# Patient Record
Sex: FEMALE | Race: WHITE | NOT HISPANIC OR LATINO | Employment: OTHER | URBAN - METROPOLITAN AREA
[De-identification: names, ages, dates, MRNs, and addresses within clinical notes are randomized per-mention and may not be internally consistent; named-entity substitution may affect disease eponyms.]

---

## 2017-01-19 ENCOUNTER — OFFICE VISIT (OUTPATIENT)
Dept: PULMONOLOGY | Facility: CLINIC | Age: 72
End: 2017-01-19

## 2017-01-19 VITALS
WEIGHT: 279 LBS | SYSTOLIC BLOOD PRESSURE: 130 MMHG | TEMPERATURE: 98.2 F | BODY MASS INDEX: 52.67 KG/M2 | RESPIRATION RATE: 16 BRPM | HEIGHT: 61 IN | OXYGEN SATURATION: 94 % | HEART RATE: 78 BPM | DIASTOLIC BLOOD PRESSURE: 72 MMHG

## 2017-01-19 DIAGNOSIS — J44.9 CHRONIC OBSTRUCTIVE PULMONARY DISEASE, UNSPECIFIED COPD TYPE (HCC): ICD-10-CM

## 2017-01-19 DIAGNOSIS — R06.02 SOB (SHORTNESS OF BREATH): Primary | ICD-10-CM

## 2017-01-19 PROCEDURE — 94726 PLETHYSMOGRAPHY LUNG VOLUMES: CPT | Performed by: INTERNAL MEDICINE

## 2017-01-19 PROCEDURE — 94375 RESPIRATORY FLOW VOLUME LOOP: CPT | Performed by: INTERNAL MEDICINE

## 2017-01-19 PROCEDURE — 99205 OFFICE O/P NEW HI 60 MIN: CPT | Performed by: INTERNAL MEDICINE

## 2017-01-19 PROCEDURE — 94729 DIFFUSING CAPACITY: CPT | Performed by: INTERNAL MEDICINE

## 2017-01-19 NOTE — LETTER
January 19, 2017     Chaz Rico DNP, APRN  [address]  [facility] [address]    Patient: Georgia Velázquez   YOB: 1945   Date of Visit: 1/19/2017       Dear Dr. Rico, FORTUNATO, APRN:    Georgia Velázquez was in my office today. Below is a copy of my note.    If you have questions, please do not hesitate to call me. I look forward to following Georgia along with you.         Sincerely,        Rajeev Sharif MD        CC: No Recipients    Pulmonary Medicine Evaluation    Chief Complaint     Shortness of breath on exertion    History of Present Illness/History Review     This is a very nice 71-year-old former smoker of approximately 22 pack years who quit about 10 years ago who presents for further evaluation of dyspnea on exertion. She states that she recently had a dry cough and her Dr. Rico stopped her lisinopril. Her cough is now better. She also complains of lower extremity edema which is been a problem for her for many years. This in fact began when she was a small girl. She also has a history of coronary artery disease and received a stent in the past. She believes that her echocardiogram does not show left ventricular dysfunction. She states that she sleeps very well however her family does note that she snores from time to time particularly when she lies on her back. She does sleep a lot through the daytime and sometimes is lethargic. She denies chest pain. She has not been evaluated for obstructive sleep apnea with a sleep study as yet though this is planned.    The patient states that she is not short of breath at baseline however if she gets up and walks around she does feel winded. She occasionally does have some wheezes. She has been prescribed as needed albuterol as well as Symbicort. She does not take the Symbicort as directed and uses it only as needed. She has not noticed any effect from it. She denies frequent exacerbations and has never  been hospitalized for a lung problem as far she knows. She denies dysphagia or odynophagia. There is been no hemoptysis with her previous cough.      Review of Systems   Constitutional: Positive for fatigue. Negative for activity change, appetite change, chills, diaphoresis, fever and unexpected weight change.   HENT: Negative for congestion, facial swelling, mouth sores, postnasal drip, rhinorrhea, sinus pressure, sneezing, trouble swallowing and voice change.    Eyes: Negative for photophobia and visual disturbance.   Respiratory: Positive for cough, shortness of breath and wheezing. Negative for apnea, choking, chest tightness and stridor.    Cardiovascular: Positive for leg swelling. Negative for chest pain and palpitations.   Gastrointestinal: Negative for abdominal distention, abdominal pain, constipation, diarrhea, nausea and vomiting.   Genitourinary: Negative for dysuria, frequency, hematuria and urgency.   Musculoskeletal: Negative for myalgias, neck pain and neck stiffness.   Skin: Negative for pallor, rash and wound.   Neurological: Negative for dizziness, tremors, seizures, syncope, numbness and headaches.   Hematological: Negative for adenopathy. Does not bruise/bleed easily.   All other systems reviewed and are negative.    Please see scanned patient review sheet for further.    No Known Allergies    Current Outpatient Prescriptions:   •  albuterol (PROVENTIL HFA;VENTOLIN HFA) 108 (90 BASE) MCG/ACT inhaler, Inhale 2 puffs every 4 (four) hours as needed for wheezing., Disp: 1 inhaler, Rfl: 11  •  alendronate (FOSAMAX) 70 MG tablet, Take 1 tablet by mouth every 7 days., Disp: 4 tablet, Rfl: 5  •  aspirin 81 MG chewable tablet, Chew 1 tablet daily., Disp: 30 tablet, Rfl: 3  •  atorvastatin (LIPITOR) 40 MG tablet, Take 1 tablet by mouth daily., Disp: 30 tablet, Rfl: 3  •  carvedilol (COREG) 3.125 MG tablet, Take 1 tablet by mouth 2 (two) times a day with meals. (Patient taking differently: Take 6.25 mg by  "mouth 2 (Two) Times a Day With Meals.), Disp: 90 tablet, Rfl: 3  •  glipiZIDE (GLUCOTROL) 5 MG ER tablet, Take 1 tablet by mouth daily., Disp: 30 tablet, Rfl: 3  •  metFORMIN (GLUCOPHAGE) 850 MG tablet, Take 1 tablet by mouth 2 (two) times a day with meals., Disp: 60 tablet, Rfl: 3  •  pioglitazone (ACTOS) 30 MG tablet, Take 1 tablet by mouth daily., Disp: 30 tablet, Rfl: 3  •  urea (CARMOL) 10 % cream, Apply  topically 2 (Two) Times a Day., Disp: 57 g, Rfl: 1  •  valsartan-hydrochlorothiazide (DIOVAN HCT) 160-25 MG per tablet, Take 1 tablet by mouth Daily., Disp: 30 tablet, Rfl: 2    Past Medical History   Diagnosis Date   • 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/2016   • Coronary artery disease 5/18/2016   • Essential hypertension 5/18/2016   • GERD (gastroesophageal reflux disease) 5/18/2016   • History of echocardiogram 10/16/2015     TDS.Est EF is 45-50%.Mild distal septal, anteroapical hypokinesia.Mild mitral stenosis.Mean gradient across mitral valve is 6 mmHg.Trace TR   • Hyperlipemia 5/18/2016   • Myocardial infarction 5/18/2016   • Osteoporosis 5/18/2016   • Sepsis      uro   • Type 2 diabetes mellitus 5/18/2016     Past Surgical History   Procedure Laterality Date   • Cholecystectomy     • Appendectomy         Family History   Problem Relation Age of Onset   • Diabetes Mother    • No Known Problems Father    • No Known Problems Sister    • No Known Problems Brother    • No Known Problems Son    • No Known Problems Daughter      Social History   Substance Use Topics   • Smoking status: Former Smoker   • Smokeless tobacco: Never Used   • Alcohol use No       Visit Vitals   • /72   • Pulse 78   • Temp 98.2 °F (36.8 °C)   • Resp 16   • Ht 60.5\" (153.7 cm)   • Wt 279 lb (127 kg)   • SpO2 94%  Comment: RA   • BMI 53.59 kg/m2     Physical Exam   Constitutional: She is oriented to person, place, and time. She appears well-developed and well-nourished. No distress.   Obese   HENT:   Head: Normocephalic and atraumatic. "   Nose: Nose normal.   Mouth/Throat: Oropharynx is clear and moist. No oropharyngeal exudate.   Vargas class IV airway   Eyes: Conjunctivae and EOM are normal. Pupils are equal, round, and reactive to light. Right eye exhibits no discharge. Left eye exhibits no discharge. No scleral icterus.   Neck: Normal range of motion. Neck supple. No JVD present. No tracheal deviation present. No thyromegaly present.   Cardiovascular: Normal rate, regular rhythm and normal heart sounds.  Exam reveals no gallop and no friction rub.    No murmur heard.  Pulmonary/Chest: Effort normal and breath sounds normal. No stridor. No respiratory distress. She has no wheezes. She has no rales. She exhibits no tenderness.   Abdominal: Soft. Bowel sounds are normal. She exhibits no distension. There is no tenderness. There is no rebound.   Musculoskeletal: Normal range of motion. She exhibits edema. She exhibits no tenderness or deformity.   There is chronic lower extremity venous stasis edema with woody changes. There is +1 edema about this.   Lymphadenopathy:     She has no cervical adenopathy.   Neurological: She is alert and oriented to person, place, and time.   Skin: Skin is warm. No rash noted. No erythema.   Psychiatric: She has a normal mood and affect. Her behavior is normal. Judgment and thought content normal.   Vitals reviewed.      Results     Pulmonary function tests were performed today. These show a mild nonspecific reduction of the flows. The FEV1 is actually normal at 1.51 L which is 81% of predicted. Flow volume loop looks normal. Lung volumes are normal but are approaching restriction with a TLC of 3.02 L which is 74% of predicted. Of note the calculated BMI is 53.4. Diffusing capacity when adjusted for alveolar volumes is normal.    Chest x-ray is reviewed. The lung volumes appear normal. There is increased pulmonary vascularity bilaterally possibly consistent with some degree of edema. There are no nodules or masses  seen. There is no infiltrate.        Problem List       ICD-10-CM ICD-9-CM   1. SOB (shortness of breath) R06.02 786.05   2. Chronic obstructive pulmonary disease, unspecified COPD type J44.9 496       Impression and Plan     Mrs. Velázquez is doing very well today. Her cough has resolved after the discontinuation of her ACE inhibitor. She primarily describes to be dyspnea on exertion without chest pain. I suspect that this is a multifactorial shortness of breath with contributions from underlying chronic obstructive pulmonary disease, deconditioning, possible slight pulmonary edema from her heart disease, and potentially some pulmonary hypertension from untreated obstructive sleep apnea. She does sleep a lot through the daytime and most likely does have sleep apnea which will need to be evaluated. I explained to her that sleep apnea puts a lot of stress on the body including the heart and lungs and can contribute to her feeling lethargic and have low energy.    I think it must be noted that the pulmonary function tests are not specifically diagnostic in her case of chronic obstructive pulmonary disease and the pattern we are seeing is likely mixed restriction from her elevated BMI as well as underlying chronic obstructive pulmonary disease.    She does not have a bronchitic component to her lung disease and I feel that she is likely not getting much benefit from her Symbicort. I have given her samples of Stiolto to use instead of this and I have explained the usage of this medicine to her. I have asked her to stop the Symbicort. She will continue the as needed albuterol.    She is currently up-to-date on her vaccinations.    I feel that she would benefit from referral to pulmonary rehabilitation and she seems very enthusiastic about this. The meantime I have asked her to increase her walking.    I will try to retrieve the records of her old echocardiogram to review.    I will plan to see her back in 2 months or so. I  have asked her to call me if she has any problems in the meantime.    Thank you very much resume to help in the care of this very nice patient.    Rajeev Sharif MD, [facility]    CC: Chaz Rico, DNP, APRN    Please note that portions of this note were completed with a voice recognition program. Efforts were made to edit the dictation, but occasionally words are mistranscribed.

## 2017-01-19 NOTE — MR AVS SNAPSHOT
Georgia GIULIANA Velázquez   1/19/2017 10:30 AM   Office Visit    Dept Phone:  [phone]   Encounter #:  [id number]    Provider:  Rajeev Sharif MD   Department:  [facility]                Your Full Care Plan              Today's Medication Changes          These changes are accurate as of: 1/19/17 11:36 AM.  If you have any questions, ask your nurse or doctor.               Stop taking medication(s)listed here:     budesonide-formoterol 160-4.5 MCG/ACT inhaler   Commonly known as:  SYMBICORT   Stopped by:  Rajeev Sharif MD           Umeclidinium Bromide 62.5 MCG/INH aerosol powder    Stopped by:  Rajeev Sharif MD                      Your Updated Medication List          This list is accurate as of: 1/19/17 11:36 AM.  Always use your most recent med list.                albuterol 108 (90 BASE) MCG/ACT inhaler   Commonly known as:  PROVENTIL HFA;VENTOLIN HFA   Inhale 2 puffs every 4 (four) hours as needed for wheezing.       alendronate 70 MG tablet   Commonly known as:  FOSAMAX   Take 1 tablet by mouth every 7 days.       aspirin 81 MG chewable tablet   Chew 1 tablet daily.       atorvastatin 40 MG tablet   Commonly known as:  LIPITOR   Take 1 tablet by mouth daily.       carvedilol 3.125 MG tablet   Commonly known as:  COREG   Take 1 tablet by mouth 2 (two) times a day with meals.       glipiZIDE 5 MG ER tablet   Commonly known as:  GLUCOTROL   Take 1 tablet by mouth daily.       metFORMIN 850 MG tablet   Commonly known as:  GLUCOPHAGE   Take 1 tablet by mouth 2 (two) times a day with meals.       pioglitazone 30 MG tablet   Commonly known as:  ACTOS   Take 1 tablet by mouth daily.       urea 10 % cream   Commonly known as:  CARMOL   Apply  topically 2 (Two) Times a Day.       valsartan-hydrochlorothiazide 160-25 MG per tablet   Commonly known as:  DIOVAN HCT   Take 1 tablet by mouth Daily.               We Performed the Following     "Pulmonary Function Test     XR Chest PA & Lateral       You Were Diagnosed With        Codes Comments    SOB (shortness of breath)    -  Primary ICD-10-CM: R06.02  ICD-9-CM: 786.05     Chronic obstructive pulmonary disease, unspecified COPD type     ICD-10-CM: J44.9  ICD-9-CM: 496       Instructions     None    Patient Instructions History      Upcoming Appointments     Visit Type Date Time Department    FULL PFT 1/19/2017 10:00 AM [facility]    CHEST X-RAY 1/19/2017  9:45 AM [facility]    NEW PATIENT 1/19/2017 10:30 AM [facility]    XR CHEST PA AND LATERAL 1/19/2017  9:50 AM [facility]    CONSULT 2/2/2017  1:00 PM [facility]    FOLLOW UP 3/23/2017  9:30 AM [facility]      MyChart Signup     Our records indicate that you have declined [facility] MyChart signup. If you would like to sign up for [employer]inks[employer], please email [email or web address] or call [phone] to obtain an activation code.             Other Info from Your Visit           Your Appointments     Feb 02, 2017  1:00 PM EST   Consult with Lamin Briggs MD   [facility] (--)    [address]  [facility] [address] [phone]           Please complete mailed new patient packets prior to follow up as well as bring a copy of insurance cards and ID to visit.            Mar 23, 2017  9:30 AM EDT   Follow Up with Rajeev Sharif MD   [facility] (--)    [address]  [facility] [address] [phone]           Arrive 15 minutes prior to appointment.              Allergies     No Known Allergies      Reason for Visit     Breathing Problem           Vital Signs     Blood Pressure Pulse Temperature Respirations Height Weight    130/72 78 98.2 °F (36.8 °C) 16 60.5\" (153.7 cm) 279 lb (127 kg)    Oxygen Saturation Body Mass Index Smoking Status             94% 53.59 kg/m2 Former " Smoker         Problems and Diagnoses Noted     SOB (shortness of breath)    -  Primary    Chronic airway obstruction          Results     XR Chest PA & Lateral           Pulmonary Function Test

## 2017-01-19 NOTE — PROGRESS NOTES
Pulmonary Medicine Evaluation    Chief Complaint     Shortness of breath on exertion    History of Present Illness/History Review     This is a very nice 71-year-old former smoker of approximately 22 pack years who quit about 10 years ago who presents for further evaluation of dyspnea on exertion. She states that she recently had a dry cough and her Dr. Rico stopped her lisinopril. Her cough is now better. She also complains of lower extremity edema which is been a problem for her for many years. This in fact began when she was a small girl. She also has a history of coronary artery disease and received a stent in the past. She believes that her echocardiogram does not show left ventricular dysfunction. She states that she sleeps very well however her family does note that she snores from time to time particularly when she lies on her back. She does sleep a lot through the daytime and sometimes is lethargic. She denies chest pain. She has not been evaluated for obstructive sleep apnea with a sleep study as yet though this is planned.    The patient states that she is not short of breath at baseline however if she gets up and walks around she does feel winded. She occasionally does have some wheezes. She has been prescribed as needed albuterol as well as Symbicort. She does not take the Symbicort as directed and uses it only as needed. She has not noticed any effect from it. She denies frequent exacerbations and has never been hospitalized for a lung problem as far she knows. She denies dysphagia or odynophagia. There is been no hemoptysis with her previous cough.      Review of Systems   Constitutional: Positive for fatigue. Negative for activity change, appetite change, chills, diaphoresis, fever and unexpected weight change.   HENT: Negative for congestion, facial swelling, mouth sores, postnasal drip, rhinorrhea, sinus pressure, sneezing, trouble swallowing and voice change.    Eyes: Negative for photophobia  and visual disturbance.   Respiratory: Positive for cough, shortness of breath and wheezing. Negative for apnea, choking, chest tightness and stridor.    Cardiovascular: Positive for leg swelling. Negative for chest pain and palpitations.   Gastrointestinal: Negative for abdominal distention, abdominal pain, constipation, diarrhea, nausea and vomiting.   Genitourinary: Negative for dysuria, frequency, hematuria and urgency.   Musculoskeletal: Negative for myalgias, neck pain and neck stiffness.   Skin: Negative for pallor, rash and wound.   Neurological: Negative for dizziness, tremors, seizures, syncope, numbness and headaches.   Hematological: Negative for adenopathy. Does not bruise/bleed easily.   All other systems reviewed and are negative.    Please see scanned patient review sheet for further.    No Known Allergies    Current Outpatient Prescriptions:   •  albuterol (PROVENTIL HFA;VENTOLIN HFA) 108 (90 BASE) MCG/ACT inhaler, Inhale 2 puffs every 4 (four) hours as needed for wheezing., Disp: 1 inhaler, Rfl: 11  •  alendronate (FOSAMAX) 70 MG tablet, Take 1 tablet by mouth every 7 days., Disp: 4 tablet, Rfl: 5  •  aspirin 81 MG chewable tablet, Chew 1 tablet daily., Disp: 30 tablet, Rfl: 3  •  atorvastatin (LIPITOR) 40 MG tablet, Take 1 tablet by mouth daily., Disp: 30 tablet, Rfl: 3  •  carvedilol (COREG) 3.125 MG tablet, Take 1 tablet by mouth 2 (two) times a day with meals. (Patient taking differently: Take 6.25 mg by mouth 2 (Two) Times a Day With Meals.), Disp: 90 tablet, Rfl: 3  •  glipiZIDE (GLUCOTROL) 5 MG ER tablet, Take 1 tablet by mouth daily., Disp: 30 tablet, Rfl: 3  •  metFORMIN (GLUCOPHAGE) 850 MG tablet, Take 1 tablet by mouth 2 (two) times a day with meals., Disp: 60 tablet, Rfl: 3  •  pioglitazone (ACTOS) 30 MG tablet, Take 1 tablet by mouth daily., Disp: 30 tablet, Rfl: 3  •  urea (CARMOL) 10 % cream, Apply  topically 2 (Two) Times a Day., Disp: 57 g, Rfl: 1  •  valsartan-hydrochlorothiazide  "(DIOVAN HCT) 160-25 MG per tablet, Take 1 tablet by mouth Daily., Disp: 30 tablet, Rfl: 2    Past Medical History   Diagnosis Date   • 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/2016   • Coronary artery disease 5/18/2016   • Essential hypertension 5/18/2016   • GERD (gastroesophageal reflux disease) 5/18/2016   • History of echocardiogram 10/16/2015     TDS.Est EF is 45-50%.Mild distal septal, anteroapical hypokinesia.Mild mitral stenosis.Mean gradient across mitral valve is 6 mmHg.Trace TR   • Hyperlipemia 5/18/2016   • Myocardial infarction 5/18/2016   • Osteoporosis 5/18/2016   • Sepsis      uro   • Type 2 diabetes mellitus 5/18/2016     Past Surgical History   Procedure Laterality Date   • Cholecystectomy     • Appendectomy         Family History   Problem Relation Age of Onset   • Diabetes Mother    • No Known Problems Father    • No Known Problems Sister    • No Known Problems Brother    • No Known Problems Son    • No Known Problems Daughter      Social History   Substance Use Topics   • Smoking status: Former Smoker   • Smokeless tobacco: Never Used   • Alcohol use No       Visit Vitals   • /72   • Pulse 78   • Temp 98.2 °F (36.8 °C)   • Resp 16   • Ht 60.5\" (153.7 cm)   • Wt 279 lb (127 kg)   • SpO2 94%  Comment: RA   • BMI 53.59 kg/m2     Physical Exam   Constitutional: She is oriented to person, place, and time. She appears well-developed and well-nourished. No distress.   Obese   HENT:   Head: Normocephalic and atraumatic.   Nose: Nose normal.   Mouth/Throat: Oropharynx is clear and moist. No oropharyngeal exudate.   Vargas class IV airway   Eyes: Conjunctivae and EOM are normal. Pupils are equal, round, and reactive to light. Right eye exhibits no discharge. Left eye exhibits no discharge. No scleral icterus.   Neck: Normal range of motion. Neck supple. No JVD present. No tracheal deviation present. No thyromegaly present.   Cardiovascular: Normal rate, regular rhythm and normal heart sounds.  Exam reveals no " gallop and no friction rub.    No murmur heard.  Pulmonary/Chest: Effort normal and breath sounds normal. No stridor. No respiratory distress. She has no wheezes. She has no rales. She exhibits no tenderness.   Abdominal: Soft. Bowel sounds are normal. She exhibits no distension. There is no tenderness. There is no rebound.   Musculoskeletal: Normal range of motion. She exhibits edema. She exhibits no tenderness or deformity.   There is chronic lower extremity venous stasis edema with woody changes. There is +1 edema about this.   Lymphadenopathy:     She has no cervical adenopathy.   Neurological: She is alert and oriented to person, place, and time.   Skin: Skin is warm. No rash noted. No erythema.   Psychiatric: She has a normal mood and affect. Her behavior is normal. Judgment and thought content normal.   Vitals reviewed.      Results     Pulmonary function tests were performed today. These show a mild nonspecific reduction of the flows. The FEV1 is actually normal at 1.51 L which is 81% of predicted. Flow volume loop looks normal. Lung volumes are normal but are approaching restriction with a TLC of 3.02 L which is 74% of predicted. Of note the calculated BMI is 53.4. Diffusing capacity when adjusted for alveolar volumes is normal.    Chest x-ray is reviewed. The lung volumes appear normal. There is increased pulmonary vascularity bilaterally possibly consistent with some degree of edema. There are no nodules or masses seen. There is no infiltrate.        Problem List       ICD-10-CM ICD-9-CM   1. SOB (shortness of breath) R06.02 786.05   2. Chronic obstructive pulmonary disease, unspecified COPD type J44.9 496       Impression and Plan     Mrs. Velázquez is doing very well today. Her cough has resolved after the discontinuation of her ACE inhibitor. She primarily describes to be dyspnea on exertion without chest pain. I suspect that this is a multifactorial shortness of breath with contributions from underlying  chronic obstructive pulmonary disease, deconditioning, possible slight pulmonary edema from her heart disease, and potentially some pulmonary hypertension from untreated obstructive sleep apnea. She does sleep a lot through the daytime and most likely does have sleep apnea which will need to be evaluated. I explained to her that sleep apnea puts a lot of stress on the body including the heart and lungs and can contribute to her feeling lethargic and have low energy.    I think it must be noted that the pulmonary function tests are not specifically diagnostic in her case of chronic obstructive pulmonary disease and the pattern we are seeing is likely mixed restriction from her elevated BMI as well as underlying chronic obstructive pulmonary disease.    She does not have a bronchitic component to her lung disease and I feel that she is likely not getting much benefit from her Symbicort. I have given her samples of Stiolto to use instead of this and I have explained the usage of this medicine to her. I have asked her to stop the Symbicort. She will continue the as needed albuterol.    She is currently up-to-date on her vaccinations.    I feel that she would benefit from referral to pulmonary rehabilitation and she seems very enthusiastic about this. The meantime I have asked her to increase her walking.    I will try to retrieve the records of her old echocardiogram to review.    I will plan to see her back in 2 months or so. I have asked her to call me if she has any problems in the meantime.    Thank you very much resume to help in the care of this very nice patient.    Rajeev Sharif MD, [facility]    CC: Chaz Rico, DNP, APRN    Please note that portions of this note were completed with a voice recognition program. Efforts were made to edit the dictation, but occasionally words are mistranscribed.

## 2017-01-20 ENCOUNTER — DOCUMENTATION (OUTPATIENT)
Dept: PULMONOLOGY | Facility: CLINIC | Age: 72
End: 2017-01-20

## 2017-03-02 ENCOUNTER — TELEPHONE (OUTPATIENT)
Dept: PULMONOLOGY | Facility: CLINIC | Age: 72
End: 2017-03-02

## 2017-03-02 ENCOUNTER — DOCUMENTATION (OUTPATIENT)
Dept: PULMONOLOGY | Facility: CLINIC | Age: 72
End: 2017-03-02

## 2017-03-03 ENCOUNTER — TELEPHONE (OUTPATIENT)
Dept: PULMONOLOGY | Facility: CLINIC | Age: 72
End: 2017-03-03

## 2017-03-17 ENCOUNTER — TELEPHONE (OUTPATIENT)
Dept: PULMONOLOGY | Facility: CLINIC | Age: 72
End: 2017-03-17

## 2017-03-17 NOTE — TELEPHONE ENCOUNTER
Called patient and canceled evaluation for 03/20/2017. Will call patient back with a new date. Patient understood and had no further questions.

## 2017-04-18 DIAGNOSIS — T46.4X5A ACE-INHIBITOR COUGH: ICD-10-CM

## 2017-04-18 DIAGNOSIS — R05.8 ACE-INHIBITOR COUGH: ICD-10-CM

## 2017-04-18 RX ORDER — VALSARTAN AND HYDROCHLOROTHIAZIDE 160; 25 MG/1; MG/1
TABLET ORAL
Qty: 30 TABLET | Refills: 1 | OUTPATIENT
Start: 2017-04-18

## 2017-05-16 ENCOUNTER — OFFICE VISIT (OUTPATIENT)
Dept: FAMILY MEDICINE CLINIC | Facility: CLINIC | Age: 72
End: 2017-05-16

## 2017-05-16 VITALS
OXYGEN SATURATION: 94 % | HEART RATE: 78 BPM | WEIGHT: 271 LBS | HEIGHT: 61 IN | SYSTOLIC BLOOD PRESSURE: 112 MMHG | TEMPERATURE: 98.4 F | BODY MASS INDEX: 51.16 KG/M2 | DIASTOLIC BLOOD PRESSURE: 60 MMHG

## 2017-05-16 DIAGNOSIS — E11.9 CONTROLLED TYPE 2 DIABETES MELLITUS WITHOUT COMPLICATION, WITHOUT LONG-TERM CURRENT USE OF INSULIN (HCC): Primary | ICD-10-CM
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
## 2017-05-16 DIAGNOSIS — R53.83 FATIGUE, UNSPECIFIED TYPE: ICD-10-CM

## 2017-05-16 DIAGNOSIS — E16.2 HYPOGLYCEMIA: ICD-10-CM

## 2017-05-16 DIAGNOSIS — R19.7 DIARRHEA, UNSPECIFIED TYPE: ICD-10-CM

## 2017-05-16 DIAGNOSIS — Z23 IMMUNIZATION DUE: ICD-10-CM

## 2017-05-16 DIAGNOSIS — R11.2 NON-INTRACTABLE VOMITING WITH NAUSEA, UNSPECIFIED VOMITING TYPE: ICD-10-CM

## 2017-05-16 DIAGNOSIS — R06.02 SHORTNESS OF BREATH: ICD-10-CM

## 2017-05-16 DIAGNOSIS — R42 DIZZINESS: ICD-10-CM

## 2017-05-16 LAB
ALBUMIN SERPL-MCNC: 4 G/DL (ref 3.2–4.8)
ALBUMIN/GLOB SERPL: 1.3 G/DL (ref 1.5–2.5)
ALP SERPL-CCNC: 123 U/L (ref 25–100)
ALT SERPL W P-5'-P-CCNC: 38 U/L (ref 7–40)
AMYLASE SERPL-CCNC: 54 U/L (ref 30–118)
ANION GAP SERPL CALCULATED.3IONS-SCNC: 9 MMOL/L (ref 3–11)
ARTICHOKE IGE QN: 91 MG/DL (ref 0–130)
AST SERPL-CCNC: 37 U/L (ref 0–33)
BASOPHILS # BLD AUTO: 0.02 10*3/MM3 (ref 0–0.2)
BASOPHILS NFR BLD AUTO: 0.2 % (ref 0–1)
BILIRUB SERPL-MCNC: 0.5 MG/DL (ref 0.3–1.2)
BUN BLD-MCNC: 27 MG/DL (ref 9–23)
BUN/CREAT SERPL: 20.8 (ref 7–25)
CALCIUM SPEC-SCNC: 9.4 MG/DL (ref 8.7–10.4)
CHLORIDE SERPL-SCNC: 106 MMOL/L (ref 99–109)
CHOLEST SERPL-MCNC: 149 MG/DL (ref 0–200)
CO2 SERPL-SCNC: 27 MMOL/L (ref 20–31)
CREAT BLD-MCNC: 1.3 MG/DL (ref 0.6–1.3)
DEPRECATED RDW RBC AUTO: 51.3 FL (ref 37–54)
EOSINOPHIL # BLD AUTO: 0.07 10*3/MM3 (ref 0.1–0.3)
EOSINOPHIL NFR BLD AUTO: 0.8 % (ref 0–3)
ERYTHROCYTE [DISTWIDTH] IN BLOOD BY AUTOMATED COUNT: 14.4 % (ref 11.3–14.5)
GFR SERPL CREATININE-BSD FRML MDRD: 40 ML/MIN/1.73
GLOBULIN UR ELPH-MCNC: 3.1 GM/DL
GLUCOSE BLD-MCNC: 80 MG/DL (ref 70–100)
HBA1C MFR BLD: 5.6 %
HCT VFR BLD AUTO: 39 % (ref 34.5–44)
HDLC SERPL-MCNC: 35 MG/DL (ref 40–60)
HGB BLD-MCNC: 12.1 G/DL (ref 11.5–15.5)
IMM GRANULOCYTES # BLD: 0.02 10*3/MM3 (ref 0–0.03)
IMM GRANULOCYTES NFR BLD: 0.2 % (ref 0–0.6)
LIPASE SERPL-CCNC: 37 U/L (ref 6–51)
LYMPHOCYTES # BLD AUTO: 1.97 10*3/MM3 (ref 0.6–4.8)
LYMPHOCYTES NFR BLD AUTO: 21.9 % (ref 24–44)
MCH RBC QN AUTO: 30.1 PG (ref 27–31)
MCHC RBC AUTO-ENTMCNC: 31 G/DL (ref 32–36)
MCV RBC AUTO: 97 FL (ref 80–99)
MONOCYTES # BLD AUTO: 0.9 10*3/MM3 (ref 0–1)
MONOCYTES NFR BLD AUTO: 10 % (ref 0–12)
NEUTROPHILS # BLD AUTO: 6.02 10*3/MM3 (ref 1.5–8.3)
NEUTROPHILS NFR BLD AUTO: 66.9 % (ref 41–71)
PLATELET # BLD AUTO: 225 10*3/MM3 (ref 150–450)
PMV BLD AUTO: 11.3 FL (ref 6–12)
POTASSIUM BLD-SCNC: 3.7 MMOL/L (ref 3.5–5.5)
PROT SERPL-MCNC: 7.1 G/DL (ref 5.7–8.2)
RBC # BLD AUTO: 4.02 10*6/MM3 (ref 3.89–5.14)
SODIUM BLD-SCNC: 142 MMOL/L (ref 132–146)
TRIGL SERPL-MCNC: 124 MG/DL (ref 0–150)
TSH SERPL DL<=0.05 MIU/L-ACNC: 1.48 MIU/ML (ref 0.35–5.35)
WBC NRBC COR # BLD: 9 10*3/MM3 (ref 3.5–10.8)

## 2017-05-16 PROCEDURE — 85025 COMPLETE CBC W/AUTO DIFF WBC: CPT | Performed by: NURSE PRACTITIONER

## 2017-05-16 PROCEDURE — G0009 ADMIN PNEUMOCOCCAL VACCINE: HCPCS | Performed by: NURSE PRACTITIONER

## 2017-05-16 PROCEDURE — 99214 OFFICE O/P EST MOD 30 MIN: CPT | Performed by: NURSE PRACTITIONER

## 2017-05-16 PROCEDURE — 84443 ASSAY THYROID STIM HORMONE: CPT | Performed by: NURSE PRACTITIONER

## 2017-05-16 PROCEDURE — 36415 COLL VENOUS BLD VENIPUNCTURE: CPT | Performed by: NURSE PRACTITIONER

## 2017-05-16 PROCEDURE — 83036 HEMOGLOBIN GLYCOSYLATED A1C: CPT | Performed by: NURSE PRACTITIONER

## 2017-05-16 PROCEDURE — 90732 PPSV23 VACC 2 YRS+ SUBQ/IM: CPT | Performed by: NURSE PRACTITIONER

## 2017-05-16 PROCEDURE — 80053 COMPREHEN METABOLIC PANEL: CPT | Performed by: NURSE PRACTITIONER

## 2017-05-16 PROCEDURE — 82150 ASSAY OF AMYLASE: CPT | Performed by: NURSE PRACTITIONER

## 2017-05-16 PROCEDURE — 83690 ASSAY OF LIPASE: CPT | Performed by: NURSE PRACTITIONER

## 2017-05-16 PROCEDURE — 80061 LIPID PANEL: CPT | Performed by: NURSE PRACTITIONER

## 2017-05-16 RX ORDER — ONDANSETRON 4 MG/1
4 TABLET, FILM COATED ORAL EVERY 8 HOURS PRN
Qty: 24 TABLET | Refills: 0 | Status: SHIPPED | OUTPATIENT
Start: 2017-05-16 | End: 2017-11-07

## 2017-06-05 DIAGNOSIS — E11.9 TYPE 2 DIABETES MELLITUS WITHOUT COMPLICATION, UNSPECIFIED LONG TERM INSULIN USE STATUS: Primary | ICD-10-CM

## 2017-08-07 DIAGNOSIS — T46.4X5A ACE-INHIBITOR COUGH: ICD-10-CM

## 2017-08-07 DIAGNOSIS — R05.8 ACE-INHIBITOR COUGH: ICD-10-CM

## 2017-08-08 RX ORDER — VALSARTAN AND HYDROCHLOROTHIAZIDE 160; 25 MG/1; MG/1
TABLET ORAL
Qty: 30 TABLET | Refills: 1 | Status: SHIPPED | OUTPATIENT
Start: 2017-08-08 | End: 2017-11-07 | Stop reason: SDUPTHER

## 2017-08-23 DIAGNOSIS — E11.36 TYPE 2 DIABETES MELLITUS WITH DIABETIC CATARACT (HCC): ICD-10-CM

## 2017-08-28 RX ORDER — PIOGLITAZONEHYDROCHLORIDE 30 MG/1
TABLET ORAL
Qty: 30 TABLET | Refills: 1 | Status: SHIPPED | OUTPATIENT
Start: 2017-08-28 | End: 2017-11-07 | Stop reason: ALTCHOICE

## 2017-10-27 ENCOUNTER — TELEPHONE (OUTPATIENT)
Dept: FAMILY MEDICINE CLINIC | Facility: CLINIC | Age: 72
End: 2017-10-27

## 2017-10-27 DIAGNOSIS — E11.36 TYPE 2 DIABETES MELLITUS WITH DIABETIC CATARACT, UNSPECIFIED LONG TERM INSULIN USE STATUS: Primary | ICD-10-CM

## 2017-10-27 NOTE — TELEPHONE ENCOUNTER
Pt has been informed that a referral for podiatrist  will be put in and she will be contacted to make a appointment for her diabetic shoes.             ----- Message from Chaz Rico DNP, APRN sent at 10/27/2017 10:45 AM EDT -----  Contact: [phone]  You will have to let her family know. I will put a referral in. She can make her own appt.  ----- Message -----     From: Shannan Lo MA     Sent: 10/27/2017   9:37 AM       To: Chaz Rico DNP, APRN     Does she need a referral ?  ----- Message -----     From: Chaz Rico DNP, APRN     Sent: 10/26/2017   4:36 PM       To: Shannan Lo MA    This is the first I have heard about this. NP's cannot order diabetic shoes. She should see a podiatrist who can order them.  ----- Message -----     From: Shannan Lo MA     Sent: 10/26/2017   4:25 PM       To: Chaz Rico DNP, APRN    Did we forge to order her a diabetic shoe?   ----- Message -----     From: Georgina Hines MA     Sent: 10/25/2017  11:56 AM       To: Shannan Lo MA        ----- Message -----     From: Danitza Rollins     Sent: 10/25/2017  10:41 AM       To: CHINEDU Leon @ [facility] IS CHECKING THE STATUS OF A ORDER REQUEST FOR DIABETIC SHOES

## 2017-11-07 ENCOUNTER — OFFICE VISIT (OUTPATIENT)
Dept: FAMILY MEDICINE CLINIC | Facility: CLINIC | Age: 72
End: 2017-11-07

## 2017-11-07 VITALS
BODY MASS INDEX: 48.9 KG/M2 | RESPIRATION RATE: 16 BRPM | DIASTOLIC BLOOD PRESSURE: 80 MMHG | WEIGHT: 259 LBS | SYSTOLIC BLOOD PRESSURE: 124 MMHG | OXYGEN SATURATION: 97 % | HEART RATE: 75 BPM | HEIGHT: 61 IN | TEMPERATURE: 98.3 F

## 2017-11-07 DIAGNOSIS — I25.83 CORONARY ARTERY DISEASE DUE TO LIPID RICH PLAQUE: ICD-10-CM

## 2017-11-07 DIAGNOSIS — I25.10 CORONARY ARTERY DISEASE DUE TO LIPID RICH PLAQUE: ICD-10-CM

## 2017-11-07 DIAGNOSIS — I10 ESSENTIAL HYPERTENSION: ICD-10-CM

## 2017-11-07 DIAGNOSIS — E78.5 HYPERLIPIDEMIA, UNSPECIFIED HYPERLIPIDEMIA TYPE: ICD-10-CM

## 2017-11-07 DIAGNOSIS — T46.4X5A ACE-INHIBITOR COUGH: ICD-10-CM

## 2017-11-07 DIAGNOSIS — R05.8 ACE-INHIBITOR COUGH: ICD-10-CM

## 2017-11-07 DIAGNOSIS — Z23 IMMUNIZATION DUE: ICD-10-CM

## 2017-11-07 DIAGNOSIS — Z12.31 ENCOUNTER FOR SCREENING MAMMOGRAM FOR BREAST CANCER: ICD-10-CM

## 2017-11-07 DIAGNOSIS — K29.00 OTHER ACUTE GASTRITIS WITHOUT HEMORRHAGE: ICD-10-CM

## 2017-11-07 DIAGNOSIS — E11.9 TYPE 2 DIABETES MELLITUS WITHOUT COMPLICATION, UNSPECIFIED LONG TERM INSULIN USE STATUS: Primary | ICD-10-CM

## 2017-11-07 DIAGNOSIS — N30.01 ACUTE CYSTITIS WITH HEMATURIA: ICD-10-CM

## 2017-11-07 DIAGNOSIS — E11.36 TYPE 2 DIABETES MELLITUS WITH DIABETIC CATARACT, WITHOUT LONG-TERM CURRENT USE OF INSULIN (HCC): ICD-10-CM

## 2017-11-07 DIAGNOSIS — M81.0 OSTEOPOROSIS WITHOUT CURRENT PATHOLOGICAL FRACTURE, UNSPECIFIED OSTEOPOROSIS TYPE: ICD-10-CM

## 2017-11-07 LAB
ALBUMIN SERPL-MCNC: 4.4 G/DL (ref 3.2–4.8)
ALBUMIN/GLOB SERPL: 1.5 G/DL (ref 1.5–2.5)
ALP SERPL-CCNC: 115 U/L (ref 25–100)
ALT SERPL W P-5'-P-CCNC: 11 U/L (ref 7–40)
ANION GAP SERPL CALCULATED.3IONS-SCNC: 9 MMOL/L (ref 3–11)
ARTICHOKE IGE QN: 90 MG/DL (ref 0–130)
AST SERPL-CCNC: 18 U/L (ref 0–33)
BASOPHILS # BLD AUTO: 0.02 10*3/MM3 (ref 0–0.2)
BASOPHILS NFR BLD AUTO: 0.2 % (ref 0–1)
BILIRUB BLD-MCNC: NEGATIVE MG/DL
BILIRUB SERPL-MCNC: 0.6 MG/DL (ref 0.3–1.2)
BUN BLD-MCNC: 24 MG/DL (ref 9–23)
BUN/CREAT SERPL: 18.5 (ref 7–25)
CALCIUM SPEC-SCNC: 9.3 MG/DL (ref 8.7–10.4)
CHLORIDE SERPL-SCNC: 107 MMOL/L (ref 99–109)
CHOLEST SERPL-MCNC: 146 MG/DL (ref 0–200)
CLARITY, POC: CLEAR
CO2 SERPL-SCNC: 27 MMOL/L (ref 20–31)
COLOR UR: YELLOW
CREAT BLD-MCNC: 1.3 MG/DL (ref 0.6–1.3)
DEPRECATED RDW RBC AUTO: 49.6 FL (ref 37–54)
EOSINOPHIL # BLD AUTO: 0.18 10*3/MM3 (ref 0–0.3)
EOSINOPHIL NFR BLD AUTO: 1.6 % (ref 0–3)
ERYTHROCYTE [DISTWIDTH] IN BLOOD BY AUTOMATED COUNT: 14.8 % (ref 11.3–14.5)
GFR SERPL CREATININE-BSD FRML MDRD: 40 ML/MIN/1.73
GLOBULIN UR ELPH-MCNC: 3 GM/DL
GLUCOSE BLD-MCNC: 95 MG/DL (ref 70–100)
GLUCOSE UR STRIP-MCNC: NEGATIVE MG/DL
HBA1C MFR BLD: 5.7 %
HCT VFR BLD AUTO: 42.5 % (ref 34.5–44)
HDLC SERPL-MCNC: 41 MG/DL (ref 40–60)
HGB BLD-MCNC: 13.5 G/DL (ref 11.5–15.5)
IMM GRANULOCYTES # BLD: 0.02 10*3/MM3 (ref 0–0.03)
IMM GRANULOCYTES NFR BLD: 0.2 % (ref 0–0.6)
KETONES UR QL: NEGATIVE
LEUKOCYTE EST, POC: ABNORMAL
LYMPHOCYTES # BLD AUTO: 2.4 10*3/MM3 (ref 0.6–4.8)
LYMPHOCYTES NFR BLD AUTO: 21.7 % (ref 24–44)
MCH RBC QN AUTO: 29.3 PG (ref 27–31)
MCHC RBC AUTO-ENTMCNC: 31.8 G/DL (ref 32–36)
MCV RBC AUTO: 92.4 FL (ref 80–99)
MONOCYTES # BLD AUTO: 0.76 10*3/MM3 (ref 0–1)
MONOCYTES NFR BLD AUTO: 6.9 % (ref 0–12)
NEUTROPHILS # BLD AUTO: 7.68 10*3/MM3 (ref 1.5–8.3)
NEUTROPHILS NFR BLD AUTO: 69.4 % (ref 41–71)
NITRITE UR-MCNC: POSITIVE MG/ML
PH UR: 6.5 [PH] (ref 5–8)
PLATELET # BLD AUTO: 235 10*3/MM3 (ref 150–450)
PMV BLD AUTO: 10.6 FL (ref 6–12)
POC CREATININE URINE: 300
POC MICROALBUMIN URINE: 150
POTASSIUM BLD-SCNC: 4.6 MMOL/L (ref 3.5–5.5)
PROT SERPL-MCNC: 7.4 G/DL (ref 5.7–8.2)
PROT UR STRIP-MCNC: ABNORMAL MG/DL
RBC # BLD AUTO: 4.6 10*6/MM3 (ref 3.89–5.14)
RBC # UR STRIP: ABNORMAL /UL
SODIUM BLD-SCNC: 143 MMOL/L (ref 132–146)
SP GR UR: 1.02 (ref 1–1.03)
TRIGL SERPL-MCNC: 112 MG/DL (ref 0–150)
TSH SERPL DL<=0.05 MIU/L-ACNC: 1.45 MIU/ML (ref 0.35–5.35)
UROBILINOGEN UR QL: NORMAL
WBC NRBC COR # BLD: 11.06 10*3/MM3 (ref 3.5–10.8)

## 2017-11-07 PROCEDURE — 36415 COLL VENOUS BLD VENIPUNCTURE: CPT | Performed by: NURSE PRACTITIONER

## 2017-11-07 PROCEDURE — 90715 TDAP VACCINE 7 YRS/> IM: CPT | Performed by: NURSE PRACTITIONER

## 2017-11-07 PROCEDURE — 80053 COMPREHEN METABOLIC PANEL: CPT | Performed by: NURSE PRACTITIONER

## 2017-11-07 PROCEDURE — 84443 ASSAY THYROID STIM HORMONE: CPT | Performed by: NURSE PRACTITIONER

## 2017-11-07 PROCEDURE — 90472 IMMUNIZATION ADMIN EACH ADD: CPT | Performed by: NURSE PRACTITIONER

## 2017-11-07 PROCEDURE — 82044 UR ALBUMIN SEMIQUANTITATIVE: CPT | Performed by: NURSE PRACTITIONER

## 2017-11-07 PROCEDURE — 80061 LIPID PANEL: CPT | Performed by: NURSE PRACTITIONER

## 2017-11-07 PROCEDURE — 90662 IIV NO PRSV INCREASED AG IM: CPT | Performed by: NURSE PRACTITIONER

## 2017-11-07 PROCEDURE — G0008 ADMIN INFLUENZA VIRUS VAC: HCPCS | Performed by: NURSE PRACTITIONER

## 2017-11-07 PROCEDURE — 81003 URINALYSIS AUTO W/O SCOPE: CPT | Performed by: NURSE PRACTITIONER

## 2017-11-07 PROCEDURE — 83036 HEMOGLOBIN GLYCOSYLATED A1C: CPT | Performed by: NURSE PRACTITIONER

## 2017-11-07 PROCEDURE — 99214 OFFICE O/P EST MOD 30 MIN: CPT | Performed by: NURSE PRACTITIONER

## 2017-11-07 PROCEDURE — 85025 COMPLETE CBC W/AUTO DIFF WBC: CPT | Performed by: NURSE PRACTITIONER

## 2017-11-07 RX ORDER — NITROFURANTOIN 25; 75 MG/1; MG/1
100 CAPSULE ORAL 2 TIMES DAILY
Qty: 14 CAPSULE | Refills: 0 | Status: SHIPPED | OUTPATIENT
Start: 2017-11-07 | End: 2017-12-08 | Stop reason: HOSPADM

## 2017-11-07 RX ORDER — CEFPODOXIME PROXETIL 200 MG/1
200 TABLET, FILM COATED ORAL EVERY 12 HOURS
COMMUNITY
End: 2017-11-07

## 2017-11-07 RX ORDER — ALENDRONATE SODIUM 70 MG/1
70 TABLET ORAL
Qty: 4 TABLET | Refills: 5 | Status: SHIPPED | OUTPATIENT
Start: 2017-11-07 | End: 2019-03-21

## 2017-11-07 RX ORDER — CARVEDILOL 3.12 MG/1
3.12 TABLET ORAL 2 TIMES DAILY WITH MEALS
Qty: 60 TABLET | Refills: 3 | Status: SHIPPED | OUTPATIENT
Start: 2017-11-07 | End: 2017-12-08 | Stop reason: HOSPADM

## 2017-11-07 RX ORDER — VALSARTAN AND HYDROCHLOROTHIAZIDE 160; 25 MG/1; MG/1
1 TABLET ORAL DAILY
Qty: 30 TABLET | Refills: 1 | Status: SHIPPED | OUTPATIENT
Start: 2017-11-07 | End: 2017-12-08 | Stop reason: HOSPADM

## 2017-11-07 RX ORDER — ASPIRIN 81 MG/1
81 TABLET, CHEWABLE ORAL DAILY
Qty: 30 TABLET | Refills: 3 | Status: SHIPPED | OUTPATIENT
Start: 2017-11-07 | End: 2020-07-21

## 2017-11-07 RX ORDER — PANTOPRAZOLE SODIUM 40 MG/1
40 TABLET, DELAYED RELEASE ORAL DAILY
Qty: 30 TABLET | Refills: 1 | Status: SHIPPED | OUTPATIENT
Start: 2017-11-07 | End: 2018-01-03 | Stop reason: SDUPTHER

## 2017-11-07 RX ORDER — PANTOPRAZOLE SODIUM 40 MG/1
40 TABLET, DELAYED RELEASE ORAL DAILY
COMMUNITY
End: 2017-11-07 | Stop reason: SDUPTHER

## 2017-11-07 RX ORDER — ATORVASTATIN CALCIUM 40 MG/1
40 TABLET, FILM COATED ORAL DAILY
Qty: 30 TABLET | Refills: 3 | Status: SHIPPED | OUTPATIENT
Start: 2017-11-07 | End: 2018-01-12

## 2017-11-08 NOTE — PROGRESS NOTES
Subjective   Georgia Velázquez is a 72 y.o. female.     History of Present Illness Patient presents with her daughter for new medical problem and follow up on established medical problems.  1. On Halloween she was sitting in a chair passing out candy with her daughter. Since then she has had back pain in the left low back and right buttock that will occasionally radiate down her right leg. She has had some urinary incontinence and frequency but denies any burning. No fever. She does have constipation. Treated the back pain with tylenol with good relief.  2. She was to follow up 2 weeks after last visit but failed to do so. She did see her cardiologist and he adjusted medications but she nor her daughter are 100% sure of her meds. I do not have a copy of that note.   3. She is not checking her blood pressure at home. Denies chest pain, palpitations or worsening edema.  4. She has diabetes. She is trying to follow a low carb diet. Weight loss is noted. She has not been to podiatry yet.  5. She has still not returned her cologuard. Her daughter states she will help her complete this task this week.  6. Ms Velázquez lives alone at [place]. Her daughter is having to assist her with baths. She ambulates with a rolling walker. Her daughter will also have to start filling her medication box.  7. She needs lab and immunizations today. Agress to mammogram. Will schedule medicare wellness.  8. Her previous symptoms of dizziness, abd pain, nausea and diarrhea.  The following portions of the patient's history were reviewed and updated as appropriate: allergies, current medications, past family history, past medical history, past social history, past surgical history and problem list.    Review of Systems   Constitutional: Negative for fatigue, fever and unexpected weight change.   HENT: Negative for congestion, hearing loss, nosebleeds, rhinorrhea, sore throat, trouble swallowing and voice change.    Eyes: Negative for pain,  discharge, redness and visual disturbance.   Respiratory: Negative for cough, chest tightness, shortness of breath and wheezing.    Cardiovascular: Negative for chest pain, palpitations and leg swelling.   Gastrointestinal: Negative for abdominal distention, abdominal pain, anal bleeding, blood in stool, constipation, diarrhea, nausea and vomiting.   Endocrine: Negative for cold intolerance, heat intolerance, polydipsia, polyphagia and polyuria.   Genitourinary: Positive for flank pain and frequency. Negative for dysuria and hematuria.   Musculoskeletal: Positive for back pain. Negative for arthralgias, gait problem, joint swelling and myalgias.   Skin: Positive for rash. Negative for color change.   Neurological: Negative for dizziness, tremors, seizures, syncope, speech difficulty, weakness, numbness and headaches.   Hematological: Negative.    Psychiatric/Behavioral: Negative.        Objective   Physical Exam   Constitutional: She is oriented to person, place, and time. She appears well-developed and well-nourished. No distress.   HENT:   Head: Normocephalic and atraumatic.   Right Ear: Tympanic membrane and external ear normal.   Left Ear: Tympanic membrane and external ear normal.   Nose: Nose normal.   Mouth/Throat: Oropharynx is clear and moist. No oropharyngeal exudate.   Eyes: Conjunctivae are normal. Pupils are equal, round, and reactive to light. Right eye exhibits no discharge. Left eye exhibits no discharge. No scleral icterus.   Neck: Neck supple. No tracheal deviation present. No thyromegaly present.   Cardiovascular: Normal rate, regular rhythm and normal heart sounds.  Exam reveals no gallop and no friction rub.    No murmur heard.  Pulmonary/Chest: Effort normal and breath sounds normal. No respiratory distress. She has no wheezes.   Abdominal: Soft. Bowel sounds are normal. She exhibits no distension and no mass. There is no tenderness.   Musculoskeletal: She exhibits tenderness. She exhibits no  edema or deformity.   Tenderness with palpation of low back. Possible muscle spasm on the left. Some pain in right sciatic notch. Strong dorsiflexion of toes bilat.   Lymphadenopathy:     She has no cervical adenopathy.   Neurological: She is alert and oriented to person, place, and time. Coordination normal.   Skin: Skin is warm and dry. Rash noted. No erythema.   Feet and ankles with heavy plaques.   Psychiatric: She has a normal mood and affect. Her speech is normal and behavior is normal. Judgment and thought content normal.   Nursing note and vitals reviewed.      Assessment/Plan   Georgia was seen today for back pain.    Diagnoses and all orders for this visit:    Type 2 diabetes mellitus without complication, unspecified long term insulin use status  -     glucose blood (ACCU-CHEK ANNIE PLUS) test strip; Use once daily  -     POC Glycosylated Hemoglobin (Hb A1C)  -     POC Microalbumin  -     POC Urinalysis Dipstick, Automated  -     CBC & Differential  -     Comprehensive Metabolic Panel  -     TSH  -     CBC Auto Differential    Encounter for screening mammogram for breast cancer  -     Mammo Screening Digital Tomosynthesis Bilateral With CAD; Future    ACE-inhibitor cough  -     valsartan-hydrochlorothiazide (DIOVAN-HCT) 160-25 MG per tablet; Take 1 tablet by mouth Daily.    Type 2 diabetes mellitus with diabetic cataract, without long-term current use of insulin  -     metFORMIN (GLUCOPHAGE) 850 MG tablet; Take 1 tablet by mouth 2 (Two) Times a Day With Meals.    Coronary artery disease due to lipid rich plaque  -     carvedilol (COREG) 3.125 MG tablet; Take 1 tablet by mouth 2 (Two) Times a Day With Meals.  -     Lipid Panel    Osteoporosis without current pathological fracture, unspecified osteoporosis type  -     alendronate (FOSAMAX) 70 MG tablet; Take 1 tablet by mouth Every 7 (Seven) Days.    Essential hypertension  -     aspirin 81 MG chewable tablet; Chew 1 tablet Daily.    Hyperlipidemia,  unspecified hyperlipidemia type  -     atorvastatin (LIPITOR) 40 MG tablet; Take 1 tablet by mouth Daily.    Other acute gastritis without hemorrhage  -     pantoprazole (PROTONIX) 40 MG EC tablet; Take 1 tablet by mouth Daily.    Immunization due  -     Flu Vaccine High Dose PF 65YR+  -     Tdap Vaccine Greater Than or Equal To 6yo IM    Acute cystitis with hematuria  -     nitrofurantoin, macrocrystal-monohydrate, (MACROBID) 100 MG capsule; Take 1 capsule by mouth 2 (Two) Times a Day.    After multiple phone calls I determined that she has NOT seen her cardiologist. She has an appt with him in 3 weeks. I reviewed her meds with her daughter. The daughter will take over her meds and fill her pill bottles. She is also getting someone to help with bathing and household chores.  We discussed her UTI and need to finish antibiotics.  Anticipate futher referrals and changes in meds.  Discussed the nature of the disease including, risks, complications, implications, management, safe and proper use of medications. Encouraged therapeutic lifestyle changes including low calorie diet with plenty of fruits and vegetables, daily exercise, medication compliance, and keeping scheduled follow up appointments with me and any other providers. Encouraged patient to have appointment for complete physical, fasting labs, appropriate screenings, and immunizations on an annual basis.  Follow up symptoms persist or worsen.  Increase water. Decrease caffeine. Discussed proper personal hygiene. Follow up for fever, nausea, flank pain or worsening symptoms.  VIS provided.  I personally spent over half of a total 45 minutes face to face with the patient in counseling and discussion and/or coordination of care as described above.

## 2017-11-09 ENCOUNTER — TELEPHONE (OUTPATIENT)
Dept: FAMILY MEDICINE CLINIC | Facility: CLINIC | Age: 72
End: 2017-11-09

## 2017-11-09 NOTE — TELEPHONE ENCOUNTER
Provider already spoke to pt.   ----- Message from Chaz Rico DNP, APRN sent at 11/9/2017 12:53 PM EST -----  Regarding: RE: RETURNED YOUR CALL  Contact: [phone]  No. I already spoke to her.  ----- Message -----     From: Shannan Lo MA     Sent: 11/9/2017  12:48 PM       To: Chaz Rico DNP, APRN  Subject: FW: RETURNED YOUR CALL                           Have you called this pt ?   ----- Message -----     From: Kasey García     Sent: 11/8/2017  10:41 AM       To: Shannan Lo MA  Subject: RETURNED YOUR CALL                               SHE IS RETURNING YOUR CALL.  PLEASE CALL PATIENT

## 2017-11-13 ENCOUNTER — TELEPHONE (OUTPATIENT)
Dept: FAMILY MEDICINE CLINIC | Facility: CLINIC | Age: 72
End: 2017-11-13

## 2017-11-13 RX ORDER — CYCLOBENZAPRINE HCL 10 MG
TABLET ORAL
Qty: 30 TABLET | Refills: 0 | Status: SHIPPED | OUTPATIENT
Start: 2017-11-13 | End: 2017-12-29

## 2017-11-13 NOTE — TELEPHONE ENCOUNTER
----- Message from Shannan Lo MA sent at 11/13/2017  8:53 AM EST -----  Regarding: FW: PLEASE CALL  Contact: [phone]  Pt wants a muscle relaxer called in for back pain.   ----- Message -----     From: Adelina Washington     Sent: 11/13/2017   8:35 AM       To: Shannan Lo MA  Subject: PLEASE CALL                                      PLEASE CALL RE: BACK PAIN HAD AN APPT THURSDAY

## 2017-11-13 NOTE — TELEPHONE ENCOUNTER
Pt has received her medication.      ----- Message from Danitza Rollins sent at 11/13/2017 11:22 AM EST -----  Contact: [phone]  PATIENT CALLED TO CHECK THE STATUS OF A PA FOR HER FLEXERIL SCRIPT

## 2017-11-13 NOTE — TELEPHONE ENCOUNTER
Pt wants a muscle relaxer for back pain.      ----- Message from Adelina Washington sent at 11/13/2017  8:35 AM EST -----  Regarding: PLEASE CALL  Contact: [phone]  PLEASE CALL RE: BACK PAIN HAD AN APPT THURSDAY

## 2017-11-20 ENCOUNTER — TELEPHONE (OUTPATIENT)
Dept: FAMILY MEDICINE CLINIC | Facility: CLINIC | Age: 72
End: 2017-11-20

## 2017-11-20 RX ORDER — TIZANIDINE HYDROCHLORIDE 2 MG/1
2 CAPSULE, GELATIN COATED ORAL 3 TIMES DAILY
Qty: 60 CAPSULE | Refills: 0 | Status: SHIPPED | OUTPATIENT
Start: 2017-11-20 | End: 2019-03-21

## 2017-11-20 NOTE — TELEPHONE ENCOUNTER
Pt information verfied      ----- Message from Danitza Rollins sent at 11/20/2017 10:46 AM EST -----  Contact: [phone]  [facility]  IS REQUESTING A CALL BACK.  THEY HAVE SEVERAL ?'S ABOUT A PA FOR A MEDICATION          REF #[id number]

## 2017-11-20 NOTE — TELEPHONE ENCOUNTER
----- Message from Shannan Lo MA sent at 11/20/2017  2:20 PM EST -----  Regarding: FW: PLEASE CALL  Contact: [phone]   Pt wants to know if you can call something else in until flexeril gets approved through her insurance?  ----- Message -----     From: Arleth Armendariz MA     Sent: 11/20/2017   9:32 AM       To: Shannan Lo MA  Subject: FW: PLEASE CALL                                      ----- Message -----     From: Adelina Washington     Sent: 11/20/2017   9:06 AM       To: Arleth Armendariz MA  Subject: PLEASE CALL                                      PLEASE CALL RE: HER BACK

## 2017-11-24 ENCOUNTER — HOSPITAL ENCOUNTER (EMERGENCY)
Facility: HOSPITAL | Age: 72
Discharge: HOME | End: 2017-11-24
Attending: EMERGENCY MEDICINE | Admitting: EMERGENCY MEDICINE

## 2017-11-24 ENCOUNTER — APPOINTMENT (OUTPATIENT)
Dept: CT IMAGING | Facility: HOSPITAL | Age: 72
End: 2017-11-24

## 2017-11-24 VITALS
RESPIRATION RATE: 18 BRPM | DIASTOLIC BLOOD PRESSURE: 46 MMHG | BODY MASS INDEX: 49.09 KG/M2 | WEIGHT: 260 LBS | OXYGEN SATURATION: 94 % | HEART RATE: 64 BPM | HEIGHT: 61 IN | SYSTOLIC BLOOD PRESSURE: 108 MMHG | TEMPERATURE: 97.7 F

## 2017-11-24 DIAGNOSIS — M54.31 RIGHT SCIATIC NERVE PAIN: ICD-10-CM

## 2017-11-24 DIAGNOSIS — S32.040A CLOSED WEDGE COMPRESSION FRACTURE OF FOURTH LUMBAR VERTEBRA, INITIAL ENCOUNTER: Primary | ICD-10-CM

## 2017-11-24 PROCEDURE — 99283 EMERGENCY DEPT VISIT LOW MDM: CPT

## 2017-11-24 PROCEDURE — 72131 CT LUMBAR SPINE W/O DYE: CPT

## 2017-11-24 RX ORDER — HYDROCODONE BITARTRATE AND ACETAMINOPHEN 10; 325 MG/1; MG/1
1 TABLET ORAL ONCE
Status: COMPLETED | OUTPATIENT
Start: 2017-11-24 | End: 2017-11-24

## 2017-11-24 RX ORDER — HYDROCODONE BITARTRATE AND ACETAMINOPHEN 5; 325 MG/1; MG/1
1-2 TABLET ORAL EVERY 4 HOURS PRN
Qty: 18 TABLET | Refills: 0 | Status: SHIPPED | OUTPATIENT
Start: 2017-11-24 | End: 2017-11-27 | Stop reason: DRUGHIGH

## 2017-11-24 RX ADMIN — HYDROCODONE BITARTRATE AND ACETAMINOPHEN 1 TABLET: 10; 325 TABLET ORAL at 16:01

## 2017-11-24 NOTE — DISCHARGE INSTRUCTIONS
Discuss with Dr. Schwartz the possibility of kyphoplasty.     No driving for 12 hours after narcotic use.    Please review the medications you are supposed to be taking according to prior physician recommendations. I have not changed your home medications during this visit. If your discharge instructions indicate that I have changed your home medications, this is not the case, and you should disregard. If you have any questions about the medication you should be taking at home, please call your physician.

## 2017-11-24 NOTE — ED PROVIDER NOTES
Subjective   HPI Comments: Georgia Velázquez is a 72 y.o.female with a hx of DM who presents to the ED with c/o back pain. For the past two weeks the pt has had diffuse lower back pain that intermittently radiates down her right leg. She has visited her PCP twice since onset. Her PCP stated that she had UTI. She was prescribed a ten day treatment of Macrobid for the UTI and Zanaflex for her back pain. Her symptoms have gradually worsened, prompting her to call EMS to bring her to the ED today. At the ED she denies bladder incontinence, dysuria, abdominal pain, abnormal appetite or any other acute sx at this time.      Patient is a 72 y.o. female presenting with back pain.   History provided by:  Patient  Back Pain   Location:  Lumbar spine  Radiates to:  R thigh  Pain is:  Same all the time  Onset quality:  Gradual  Duration:  2 weeks  Timing:  Constant  Progression:  Worsening  Relieved by:  Nothing  Worsened by:  Movement  Ineffective treatments: Zanaflex.  Associated symptoms: leg pain    Associated symptoms: no abdominal pain, no bladder incontinence, no dysuria, no fever and no numbness        Review of Systems   Constitutional: Negative for appetite change, chills, diaphoresis and fever.   Gastrointestinal: Negative for abdominal pain.   Genitourinary: Negative for bladder incontinence and dysuria.   Musculoskeletal: Positive for back pain.   Neurological: Negative for numbness.   All other systems reviewed and are negative.      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   • Coronary artery disease 5/18/2016   • Essential hypertension 5/18/2016   • GERD (gastroesophageal reflux disease) 5/18/2016   • History of echocardiogram 10/16/2015    TDS.Est EF is 45-50%.Mild distal septal, anteroapical hypokinesia.Mild mitral stenosis.Mean gradient across mitral valve is 6 mmHg.Trace TR   • Hyperlipemia 5/18/2016   • Myocardial infarction 5/18/2016   • Osteoporosis 5/18/2016   • Sepsis      uro   • Type 2 diabetes mellitus 5/18/2016       No Known Allergies    Past Surgical History:   Procedure Laterality Date   • APPENDECTOMY     • CHOLECYSTECTOMY         Family History   Problem Relation Age of Onset   • Diabetes Mother    • No Known Problems Father    • No Known Problems Sister    • No Known Problems Brother    • No Known Problems Son    • No Known Problems Daughter        Social History     Social History   • Marital status:      Spouse name: N/A   • Number of children: N/A   • Years of education: N/A     Occupational History   • Retired from [employer]      Social History Main Topics   • Smoking status: Former Smoker     Types: Cigarettes   • Smokeless tobacco: Never Used   • Alcohol use No   • Drug use: No   • Sexual activity: No     Other Topics Concern   • None     Social History Narrative         Objective   Physical Exam   Constitutional: She is oriented to person, place, and time. She appears well-developed and well-nourished. No distress.   HENT:   Head: Normocephalic and atraumatic.   Mouth/Throat: No oropharyngeal exudate.   Eyes: Conjunctivae are normal. No scleral icterus.   Neck: Normal range of motion. Neck supple. No JVD present.   Cardiovascular: Normal rate, regular rhythm and normal heart sounds.  Exam reveals no gallop and no friction rub.    No murmur heard.  Pulmonary/Chest: Effort normal and breath sounds normal. No respiratory distress. She has no wheezes. She has no rales.   Abdominal: Soft. Bowel sounds are normal. She exhibits no distension. There is no tenderness. There is no rebound and no guarding.   Musculoskeletal: Normal range of motion. She exhibits tenderness. She exhibits no edema.   No ankle clonus. TTP int he right low back soft tissues, especially overlying the pelvis. Compression of sciatic nerve is difficult, secondary to body habitus, although she does have some pain with palpation of that area.   Neurological: She is alert and oriented to person,  "place, and time.   1+ patellar reflexes but pt has difficulty relaxing completely. Fine touch and motor intact distally.   Skin: Skin is warm and dry. She is not diaphoretic.   Psychiatric: She has a normal mood and affect. Her behavior is normal.   Nursing note and vitals reviewed.      Procedures         ED Course  ED Course   Comment By Time   MAIRA query unsuccessful secondary to prolonged retrieval time/inoperable MAIRA system. [facility] 11/24 1709   Dr. Severino re-evaluated the pt. She is feeling much better after the Lortab. [facility] 11/24 1727     No results found for this or any previous visit (from the past 24 hour(s)).  Note: In addition to lab results from this visit, the labs listed above may include labs taken at another facility or during a different encounter within the last 24 hours. Please correlate lab times with ED admission and discharge times for further clarification of the services performed during this visit.    CT Lumbar Spine Without Contrast   Final Result   Superior endplate irregularity with approximately 25% height   loss at the L4 vertebral level consistent with compression deformity of   likely subacute etiology given subchondral sclerosis. There is   retropulsion of fracture fragments of approximate 7 mm at this level   producing moderate spinal canal stenosis.       DICTATED:     11/24/2017   EDITED:          11/24/2017           This report was finalized on 11/24/2017 3:50 PM by Dr. Jamison Nunn.            Vitals:    11/24/17 1324 11/24/17 1453 11/24/17 1601 11/24/17 1630   BP: 108/62   108/46   Pulse: 60   64   Resp: 18      Temp: 97.7 °F (36.5 °C)      TempSrc: Oral      SpO2:   94% 94%   Weight:  260 lb (118 kg)     Height:  61\" (154.9 cm)       Medications   HYDROcodone-acetaminophen (NORCO)  MG per tablet 1 tablet (1 tablet Oral Given 11/24/17 1601)     ECG/EMG Results (last 24 hours)     ** No results found for the last 24 hours. **            "         MDM    Final diagnoses:   Closed wedge compression fracture of fourth lumbar vertebra, initial encounter   Right sciatic nerve pain       Documentation assistance provided by vic Solares.  Information recorded by the vic was done at my direction and has been verified and validated by me.     Hao Solares  11/24/17 1512       Hao Solares  11/24/17 1718       Medardo Severino MD  11/26/17 0955

## 2017-11-27 ENCOUNTER — TELEPHONE (OUTPATIENT)
Dept: FAMILY MEDICINE CLINIC | Facility: CLINIC | Age: 72
End: 2017-11-27

## 2017-11-27 DIAGNOSIS — M48.46XA STRESS FRACTURE OF LUMBAR VERTEBRA, INITIAL ENCOUNTER: Primary | ICD-10-CM

## 2017-11-27 RX ORDER — HYDROCODONE BITARTRATE AND ACETAMINOPHEN 10; 325 MG/1; MG/1
1 TABLET ORAL EVERY 6 HOURS PRN
Qty: 24 TABLET | Refills: 0 | Status: SHIPPED | OUTPATIENT
Start: 2017-11-27 | End: 2017-12-29

## 2017-11-27 NOTE — TELEPHONE ENCOUNTER
Seen in ER. Lumbar stress fracture. Severe pain. Seeing neurosurg. Daughter is managing her meds and someone is staying with her around the clock. Will check reid. Has apt with me next week.

## 2017-11-30 ENCOUNTER — OFFICE VISIT (OUTPATIENT)
Dept: NEUROSURGERY | Facility: CLINIC | Age: 72
End: 2017-11-30

## 2017-11-30 VITALS
WEIGHT: 245 LBS | HEIGHT: 61 IN | DIASTOLIC BLOOD PRESSURE: 78 MMHG | SYSTOLIC BLOOD PRESSURE: 132 MMHG | BODY MASS INDEX: 46.26 KG/M2 | TEMPERATURE: 97.3 F

## 2017-11-30 DIAGNOSIS — M81.0 AGE RELATED OSTEOPOROSIS, UNSPECIFIED PATHOLOGICAL FRACTURE PRESENCE: Primary | ICD-10-CM

## 2017-11-30 DIAGNOSIS — S32.040A CLOSED COMPRESSION FRACTURE OF FOURTH LUMBAR VERTEBRA, INITIAL ENCOUNTER: ICD-10-CM

## 2017-11-30 PROCEDURE — 99214 OFFICE O/P EST MOD 30 MIN: CPT | Performed by: PHYSICIAN ASSISTANT

## 2017-11-30 NOTE — PROGRESS NOTES
Patient: Georgia Velázquez  : 1945  Chart #: [id number]    Date of Service: 2017    CHIEF COMPLAINT: Back and right leg pain    History of Present Illness Ms. Velázquez is a very kind 72-year-old woman with a history of osteoporosis who presents with a two-month history of pain in the low back that extends down the back of the right leg to the top of her foot.  This has been progressive.  She denies any inciting accident or event.  She has no prior history of back difficulties.  She does have multiple medical comorbidities including CHF, COPD, diabetes, hypertension, hypercholesterolemia, and GERD.  Her pain is severe with walking.  She has some relief when lying down.  She denies left-sided symptoms.  No bowel or bladder difficulties.  She was evaluated in the emergency department and was treated with pain medications.  She also had a CT scan of the lumbar spine and is here for our review and further recommendation.    The following portions of the patient's history were reviewed and updated as appropriate: allergies, current medications, past family history, past medical history, past social history, past surgical history and problem list.    Review of Systems   Constitutional: Negative for activity change, appetite change, chills, diaphoresis, fatigue, fever and unexpected weight change.   HENT: Negative for congestion, dental problem, drooling, ear discharge, ear pain, facial swelling, hearing loss, mouth sores, nosebleeds, postnasal drip, rhinorrhea, sinus pressure, sneezing, sore throat, tinnitus, trouble swallowing and voice change.    Eyes: Negative for photophobia, pain, discharge, redness, itching and visual disturbance.   Respiratory: Negative for apnea, cough, choking, chest tightness, shortness of breath, wheezing and stridor.    Cardiovascular: Negative for chest pain, palpitations and leg swelling.   Gastrointestinal: Negative for abdominal distention, abdominal pain, anal bleeding,  "blood in stool, constipation, diarrhea, nausea, rectal pain and vomiting.   Endocrine: Negative for cold intolerance, heat intolerance, polydipsia, polyphagia and polyuria.   Genitourinary: Negative for decreased urine volume, difficulty urinating, dysuria, enuresis, flank pain, frequency, genital sores, hematuria and urgency.   Musculoskeletal: Positive for back pain, gait problem and myalgias. Negative for arthralgias, joint swelling, neck pain and neck stiffness.   Skin: Negative for color change, pallor, rash and wound.   Allergic/Immunologic: Negative for environmental allergies, food allergies and immunocompromised state.   Neurological: Negative for dizziness, tremors, seizures, syncope, facial asymmetry, speech difficulty, weakness, light-headedness, numbness and headaches.   Hematological: Negative for adenopathy. Does not bruise/bleed easily.   Psychiatric/Behavioral: Negative for agitation, behavioral problems, confusion, decreased concentration, dysphoric mood, hallucinations, self-injury, sleep disturbance and suicidal ideas. The patient is not nervous/anxious and is not hyperactive.        Objective   Vital Signs: Blood pressure 132/78, temperature 97.3 °F (36.3 °C), height 61\" (154.9 cm), weight 245 lb (111 kg), not currently breastfeeding.  Physical Exam   Constitutional: She appears well-developed and well-nourished. No distress.   HENT:   Head: Normocephalic and atraumatic.   Eyes: EOM are normal. Pupils are equal, round, and reactive to light.   Cardiovascular: Normal rate, regular rhythm and normal heart sounds.    Pulmonary/Chest: Effort normal and breath sounds normal.   Abdominal: Soft. There is no tenderness.   Musculoskeletal: She exhibits edema (lower extremities).   Psychiatric: She has a normal mood and affect. Her behavior is normal. Thought content normal.   Nursing note and vitals reviewed.  Musculoskeletal: No tenderness observed in the low back.  Strength is intact in upper and lower " extremities to direct testing.  Muscle tone is normal throughout.  Ambulates with the assistance of a rolling walker.  She is able to ambulate independently in the room and climb up on the exam table.  Patient has pain in the calf with straight leg raise on the right.  Neurologic:  Coordination is intact.  Finger to nose, heel-to-shin, rapid alternating movements.  Deep tendon reflexes: Difficult to elicit throughout  Sensation is intact to light touch throughout.  Patient is oriented to person, place, and time.  Jerry sign negative. No ankle clonus     Radiographic Imaging: CT of the lumbar spine demonstrates diffuse degenerative change and diminished bone density.  At L4 vertebral body there is superior endplate irregularity and compression deformity.  Possibly a fracture fragment extending posteriorly into the canal.    Assessment/Plan    Diagnosis: Compression fracture L4 vertebral body in the setting of osteoporosis.  Medical Decision Making: I have referred Ms. Velázquez for an MRI of the lumbar spine without gadolinium, a bone scan, and flexion-extension x-rays.  I have also given her some pain medication until she can get back in our office.  She will follow up with these studies and see Dr. Snow.               Michaela Can PA-C  Patient Care Team:  Chaz Rico DNP, APRN as PCP - General (Family Medicine)  Chaz Rico DNP, APRDOROTHEA as PCP - Claims Attributed  Carmine Pérez MD as Consulting Physician (Interventional Cardiology)

## 2017-12-01 ENCOUNTER — TELEPHONE (OUTPATIENT)
Dept: FAMILY MEDICINE CLINIC | Facility: CLINIC | Age: 72
End: 2017-12-01

## 2017-12-01 NOTE — TELEPHONE ENCOUNTER
----- Message from Shannan Lo MA sent at 12/1/2017  3:55 PM EST -----  Regarding: FW: PATIENT HAVING EXTREME PAIN  Contact: [phone]      ----- Message -----     From: Kasey García     Sent: 12/1/2017  12:45 PM       To: Shannan Lo MA  Subject: PATIENT HAVING EXTREME PAIN                      JARVIS ESCALONA,DAUGHTER, WILL YOU DIRECT ADMIT HER TO [facility].  PATIENT SAW DR GALEAS YESTERDAY AND SHE HAS COMPRESSION FX .  THEY HAVE SCHEDULED MRI AND BONE SCAN FOR 12-05-17, BUT PATIENT IS NOT EATING AND PATIENT HAS LOST 15 POUNDS.  PATIENT IS IN EXTREME PAIN.  PATIENT IS TAKING LORTAB IS NOT WORKING.  CANT GET PERCOCETS FILLED UNTIL TOMORROW.  PATIENT IS VERY DISTRAUGHT AND CRYING.        Spoke to patient's daughter. Pain is so severe she is not eating or drinking. 15 pound weight loss. I have encouraged her to take the patient to the ER for evaluation. She may need to be admitted or just have fluids and pain control. She will take her.

## 2017-12-01 NOTE — TELEPHONE ENCOUNTER
Pt informed that pt will not be sent a refferal till her neurologist clear her and after her mri.       ----- Message from Chaz Rico DNP, DON sent at 12/1/2017  8:18 AM EST -----  Regarding: RE: order for pt  Contact: [phone]  She needs to wait until after the neurologist clears her after her MRI etc. Once she is cleared we can do that.  ----- Message -----     From: Shannan Lo MA     Sent: 12/1/2017   7:42 AM       To: Chaz Rico DNP, DON  Subject: FW: order for pt                                     ----- Message -----     From: Jeanette Zuniga     Sent: 11/30/2017   3:57 PM       To: Shannan Lo MA  Subject: order for pt                                     Gisela--julio is requesting an order for pt for weakness and strengthing

## 2017-12-04 ENCOUNTER — HOSPITAL ENCOUNTER (INPATIENT)
Facility: HOSPITAL | Age: 72
LOS: 3 days | Discharge: HOME HEALTH | End: 2017-12-08
Attending: EMERGENCY MEDICINE | Admitting: FAMILY MEDICINE

## 2017-12-04 ENCOUNTER — APPOINTMENT (OUTPATIENT)
Dept: MRI IMAGING | Facility: HOSPITAL | Age: 72
End: 2017-12-04

## 2017-12-04 DIAGNOSIS — Z74.09 IMPAIRED MOBILITY AND ADLS: ICD-10-CM

## 2017-12-04 DIAGNOSIS — N30.00 ACUTE CYSTITIS WITHOUT HEMATURIA: Primary | ICD-10-CM

## 2017-12-04 DIAGNOSIS — N17.9 ACUTE KIDNEY INJURY (HCC): ICD-10-CM

## 2017-12-04 DIAGNOSIS — S32.040A CLOSED COMPRESSION FRACTURE OF FOURTH LUMBAR VERTEBRA, INITIAL ENCOUNTER: ICD-10-CM

## 2017-12-04 DIAGNOSIS — Z78.9 IMPAIRED MOBILITY AND ADLS: ICD-10-CM

## 2017-12-04 DIAGNOSIS — S32.040S CLOSED COMPRESSION FRACTURE OF FOURTH LUMBAR VERTEBRA, SEQUELA: ICD-10-CM

## 2017-12-04 DIAGNOSIS — Z74.09 IMPAIRED FUNCTIONAL MOBILITY, BALANCE, GAIT, AND ENDURANCE: ICD-10-CM

## 2017-12-04 DIAGNOSIS — M51.9 LUMBAR DISC DISEASE: ICD-10-CM

## 2017-12-04 LAB
ALBUMIN SERPL-MCNC: 4.1 G/DL (ref 3.2–4.8)
ALBUMIN/GLOB SERPL: 1.2 G/DL (ref 1.5–2.5)
ALP SERPL-CCNC: 207 U/L (ref 25–100)
ALT SERPL W P-5'-P-CCNC: 58 U/L (ref 7–40)
ANION GAP SERPL CALCULATED.3IONS-SCNC: 14 MMOL/L (ref 3–11)
AST SERPL-CCNC: 78 U/L (ref 0–33)
BACTERIA UR QL AUTO: ABNORMAL /HPF
BASOPHILS # BLD AUTO: 0.02 10*3/MM3 (ref 0–0.2)
BASOPHILS NFR BLD AUTO: 0.2 % (ref 0–1)
BILIRUB SERPL-MCNC: 0.8 MG/DL (ref 0.3–1.2)
BILIRUB UR QL STRIP: ABNORMAL
BUN BLD-MCNC: 62 MG/DL (ref 9–23)
BUN/CREAT SERPL: 25.8 (ref 7–25)
CALCIUM SPEC-SCNC: 9.4 MG/DL (ref 8.7–10.4)
CHLORIDE SERPL-SCNC: 95 MMOL/L (ref 99–109)
CLARITY UR: ABNORMAL
CO2 SERPL-SCNC: 20 MMOL/L (ref 20–31)
COLOR UR: ABNORMAL
CREAT BLD-MCNC: 2.4 MG/DL (ref 0.6–1.3)
DEPRECATED RDW RBC AUTO: 47.6 FL (ref 37–54)
EOSINOPHIL # BLD AUTO: 0.05 10*3/MM3 (ref 0–0.3)
EOSINOPHIL NFR BLD AUTO: 0.4 % (ref 0–3)
ERYTHROCYTE [DISTWIDTH] IN BLOOD BY AUTOMATED COUNT: 14.7 % (ref 11.3–14.5)
GFR SERPL CREATININE-BSD FRML MDRD: 20 ML/MIN/1.73
GLOBULIN UR ELPH-MCNC: 3.3 GM/DL
GLUCOSE BLD-MCNC: 213 MG/DL (ref 70–100)
GLUCOSE UR STRIP-MCNC: NEGATIVE MG/DL
HCT VFR BLD AUTO: 34.8 % (ref 34.5–44)
HGB BLD-MCNC: 11.6 G/DL (ref 11.5–15.5)
HGB UR QL STRIP.AUTO: ABNORMAL
HYALINE CASTS UR QL AUTO: ABNORMAL /LPF
IMM GRANULOCYTES # BLD: 0.03 10*3/MM3 (ref 0–0.03)
IMM GRANULOCYTES NFR BLD: 0.3 % (ref 0–0.6)
KETONES UR QL STRIP: NEGATIVE
LEUKOCYTE ESTERASE UR QL STRIP.AUTO: ABNORMAL
LYMPHOCYTES # BLD AUTO: 1.02 10*3/MM3 (ref 0.6–4.8)
LYMPHOCYTES NFR BLD AUTO: 8.9 % (ref 24–44)
MCH RBC QN AUTO: 29.3 PG (ref 27–31)
MCHC RBC AUTO-ENTMCNC: 33.3 G/DL (ref 32–36)
MCV RBC AUTO: 87.9 FL (ref 80–99)
MONOCYTES # BLD AUTO: 1.08 10*3/MM3 (ref 0–1)
MONOCYTES NFR BLD AUTO: 9.4 % (ref 0–12)
NEUTROPHILS # BLD AUTO: 9.25 10*3/MM3 (ref 1.5–8.3)
NEUTROPHILS NFR BLD AUTO: 80.8 % (ref 41–71)
NITRITE UR QL STRIP: NEGATIVE
PH UR STRIP.AUTO: 5.5 [PH] (ref 5–8)
PLATELET # BLD AUTO: 217 10*3/MM3 (ref 150–450)
PMV BLD AUTO: 11.4 FL (ref 6–12)
POTASSIUM BLD-SCNC: 4.5 MMOL/L (ref 3.5–5.5)
PROT SERPL-MCNC: 7.4 G/DL (ref 5.7–8.2)
PROT UR QL STRIP: ABNORMAL
RBC # BLD AUTO: 3.96 10*6/MM3 (ref 3.89–5.14)
RBC # UR: ABNORMAL /HPF
REF LAB TEST METHOD: ABNORMAL
SODIUM BLD-SCNC: 129 MMOL/L (ref 132–146)
SP GR UR STRIP: 1.02 (ref 1–1.03)
SQUAMOUS #/AREA URNS HPF: ABNORMAL /HPF
UROBILINOGEN UR QL STRIP: ABNORMAL
WBC NRBC COR # BLD: 11.45 10*3/MM3 (ref 3.5–10.8)
WBC UR QL AUTO: ABNORMAL /HPF

## 2017-12-04 PROCEDURE — 81001 URINALYSIS AUTO W/SCOPE: CPT | Performed by: EMERGENCY MEDICINE

## 2017-12-04 PROCEDURE — 25010000002 HYDROMORPHONE PER 4 MG: Performed by: EMERGENCY MEDICINE

## 2017-12-04 PROCEDURE — 87086 URINE CULTURE/COLONY COUNT: CPT | Performed by: EMERGENCY MEDICINE

## 2017-12-04 PROCEDURE — 25010000002 ONDANSETRON PER 1 MG: Performed by: EMERGENCY MEDICINE

## 2017-12-04 PROCEDURE — 85025 COMPLETE CBC W/AUTO DIFF WBC: CPT | Performed by: EMERGENCY MEDICINE

## 2017-12-04 PROCEDURE — P9612 CATHETERIZE FOR URINE SPEC: HCPCS

## 2017-12-04 PROCEDURE — 87186 SC STD MICRODIL/AGAR DIL: CPT | Performed by: EMERGENCY MEDICINE

## 2017-12-04 PROCEDURE — 99284 EMERGENCY DEPT VISIT MOD MDM: CPT

## 2017-12-04 PROCEDURE — 87077 CULTURE AEROBIC IDENTIFY: CPT | Performed by: EMERGENCY MEDICINE

## 2017-12-04 PROCEDURE — 82570 ASSAY OF URINE CREATININE: CPT | Performed by: NURSE PRACTITIONER

## 2017-12-04 PROCEDURE — 72148 MRI LUMBAR SPINE W/O DYE: CPT

## 2017-12-04 PROCEDURE — 80053 COMPREHEN METABOLIC PANEL: CPT | Performed by: EMERGENCY MEDICINE

## 2017-12-04 PROCEDURE — 25010000002 CEFTRIAXONE PER 250 MG: Performed by: EMERGENCY MEDICINE

## 2017-12-04 RX ORDER — HYDROMORPHONE HYDROCHLORIDE 1 MG/ML
0.5 INJECTION, SOLUTION INTRAMUSCULAR; INTRAVENOUS; SUBCUTANEOUS ONCE
Status: COMPLETED | OUTPATIENT
Start: 2017-12-04 | End: 2017-12-04

## 2017-12-04 RX ORDER — ONDANSETRON 2 MG/ML
4 INJECTION INTRAMUSCULAR; INTRAVENOUS ONCE
Status: COMPLETED | OUTPATIENT
Start: 2017-12-04 | End: 2017-12-04

## 2017-12-04 RX ORDER — CEFTRIAXONE SODIUM 1 G/50ML
1 INJECTION, SOLUTION INTRAVENOUS ONCE
Status: COMPLETED | OUTPATIENT
Start: 2017-12-04 | End: 2017-12-04

## 2017-12-04 RX ADMIN — CEFTRIAXONE SODIUM 1 G: 1 INJECTION, SOLUTION INTRAVENOUS at 22:29

## 2017-12-04 RX ADMIN — HYDROMORPHONE HYDROCHLORIDE 0.5 MG: 10 INJECTION INTRAMUSCULAR; INTRAVENOUS; SUBCUTANEOUS at 19:59

## 2017-12-04 RX ADMIN — SODIUM CHLORIDE 500 ML: 9 INJECTION, SOLUTION INTRAVENOUS at 19:54

## 2017-12-04 RX ADMIN — ONDANSETRON 4 MG: 2 INJECTION INTRAMUSCULAR; INTRAVENOUS at 19:56

## 2017-12-05 ENCOUNTER — TELEPHONE (OUTPATIENT)
Dept: NEUROSURGERY | Facility: CLINIC | Age: 72
End: 2017-12-05

## 2017-12-05 ENCOUNTER — HOSPITAL ENCOUNTER (OUTPATIENT)
Dept: NUCLEAR MEDICINE | Facility: HOSPITAL | Age: 72
Discharge: HOME | End: 2017-12-05

## 2017-12-05 ENCOUNTER — APPOINTMENT (OUTPATIENT)
Dept: MRI IMAGING | Facility: HOSPITAL | Age: 72
End: 2017-12-05

## 2017-12-05 ENCOUNTER — APPOINTMENT (OUTPATIENT)
Dept: GENERAL RADIOLOGY | Facility: HOSPITAL | Age: 72
End: 2017-12-05

## 2017-12-05 ENCOUNTER — APPOINTMENT (OUTPATIENT)
Dept: ULTRASOUND IMAGING | Facility: HOSPITAL | Age: 72
End: 2017-12-05

## 2017-12-05 PROBLEM — N30.00 ACUTE CYSTITIS WITHOUT HEMATURIA: Status: ACTIVE | Noted: 2017-12-05

## 2017-12-05 PROBLEM — D72.829 LEUKOCYTOSIS: Status: ACTIVE | Noted: 2017-12-05

## 2017-12-05 PROBLEM — N39.0 URINARY TRACT INFECTION: Status: ACTIVE | Noted: 2017-12-05

## 2017-12-05 PROBLEM — R10.9 ABDOMINAL PAIN: Status: ACTIVE | Noted: 2017-12-05

## 2017-12-05 PROBLEM — N17.9 ACUTE KIDNEY INJURY (HCC): Status: ACTIVE | Noted: 2017-12-05

## 2017-12-05 PROBLEM — K59.00 CONSTIPATION: Status: ACTIVE | Noted: 2017-12-05

## 2017-12-05 LAB
ANION GAP SERPL CALCULATED.3IONS-SCNC: 12 MMOL/L (ref 3–11)
BUN BLD-MCNC: 49 MG/DL (ref 9–23)
BUN/CREAT SERPL: 27.2 (ref 7–25)
CALCIUM SPEC-SCNC: 9.2 MG/DL (ref 8.7–10.4)
CHLORIDE SERPL-SCNC: 98 MMOL/L (ref 99–109)
CO2 SERPL-SCNC: 22 MMOL/L (ref 20–31)
CREAT BLD-MCNC: 1.8 MG/DL (ref 0.6–1.3)
CREAT UR-MCNC: 157.7 MG/DL
DEPRECATED RDW RBC AUTO: 49 FL (ref 37–54)
ERYTHROCYTE [DISTWIDTH] IN BLOOD BY AUTOMATED COUNT: 15.2 % (ref 11.3–14.5)
GFR SERPL CREATININE-BSD FRML MDRD: 28 ML/MIN/1.73
GLUCOSE BLD-MCNC: 129 MG/DL (ref 70–100)
GLUCOSE BLDC GLUCOMTR-MCNC: 127 MG/DL (ref 70–130)
GLUCOSE BLDC GLUCOMTR-MCNC: 130 MG/DL (ref 70–130)
GLUCOSE BLDC GLUCOMTR-MCNC: 148 MG/DL (ref 70–130)
GLUCOSE BLDC GLUCOMTR-MCNC: 148 MG/DL (ref 70–130)
GLUCOSE BLDC GLUCOMTR-MCNC: 158 MG/DL (ref 70–130)
HCT VFR BLD AUTO: 34.8 % (ref 34.5–44)
HGB BLD-MCNC: 11.7 G/DL (ref 11.5–15.5)
MCH RBC QN AUTO: 29.9 PG (ref 27–31)
MCHC RBC AUTO-ENTMCNC: 33.6 G/DL (ref 32–36)
MCV RBC AUTO: 89 FL (ref 80–99)
PLATELET # BLD AUTO: 225 10*3/MM3 (ref 150–450)
PMV BLD AUTO: 11.4 FL (ref 6–12)
POTASSIUM BLD-SCNC: 4.3 MMOL/L (ref 3.5–5.5)
RBC # BLD AUTO: 3.91 10*6/MM3 (ref 3.89–5.14)
SODIUM BLD-SCNC: 132 MMOL/L (ref 132–146)
WBC NRBC COR # BLD: 10.09 10*3/MM3 (ref 3.5–10.8)

## 2017-12-05 PROCEDURE — 82962 GLUCOSE BLOOD TEST: CPT

## 2017-12-05 PROCEDURE — 76775 US EXAM ABDO BACK WALL LIM: CPT

## 2017-12-05 PROCEDURE — 99222 1ST HOSP IP/OBS MODERATE 55: CPT | Performed by: PHYSICIAN ASSISTANT

## 2017-12-05 PROCEDURE — 25010000002 HEPARIN (PORCINE) PER 1000 UNITS: Performed by: NURSE PRACTITIONER

## 2017-12-05 PROCEDURE — 74000 HC ABDOMEN KUB: CPT

## 2017-12-05 PROCEDURE — 63710000001 INSULIN LISPRO (HUMAN) PER 5 UNITS: Performed by: NURSE PRACTITIONER

## 2017-12-05 PROCEDURE — 99222 1ST HOSP IP/OBS MODERATE 55: CPT | Performed by: INTERNAL MEDICINE

## 2017-12-05 PROCEDURE — 80048 BASIC METABOLIC PNL TOTAL CA: CPT | Performed by: NURSE PRACTITIONER

## 2017-12-05 PROCEDURE — 85027 COMPLETE CBC AUTOMATED: CPT | Performed by: NURSE PRACTITIONER

## 2017-12-05 RX ORDER — ASPIRIN 81 MG/1
81 TABLET, CHEWABLE ORAL DAILY
Status: DISCONTINUED | OUTPATIENT
Start: 2017-12-05 | End: 2017-12-08 | Stop reason: HOSPADM

## 2017-12-05 RX ORDER — DEXTROSE MONOHYDRATE 25 G/50ML
25 INJECTION, SOLUTION INTRAVENOUS
Status: DISCONTINUED | OUTPATIENT
Start: 2017-12-05 | End: 2017-12-08 | Stop reason: HOSPADM

## 2017-12-05 RX ORDER — ACETAMINOPHEN 325 MG/1
650 TABLET ORAL EVERY 4 HOURS PRN
Status: DISCONTINUED | OUTPATIENT
Start: 2017-12-05 | End: 2017-12-08 | Stop reason: HOSPADM

## 2017-12-05 RX ORDER — HYDROCODONE BITARTRATE AND ACETAMINOPHEN 10; 325 MG/1; MG/1
1 TABLET ORAL EVERY 6 HOURS PRN
Status: DISCONTINUED | OUTPATIENT
Start: 2017-12-05 | End: 2017-12-08 | Stop reason: HOSPADM

## 2017-12-05 RX ORDER — SENNA AND DOCUSATE SODIUM 50; 8.6 MG/1; MG/1
2 TABLET, FILM COATED ORAL NIGHTLY
Status: DISCONTINUED | OUTPATIENT
Start: 2017-12-05 | End: 2017-12-08 | Stop reason: HOSPADM

## 2017-12-05 RX ORDER — SODIUM CHLORIDE 0.9 % (FLUSH) 0.9 %
1-10 SYRINGE (ML) INJECTION AS NEEDED
Status: DISCONTINUED | OUTPATIENT
Start: 2017-12-05 | End: 2017-12-08 | Stop reason: HOSPADM

## 2017-12-05 RX ORDER — NICOTINE POLACRILEX 4 MG
15 LOZENGE BUCCAL
Status: DISCONTINUED | OUTPATIENT
Start: 2017-12-05 | End: 2017-12-08 | Stop reason: HOSPADM

## 2017-12-05 RX ORDER — CARVEDILOL 6.25 MG/1
6.25 TABLET ORAL 2 TIMES DAILY WITH MEALS
Status: DISCONTINUED | OUTPATIENT
Start: 2017-12-05 | End: 2017-12-08 | Stop reason: HOSPADM

## 2017-12-05 RX ORDER — PANTOPRAZOLE SODIUM 40 MG/1
40 TABLET, DELAYED RELEASE ORAL
Status: DISCONTINUED | OUTPATIENT
Start: 2017-12-05 | End: 2017-12-08 | Stop reason: HOSPADM

## 2017-12-05 RX ORDER — NYSTATIN 100000 [USP'U]/G
POWDER TOPICAL EVERY 12 HOURS SCHEDULED
Status: DISCONTINUED | OUTPATIENT
Start: 2017-12-05 | End: 2017-12-08 | Stop reason: HOSPADM

## 2017-12-05 RX ORDER — ONDANSETRON 4 MG/1
4 TABLET, FILM COATED ORAL EVERY 6 HOURS PRN
Status: DISCONTINUED | OUTPATIENT
Start: 2017-12-05 | End: 2017-12-08 | Stop reason: HOSPADM

## 2017-12-05 RX ORDER — TIZANIDINE 4 MG/1
2 TABLET ORAL 3 TIMES DAILY
Status: DISCONTINUED | OUTPATIENT
Start: 2017-12-05 | End: 2017-12-08 | Stop reason: HOSPADM

## 2017-12-05 RX ORDER — HEPARIN SODIUM 5000 [USP'U]/ML
5000 INJECTION, SOLUTION INTRAVENOUS; SUBCUTANEOUS EVERY 12 HOURS SCHEDULED
Status: DISCONTINUED | OUTPATIENT
Start: 2017-12-05 | End: 2017-12-08 | Stop reason: HOSPADM

## 2017-12-05 RX ORDER — ONDANSETRON 2 MG/ML
4 INJECTION INTRAMUSCULAR; INTRAVENOUS EVERY 6 HOURS PRN
Status: DISCONTINUED | OUTPATIENT
Start: 2017-12-05 | End: 2017-12-08 | Stop reason: HOSPADM

## 2017-12-05 RX ORDER — ATORVASTATIN CALCIUM 40 MG/1
40 TABLET, FILM COATED ORAL DAILY
Status: DISCONTINUED | OUTPATIENT
Start: 2017-12-05 | End: 2017-12-08 | Stop reason: HOSPADM

## 2017-12-05 RX ORDER — BISACODYL 10 MG
10 SUPPOSITORY, RECTAL RECTAL DAILY
Status: DISCONTINUED | OUTPATIENT
Start: 2017-12-05 | End: 2017-12-08 | Stop reason: HOSPADM

## 2017-12-05 RX ORDER — SODIUM CHLORIDE 9 MG/ML
100 INJECTION, SOLUTION INTRAVENOUS CONTINUOUS
Status: DISCONTINUED | OUTPATIENT
Start: 2017-12-05 | End: 2017-12-07

## 2017-12-05 RX ORDER — SENNA AND DOCUSATE SODIUM 50; 8.6 MG/1; MG/1
2 TABLET, FILM COATED ORAL 2 TIMES DAILY
Status: DISCONTINUED | OUTPATIENT
Start: 2017-12-05 | End: 2017-12-05

## 2017-12-05 RX ADMIN — Medication 2 TABLET: at 02:28

## 2017-12-05 RX ADMIN — TIZANIDINE 2 MG: 4 TABLET ORAL at 21:12

## 2017-12-05 RX ADMIN — TIZANIDINE 2 MG: 4 TABLET ORAL at 08:44

## 2017-12-05 RX ADMIN — TIZANIDINE 2 MG: 4 TABLET ORAL at 17:25

## 2017-12-05 RX ADMIN — SODIUM CHLORIDE 100 ML/HR: 9 INJECTION, SOLUTION INTRAVENOUS at 13:51

## 2017-12-05 RX ADMIN — INSULIN LISPRO 2 UNITS: 100 INJECTION, SOLUTION INTRAVENOUS; SUBCUTANEOUS at 17:32

## 2017-12-05 RX ADMIN — CARVEDILOL 6.25 MG: 6.25 TABLET, FILM COATED ORAL at 08:44

## 2017-12-05 RX ADMIN — HYDROCODONE BITARTRATE AND ACETAMINOPHEN 1 TABLET: 10; 325 TABLET ORAL at 15:06

## 2017-12-05 RX ADMIN — BISACODYL 10 MG: 10 SUPPOSITORY RECTAL at 02:28

## 2017-12-05 RX ADMIN — INSULIN LISPRO 0 UNITS: 100 INJECTION, SOLUTION INTRAVENOUS; SUBCUTANEOUS at 21:10

## 2017-12-05 RX ADMIN — HEPARIN SODIUM 5000 UNITS: 5000 INJECTION, SOLUTION INTRAVENOUS; SUBCUTANEOUS at 02:28

## 2017-12-05 RX ADMIN — NYSTATIN: 100000 POWDER TOPICAL at 17:24

## 2017-12-05 RX ADMIN — HEPARIN SODIUM 5000 UNITS: 5000 INJECTION, SOLUTION INTRAVENOUS; SUBCUTANEOUS at 08:44

## 2017-12-05 RX ADMIN — INSULIN LISPRO 0 UNITS: 100 INJECTION, SOLUTION INTRAVENOUS; SUBCUTANEOUS at 02:28

## 2017-12-05 RX ADMIN — CARVEDILOL 6.25 MG: 6.25 TABLET, FILM COATED ORAL at 17:26

## 2017-12-05 RX ADMIN — POLYETHYLENE GLYCOL 3350 17 G: 17 POWDER, FOR SOLUTION ORAL at 08:54

## 2017-12-05 RX ADMIN — ATORVASTATIN CALCIUM 40 MG: 40 TABLET, FILM COATED ORAL at 08:44

## 2017-12-05 RX ADMIN — PANTOPRAZOLE SODIUM 40 MG: 40 TABLET, DELAYED RELEASE ORAL at 06:08

## 2017-12-05 RX ADMIN — NYSTATIN: 100000 POWDER TOPICAL at 21:12

## 2017-12-05 RX ADMIN — HEPARIN SODIUM 5000 UNITS: 5000 INJECTION, SOLUTION INTRAVENOUS; SUBCUTANEOUS at 21:11

## 2017-12-05 RX ADMIN — ASPIRIN 81 MG 81 MG: 81 TABLET ORAL at 08:44

## 2017-12-05 RX ADMIN — SODIUM CHLORIDE 100 ML/HR: 9 INJECTION, SOLUTION INTRAVENOUS at 02:29

## 2017-12-05 NOTE — H&P
[facility] Medicine Services  HISTORY AND PHYSICAL    Patient Name: Georgia Velázquez  : 1945  MRN: [id number]  Primary Care Physician: Chaz Rico, DNP, APRN    Subjective   Subjective     Chief Complaint: Low back pain, urinary incontinence    HPI:  Georgia Velázquez is a 72 y.o. female with PMH significant for CAD, HTN, HLD, T2DM, GERD, osteoporosis. She was recently diagnosed with an L4 compression fracture and follows Dr. Snow, Neurosurgery. She presents to [facility] ED today for c/o low back pain and states today pain is worse. She also reports that she lost control of her bladder and had urinary incontinence. Additionally, she reports dysuria, urgency, frequency, and suprapubic abdominal pain. She reports constipation and states her last BM was about 7 days ago. She denies bowel incontinence, diarrhea, or blood in stool. Denies F/C, cough, SOA, CP, palpitations, dizziness, weakness, lightheadedness, confusion, or any other acute complaints. She is able to walk with a walker. According to patient, she has had hx of frequent urinary tract infection. In the ED, she was found to have a UTI with large leukocytes and 4+ bacteria. Her labs revealed ISRRAEL with BUN 62 and Cr 2.40, baseline Cr 1.30. She also has leukocytosis (WBC 11.45). She was given IV Rocephin in the ED. MRI showed recent (acute or subacute) compression fracture. She will be admitted to Hospital medicine service for further evaluation with plans for Neurosurgery, Dr. Snow, consult in the AM.     Review of Systems   Constitutional: Negative for chills and fever.   Respiratory: Negative for cough, shortness of breath and wheezing.    Cardiovascular: Negative for chest pain, palpitations and leg swelling.   Gastrointestinal: Positive for abdominal pain. Negative for blood in stool, constipation, diarrhea, nausea and vomiting.        Denies bowel dysfunction or incontinence   Genitourinary: Positive for  dysuria, frequency and urgency. Negative for difficulty urinating and hematuria.        Reports hx of frequent urinary tract infections   Musculoskeletal: Positive for back pain. Negative for arthralgias.   Skin: Negative for color change, pallor and rash.   Neurological: Negative for dizziness, syncope, weakness, light-headedness and headaches.   Hematological: Does not bruise/bleed easily.   Psychiatric/Behavioral: Negative for confusion.   All other systems reviewed and are negative.     Otherwise 10-system ROS reviewed and is negative except as mentioned in the HPI.    Personal History     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   • CHF (congestive heart failure)    • COPD (chronic obstructive pulmonary disease)    • Coronary artery disease 5/18/2016   • Essential hypertension 5/18/2016   • GERD (gastroesophageal reflux disease) 5/18/2016   • History of echocardiogram 10/16/2015    TDS.Est EF is 45-50%.Mild distal septal, anteroapical hypokinesia.Mild mitral stenosis.Mean gradient across mitral valve is 6 mmHg.Trace TR   • Hyperlipemia 5/18/2016   • Myocardial infarction 5/18/2016   • Osteoporosis 5/18/2016   • Sepsis     uro   • Type 2 diabetes mellitus 5/18/2016     Past Surgical History:   Procedure Laterality Date   • APPENDECTOMY     • CHOLECYSTECTOMY       Family History: family history includes Diabetes in her mother; No Known Problems in her brother, daughter, father, sister, and son.     Social History:  reports that she quit smoking about 10 years ago. Her smoking use included Cigarettes. She has never used smokeless tobacco. She reports that she does not drink alcohol or use illicit drugs.    Medications:  Prescriptions Prior to Admission   Medication Sig Dispense Refill Last Dose   • albuterol (PROVENTIL HFA;VENTOLIN HFA) 108 (90 BASE) MCG/ACT inhaler Inhale 2 puffs every 4 (four) hours as needed for wheezing. 1 inhaler 11 Taking   • alendronate  (FOSAMAX) 70 MG tablet Take 1 tablet by mouth Every 7 (Seven) Days. 4 tablet 5 Taking   • aspirin 81 MG chewable tablet Chew 1 tablet Daily. 30 tablet 3 Taking   • atorvastatin (LIPITOR) 40 MG tablet Take 1 tablet by mouth Daily. 30 tablet 3 Taking   • carvedilol (COREG) 3.125 MG tablet Take 1 tablet by mouth 2 (Two) Times a Day With Meals. (Patient taking differently: Take 6.25 mg by mouth 2 (Two) Times a Day With Meals.) 60 tablet 3 Taking   • cyclobenzaprine (FLEXERIL) 10 MG tablet 1/2 po bid prn 30 tablet 0 Taking   • glucose blood (ACCU-CHEK ANNIE PLUS) test strip Use once daily 100 each 12 Taking   • glucose blood test strip Use as instructed 100 each 12 Taking   • HYDROcodone-acetaminophen (NORCO)  MG per tablet Take 1 tablet by mouth Every 6 (Six) Hours As Needed for Severe Pain . 24 tablet 0 Taking   • metFORMIN (GLUCOPHAGE) 850 MG tablet Take 1 tablet by mouth 2 (Two) Times a Day With Meals. 60 tablet 2 Taking   • nitrofurantoin, macrocrystal-monohydrate, (MACROBID) 100 MG capsule Take 1 capsule by mouth 2 (Two) Times a Day. 14 capsule 0 Taking   • pantoprazole (PROTONIX) 40 MG EC tablet Take 1 tablet by mouth Daily. 30 tablet 1 Taking   • TiZANidine (ZANAFLEX) 2 MG capsule Take 1 capsule by mouth 3 (Three) Times a Day. 60 capsule 0 Taking   • urea (CARMOL) 10 % cream Apply  topically 2 (Two) Times a Day. 57 g 1 Taking   • valsartan-hydrochlorothiazide (DIOVAN-HCT) 160-25 MG per tablet Take 1 tablet by mouth Daily. 30 tablet 1 Taking     No Known Allergies    Objective   Objective     Vital Signs:   Temp:  [98 °F (36.7 °C)-98.6 °F (37 °C)] 98 °F (36.7 °C)  Heart Rate:  [72-81] 77  Resp:  [18-20] 18  BP: (105-124)/(60-89) 115/61        Physical Exam   Constitutional: She is oriented to person, place, and time. She appears well-developed and well-nourished. She is cooperative. She is easily aroused.   Obese  female. No apparent distress.    HENT:   Head: Normocephalic and atraumatic.   Eyes:  EOM are normal. Pupils are equal, round, and reactive to light. No scleral icterus.   Neck: Normal range of motion. Neck supple.   Cardiovascular: Normal rate, regular rhythm, normal heart sounds and intact distal pulses.  Exam reveals no gallop and no friction rub.    No murmur heard.  Pulses:       Radial pulses are 2+ on the right side, and 2+ on the left side.        Dorsalis pedis pulses are 2+ on the right side, and 2+ on the left side.        Posterior tibial pulses are 2+ on the right side, and 2+ on the left side.   Pulmonary/Chest: Effort normal and breath sounds normal. No respiratory distress. She has no wheezes. She exhibits no tenderness.   Abdominal: Soft. Bowel sounds are normal. She exhibits distension. She exhibits no mass. There is no hepatosplenomegaly. There is tenderness in the right lower quadrant and suprapubic area. There is no rebound, no guarding and no CVA tenderness. No hernia.   Musculoskeletal: Normal range of motion. She exhibits no edema or tenderness.   Neurological: She is alert, oriented to person, place, and time and easily aroused. GCS eye subscore is 4. GCS verbal subscore is 5. GCS motor subscore is 6.   Skin: Skin is warm and dry. No erythema.   Psychiatric: She has a normal mood and affect. Her behavior is normal. Thought content normal.   Vitals reviewed.     Results Reviewed:  I have personally reviewed current lab, radiology, and data and agree.    Lab Results (last 24 hours)     Procedure Component Value Units Date/Time    CBC & Differential [[id number]] Collected:  12/04/17 1936    Specimen:  Blood Updated:  12/04/17 1946    Narrative:       The following orders were created for panel order CBC & Differential.  Procedure                               Abnormality         Status                     ---------                               -----------         ------                     CBC Auto Differential[[id number]]        Abnormal            Final result                  Please view results for these tests on the individual orders.    CBC Auto Differential [[id number]]  (Abnormal) Collected:  12/04/17 1936    Specimen:  Blood Updated:  12/04/17 1946     WBC 11.45 (H) 10*3/mm3      RBC 3.96 10*6/mm3      Hemoglobin 11.6 g/dL      Hematocrit 34.8 %      MCV 87.9 fL      MCH 29.3 pg      MCHC 33.3 g/dL      RDW 14.7 (H) %      RDW-SD 47.6 fl      MPV 11.4 fL      Platelets 217 10*3/mm3      Neutrophil % 80.8 (H) %      Lymphocyte % 8.9 (L) %      Monocyte % 9.4 %      Eosinophil % 0.4 %      Basophil % 0.2 %      Immature Grans % 0.3 %      Neutrophils, Absolute 9.25 (H) 10*3/mm3      Lymphocytes, Absolute 1.02 10*3/mm3      Monocytes, Absolute 1.08 (H) 10*3/mm3      Eosinophils, Absolute 0.05 10*3/mm3      Basophils, Absolute 0.02 10*3/mm3      Immature Grans, Absolute 0.03 10*3/mm3     Urine Culture - Urine, Urine, Clean Catch [[id number]] Collected:  12/04/17 2009    Specimen:  Urine from Urine, Catheter Updated:  12/04/17 2026    Comprehensive Metabolic Panel [[id number]]  (Abnormal) Collected:  12/04/17 1936    Specimen:  Blood Updated:  12/04/17 2026     Glucose 213 (H) mg/dL      BUN 62 (H) mg/dL      Creatinine 2.40 (H) mg/dL      Sodium 129 (L) mmol/L      Potassium 4.5 mmol/L      Chloride 95 (L) mmol/L      CO2 20.0 mmol/L      Calcium 9.4 mg/dL      Total Protein 7.4 g/dL      Albumin 4.10 g/dL      ALT (SGPT) 58 (H) U/L      AST (SGOT) 78 (H) U/L      Alkaline Phosphatase 207 (H) U/L      Total Bilirubin 0.8 mg/dL      eGFR Non African Amer 20 (L) mL/min/1.73      Globulin 3.3 gm/dL      A/G Ratio 1.2 (L) g/dL      BUN/Creatinine Ratio 25.8 (H)     Anion Gap 14.0 (H) mmol/L     Narrative:       [employer] Guidelines    Stage     Description        GFR  1         Normal or High     90+  2         Mild decrease      60-89  3         Moderate decrease  30-59  4         Severe decrease    15-29  5         Kidney failure     <15    Urinalysis With / Culture If  Indicated - Urine, Catheter [[id number]]  (Abnormal) Collected:  12/04/17 2009    Specimen:  Urine from Urine, Catheter Updated:  12/04/17 2028     Color, UA Dark Yellow (A)     Appearance, UA Turbid (A)     pH, UA 5.5     Specific Gravity, UA 1.019     Glucose, UA Negative     Ketones, UA Negative     Bilirubin, UA Small (1+) (A)     Blood, UA Moderate (2+) (A)     Protein, UA 30 mg/dL (1+) (A)     Leuk Esterase, UA Large (3+) (A)     Nitrite, UA Negative     Urobilinogen, UA 1.0 E.U./dL    Urinalysis, Microscopic Only - Urine, Clean Catch [[id number]]  (Abnormal) Collected:  12/04/17 2009    Specimen:  Urine from Urine, Catheter Updated:  12/04/17 2028     RBC, UA 3-6 (A) /HPF      WBC, UA Too Numerous to Count (A) /HPF      Bacteria, UA 4+ (A) /HPF      Squamous Epithelial Cells, UA 0-2 /HPF      Hyaline Casts, UA 0-6 /LPF      Methodology Automated Microscopy        Imaging Results (last 24 hours)     Procedure Component Value Units Date/Time    MRI Lumbar Spine Without Contrast [[id number]] Collected:  12/04/17 1855     Updated:  12/04/17 2322    Addenda:        ADDENDUM: No MR evidence of cauda equina syndrome.  Clinical correlation   is   also necessary. The findings were verbally communicated via telephone   conference with GEORGIA Bond at 11:17 PM EST on 12/4/2017. The findings   were   acknowledged and understood.    Hyperintense STIR signal along posterior aspects of L4, L5, S1 vertebral   bodies   may represent some posterior longitudinal ligamentous injury.    THIS DOCUMENT HAS BEEN ELECTRONICALLY SIGNED BY CHALINO MTZ MD  Signed:  12/04/17 2322 by Chalino Mtz MD    Narrative:       EXAM:    MR Lumbar Spine Without Intravenous Contrast  11/27/2017 at 2156.    CLINICAL HISTORY:    72 years old, female; Pain; Lumbago; Loss of bladder control. Recently dx   with comp FX L4; back and right leg pain low back pain x 2 weeks; per pt no HX   of cancer no surgeries on back    TECHNIQUE:    Magnetic  resonance images of the lumbar spine without intravenous contrast in   multiple planes.    COMPARISON:    CT LUMBAR SPINE WO CONTRAST 2017-11-24 15:19    FINDINGS:    Vertebrae:    L4 vertebral body shows nearly diffuse hypointense T1W signal, hyperintense T2W   signal at superior endplate, and hyperintense STIR signal throughout vertebral   body but predominantly at superior endplate.  L4 vertebral body shows mild height loss.  No subluxation.    Spinal cord: Suboptimal visualization of lower spinal cord and conus   medullaris due to motion artifact.    Soft tissues: Extensive subcutaneous edema in lower back.    Kidneys and ureters:  Right renal posterior, cortical, round, hyperintense   T2W lesion measures about 18 x 15 mm and likely represents renal cyst.     DISCS/SPINAL CANAL/NEURAL FORAMINA:  Mild height loss at L5/S1 disc.  T12/L1: Focal disc bulge or broad-based disc protrusion and bilateral facet   hypertrophy causing minimal central canal and no significant neuroforaminal   stenosis.   L1/L2: Focal disc bulge or broad-based disc protrusion and bilateral facet   hypertrophy causing minimal central canal and no significant neuroforaminal   stenosis.   L2/L3: Bilateral facet hypertrophy causing no significant central canal or   neuroforaminal stenosis.  L3/L4: Focal disc bulge or broad-based disc protrusion, L4 superoposterior   endplate retropulsed bone fragment, and bilateral facet hypertrophy causing   moderate central canal and moderate bilateral neuroforaminal stenosis.  L4/L5: Focal disc bulge or broad-based disc protrusion and bilateral facet   hypertrophy causing mild central canal and moderate bilateral neuroforaminal   stenosis.   L5/S1: Focal disc bulge or broad-based disc protrusion and bilateral facet   hypertrophy causing mild central canal and mild bilateral neuroforaminal   stenosis.       Impression:       1. L4 vertebral body, recent (acute or subacute), compression fracture.  L4 vertebral  body shows mild height loss and superoposterior endplate   retropulsed bone fragment.  L3/L4: Focal disc bulge or broad-based disc protrusion, L4 superoposterior   endplate retropulsed bone fragment, and bilateral facet hypertrophy causing   moderate central canal and moderate bilateral neuroforaminal stenosis.  2. Moderate lumbosacral spine degenerative disease with acquired spinal   stenosis.    THIS DOCUMENT HAS BEEN ELECTRONICALLY SIGNED BY CHALINO RIBERA MD        Assessment/Plan   Assessment / Plan       Assessment & Plan:  1. Low back pain secondary to recent compression fracture   -follows Dr. Snow   -consult Neurosurgery in the AM   -continue home pain control    2. Urinary tract infection  -UA dark, turbid, blood, large leukocytes, TNTC WBC, 4+ bacteria  -given IV Rocephin in the ED   -continue IV Rocephin   -follow urine culture   -strict I&O's    3. Acute kidney injury  -BUN 62 and Cr 2.40 (previously 24 and 1.30 on 11/7/17)   -IV hydration   -hold nephrotoxic meds   -urine creatine added   -renal ultrasound in the AM   -BMP in the AM    4. Constipation, abdominal pain  -last BM about 7 days ago  -bowel regimen    -bisacodyl suppository   -STAT KUB ordered    5. Hx of HTN   -admit to telemetry   -vital signs Q4H   -continue home meds with hold parameters    6. Hx of T2DM   -FSBG AC/HS   -low dose SSI   -last Hgb A1C 5.7 on 11/7/2017    7. Hx of HLD   -continue home meds    8. Hx of GERD   -continue home meds    9. Hx of CAD   -continue home meds    DVT prophylaxis:   -heparin   -TEDs and SCDs    CODE STATUS:     -FULL code      DON Deshpande   12/05/17   1:21 AM      Brief Attending Admission Attestation     I have seen and examined the patient, performing an independent face-to-face diagnostic evaluation with plan of care reviewed and developed with the advanced practice clinician (APC).      Brief Summary Statement/HPI:   Georgia Velázquez is a 72 y.o. female with PMH significant for CAD, HTN,  "HLD, DM, GERD who presents from home with 2 weeks of dysuria, increased frequency and suprapubic pain as well as back pain. She has a known L4 compression fracture and is supposed to follow-up with neurosurgery. She also endorses constipation and stats she has \"not had a BM in 2 weeks\". She endorses some RLQ abdominal pain, but denies any nausea, vomiting. She takes stool softeners at home but states they have not been working. She currently lives with her daughter, says she gets around okay with a walker. In the ED found to have UTI as well as ISRRAEL.       Attending Physical Exam:  Constitutional: No acute distress, awake, alert, obese  female  Eyes: PERRLA, sclerae anicteric, no conjunctival injection  HENT: NCAT, mucous membranes moist  Neck: Supple, no thyromegaly, no lymphadenopathy, trachea midline  Respiratory: Clear to auscultation bilaterally, nonlabored respirations   Cardiovascular: RRR, no murmurs, rubs, or gallops, palpable pedal pulses bilaterally  Gastrointestinal: Positive bowel sounds, soft, nontender, nondistended, some mild RLQ tenderness with deep palpation.  Musculoskeletal: No bilateral ankle edema, no clubbing or cyanosis to extremities  Psychiatric: Appropriate affect, cooperative  Neurologic: Oriented x 3, strength symmetric in all extremities, Cranial Nerves grossly intact to confrontation, speech clear  Skin: No rashes    Brief Assessment/Plan :  See above for further detailed assessment and plan developed with APC which I have reviewed and/or edited.    UTI - on IV rocephin, will adjust as cx results come back  Constipation - patient reports no BM x2 weeks, KUB showing no obvious obstruction. Aggressive bowel regimen, colace BID, miralax and 1x dulcolax suppository, if no BM will try mag citrate or lactulose    I believe this patient meets INPATIENT status due to the need for care which can only be reasonably provided in an hospital setting such as aggressive/expedited ancillary " services and/or consultation services, the necessity for IV medications, close physician monitoring and/or the possible need for procedures.  In such, I feel patient’s risk for adverse outcomes and need for care warrant INPATIENT evaluation and predict the patient’s care encounter to likely last beyond 2 midnights.      Cailin Rosales DO  12/05/17  10:04 AM

## 2017-12-05 NOTE — PLAN OF CARE
Problem: Patient Care Overview (Adult)  Goal: Plan of Care Review  Outcome: Ongoing (interventions implemented as appropriate)    12/05/17 0441   Coping/Psychosocial Response Interventions   Plan Of Care Reviewed With patient   Patient Care Overview   Progress unable to show any progress toward functional goals         Problem: Fall Risk (Adult)  Goal: Identify Related Risk Factors and Signs and Symptoms  Outcome: Ongoing (interventions implemented as appropriate)    12/05/17 0441   Fall Risk   Fall Risk: Related Risk Factors bladder function altered;confusion/agitation;gait/mobility problems;environment unfamiliar   Fall Risk: Signs and Symptoms presence of risk factors       Goal: Absence of Falls  Outcome: Ongoing (interventions implemented as appropriate)    12/05/17 0441   Fall Risk (Adult)   Absence of Falls achieves outcome         Problem: Urine Elimination, Impaired (Adult)  Goal: Identify Related Risk Factors and Signs and Symptoms  Outcome: Ongoing (interventions implemented as appropriate)    12/05/17 0441   Urine Elimination, Impaired   Urine Elimination, Impaired: Related Risk Factors disease process;immobility   Signs and Symptoms (Urine Elimination Impaired) acute pain (abdomen, pelvis, back);involuntary loss of urine;painful urination;urgency;voiding small frequent amounts

## 2017-12-05 NOTE — PROGRESS NOTES
Discharge Planning Assessment  [facility]     Patient Name: Georgia Velázquez  MRN: [id number]  Today's Date: 12/5/2017    Admit Date: 12/4/2017          Discharge Needs Assessment       12/05/17 1144    Living Environment    Lives With alone    Living Arrangements apartment    Home Accessibility no concerns    Stair Railings at Home inside, present on right side;outside, present on right side    Type of Financial/Environmental Concern none    Transportation Available car;family or friend will provide    Living Environment    Provides Primary Care For no one    Primary Care Provided By child(chandler) (specify)    Quality Of Family Relationships supportive;helpful    Able to Return to Prior Living Arrangements yes    Discharge Needs Assessment    Concerns To Be Addressed no discharge needs identified;denies needs/concerns at this time    Readmission Within The Last 30 Days no previous admission in last 30 days    Anticipated Changes Related to Illness none    Equipment Currently Used at Home walker, standard;bath bench;glucometer    Equipment Needed After Discharge none    Discharge Disposition home or self-care;home healthcare service            Discharge Plan       12/05/17 1144    Case Management/Social Work Plan    Plan Home at discharge with HH if needed     Patient/Family In Agreement With Plan yes    Additional Comments Spoke with patient and daughter at bedside. Patient lives alone in [facility] - She has a walker, bath bench, and glucometer at home - she denies any needs for additional DME. Patient has had [facility] in the past and is agreeable to being evaluated by PT/OT to see if she should have home health at discharge. CM will watch for PT/OT eval & recs and then will rediscuss with the patient - CM following - [phone]         Discharge Placement     No information found                Demographic Summary       12/05/17 1142    Referral Information    Admission Type inpatient    Arrived From  admitted as an inpatient    Referral Source admission list    Reason For Consult discharge planning    Record Reviewed history and physical;medical record    Contact Information    Permission Granted to Share Information With     Primary Care Physician Information    Name Chaz Rico             Functional Status       12/05/17 1143    Functional Status Current    Current Functional Level Comment PLease see nursing notes     Functional Status Prior    Ambulation 1-->assistive equipment    Transferring 0-->independent    Toileting 0-->independent    Bathing 0-->independent    Eating 0-->independent    Communication 0-->understands/communicates without difficulty    Swallowing 0-->swallows foods/liquids without difficulty    IADL    Medications independent    Meal Preparation independent    Housekeeping independent    Laundry independent    Shopping assistive person    Oral Care independent    Activity Tolerance    Current Activity Limitations none    Usual Activity Tolerance good    Current Activity Tolerance moderate    Cognitive/Perceptual/Developmental    Current Mental Status/Cognitive Functioning no deficits noted    Employment/Financial    Employment/Finance Comments Medicare with [employer] medicaid secondary             Psychosocial     None            Abuse/Neglect     None            Legal     None            Substance Abuse     None            Patient Forms     None          Lianne Peterson RN

## 2017-12-05 NOTE — ED PROVIDER NOTES
Subjective   HPI Comments: 32-year-old white female arrives complaining of low back pain.  Patient was recently diagnosed with an L4 compression fracture and states that today the pain was worse and she lost control of her bladder.  She is able to walk with the use of a walker and denies any bowel incontinence, or documented fever.  According the patient and her family she has had a history of frequent urinary tract infections.  She has no other complaints    Patient is a 72 y.o. female presenting with back pain.   History provided by:  Patient  Back Pain   Location:  Lumbar spine  Quality:  Aching  Radiates to:  Does not radiate  Pain severity:  Moderate  Onset quality:  Gradual  Timing:  Constant  Chronicity:  New  Relieved by:  Nothing  Worsened by:  Movement  Ineffective treatments:  None tried  Associated symptoms: bladder incontinence    Associated symptoms: no abdominal pain, no bowel incontinence, no dysuria, no fever, no headaches, no numbness, no paresthesias and no perianal numbness        Review of Systems   Constitutional: Negative for fever.   Gastrointestinal: Negative for abdominal pain and bowel incontinence.   Genitourinary: Positive for bladder incontinence. Negative for dysuria.   Musculoskeletal: Positive for back pain.   Neurological: Negative for numbness, headaches and paresthesias.   All other systems reviewed and are negative.      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   • CHF (congestive heart failure)    • COPD (chronic obstructive pulmonary disease)    • Coronary artery disease 5/18/2016   • Essential hypertension 5/18/2016   • GERD (gastroesophageal reflux disease) 5/18/2016   • History of echocardiogram 10/16/2015    TDS.Est EF is 45-50%.Mild distal septal, anteroapical hypokinesia.Mild mitral stenosis.Mean gradient across mitral valve is 6 mmHg.Trace TR   • Hyperlipemia 5/18/2016   • Myocardial infarction 5/18/2016   • Osteoporosis  5/18/2016   • Sepsis     uro   • Type 2 diabetes mellitus 5/18/2016       No Known Allergies    Past Surgical History:   Procedure Laterality Date   • APPENDECTOMY     • CHOLECYSTECTOMY         Family History   Problem Relation Age of Onset   • Diabetes Mother    • No Known Problems Father    • No Known Problems Sister    • No Known Problems Brother    • No Known Problems Son    • No Known Problems Daughter        Social History     Social History   • Marital status:      Spouse name: N/A   • Number of children: N/A   • Years of education: N/A     Occupational History   • Retired from [employer]      Social History Main Topics   • Smoking status: Former Smoker     Types: Cigarettes     Quit date: 2007   • Smokeless tobacco: Never Used   • Alcohol use No   • Drug use: No   • Sexual activity: No     Other Topics Concern   • None     Social History Narrative           Objective   Physical Exam   Constitutional: She appears well-developed and well-nourished.   HENT:   Head: Normocephalic and atraumatic.   Eyes: Conjunctivae are normal.   Neck: Normal range of motion. Neck supple.   Cardiovascular: Normal rate, regular rhythm and normal heart sounds.    No murmur heard.  Pulmonary/Chest: Effort normal and breath sounds normal. No respiratory distress. She has no wheezes.   Abdominal: Soft. Bowel sounds are normal. She exhibits no distension. There is no tenderness.   Genitourinary:   Genitourinary Comments: Normal rectal tone. Normal sensation in the saddle distribution   Musculoskeletal: Normal range of motion.   Tender lumbar area.  Normal sensation lower extremities.  Abdomen bowel sounds active soft nontender no rebound or guarding.   Neurological: She is alert. She displays normal reflexes.   Skin: Skin is warm and dry.   Psychiatric: She has a normal mood and affect. Her behavior is normal. Judgment and thought content normal.   Nursing note and vitals reviewed.      Procedures         ED Course  ED Course    Comment By Time   Spoke with Dr. Cesar with virtual radiology.  He said there was no findings of cauda equina syndrome on this study.  Further, I performed a rectal exam and the rectal tone was normal and she had normal sensation in the saddle distribution. GEORGIA Gu 12/04 2325          Recent Results (from the past 24 hour(s))   Comprehensive Metabolic Panel    Collection Time: 12/04/17  7:36 PM   Result Value Ref Range    Glucose 213 (H) 70 - 100 mg/dL    BUN 62 (H) 9 - 23 mg/dL    Creatinine 2.40 (H) 0.60 - 1.30 mg/dL    Sodium 129 (L) 132 - 146 mmol/L    Potassium 4.5 3.5 - 5.5 mmol/L    Chloride 95 (L) 99 - 109 mmol/L    CO2 20.0 20.0 - 31.0 mmol/L    Calcium 9.4 8.7 - 10.4 mg/dL    Total Protein 7.4 5.7 - 8.2 g/dL    Albumin 4.10 3.20 - 4.80 g/dL    ALT (SGPT) 58 (H) 7 - 40 U/L    AST (SGOT) 78 (H) 0 - 33 U/L    Alkaline Phosphatase 207 (H) 25 - 100 U/L    Total Bilirubin 0.8 0.3 - 1.2 mg/dL    eGFR Non African Amer 20 (L) >60 mL/min/1.73    Globulin 3.3 gm/dL    A/G Ratio 1.2 (L) 1.5 - 2.5 g/dL    BUN/Creatinine Ratio 25.8 (H) 7.0 - 25.0    Anion Gap 14.0 (H) 3.0 - 11.0 mmol/L   CBC Auto Differential    Collection Time: 12/04/17  7:36 PM   Result Value Ref Range    WBC 11.45 (H) 3.50 - 10.80 10*3/mm3    RBC 3.96 3.89 - 5.14 10*6/mm3    Hemoglobin 11.6 11.5 - 15.5 g/dL    Hematocrit 34.8 34.5 - 44.0 %    MCV 87.9 80.0 - 99.0 fL    MCH 29.3 27.0 - 31.0 pg    MCHC 33.3 32.0 - 36.0 g/dL    RDW 14.7 (H) 11.3 - 14.5 %    RDW-SD 47.6 37.0 - 54.0 fl    MPV 11.4 6.0 - 12.0 fL    Platelets 217 150 - 450 10*3/mm3    Neutrophil % 80.8 (H) 41.0 - 71.0 %    Lymphocyte % 8.9 (L) 24.0 - 44.0 %    Monocyte % 9.4 0.0 - 12.0 %    Eosinophil % 0.4 0.0 - 3.0 %    Basophil % 0.2 0.0 - 1.0 %    Immature Grans % 0.3 0.0 - 0.6 %    Neutrophils, Absolute 9.25 (H) 1.50 - 8.30 10*3/mm3    Lymphocytes, Absolute 1.02 0.60 - 4.80 10*3/mm3    Monocytes, Absolute 1.08 (H) 0.00 - 1.00 10*3/mm3    Eosinophils, Absolute 0.05 0.00 -  0.30 10*3/mm3    Basophils, Absolute 0.02 0.00 - 0.20 10*3/mm3    Immature Grans, Absolute 0.03 0.00 - 0.03 10*3/mm3   Urinalysis With / Culture If Indicated - Urine, Catheter    Collection Time: 12/04/17  8:09 PM   Result Value Ref Range    Color, UA Dark Yellow (A) Yellow, Straw    Appearance, UA Turbid (A) Clear    pH, UA 5.5 5.0 - 8.0    Specific Gravity, UA 1.019 1.001 - 1.030    Glucose, UA Negative Negative    Ketones, UA Negative Negative    Bilirubin, UA Small (1+) (A) Negative    Blood, UA Moderate (2+) (A) Negative    Protein, UA 30 mg/dL (1+) (A) Negative    Leuk Esterase, UA Large (3+) (A) Negative    Nitrite, UA Negative Negative    Urobilinogen, UA 1.0 E.U./dL 0.2 - 1.0 E.U./dL   Urinalysis, Microscopic Only - Urine, Clean Catch    Collection Time: 12/04/17  8:09 PM   Result Value Ref Range    RBC, UA 3-6 (A) None Seen, 0-2 /HPF    WBC, UA Too Numerous to Count (A) None Seen /HPF    Bacteria, UA 4+ (A) None Seen, Trace /HPF    Squamous Epithelial Cells, UA 0-2 None Seen, 0-2 /HPF    Hyaline Casts, UA 0-6 0 - 6 /LPF    Methodology Automated Microscopy      Note: In addition to lab results from this visit, the labs listed above may include labs taken at another facility or during a different encounter within the last 24 hours. Please correlate lab times with ED admission and discharge times for further clarification of the services performed during this visit.    MRI Lumbar Spine Without Contrast   Final Result   Addendum 1 of 1   ADDENDUM: No MR evidence of cauda equina syndrome.  Clinical correlation    is    also necessary. The findings were verbally communicated via telephone    conference with GEORGIA Bond at 11:17 PM EST on 12/4/2017. The findings    were    acknowledged and understood.      Hyperintense STIR signal along posterior aspects of L4, L5, S1 vertebral    bodies    may represent some posterior longitudinal ligamentous injury.      THIS DOCUMENT HAS BEEN ELECTRONICALLY SIGNED BY CHALINO  "MOUNIKA ANGLIN      Final   1. L4 vertebral body, recent (acute or subacute), compression fracture.   L4 vertebral body shows mild height loss and superoposterior endplate    retropulsed bone fragment.   L3/L4: Focal disc bulge or broad-based disc protrusion, L4 superoposterior    endplate retropulsed bone fragment, and bilateral facet hypertrophy causing    moderate central canal and moderate bilateral neuroforaminal stenosis.   2. Moderate lumbosacral spine degenerative disease with acquired spinal    stenosis.      THIS DOCUMENT HAS BEEN ELECTRONICALLY SIGNED BY CHALINO IRBERA MD        Vitals:    12/04/17 1817 12/04/17 1900 12/04/17 1934   BP: 118/89 114/64    BP Location: Left arm     Patient Position: Lying     Pulse: 80  72   Resp: 20     Temp: 98.6 °F (37 °C)     TempSrc: Oral     SpO2: 95% 95%    Weight: 245 lb (111 kg)     Height: 61\" (154.9 cm)       Medications   sodium chloride 0.9 % bolus 500 mL (0 mL Intravenous Stopped 12/4/17 2100)   ondansetron (ZOFRAN) injection 4 mg (4 mg Intravenous Given 12/4/17 1956)   HYDROmorphone (DILAUDID) injection 0.5 mg (0.5 mg Intravenous Given 12/4/17 1959)   cefTRIAXone (ROCEPHIN) IVPB 1 g (0 g Intravenous Stopped 12/4/17 2300)     ECG/EMG Results (last 24 hours)     ** No results found for the last 24 hours. **              [facility]    Final diagnoses:   Acute cystitis without hematuria   Acute kidney injury   Closed compression fracture of fourth lumbar vertebra, initial encounter   Lumbar disc disease            GEORGIA Gu  12/04/17 2316       GEORGIA Gu  12/04/17 2327    "

## 2017-12-05 NOTE — TELEPHONE ENCOUNTER
Provider:  Edy  Caller: J Luis     Phone #:  [phone]   Last visit:   11/30/17  Next visit: 12/12/17  CURRENTLY ADMITTED  MAIRA:         Reason for call:         J Luis from [facility] called to verify if there was anything that would prevent the pt from having Limited Bone Scan that was ordered, he states that he was told that it could not be done due to high creatine level. Please advise

## 2017-12-05 NOTE — CONSULTS
Adult Nutrition  Assessment/PES    Patient Name:  Georgia Velázquez  YOB: 1945  MRN: [id number]  Admit Date:  12/4/2017    Assessment Date:  12/5/2017    Comments:            Reason for Assessment       12/05/17 1342    Reason for Assessment    Reason For Assessment/Visit identified at risk by screening criteria;nurse/nurse practitioner consult    Identified At Risk By Screening Criteria MST SCORE 2+    Time Spent (min) 30    Infectious Disease UTI    Ortho Fracture   L4 compression Fx    Renal ISRRAEL              Nutrition/Diet History       12/05/17 1343    Nutrition/Diet History    Patient Reported Diet/Restrictions/Preferences diabetic    Reported/Observed By Family    Appetite Poor at this Time   Family reports appetite has been poor since sustaining her Fx.      Mental State/Condition Confusion    Other Per family pt was weighed in MD office 11/7, weight at that visit was 259lbs.  On subsequent visit to MD on 11/30 she weighed 245lbs.              Anthropometrics       12/05/17 1356    Anthropometrics    RD Documented Weight on Admission 111 kg (245 lb)   Stated weight by family    Usual Body Weight (UBW)    Usual Body Weight 117 kg (259 lb)    Weight Loss 6.35 kg (14 lb)    % Weight Loss  5 %    Weight Loss Time Frame 3 weeks            Labs/Tests/Procedures/Meds       12/05/17 1359    Labs/Tests/Procedures/Meds    Labs/Tests Review Reviewed;BUN;Creat    Medication Review Reviewed, pertinent;Diabetic Oral Agent                Nutrition Prescription Ordered       12/05/17 1400    Nutrition Prescription PO    Current PO Diet Regular            Evaluation of Received Nutrient/Fluid Intake       12/05/17 1400    PO Evaluation    Number of Days PO Intake Evaluated Insufficient Data              Malnutrition Severity Assessment       12/05/17 1404    Malnutrition Severity Assessment    Malnutrition Type Acute Illness/Injury Malnutrition    Weight Status (Acute)    Weight Loss Mod (5% / 1 mo)     Energy Intake Status (Acute)    Energy Intake Severe (< or equal to 50% / > or equal to 5d)    Criteria Met (Must meet criteria for severity in at least 2 of these categories: M Wasting, Fat Loss, Fluid, Secondary Signs, Wt. Status, Intake)    Patient meets criteria for  Moderate malnutrition        Problem/Interventions:        Problem 1       12/05/17 1400    Nutrition Diagnoses Problem 1    Problem 1 Inadequate Nutrient Intake    Etiology (related to) Medical Diagnosis    Infectious Disease UTI    Renal ISRRAEL    Signs/Symptoms (evidenced by) Report of Mnimal PO Intake;Unintended Weight Change    Unintended Weight Change Loss    Number of Pounds Lost 14    Weight loss time period 3 weeks                    Intervention Goal       12/05/17 1401    Intervention Goal    General Nutrition support treatment    PO Establish PO;Tolerate PO            Nutrition Intervention       12/05/17 1401    Nutrition Intervention    RD/Tech Action Follow Tx progress;Care plan reviewd;Advise alternate selection;Interview for preference;Encourage intake              Education/Evaluation       12/05/17 1402    Monitor/Evaluation    Monitor Per protocol;PO intake;Pertinent labs        Electronically signed by:  Alina Prince  12/05/17 2:12 PM

## 2017-12-05 NOTE — TELEPHONE ENCOUNTER
The bone scan was part of her regular follow up through the office for Dr. Snow and can be postponed until her acute issues for this hospitalization are addressed. I called [facility] and updated them on this

## 2017-12-05 NOTE — CONSULTS
[facility]    NEUROSURGERY CONSULTATION    Patient: Georgia Velázquez  : 1945   MRN: [id number]    Referring Provider: No ref. provider found  Admitting Provider: Cailin Rosales DO  Admitting Diagnosis:  Acute cystitis without hematuria [N30.00]  Date of Admission:  2017    Patient Care Team:  Chaz Rico DNP, APRN as PCP - General (Family Medicine)  Chaz Rico DNP, APRN as PCP - Claims Attributed  Carmine Pérez MD as Consulting Physician (Interventional Cardiology)      Reason for Consultation: L4 compression fracture    History of Present Illness:    Ms. Velázquez is a 72 y.o. woman with a history of an L4 compression fracture and osteoporosis who presented to the ED last night with altered mental status and urinary incontinence.  Physical examination and imaging in ED ruled out cauda equina.   Upon presentation, she was also found to have a UTI, ISRRAEL, and abdominal pain which required additional workup and treatment upon admission.       Patient is disoriented and thus family provides the history for me today.  She reportedly has had low back pain with radiation down the left leg to top of foot for 2 months which progressively worsened about 2 weeks ago.  No acute injury or fall.  She has a known history of osteoporosis.  She has severe pain with walking.  She has no left sided symptoms. She was evaluated in our clinic on  after presenting to ED on .  MRI, bone scan, and flexion/extension Xrays were ordered and were initially scheduled for this morning.  MRI was preformed this morning.  Bone scan has been deferred.  She has an extensive cardiac history, including CHF, AMI, CAD, HTN, HLD, DM2, and COPD.       Review of Systems:  Review of Systems    Constitutional: Negative for chills and fever.   Respiratory: Negative for cough, shortness of breath and wheezing.    Cardiovascular: Negative for chest pain,  palpitations and leg swelling.   Gastrointestinal: Positive for abdominal pain. Negative for blood in stool, constipation, diarrhea, nausea and vomiting.        Denies bowel dysfunction or incontinence   Genitourinary: Positive for dysuria, frequency and urgency. Negative for difficulty urinating and hematuria.        Reports hx of frequent urinary tract infections   Musculoskeletal: Positive for back pain. Negative for arthralgias.   Skin: Negative for color change, pallor and rash.   Neurological: Negative for dizziness, syncope, weakness, light-headedness and headaches.   Hematological: Does not bruise/bleed easily.   Psychiatric/Behavioral: Negative for confusion.   All other systems reviewed and are negative.    History:  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   • CHF (congestive heart failure)    • COPD (chronic obstructive pulmonary disease)    • Coronary artery disease 5/18/2016   • Essential hypertension 5/18/2016   • GERD (gastroesophageal reflux disease) 5/18/2016   • History of echocardiogram 10/16/2015    TDS.Est EF is 45-50%.Mild distal septal, anteroapical hypokinesia.Mild mitral stenosis.Mean gradient across mitral valve is 6 mmHg.Trace TR   • Hyperlipemia 5/18/2016   • Myocardial infarction 5/18/2016   • Osteoporosis 5/18/2016   • Sepsis     uro   • Type 2 diabetes mellitus 5/18/2016     Past Surgical History:   Procedure Laterality Date   • APPENDECTOMY     • CHOLECYSTECTOMY       Family History   Problem Relation Age of Onset   • Diabetes Mother    • No Known Problems Father    • No Known Problems Sister    • No Known Problems Brother    • No Known Problems Son    • No Known Problems Daughter      Social History     Social History   • Marital status:      Spouse name: N/A   • Number of children: N/A   • Years of education: N/A     Occupational History   • Retired from [employer]      Social History Main Topics   • Smoking status: Former  Smoker     Types: Cigarettes     Quit date: 2007   • Smokeless tobacco: Never Used   • Alcohol use No   • Drug use: No   • Sexual activity: No     Other Topics Concern   • Not on file     Social History Narrative       Allergies:  Review of patient's allergies indicates no known allergies.  Prescriptions Prior to Admission   Medication Sig Dispense Refill Last Dose   • albuterol (PROVENTIL HFA;VENTOLIN HFA) 108 (90 BASE) MCG/ACT inhaler Inhale 2 puffs every 4 (four) hours as needed for wheezing. 1 inhaler 11 Taking   • alendronate (FOSAMAX) 70 MG tablet Take 1 tablet by mouth Every 7 (Seven) Days. 4 tablet 5 Taking   • aspirin 81 MG chewable tablet Chew 1 tablet Daily. 30 tablet 3 Taking   • atorvastatin (LIPITOR) 40 MG tablet Take 1 tablet by mouth Daily. 30 tablet 3 Taking   • carvedilol (COREG) 3.125 MG tablet Take 1 tablet by mouth 2 (Two) Times a Day With Meals. (Patient taking differently: Take 6.25 mg by mouth 2 (Two) Times a Day With Meals.) 60 tablet 3 Taking   • cyclobenzaprine (FLEXERIL) 10 MG tablet 1/2 po bid prn 30 tablet 0 Taking   • glucose blood (ACCU-CHEK ANNIE PLUS) test strip Use once daily 100 each 12 Taking   • glucose blood test strip Use as instructed 100 each 12 Taking   • HYDROcodone-acetaminophen (NORCO)  MG per tablet Take 1 tablet by mouth Every 6 (Six) Hours As Needed for Severe Pain . 24 tablet 0 Taking   • metFORMIN (GLUCOPHAGE) 850 MG tablet Take 1 tablet by mouth 2 (Two) Times a Day With Meals. 60 tablet 2 Taking   • nitrofurantoin, macrocrystal-monohydrate, (MACROBID) 100 MG capsule Take 1 capsule by mouth 2 (Two) Times a Day. 14 capsule 0 Taking   • pantoprazole (PROTONIX) 40 MG EC tablet Take 1 tablet by mouth Daily. 30 tablet 1 Taking   • TiZANidine (ZANAFLEX) 2 MG capsule Take 1 capsule by mouth 3 (Three) Times a Day. 60 capsule 0 Taking   • urea (CARMOL) 10 % cream Apply  topically 2 (Two) Times a Day. 57 g 1 Taking   • valsartan-hydrochlorothiazide (DIOVAN-HCT) 160-25  "MG per tablet Take 1 tablet by mouth Daily. 30 tablet 1 Taking        Objective:  Physical Exam  Vitals: Blood pressure 114/63, pulse 79, temperature 98.4 °F (36.9 °C), temperature source Oral, resp. rate 20, height 154.9 cm (61\"), weight 111 kg (245 lb), SpO2 95 %, not currently breastfeeding. Body mass index is 46.29 kg/(m^2).   General:   Morbidly obese, obtunded. NAD.  Cooperative when awakened and asked to follow directions, but quickly falls back to sleep.   HEENT: NCAT.  PERRLAB.  EOMI.  Nystagmus is not present.     Chest Breathing unlabored.   Extremities: 1-2+ BLE edema.  Hyperpigmentation of BLE.    Motor: Normal muscle strength, bulk and tone in upper extremities. Unable to assess muscle strength of hip flexors d/t drowsiness, otherwise, normal muscle strength, bulk, and tone in lower extremities.     Sensation: Sensation is intact to light touch testing throughout.   Reflexes: Difficult to assess due to decreased cooperation/alertness during exam.   Station and Gait: Deferred.   Special Test: No Jerry's signs, there is no ankle clonus.  Straight leg raise and Giles's sign did not elicit pain bilaterally.   Skin: Warm and dry.  Patient is not diaphoretic.          Review of Imaging (Interpretation of scans not reports):  MRI demonstrates compression fracture of L4 vertebral body with a fracture fragment extending posteriorly without significant comprise of the spinal canal.      Laboratory Results:   UA suggestive of UTI, Cx pending.  BMP demonstrated elevated elevated Cr, BUN in presence of decreased eGFR.  KUB demonstrated an abnormal gas pattern favoring ileus or viral enteritis with the recommendation for further workup.       Test Pending:  Renal US    Assessment & Plan    Problem List:  Principal Problem:    Urinary tract infection  Active Problems:    Essential hypertension    Type 2 diabetes mellitus    Hyperlipemia    Coronary artery disease    Osteoporosis    GERD (gastroesophageal reflux " disease)    Acute cystitis without hematuria    Acute kidney injury    Constipation    Abdominal pain    Leukocytosis      ASSESSMENT  Compression fracture of L4 vertebral body in presence of osteoporosis without evidence of significant spinal stenosis.  She has no signs of urinary retention, cauda equina syndrome.  She has no signs of myelopathy, no clonus no long tract signs.      TREATMENT RECOMMENDATIONS  Ms. Velázquez is being seen in consult for low back pain r/t L4 compression fracture.   After examination, the plan of care should include symptomatic treatment until her other hospital conditions are stabilized.  Once her other more acute conditions are stabilized and she has received cardiac clearance, we can move forward with further workup and treatment of her L4 vertebral fracture.  This includes her bone scan, which can be deferred at this time.  Further recommendations will ensue pending evaluation by Dr. Pham.      Thank you for the opportunity to assist in the care of Ms. Velázquez.        PARIS Bravo MD  [facility]  12/05/17  11:44 AM    As per Ms. Salbador above.  The patient is a 72-year-old woman known osteoporosis who complains of a 2 week history of severe low back pain.  Sometimes the pain extends into her legs, right greater than left.  She denies any inciting or precipitating event.  She was seen in our office and referred for studies.  Given her increased pain she presented to the emergency room where she was noted to have a urinary tract infection and acute renal insufficiency.  Studies have demonstrated a compression fracture at L4.    Examination:  The patient is now awake and alert and generally oriented.  Her speech is fluent.  She follows all commands.  Motor function is at least antigravity in her left leg and perhaps 2/5 in her right leg.  Straight leg raising induces severe back pain on each side.  There is mild  tenderness to palpation in the lumbar midline.    Data review:  MRI of the lumbar spine demonstrates an acute fracture of the upper aspect of L4.  No other fractures are noted.  There is moderate to generous stenosis at L3-4 and L4-5.    Impression/recommendation:  If the patient stabilizes from a medical standpoint and is cleared in terms of her cardiac issues then we certainly could pursue L4 kyphoplasty to ameliorate her pain.  We have sufficient data.  The bone scan is not necessary at this point in regard to her back.    Marc Pham MD

## 2017-12-06 PROBLEM — S32.040A CLOSED COMPRESSION FRACTURE OF L4 LUMBAR VERTEBRA: Status: ACTIVE | Noted: 2017-12-04

## 2017-12-06 LAB
ANION GAP SERPL CALCULATED.3IONS-SCNC: 11 MMOL/L (ref 3–11)
BACTERIA SPEC AEROBE CULT: ABNORMAL
BACTERIA SPEC AEROBE CULT: ABNORMAL
BUN BLD-MCNC: 40 MG/DL (ref 9–23)
BUN/CREAT SERPL: 23.5 (ref 7–25)
CALCIUM SPEC-SCNC: 8.8 MG/DL (ref 8.7–10.4)
CHLORIDE SERPL-SCNC: 105 MMOL/L (ref 99–109)
CO2 SERPL-SCNC: 20 MMOL/L (ref 20–31)
CREAT BLD-MCNC: 1.7 MG/DL (ref 0.6–1.3)
GFR SERPL CREATININE-BSD FRML MDRD: 29 ML/MIN/1.73
GLUCOSE BLD-MCNC: 117 MG/DL (ref 70–100)
GLUCOSE BLDC GLUCOMTR-MCNC: 108 MG/DL (ref 70–130)
GLUCOSE BLDC GLUCOMTR-MCNC: 130 MG/DL (ref 70–130)
GLUCOSE BLDC GLUCOMTR-MCNC: 139 MG/DL (ref 70–130)
GLUCOSE BLDC GLUCOMTR-MCNC: 145 MG/DL (ref 70–130)
POTASSIUM BLD-SCNC: 4.3 MMOL/L (ref 3.5–5.5)
SODIUM BLD-SCNC: 136 MMOL/L (ref 132–146)

## 2017-12-06 PROCEDURE — 97530 THERAPEUTIC ACTIVITIES: CPT

## 2017-12-06 PROCEDURE — 97161 PT EVAL LOW COMPLEX 20 MIN: CPT

## 2017-12-06 PROCEDURE — 93005 ELECTROCARDIOGRAM TRACING: CPT | Performed by: INTERNAL MEDICINE

## 2017-12-06 PROCEDURE — 97165 OT EVAL LOW COMPLEX 30 MIN: CPT

## 2017-12-06 PROCEDURE — 80048 BASIC METABOLIC PNL TOTAL CA: CPT | Performed by: INTERNAL MEDICINE

## 2017-12-06 PROCEDURE — 25010000002 CEFTRIAXONE PER 250 MG: Performed by: INTERNAL MEDICINE

## 2017-12-06 PROCEDURE — 82962 GLUCOSE BLOOD TEST: CPT

## 2017-12-06 PROCEDURE — 25010000002 HEPARIN (PORCINE) PER 1000 UNITS: Performed by: NURSE PRACTITIONER

## 2017-12-06 PROCEDURE — 93010 ELECTROCARDIOGRAM REPORT: CPT | Performed by: INTERNAL MEDICINE

## 2017-12-06 PROCEDURE — 99232 SBSQ HOSP IP/OBS MODERATE 35: CPT | Performed by: NEUROLOGICAL SURGERY

## 2017-12-06 PROCEDURE — 99232 SBSQ HOSP IP/OBS MODERATE 35: CPT | Performed by: INTERNAL MEDICINE

## 2017-12-06 RX ORDER — CEFTRIAXONE SODIUM 1 G/50ML
1 INJECTION, SOLUTION INTRAVENOUS EVERY 24 HOURS
Status: DISCONTINUED | OUTPATIENT
Start: 2017-12-06 | End: 2017-12-08 | Stop reason: HOSPADM

## 2017-12-06 RX ORDER — CASTOR OIL AND BALSAM, PERU 788; 87 MG/G; MG/G
OINTMENT TOPICAL EVERY 12 HOURS SCHEDULED
Status: DISCONTINUED | OUTPATIENT
Start: 2017-12-06 | End: 2017-12-08 | Stop reason: HOSPADM

## 2017-12-06 RX ADMIN — NYSTATIN: 100000 POWDER TOPICAL at 20:54

## 2017-12-06 RX ADMIN — HYDROCODONE BITARTRATE AND ACETAMINOPHEN 1 TABLET: 10; 325 TABLET ORAL at 11:04

## 2017-12-06 RX ADMIN — CARVEDILOL 6.25 MG: 6.25 TABLET, FILM COATED ORAL at 09:30

## 2017-12-06 RX ADMIN — CASTOR OIL AND BALSAM, PERU: 788; 87 OINTMENT TOPICAL at 20:54

## 2017-12-06 RX ADMIN — TIZANIDINE 2 MG: 4 TABLET ORAL at 09:30

## 2017-12-06 RX ADMIN — ASPIRIN 81 MG 81 MG: 81 TABLET ORAL at 09:30

## 2017-12-06 RX ADMIN — TIZANIDINE 2 MG: 4 TABLET ORAL at 16:58

## 2017-12-06 RX ADMIN — PANTOPRAZOLE SODIUM 40 MG: 40 TABLET, DELAYED RELEASE ORAL at 05:39

## 2017-12-06 RX ADMIN — ATORVASTATIN CALCIUM 40 MG: 40 TABLET, FILM COATED ORAL at 09:30

## 2017-12-06 RX ADMIN — CEFTRIAXONE SODIUM 1 G: 1 INJECTION, SOLUTION INTRAVENOUS at 11:04

## 2017-12-06 RX ADMIN — TIZANIDINE 2 MG: 4 TABLET ORAL at 20:50

## 2017-12-06 RX ADMIN — HYDROCODONE BITARTRATE AND ACETAMINOPHEN 1 TABLET: 10; 325 TABLET ORAL at 17:29

## 2017-12-06 RX ADMIN — HEPARIN SODIUM 5000 UNITS: 5000 INJECTION, SOLUTION INTRAVENOUS; SUBCUTANEOUS at 09:30

## 2017-12-06 RX ADMIN — HEPARIN SODIUM 5000 UNITS: 5000 INJECTION, SOLUTION INTRAVENOUS; SUBCUTANEOUS at 20:49

## 2017-12-06 RX ADMIN — CASTOR OIL AND BALSAM, PERU 1 STRIP: 788; 87 OINTMENT TOPICAL at 17:08

## 2017-12-06 RX ADMIN — SODIUM CHLORIDE 100 ML/HR: 9 INJECTION, SOLUTION INTRAVENOUS at 20:50

## 2017-12-06 RX ADMIN — NYSTATIN: 100000 POWDER TOPICAL at 11:00

## 2017-12-06 RX ADMIN — CARVEDILOL 6.25 MG: 6.25 TABLET, FILM COATED ORAL at 17:08

## 2017-12-06 NOTE — THERAPY EVALUATION
Acute Care - Occupational Therapy Initial Evaluation  [facility]     Patient Name: Georgia Velázquez  : 1945  MRN: [id number]  Today's Date: 2017  Onset of Illness/Injury or Date of Surgery Date: 17  Date of Referral to OT: 17  Referring Physician: DO Bobby    Admit Date: 2017       ICD-10-CM ICD-9-CM   1. Acute cystitis without hematuria N30.00 595.0   2. Acute kidney injury N17.9 584.9   3. Closed compression fracture of fourth lumbar vertebra, initial encounter S32.040A 805.4   4. Lumbar disc disease M51.9 722.93   5. Impaired functional mobility, balance, gait, and endurance Z74.09 V49.89   6. Impaired mobility and ADLs Z74.09 799.89     Patient Active Problem List   Diagnosis   • Essential hypertension   • Type 2 diabetes mellitus   • Hyperlipemia   • Myocardial infarction   • Coronary artery disease   • Osteoporosis   • GERD (gastroesophageal reflux disease)   • Arthritis of shoulder   • Acute cystitis without hematuria   • Urinary tract infection   • Acute kidney injury   • Constipation   • Abdominal pain   • Leukocytosis     Past Medical History:   Diagnosis Date   • Arthritis    • Arthritis of shoulder 2016   • CHF (congestive heart failure)    • COPD (chronic obstructive pulmonary disease)    • Coronary artery disease 2016   • Essential hypertension 2016   • GERD (gastroesophageal reflux disease) 2016   • History of echocardiogram 10/16/2015    TDS.Est EF is 45-50%.Mild distal septal, anteroapical hypokinesia.Mild mitral stenosis.Mean gradient across mitral valve is 6 mmHg.Trace TR   • Hyperlipemia 2016   • Myocardial infarction 2016   • Osteoporosis 2016   • Sepsis     uro   • Type 2 diabetes mellitus 2016     Past Surgical History:   Procedure Laterality Date   • APPENDECTOMY     • CHOLECYSTECTOMY            OT ASSESSMENT FLOWSHEET (last 72 hours)      OT Evaluation       17 1115 17 1106 17 1144 17 1143  12/05/17 0031    Rehab Evaluation    Document Type evaluation  -LS evaluation;therapy note (daily note)  -TB       Subjective Information agree to therapy;no complaints  -LS agree to therapy;complains of;pain  -TB       Patient Effort, Rehab Treatment good  -LS good  -TB       Symptoms Noted During/After Treatment increased pain  -LS increased pain;fatigue  -TB       Symptoms Noted Comment  Pt with h/o L4 compression fracture treated conservatively for pain control; Current plan is for kyphoplasty   -TB       General Information    Patient Profile Review yes  -LS yes  -TB       Onset of Illness/Injury or Date of Surgery Date 12/04/17  -LS 12/04/17  -TB       Referring Physician DO Bobby  -RAMSES Rosales  -DAYAN       General Observations  Pt rec'vd supine in bed, room air, IV and tele intact; son present  -TB       Pertinent History Of Current Problem Pt admitted with AMS, recent incontinence of bladder, constipation; hx of known L4 compression fx being treated conservatively. Found to have UTI and [place]; planned to undergo kyphoplasty when medically appropriate.   -LS Pt to ED with AMS and back pain that radiates to L foot; UTI; plans for kyphoplasty as pt has failed out pt pain control  -TB       Precautions/Limitations fall precautions;spinal precautions   L4 compression fx  -LS fall precautions;spinal precautions  -TB       Prior Level of Function independent:;gait;transfer;ADL's;bathing;dressing;driving  -LS independent:;all household mobility;community mobility;transfer;bed mobility;ADL's;home management;driving  -TB       Equipment Currently Used at Home rollator;shower chair;grab bar  -LS grab bar;shower chair;rollator  -TB walker, standard;bath bench;glucometer  -AW  walker, standard;bath bench;glucometer  -KW    Plans/Goals Discussed With patient and family;agreed upon  -LS patient and family;agreed upon  -TB       Risks Reviewed patient and family:;LOB;dizziness;increased discomfort;change in vital signs  -LS  patient and family:;LOB;increased discomfort;lines disloged  -TB       Benefits Reviewed patient and family:;improve function;increase independence;increase strength;increase knowledge  -LS patient and family:;improve function;increase independence;decrease pain;increase knowledge  -TB       Barriers to Rehab  none identified  -TB       Living Environment    Lives With alone  -LS alone  -TB alone  -AW  alone  -KW    Living Arrangements apartment  -LS apartment  -TB apartment  -AW  apartment  -KW    Home Accessibility tub/shower is not walk in  -LS tub/shower is not walk in  -TB no concerns  -AW  no concerns  -KW    Stair Railings at Home   inside, present on right side;outside, present on right side  -AW  inside, present on right side;outside, present on right side  -KW    Type of Financial/Environmental Concern   none  -AW  none  -KW    Transportation Available   car;family or friend will provide  -AW  car;family or friend will provide  -KW    Living Environment Comment  Recommend toilet seat riser - education completed with pt/son. Pt has shower chair and grab bars at tub and toilet  -TB       Clinical Impression    Date of Referral to OT  12/05/17  -TB       OT Diagnosis  Impaired mobility, transfer and ADL  -TB       Patient/Family Goals Statement  to return home at d/c  -TB       Criteria for Skilled Therapeutic Interventions Met  yes;treatment indicated  -TB       Rehab Potential  good, to achieve stated therapy goals  -TB       Therapy Frequency  daily  -TB       Anticipated Equipment Needs At Discharge  raised toilet seat   Education completed with pt/family for obtaining ETS  -TB       Anticipated Discharge Disposition  home with home health  -TB       Functional Level Prior    Ambulation    1-->assistive equipment  -AW 1-->assistive equipment  -KW    Transferring    0-->independent  -AW 0-->independent  -KW    Toileting    0-->independent  -AW 0-->independent  -KW    Bathing    0-->independent  -AW  0-->independent  -KW    Dressing     0-->independent  -KW    Eating    0-->independent  -AW 0-->independent  -KW    Communication    0-->understands/communicates without difficulty  -AW 0-->understands/communicates without difficulty  -KW    Swallowing    0-->swallows foods/liquids without difficulty  -AW 0-->swallows foods/liquids without difficulty  -KW    Prior Functional Level Comment  Independent with pain and effort  -TB   NA  -KW    Vital Signs    Pre Systolic BP Rehab 135  -LS        Pre Treatment Diastolic BP 77  -LS --   stable; RN cleared OT  -TB       Pretreatment Heart Rate (beats/min) 73  -LS        Posttreatment Heart Rate (beats/min) 81  -LS        O2 Delivery Pre Treatment room air  -LS        O2 Delivery Post Treatment room air  -LS room air  -TB       Pre Patient Position Supine  -LS Supine  -TB       Intra Patient Position Standing  -LS Standing  -TB       Post Patient Position Sitting  -LS Sitting  -TB       Pain Assessment    Pain Assessment 0-10  -LS 0-10  -TB       Pain Score 0  -LS 0  -TB       Post Pain Score  5  -TB       Pain Type  Acute pain  -TB       Pain Location Back  -LS Back  -TB       Pain Orientation  Lower  -TB       Pain Radiating Towards  L LE/foot  -TB       Pain Intervention(s) Repositioned;Ambulation/increased activity  -LS Ambulation/increased activity;Repositioned  -TB       Response to Interventions tolerated  -LS Pt tolerated OOB activity  -TB       Vision Assessment/Intervention    Visual Impairment  WFL  -TB       Cognitive Assessment/Intervention    Current Cognitive/Communication Assessment functional  -LS functional  -TB       Orientation Status oriented x 4  -LS oriented x 4  -TB       Follows Commands/Answers Questions able to follow single-step instructions;100% of the time;needs cueing  -LS able to follow single-step instructions;100% of the time  -TB       Personal Safety mild impairment;decreased awareness, need for safety  -LS WNL/WFL  -TB       Personal  Safety Interventions fall prevention program maintained;gait belt;nonskid shoes/slippers when out of bed  -LS fall prevention program maintained;gait belt;nonskid shoes/slippers when out of bed  -TB       Short/Long Term Memory  intact short term memory;intact long term memory  -TB       ROM (Range of Motion)    General ROM no range of motion deficits identified   BLEs  -LS no range of motion deficits identified  -TB       General ROM Detail  B UE WFL  -TB       MMT (Manual Muscle Testing)    General MMT Assessment lower extremity strength deficits identified  -LS no strength deficits identified  -TB       General MMT Assessment Detail  B UE WFL 4/5,  4+/5  -TB       Bed Mobility, Assessment/Treatment    Bed Mobility, Assistive Device bed rails;head of bed elevated  -LS        Bed Mobility, Roll Right, [place] moderate assist (50% patient effort);verbal cues required  -LS moderate assist (50% patient effort);verbal cues required  -TB       Bed Mob, Sidelying to Sit, [place] minimum assist (75% patient effort);verbal cues required  -LS minimum assist (75% patient effort);verbal cues required  -TB       Bed Mobility, Safety Issues  decreased use of arms for pushing/pulling;decreased use of legs for bridging/pushing  -TB       Bed Mobility, Impairments strength decreased;impaired balance;pain  -LS pain  -TB       Bed Mobility, Comment VC's for log rolling.   -LS Education initiated for logroll sequencing in light of kyphoplasty pending  -TB       Transfer Assessment/Treatment    Transfers, Bed-Chair [place]  contact guard assist;verbal cues required  -TB       Transfers, Sit-Stand [place] contact guard assist;verbal cues required;1 person + 1 person to manage equipment  -LS contact guard assist;verbal cues required  -TB       Transfers, Stand-Sit [place] contact guard assist;verbal cues required;1 person + 1 person to manage equipment  -LS contact guard assist;verbal cues required  -TB        Transfers, Sit-Stand-Sit, Assist Device rolling walker  -LS rolling walker  -TB       Toilet Transfer, [place] contact guard assist;1 person + 1 person to manage equipment;verbal cues required  -LS contact guard assist;verbal cues required  -TB       Toilet Transfer, Assistive Device  bedside commode without drop arms;rolling walker  -TB       Transfer, Safety Issues  balance decreased during turns  -TB       Transfer, Impairments impaired balance;strength decreased  -LS pain  -TB       Transfer, Comment VC's for hand placement.   -LS fair safety, no LOB  -TB       Functional Mobility    Functional Mobility- Ind. Level  contact guard assist;verbal cues required  -TB       Functional Mobility- Device  rolling walker  -TB       Functional Mobility-Distance (Feet)  100  -TB       Functional Mobility- Comment  fair safety, decreased activity tolerance with standing rest break at 50 feet  -TB       Upper Body Dressing Assessment/Training    UB Dressing Assess/Train, Clothing Type  donning:;hospital gown  -TB       UB Dressing Assess/Train, Position  sitting  -TB       UB Dressing Assess/Train, [place]  minimum assist (75% patient effort)  -TB       UB Dressing Assess/Train, Comment  assist for lines  -TB       Toileting Assessment/Training    Toileting Assess/Train, Assistive Device  bedside commode  -TB       Toileting Assess/Train, Position  sitting  -TB       Toileting Assess/Train, Indepen Level  moderate assist (50% patient effort)  -TB       Toileting Assess/Train, Comment  assist for clothing mgmt; s/u for hygiene  -TB       Grooming Assessment/Training    Grooming Assess/Train, Position  sitting  -TB       Grooming Assess/Train, Indepen Level  set up required  -TB       Grooming Assess/Train, Comment  to wash hands  -TB       Motor Skills/Interventions    Additional Documentation Balance Skills Training (Group)  -        Balance Skills Training    Sitting-Level of Assistance Close supervision  -  Close supervision  -TB       Sitting-Balance Support Feet supported  -LS Feet supported  -TB       Sitting # of Minutes  10  -TB       Standing-Level of Assistance Contact guard  -LS Contact guard  -TB       Static Standing Balance Support assistive device  -LS assistive device  -TB       Standing-Balance Activities  Weight Shift R-L  -TB       Standing Balance # of Minutes  3  -TB       Gait Balance-Level of Assistance Contact guard  -LS        Gait Balance Support assistive device  -LS        Therapy Exercises    Bilateral Upper Extremity  AROM:;sitting;shoulder extension/flexion;shoulder abduction/adduction;shoulder horizontal abd/add;shoulder ER/IR;elbow flexion/extension;pronation/supination;hand pumps  -TB       Fine Motor Coordination Training    Opposition  Right:;Left:;intact  -TB       Sensory Assessment/Intervention    Light Touch RLE;LLE  -LS LUE;RUE  -TB       LUE Light Touch  WNL  -TB       RUE Light Touch  WNL  -TB       LLE Light Touch WNL  -LS        RLE Light Touch WNL  -LS        Positioning and Restraints    Pre-Treatment Position in bed  -LS in bed  -TB       Post Treatment Position chair  -LS chair  -TB       In Chair notified nsg;reclined;call light within reach;encouraged to call for assist;with family/caregiver;legs elevated  -LS notified nsg;reclined;call light within reach;encouraged to call for assist;with family/caregiver;legs elevated  -TB       Lower Extremity    Lower Ext Manual Muscle Testing Detail BLEs grossly 3+/5  -LS          User Key  (r) = Recorded By, (t) = Taken By, (c) = Cosigned By    Initials Name Effective Dates    TB Geovanna Vieira OT 06/22/15 -     LS Latonya Snowden PT 06/19/15 -     AW Lianne Peterson, JEANMARIE 06/16/16 -     KW Hazel Cedeno, RN 04/18/17 -            Occupational Therapy Education     Title: PT OT SLP Therapies (Done)     Topic: Occupational Therapy (Done)     Point: ADL training (Done)    Description: Instruct learner(s) on proper safety  adaptation and remediation techniques during self care or transfers.   Instruct in proper use of assistive devices.    Learning Progress Summary    Learner Readiness Method Response Comment Documented by Status   Patient Acceptance E,D VU,NR Education initiated for benefits of activity/therapy, role of OT, spinal precautions in anticipation of upcoming kyphoplasty and home safety (including recommendation for ETS) TB 12/06/17 1158 Done   Family Acceptance E,D VU,NR Education initiated for benefits of activity/therapy, role of OT, spinal precautions in anticipation of upcoming kyphoplasty and home safety (including recommendation for ETS) TB 12/06/17 1158 Done               Point: Precautions (Done)    Description: Instruct learner(s) on prescribed precautions during self-care and functional transfers.    Learning Progress Summary    Learner Readiness Method Response Comment Documented by Status   Patient Acceptance E,D VU,NR Education initiated for benefits of activity/therapy, role of OT, spinal precautions in anticipation of upcoming kyphoplasty and home safety (including recommendation for ETS) TB 12/06/17 1158 Done   Family Acceptance E,D VU,NR Education initiated for benefits of activity/therapy, role of OT, spinal precautions in anticipation of upcoming kyphoplasty and home safety (including recommendation for ETS) TB 12/06/17 1158 Done                      User Key     Initials Effective Dates Name Provider Type Discipline     06/22/15 -  Geovanna Vieira OT Occupational Therapist OT                  OT Recommendation and Plan  Anticipated Equipment Needs At Discharge: raised toilet seat (Education completed with pt/family for obtaining ETS)  Anticipated Discharge Disposition: home with home health  Therapy Frequency: daily  Plan of Care Review  Plan Of Care Reviewed With: patient, son  Outcome Summary/Follow up Plan: OT IE completed. Pt presents with increased pain, decreased generalized strength and  decreased occupational endurance limiting participation in ADLs. OT to follow. CGA for mobility/transfers. Education completed for ETS/DME recommendations for home.  Recommend home with assist and HH OT/PT to follow.          OT Goals       12/06/17 1334          Bed Mobility OT LTG    Bed Mobility OT LTG, Time to Achieve by discharge  -TB      Bed Mobility OT LTG, Activity Type roll left/roll right;supine to sit/sit to supine  -TB      Bed Mobility OT LTG, [place] Level supervision required;verbal cues required  -TB      Bed Mobility OT LTG, Additional Goal via logroll  -TB      Transfer Training OT LTG    Transfer Training OT LTG, Time to Achieve by discharge  -TB      Transfer Training OT LTG, Activity Type sit to stand/stand to sit;toilet  -TB      Transfer Training OT LTG, [place] Level supervision required;verbal cues required  -TB      Transfer Training OT LTG, Assist Device walker, rolling  -TB      Transfer Training OT LTG, Additional Goal ambulate to BR, ETS with rails  -TB      Strength OT LTG    Strength Goal OT LTG, Functional Goal Pt demonstrates independence with written HEP x10 reps to support ADLs  -TB      Patient Education OT LTG    Patient Education OT LTG, Time to Achieve by discharge  -TB      Patient Education OT LTG, Education Type written program;HEP;precautions per surgeon;home safety  -TB      Patient Education OT LTG, Education Understanding demonstrates adequately;verbalizes understanding  -TB      Functional Mobility OT LTG    Functional Mobility Goal OT LTG, Time to Achieve by discharge  -TB      Functional Mobility Goal OT LTG, [place] Level standby assist  -TB      Functional Mobility Goal OT LTG, Assist Device rolling walker  -TB      Functional Mobility Goal OT LTG, Distance to Achieve to the bathroom  -TB        User Key  (r) = Recorded By, (t) = Taken By, (c) = Cosigned By    Initials Name Provider Type    TB Geovanna Vieira OT Occupational Therapist                 Outcome Measures       12/06/17 1115 12/06/17 1106       How much help from another person do you currently need...    Turning from your back to your side while in flat bed without using bedrails? 2  -LS      Moving from lying on back to sitting on the side of a flat bed without bedrails? 3  -LS      Moving to and from a bed to a chair (including a wheelchair)? 3  -LS      Standing up from a chair using your arms (e.g., wheelchair, bedside chair)? 3  -LS      Climbing 3-5 steps with a railing? 2  -LS      To walk in hospital room? 3  -LS      AM-PAC 6 Clicks Score 16  -LS      How much help from another is currently needed...    Putting on and taking off regular lower body clothing?  3  -TB     Bathing (including washing, rinsing, and drying)  3  -TB     Toileting (which includes using toilet bed pan or urinal)  3  -TB     Putting on and taking off regular upper body clothing  3  -TB     Taking care of personal grooming (such as brushing teeth)  4  -TB     Eating meals  4  -TB     Score  20  -TB     Functional Assessment    Outcome Measure Options AM-PAC 6 Clicks Basic Mobility (PT)  -LS AM-PAC 6 Clicks Daily Activity (OT)  -TB       User Key  (r) = Recorded By, (t) = Taken By, (c) = Cosigned By    Initials Name Provider Type    TB Geovanna Vieira OT Occupational Therapist    LS Latonya Snowden, PT Physical Therapist          Time Calculation:   OT Start Time: 1106    Therapy Charges for Today     Code Description Service Date Service Provider Modifiers Qty    [id number]  OT THERAPEUTIC ACT EA 15 MIN 12/6/2017 Geovanna Vieira OT GO 1    [id number]  OT EVAL LOW COMPLEXITY 3 12/6/2017 Geovanna Vieira OT GO 1               Geovanna Vieira OT  12/6/2017

## 2017-12-06 NOTE — PLAN OF CARE
Problem: Patient Care Overview (Adult)  Goal: Plan of Care Review  Outcome: Ongoing (interventions implemented as appropriate)    12/06/17 0354   Coping/Psychosocial Response Interventions   Plan Of Care Reviewed With patient;terry   Patient Care Overview   Progress progress toward functional goals as expected         Problem: Fall Risk (Adult)  Goal: Identify Related Risk Factors and Signs and Symptoms  Outcome: Ongoing (interventions implemented as appropriate)    12/05/17 0441   Fall Risk   Fall Risk: Related Risk Factors bladder function altered;confusion/agitation;gait/mobility problems;environment unfamiliar   Fall Risk: Signs and Symptoms presence of risk factors         Problem: Urine Elimination, Impaired (Adult)  Goal: Identify Related Risk Factors and Signs and Symptoms  Outcome: Ongoing (interventions implemented as appropriate)    12/06/17 0354   Urine Elimination, Impaired   Urine Elimination, Impaired: Related Risk Factors disease process;immobility   Signs and Symptoms (Urine Elimination Impaired) involuntary loss of urine;painful urination;urgency;voiding small frequent amounts       Goal: Effective Urinary Elimination  Outcome: Ongoing (interventions implemented as appropriate)    12/06/17 0354   Urine Elimination, Impaired (Adult)   Effective Urinary Elimination achieves outcome       Goal: Effective Containment of Urine  Outcome: Ongoing (interventions implemented as appropriate)    12/06/17 0354   Urine Elimination, Impaired (Adult)   Effective Containment of Urine achieves outcome

## 2017-12-06 NOTE — PLAN OF CARE
Problem: Patient Care Overview (Adult)  Goal: Plan of Care Review    12/05/17 1900   Coping/Psychosocial Response Interventions   Plan Of Care Reviewed With patient;daughter   Patient Care Overview   Progress progress toward functional goals as expected   Outcome Evaluation   Outcome Summary/Follow up Plan encouraged fluids and reviewed pain management program         Problem: Urine Elimination, Impaired (Adult)  Intervention: Promote Effective Urine Elimination/Improve Continence    12/05/17 1900   Hygiene Care Assistance   Perineal Care cleansed  (purewick in use)   Genitourinary () Interventions   Urinary Elimination Promotion other (see comments)  (purewick in use)

## 2017-12-06 NOTE — PROGRESS NOTES
"NEUROSURGERY PROGRESS NOTE     LOS: 1 day   Patient Care Team:  Chaz Rico DNP, APRN as PCP - General (Family Medicine)  Chaz Rico DNP, APRN as PCP - Claims Attributed  Carmine Pérez MD as Consulting Physician (Interventional Cardiology)    Chief Complaint: Low back pain.    Interval History:   Patient Complaints: Ongoing low back pain but better controlled.  Patient Denies: Leg pain, new weakness or numbness.    Review of Systems:   Musculoskeletal and Neurological systems were reviewed and are negative except for:  Musculoskeletal: positive for back pain  Neurological: positive for difficulty walking    Vital Signs: Blood pressure 118/50, pulse 82, temperature 97.6 °F (36.4 °C), temperature source Oral, resp. rate 18, height 154.9 cm (61\"), weight 111 kg (245 lb), SpO2 95 %, not currently breastfeeding.  Intake/Output:   Intake/Output Summary (Last 24 hours) at 12/06/17 0733  Last data filed at 12/06/17 0600   Gross per 24 hour   Intake             2198 ml   Output             2450 ml   Net             -252 ml       Physical Exam:  The patient is awake, alert, and appropriate.  She is well oriented this morning.  She follows all commands.  Motor function is 5/5 in her lower extremities distally but she is not antigravity proximally in the right lower extremity.  She indicates that this is a long-standing and not a new issue.  Sensation is intact to light touch testing in her lower extremities.       Assessment/Plan:  1.  L4 osteoporotic compression fracture.  Given significant pain that has been poorly managed on an outpatient basis I think she would greatly benefit from kyphoplasty.  I discussed the nature of this procedure with the patient as well as the potential risks, complications, and limitations.  2.  Acute renal insufficiency.  3.  Urinary tract infection.  Gram-negative bacilli.  Are we treating this infection?  4.  Coronary artery disease.  5.  Morbid obesity.  6.  Diabetes " mellitus.  7.  Hypertension.  8.  Disposition: When patient is medically cleared I can pursue L4 kyphoplasty as early as tomorrow or potentially on Monday.        Marc Pham MD  12/06/17  7:33 AM

## 2017-12-06 NOTE — PLAN OF CARE
Problem: Patient Care Overview (Adult)  Goal: Plan of Care Review  Outcome: Ongoing (interventions implemented as appropriate)    12/06/17 1334   Coping/Psychosocial Response Interventions   Plan Of Care Reviewed With patient;son   Outcome Evaluation   Outcome Summary/Follow up Plan OT IE completed. Pt presents with increased pain, decreased generalized strength and decreased occupational endurance limiting participation in ADLs. OT to follow. CGA for mobility/transfers. Education completed for ETS/DME recommendations for home. Recommend home with assist and HH OT/PT to follow.         Problem: Inpatient Occupational Therapy  Goal: Bed Mobility Goal LTG- OT  Outcome: Ongoing (interventions implemented as appropriate)    12/06/17 1334   Bed Mobility OT LTG   Bed Mobility OT LTG, Time to Achieve by discharge   Bed Mobility OT LTG, Activity Type roll left/roll right;supine to sit/sit to supine   Bed Mobility OT LTG, [place] Level supervision required;verbal cues required   Bed Mobility OT LTG, Additional Goal via logroll       Goal: Transfer Training Goal 1 LTG- OT  Outcome: Ongoing (interventions implemented as appropriate)    12/06/17 1334   Transfer Training OT LTG   Transfer Training OT LTG, Time to Achieve by discharge   Transfer Training OT LTG, Activity Type sit to stand/stand to sit;toilet   Transfer Training OT LTG, [place] Level supervision required;verbal cues required   Transfer Training OT LTG, Assist Device walker, rolling   Transfer Training OT LTG, Additional Goal ambulate to BR, ETS with rails       Goal: Strength Goal LTG- OT  Outcome: Ongoing (interventions implemented as appropriate)    12/06/17 1334   Strength OT LTG   Strength Goal OT LTG, Functional Goal Pt demonstrates independence with written HEP x10 reps to support ADLs       Goal: Patient Education Goal LTG- OT  Outcome: Ongoing (interventions implemented as appropriate)    12/06/17 1334   Patient Education OT LTG   Patient  Education OT LTG, Time to Achieve by discharge   Patient Education OT LTG, Education Type written program;HEP;precautions per surgeon;home safety   Patient Education OT LTG, Education Understanding demonstrates adequately;verbalizes understanding       Goal: Functional Mobility Goal LTG- OT  Outcome: Ongoing (interventions implemented as appropriate)    12/06/17 1334   Functional Mobility OT LTG   Functional Mobility Goal OT LTG, Time to Achieve by discharge   Functional Mobility Goal OT LTG, [place] Level standby assist   Functional Mobility Goal OT LTG, Assist Device rolling walker   Functional Mobility Goal OT LTG, Distance to Achieve to the bathroom

## 2017-12-06 NOTE — THERAPY EVALUATION
Acute Care - Physical Therapy Initial Evaluation  [facility]     Patient Name: Georgia Velázquez  : 1945  MRN: [id number]  Today's Date: 2017   Onset of Illness/Injury or Date of Surgery Date: 17  Date of Referral to PT: 17  Referring Physician: DO Bobby      Admit Date: 2017     Visit Dx:    ICD-10-CM ICD-9-CM   1. Acute cystitis without hematuria N30.00 595.0   2. Acute kidney injury N17.9 584.9   3. Closed compression fracture of fourth lumbar vertebra, initial encounter S32.040A 805.4   4. Lumbar disc disease M51.9 722.93   5. Impaired functional mobility, balance, gait, and endurance Z74.09 V49.89   6. Impaired mobility and ADLs Z74.09 799.89     Patient Active Problem List   Diagnosis   • Essential hypertension   • Type 2 diabetes mellitus   • Hyperlipemia   • Myocardial infarction   • Coronary artery disease   • Osteoporosis   • GERD (gastroesophageal reflux disease)   • Arthritis of shoulder   • Acute cystitis without hematuria   • Urinary tract infection   • Acute kidney injury   • Constipation   • Abdominal pain   • Leukocytosis     Past Medical History:   Diagnosis Date   • Arthritis    • Arthritis of shoulder 2016   • CHF (congestive heart failure)    • COPD (chronic obstructive pulmonary disease)    • Coronary artery disease 2016   • Essential hypertension 2016   • GERD (gastroesophageal reflux disease) 2016   • History of echocardiogram 10/16/2015    TDS.Est EF is 45-50%.Mild distal septal, anteroapical hypokinesia.Mild mitral stenosis.Mean gradient across mitral valve is 6 mmHg.Trace TR   • Hyperlipemia 2016   • Myocardial infarction 2016   • Osteoporosis 2016   • Sepsis     uro   • Type 2 diabetes mellitus 2016     Past Surgical History:   Procedure Laterality Date   • APPENDECTOMY     • CHOLECYSTECTOMY            PT ASSESSMENT (last 72 hours)      PT Evaluation       17 1115 17 1106    Rehab Evaluation     Document Type evaluation  -LS evaluation;therapy note (daily note)  -TB    Subjective Information agree to therapy;no complaints  -LS agree to therapy;complains of;pain  -TB    Patient Effort, Rehab Treatment good  -LS good  -TB    Symptoms Noted During/After Treatment increased pain  -LS increased pain;fatigue  -TB    Symptoms Noted Comment  Pt with h/o L4 compression fracture treated conservatively for pain control; Current plan is for kyphoplasty   -TB    General Information    Patient Profile Review yes  -LS yes  -TB    Onset of Illness/Injury or Date of Surgery Date 12/04/17  -LS 12/04/17  -TB    Referring Physician DO Bobby  -RAMSES Rosales  -DAYAN    General Observations  Pt rec'vd supine in bed, room air, IV and tele intact; son present  -TB    Pertinent History Of Current Problem Pt admitted with AMS, recent incontinence of bladder, constipation; hx of known L4 compression fx being treated conservatively. Found to have UTI and [place]; planned to undergo kyphoplasty when medically appropriate.   -LS Pt to ED with AMS and back pain that radiates to L foot; UTI; plans for kyphoplasty as pt has failed out pt pain control  -TB    Precautions/Limitations fall precautions;spinal precautions   L4 compression fx  -LS fall precautions;spinal precautions  -TB    Prior Level of Function independent:;gait;transfer;ADL's;bathing;dressing;driving  -LS independent:;all household mobility;community mobility;transfer;bed mobility;ADL's;home management;driving  -TB    Equipment Currently Used at Home rollator;shower chair;grab bar  -LS grab bar;shower chair;rollator  -TB    Plans/Goals Discussed With patient and family;agreed upon  -LS patient and family;agreed upon  -TB    Risks Reviewed patient and family:;LOB;dizziness;increased discomfort;change in vital signs  -LS patient and family:;LOB;increased discomfort;lines disloged  -TB    Benefits Reviewed patient and family:;improve function;increase independence;increase  strength;increase knowledge  -LS patient and family:;improve function;increase independence;decrease pain;increase knowledge  -TB    Barriers to Rehab  none identified  -TB    Living Environment    Lives With alone  -LS alone  -TB    Living Arrangements apartment  -LS apartment  -TB    Home Accessibility tub/shower is not walk in  -LS tub/shower is not walk in  -TB    Living Environment Comment  Recommend toilet seat riser - education completed with pt/son. Pt has shower chair and grab bars at tub and toilet  -TB    Clinical Impression    Date of Referral to PT 12/05/17  -LS     PT Diagnosis impaired functional mobility, balance, gait  -LS     Patient/Family Goals Statement return to PLOF  -LS     Criteria for Skilled Therapeutic Interventions Met yes;treatment indicated  -LS     Vital Signs    Pre Systolic BP Rehab 135  -LS     Pre Treatment Diastolic BP 77  -LS --   stable; RN cleared OT  -TB    Pretreatment Heart Rate (beats/min) 73  -LS     Posttreatment Heart Rate (beats/min) 81  -LS     O2 Delivery Pre Treatment room air  -LS     O2 Delivery Post Treatment room air  -LS room air  -TB    Pre Patient Position Supine  -LS Supine  -TB    Intra Patient Position Standing  -LS Standing  -TB    Post Patient Position Sitting  -LS Sitting  -TB    Pain Assessment    Pain Assessment 0-10  -LS 0-10  -TB    Pain Score 0  -LS 0  -TB    Post Pain Score  5  -TB    Pain Type  Acute pain  -TB    Pain Location Back  -LS Back  -TB    Pain Orientation  Lower  -TB    Pain Radiating Towards  L LE/foot  -TB    Pain Intervention(s) Repositioned;Ambulation/increased activity  -LS Ambulation/increased activity;Repositioned  -TB    Response to Interventions tolerated  -LS Pt tolerated OOB activity  -TB    Vision Assessment/Intervention    Visual Impairment  WFL  -TB    Cognitive Assessment/Intervention    Current Cognitive/Communication Assessment functional  -LS functional  -TB    Orientation Status oriented x 4  -LS oriented x 4  -TB     Follows Commands/Answers Questions able to follow single-step instructions;100% of the time;needs cueing  -LS able to follow single-step instructions;100% of the time  -TB    Personal Safety mild impairment;decreased awareness, need for safety  -LS WNL/WFL  -TB    Personal Safety Interventions fall prevention program maintained;gait belt;nonskid shoes/slippers when out of bed  -LS fall prevention program maintained;gait belt;nonskid shoes/slippers when out of bed  -TB    Short/Long Term Memory  intact short term memory;intact long term memory  -TB    ROM (Range of Motion)    General ROM no range of motion deficits identified   BLEs  -LS no range of motion deficits identified  -TB    General ROM Detail  B UE WFL  -TB    MMT (Manual Muscle Testing)    General MMT Assessment lower extremity strength deficits identified  -LS no strength deficits identified  -TB    General MMT Assessment Detail  B UE WFL 4/5,  4+/5  -TB    Lower Extremity    Lower Ext Manual Muscle Testing Detail BLEs grossly 3+/5  -LS     Bed Mobility, Assessment/Treatment    Bed Mobility, Assistive Device bed rails;head of bed elevated  -LS     Bed Mobility, Roll Right, [place] moderate assist (50% patient effort);verbal cues required  -LS moderate assist (50% patient effort);verbal cues required  -TB    Bed Mob, Sidelying to Sit, [place] minimum assist (75% patient effort);verbal cues required  -LS minimum assist (75% patient effort);verbal cues required  -TB    Bed Mobility, Safety Issues  decreased use of arms for pushing/pulling;decreased use of legs for bridging/pushing  -TB    Bed Mobility, Impairments strength decreased;impaired balance;pain  -LS pain  -TB    Bed Mobility, Comment VC's for log rolling.   -LS Education initiated for logroll sequencing in light of kyphoplasty pending  -TB    Transfer Assessment/Treatment    Transfers, Bed-Chair [place]  contact guard assist;verbal cues required  -TB    Transfers, Sit-Stand  [place] contact guard assist;verbal cues required;1 person + 1 person to manage equipment  -LS contact guard assist;verbal cues required  -TB    Transfers, Stand-Sit [place] contact guard assist;verbal cues required;1 person + 1 person to manage equipment  -LS contact guard assist;verbal cues required  -TB    Transfers, Sit-Stand-Sit, Assist Device rolling walker  -LS rolling walker  -TB    Toilet Transfer, [place] contact guard assist;1 person + 1 person to manage equipment;verbal cues required  -LS contact guard assist;verbal cues required  -TB    Toilet Transfer, Assistive Device  bedside commode without drop arms;rolling walker  -TB    Transfer, Safety Issues  balance decreased during turns  -TB    Transfer, Impairments impaired balance;strength decreased  -LS pain  -TB    Transfer, Comment VC's for hand placement.   -LS fair safety, no LOB  -TB    Gait Assessment/Treatment    Gait, [place] Level contact guard assist;1 person + 1 person to manage equipment;verbal cues required  -LS     Gait, Assistive Device rolling walker  -LS     Gait, Distance (Feet) 100  -LS     Gait, Gait Deviations no decreased;forward flexed posture;step length decreased  -LS     Gait, Impairments impaired balance;strength decreased  -LS     Gait, Comment VC's for posture and increasing step length within RWx. Distance limited by fatigue; req'd 1 brief standing rest break.   -LS     Motor Skills/Interventions    Additional Documentation Balance Skills Training (Group)  -LS     Balance Skills Training    Sitting-Level of Assistance Close supervision  -LS Close supervision  -TB    Sitting-Balance Support Feet supported  -LS Feet supported  -TB    Sitting # of Minutes  10  -TB    Standing-Level of Assistance Contact guard  -LS Contact guard  -TB    Static Standing Balance Support assistive device  -LS assistive device  -TB    Standing-Balance Activities  Weight Shift R-L  -TB    Standing Balance # of Minutes  3  -TB     Gait Balance-Level of Assistance Contact guard  -LS     Gait Balance Support assistive device  -LS     Therapy Exercises    Bilateral Upper Extremity  AROM:;sitting;shoulder extension/flexion;shoulder abduction/adduction;shoulder horizontal abd/add;shoulder ER/IR;elbow flexion/extension;pronation/supination;hand pumps  -TB    Fine Motor Coordination Training    Opposition  Right:;Left:;intact  -TB    Sensory Assessment/Intervention    Light Touch RLE;LLE  -LS LUE;RUE  -TB    LUE Light Touch  WNL  -TB    RUE Light Touch  WNL  -TB    LLE Light Touch WNL  -LS     RLE Light Touch WNL  -LS     Positioning and Restraints    Pre-Treatment Position in bed  -LS in bed  -TB    Post Treatment Position chair  -LS chair  -TB    In Chair notified nsg;reclined;call light within reach;encouraged to call for assist;with family/caregiver;legs elevated  -LS notified nsg;reclined;call light within reach;encouraged to call for assist;with family/caregiver;legs elevated  -TB      12/05/17 1144 12/05/17 0031    General Information    Equipment Currently Used at Home walker, standard;bath bench;glucometer  -AW walker, standard;bath bench;glucometer  -KW    Living Environment    Lives With alone  -AW alone  -KW    Living Arrangements apartment  -AW apartment  -KW    Home Accessibility no concerns  -AW no concerns  -KW    Stair Railings at Home inside, present on right side;outside, present on right side  -AW inside, present on right side;outside, present on right side  -KW    Type of Financial/Environmental Concern none  -AW none  -KW    Transportation Available car;family or friend will provide  -AW car;family or friend will provide  -KW      User Key  (r) = Recorded By, (t) = Taken By, (c) = Cosigned By    Initials Name Provider Type    TB Geovanna Vieira OT Occupational Therapist    LS Latonya Snowden, PT Physical Therapist    AW Lianne Peterson, RN Registered Nurse    KW Hazel Cedeno, RN Registered Nurse              PT  Recommendation and Plan  Anticipated Discharge Disposition: home with assist, home with home health  PT Frequency: daily  Plan of Care Review  Plan Of Care Reviewed With: patient  Outcome Summary/Follow up Plan: PT evaluation completed. Pt demonstrates decreased indep re: funcitional mobility with stable signs/symptoms, warranting further skilled PT services to promote PLOF. Recommend d/c home with assist and HHPT based upon current functional status; will cont to monitor d/c needs as pt is scheduled to undergo kyphoplasty (when medically appropriate).           IP PT Goals       12/06/17 1201          Bed Mobility PT LTG    Bed Mobility PT LTG, Date Established 12/06/17  -LS      Bed Mobility PT LTG, Time to Achieve 2 wks  -LS      Bed Mobility PT LTG, [place] Level independent  -LS      Bed Mobility PT LTG, Additional Goal using log roll technique  -LS      Transfer Training PT LTG    Transfer Training PT LTG, Date Established 12/06/17  -LS      Transfer Training PT LTG, Time to Achieve 2 wks  -LS      Transfer Training PT LTG, Activity Type sit to stand/stand to sit  -LS      Transfer Training PT LTG, [place] Level conditional independence  -LS      Transfer Training PT LTG, Assist Device walker, rolling  -LS      Gait Training PT LTG    Gait Training Goal PT LTG, Date Established 12/06/17  -LS      Gait Training Goal PT LTG, Time to Achieve 2 wks  -LS      Gait Training Goal PT LTG, [place] Level conditional independence  -LS      Gait Training Goal PT LTG, Assist Device walker, rolling  -LS      Gait Training Goal PT LTG, Distance to Achieve 200  -LS        User Key  (r) = Recorded By, (t) = Taken By, (c) = Cosigned By    Initials Name Provider Type    LS Latonya Snowden, PT Physical Therapist                Outcome Measures       12/06/17 1115 12/06/17 1106       How much help from another person do you currently need...    Turning from your back to your side while in flat bed without using  bedrails? 2  -LS      Moving from lying on back to sitting on the side of a flat bed without bedrails? 3  -LS      Moving to and from a bed to a chair (including a wheelchair)? 3  -LS      Standing up from a chair using your arms (e.g., wheelchair, bedside chair)? 3  -LS      Climbing 3-5 steps with a railing? 2  -LS      To walk in hospital room? 3  -LS      AM-PAC 6 Clicks Score 16  -LS      How much help from another is currently needed...    Putting on and taking off regular lower body clothing?  3  -TB     Bathing (including washing, rinsing, and drying)  3  -TB     Toileting (which includes using toilet bed pan or urinal)  3  -TB     Putting on and taking off regular upper body clothing  3  -TB     Taking care of personal grooming (such as brushing teeth)  4  -TB     Eating meals  4  -TB     Score  20  -TB     Functional Assessment    Outcome Measure Options AM-PAC 6 Clicks Basic Mobility (PT)  -LS AM-PAC 6 Clicks Daily Activity (OT)  -TB       User Key  (r) = Recorded By, (t) = Taken By, (c) = Cosigned By    Initials Name Provider Type     Geovanna Vieira, OT Occupational Therapist     Latonya Snowden, PT Physical Therapist           Time Calculation:         PT Charges       12/06/17 1205          Time Calculation    Start Time 1115  -LS      PT Received On 12/06/17  -      PT Goal Re-Cert Due Date 12/16/17  -        User Key  (r) = Recorded By, (t) = Taken By, (c) = Cosigned By    Initials Name Provider Type     Latonya Snowden, PT Physical Therapist          Therapy Charges for Today     Code Description Service Date Service Provider Modifiers Qty    68499353930 HC PT EVAL LOW COMPLEXITY 4 12/6/2017 Latonya Snowden, PT GP 1          PT G-Codes  Outcome Measure Options: AM-PAC 6 Clicks Basic Mobility (PT)      Latonya Snowden, PT  12/6/2017

## 2017-12-06 NOTE — PLAN OF CARE
Problem: Patient Care Overview (Adult)  Goal: Plan of Care Review  Outcome: Ongoing (interventions implemented as appropriate)    12/06/17 1201   Coping/Psychosocial Response Interventions   Plan Of Care Reviewed With patient   Outcome Evaluation   Outcome Summary/Follow up Plan PT evaluation completed. Pt demonstrates decreased indep re: funcitional mobility with stable signs/symptoms, warranting further skilled PT services to promote PLOF. Recommend d/c home with assist and HHPT based upon current functional status; will cont to monitor d/c needs as pt is scheduled to undergo kyphoplasty (when medically appropriate).          Problem: Inpatient Physical Therapy  Goal: Bed Mobility Goal LTG- PT  Outcome: Ongoing (interventions implemented as appropriate)    12/06/17 1201   Bed Mobility PT LTG   Bed Mobility PT LTG, Date Established 12/06/17   Bed Mobility PT LTG, Time to Achieve 2 wks   Bed Mobility PT LTG, [place] Level independent   Bed Mobility PT LTG, Additional Goal using log roll technique       Goal: Transfer Training Goal 1 LTG- PT  Outcome: Ongoing (interventions implemented as appropriate)    12/06/17 1201   Transfer Training PT LTG   Transfer Training PT LTG, Date Established 12/06/17   Transfer Training PT LTG, Time to Achieve 2 wks   Transfer Training PT LTG, Activity Type sit to stand/stand to sit   Transfer Training PT LTG, [place] Level conditional independence   Transfer Training PT LTG, Assist Device walker, rolling       Goal: Gait Training Goal LTG- PT  Outcome: Ongoing (interventions implemented as appropriate)    12/06/17 1201   Gait Training PT LTG   Gait Training Goal PT LTG, Date Established 12/06/17   Gait Training Goal PT LTG, Time to Achieve 2 wks   Gait Training Goal PT LTG, [place] Level conditional independence   Gait Training Goal PT LTG, Assist Device walker, rolling   Gait Training Goal PT LTG, Distance to Achieve 200

## 2017-12-06 NOTE — PLAN OF CARE
Problem: Patient Care Overview (Adult)  Goal: Plan of Care Review  Outcome: Ongoing (interventions implemented as appropriate)    12/06/17 1340   Coping/Psychosocial Response Interventions   Plan Of Care Reviewed With patient   Patient Care Overview   Progress no change   Outcome Evaluation   Outcome Summary/Follow up Plan Patient presents with coccyx excoriation and mild shearing. On waffle mattress. See LDA for wound details. See skin care orders for care needs. WOC will continue to follow. Please contact WOC nurse if further needs arise. Thanks

## 2017-12-07 ENCOUNTER — TELEPHONE (OUTPATIENT)
Dept: FAMILY MEDICINE CLINIC | Facility: CLINIC | Age: 72
End: 2017-12-07

## 2017-12-07 ENCOUNTER — ANESTHESIA (OUTPATIENT)
Dept: PERIOP | Facility: HOSPITAL | Age: 72
End: 2017-12-07

## 2017-12-07 ENCOUNTER — APPOINTMENT (OUTPATIENT)
Dept: GENERAL RADIOLOGY | Facility: HOSPITAL | Age: 72
End: 2017-12-07

## 2017-12-07 ENCOUNTER — ANESTHESIA EVENT (OUTPATIENT)
Dept: PERIOP | Facility: HOSPITAL | Age: 72
End: 2017-12-07

## 2017-12-07 LAB
ANION GAP SERPL CALCULATED.3IONS-SCNC: 10 MMOL/L (ref 3–11)
BUN BLD-MCNC: 29 MG/DL (ref 9–23)
BUN/CREAT SERPL: 24.2 (ref 7–25)
CALCIUM SPEC-SCNC: 8.6 MG/DL (ref 8.7–10.4)
CHLORIDE SERPL-SCNC: 104 MMOL/L (ref 99–109)
CO2 SERPL-SCNC: 23 MMOL/L (ref 20–31)
CREAT BLD-MCNC: 1.2 MG/DL (ref 0.6–1.3)
GFR SERPL CREATININE-BSD FRML MDRD: 44 ML/MIN/1.73
GLUCOSE BLD-MCNC: 114 MG/DL (ref 70–100)
GLUCOSE BLDC GLUCOMTR-MCNC: 112 MG/DL (ref 70–130)
GLUCOSE BLDC GLUCOMTR-MCNC: 115 MG/DL (ref 70–130)
GLUCOSE BLDC GLUCOMTR-MCNC: 121 MG/DL (ref 70–130)
GLUCOSE BLDC GLUCOMTR-MCNC: 124 MG/DL (ref 70–130)
GLUCOSE BLDC GLUCOMTR-MCNC: 222 MG/DL (ref 70–130)
POTASSIUM BLD-SCNC: 4.2 MMOL/L (ref 3.5–5.5)
SODIUM BLD-SCNC: 137 MMOL/L (ref 132–146)
TROPONIN I SERPL-MCNC: 0.02 NG/ML

## 2017-12-07 PROCEDURE — 99232 SBSQ HOSP IP/OBS MODERATE 35: CPT | Performed by: INTERNAL MEDICINE

## 2017-12-07 PROCEDURE — 93005 ELECTROCARDIOGRAM TRACING: CPT | Performed by: ANESTHESIOLOGY

## 2017-12-07 PROCEDURE — 25010000002 DEXAMETHASONE PER 1 MG: Performed by: NURSE ANESTHETIST, CERTIFIED REGISTERED

## 2017-12-07 PROCEDURE — 25010000003 CEFAZOLIN IN DEXTROSE 2-4 GM/100ML-% SOLUTION: Performed by: NEUROLOGICAL SURGERY

## 2017-12-07 PROCEDURE — 93010 ELECTROCARDIOGRAM REPORT: CPT | Performed by: INTERNAL MEDICINE

## 2017-12-07 PROCEDURE — 0QU03JZ SUPPLEMENT LUMBAR VERTEBRA WITH SYNTHETIC SUBSTITUTE, PERCUTANEOUS APPROACH: ICD-10-PCS | Performed by: NEUROLOGICAL SURGERY

## 2017-12-07 PROCEDURE — 0QS03ZZ REPOSITION LUMBAR VERTEBRA, PERCUTANEOUS APPROACH: ICD-10-PCS | Performed by: NEUROLOGICAL SURGERY

## 2017-12-07 PROCEDURE — 25010000002 PROPOFOL 10 MG/ML EMULSION: Performed by: NURSE ANESTHETIST, CERTIFIED REGISTERED

## 2017-12-07 PROCEDURE — 93005 ELECTROCARDIOGRAM TRACING: CPT | Performed by: INTERNAL MEDICINE

## 2017-12-07 PROCEDURE — C1713 ANCHOR/SCREW BN/BN,TIS/BN: HCPCS | Performed by: NEUROLOGICAL SURGERY

## 2017-12-07 PROCEDURE — 88311 DECALCIFY TISSUE: CPT | Performed by: NEUROLOGICAL SURGERY

## 2017-12-07 PROCEDURE — 25010000002 MORPHINE SULFATE (PF) 2 MG/ML SOLUTION: Performed by: NEUROLOGICAL SURGERY

## 2017-12-07 PROCEDURE — 80048 BASIC METABOLIC PNL TOTAL CA: CPT | Performed by: INTERNAL MEDICINE

## 2017-12-07 PROCEDURE — 22514 PERQ VERTEBRAL AUGMENTATION: CPT | Performed by: NEUROLOGICAL SURGERY

## 2017-12-07 PROCEDURE — 84484 ASSAY OF TROPONIN QUANT: CPT | Performed by: INTERNAL MEDICINE

## 2017-12-07 PROCEDURE — 25010000002 FENTANYL CITRATE (PF) 100 MCG/2ML SOLUTION: Performed by: NURSE ANESTHETIST, CERTIFIED REGISTERED

## 2017-12-07 PROCEDURE — 82962 GLUCOSE BLOOD TEST: CPT

## 2017-12-07 PROCEDURE — 88307 TISSUE EXAM BY PATHOLOGIST: CPT | Performed by: NEUROLOGICAL SURGERY

## 2017-12-07 PROCEDURE — 0 IOPAMIDOL 41 % SOLUTION: Performed by: NEUROLOGICAL SURGERY

## 2017-12-07 PROCEDURE — 0Q903ZX DRAINAGE OF LUMBAR VERTEBRA, PERCUTANEOUS APPROACH, DIAGNOSTIC: ICD-10-PCS | Performed by: NEUROLOGICAL SURGERY

## 2017-12-07 PROCEDURE — 25010000002 CEFTRIAXONE PER 250 MG: Performed by: INTERNAL MEDICINE

## 2017-12-07 PROCEDURE — 25010000002 HEPARIN (PORCINE) PER 1000 UNITS: Performed by: NURSE PRACTITIONER

## 2017-12-07 PROCEDURE — 22510 PERQ CERVICOTHORACIC INJECT: CPT

## 2017-12-07 DEVICE — BONE CEMENT C10A KYPHON ACTIVOS 10
Type: IMPLANTABLE DEVICE | Site: SPINE LUMBAR | Status: FUNCTIONAL
Brand: ACTIVOS™ 10 BONE CEMENT

## 2017-12-07 RX ORDER — DEXAMETHASONE SODIUM PHOSPHATE 4 MG/ML
INJECTION, SOLUTION INTRA-ARTICULAR; INTRALESIONAL; INTRAMUSCULAR; INTRAVENOUS; SOFT TISSUE AS NEEDED
Status: DISCONTINUED | OUTPATIENT
Start: 2017-12-07 | End: 2017-12-07 | Stop reason: SURG

## 2017-12-07 RX ORDER — SODIUM CHLORIDE, SODIUM LACTATE, POTASSIUM CHLORIDE, CALCIUM CHLORIDE 600; 310; 30; 20 MG/100ML; MG/100ML; MG/100ML; MG/100ML
75 INJECTION, SOLUTION INTRAVENOUS CONTINUOUS
Status: DISCONTINUED | OUTPATIENT
Start: 2017-12-07 | End: 2017-12-08

## 2017-12-07 RX ORDER — SODIUM CHLORIDE 0.9 % (FLUSH) 0.9 %
1-10 SYRINGE (ML) INJECTION AS NEEDED
Status: DISCONTINUED | OUTPATIENT
Start: 2017-12-07 | End: 2017-12-08 | Stop reason: HOSPADM

## 2017-12-07 RX ORDER — FAMOTIDINE 20 MG/1
20 TABLET, FILM COATED ORAL ONCE
Status: DISCONTINUED | OUTPATIENT
Start: 2017-12-07 | End: 2017-12-07 | Stop reason: HOSPADM

## 2017-12-07 RX ORDER — SODIUM CHLORIDE 0.9 % (FLUSH) 0.9 %
1-10 SYRINGE (ML) INJECTION AS NEEDED
Status: DISCONTINUED | OUTPATIENT
Start: 2017-12-07 | End: 2017-12-07 | Stop reason: HOSPADM

## 2017-12-07 RX ORDER — NALOXONE HCL 0.4 MG/ML
0.4 VIAL (ML) INJECTION AS NEEDED
Status: DISCONTINUED | OUTPATIENT
Start: 2017-12-07 | End: 2017-12-07 | Stop reason: HOSPADM

## 2017-12-07 RX ORDER — SENNA AND DOCUSATE SODIUM 50; 8.6 MG/1; MG/1
1 TABLET, FILM COATED ORAL NIGHTLY PRN
Status: DISCONTINUED | OUTPATIENT
Start: 2017-12-07 | End: 2017-12-08 | Stop reason: HOSPADM

## 2017-12-07 RX ORDER — BISACODYL 5 MG/1
5 TABLET, DELAYED RELEASE ORAL DAILY PRN
Status: DISCONTINUED | OUTPATIENT
Start: 2017-12-07 | End: 2017-12-08 | Stop reason: HOSPADM

## 2017-12-07 RX ORDER — MEPERIDINE HYDROCHLORIDE 25 MG/ML
12.5 INJECTION INTRAMUSCULAR; INTRAVENOUS; SUBCUTANEOUS
Status: DISCONTINUED | OUTPATIENT
Start: 2017-12-07 | End: 2017-12-07 | Stop reason: HOSPADM

## 2017-12-07 RX ORDER — CEFAZOLIN SODIUM 2 G/100ML
2 INJECTION, SOLUTION INTRAVENOUS ONCE
Status: COMPLETED | OUTPATIENT
Start: 2017-12-07 | End: 2017-12-07

## 2017-12-07 RX ORDER — GLYCOPYRROLATE 0.2 MG/ML
INJECTION INTRAMUSCULAR; INTRAVENOUS AS NEEDED
Status: DISCONTINUED | OUTPATIENT
Start: 2017-12-07 | End: 2017-12-07 | Stop reason: SURG

## 2017-12-07 RX ORDER — MORPHINE SULFATE 2 MG/ML
2 INJECTION, SOLUTION INTRAMUSCULAR; INTRAVENOUS EVERY 4 HOURS PRN
Status: DISCONTINUED | OUTPATIENT
Start: 2017-12-07 | End: 2017-12-08 | Stop reason: HOSPADM

## 2017-12-07 RX ORDER — FAMOTIDINE 20 MG/1
20 TABLET, FILM COATED ORAL
Status: DISCONTINUED | OUTPATIENT
Start: 2017-12-07 | End: 2017-12-07 | Stop reason: HOSPADM

## 2017-12-07 RX ORDER — NALOXONE HCL 0.4 MG/ML
0.4 VIAL (ML) INJECTION
Status: DISCONTINUED | OUTPATIENT
Start: 2017-12-07 | End: 2017-12-08 | Stop reason: HOSPADM

## 2017-12-07 RX ORDER — MAGNESIUM HYDROXIDE 1200 MG/15ML
LIQUID ORAL AS NEEDED
Status: DISCONTINUED | OUTPATIENT
Start: 2017-12-07 | End: 2017-12-07 | Stop reason: HOSPADM

## 2017-12-07 RX ORDER — ONDANSETRON 2 MG/ML
4 INJECTION INTRAMUSCULAR; INTRAVENOUS ONCE AS NEEDED
Status: DISCONTINUED | OUTPATIENT
Start: 2017-12-07 | End: 2017-12-07 | Stop reason: HOSPADM

## 2017-12-07 RX ORDER — BISACODYL 10 MG
10 SUPPOSITORY, RECTAL RECTAL DAILY PRN
Status: DISCONTINUED | OUTPATIENT
Start: 2017-12-07 | End: 2017-12-08 | Stop reason: HOSPADM

## 2017-12-07 RX ORDER — NITROGLYCERIN 0.4 MG/1
TABLET SUBLINGUAL
Status: COMPLETED
Start: 2017-12-07 | End: 2017-12-07

## 2017-12-07 RX ORDER — OXYCODONE HYDROCHLORIDE AND ACETAMINOPHEN 5; 325 MG/1; MG/1
1 TABLET ORAL ONCE AS NEEDED
Status: DISCONTINUED | OUTPATIENT
Start: 2017-12-07 | End: 2017-12-07 | Stop reason: HOSPADM

## 2017-12-07 RX ORDER — LABETALOL HYDROCHLORIDE 5 MG/ML
5 INJECTION, SOLUTION INTRAVENOUS
Status: DISCONTINUED | OUTPATIENT
Start: 2017-12-07 | End: 2017-12-07 | Stop reason: HOSPADM

## 2017-12-07 RX ORDER — FENTANYL CITRATE 50 UG/ML
INJECTION, SOLUTION INTRAMUSCULAR; INTRAVENOUS AS NEEDED
Status: DISCONTINUED | OUTPATIENT
Start: 2017-12-07 | End: 2017-12-07 | Stop reason: SURG

## 2017-12-07 RX ORDER — EPHEDRINE SULFATE 50 MG/ML
5 INJECTION, SOLUTION INTRAVENOUS ONCE AS NEEDED
Status: DISCONTINUED | OUTPATIENT
Start: 2017-12-07 | End: 2017-12-07 | Stop reason: HOSPADM

## 2017-12-07 RX ORDER — DIPHENHYDRAMINE HCL 25 MG
25 CAPSULE ORAL NIGHTLY PRN
Status: DISCONTINUED | OUTPATIENT
Start: 2017-12-07 | End: 2017-12-08 | Stop reason: HOSPADM

## 2017-12-07 RX ORDER — ATRACURIUM BESYLATE 10 MG/ML
INJECTION, SOLUTION INTRAVENOUS AS NEEDED
Status: DISCONTINUED | OUTPATIENT
Start: 2017-12-07 | End: 2017-12-07 | Stop reason: SURG

## 2017-12-07 RX ORDER — LIDOCAINE HYDROCHLORIDE 10 MG/ML
INJECTION, SOLUTION EPIDURAL; INFILTRATION; INTRACAUDAL; PERINEURAL AS NEEDED
Status: DISCONTINUED | OUTPATIENT
Start: 2017-12-07 | End: 2017-12-07 | Stop reason: SURG

## 2017-12-07 RX ORDER — FENTANYL CITRATE 50 UG/ML
50 INJECTION, SOLUTION INTRAMUSCULAR; INTRAVENOUS
Status: DISCONTINUED | OUTPATIENT
Start: 2017-12-07 | End: 2017-12-07 | Stop reason: HOSPADM

## 2017-12-07 RX ORDER — CEFAZOLIN SODIUM 2 G/100ML
2 INJECTION, SOLUTION INTRAVENOUS EVERY 8 HOURS
Status: COMPLETED | OUTPATIENT
Start: 2017-12-07 | End: 2017-12-08

## 2017-12-07 RX ORDER — NITROGLYCERIN 0.4 MG/1
0.4 TABLET SUBLINGUAL
Status: DISCONTINUED | OUTPATIENT
Start: 2017-12-07 | End: 2017-12-08 | Stop reason: HOSPADM

## 2017-12-07 RX ORDER — SODIUM CHLORIDE, SODIUM LACTATE, POTASSIUM CHLORIDE, CALCIUM CHLORIDE 600; 310; 30; 20 MG/100ML; MG/100ML; MG/100ML; MG/100ML
9 INJECTION, SOLUTION INTRAVENOUS CONTINUOUS
Status: DISCONTINUED | OUTPATIENT
Start: 2017-12-07 | End: 2017-12-08 | Stop reason: HOSPADM

## 2017-12-07 RX ORDER — PROPOFOL 10 MG/ML
VIAL (ML) INTRAVENOUS AS NEEDED
Status: DISCONTINUED | OUTPATIENT
Start: 2017-12-07 | End: 2017-12-07 | Stop reason: SURG

## 2017-12-07 RX ORDER — FAMOTIDINE 10 MG/ML
20 INJECTION, SOLUTION INTRAVENOUS ONCE
Status: DISCONTINUED | OUTPATIENT
Start: 2017-12-07 | End: 2017-12-07 | Stop reason: HOSPADM

## 2017-12-07 RX ORDER — LIDOCAINE HYDROCHLORIDE 10 MG/ML
0.5 INJECTION, SOLUTION EPIDURAL; INFILTRATION; INTRACAUDAL; PERINEURAL ONCE AS NEEDED
Status: DISCONTINUED | OUTPATIENT
Start: 2017-12-07 | End: 2017-12-07 | Stop reason: HOSPADM

## 2017-12-07 RX ORDER — ACETAMINOPHEN 325 MG/1
650 TABLET ORAL EVERY 4 HOURS PRN
Status: DISCONTINUED | OUTPATIENT
Start: 2017-12-07 | End: 2017-12-08 | Stop reason: HOSPADM

## 2017-12-07 RX ORDER — DOCUSATE SODIUM 100 MG/1
100 CAPSULE, LIQUID FILLED ORAL 2 TIMES DAILY PRN
Status: DISCONTINUED | OUTPATIENT
Start: 2017-12-07 | End: 2017-12-08 | Stop reason: HOSPADM

## 2017-12-07 RX ORDER — SODIUM CHLORIDE, SODIUM LACTATE, POTASSIUM CHLORIDE, CALCIUM CHLORIDE 600; 310; 30; 20 MG/100ML; MG/100ML; MG/100ML; MG/100ML
100 INJECTION, SOLUTION INTRAVENOUS CONTINUOUS
Status: DISCONTINUED | OUTPATIENT
Start: 2017-12-07 | End: 2017-12-08

## 2017-12-07 RX ADMIN — ATRACURIUM BESYLATE 30 MG: 10 INJECTION, SOLUTION INTRAVENOUS at 13:22

## 2017-12-07 RX ADMIN — DEXAMETHASONE SODIUM PHOSPHATE 4 MG: 4 INJECTION, SOLUTION INTRAMUSCULAR; INTRAVENOUS at 13:22

## 2017-12-07 RX ADMIN — LIDOCAINE HYDROCHLORIDE 30 MG: 10 INJECTION, SOLUTION EPIDURAL; INFILTRATION; INTRACAUDAL; PERINEURAL at 13:22

## 2017-12-07 RX ADMIN — HYDROCODONE BITARTRATE AND ACETAMINOPHEN 1 TABLET: 10; 325 TABLET ORAL at 04:51

## 2017-12-07 RX ADMIN — CEFAZOLIN SODIUM 2 G: 2 INJECTION, SOLUTION INTRAVENOUS at 13:19

## 2017-12-07 RX ADMIN — CARVEDILOL 6.25 MG: 6.25 TABLET, FILM COATED ORAL at 09:05

## 2017-12-07 RX ADMIN — CASTOR OIL AND BALSAM, PERU: 788; 87 OINTMENT TOPICAL at 20:26

## 2017-12-07 RX ADMIN — NITROGLYCERIN 0.4 MG: 0.4 TABLET SUBLINGUAL at 17:29

## 2017-12-07 RX ADMIN — MORPHINE SULFATE 2 MG: 2 INJECTION, SOLUTION INTRAMUSCULAR; INTRAVENOUS at 18:24

## 2017-12-07 RX ADMIN — ASPIRIN 81 MG 81 MG: 81 TABLET ORAL at 09:03

## 2017-12-07 RX ADMIN — HEPARIN SODIUM 5000 UNITS: 5000 INJECTION, SOLUTION INTRAVENOUS; SUBCUTANEOUS at 09:03

## 2017-12-07 RX ADMIN — PROPOFOL 150 MG: 10 INJECTION, EMULSION INTRAVENOUS at 13:22

## 2017-12-07 RX ADMIN — NYSTATIN: 100000 POWDER TOPICAL at 20:26

## 2017-12-07 RX ADMIN — Medication 2 TABLET: at 20:25

## 2017-12-07 RX ADMIN — HEPARIN SODIUM 5000 UNITS: 5000 INJECTION, SOLUTION INTRAVENOUS; SUBCUTANEOUS at 20:25

## 2017-12-07 RX ADMIN — CEFTRIAXONE SODIUM 1 G: 1 INJECTION, SOLUTION INTRAVENOUS at 09:03

## 2017-12-07 RX ADMIN — CEFAZOLIN SODIUM 2 G: 2 INJECTION, SOLUTION INTRAVENOUS at 21:35

## 2017-12-07 RX ADMIN — ATORVASTATIN CALCIUM 40 MG: 40 TABLET, FILM COATED ORAL at 09:03

## 2017-12-07 RX ADMIN — SODIUM CHLORIDE, POTASSIUM CHLORIDE, SODIUM LACTATE AND CALCIUM CHLORIDE: 600; 310; 30; 20 INJECTION, SOLUTION INTRAVENOUS at 13:19

## 2017-12-07 RX ADMIN — INSULIN LISPRO 3 UNITS: 100 INJECTION, SOLUTION INTRAVENOUS; SUBCUTANEOUS at 21:42

## 2017-12-07 RX ADMIN — PANTOPRAZOLE SODIUM 40 MG: 40 TABLET, DELAYED RELEASE ORAL at 04:51

## 2017-12-07 RX ADMIN — CARVEDILOL 6.25 MG: 6.25 TABLET, FILM COATED ORAL at 17:33

## 2017-12-07 RX ADMIN — TIZANIDINE 2 MG: 4 TABLET ORAL at 20:26

## 2017-12-07 RX ADMIN — TIZANIDINE 2 MG: 4 TABLET ORAL at 09:03

## 2017-12-07 RX ADMIN — FENTANYL CITRATE 100 MCG: 50 INJECTION, SOLUTION INTRAMUSCULAR; INTRAVENOUS at 13:22

## 2017-12-07 RX ADMIN — GLYCOPYRROLATE 3 MG: 0.2 INJECTION, SOLUTION INTRAMUSCULAR; INTRAVENOUS at 13:56

## 2017-12-07 RX ADMIN — CASTOR OIL AND BALSAM, PERU: 788; 87 OINTMENT TOPICAL at 09:04

## 2017-12-07 RX ADMIN — NYSTATIN: 100000 POWDER TOPICAL at 09:04

## 2017-12-07 NOTE — OP NOTE
NEUROSURGICAL OPERATIVE NOTE        PREOPERATIVE DIAGNOSIS:    L4 compression fracture  Osteoporosis      POSTOPERATIVE DIAGNOSIS:  Same      PROCEDURE:  L4 Kyphoplasty      SURGEON:  Marc Pham M.D.      ASSISTANT:  Nancy Siu PA-C      ANESTHESIA:  General      ESTIMATED BLOOD LOSS:  Minimal      SPECIMEN:  Bone core      DRAINS:  None      COMPLICATIONS:  None      CLINICAL NOTE:  The patient is a 72-year-old woman with a history of osteoporosis who has developed severe low back pain.  Studies reveal an acute upper endplate fracture of L4 that provides clinical correlation.  Given her ongoing pain she is brought to surgery for L4 kyphoplasty.  The nature of the procedure as well as the potential risks, complications, limitations, and alternatives to the procedure were discussed at length with the patient and the patient has agreed to proceed with surgery.      TECHNICAL NOTE:  The patient was brought to the operating room and while on her cart, general endotracheal anesthesia was achieved.  She was turned prone onto blanket rolls, maintained longitudinally onto her chest and abdomen.  Special care was ensured to protect pressure points. Two C-arms were brought into use and aligned to provide good AP and lateral imaging of the L4 fracture site.  The low back was prepared and draped in the usual fashion. Punctate incisions were made just lateral to the pedicles as noted on the AP imaging.  One-Step cannulae were impacted through these punctate incisions, through the pedicle into the dorsal vertebral body.  Biopsy needle was impacted through the working cannula on each side.  A bone core was harvested on the right.  Kyphon balloons, 20 mm, were inserted through the working cannula.  These were inflated and I was able to reduce the fracture.  The balloons were lowered and approximately 4.5 mL of methyl methacrylate was instilled on each side in the standard fashion.  The cement was allowed to harden and the  working cannulae were removed.  The punctate incisions were closed with a single subcuticular Vicryl suture, followed by a dermal sealant.  Completion fluoroscopy demonstrated that the cement was well contained along the upper aspect of the L4 vertebral body.  She did receive preoperative antibiotics.  She was rolled onto her cart, extubated  and taking to the recovery room in satisfactory condition.  There were no overt intraoperative complications.              Marc Pham M.D.

## 2017-12-07 NOTE — ANESTHESIA PROCEDURE NOTES
Airway  Urgency: elective    Airway not difficult    General Information and Staff    Patient location during procedure: OR  Anesthesiologist: JEMAL LOPEZ  CRNA: ROSALIA HOLCOMB    Indications and Patient Condition  Indications for airway management: airway protection    Preoxygenated: yes  MILS not maintained throughout  Mask difficulty assessment: 1 - vent by mask    Final Airway Details  Final airway type: endotracheal airway      Successful airway: ETT  Cuffed: yes   Successful intubation technique: direct laryngoscopy  Endotracheal tube insertion site: oral  Blade: Anjelica  Blade size: #3  ETT size: 7.0 mm  Cormack-Lehane Classification: grade I - full view of glottis  Placement verified by: chest auscultation and capnometry   Cuff volume (mL): 5  Measured from: lips  ETT to lips (cm): 20  Number of attempts at approach: 1    Additional Comments  Negative epigastric sounds, Breath sound equal bilaterally with symmetric chest rise and fall

## 2017-12-07 NOTE — PROGRESS NOTES
Adult Nutrition  Assessment/PES    Patient Name:  Georgia Velázquez  YOB: 1945  MRN: [id number]  Admit Date:  12/4/2017    Assessment Date:  12/7/2017          Reason for Assessment       12/07/17 0900    Reason for Assessment    Reason For Assessment/Visit follow up protocol    Time Spent (min) 10    Diagnosis --   per notes this adm    Ortho --   tentatively planning for kyphoplasty today (12/17) per RN   Principal Problem:    Closed compression fracture of L4 lumbar vertebra  Active Problems:    Essential hypertension    Type 2 diabetes mellitus    Hyperlipemia    Coronary artery disease    Osteoporosis    GERD (gastroesophageal reflux disease)    Acute cystitis without hematuria    Urinary tract infection    Acute kidney injury    Constipation    Abdominal pain    Leukocytosis                 Labs/Tests/Procedures/Meds       12/07/17 0900    Labs/Tests/Procedures/Meds    Labs/Tests Review Reviewed    Medication Review Reviewed, pertinent     Results from last 7 days  Lab Units 12/07/17  0519  12/04/17  1936   SODIUM mmol/L 137  < > 129*   POTASSIUM mmol/L 4.2  < > 4.5   CHLORIDE mmol/L 104  < > 95*   CO2 mmol/L 23.0  < > 20.0   BUN mg/dL 29*  < > 62*   CREATININE mg/dL 1.20  < > 2.40*   CALCIUM mg/dL 8.6*  < > 9.4   BILIRUBIN mg/dL  --   --  0.8   ALK PHOS U/L  --   --  207*   ALT (SGPT) U/L  --   --  58*   AST (SGOT) U/L  --   --  78*   GLUCOSE mg/dL 114*  < > 213*   < > = values in this interval not displayed.             Nutrition Prescription Ordered       12/07/17 0900    Nutrition Prescription PO    Current PO Diet NPO   for procedure            Evaluation of Received Nutrient/Fluid Intake       12/07/17 0901    PO Evaluation    Number of Meals 6    % PO Intake 88   reflects PO intake prior to pt being NPO, pt was on a consistent carbohydrate diet            Problem/Interventions:          Problem 2       12/07/17 0902    Nutrition Diagnoses Problem 2    Problem 2 No Nutrition Diagnosis  at this Time                  Intervention Goal       12/07/17 0902    Intervention Goal    General Nutrition support treatment    PO Maintain intake            Nutrition Intervention       12/07/17 0902    Nutrition Intervention    RD/Tech Action Care plan reviewd;Follow Tx progress              Education/Evaluation       12/07/17 0902    Monitor/Evaluation    Monitor Per protocol;PO intake;Pertinent labs        Electronically signed by:  Eveline Ferrer MS RD/CB CNSC  12/07/17 9:02 AM

## 2017-12-07 NOTE — PLAN OF CARE
Problem: Patient Care Overview (Adult)  Goal: Plan of Care Review  Outcome: Ongoing (interventions implemented as appropriate)    12/06/17 1340 12/07/17 0422   Coping/Psychosocial Response Interventions   Plan Of Care Reviewed With --  patient   Patient Care Overview   Progress no change --    Outcome Evaluation   Outcome Summary/Follow up Plan --  Pt rested throughout the night. Redness/excoriation to sacrum and skin folds; nystatin powder and venelex applied. VSS. Plan for kyphoplsty today- NPO since MN.          Problem: Fall Risk (Adult)  Goal: Identify Related Risk Factors and Signs and Symptoms  Outcome: Ongoing (interventions implemented as appropriate)  Goal: Absence of Falls  Outcome: Ongoing (interventions implemented as appropriate)    12/07/17 0422   Fall Risk (Adult)   Absence of Falls making progress toward outcome         Problem: Skin Integrity Impairment, Risk/Actual (Adult)  Goal: Identify Related Risk Factors and Signs and Symptoms  Outcome: Ongoing (interventions implemented as appropriate)    12/07/17 0422   Skin Integrity Impairment, Risk/Actual   Skin Integrity Impairment, Risk/Actual: Related Risk Factors age extremes;environmental exposure;edema;immobility   Signs and Symptoms (Skin Integrity Impairment) edema       Goal: Skin Integrity/Wound Healing  Outcome: Ongoing (interventions implemented as appropriate)    12/07/17 0422   Skin Integrity Impairment, Risk/Actual (Adult)   Skin Integrity/Wound Healing making progress toward outcome

## 2017-12-07 NOTE — ANESTHESIA PREPROCEDURE EVALUATION
Anesthesia Evaluation     Patient summary reviewed and Nursing notes reviewed   no history of anesthetic complications:  NPO Solid Status: > 8 hours  NPO Liquid Status: > 8 hours     Airway   Mallampati: II  TM distance: >3 FB  Neck ROM: full  no difficulty expected  Dental    (+) edentulous    Pulmonary - normal exam   (+) a smoker Former, COPD, shortness of breath,   Cardiovascular - normal exam  Exercise tolerance: poor (<4 METS)    ECG reviewed  Rhythm: regular  Rate: normal    (+) hypertension, past MI  >12 months, CAD, cardiac stents more than 12 months ago hyperlipidemia  (-) angina, CHF, DVT      Neuro/Psych  (-) seizures, TIA  GI/Hepatic/Renal/Endo    (+) obesity,  GERD well controlled, renal disease (CKD ), diabetes mellitus type 2 well controlled,   (-) hepatitis, liver disease, hypothyroidism    Musculoskeletal     (+) back pain, radiculopathy Right lower extremity  Abdominal   (+) obese,    Substance History      OB/GYN          Other   (+) arthritis                                   Anesthesia Plan    ASA 3     general   (Labs/studies reviewed)  intravenous induction   Anesthetic plan and risks discussed with patient.    Plan discussed with CRNA.

## 2017-12-07 NOTE — ANESTHESIA POSTPROCEDURE EVALUATION
Patient: Georgia Velázquez    Procedure Summary     Date Anesthesia Start Anesthesia Stop Room / Location    12/07/17 1319  [facility]       Procedure Diagnosis Surgeon Provider    KYPHOPLASTY L4 (N/A Spine Lumbar) Closed compression fracture of fourth lumbar vertebra, initial encounter  (Closed compression fracture of fourth lumbar vertebra, initial encounter [S32.040A]) MD Lisa Omalley MD          Anesthesia Type: general  Last vitals  /66   Temp   97.3   Pulse   67   Resp   16   SpO2   93     Post Anesthesia Care and Evaluation    Patient location during evaluation: PACU  Patient participation: complete - patient participated  Level of consciousness: awake and alert  Pain score: 0  Pain management: adequate  Airway patency: patent  Anesthetic complications: No anesthetic complications  PONV Status: none  Cardiovascular status: hemodynamically stable and acceptable  Respiratory status: nonlabored ventilation, acceptable and nasal cannula  Hydration status: acceptable

## 2017-12-07 NOTE — DISCHARGE PLACEMENT REQUEST
"Referral for PT/OT home health   Patient says she has been a patient with [facility] in the past    Thank you   Will call on day of discharge - possibly 12/8     Lianne Peterson Rn/CM   [phone]     Georgia Palumbo (72 y.o. Female)     Date of Birth Social Security Number Address Home Phone MRN    1945  [address]  [address]  [facility] [address] [phone] [id number]    Sabianist Marital Status          Judaism        Admission Date Admission Type Admitting Provider Attending Provider Department, Room/Bed    12/4/17 Emergency Cailin Rosales, Cailin Lopez,  [address], [address]/1    Discharge Date Discharge Disposition Discharge Destination                      Attending Provider: Cailin Rosales DO     Allergies:  No Known Allergies    Isolation:  None   Infection:  None   Code Status:  FULL    Ht:  154.9 cm (61\")   Wt:  111 kg (245 lb)    Admission Cmt:  None   Principal Problem:  Closed compression fracture of L4 lumbar vertebra [S32.040A] More...                 Active Insurance as of 12/4/2017     Primary Coverage     Payor Plan Insurance Group Employer/Plan Group    MEDICARE MEDICARE A & B      Payor Plan Address Payor Plan Phone Number Effective From Effective To    [address] [phone] 5/1/2008     [place] [address]       Subscriber Name Subscriber Birth Date Member ID       GEORGIA PALUMBO 1945 [id number]           Secondary Coverage     Payor Plan Insurance Group Employer/Plan Group    [place]      Payor Plan Address Payor Plan Phone Number Effective From Effective To    [address] [address]  1/1/2014     [place] [address]       Subscriber Name Subscriber Birth Date Member ID       GEORGIA PALUMBO GIULIANA 1945 [id number]                 Emergency Contacts      (Rel.) Home Phone Work Phone Mobile Phone    JorgeOmaira (Daughter) [phone] -- --    EberFlakito (Son) " [phone] -- --          [address]  [address] [facility] [address]  Phone:  [phone]  Fax:   Date: Dec 7, 2017      Ambulatory Referral to Home Health     Patient:  Georgia Velázquez MRN:  [id number]   [address]  [address]  [facility] [address] :  1945  SSN:    Phone: [phone] Sex:  F      INSURANCE PAYOR PLAN GROUP # SUBSCRIBER ID   Primary:  Secondary: MEDICARE  [facility] [place] [id number]  [id number]   [id number]  [id number]      Referring Provider Information:  YULIANA TEE Phone: [phone] Fax:       Referral Information:   # Visits:  1 Referral Type: Home Health [42]   Urgency:  Routine Referral Reason: Specialty Services Required   Start Date: Dec 7, 2017 End Date:  To be determined by Insurer   Diagnosis: Acute cystitis without hematuria (N30.00 [ICD-10-CM] 595.0 [ICD-9-CM])  Closed compression fracture of fourth lumbar vertebra, initial encounter (S32.040A [ICD-10-CM] 805.4 [ICD-9-CM])  Impaired functional mobility, balance, gait, and endurance (Z74.09 [ICD-10-CM] V49.89 [ICD-9-CM])  Impaired mobility and ADLs (Z74.09 [ICD-10-CM] 799.89 [ICD-9-CM])      Refer to Dept:   Refer to Provider:   Refer to Facility:       Face to Face Visit Date: 2017  Follow-up Provider for Plan of Care? I treated the patient in an acute care facility and will not continue treatment after discharge.  Follow-up Provider: KIRA CHUNG [[id number]]  Reason/Clinical Findings: close Compression fracture of L4 with limited mobility, gait, weakness  Describe mobility limitations that make leaving home difficult: compression fracture, limited mobility, impaired strength and gait  Nursing/Therapeutic Services Requested: Physical Therapy  Nursing/Therapeutic Services Requested: Occupational Therapy  PT orders: Total joint pathway  PT orders: Therapeutic exercise  PT orders: Gait Training  PT orders: Home safety assessment  Weight Bearing Status: As  Tolerated  Occupational orders: Activities of daily living  Occupational orders: Energy conservation  Occupational orders: Strengthening  Occupational orders: Cognition  Occupational orders: Fine motor  Occupational orders: Home safety assessment     This document serves as a request of services and does not constitute Insurance authorization or approval of services.  To determine eligibility, please contact the members Insurance carrier to verify and review coverage.     If you have medical questions regarding this request for services. Please contact [address] at [phone] between the hours of 8:00am - 5:00pm (Mon-Fri).             Verbal Order Mode: Per protocol: cosign required  Authorizing Provider: Cailin Rosales DO  Authorizing Provider's NPI: [id number]     Order Entered By: Lianne Peterson RN 2017 10:30 AM     Electronically signed by: Cailin Rosales DO  2017 10:33 AM                 History & Physical      Cailin Rosales DO at 2017  1:21 AM              [facility] Medicine Services  HISTORY AND PHYSICAL    Patient Name: Georgia Velázquez  : 1945  MRN: [id number]  Primary Care Physician: Chaz Rico, DNP, APRN    Subjective   Subjective     Chief Complaint: Low back pain, urinary incontinence    HPI:  Georgia Velázquez is a 72 y.o. female with PMH significant for CAD, HTN, HLD, T2DM, GERD, osteoporosis. She was recently diagnosed with an L4 compression fracture and follows Dr. Snow, Neurosurgery. She presents to [facility] ED today for c/o low back pain and states today pain is worse. She also reports that she lost control of her bladder and had urinary incontinence. Additionally, she reports dysuria, urgency, frequency, and suprapubic abdominal pain. She reports constipation and states her last BM was about 7 days ago. She denies bowel incontinence, diarrhea, or blood in stool. Denies F/C, cough, SOA, CP, palpitations,  dizziness, weakness, lightheadedness, confusion, or any other acute complaints. She is able to walk with a walker. According to patient, she has had hx of frequent urinary tract infection. In the ED, she was found to have a UTI with large leukocytes and 4+ bacteria. Her labs revealed ISRRAEL with BUN 62 and Cr 2.40, baseline Cr 1.30. She also has leukocytosis (WBC 11.45). She was given IV Rocephin in the ED. MRI showed recent (acute or subacute) compression fracture. She will be admitted to Hospital medicine service for further evaluation with plans for Neurosurgery, Dr. Snow, consult in the AM.     Review of Systems   Constitutional: Negative for chills and fever.   Respiratory: Negative for cough, shortness of breath and wheezing.    Cardiovascular: Negative for chest pain, palpitations and leg swelling.   Gastrointestinal: Positive for abdominal pain. Negative for blood in stool, constipation, diarrhea, nausea and vomiting.        Denies bowel dysfunction or incontinence   Genitourinary: Positive for dysuria, frequency and urgency. Negative for difficulty urinating and hematuria.        Reports hx of frequent urinary tract infections   Musculoskeletal: Positive for back pain. Negative for arthralgias.   Skin: Negative for color change, pallor and rash.   Neurological: Negative for dizziness, syncope, weakness, light-headedness and headaches.   Hematological: Does not bruise/bleed easily.   Psychiatric/Behavioral: Negative for confusion.   All other systems reviewed and are negative.     Otherwise 10-system ROS reviewed and is negative except as mentioned in the HPI.    Personal History     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   • CHF (congestive heart failure)    • COPD (chronic obstructive pulmonary disease)    • Coronary artery disease 5/18/2016   • Essential hypertension 5/18/2016   • GERD (gastroesophageal reflux disease) 5/18/2016   • History of  echocardiogram 10/16/2015    TDS.Est EF is 45-50%.Mild distal septal, anteroapical hypokinesia.Mild mitral stenosis.Mean gradient across mitral valve is 6 mmHg.Trace TR   • Hyperlipemia 5/18/2016   • Myocardial infarction 5/18/2016   • Osteoporosis 5/18/2016   • Sepsis     uro   • Type 2 diabetes mellitus 5/18/2016     Past Surgical History:   Procedure Laterality Date   • APPENDECTOMY     • CHOLECYSTECTOMY       Family History: family history includes Diabetes in her mother; No Known Problems in her brother, daughter, father, sister, and son.     Social History:  reports that she quit smoking about 10 years ago. Her smoking use included Cigarettes. She has never used smokeless tobacco. She reports that she does not drink alcohol or use illicit drugs.    Medications:  Prescriptions Prior to Admission   Medication Sig Dispense Refill Last Dose   • albuterol (PROVENTIL HFA;VENTOLIN HFA) 108 (90 BASE) MCG/ACT inhaler Inhale 2 puffs every 4 (four) hours as needed for wheezing. 1 inhaler 11 Taking   • alendronate (FOSAMAX) 70 MG tablet Take 1 tablet by mouth Every 7 (Seven) Days. 4 tablet 5 Taking   • aspirin 81 MG chewable tablet Chew 1 tablet Daily. 30 tablet 3 Taking   • atorvastatin (LIPITOR) 40 MG tablet Take 1 tablet by mouth Daily. 30 tablet 3 Taking   • carvedilol (COREG) 3.125 MG tablet Take 1 tablet by mouth 2 (Two) Times a Day With Meals. (Patient taking differently: Take 6.25 mg by mouth 2 (Two) Times a Day With Meals.) 60 tablet 3 Taking   • cyclobenzaprine (FLEXERIL) 10 MG tablet 1/2 po bid prn 30 tablet 0 Taking   • glucose blood (ACCU-CHEK ANNIE PLUS) test strip Use once daily 100 each 12 Taking   • glucose blood test strip Use as instructed 100 each 12 Taking   • HYDROcodone-acetaminophen (NORCO)  MG per tablet Take 1 tablet by mouth Every 6 (Six) Hours As Needed for Severe Pain . 24 tablet 0 Taking   • metFORMIN (GLUCOPHAGE) 850 MG tablet Take 1 tablet by mouth 2 (Two) Times a Day With Meals. 60  tablet 2 Taking   • nitrofurantoin, macrocrystal-monohydrate, (MACROBID) 100 MG capsule Take 1 capsule by mouth 2 (Two) Times a Day. 14 capsule 0 Taking   • pantoprazole (PROTONIX) 40 MG EC tablet Take 1 tablet by mouth Daily. 30 tablet 1 Taking   • TiZANidine (ZANAFLEX) 2 MG capsule Take 1 capsule by mouth 3 (Three) Times a Day. 60 capsule 0 Taking   • urea (CARMOL) 10 % cream Apply  topically 2 (Two) Times a Day. 57 g 1 Taking   • valsartan-hydrochlorothiazide (DIOVAN-HCT) 160-25 MG per tablet Take 1 tablet by mouth Daily. 30 tablet 1 Taking     No Known Allergies    Objective   Objective     Vital Signs:   Temp:  [98 °F (36.7 °C)-98.6 °F (37 °C)] 98 °F (36.7 °C)  Heart Rate:  [72-81] 77  Resp:  [18-20] 18  BP: (105-124)/(60-89) 115/61        Physical Exam   Constitutional: She is oriented to person, place, and time. She appears well-developed and well-nourished. She is cooperative. She is easily aroused.   Obese  female. No apparent distress.    HENT:   Head: Normocephalic and atraumatic.   Eyes: EOM are normal. Pupils are equal, round, and reactive to light. No scleral icterus.   Neck: Normal range of motion. Neck supple.   Cardiovascular: Normal rate, regular rhythm, normal heart sounds and intact distal pulses.  Exam reveals no gallop and no friction rub.    No murmur heard.  Pulses:       Radial pulses are 2+ on the right side, and 2+ on the left side.        Dorsalis pedis pulses are 2+ on the right side, and 2+ on the left side.        Posterior tibial pulses are 2+ on the right side, and 2+ on the left side.   Pulmonary/Chest: Effort normal and breath sounds normal. No respiratory distress. She has no wheezes. She exhibits no tenderness.   Abdominal: Soft. Bowel sounds are normal. She exhibits distension. She exhibits no mass. There is no hepatosplenomegaly. There is tenderness in the right lower quadrant and suprapubic area. There is no rebound, no guarding and no CVA tenderness. No hernia.    Musculoskeletal: Normal range of motion. She exhibits no edema or tenderness.   Neurological: She is alert, oriented to person, place, and time and easily aroused. GCS eye subscore is 4. GCS verbal subscore is 5. GCS motor subscore is 6.   Skin: Skin is warm and dry. No erythema.   Psychiatric: She has a normal mood and affect. Her behavior is normal. Thought content normal.   Vitals reviewed.     Results Reviewed:  I have personally reviewed current lab, radiology, and data and agree.    Lab Results (last 24 hours)     Procedure Component Value Units Date/Time    CBC & Differential [[id number]] Collected:  12/04/17 1936    Specimen:  Blood Updated:  12/04/17 1946    Narrative:       The following orders were created for panel order CBC & Differential.  Procedure                               Abnormality         Status                     ---------                               -----------         ------                     CBC Auto Differential[[id number]]        Abnormal            Final result                 Please view results for these tests on the individual orders.    CBC Auto Differential [[id number]]  (Abnormal) Collected:  12/04/17 1936    Specimen:  Blood Updated:  12/04/17 1946     WBC 11.45 (H) 10*3/mm3      RBC 3.96 10*6/mm3      Hemoglobin 11.6 g/dL      Hematocrit 34.8 %      MCV 87.9 fL      MCH 29.3 pg      MCHC 33.3 g/dL      RDW 14.7 (H) %      RDW-SD 47.6 fl      MPV 11.4 fL      Platelets 217 10*3/mm3      Neutrophil % 80.8 (H) %      Lymphocyte % 8.9 (L) %      Monocyte % 9.4 %      Eosinophil % 0.4 %      Basophil % 0.2 %      Immature Grans % 0.3 %      Neutrophils, Absolute 9.25 (H) 10*3/mm3      Lymphocytes, Absolute 1.02 10*3/mm3      Monocytes, Absolute 1.08 (H) 10*3/mm3      Eosinophils, Absolute 0.05 10*3/mm3      Basophils, Absolute 0.02 10*3/mm3      Immature Grans, Absolute 0.03 10*3/mm3     Urine Culture - Urine, Urine, Clean Catch [[id number]] Collected:  12/04/17 2009    Specimen:   Urine from Urine, Catheter Updated:  12/04/17 2026    Comprehensive Metabolic Panel [[id number]]  (Abnormal) Collected:  12/04/17 1936    Specimen:  Blood Updated:  12/04/17 2026     Glucose 213 (H) mg/dL      BUN 62 (H) mg/dL      Creatinine 2.40 (H) mg/dL      Sodium 129 (L) mmol/L      Potassium 4.5 mmol/L      Chloride 95 (L) mmol/L      CO2 20.0 mmol/L      Calcium 9.4 mg/dL      Total Protein 7.4 g/dL      Albumin 4.10 g/dL      ALT (SGPT) 58 (H) U/L      AST (SGOT) 78 (H) U/L      Alkaline Phosphatase 207 (H) U/L      Total Bilirubin 0.8 mg/dL      eGFR Non African Amer 20 (L) mL/min/1.73      Globulin 3.3 gm/dL      A/G Ratio 1.2 (L) g/dL      BUN/Creatinine Ratio 25.8 (H)     Anion Gap 14.0 (H) mmol/L     Narrative:       National Kid[employer] Guidelines    Stage     Description        GFR  1         Normal or High     90+  2         Mild decrease      60-89  3         Moderate decrease  30-59  4         Severe decrease    15-29  5         Kidney failure     <15    Urinalysis With / Culture If Indicated - Urine, Catheter [[id number]]  (Abnormal) Collected:  12/04/17 2009    Specimen:  Urine from Urine, Catheter Updated:  12/04/17 2028     Color, UA Dark Yellow (A)     Appearance, UA Turbid (A)     pH, UA 5.5     Specific Gravity, UA 1.019     Glucose, UA Negative     Ketones, UA Negative     Bilirubin, UA Small (1+) (A)     Blood, UA Moderate (2+) (A)     Protein, UA 30 mg/dL (1+) (A)     Leuk Esterase, UA Large (3+) (A)     Nitrite, UA Negative     Urobilinogen, UA 1.0 E.U./dL    Urinalysis, Microscopic Only - Urine, Clean Catch [[id number]]  (Abnormal) Collected:  12/04/17 2009    Specimen:  Urine from Urine, Catheter Updated:  12/04/17 2028     RBC, UA 3-6 (A) /HPF      WBC, UA Too Numerous to Count (A) /HPF      Bacteria, UA 4+ (A) /HPF      Squamous Epithelial Cells, UA 0-2 /HPF      Hyaline Casts, UA 0-6 /LPF      Methodology Automated Microscopy        Imaging Results (last 24 hours)     Procedure  Component Value Units Date/Time    MRI Lumbar Spine Without Contrast [[id number]] Collected:  12/04/17 1855     Updated:  12/04/17 2322    Addenda:        ADDENDUM: No MR evidence of cauda equina syndrome.  Clinical correlation   is   also necessary. The findings were verbally communicated via telephone   conference with GEORGIA Bond at 11:17 PM EST on 12/4/2017. The findings   were   acknowledged and understood.    Hyperintense STIR signal along posterior aspects of L4, L5, S1 vertebral   bodies   may represent some posterior longitudinal ligamentous injury.    THIS DOCUMENT HAS BEEN ELECTRONICALLY SIGNED BY CHALINO MTZ MD  Signed:  12/04/17 2322 by Chalino Mtz MD    Narrative:       EXAM:    MR Lumbar Spine Without Intravenous Contrast  11/27/2017 at 2156.    CLINICAL HISTORY:    72 years old, female; Pain; Lumbago; Loss of bladder control. Recently dx   with comp FX L4; back and right leg pain low back pain x 2 weeks; per pt no HX   of cancer no surgeries on back    TECHNIQUE:    Magnetic resonance images of the lumbar spine without intravenous contrast in   multiple planes.    COMPARISON:    CT LUMBAR SPINE WO CONTRAST 2017-11-24 15:19    FINDINGS:    Vertebrae:    L4 vertebral body shows nearly diffuse hypointense T1W signal, hyperintense T2W   signal at superior endplate, and hyperintense STIR signal throughout vertebral   body but predominantly at superior endplate.  L4 vertebral body shows mild height loss.  No subluxation.    Spinal cord: Suboptimal visualization of lower spinal cord and conus   medullaris due to motion artifact.    Soft tissues: Extensive subcutaneous edema in lower back.    Kidneys and ureters:  Right renal posterior, cortical, round, hyperintense   T2W lesion measures about 18 x 15 mm and likely represents renal cyst.     DISCS/SPINAL CANAL/NEURAL FORAMINA:  Mild height loss at L5/S1 disc.  T12/L1: Focal disc bulge or broad-based disc protrusion and bilateral facet    hypertrophy causing minimal central canal and no significant neuroforaminal   stenosis.   L1/L2: Focal disc bulge or broad-based disc protrusion and bilateral facet   hypertrophy causing minimal central canal and no significant neuroforaminal   stenosis.   L2/L3: Bilateral facet hypertrophy causing no significant central canal or   neuroforaminal stenosis.  L3/L4: Focal disc bulge or broad-based disc protrusion, L4 superoposterior   endplate retropulsed bone fragment, and bilateral facet hypertrophy causing   moderate central canal and moderate bilateral neuroforaminal stenosis.  L4/L5: Focal disc bulge or broad-based disc protrusion and bilateral facet   hypertrophy causing mild central canal and moderate bilateral neuroforaminal   stenosis.   L5/S1: Focal disc bulge or broad-based disc protrusion and bilateral facet   hypertrophy causing mild central canal and mild bilateral neuroforaminal   stenosis.       Impression:       1. L4 vertebral body, recent (acute or subacute), compression fracture.  L4 vertebral body shows mild height loss and superoposterior endplate   retropulsed bone fragment.  L3/L4: Focal disc bulge or broad-based disc protrusion, L4 superoposterior   endplate retropulsed bone fragment, and bilateral facet hypertrophy causing   moderate central canal and moderate bilateral neuroforaminal stenosis.  2. Moderate lumbosacral spine degenerative disease with acquired spinal   stenosis.    THIS DOCUMENT HAS BEEN ELECTRONICALLY SIGNED BY CHALINO RIBERA MD        Assessment/Plan   Assessment / Plan       Assessment & Plan:  1. Low back pain secondary to recent compression fracture   -follows Dr. Snow   -consult Neurosurgery in the AM   -continue home pain control    2. Urinary tract infection  -UA dark, turbid, blood, large leukocytes, TNTC WBC, 4+ bacteria  -given IV Rocephin in the ED   -continue IV Rocephin   -follow urine culture   -strict I&O's    3. Acute kidney injury  -BUN 62 and Cr 2.40  "(previously 24 and 1.30 on 11/7/17)   -IV hydration   -hold nephrotoxic meds   -urine creatine added   -renal ultrasound in the AM   -BMP in the AM    4. Constipation, abdominal pain  -last BM about 7 days ago  -bowel regimen    -bisacodyl suppository   -STAT KUB ordered    5. Hx of HTN   -admit to telemetry   -vital signs Q4H   -continue home meds with hold parameters    6. Hx of T2DM   -FSBG AC/HS   -low dose SSI   -last Hgb A1C 5.7 on 11/7/2017    7. Hx of HLD   -continue home meds    8. Hx of GERD   -continue home meds    9. Hx of CAD   -continue home meds    DVT prophylaxis:   -heparin   -TEDs and SCDs    CODE STATUS:     -FULL code      Rocky Get, APRN   12/05/17   1:21 AM      Brief Attending Admission Attestation     I have seen and examined the patient, performing an independent face-to-face diagnostic evaluation with plan of care reviewed and developed with the advanced practice clinician (APC).      Brief Summary Statement/HPI:   Georgia Velázquez is a 72 y.o. female with PMH significant for CAD, HTN, HLD, DM, GERD who presents from home with 2 weeks of dysuria, increased frequency and suprapubic pain as well as back pain. She has a known L4 compression fracture and is supposed to follow-up with neurosurgery. She also endorses constipation and stats she has \"not had a BM in 2 weeks\". She endorses some RLQ abdominal pain, but denies any nausea, vomiting. She takes stool softeners at home but states they have not been working. She currently lives with her daughter, says she gets around okay with a walker. In the ED found to have UTI as well as ISRRAEL.       Attending Physical Exam:  Constitutional: No acute distress, awake, alert, obese  female  Eyes: PERRLA, sclerae anicteric, no conjunctival injection  HENT: NCAT, mucous membranes moist  Neck: Supple, no thyromegaly, no lymphadenopathy, trachea midline  Respiratory: Clear to auscultation bilaterally, nonlabored respirations   Cardiovascular: " RRR, no murmurs, rubs, or gallops, palpable pedal pulses bilaterally  Gastrointestinal: Positive bowel sounds, soft, nontender, nondistended, some mild RLQ tenderness with deep palpation.  Musculoskeletal: No bilateral ankle edema, no clubbing or cyanosis to extremities  Psychiatric: Appropriate affect, cooperative  Neurologic: Oriented x 3, strength symmetric in all extremities, Cranial Nerves grossly intact to confrontation, speech clear  Skin: No rashes    Brief Assessment/Plan :  See above for further detailed assessment and plan developed with APC which I have reviewed and/or edited.    UTI - on IV rocephin, will adjust as cx results come back  Constipation - patient reports no BM x2 weeks, KUB showing no obvious obstruction. Aggressive bowel regimen, colace BID, miralax and 1x dulcolax suppository, if no BM will try mag citrate or lactulose    I believe this patient meets INPATIENT status due to the need for care which can only be reasonably provided in an hospital setting such as aggressive/expedited ancillary services and/or consultation services, the necessity for IV medications, close physician monitoring and/or the possible need for procedures.  In such, I feel patient’s risk for adverse outcomes and need for care warrant INPATIENT evaluation and predict the patient’s care encounter to likely last beyond 2 midnights.      Cailin Rosales DO  17  10:04 AM              Electronically signed by Cailin Rosales DO at 2017 10:12 AM           Physician Progress Notes (most recent note)      Cailin Rosales DO at 2017  8:36 AM  Version 1 of 1             [facility]  PROGRESS NOTE    Patient Name: Georgia Velázquez  : 1945  MRN: [id number]    Date of Admission: 2017  Length of Stay: 2  Primary Care Physician: Chaz Rico, DNP, APRN    Subjective   Subjective     CC:  F/u back pain    HPI:  Feels well, no complaints, no acute  events overnight.    Review of Systems  Gen- No fevers, chills  CV- No chest pain, palpitations  Resp- No cough, dyspnea  GI- No N/V/D, abd pain     Otherwise ROS is negative except as mentioned in the HPI.    Objective   Objective     Vital Signs:   Temp:  [97.7 °F (36.5 °C)-98.2 °F (36.8 °C)] 98.2 °F (36.8 °C)  Heart Rate:  [65-70] 65  Resp:  [18-20] 18  BP: (115-142)/(46-64) 142/47        Physical Exam:  Constitutional: No acute distress, awake, alert, obese  female  Eyes: PERRL, sclerae anicteric, no conjunctival injection  HENT: NCAT, mucous membranes moist  Neck: Supple, no thyromegaly, no lymphadenopathy, trachea midline  Respiratory: Clear to auscultation bilaterally, nonlabored respirations   Cardiovascular: RRR, no murmurs, rubs, or gallops, palpable pedal pulses bilaterally  Gastrointestinal: Positive bowel sounds, soft, nontender, nondistended  Musculoskeletal: No bilateral ankle edema, no clubbing or cyanosis to extremities  Psychiatric: Appropriate affect, cooperative  Neurologic: Oriented x 3, strength symmetric in all extremities, Cranial Nerves grossly intact to confrontation, speech clear  Skin: No rashes    Results Reviewed:  I have personally reviewed current lab, radiology, and data and agree.      Results from last 7 days  Lab Units 12/05/17  0750 12/04/17  1936   WBC 10*3/mm3 10.09 11.45*   HEMOGLOBIN g/dL 11.7 11.6   HEMATOCRIT % 34.8 34.8   PLATELETS 10*3/mm3 225 217       Results from last 7 days  Lab Units 12/07/17  0519 12/06/17  0657 12/05/17  0750 12/04/17  1936   SODIUM mmol/L 137 136 132 129*   POTASSIUM mmol/L 4.2 4.3 4.3 4.5   CHLORIDE mmol/L 104 105 98* 95*   CO2 mmol/L 23.0 20.0 22.0 20.0   BUN mg/dL 29* 40* 49* 62*   CREATININE mg/dL 1.20 1.70* 1.80* 2.40*   GLUCOSE mg/dL 114* 117* 129* 213*   CALCIUM mg/dL 8.6* 8.8 9.2 9.4   ALT (SGPT) U/L  --   --   --  58*   AST (SGOT) U/L  --   --   --  78*     No results found for: BNP  No results found for: PHART    Microbiology  Results Abnormal     Procedure Component Value - Date/Time    Urine Culture - Urine, Urine, Clean Catch [[id number]]  (Abnormal)  (Susceptibility) Collected:  12/04/17 2009    Lab Status:  Final result Specimen:  Urine from Urine, Catheter Updated:  12/06/17 1142     Urine Culture --      >100,000 CFU/mL Klebsiella pneumoniae (A)    Susceptibility      Klebsiella pneumoniae     MIGUEL ANGEL     Ampicillin >16 ug/ml Resistant     Ampicillin + Sulbactam <=8/4 ug/ml Susceptible     Aztreonam <=8 ug/ml Susceptible     Cefepime <=8 ug/ml Susceptible     Cefotaxime <=2 ug/ml Susceptible     Ceftriaxone <=8 ug/ml Susceptible     Cefuroxime sodium <=4 ug/ml Susceptible     Cephalothin <=8 ug/ml Susceptible     Ertapenem <=1 ug/ml Susceptible     Gentamicin <=4 ug/ml Susceptible     Levofloxacin <=2 ug/ml Susceptible     Meropenem <=1 ug/ml Susceptible     Nitrofurantoin <=32 ug/ml Susceptible     Piperacillin + Tazobactam <=16 ug/ml Susceptible     Tetracycline <=4 ug/ml Susceptible     Tobramycin <=4 ug/ml Susceptible     Trimethoprim + Sulfamethoxazole <=2/38 ug/ml Susceptible                          Imaging Results (last 24 hours)     ** No results found for the last 24 hours. **           I have reviewed the medications.    Assessment/Plan   Assessment / Plan     Hospital Problem List     * (Principal)Closed compression fracture of L4 lumbar vertebra    Overview Signed 12/6/2017  2:39 PM by Marc Pham MD     Added automatically from request for surgery [id number]         Essential hypertension    Type 2 diabetes mellitus    Hyperlipemia    Coronary artery disease    Osteoporosis    GERD (gastroesophageal reflux disease)    Acute cystitis without hematuria    Urinary tract infection    Acute kidney injury    Constipation    Abdominal pain    Leukocytosis          Brief Hospital Course to date:  Georgia Velázquez is a 72 y.o. female admitted with increased urinary frequency, and low back pain.    Assessment & Plan:  1. Low  back pain secondary to L4 compression fracture  - neurosurgery planning for kyphoplasty when medically cleared - tentatively tomorrow  - EKG performed, NSR, no ST elevation/depression  - Hx of MI in , patient reports no issues since then, no anginal symptoms, no exertional SOB or CP  - RCRI 0.9%      2. Urinary tract infection  - urine cx growing klebsiella susceptible to Rocephin, will continue for now     3. Acute kidney injury  - Cr. Has returned to patient's baseline, 1.2 this morning. D/C IVF  - renal u/s negative, no hydronephrosis     4. Constipation, abdominal pain  -continue bowel regimen     5. Hx of HTN  - continue home meds    6. Hx of T2DM  -FSBG AC/HS - not on insulin therapy at home  -low dose SSI  -last Hgb A1C 5.7 on 2017     7. Hx of HLD  -continue home meds     8. Hx of GERD  -continue home meds     9. Hx of CAD   -continue home meds     DVT Prophylaxis:  Heparin    CODE STATUS: Full Code    Disposition: I expect the patient to be discharged TBD    Cailin Rosales DO  17  8:36 AM           Electronically signed by Cailin Rosales DO at 2017  8:41 AM           Physical Therapy Notes (most recent note)      Latonya Snowden, PT at 2017 12:05 PM  Version 1 of 1         Acute Care - Physical Therapy Initial Evaluation  [facility]     Patient Name: Georgia Velázquez  : 1945  MRN: [id number]  Today's Date: 2017   Onset of Illness/Injury or Date of Surgery Date: 17  Date of Referral to PT: 17  Referring Physician: DO Bobby      Admit Date: 2017     Visit Dx:    ICD-10-CM ICD-9-CM   1. Acute cystitis without hematuria N30.00 595.0   2. Acute kidney injury N17.9 584.9   3. Closed compression fracture of fourth lumbar vertebra, initial encounter S32.040A 805.4   4. Lumbar disc disease M51.9 722.93   5. Impaired functional mobility, balance, gait, and endurance Z74.09 V49.89   6. Impaired mobility and ADLs Z74.09 799.89     Patient Active Problem  List   Diagnosis   • Essential hypertension   • Type 2 diabetes mellitus   • Hyperlipemia   • Myocardial infarction   • Coronary artery disease   • Osteoporosis   • GERD (gastroesophageal reflux disease)   • Arthritis of shoulder   • Acute cystitis without hematuria   • Urinary tract infection   • Acute kidney injury   • Constipation   • Abdominal pain   • Leukocytosis     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   • CHF (congestive heart failure)    • COPD (chronic obstructive pulmonary disease)    • Coronary artery disease 5/18/2016   • Essential hypertension 5/18/2016   • GERD (gastroesophageal reflux disease) 5/18/2016   • History of echocardiogram 10/16/2015    TDS.Est EF is 45-50%.Mild distal septal, anteroapical hypokinesia.Mild mitral stenosis.Mean gradient across mitral valve is 6 mmHg.Trace TR   • Hyperlipemia 5/18/2016   • Myocardial infarction 5/18/2016   • Osteoporosis 5/18/2016   • Sepsis     uro   • Type 2 diabetes mellitus 5/18/2016     Past Surgical History:   Procedure Laterality Date   • APPENDECTOMY     • CHOLECYSTECTOMY            PT ASSESSMENT (last 72 hours)      PT Evaluation       12/06/17 1115 12/06/17 1106    Rehab Evaluation    Document Type evaluation  -LS evaluation;therapy note (daily note)  -TB    Subjective Information agree to therapy;no complaints  -LS agree to therapy;complains of;pain  -TB    Patient Effort, Rehab Treatment good  -LS good  -TB    Symptoms Noted During/After Treatment increased pain  -LS increased pain;fatigue  -TB    Symptoms Noted Comment  Pt with h/o L4 compression fracture treated conservatively for pain control; Current plan is for kyphoplasty   -TB    General Information    Patient Profile Review yes  -LS yes  -TB    Onset of Illness/Injury or Date of Surgery Date 12/04/17  -LS 12/04/17  -TB    Referring Physician Bobby DO  -RAMSES Rosales  -TB    General Observations  Pt rec'vd supine in bed, room air, IV and  tele intact; son present  -TB    Pertinent History Of Current Problem Pt admitted with AMS, recent incontinence of bladder, constipation; hx of known L4 compression fx being treated conservatively. Found to have UTI and ISRRAEL; planned to undergo kyphoplasty when medically appropriate.   -LS Pt to ED with AMS and back pain that radiates to L foot; UTI; plans for kyphoplasty as pt has failed out pt pain control  -TB    Precautions/Limitations fall precautions;spinal precautions   L4 compression fx  -LS fall precautions;spinal precautions  -TB    Prior Level of Function independent:;gait;transfer;ADL's;bathing;dressing;driving  - independent:;all household mobility;community mobility;transfer;bed mobility;ADL's;home management;driving  -TB    Equipment Currently Used at Home rollator;shower chair;grab bar  -LS grab bar;shower chair;rollator  -TB    Plans/Goals Discussed With patient and family;agreed upon  -LS patient and family;agreed upon  -TB    Risks Reviewed patient and family:;LOB;dizziness;increased discomfort;change in vital signs  -LS patient and family:;LOB;increased discomfort;lines disloged  -TB    Benefits Reviewed patient and family:;improve function;increase independence;increase strength;increase knowledge  -LS patient and family:;improve function;increase independence;decrease pain;increase knowledge  -TB    Barriers to Rehab  none identified  -TB    Living Environment    Lives With alone  -LS alone  -TB    Living Arrangements apartment  -LS apartment  -TB    Home Accessibility tub/shower is not walk in  -LS tub/shower is not walk in  -TB    Living Environment Comment  Recommend toilet seat riser - education completed with pt/son. Pt has shower chair and grab bars at tub and toilet  -TB    Clinical Impression    Date of Referral to PT 12/05/17  -LS     PT Diagnosis impaired functional mobility, balance, gait  -LS     Patient/Family Goals Statement return to PLOF  -     Criteria for Skilled  Therapeutic Interventions Met yes;treatment indicated  -LS     Vital Signs    Pre Systolic BP Rehab 135  -LS     Pre Treatment Diastolic BP 77  -LS --   stable; RN cleared OT  -TB    Pretreatment Heart Rate (beats/min) 73  -LS     Posttreatment Heart Rate (beats/min) 81  -LS     O2 Delivery Pre Treatment room air  -LS     O2 Delivery Post Treatment room air  -LS room air  -TB    Pre Patient Position Supine  -LS Supine  -TB    Intra Patient Position Standing  -LS Standing  -TB    Post Patient Position Sitting  -LS Sitting  -TB    Pain Assessment    Pain Assessment 0-10  -LS 0-10  -TB    Pain Score 0  -LS 0  -TB    Post Pain Score  5  -TB    Pain Type  Acute pain  -TB    Pain Location Back  -LS Back  -TB    Pain Orientation  Lower  -TB    Pain Radiating Towards  L LE/foot  -TB    Pain Intervention(s) Repositioned;Ambulation/increased activity  -LS Ambulation/increased activity;Repositioned  -TB    Response to Interventions tolerated  -LS Pt tolerated OOB activity  -TB    Vision Assessment/Intervention    Visual Impairment  WFL  -TB    Cognitive Assessment/Intervention    Current Cognitive/Communication Assessment functional  -LS functional  -TB    Orientation Status oriented x 4  -LS oriented x 4  -TB    Follows Commands/Answers Questions able to follow single-step instructions;100% of the time;needs cueing  -LS able to follow single-step instructions;100% of the time  -TB    Personal Safety mild impairment;decreased awareness, need for safety  -LS WNL/WFL  -TB    Personal Safety Interventions fall prevention program maintained;gait belt;nonskid shoes/slippers when out of bed  -LS fall prevention program maintained;gait belt;nonskid shoes/slippers when out of bed  -TB    Short/Long Term Memory  intact short term memory;intact long term memory  -TB    ROM (Range of Motion)    General ROM no range of motion deficits identified   BLEs  -LS no range of motion deficits identified  -TB    General ROM Detail  B UE WFL  -TB     MMT (Manual Muscle Testing)    General MMT Assessment lower extremity strength deficits identified  -LS no strength deficits identified  -TB    General MMT Assessment Detail  B UE WFL 4/5,  4+/5  -TB    Lower Extremity    Lower Ext Manual Muscle Testing Detail BLEs grossly 3+/5  -LS     Bed Mobility, Assessment/Treatment    Bed Mobility, Assistive Device bed rails;head of bed elevated  -LS     Bed Mobility, Roll Right, [place] moderate assist (50% patient effort);verbal cues required  -LS moderate assist (50% patient effort);verbal cues required  -TB    Bed Mob, Sidelying to Sit, [place] minimum assist (75% patient effort);verbal cues required  -LS minimum assist (75% patient effort);verbal cues required  -TB    Bed Mobility, Safety Issues  decreased use of arms for pushing/pulling;decreased use of legs for bridging/pushing  -TB    Bed Mobility, Impairments strength decreased;impaired balance;pain  -LS pain  -TB    Bed Mobility, Comment VC's for log rolling.   -LS Education initiated for logroll sequencing in light of kyphoplasty pending  -TB    Transfer Assessment/Treatment    Transfers, Bed-Chair [place]  contact guard assist;verbal cues required  -TB    Transfers, Sit-Stand [place] contact guard assist;verbal cues required;1 person + 1 person to manage equipment  -LS contact guard assist;verbal cues required  -TB    Transfers, Stand-Sit [place] contact guard assist;verbal cues required;1 person + 1 person to manage equipment  -LS contact guard assist;verbal cues required  -TB    Transfers, Sit-Stand-Sit, Assist Device rolling walker  -LS rolling walker  -TB    Toilet Transfer, [place] contact guard assist;1 person + 1 person to manage equipment;verbal cues required  -LS contact guard assist;verbal cues required  -TB    Toilet Transfer, Assistive Device  bedside commode without drop arms;rolling walker  -TB    Transfer, Safety Issues  balance decreased during turns  -TB     Transfer, Impairments impaired balance;strength decreased  -LS pain  -TB    Transfer, Comment VC's for hand placement.   -LS fair safety, no LOB  -TB    Gait Assessment/Treatment    Gait, [place] Level contact guard assist;1 person + 1 person to manage equipment;verbal cues required  -LS     Gait, Assistive Device rolling walker  -LS     Gait, Distance (Feet) 100  -LS     Gait, Gait Deviations no decreased;forward flexed posture;step length decreased  -LS     Gait, Impairments impaired balance;strength decreased  -LS     Gait, Comment VC's for posture and increasing step length within RWx. Distance limited by fatigue; req'd 1 brief standing rest break.   -LS     Motor Skills/Interventions    Additional Documentation Balance Skills Training (Group)  -LS     Balance Skills Training    Sitting-Level of Assistance Close supervision  -LS Close supervision  -TB    Sitting-Balance Support Feet supported  -LS Feet supported  -TB    Sitting # of Minutes  10  -TB    Standing-Level of Assistance Contact guard  -LS Contact guard  -TB    Static Standing Balance Support assistive device  -LS assistive device  -TB    Standing-Balance Activities  Weight Shift R-L  -TB    Standing Balance # of Minutes  3  -TB    Gait Balance-Level of Assistance Contact guard  -LS     Gait Balance Support assistive device  -LS     Therapy Exercises    Bilateral Upper Extremity  AROM:;sitting;shoulder extension/flexion;shoulder abduction/adduction;shoulder horizontal abd/add;shoulder ER/IR;elbow flexion/extension;pronation/supination;hand pumps  -TB    Fine Motor Coordination Training    Opposition  Right:;Left:;intact  -TB    Sensory Assessment/Intervention    Light Touch RLE;LLE  -LS LUE;RUE  -TB    LUE Light Touch  WNL  -TB    RUE Light Touch  WNL  -TB    LLE Light Touch WNL  -LS     RLE Light Touch WNL  -LS     Positioning and Restraints    Pre-Treatment Position in bed  -LS in bed  -TB    Post Treatment Position chair  -LS chair  -TB     In Chair notified nsg;reclined;call light within reach;encouraged to call for assist;with family/caregiver;legs elevated  -LS notified nsg;reclined;call light within reach;encouraged to call for assist;with family/caregiver;legs elevated  -TB      12/05/17 1144 12/05/17 0031    General Information    Equipment Currently Used at Home walker, standard;bath bench;glucometer  -AW walker, standard;bath bench;glucometer  -KW    Living Environment    Lives With alone  -AW alone  -KW    Living Arrangements apartment  -AW apartment  -KW    Home Accessibility no concerns  -AW no concerns  -KW    Stair Railings at Home inside, present on right side;outside, present on right side  -AW inside, present on right side;outside, present on right side  -KW    Type of Financial/Environmental Concern none  -AW none  -KW    Transportation Available car;family or friend will provide  -AW car;family or friend will provide  -KW      User Key  (r) = Recorded By, (t) = Taken By, (c) = Cosigned By    Initials Name Provider Type    TB Geovanna Veiira, OT Occupational Therapist    LS Latonya Snowden, PT Physical Therapist    AW Lianne Peterson, RN Registered Nurse    KW Hazel Cedeno, RN Registered Nurse              PT Recommendation and Plan  Anticipated Discharge Disposition: home with assist, home with home health  PT Frequency: daily  Plan of Care Review  Plan Of Care Reviewed With: patient  Outcome Summary/Follow up Plan: PT evaluation completed. Pt demonstrates decreased indep re: funcitional mobility with stable signs/symptoms, warranting further skilled PT services to promote PLOF. Recommend d/c home with assist and HHPT based upon current functional status; will cont to monitor d/c needs as pt is scheduled to undergo kyphoplasty (when medically appropriate).           IP PT Goals       12/06/17 1201          Bed Mobility PT LTG    Bed Mobility PT LTG, Date Established 12/06/17  -LS      Bed Mobility PT LTG, Time to Achieve 2 wks   -LS      Bed Mobility PT LTG, [place] Level independent  -LS      Bed Mobility PT LTG, Additional Goal using log roll technique  -LS      Transfer Training PT LTG    Transfer Training PT LTG, Date Established 12/06/17  -LS      Transfer Training PT LTG, Time to Achieve 2 wks  -LS      Transfer Training PT LTG, Activity Type sit to stand/stand to sit  -LS      Transfer Training PT LTG, [place] Level conditional independence  -LS      Transfer Training PT LTG, Assist Device walker, rolling  -LS      Gait Training PT LTG    Gait Training Goal PT LTG, Date Established 12/06/17  -LS      Gait Training Goal PT LTG, Time to Achieve 2 wks  -LS      Gait Training Goal PT LTG, [place] Level conditional independence  -LS      Gait Training Goal PT LTG, Assist Device walker, rolling  -LS      Gait Training Goal PT LTG, Distance to Achieve 200  -LS        User Key  (r) = Recorded By, (t) = Taken By, (c) = Cosigned By    Initials Name Provider Type    LS Latonya Snowden, PT Physical Therapist                Outcome Measures       12/06/17 1115 12/06/17 1106       How much help from another person do you currently need...    Turning from your back to your side while in flat bed without using bedrails? 2  -LS      Moving from lying on back to sitting on the side of a flat bed without bedrails? 3  -LS      Moving to and from a bed to a chair (including a wheelchair)? 3  -LS      Standing up from a chair using your arms (e.g., wheelchair, bedside chair)? 3  -LS      Climbing 3-5 steps with a railing? 2  -LS      To walk in hospital room? 3  -LS      AM-PAC 6 Clicks Score 16  -LS      How much help from another is currently needed...    Putting on and taking off regular lower body clothing?  3  -TB     Bathing (including washing, rinsing, and drying)  3  -TB     Toileting (which includes using toilet bed pan or urinal)  3  -TB     Putting on and taking off regular upper body clothing  3  -TB     Taking care of personal  grooming (such as brushing teeth)  4  -TB     Eating meals  4  -TB     Score  20  -TB     Functional Assessment    Outcome Measure Options AM-PAC 6 Clicks Basic Mobility (PT)  -LS AM-PAC 6 Clicks Daily Activity (OT)  -TB       User Key  (r) = Recorded By, (t) = Taken By, (c) = Cosigned By    Initials Name Provider Type    TB Geovanna Vieira, OT Occupational Therapist    LS Latonya Snowden, PT Physical Therapist           Time Calculation:         PT Charges       17 1205          Time Calculation    Start Time 1115  -LS      PT Received On 17  -      PT Goal Re-Cert Due Date 17  -        User Key  (r) = Recorded By, (t) = Taken By, (c) = Cosigned By    Initials Name Provider Type     Latonya Snowden, PT Physical Therapist          Therapy Charges for Today     Code Description Service Date Service Provider Modifiers Qty    14276690344 HC PT EVAL LOW COMPLEXITY 4 2017 Latonya Snowden, PT GP 1          PT G-Codes  Outcome Measure Options: AM-PAC 6 Clicks Basic Mobility (PT)      Latonya Snowden, PT  2017            Electronically signed by Latonya Snowden, PT at 2017 12:06 PM           Occupational Therapy Notes (most recent note)      Geovanna Vieira, OT at 2017  1:39 PM  Version 1 of 1         Acute Care - Occupational Therapy Initial Evaluation  [facility]     Patient Name: Georgia Velázquez  : 1945  MRN: [id number]  Today's Date: 2017  Onset of Illness/Injury or Date of Surgery Date: 17  Date of Referral to OT: 17  Referring Physician: DO Bobby    Admit Date: 2017       ICD-10-CM ICD-9-CM   1. Acute cystitis without hematuria N30.00 595.0   2. Acute kidney injury N17.9 584.9   3. Closed compression fracture of fourth lumbar vertebra, initial encounter S32.040A 805.4   4. Lumbar disc disease M51.9 722.93   5. Impaired functional mobility, balance, gait, and endurance Z74.09 V49.89   6. Impaired mobility and ADLs Z74.09 799.89      Patient Active Problem List   Diagnosis   • Essential hypertension   • Type 2 diabetes mellitus   • Hyperlipemia   • Myocardial infarction   • Coronary artery disease   • Osteoporosis   • GERD (gastroesophageal reflux disease)   • Arthritis of shoulder   • Acute cystitis without hematuria   • Urinary tract infection   • Acute kidney injury   • Constipation   • Abdominal pain   • Leukocytosis     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   • CHF (congestive heart failure)    • COPD (chronic obstructive pulmonary disease)    • Coronary artery disease 5/18/2016   • Essential hypertension 5/18/2016   • GERD (gastroesophageal reflux disease) 5/18/2016   • History of echocardiogram 10/16/2015    TDS.Est EF is 45-50%.Mild distal septal, anteroapical hypokinesia.Mild mitral stenosis.Mean gradient across mitral valve is 6 mmHg.Trace TR   • Hyperlipemia 5/18/2016   • Myocardial infarction 5/18/2016   • Osteoporosis 5/18/2016   • Sepsis     uro   • Type 2 diabetes mellitus 5/18/2016     Past Surgical History:   Procedure Laterality Date   • APPENDECTOMY     • CHOLECYSTECTOMY            OT ASSESSMENT FLOWSHEET (last 72 hours)      OT Evaluation       12/06/17 1115 12/06/17 1106 12/05/17 1144 12/05/17 1143 12/05/17 0031    Rehab Evaluation    Document Type evaluation  -LS evaluation;therapy note (daily note)  -TB       Subjective Information agree to therapy;no complaints  -LS agree to therapy;complains of;pain  -TB       Patient Effort, Rehab Treatment good  -LS good  -TB       Symptoms Noted During/After Treatment increased pain  -LS increased pain;fatigue  -TB       Symptoms Noted Comment  Pt with h/o L4 compression fracture treated conservatively for pain control; Current plan is for kyphoplasty   -TB       General Information    Patient Profile Review yes  -LS yes  -TB       Onset of Illness/Injury or Date of Surgery Date 12/04/17  -LS 12/04/17  -TB       Referring  Physician Bobby,   - Bobby  -TB       General Observations  Pt rec'vd supine in bed, room air, IV and tele intact; son present  -TB       Pertinent History Of Current Problem Pt admitted with AMS, recent incontinence of bladder, constipation; hx of known L4 compression fx being treated conservatively. Found to have UTI and [place]; planned to undergo kyphoplasty when medically appropriate.   -LS Pt to ED with AMS and back pain that radiates to L foot; UTI; plans for kyphoplasty as pt has failed out pt pain control  -TB       Precautions/Limitations fall precautions;spinal precautions   L4 compression fx  -LS fall precautions;spinal precautions  -TB       Prior Level of Function independent:;gait;transfer;ADL's;bathing;dressing;driving  -LS independent:;all household mobility;community mobility;transfer;bed mobility;ADL's;home management;driving  -TB       Equipment Currently Used at Home rollator;shower chair;grab bar  -LS grab bar;shower chair;rollator  -TB walker, standard;bath bench;glucometer  -AW  walker, standard;bath bench;glucometer  -KW    Plans/Goals Discussed With patient and family;agreed upon  -LS patient and family;agreed upon  -TB       Risks Reviewed patient and family:;LOB;dizziness;increased discomfort;change in vital signs  -LS patient and family:;LOB;increased discomfort;lines disloged  -TB       Benefits Reviewed patient and family:;improve function;increase independence;increase strength;increase knowledge  -LS patient and family:;improve function;increase independence;decrease pain;increase knowledge  -TB       Barriers to Rehab  none identified  -TB       Living Environment    Lives With alone  -LS alone  -TB alone  -AW  alone  -KW    Living Arrangements apartment  -LS apartment  -TB apartment  -AW  apartment  -KW    Home Accessibility tub/shower is not walk in  -LS tub/shower is not walk in  -TB no concerns  -AW  no concerns  -KW    Stair Railings at Home   inside, present on right  side;outside, present on right side  -AW  inside, present on right side;outside, present on right side  -KW    Type of Financial/Environmental Concern   none  -AW  none  -KW    Transportation Available   car;family or friend will provide  -AW  car;family or friend will provide  -KW    Living Environment Comment  Recommend toilet seat riser - education completed with pt/son. Pt has shower chair and grab bars at tub and toilet  -TB       Clinical Impression    Date of Referral to OT  12/05/17  -TB       OT Diagnosis  Impaired mobility, transfer and ADL  -TB       Patient/Family Goals Statement  to return home at d/c  -TB       Criteria for Skilled Therapeutic Interventions Met  yes;treatment indicated  -TB       Rehab Potential  good, to achieve stated therapy goals  -TB       Therapy Frequency  daily  -TB       Anticipated Equipment Needs At Discharge  raised toilet seat   Education completed with pt/family for obtaining ETS  -TB       Anticipated Discharge Disposition  home with home health  -TB       Functional Level Prior    Ambulation    1-->assistive equipment  -AW 1-->assistive equipment  -KW    Transferring    0-->independent  -AW 0-->independent  -KW    Toileting    0-->independent  -AW 0-->independent  -KW    Bathing    0-->independent  -AW 0-->independent  -KW    Dressing     0-->independent  -KW    Eating    0-->independent  -AW 0-->independent  -KW    Communication    0-->understands/communicates without difficulty  -AW 0-->understands/communicates without difficulty  -KW    Swallowing    0-->swallows foods/liquids without difficulty  -AW 0-->swallows foods/liquids without difficulty  -KW    Prior Functional Level Comment  Independent with pain and effort  -TB   NA  -KW    Vital Signs    Pre Systolic BP Rehab 135  -LS        Pre Treatment Diastolic BP 77  -LS --   stable; RN cleared OT  -TB       Pretreatment Heart Rate (beats/min) 73  -LS        Posttreatment Heart Rate (beats/min) 81  -LS        O2  Delivery Pre Treatment room air  -LS        O2 Delivery Post Treatment room air  -LS room air  -TB       Pre Patient Position Supine  -LS Supine  -TB       Intra Patient Position Standing  -LS Standing  -TB       Post Patient Position Sitting  -LS Sitting  -TB       Pain Assessment    Pain Assessment 0-10  -LS 0-10  -TB       Pain Score 0  -LS 0  -TB       Post Pain Score  5  -TB       Pain Type  Acute pain  -TB       Pain Location Back  -LS Back  -TB       Pain Orientation  Lower  -TB       Pain Radiating Towards  L LE/foot  -TB       Pain Intervention(s) Repositioned;Ambulation/increased activity  -LS Ambulation/increased activity;Repositioned  -TB       Response to Interventions tolerated  -LS Pt tolerated OOB activity  -TB       Vision Assessment/Intervention    Visual Impairment  WFL  -TB       Cognitive Assessment/Intervention    Current Cognitive/Communication Assessment functional  -LS functional  -TB       Orientation Status oriented x 4  -LS oriented x 4  -TB       Follows Commands/Answers Questions able to follow single-step instructions;100% of the time;needs cueing  -LS able to follow single-step instructions;100% of the time  -TB       Personal Safety mild impairment;decreased awareness, need for safety  -LS WNL/WFL  -TB       Personal Safety Interventions fall prevention program maintained;gait belt;nonskid shoes/slippers when out of bed  -LS fall prevention program maintained;gait belt;nonskid shoes/slippers when out of bed  -TB       Short/Long Term Memory  intact short term memory;intact long term memory  -TB       ROM (Range of Motion)    General ROM no range of motion deficits identified   BLEs  -LS no range of motion deficits identified  -TB       General ROM Detail  B UE WFL  -TB       MMT (Manual Muscle Testing)    General MMT Assessment lower extremity strength deficits identified  -LS no strength deficits identified  -TB       General MMT Assessment Detail  B UE WFL 4/5,  4+/5  -TB        Bed Mobility, Assessment/Treatment    Bed Mobility, Assistive Device bed rails;head of bed elevated  -LS        Bed Mobility, Roll Right, [place] moderate assist (50% patient effort);verbal cues required  -LS moderate assist (50% patient effort);verbal cues required  -TB       Bed Mob, Sidelying to Sit, [place] minimum assist (75% patient effort);verbal cues required  -LS minimum assist (75% patient effort);verbal cues required  -TB       Bed Mobility, Safety Issues  decreased use of arms for pushing/pulling;decreased use of legs for bridging/pushing  -TB       Bed Mobility, Impairments strength decreased;impaired balance;pain  -LS pain  -TB       Bed Mobility, Comment VC's for log rolling.   -LS Education initiated for logroll sequencing in light of kyphoplasty pending  -TB       Transfer Assessment/Treatment    Transfers, Bed-Chair [place]  contact guard assist;verbal cues required  -TB       Transfers, Sit-Stand [place] contact guard assist;verbal cues required;1 person + 1 person to manage equipment  -LS contact guard assist;verbal cues required  -TB       Transfers, Stand-Sit [place] contact guard assist;verbal cues required;1 person + 1 person to manage equipment  -LS contact guard assist;verbal cues required  -TB       Transfers, Sit-Stand-Sit, Assist Device rolling walker  -LS rolling walker  -TB       Toilet Transfer, [place] contact guard assist;1 person + 1 person to manage equipment;verbal cues required  -LS contact guard assist;verbal cues required  -TB       Toilet Transfer, Assistive Device  bedside commode without drop arms;rolling walker  -TB       Transfer, Safety Issues  balance decreased during turns  -TB       Transfer, Impairments impaired balance;strength decreased  -LS pain  -TB       Transfer, Comment VC's for hand placement.   -LS fair safety, no LOB  -TB       Functional Mobility    Functional Mobility- Ind. Level  contact guard assist;verbal cues required   -TB       Functional Mobility- Device  rolling walker  -TB       Functional Mobility-Distance (Feet)  100  -TB       Functional Mobility- Comment  fair safety, decreased activity tolerance with standing rest break at 50 feet  -TB       Upper Body Dressing Assessment/Training    UB Dressing Assess/Train, Clothing Type  donning:;hospital gown  -TB       UB Dressing Assess/Train, Position  sitting  -TB       UB Dressing Assess/Train, [place]  minimum assist (75% patient effort)  -TB       UB Dressing Assess/Train, Comment  assist for lines  -TB       Toileting Assessment/Training    Toileting Assess/Train, Assistive Device  bedside commode  -TB       Toileting Assess/Train, Position  sitting  -TB       Toileting Assess/Train, Indepen Level  moderate assist (50% patient effort)  -TB       Toileting Assess/Train, Comment  assist for clothing mgmt; s/u for hygiene  -TB       Grooming Assessment/Training    Grooming Assess/Train, Position  sitting  -TB       Grooming Assess/Train, Indepen Level  set up required  -TB       Grooming Assess/Train, Comment  to wash hands  -TB       Motor Skills/Interventions    Additional Documentation Balance Skills Training (Group)  -LS        Balance Skills Training    Sitting-Level of Assistance Close supervision  -LS Close supervision  -TB       Sitting-Balance Support Feet supported  -LS Feet supported  -TB       Sitting # of Minutes  10  -TB       Standing-Level of Assistance Contact guard  -LS Contact guard  -TB       Static Standing Balance Support assistive device  -LS assistive device  -TB       Standing-Balance Activities  Weight Shift R-L  -TB       Standing Balance # of Minutes  3  -TB       Gait Balance-Level of Assistance Contact guard  -LS        Gait Balance Support assistive device  -LS        Therapy Exercises    Bilateral Upper Extremity  AROM:;sitting;shoulder extension/flexion;shoulder abduction/adduction;shoulder horizontal abd/add;shoulder ER/IR;elbow  flexion/extension;pronation/supination;hand pumps  -TB       Fine Motor Coordination Training    Opposition  Right:;Left:;intact  -TB       Sensory Assessment/Intervention    Light Touch RLE;LLE  -LS LUE;RUE  -TB       LUE Light Touch  WNL  -TB       RUE Light Touch  WNL  -TB       LLE Light Touch WNL  -LS        RLE Light Touch WNL  -LS        Positioning and Restraints    Pre-Treatment Position in bed  -LS in bed  -TB       Post Treatment Position chair  -LS chair  -TB       In Chair notified nsg;reclined;call light within reach;encouraged to call for assist;with family/caregiver;legs elevated  -LS notified nsg;reclined;call light within reach;encouraged to call for assist;with family/caregiver;legs elevated  -TB       Lower Extremity    Lower Ext Manual Muscle Testing Detail BLEs grossly 3+/5  -LS          User Key  (r) = Recorded By, (t) = Taken By, (c) = Cosigned By    Initials Name Effective Dates    TB Geovanna Vieira, OT 06/22/15 -     LS Latonya Snowden, PT 06/19/15 -     AW Lianne Peterson, RN 06/16/16 -     KW Hazel Cedeno, RN 04/18/17 -            Occupational Therapy Education     Title: PT OT SLP Therapies (Done)     Topic: Occupational Therapy (Done)     Point: ADL training (Done)    Description: Instruct learner(s) on proper safety adaptation and remediation techniques during self care or transfers.   Instruct in proper use of assistive devices.    Learning Progress Summary    Learner Readiness Method Response Comment Documented by Status   Patient Acceptance E,D VU,NR Education initiated for benefits of activity/therapy, role of OT, spinal precautions in anticipation of upcoming kyphoplasty and home safety (including recommendation for ETS)  12/06/17 1158 Done   Family Acceptance E,D VU,NR Education initiated for benefits of activity/therapy, role of OT, spinal precautions in anticipation of upcoming kyphoplasty and home safety (including recommendation for ETS) TB 12/06/17 1158 Done                Point: Precautions (Done)    Description: Instruct learner(s) on prescribed precautions during self-care and functional transfers.    Learning Progress Summary    Learner Readiness Method Response Comment Documented by Status   Patient Acceptance E,D ALBERT,NR Education initiated for benefits of activity/therapy, role of OT, spinal precautions in anticipation of upcoming kyphoplasty and home safety (including recommendation for ETS)  12/06/17 1158 Done   Family Acceptance E,D ALBERT,NR Education initiated for benefits of activity/therapy, role of OT, spinal precautions in anticipation of upcoming kyphoplasty and home safety (including recommendation for ETS)  12/06/17 1158 Done                      User Key     Initials Effective Dates Name Provider Type Discipline     06/22/15 -  Geovanna Vieira OT Occupational Therapist OT                  OT Recommendation and Plan  Anticipated Equipment Needs At Discharge: raised toilet seat (Education completed with pt/family for obtaining ETS)  Anticipated Discharge Disposition: home with home health  Therapy Frequency: daily  Plan of Care Review  Plan Of Care Reviewed With: patient, son  Outcome Summary/Follow up Plan: OT IE completed. Pt presents with increased pain, decreased generalized strength and decreased occupational endurance limiting participation in ADLs. OT to follow. CGA for mobility/transfers. Education completed for ETS/DME recommendations for home.  Recommend home with assist and HH OT/PT to follow.          OT Goals       12/06/17 1334          Bed Mobility OT LTG    Bed Mobility OT LTG, Time to Achieve by discharge  -TB      Bed Mobility OT LTG, Activity Type roll left/roll right;supine to sit/sit to supine  -TB      Bed Mobility OT LTG, [place] Level supervision required;verbal cues required  -TB      Bed Mobility OT LTG, Additional Goal via logroll  -TB      Transfer Training OT LTG    Transfer Training OT LTG, Time to Achieve by  discharge  -TB      Transfer Training OT LTG, Activity Type sit to stand/stand to sit;toilet  -TB      Transfer Training OT LTG, [place] Level supervision required;verbal cues required  -TB      Transfer Training OT LTG, Assist Device walker, rolling  -TB      Transfer Training OT LTG, Additional Goal ambulate to BR, ETS with rails  -TB      Strength OT LTG    Strength Goal OT LTG, Functional Goal Pt demonstrates independence with written HEP x10 reps to support ADLs  -TB      Patient Education OT LTG    Patient Education OT LTG, Time to Achieve by discharge  -TB      Patient Education OT LTG, Education Type written program;HEP;precautions per surgeon;home safety  -TB      Patient Education OT LTG, Education Understanding demonstrates adequately;verbalizes understanding  -TB      Functional Mobility OT LTG    Functional Mobility Goal OT LTG, Time to Achieve by discharge  -TB      Functional Mobility Goal OT LTG, [place] Level standby assist  -TB      Functional Mobility Goal OT LTG, Assist Device rolling walker  -TB      Functional Mobility Goal OT LTG, Distance to Achieve to the bathroom  -TB        User Key  (r) = Recorded By, (t) = Taken By, (c) = Cosigned By    Initials Name Provider Type    TB Geovanna Vieira OT Occupational Therapist                Outcome Measures       12/06/17 1115 12/06/17 1106       How much help from another person do you currently need...    Turning from your back to your side while in flat bed without using bedrails? 2  -LS      Moving from lying on back to sitting on the side of a flat bed without bedrails? 3  -LS      Moving to and from a bed to a chair (including a wheelchair)? 3  -LS      Standing up from a chair using your arms (e.g., wheelchair, bedside chair)? 3  -LS      Climbing 3-5 steps with a railing? 2  -LS      To walk in hospital room? 3  -LS      AM-PAC 6 Clicks Score 16  -LS      How much help from another is currently needed...    Putting on and  taking off regular lower body clothing?  3  -TB     Bathing (including washing, rinsing, and drying)  3  -TB     Toileting (which includes using toilet bed pan or urinal)  3  -TB     Putting on and taking off regular upper body clothing  3  -TB     Taking care of personal grooming (such as brushing teeth)  4  -TB     Eating meals  4  -TB     Score  20  -TB     Functional Assessment    Outcome Measure Options AM-PAC 6 Clicks Basic Mobility (PT)  -LS AM-PAC 6 Clicks Daily Activity (OT)  -TB       User Key  (r) = Recorded By, (t) = Taken By, (c) = Cosigned By    Initials Name Provider Type    TB Geovanna Vieira OT Occupational Therapist    LS Latonya Snowden, PT Physical Therapist          Time Calculation:   OT Start Time: 1106    Therapy Charges for Today     Code Description Service Date Service Provider Modifiers Qty    25362453726  OT THERAPEUTIC ACT EA 15 MIN 12/6/2017 Geovanna Vieira OT GO 1    [id number]  OT EVAL LOW COMPLEXITY 3 12/6/2017 Geovanna Vieira OT GO 1               Geovanna Vieira OT  12/6/2017     Electronically signed by Geovanna Vieira OT at 12/6/2017  1:40 PM

## 2017-12-07 NOTE — PROGRESS NOTES
[facility] Medicine Services  PROGRESS NOTE    Patient Name: Georgia Velázquez  : 1945  MRN: [id number]    Date of Admission: 2017  Length of Stay: 2  Primary Care Physician: Chaz Rico, DNP, APRN    Subjective   Subjective     CC:  F/u back pain    HPI:  Feels well, no complaints, no acute events overnight.    Review of Systems  Gen- No fevers, chills  CV- No chest pain, palpitations  Resp- No cough, dyspnea  GI- No N/V/D, abd pain     Otherwise ROS is negative except as mentioned in the HPI.    Objective   Objective     Vital Signs:   Temp:  [97.7 °F (36.5 °C)-98.2 °F (36.8 °C)] 98.2 °F (36.8 °C)  Heart Rate:  [65-70] 65  Resp:  [18-20] 18  BP: (115-142)/(46-64) 142/47        Physical Exam:  Constitutional: No acute distress, awake, alert, obese  female  Eyes: PERRL, sclerae anicteric, no conjunctival injection  HENT: NCAT, mucous membranes moist  Neck: Supple, no thyromegaly, no lymphadenopathy, trachea midline  Respiratory: Clear to auscultation bilaterally, nonlabored respirations   Cardiovascular: RRR, no murmurs, rubs, or gallops, palpable pedal pulses bilaterally  Gastrointestinal: Positive bowel sounds, soft, nontender, nondistended  Musculoskeletal: No bilateral ankle edema, no clubbing or cyanosis to extremities  Psychiatric: Appropriate affect, cooperative  Neurologic: Oriented x 3, strength symmetric in all extremities, Cranial Nerves grossly intact to confrontation, speech clear  Skin: No rashes    Results Reviewed:  I have personally reviewed current lab, radiology, and data and agree.      Results from last 7 days  Lab Units 17  0750 17  1936   WBC 10*3/mm3 10.09 11.45*   HEMOGLOBIN g/dL 11.7 11.6   HEMATOCRIT % 34.8 34.8   PLATELETS 10*3/mm3 225 217       Results from last 7 days  Lab Units 17  0519 17  0657 17  0750 17  1936   SODIUM mmol/L 137 136 132 129*   POTASSIUM mmol/L 4.2 4.3 4.3 4.5   CHLORIDE  mmol/L 104 105 98* 95*   CO2 mmol/L 23.0 20.0 22.0 20.0   BUN mg/dL 29* 40* 49* 62*   CREATININE mg/dL 1.20 1.70* 1.80* 2.40*   GLUCOSE mg/dL 114* 117* 129* 213*   CALCIUM mg/dL 8.6* 8.8 9.2 9.4   ALT (SGPT) U/L  --   --   --  58*   AST (SGOT) U/L  --   --   --  78*     No results found for: BNP  No results found for: PHART    Microbiology Results Abnormal     Procedure Component Value - Date/Time    Urine Culture - Urine, Urine, Clean Catch [[id number]]  (Abnormal)  (Susceptibility) Collected:  12/04/17 2009    Lab Status:  Final result Specimen:  Urine from Urine, Catheter Updated:  12/06/17 1142     Urine Culture --      >100,000 CFU/mL Klebsiella pneumoniae (A)    Susceptibility      Klebsiella pneumoniae     MIGUEL ANGEL     Ampicillin >16 ug/ml Resistant     Ampicillin + Sulbactam <=8/4 ug/ml Susceptible     Aztreonam <=8 ug/ml Susceptible     Cefepime <=8 ug/ml Susceptible     Cefotaxime <=2 ug/ml Susceptible     Ceftriaxone <=8 ug/ml Susceptible     Cefuroxime sodium <=4 ug/ml Susceptible     Cephalothin <=8 ug/ml Susceptible     Ertapenem <=1 ug/ml Susceptible     Gentamicin <=4 ug/ml Susceptible     Levofloxacin <=2 ug/ml Susceptible     Meropenem <=1 ug/ml Susceptible     Nitrofurantoin <=32 ug/ml Susceptible     Piperacillin + Tazobactam <=16 ug/ml Susceptible     Tetracycline <=4 ug/ml Susceptible     Tobramycin <=4 ug/ml Susceptible     Trimethoprim + Sulfamethoxazole <=2/38 ug/ml Susceptible                          Imaging Results (last 24 hours)     ** No results found for the last 24 hours. **           I have reviewed the medications.    Assessment/Plan   Assessment / Plan     Hospital Problem List     * (Principal)Closed compression fracture of L4 lumbar vertebra    Overview Signed 12/6/2017  2:39 PM by Marc Pham MD     Added automatically from request for surgery [id number]         Essential hypertension    Type 2 diabetes mellitus    Hyperlipemia    Coronary artery disease    Osteoporosis    GERD  (gastroesophageal reflux disease)    Acute cystitis without hematuria    Urinary tract infection    Acute kidney injury    Constipation    Abdominal pain    Leukocytosis          Brief Hospital Course to date:  Georgia Velázquez is a 72 y.o. female admitted with increased urinary frequency, and low back pain.    Assessment & Plan:  1. Low back pain secondary to L4 compression fracture  - neurosurgery planning for kyphoplasty when medically cleared - tentatively tomorrow  - EKG performed, NSR, no ST elevation/depression  - Hx of MI in 2002, patient reports no issues since then, no anginal symptoms, no exertional SOB or CP  - RCRI 0.9%      2. Urinary tract infection  - urine cx growing klebsiella susceptible to Rocephin, will continue for now     3. Acute kidney injury  - Cr. Has returned to patient's baseline, 1.2 this morning. D/C IVF  - renal u/s negative, no hydronephrosis     4. Constipation, abdominal pain  -continue bowel regimen     5. Hx of HTN  - continue home meds    6. Hx of T2DM  -FSBG AC/HS - not on insulin therapy at home  -low dose SSI  -last Hgb A1C 5.7 on 11/7/2017     7. Hx of HLD  -continue home meds     8. Hx of GERD  -continue home meds     9. Hx of CAD   -continue home meds     DVT Prophylaxis:  Heparin    CODE STATUS: Full Code    Disposition: I expect the patient to be discharged CYNTHIA Rosales,   12/07/17  8:36 AM

## 2017-12-07 NOTE — TELEPHONE ENCOUNTER
----- Message from Chaz Rico DNP, DON sent at 12/7/2017  1:53 PM EST -----  Regarding: RE: ORDER FOR HH  Contact: [phone]  Please call her back and let her know that I will follow along with Dr Nancy Sorensen.  ----- Message -----     From: Shannan Lo MA     Sent: 12/7/2017   1:10 PM       To: Chaz Rico DNP, APRN  Subject: FW: ORDER FOR HH                                     ----- Message -----     From: Kasey García     Sent: 12/7/2017  12:07 PM       To: Shannan Lo MA  Subject: ORDER FOR                                      TAN WITH [facility].  WANTS TO KNOW IF WE CAN FOLLOW FOR HOME HEALTH?

## 2017-12-07 NOTE — PLAN OF CARE
Problem: Perioperative Period (Adult)  Goal: Signs and Symptoms of Listed Potential Problems Will be Absent or Manageable (Perioperative Period)  Outcome: Ongoing (interventions implemented as appropriate)    12/07/17 1209   Perioperative Period   Problems Assessed (Perioperative Period) pain   Problems Present (Perioperative Period) none

## 2017-12-08 ENCOUNTER — TELEPHONE (OUTPATIENT)
Dept: FAMILY MEDICINE CLINIC | Facility: CLINIC | Age: 72
End: 2017-12-08

## 2017-12-08 VITALS
BODY MASS INDEX: 46.26 KG/M2 | RESPIRATION RATE: 16 BRPM | SYSTOLIC BLOOD PRESSURE: 103 MMHG | HEIGHT: 61 IN | DIASTOLIC BLOOD PRESSURE: 57 MMHG | OXYGEN SATURATION: 94 % | TEMPERATURE: 97.8 F | WEIGHT: 245 LBS | HEART RATE: 63 BPM

## 2017-12-08 LAB
ANION GAP SERPL CALCULATED.3IONS-SCNC: 9 MMOL/L (ref 3–11)
BUN BLD-MCNC: 21 MG/DL (ref 9–23)
BUN/CREAT SERPL: 15 (ref 7–25)
CALCIUM SPEC-SCNC: 9.2 MG/DL (ref 8.7–10.4)
CHLORIDE SERPL-SCNC: 104 MMOL/L (ref 99–109)
CO2 SERPL-SCNC: 24 MMOL/L (ref 20–31)
CREAT BLD-MCNC: 1.4 MG/DL (ref 0.6–1.3)
CYTO UR: NORMAL
GFR SERPL CREATININE-BSD FRML MDRD: 37 ML/MIN/1.73
GLUCOSE BLD-MCNC: 134 MG/DL (ref 70–100)
GLUCOSE BLDC GLUCOMTR-MCNC: 127 MG/DL (ref 70–130)
GLUCOSE BLDC GLUCOMTR-MCNC: 147 MG/DL (ref 70–130)
LAB AP CASE REPORT: NORMAL
LAB AP CLINICAL INFORMATION: NORMAL
Lab: NORMAL
PATH REPORT.FINAL DX SPEC: NORMAL
PATH REPORT.GROSS SPEC: NORMAL
POTASSIUM BLD-SCNC: 4.7 MMOL/L (ref 3.5–5.5)
SODIUM BLD-SCNC: 137 MMOL/L (ref 132–146)

## 2017-12-08 PROCEDURE — 99239 HOSP IP/OBS DSCHRG MGMT >30: CPT | Performed by: INTERNAL MEDICINE

## 2017-12-08 PROCEDURE — 25010000002 HEPARIN (PORCINE) PER 1000 UNITS: Performed by: NURSE PRACTITIONER

## 2017-12-08 PROCEDURE — 82962 GLUCOSE BLOOD TEST: CPT

## 2017-12-08 PROCEDURE — 80048 BASIC METABOLIC PNL TOTAL CA: CPT | Performed by: INTERNAL MEDICINE

## 2017-12-08 PROCEDURE — 25010000003 CEFAZOLIN IN DEXTROSE 2-4 GM/100ML-% SOLUTION: Performed by: NEUROLOGICAL SURGERY

## 2017-12-08 PROCEDURE — 97110 THERAPEUTIC EXERCISES: CPT

## 2017-12-08 PROCEDURE — 25010000002 CEFTRIAXONE PER 250 MG: Performed by: INTERNAL MEDICINE

## 2017-12-08 PROCEDURE — 97116 GAIT TRAINING THERAPY: CPT

## 2017-12-08 RX ORDER — CARVEDILOL 6.25 MG/1
6.25 TABLET ORAL 2 TIMES DAILY WITH MEALS
Qty: 60 TABLET | Refills: 0 | OUTPATIENT
Start: 2017-12-08 | End: 2021-03-19 | Stop reason: HOSPADM

## 2017-12-08 RX ORDER — CEFUROXIME AXETIL 500 MG/1
500 TABLET ORAL 2 TIMES DAILY
Qty: 8 TABLET | Refills: 0 | Status: SHIPPED | OUTPATIENT
Start: 2017-12-08 | End: 2017-12-12

## 2017-12-08 RX ADMIN — HYDROCODONE BITARTRATE AND ACETAMINOPHEN 1 TABLET: 10; 325 TABLET ORAL at 02:51

## 2017-12-08 RX ADMIN — POLYETHYLENE GLYCOL 3350 17 G: 17 POWDER, FOR SOLUTION ORAL at 08:25

## 2017-12-08 RX ADMIN — ASPIRIN 81 MG 81 MG: 81 TABLET ORAL at 08:23

## 2017-12-08 RX ADMIN — PANTOPRAZOLE SODIUM 40 MG: 40 TABLET, DELAYED RELEASE ORAL at 05:07

## 2017-12-08 RX ADMIN — CASTOR OIL AND BALSAM, PERU 1 APPLICATION: 788; 87 OINTMENT TOPICAL at 08:25

## 2017-12-08 RX ADMIN — CEFAZOLIN SODIUM 2 G: 2 INJECTION, SOLUTION INTRAVENOUS at 05:06

## 2017-12-08 RX ADMIN — TIZANIDINE 2 MG: 4 TABLET ORAL at 08:23

## 2017-12-08 RX ADMIN — CARVEDILOL 6.25 MG: 6.25 TABLET, FILM COATED ORAL at 08:24

## 2017-12-08 RX ADMIN — HEPARIN SODIUM 5000 UNITS: 5000 INJECTION, SOLUTION INTRAVENOUS; SUBCUTANEOUS at 08:25

## 2017-12-08 RX ADMIN — NYSTATIN 1 APPLICATION: 100000 POWDER TOPICAL at 08:27

## 2017-12-08 RX ADMIN — ATORVASTATIN CALCIUM 40 MG: 40 TABLET, FILM COATED ORAL at 08:24

## 2017-12-08 NOTE — PROGRESS NOTES
Continued Stay Note  [facility]     Patient Name: Georgia Velázquez  MRN: [id number]  Today's Date: 12/8/2017    Admit Date: 12/4/2017          Discharge Plan       12/08/17 1103    Case Management/Social Work Plan    Plan Bedside Commode    Patient/Family In Agreement With Plan yes    Additional Comments Spoke with patient's daughter at bedside. Patient will go home with daughter at discharge. Interested in a bedside commode while she is recovering. No preference of [employer] company. Called referral to [facility]. Bedside commode to be delivered to patient's room prior to discharge. Danitza Gilmore CM [phone]              Discharge Codes     None        Expected Discharge Date and Time     Expected Discharge Date Expected Discharge Time    Dec 8, 2017             Danitza Gilmore RN

## 2017-12-08 NOTE — DISCHARGE SUMMARY
[facility] Medicine Services  DISCHARGE SUMMARY    Patient Name: Georgia Velázquez  : 1945  MRN: [id number]    Date of Admission: 2017  Date of Discharge:    Length of Stay: 3  Primary Care Physician: Chaz Rico, FORTUNATO, APRN    Consults     Date and Time Order Name Status Description    2017 0128 Inpatient Consult to Neurosurgery Completed         Hospital Course     Presenting Problem:   Acute cystitis without hematuria [N30.00]    Active Hospital Problems (** Indicates Principal Problem)    Diagnosis Date Noted   • **Closed compression fracture of L4 lumbar vertebra [S32.040A] 2017   • Acute cystitis without hematuria [N30.00] 2017   • Urinary tract infection [N39.0] 2017   • Acute kidney injury [N17.9] 2017   • Constipation [K59.00] 2017   • Abdominal pain [R10.9] 2017   • Leukocytosis [D72.829] 2017   • Essential hypertension [I10] 2016   • Type 2 diabetes mellitus [E11.9] 2016   • Hyperlipemia [E78.5] 2016   • Coronary artery disease [I25.10] 2016   • Osteoporosis [M81.0] 2016   • GERD (gastroesophageal reflux disease) [K21.9] 2016      Resolved Hospital Problems    Diagnosis Date Noted Date Resolved   No resolved problems to display.          Hospital Course:  Georgia Velázquez is a 72 y.o. female with PMH significant for CAD, HTN, HLD, DM, GERD who presented from home with 2 weeks of dysuria, increased frequency and suprapubic pain as well as back pain with known L4 compression fracture. Patient admitted to hospital medicine services and diagnosed with urinary tract infection. She was started on IV rocephin while cultures were pending. She was also noted to have ISRRAEL on CKD, with Cr. Of 2.4 on admission. Patient was given IVF, renal u/s was checked which was unremarkable. Her Cr. Gradually improved back to her baseline of 1.3-1.4. Neurosurgery was consulted regarding her back  pain and decided to proceed with successful L4 kyphoplasty on 12/7. Patient was seen on PT post-operatively and did well. She complained of no pain with ambulation. They felt she was appropriate for discharge home with home health. She was provided with a bedside commode prior to discharge. Her urine cultures grew Klebsiella sensitive to cephalosporins and she will be discharged on abx to complete total course of 7 days. Her Diovan was held at discharge given her ISRRAEL on CKD during this admission. Her Coreg was increased and has provided adequate blood pressure control. She will follow up with neurosurgery in 2 weeks and her PCP within one week.    Procedure(s):  KYPHOPLASTY L4       Day of Discharge     HPI:   Feels well this morning after kyphoplasty. Has had no further pain. Ambulating well. Plans to discharge home today with daughter.    Review of Systems  Gen- No fevers, chills  CV- No chest pain, palpitations  Resp- No cough, dyspnea  GI- No N/V/D, abd pain    Otherwise ROS is negative except as mentioned in the HPI.    Vital Signs:   Temp:  [97.1 °F (36.2 °C)-98.7 °F (37.1 °C)] 97.8 °F (36.6 °C)  Heart Rate:  [] 63  Resp:  [16] 16  BP: (102-184)/() 103/57     Physical Exam:  Constitutional: No acute distress, awake, alert  HENT: NCAT, mucous membranes moist  Respiratory: Clear to auscultation bilaterally, respiratory effort normal   Cardiovascular: RRR, no murmurs, rubs, or gallops, palpable pedal pulses bilaterally  Gastrointestinal: Positive bowel sounds, soft, nontender, nondistended  Musculoskeletal: No bilateral ankle edema  Psychiatric: Appropriate affect, cooperative  Neurologic: Oriented x 3, strength symmetric in all extremities, Cranial Nerves grossly intact to confrontation, speech clear  Skin: No rashes      Pertinent  and/or Most Recent Results         Results from last 7 days  Lab Units 12/08/17  0428 12/07/17  0519 12/06/17  0657 12/05/17  0750 12/04/17  1936   WBC 10*3/mm3  --   --    --  10.09 11.45*   HEMOGLOBIN g/dL  --   --   --  11.7 11.6   HEMATOCRIT %  --   --   --  34.8 34.8   PLATELETS 10*3/mm3  --   --   --  225 217   SODIUM mmol/L 137 137 136 132 129*   POTASSIUM mmol/L 4.7 4.2 4.3 4.3 4.5   CHLORIDE mmol/L 104 104 105 98* 95*   CO2 mmol/L 24.0 23.0 20.0 22.0 20.0   BUN mg/dL 21 29* 40* 49* 62*   CREATININE mg/dL 1.40* 1.20 1.70* 1.80* 2.40*   GLUCOSE mg/dL 134* 114* 117* 129* 213*   CALCIUM mg/dL 9.2 8.6* 8.8 9.2 9.4       Results from last 7 days  Lab Units 12/04/17  1936   BILIRUBIN mg/dL 0.8   ALK PHOS U/L 207*   ALT (SGPT) U/L 58*   AST (SGOT) U/L 78*           Invalid input(s): TG, LDLREALC    Results from last 7 days  Lab Units 12/07/17  1733   TROPONIN I ng/mL 0.022     Brief Urine Lab Results  (Last result in the past 365 days)      Color   Clarity   Blood   Leuk Est   Nitrite   Protein   CREAT   Urine HCG        12/04/17 2009             157.7       12/04/17 2009 Dark Yellow(A) Turbid(A) Moderate (2+)(A) Large (3+)(A) Negative 30 mg/dL (1+)(A)               Microbiology Results Abnormal     Procedure Component Value - Date/Time    Urine Culture - Urine, Urine, Clean Catch [[id number]]  (Abnormal)  (Susceptibility) Collected:  12/04/17 2009    Lab Status:  Final result Specimen:  Urine from Urine, Catheter Updated:  12/06/17 1142     Urine Culture --      >100,000 CFU/mL Klebsiella pneumoniae (A)    Susceptibility      Klebsiella pneumoniae     MIGUEL ANGEL     Ampicillin >16 ug/ml Resistant     Ampicillin + Sulbactam <=8/4 ug/ml Susceptible     Aztreonam <=8 ug/ml Susceptible     Cefepime <=8 ug/ml Susceptible     Cefotaxime <=2 ug/ml Susceptible     Ceftriaxone <=8 ug/ml Susceptible     Cefuroxime sodium <=4 ug/ml Susceptible     Cephalothin <=8 ug/ml Susceptible     Ertapenem <=1 ug/ml Susceptible     Gentamicin <=4 ug/ml Susceptible     Levofloxacin <=2 ug/ml Susceptible     Meropenem <=1 ug/ml Susceptible     Nitrofurantoin <=32 ug/ml Susceptible     Piperacillin + Tazobactam <=16  ug/ml Susceptible     Tetracycline <=4 ug/ml Susceptible     Tobramycin <=4 ug/ml Susceptible     Trimethoprim + Sulfamethoxazole <=2/38 ug/ml Susceptible                          Imaging Results (all)     Procedure Component Value Units Date/Time    MRI Lumbar Spine Without Contrast [[id number]] Collected:  12/04/17 1855     Updated:  12/04/17 2322    Addenda:        ADDENDUM: No MR evidence of cauda equina syndrome.  Clinical correlation   is   also necessary. The findings were verbally communicated via telephone   conference with GEORGIA Bond at 11:17 PM EST on 12/4/2017. The findings   were   acknowledged and understood.    Hyperintense STIR signal along posterior aspects of L4, L5, S1 vertebral   bodies   may represent some posterior longitudinal ligamentous injury.    THIS DOCUMENT HAS BEEN ELECTRONICALLY SIGNED BY CHALINO MTZ MD  Signed:  12/04/17 2322 by Chalino Mtz MD    Narrative:       EXAM:    MR Lumbar Spine Without Intravenous Contrast  11/27/2017 at 2156.    CLINICAL HISTORY:    72 years old, female; Pain; Lumbago; Loss of bladder control. Recently dx   with comp FX L4; back and right leg pain low back pain x 2 weeks; per pt no HX   of cancer no surgeries on back    TECHNIQUE:    Magnetic resonance images of the lumbar spine without intravenous contrast in   multiple planes.    COMPARISON:    CT LUMBAR SPINE WO CONTRAST 2017-11-24 15:19    FINDINGS:    Vertebrae:    L4 vertebral body shows nearly diffuse hypointense T1W signal, hyperintense T2W   signal at superior endplate, and hyperintense STIR signal throughout vertebral   body but predominantly at superior endplate.  L4 vertebral body shows mild height loss.  No subluxation.    Spinal cord: Suboptimal visualization of lower spinal cord and conus   medullaris due to motion artifact.    Soft tissues: Extensive subcutaneous edema in lower back.    Kidneys and ureters:  Right renal posterior, cortical, round, hyperintense   T2W lesion  measures about 18 x 15 mm and likely represents renal cyst.     DISCS/SPINAL CANAL/NEURAL FORAMINA:  Mild height loss at L5/S1 disc.  T12/L1: Focal disc bulge or broad-based disc protrusion and bilateral facet   hypertrophy causing minimal central canal and no significant neuroforaminal   stenosis.   L1/L2: Focal disc bulge or broad-based disc protrusion and bilateral facet   hypertrophy causing minimal central canal and no significant neuroforaminal   stenosis.   L2/L3: Bilateral facet hypertrophy causing no significant central canal or   neuroforaminal stenosis.  L3/L4: Focal disc bulge or broad-based disc protrusion, L4 superoposterior   endplate retropulsed bone fragment, and bilateral facet hypertrophy causing   moderate central canal and moderate bilateral neuroforaminal stenosis.  L4/L5: Focal disc bulge or broad-based disc protrusion and bilateral facet   hypertrophy causing mild central canal and moderate bilateral neuroforaminal   stenosis.   L5/S1: Focal disc bulge or broad-based disc protrusion and bilateral facet   hypertrophy causing mild central canal and mild bilateral neuroforaminal   stenosis.       Impression:       1. L4 vertebral body, recent (acute or subacute), compression fracture.  L4 vertebral body shows mild height loss and superoposterior endplate   retropulsed bone fragment.  L3/L4: Focal disc bulge or broad-based disc protrusion, L4 superoposterior   endplate retropulsed bone fragment, and bilateral facet hypertrophy causing   moderate central canal and moderate bilateral neuroforaminal stenosis.  2. Moderate lumbosacral spine degenerative disease with acquired spinal   stenosis.    THIS DOCUMENT HAS BEEN ELECTRONICALLY SIGNED BY CHALINO RIBERA MD    XR Abdomen KUB [[id number]] Collected:  12/05/17 0856     Updated:  12/05/17 0941    Narrative:       EXAMINATION: XR ABDOMEN KUB- 12/05/2017     INDICATION: N30.00-Acute cystitis without hematuria; N17.9-Acute kidney  failure, unspecified;  S32.040A-Wedge compression fracture of fourth  lumbar vertebra, initial encounter for closed fracture;  M51.9-Unspecified thoracic, thoracolumbar and lumbosacral intervertebral  disc disorder      COMPARISON: NONE     FINDINGS: There is gaseous prominence of both the large and small bowel  diffusely with mild air-filled stomach. No dominant high-grade  distention is seen and there is no mass or transition zone.           Impression:       1. Diffusely gaseous abdomen without evidence of focal dominant  distention, mass or transition zone.  2. Ileus or viral enteritis is favored. High-grade obstructive findings  are not identified and there is no detectable pneumatosis. Nonetheless,  the pattern is considered abnormal and should be followed.     D:  12/05/2017  E:  12/05/2017     This report was finalized on 12/5/2017 9:39 AM by Dr. Curt Hills MD.        Renal Limited [[id number]] Collected:  12/05/17 1343     Updated:  12/05/17 1434    Narrative:       EXAMINATION:  RENAL LIMITED- 12/05/2017     INDICATION: ISRRAEL; N30.00-Acute cystitis without hematuria; N17.9-Acute  kidney failure, unspecified; S32.040A-Wedge compression fracture of  fourth lumbar vertebra, initial encounter for closed fracture;  M51.9-Unspecified thoracic, thoracolumbar and lumbosacral intervertebral  disc disorder      TECHNIQUE: Ultrasound kidneys and urinary bladder     COMPARISON: NONE     FINDINGS: Right kidney measures 10.2 cm in length without evidence of  hydronephrosis, contour deforming mass or obvious calculi. Left kidney  measures 11.6 cm in length without evidence of hydronephrosis, contour  deforming mass or obvious calculi.     Visualized urinary bladder is partially decompressed and grossly  unremarkable.       Impression:       No sonographic evidence for abnormality within the  visualized kidneys or urinary bladder. No hydronephrosis.     D:  12/05/2017  E:  12/05/2017         This report was finalized on 12/5/2017 2:32 PM  by Dr. Jamison Nunn.       Kyphoplasty Vertebroplasty [[id number]] Collected:  12/07/17 1608     Updated:  12/07/17 1819    Narrative:       EXAMINATION: IR KYPHOPLASTY VERTEBROPLASTY - 12/07/2017     INDICATION:  N30.00-Acute cystitis without hematuria; N17.9-Acute kidney  failure, unspecified; S32.040A-Wedge compression fracture of fourth  lumbar vertebra, initial encounter for closed fracture;  M51.9-Unspecified thoracic, thoracolumbar and lumbosacral intervertebral  disc disorder; Z74.09-Other reduced mobility.     TECHNIQUE: Intraoperative fluoroscopy utilized for localization and  treatment planning.     COMPARISON: MRI lumbar spine dated 12/04/2017.     FINDINGS: Intraoperative fluoroscopy with total fluoroscopic time 1  minute 30 seconds and 4 representative images saved of L4 kyphoplasty  repair.       Impression:       Intraoperative fluoroscopy L4 kyphoplasty.     DICTATED:     12/07/2017  EDITED:          12/07/2017        This report was finalized on 12/7/2017 6:17 PM by Dr. Jamison Nunn.                    Discharge Details      Georgia Velázquez   Home Medication Instructions RAUDEL:[id number]    Printed on:12/08/17 1258   Medication Information                      albuterol (PROVENTIL HFA;VENTOLIN HFA) 108 (90 BASE) MCG/ACT inhaler  Inhale 2 puffs every 4 (four) hours as needed for wheezing.             alendronate (FOSAMAX) 70 MG tablet  Take 1 tablet by mouth Every 7 (Seven) Days.             aspirin 81 MG chewable tablet  Chew 1 tablet Daily.             atorvastatin (LIPITOR) 40 MG tablet  Take 1 tablet by mouth Daily.             carvedilol (COREG) 6.25 MG tablet  Take 1 tablet by mouth 2 (Two) Times a Day With Meals for 30 days.             cefuroxime (CEFTIN) 500 MG tablet  Take 1 tablet by mouth 2 (Two) Times a Day for 4 days.             cyclobenzaprine (FLEXERIL) 10 MG tablet  1/2 po bid prn             glucose blood (ACCU-CHEK ANNIE PLUS) test strip  Use once daily             glucose  blood test strip  Use as instructed             HYDROcodone-acetaminophen (NORCO)  MG per tablet  Take 1 tablet by mouth Every 6 (Six) Hours As Needed for Severe Pain .             metFORMIN (GLUCOPHAGE) 850 MG tablet  Take 1 tablet by mouth 2 (Two) Times a Day With Meals.             pantoprazole (PROTONIX) 40 MG EC tablet  Take 1 tablet by mouth Daily.             TiZANidine (ZANAFLEX) 2 MG capsule  Take 1 capsule by mouth 3 (Three) Times a Day.             urea (CARMOL) 10 % cream  Apply  topically 2 (Two) Times a Day.                   Discharge Disposition:  Home or Self Care    Discharge Diet: regular       Discharge Activity: as tolerated      Special Instructions:      Future Appointments  Date Time Provider Department Center   12/18/2017 10:20 AM [facility]   12/29/2017 2:30 PM PARIS Fonseca NS [facility] None       Additional Instructions for the Follow-ups that You Need to Schedule     Ambulatory Referral to Home Health    As directed    Face to Face Visit Date:  12/7/2017   Follow-up Provider for Plan of Care?:  I treated the patient in an acute care facility and will not continue treatment after discharge.   Follow-up Provider:  KIRA CHUNG   Reason/Clinical Findings:  close Compression fracture of L4 with limited mobility, gait, weakness   Describe mobility limitations that make leaving home difficult:  compression fracture, limited mobility, impaired strength and gait   Nursing/Therapeutic Services Requested:   Physical Therapy  Occupational Therapy      PT orders:   Total joint pathway  Therapeutic exercise  Gait Training  Home safety assessment      Weight Bearing Status:  As Tolerated   Occupational orders:   Activities of daily living  Energy conservation  Strengthening  Cognition  Fine motor  Home safety assessment          Discharge Follow-up with Specified Provider: Mary Ellen Siu PA-C and Dr. Pham's office; 3 Weeks    As directed    To:   Mary Ellen Siu PA-C and Dr. Pham's office   Follow Up:  3 Weeks   Verify the patient's follow-up provider is added in the Provider section of the Discharge navigator with the appropriate appointment information.:  Complete                 Time Spent on Discharge:  35 minutes    Cailin Rosales DO  12/08/17  12:58 PM

## 2017-12-08 NOTE — THERAPY PROGRESS REPORT/RE-CERT
Acute Care - Physical Therapy Re-Evaluation  [facility]     Patient Name: Georgia Velázquez  : 1945  MRN: [id number]  Today's Date: 2017   Onset of Illness/Injury or Date of Surgery Date: 17  Date of Referral to PT: 17  Referring Physician: DO Bobby      Admit Date: 2017     Visit Dx:    ICD-10-CM ICD-9-CM   1. Acute cystitis without hematuria N30.00 595.0   2. Acute kidney injury N17.9 584.9   3. Closed compression fracture of fourth lumbar vertebra, initial encounter S32.040A 805.4   4. Lumbar disc disease M51.9 722.93   5. Impaired functional mobility, balance, gait, and endurance Z74.09 V49.89   6. Impaired mobility and ADLs Z74.09 799.89     Patient Active Problem List   Diagnosis   • Essential hypertension   • Type 2 diabetes mellitus   • Hyperlipemia   • Myocardial infarction   • Coronary artery disease   • Osteoporosis   • GERD (gastroesophageal reflux disease)   • Arthritis of shoulder   • Acute cystitis without hematuria   • Urinary tract infection   • Acute kidney injury   • Constipation   • Abdominal pain   • Leukocytosis   • Closed compression fracture of L4 lumbar vertebra     Past Medical History:   Diagnosis Date   • Arthritis    • Arthritis of shoulder 2016   • CHF (congestive heart failure)    • COPD (chronic obstructive pulmonary disease)    • Coronary artery disease 2016   • Essential hypertension 2016   • GERD (gastroesophageal reflux disease) 2016   • History of echocardiogram 10/16/2015    TDS.Est EF is 45-50%.Mild distal septal, anteroapical hypokinesia.Mild mitral stenosis.Mean gradient across mitral valve is 6 mmHg.Trace TR   • Hyperlipemia 2016   • Myocardial infarction 2016   • Osteoporosis 2016   • Sepsis     uro   • Type 2 diabetes mellitus 2016     Past Surgical History:   Procedure Laterality Date   • APPENDECTOMY     • CHOLECYSTECTOMY            PT ASSESSMENT (last 72 hours)      PT Evaluation       17  1000 12/08/17 0925    Rehab Evaluation    Document Type  re-evaluation   pt is s/p kyphoplasty 12/7  -KM    Subjective Information  no complaints;agree to therapy  -KM    Patient Effort, Rehab Treatment  excellent  -KM    General Information    Precautions/Limitations  fall precautions;spinal precautions  -KM    Vital Signs    O2 Delivery Pre Treatment room air  -KM     O2 Delivery Intra Treatment room air  -KM     Post SpO2 (%) 95  -KM     O2 Delivery Post Treatment room air  -KM     Pain Assessment    Pain Assessment  No/denies pain  -KM    Cognitive Assessment/Intervention    Current Cognitive/Communication Assessment  functional  -KM    Orientation Status  oriented x 4  -KM    Follows Commands/Answers Questions  able to follow single-step instructions;100% of the time  -KM    Personal Safety  WNL/WFL  -KM    Personal Safety Interventions  fall prevention program maintained  -KM    ROM (Range of Motion)    General ROM  no range of motion deficits identified  -KM    Bed Mobility, Assessment/Treatment    Bed Mobility, Comment  pt sitting eob,stated was able to self perform  -KM    Transfer Assessment/Treatment    Transfers, Sit-Stand [place]  independent  -KM    Transfers, Stand-Sit [place]  independent  -KM    Transfers, Sit-Stand-Sit, Assist Device  rolling walker  -KM    Gait Assessment/Treatment    Gait, [place] Level  conditional independence  -KM    Gait, Assistive Device  rolling walker  -KM    Gait, Distance (Feet)  250   one sitting rest at 200 ft  -KM    Gait, Gait Deviations  no decreased  -KM    Therapy Exercises    Bilateral Lower Extremities  AROM:;10 reps;sitting;ankle pumps/circles;hip flexion;LAQ  -KM    Positioning and Restraints    Pre-Treatment Position  in bed   sitting eob  -KM    Post Treatment Position  bed  -KM    In Bed  sitting EOB;call light within reach;encouraged to call for assist;with family/caregiver  -KM      12/08/17 0815 12/07/17 1600    Sensory  Assessment/Intervention    Light Touch RLE;LLE  -SK RLE;LLE  -LS    LUE Light Touch WNL  -SK WNL  -LS    RUE Light Touch WNL  -SK WNL  -LS    LLE Light Touch WNL  -SK WNL  -LS    RLE Light Touch WNL  -SK WNL  -LS      12/07/17 1200 12/06/17 1115    Rehab Evaluation    Document Type  evaluation  -LSA    Subjective Information  agree to therapy;no complaints  -LSA    Patient Effort, Rehab Treatment  good  -LSA    Symptoms Noted During/After Treatment  increased pain  -LSA    General Information    Patient Profile Review  yes  -LSA    Onset of Illness/Injury or Date of Surgery Date  12/04/17  -LSA    Referring Physician  DO Bobby  -LSA    Pertinent History Of Current Problem  Pt admitted with AMS, recent incontinence of bladder, constipation; hx of known L4 compression fx being treated conservatively. Found to have UTI and [place]; planned to undergo kyphoplasty when medically appropriate.   -LSA    Precautions/Limitations  fall precautions;spinal precautions   L4 compression fx  -LSA    Prior Level of Function  independent:;gait;transfer;ADL's;bathing;dressing;driving  -LSA    Equipment Currently Used at Home rollator  -CR rollator;shower chair;grab bar  -LSA    Plans/Goals Discussed With  patient and family;agreed upon  -LSA    Risks Reviewed  patient and family:;LOB;dizziness;increased discomfort;change in vital signs  -LSA    Benefits Reviewed  patient and family:;improve function;increase independence;increase strength;increase knowledge  -LSA    Living Environment    Lives With alone  -CR alone  -LSA    Living Arrangements apartment  -CR apartment  -LSA    Home Accessibility  tub/shower is not walk in  -LSA    Clinical Impression    Date of Referral to PT  12/05/17  -LSA    PT Diagnosis  impaired functional mobility, balance, gait  -LSA    Patient/Family Goals Statement  return to PLOF  -LSA    Criteria for Skilled Therapeutic Interventions Met  yes;treatment indicated  -LSA    Vital Signs    Pre Systolic BP Rehab   135  -LSA    Pre Treatment Diastolic BP  77  -LSA    Pretreatment Heart Rate (beats/min)  73  -LSA    Posttreatment Heart Rate (beats/min)  81  -LSA    O2 Delivery Pre Treatment  room air  -LSA    O2 Delivery Post Treatment  room air  -LSA    Pre Patient Position  Supine  -LSA    Intra Patient Position  Standing  -LSA    Post Patient Position  Sitting  -LSA    Pain Assessment    Pain Assessment  0-10  -LSA    Pain Score  0  -LSA    Pain Location  Back  -LSA    Pain Intervention(s)  Repositioned;Ambulation/increased activity  -LSA    Response to Interventions  tolerated  -LSA    Cognitive Assessment/Intervention    Current Cognitive/Communication Assessment  functional  -LSA    Orientation Status  oriented x 4  -LSA    Follows Commands/Answers Questions  able to follow single-step instructions;100% of the time;needs cueing  -LSA    Personal Safety  mild impairment;decreased awareness, need for safety  -LSA    Personal Safety Interventions  fall prevention program maintained;gait belt;nonskid shoes/slippers when out of bed  -LSA    ROM (Range of Motion)    General ROM  no range of motion deficits identified   BLEs  -LSA    MMT (Manual Muscle Testing)    General MMT Assessment  lower extremity strength deficits identified  -LSA    Lower Extremity    Lower Ext Manual Muscle Testing Detail  BLEs grossly 3+/5  -LSA    Bed Mobility, Assessment/Treatment    Bed Mobility, Assistive Device  bed rails;head of bed elevated  -LSA    Bed Mobility, Roll Right, [place]  moderate assist (50% patient effort);verbal cues required  -LSA    Bed Mob, Sidelying to Sit, [place]  minimum assist (75% patient effort);verbal cues required  -LSA    Bed Mobility, Impairments  strength decreased;impaired balance;pain  -LSA    Bed Mobility, Comment  VC's for log rolling.   -LSA    Transfer Assessment/Treatment    Transfers, Sit-Stand [place]  contact guard assist;verbal cues required;1 person + 1 person to manage equipment  -LSA     Transfers, Stand-Sit [place]  contact guard assist;verbal cues required;1 person + 1 person to manage equipment  -LSA    Transfers, Sit-Stand-Sit, Assist Device  rolling walker  -LSA    Toilet Transfer, [place]  contact guard assist;1 person + 1 person to manage equipment;verbal cues required  -LSA    Transfer, Impairments  impaired balance;strength decreased  -LSA    Transfer, Comment  VC's for hand placement.   -LSA    Gait Assessment/Treatment    Gait, [place] Level  contact guard assist;1 person + 1 person to manage equipment;verbal cues required  -LSA    Gait, Assistive Device  rolling walker  -LSA    Gait, Distance (Feet)  100  -LSA    Gait, Gait Deviations  no decreased;forward flexed posture;step length decreased  -LSA    Gait, Impairments  impaired balance;strength decreased  -LSA    Gait, Comment  VC's for posture and increasing step length within RWx. Distance limited by fatigue; req'd 1 brief standing rest break.   -LSA    Motor Skills/Interventions    Additional Documentation  Balance Skills Training (Group)  -LSA    Balance Skills Training    Sitting-Level of Assistance  Close supervision  -LSA    Sitting-Balance Support  Feet supported  -LSA    Standing-Level of Assistance  Contact guard  -LSA    Static Standing Balance Support  assistive device  -LSA    Gait Balance-Level of Assistance  Contact guard  -LSA    Gait Balance Support  assistive device  -LSA    Sensory Assessment/Intervention    Light Touch  RLE;LLE  -LSA    LLE Light Touch  WNL  -LSA    RLE Light Touch  WNL  -LSA    Positioning and Restraints    Pre-Treatment Position  in bed  -LSA    Post Treatment Position  chair  -LSA    In Chair  notified nsg;reclined;call light within reach;encouraged to call for assist;with family/caregiver;legs elevated  -LSA      12/06/17 1106 12/05/17 1144    Rehab Evaluation    Document Type evaluation;therapy note (daily note)  -TB     Subjective Information agree to therapy;complains  of;pain  -TB     Patient Effort, Rehab Treatment good  -TB     Symptoms Noted During/After Treatment increased pain;fatigue  -TB     Symptoms Noted Comment Pt with h/o L4 compression fracture treated conservatively for pain control; Current plan is for kyphoplasty   -TB     General Information    Patient Profile Review yes  -TB     Onset of Illness/Injury or Date of Surgery Date 12/04/17  -TB     Referring Physician Bobby  -TB     General Observations Pt rec'vd supine in bed, room air, IV and tele intact; son present  -TB     Pertinent History Of Current Problem Pt to ED with AMS and back pain that radiates to L foot; UTI; plans for kyphoplasty as pt has failed out pt pain control  -TB     Precautions/Limitations fall precautions;spinal precautions  -TB     Prior Level of Function independent:;all household mobility;community mobility;transfer;bed mobility;ADL's;home management;driving  -TB     Equipment Currently Used at Home grab bar;shower chair;rollator  -TB walker, standard;bath bench;glucometer  -AW    Plans/Goals Discussed With patient and family;agreed upon  -TB     Risks Reviewed patient and family:;LOB;increased discomfort;lines disloged  -TB     Benefits Reviewed patient and family:;improve function;increase independence;decrease pain;increase knowledge  -TB     Barriers to Rehab none identified  -TB     Living Environment    Lives With alone  -TB alone  -AW    Living Arrangements apartment  -TB apartment  -AW    Home Accessibility tub/shower is not walk in  -TB no concerns  -AW    Stair Railings at Home  inside, present on right side;outside, present on right side  -AW    Type of Financial/Environmental Concern  none  -AW    Transportation Available  car;family or friend will provide  -AW    Living Environment Comment Recommend toilet seat riser - education completed with pt/son. Pt has shower chair and grab bars at tub and toilet  -TB     Vital Signs    Pre Treatment Diastolic BP --   stable; RN  cleared OT  -TB     O2 Delivery Post Treatment room air  -TB     Pre Patient Position Supine  -TB     Intra Patient Position Standing  -TB     Post Patient Position Sitting  -TB     Pain Assessment    Pain Assessment 0-10  -TB     Pain Score 0  -TB     Post Pain Score 5  -TB     Pain Type Acute pain  -TB     Pain Location Back  -TB     Pain Orientation Lower  -TB     Pain Radiating Towards L LE/foot  -TB     Pain Intervention(s) Ambulation/increased activity;Repositioned  -TB     Response to Interventions Pt tolerated OOB activity  -TB     Vision Assessment/Intervention    Visual Impairment WFL  -TB     Cognitive Assessment/Intervention    Current Cognitive/Communication Assessment functional  -TB     Orientation Status oriented x 4  -TB     Follows Commands/Answers Questions able to follow single-step instructions;100% of the time  -TB     Personal Safety WNL/WFL  -TB     Personal Safety Interventions fall prevention program maintained;gait belt;nonskid shoes/slippers when out of bed  -TB     Short/Long Term Memory intact short term memory;intact long term memory  -TB     ROM (Range of Motion)    General ROM no range of motion deficits identified  -TB     General ROM Detail B UE WFL  -TB     MMT (Manual Muscle Testing)    General MMT Assessment no strength deficits identified  -TB     General MMT Assessment Detail B UE WFL 4/5,  4+/5  -TB     Bed Mobility, Assessment/Treatment    Bed Mobility, Roll Right, [place] moderate assist (50% patient effort);verbal cues required  -TB     Bed Mob, Sidelying to Sit, [place] minimum assist (75% patient effort);verbal cues required  -TB     Bed Mobility, Safety Issues decreased use of arms for pushing/pulling;decreased use of legs for bridging/pushing  -TB     Bed Mobility, Impairments pain  -TB     Bed Mobility, Comment Education initiated for logroll sequencing in light of kyphoplasty pending  -TB     Transfer Assessment/Treatment    Transfers, Bed-Chair  [place] contact guard assist;verbal cues required  -TB     Transfers, Sit-Stand [place] contact guard assist;verbal cues required  -TB     Transfers, Stand-Sit [place] contact guard assist;verbal cues required  -TB     Transfers, Sit-Stand-Sit, Assist Device rolling walker  -TB     Toilet Transfer, [place] contact guard assist;verbal cues required  -TB     Toilet Transfer, Assistive Device bedside commode without drop arms;rolling walker  -TB     Transfer, Safety Issues balance decreased during turns  -TB     Transfer, Impairments pain  -TB     Transfer, Comment fair safety, no LOB  -TB     Balance Skills Training    Sitting-Level of Assistance Close supervision  -TB     Sitting-Balance Support Feet supported  -TB     Sitting # of Minutes 10  -TB     Standing-Level of Assistance Contact guard  -TB     Static Standing Balance Support assistive device  -TB     Standing-Balance Activities Weight Shift R-L  -TB     Standing Balance # of Minutes 3  -TB     Therapy Exercises    Bilateral Upper Extremity AROM:;sitting;shoulder extension/flexion;shoulder abduction/adduction;shoulder horizontal abd/add;shoulder ER/IR;elbow flexion/extension;pronation/supination;hand pumps  -TB     Fine Motor Coordination Training    Opposition Right:;Left:;intact  -TB     Sensory Assessment/Intervention    Light Touch LUE;RUE  -TB     LUE Light Touch WNL  -TB     RUE Light Touch WNL  -TB     Positioning and Restraints    Pre-Treatment Position in bed  -TB     Post Treatment Position chair  -TB     In Chair notified nsg;reclined;call light within reach;encouraged to call for assist;with family/caregiver;legs elevated  -TB       User Key  (r) = Recorded By, (t) = Taken By, (c) = Cosigned By    Initials Name Provider Type    TB Geovanna Vieira, OT Occupational Therapist    TARUN Cote, PT Physical Therapist    NAZ Snowden, PT Physical Therapist    DAREK Peterson, JEANMARIE Registered Nurse    MELINA  Ino Bauer, RN Registered Nurse    RAMSES Reed, RN Registered Nurse    RINA Ramires, RN Registered Nurse          Physical Therapy Education     Title: PT OT SLP Therapies (Done)     Topic: Physical Therapy (Done)     Point: Mobility training (Done)    Learning Progress Summary    Learner Readiness Method Response Comment Documented by Status   Patient Acceptance E SCOTT PRICE discussed d/c planning and movement precautions s/p kyphoplasty  12/08/17 1044 Done   Family Acceptance E SCOTT PRICE discussed d/c planning and movement precautions s/p kyphoplasty  12/08/17 1044 Done               Point: Home exercise program (Done)    Learning Progress Summary    Learner Readiness Method Response Comment Documented by Status   Patient Acceptance E SCOTT PRICE discussed d/c planning and movement precautions s/p kyphoplasty  12/08/17 1044 Done   Family Acceptance E SCOTT PRICE discussed d/c planning and movement precautions s/p kyphoplasty  12/08/17 1044 Done               Point: Body mechanics (Done)    Learning Progress Summary    Learner Readiness Method Response Comment Documented by Status   Patient Acceptance E SCOTT PRICE discussed d/c planning and movement precautions s/p kyphoplasty  12/08/17 1044 Done   Family Acceptance E SCOTT PRICE discussed d/c planning and movement precautions s/p kyphoplasty  12/08/17 1044 Done               Point: Precautions (Done)    Learning Progress Summary    Learner Readiness Method Response Comment Documented by Status   Patient Acceptance E SCOTT PRICE discussed d/c planning and movement precautions s/p kyphoplasty  12/08/17 1044 Done   Family Acceptance E SCOTT PRICE discussed d/c planning and movement precautions s/p kyphoplasty  12/08/17 1044 Done                      User Key     Initials Effective Dates Name Provider Type Discipline     06/19/15 -  Patricia Cote, LARISA Physical Therapist PT                PT Recommendation and Plan  Anticipated Discharge Disposition: home with  assist, home with home health  PT Frequency: daily  Plan of Care Review  Plan Of Care Reviewed With: patient  Outcome Summary/Follow up Plan: Pt with improved mobility and no pain s/p kyphoplasty, appropriate for home with HHPT for safety eval          IP PT Goals       12/08/17 1041 12/06/17 1201       Bed Mobility PT LTG    Bed Mobility PT LTG, Date Established  12/06/17  -LS     Bed Mobility PT LTG, Time to Achieve  2 wks  -LS     Bed Mobility PT LTG, [place] Level  independent  -LS     Bed Mobility PT LTG, Additional Goal  using log roll technique  -LS     Bed Mobility PT LTG, Outcome goal met  -KM      Transfer Training PT LTG    Transfer Training PT LTG, Date Established  12/06/17  -LS     Transfer Training PT LTG, Time to Achieve  2 wks  -LS     Transfer Training PT LTG, Activity Type  sit to stand/stand to sit  -LS     Transfer Training PT LTG, [place] Level  conditional independence  -LS     Transfer Training PT LTG, Assist Device  walker, rolling  -LS     Transfer Training PT LTG, Outcome goal met  -KM      Gait Training PT LTG    Gait Training Goal PT LTG, Date Established  12/06/17  -LS     Gait Training Goal PT LTG, Time to Achieve  2 wks  -LS     Gait Training Goal PT LTG, [place] Level  conditional independence  -LS     Gait Training Goal PT LTG, Assist Device  walker, rolling  -LS     Gait Training Goal PT LTG, Distance to Achieve  200  -LS     Gait Training Goal PT LTG, Outcome goal met  -KM        User Key  (r) = Recorded By, (t) = Taken By, (c) = Cosigned By    Initials Name Provider Type    TARUN Cote, PT Physical Therapist    RAMSES Snowden, PT Physical Therapist                Outcome Measures       12/08/17 0925 12/06/17 1115 12/06/17 1106    How much help from another person do you currently need...    Turning from your back to your side while in flat bed without using bedrails? 4  -KM 2  -LS     Moving from lying on back to sitting on the side of a flat bed  without bedrails? 4  -KM 3  -LS     Moving to and from a bed to a chair (including a wheelchair)? 4  -KM 3  -LS     Standing up from a chair using your arms (e.g., wheelchair, bedside chair)? 4  -KM 3  -LS     Climbing 3-5 steps with a railing? 2  -KM 2  -LS     To walk in hospital room? 3  -KM 3  -LS     AM-PAC 6 Clicks Score 21  -KM 16  -LS     How much help from another is currently needed...    Putting on and taking off regular lower body clothing?   3  -TB    Bathing (including washing, rinsing, and drying)   3  -TB    Toileting (which includes using toilet bed pan or urinal)   3  -TB    Putting on and taking off regular upper body clothing   3  -TB    Taking care of personal grooming (such as brushing teeth)   4  -TB    Eating meals   4  -TB    Score   20  -TB    Functional Assessment    Outcome Measure Options AM-PAC 6 Clicks Basic Mobility (PT)  -KM AM-PAC 6 Clicks Basic Mobility (PT)  -LS AM-PAC 6 Clicks Daily Activity (OT)  -TB      User Key  (r) = Recorded By, (t) = Taken By, (c) = Cosigned By    Initials Name Provider Type    TB Geovanna Vieira, OT Occupational Therapist    KM Patricia Cote, PT Physical Therapist    LS Latonya Snowden, PT Physical Therapist           Time Calculation:         PT Charges       12/08/17 1040          Time Calculation    Start Time 0925  -KM      PT Received On 12/08/17  -KM      PT Goal Re-Cert Due Date 12/16/17  -KM      Time Calculation- PT    Total Timed Code Minutes- PT 23 minute(s)  -KM        User Key  (r) = Recorded By, (t) = Taken By, (c) = Cosigned By    Initials Name Provider Type     Patricia Cote, PT Physical Therapist          Therapy Charges for Today     Code Description Service Date Service Provider Modifiers Qty    22961179724 HC GAIT TRAINING EA 15 MIN 12/8/2017 Patricia Cote, PT GP 1    50345994476 HC PT THER PROC EA 15 MIN 12/8/2017 Patricia Cote, PT GP 1          PT G-Codes  Outcome Measure Options: AM-PAC 6 Clicks  Basic Mobility (PT)      Patricia Cote, PT  12/8/2017

## 2017-12-08 NOTE — PLAN OF CARE
Problem: Patient Care Overview (Adult)  Goal: Plan of Care Review  Outcome: Ongoing (interventions implemented as appropriate)    12/08/17 0411   Coping/Psychosocial Response Interventions   Plan Of Care Reviewed With patient   Patient Care Overview   Progress progress toward functional goals as expected   Outcome Evaluation   Outcome Summary/Follow up Plan vital signs stable, pain controlled with prn meds, ambulated to restroom         Problem: Fall Risk (Adult)  Goal: Identify Related Risk Factors and Signs and Symptoms  Outcome: Ongoing (interventions implemented as appropriate)    Problem: Skin Integrity Impairment, Risk/Actual (Adult)  Goal: Identify Related Risk Factors and Signs and Symptoms  Outcome: Ongoing (interventions implemented as appropriate)    Problem: Perioperative Period (Adult)  Goal: Signs and Symptoms of Listed Potential Problems Will be Absent or Manageable (Perioperative Period)  Outcome: Ongoing (interventions implemented as appropriate)

## 2017-12-08 NOTE — PROGRESS NOTES
Continued Stay Note  [facility]     Patient Name: Georgia Velázquez  MRN: [id number]  Today's Date: 12/8/2017    Admit Date: 12/4/2017          Discharge Plan       12/08/17 1138    Case Management/Social Work Plan    Plan Home with     Patient/Family In Agreement With Plan yes    Additional Comments Called discharge date to [facility] and gave them daughter's address and phone number. Danitza Gilmore CM [phone]      12/08/17 1103    Case Management/Social Work Plan    Plan Bedside Commode    Patient/Family In Agreement With Plan yes    Additional Comments Spoke with patient's daughter at bedside. Patient will go home with daughter at discharge. Interested in a bedside commode while she is recovering. No preference of [employer] company. Called referral to [facility]. Bedside commode to be delivered to patient's room prior to discharge. Danitza Gilmore CM [phone]              Discharge Codes       12/08/17 1136    Discharge Codes    Discharge Codes 06  Discharged/transferred to home under care of organized home health service organization in anticipation of skilled care        Expected Discharge Date and Time     Expected Discharge Date Expected Discharge Time    Dec 8, 2017             Danitza Gilmore RN

## 2017-12-08 NOTE — PROGRESS NOTES
"NEUROSURGERY PROGRESS NOTE     LOS: 3 days   Patient Care Team:  Chaz Rico DNP, APRN as PCP - General (Family Medicine)  Chaz Rico DNP, APRN as PCP - Claims Attributed  Carmine Pérez MD as Consulting Physician (Interventional Cardiology)    Chief Complaint: Low back pain.    POD#: 1 Day Post-Op  Procedures:  L4 kyphoplasty.    Interval History:   The patient ambulated in the cadena yesterday afternoon after surgery.  Patient Complaints: None.  Patient Denies: Preoperative low back pain, weakness, numbness, or voiding difficulty.    Vital Signs: Blood pressure 156/75, pulse 97, temperature 97.7 °F (36.5 °C), temperature source Temporal Artery , resp. rate 16, height 154.9 cm (61\"), weight 111 kg (245 lb), SpO2 98 %, not currently breastfeeding.  Intake/Output:   Intake/Output Summary (Last 24 hours) at 12/08/17 0655  Last data filed at 12/08/17 0526   Gross per 24 hour   Intake              540 ml   Output             1550 ml   Net            -1010 ml       Physical Exam:  The patient is awake, alert, uncomfortable appearing.  She moves about in bed easily.  Incision sites are intact.       Data Review:      Results from last 7 days  Lab Units 12/08/17  0428   SODIUM mmol/L 137   POTASSIUM mmol/L 4.7   CHLORIDE mmol/L 104   CO2 mmol/L 24.0   BUN mg/dL 21   CREATININE mg/dL 1.40*   GLUCOSE mg/dL 134*   CALCIUM mg/dL 9.2         Assessment/Plan:  1.  L4 osteoporotic compression fracture.   L4 kyphoplasty yesterday.  2.  Acute renal insufficiency.  3.  Urinary tract infection.  Gram-negative bacilli.  Are we treating this infection?  4.  Coronary artery disease.  5.  Morbid obesity.  6.  Diabetes mellitus.  7.  Hypertension.  8.  Disposition: The patient is doing well status post kyphoplasty.  She may be discharged home or to rehabilitation from neurosurgical perspective.  We will see her in follow-up in the office in 2-3 weeks.    Marc Pham MD  12/08/17  6:55 AM    "

## 2017-12-08 NOTE — PLAN OF CARE
Problem: Patient Care Overview (Adult)  Goal: Plan of Care Review  Outcome: Outcome(s) achieved Date Met:  12/08/17 12/08/17 1041   Coping/Psychosocial Response Interventions   Plan Of Care Reviewed With patient   Outcome Evaluation   Outcome Summary/Follow up Plan Pt with improved mobility and no pain s/p kyphoplasty, appropriate for home with HHPT for safety eval         Problem: Inpatient Physical Therapy  Goal: Bed Mobility Goal LTG- PT  Outcome: Outcome(s) achieved Date Met:  12/08/17 12/08/17 1041   Bed Mobility PT LTG   Bed Mobility PT LTG, Outcome goal met       Goal: Transfer Training Goal 1 LTG- PT  Outcome: Outcome(s) achieved Date Met:  12/08/17  Goal: Gait Training Goal LTG- PT  Outcome: Outcome(s) achieved Date Met:  12/08/17 12/08/17 1041   Gait Training PT LTG   Gait Training Goal PT LTG, Outcome goal met

## 2017-12-08 NOTE — TELEPHONE ENCOUNTER
----- Message from Shannan Lo MA sent at 12/8/2017  3:15 PM EST -----      ----- Message -----     From: Cherelle Luis     Sent: 12/8/2017   3:00 PM       To: CHINEDU Valdesyn from [facility] called [phone]. Pt is asking that she start PT and OT on Monday 12/11/17. Referral from  for a closed compression fracture of L4 lumbar vertebrae.      They will fax order to sign.

## 2017-12-08 NOTE — PLAN OF CARE
Problem: Patient Care Overview (Adult)  Goal: Adult Individualization and Mutuality  Outcome: Ongoing (interventions implemented as appropriate)    12/08/17 1405   Individualization   Patient Specific Goals Patient wants to go home today.        Goal: Discharge Needs Assessment    12/08/17 1405   Discharge Needs Assessment   Discharge Disposition still a patient         Problem: Fall Risk (Adult)  Goal: Identify Related Risk Factors and Signs and Symptoms  Outcome: Ongoing (interventions implemented as appropriate)    12/08/17 1405   Fall Risk   Fall Risk: Related Risk Factors age-related changes;bladder function altered;gait/mobility problems;history of falls   Fall Risk: Signs and Symptoms presence of risk factors       Goal: Absence of Falls  Outcome: Ongoing (interventions implemented as appropriate)    12/08/17 1405   Fall Risk (Adult)   Absence of Falls making progress toward outcome

## 2017-12-11 ENCOUNTER — TRANSITIONAL CARE MANAGEMENT TELEPHONE ENCOUNTER (OUTPATIENT)
Dept: FAMILY MEDICINE CLINIC | Facility: CLINIC | Age: 72
End: 2017-12-11

## 2017-12-11 ENCOUNTER — APPOINTMENT (OUTPATIENT)
Dept: GENERAL RADIOLOGY | Facility: HOSPITAL | Age: 72
End: 2017-12-11

## 2017-12-11 ENCOUNTER — APPOINTMENT (OUTPATIENT)
Dept: NUCLEAR MEDICINE | Facility: HOSPITAL | Age: 72
End: 2017-12-11

## 2017-12-11 ENCOUNTER — HOSPITAL ENCOUNTER (EMERGENCY)
Facility: HOSPITAL | Age: 72
Discharge: HOME | End: 2017-12-11
Attending: EMERGENCY MEDICINE | Admitting: EMERGENCY MEDICINE

## 2017-12-11 VITALS
HEART RATE: 80 BPM | DIASTOLIC BLOOD PRESSURE: 74 MMHG | RESPIRATION RATE: 22 BRPM | OXYGEN SATURATION: 97 % | HEIGHT: 61 IN | TEMPERATURE: 98.5 F | WEIGHT: 249 LBS | SYSTOLIC BLOOD PRESSURE: 132 MMHG | BODY MASS INDEX: 47.01 KG/M2

## 2017-12-11 DIAGNOSIS — R07.89 CHEST WALL PAIN: Primary | ICD-10-CM

## 2017-12-11 DIAGNOSIS — R79.89 ELEVATED LIVER FUNCTION TESTS: ICD-10-CM

## 2017-12-11 DIAGNOSIS — R73.9 HYPERGLYCEMIA: ICD-10-CM

## 2017-12-11 DIAGNOSIS — D72.829 LEUKOCYTOSIS, UNSPECIFIED TYPE: ICD-10-CM

## 2017-12-11 LAB
ALBUMIN SERPL-MCNC: 3.9 G/DL (ref 3.2–4.8)
ALBUMIN/GLOB SERPL: 1.3 G/DL (ref 1.5–2.5)
ALP SERPL-CCNC: 216 U/L (ref 25–100)
ALT SERPL W P-5'-P-CCNC: 45 U/L (ref 7–40)
ANION GAP SERPL CALCULATED.3IONS-SCNC: 11 MMOL/L (ref 3–11)
AST SERPL-CCNC: 44 U/L (ref 0–33)
BACTERIA UR QL AUTO: ABNORMAL /HPF
BASOPHILS # BLD AUTO: 0.03 10*3/MM3 (ref 0–0.2)
BASOPHILS NFR BLD AUTO: 0.2 % (ref 0–1)
BILIRUB SERPL-MCNC: 0.6 MG/DL (ref 0.3–1.2)
BILIRUB UR QL STRIP: ABNORMAL
BNP SERPL-MCNC: 71 PG/ML (ref 0–100)
BUN BLD-MCNC: 27 MG/DL (ref 9–23)
BUN/CREAT SERPL: 22.5 (ref 7–25)
CALCIUM SPEC-SCNC: 8.7 MG/DL (ref 8.7–10.4)
CHLORIDE SERPL-SCNC: 101 MMOL/L (ref 99–109)
CLARITY UR: ABNORMAL
CO2 SERPL-SCNC: 26 MMOL/L (ref 20–31)
COLOR UR: ABNORMAL
CREAT BLD-MCNC: 1.2 MG/DL (ref 0.6–1.3)
DEPRECATED RDW RBC AUTO: 51.9 FL (ref 37–54)
EOSINOPHIL # BLD AUTO: 0.27 10*3/MM3 (ref 0–0.3)
EOSINOPHIL NFR BLD AUTO: 1.9 % (ref 0–3)
ERYTHROCYTE [DISTWIDTH] IN BLOOD BY AUTOMATED COUNT: 15.2 % (ref 11.3–14.5)
GFR SERPL CREATININE-BSD FRML MDRD: 44 ML/MIN/1.73
GLOBULIN UR ELPH-MCNC: 3 GM/DL
GLUCOSE BLD-MCNC: 149 MG/DL (ref 70–100)
GLUCOSE UR STRIP-MCNC: ABNORMAL MG/DL
HCT VFR BLD AUTO: 38 % (ref 34.5–44)
HGB BLD-MCNC: 12.5 G/DL (ref 11.5–15.5)
HGB UR QL STRIP.AUTO: NEGATIVE
HOLD SPECIMEN: NORMAL
HOLD SPECIMEN: NORMAL
HYALINE CASTS UR QL AUTO: ABNORMAL /LPF
IMM GRANULOCYTES # BLD: 0.04 10*3/MM3 (ref 0–0.03)
IMM GRANULOCYTES NFR BLD: 0.3 % (ref 0–0.6)
KETONES UR QL STRIP: NEGATIVE
LEUKOCYTE ESTERASE UR QL STRIP.AUTO: ABNORMAL
LIPASE SERPL-CCNC: 68 U/L (ref 6–51)
LYMPHOCYTES # BLD AUTO: 2.48 10*3/MM3 (ref 0.6–4.8)
LYMPHOCYTES NFR BLD AUTO: 17.9 % (ref 24–44)
MCH RBC QN AUTO: 30.6 PG (ref 27–31)
MCHC RBC AUTO-ENTMCNC: 32.9 G/DL (ref 32–36)
MCV RBC AUTO: 92.9 FL (ref 80–99)
MONOCYTES # BLD AUTO: 0.71 10*3/MM3 (ref 0–1)
MONOCYTES NFR BLD AUTO: 5.1 % (ref 0–12)
NEUTROPHILS # BLD AUTO: 10.34 10*3/MM3 (ref 1.5–8.3)
NEUTROPHILS NFR BLD AUTO: 74.6 % (ref 41–71)
NITRITE UR QL STRIP: NEGATIVE
PH UR STRIP.AUTO: 6 [PH] (ref 5–8)
PLATELET # BLD AUTO: 306 10*3/MM3 (ref 150–450)
PMV BLD AUTO: 10.7 FL (ref 6–12)
POTASSIUM BLD-SCNC: 4.4 MMOL/L (ref 3.5–5.5)
PROT SERPL-MCNC: 6.9 G/DL (ref 5.7–8.2)
PROT UR QL STRIP: ABNORMAL
RBC # BLD AUTO: 4.09 10*6/MM3 (ref 3.89–5.14)
RBC # UR: ABNORMAL /HPF
REF LAB TEST METHOD: ABNORMAL
SODIUM BLD-SCNC: 138 MMOL/L (ref 132–146)
SP GR UR STRIP: 1.02 (ref 1–1.03)
SQUAMOUS #/AREA URNS HPF: ABNORMAL /HPF
TROPONIN I SERPL-MCNC: 0 NG/ML (ref 0–0.07)
TROPONIN I SERPL-MCNC: 0 NG/ML (ref 0–0.07)
UROBILINOGEN UR QL STRIP: ABNORMAL
WBC NRBC COR # BLD: 13.87 10*3/MM3 (ref 3.5–10.8)
WBC UR QL AUTO: ABNORMAL /HPF
WHOLE BLOOD HOLD SPECIMEN: NORMAL
WHOLE BLOOD HOLD SPECIMEN: NORMAL

## 2017-12-11 PROCEDURE — 0 TECHNETIUM ALBUMIN AGGREGATED: Performed by: EMERGENCY MEDICINE

## 2017-12-11 PROCEDURE — 25010000002 ONDANSETRON PER 1 MG: Performed by: EMERGENCY MEDICINE

## 2017-12-11 PROCEDURE — 96375 TX/PRO/DX INJ NEW DRUG ADDON: CPT

## 2017-12-11 PROCEDURE — 84484 ASSAY OF TROPONIN QUANT: CPT

## 2017-12-11 PROCEDURE — 81001 URINALYSIS AUTO W/SCOPE: CPT | Performed by: EMERGENCY MEDICINE

## 2017-12-11 PROCEDURE — 83690 ASSAY OF LIPASE: CPT | Performed by: EMERGENCY MEDICINE

## 2017-12-11 PROCEDURE — 96374 THER/PROPH/DIAG INJ IV PUSH: CPT

## 2017-12-11 PROCEDURE — 96361 HYDRATE IV INFUSION ADD-ON: CPT

## 2017-12-11 PROCEDURE — 0 XENON XE 133: Performed by: EMERGENCY MEDICINE

## 2017-12-11 PROCEDURE — A9540 TC99M MAA: HCPCS | Performed by: EMERGENCY MEDICINE

## 2017-12-11 PROCEDURE — 71010 HC CHEST PA OR AP: CPT

## 2017-12-11 PROCEDURE — 80053 COMPREHEN METABOLIC PANEL: CPT | Performed by: EMERGENCY MEDICINE

## 2017-12-11 PROCEDURE — 25010000002 HYDROMORPHONE PER 4 MG: Performed by: EMERGENCY MEDICINE

## 2017-12-11 PROCEDURE — 83880 ASSAY OF NATRIURETIC PEPTIDE: CPT | Performed by: EMERGENCY MEDICINE

## 2017-12-11 PROCEDURE — 78582 LUNG VENTILAT&PERFUS IMAGING: CPT

## 2017-12-11 PROCEDURE — 99284 EMERGENCY DEPT VISIT MOD MDM: CPT

## 2017-12-11 PROCEDURE — 93005 ELECTROCARDIOGRAM TRACING: CPT

## 2017-12-11 PROCEDURE — A9558 XE133 XENON 10MCI: HCPCS | Performed by: EMERGENCY MEDICINE

## 2017-12-11 PROCEDURE — 85025 COMPLETE CBC W/AUTO DIFF WBC: CPT | Performed by: EMERGENCY MEDICINE

## 2017-12-11 PROCEDURE — 93005 ELECTROCARDIOGRAM TRACING: CPT | Performed by: EMERGENCY MEDICINE

## 2017-12-11 RX ORDER — SODIUM CHLORIDE 0.9 % (FLUSH) 0.9 %
10 SYRINGE (ML) INJECTION AS NEEDED
Status: DISCONTINUED | OUTPATIENT
Start: 2017-12-11 | End: 2017-12-12 | Stop reason: HOSPADM

## 2017-12-11 RX ORDER — HYDROCODONE BITARTRATE AND ACETAMINOPHEN 10; 325 MG/1; MG/1
1 TABLET ORAL ONCE
Status: COMPLETED | OUTPATIENT
Start: 2017-12-11 | End: 2017-12-11

## 2017-12-11 RX ORDER — HYDROMORPHONE HYDROCHLORIDE 1 MG/ML
0.5 INJECTION, SOLUTION INTRAMUSCULAR; INTRAVENOUS; SUBCUTANEOUS ONCE
Status: COMPLETED | OUTPATIENT
Start: 2017-12-11 | End: 2017-12-11

## 2017-12-11 RX ORDER — ONDANSETRON 2 MG/ML
4 INJECTION INTRAMUSCULAR; INTRAVENOUS ONCE
Status: COMPLETED | OUTPATIENT
Start: 2017-12-11 | End: 2017-12-11

## 2017-12-11 RX ORDER — ASPIRIN 81 MG/1
324 TABLET, CHEWABLE ORAL ONCE
Status: DISCONTINUED | OUTPATIENT
Start: 2017-12-11 | End: 2017-12-12 | Stop reason: HOSPADM

## 2017-12-11 RX ORDER — SODIUM CHLORIDE 9 MG/ML
125 INJECTION, SOLUTION INTRAVENOUS CONTINUOUS
Status: DISCONTINUED | OUTPATIENT
Start: 2017-12-11 | End: 2017-12-12 | Stop reason: HOSPADM

## 2017-12-11 RX ORDER — NITROGLYCERIN 0.4 MG/1
0.4 TABLET SUBLINGUAL
Qty: 100 TABLET | Refills: 0 | Status: SHIPPED | OUTPATIENT
Start: 2017-12-11

## 2017-12-11 RX ADMIN — SODIUM CHLORIDE 500 ML: 9 INJECTION, SOLUTION INTRAVENOUS at 16:51

## 2017-12-11 RX ADMIN — Medication 1 DOSE: at 18:30

## 2017-12-11 RX ADMIN — XENON XE-133 12.97 MILLICURIE: 10 GAS RESPIRATORY (INHALATION) at 18:17

## 2017-12-11 RX ADMIN — ONDANSETRON 4 MG: 2 INJECTION INTRAMUSCULAR; INTRAVENOUS at 19:39

## 2017-12-11 RX ADMIN — HYDROMORPHONE HYDROCHLORIDE 0.5 MG: 2 INJECTION INTRAMUSCULAR; INTRAVENOUS; SUBCUTANEOUS at 19:38

## 2017-12-11 RX ADMIN — SODIUM CHLORIDE 125 ML/HR: 9 INJECTION, SOLUTION INTRAVENOUS at 16:51

## 2017-12-11 RX ADMIN — HYDROCODONE BITARTRATE AND ACETAMINOPHEN 1 TABLET: 10; 325 TABLET ORAL at 21:57

## 2017-12-11 NOTE — ED PROVIDER NOTES
Subjective   HPI Comments: Ms. Georgia Velázquez is a 72 y.o. female who presents to the ED with c/o CP s/p kyphoplasty 5 days ago. She reports that 5 days ago she had a kyphoplasty done for a closed compression fracture of her L4 vertebrae but 3 hours after the surgery she developed CP so she was admitted to the hospital for a night to do tests. She states that she had cardiac enzymes and an EKG done, which were both normal so she was discharged the next day. She states that her pain has been constant even since being discharged but the pain worsened significantly today, which prompted presentation to the ED. She also complains of constipation, and hematochezia. She notes that her last bm before today was the day of her surgery. She has no h of blood clots, being on blood thinners, or hemorrhoids. No other acute complaints at this time.    Patient is a 72 y.o. female presenting with chest pain.   History provided by:  Patient  Chest Pain   Pain location:  L chest  Pain quality: sharp    Pain severity:  Severe  Onset quality:  Gradual  Duration:  5 days  Timing:  Constant  Progression:  Worsening  Chronicity:  New  Context comment:  Surgery  Relieved by:  Nothing      Review of Systems   Cardiovascular: Positive for chest pain.   Gastrointestinal: Positive for blood in stool and constipation.   All other systems reviewed and are negative.      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   • CHF (congestive heart failure)    • COPD (chronic obstructive pulmonary disease)    • Coronary artery disease 5/18/2016   • Essential hypertension 5/18/2016   • GERD (gastroesophageal reflux disease) 5/18/2016   • History of echocardiogram 10/16/2015    TDS.Est EF is 45-50%.Mild distal septal, anteroapical hypokinesia.Mild mitral stenosis.Mean gradient across mitral valve is 6 mmHg.Trace TR   • Hyperlipemia 5/18/2016   • Myocardial infarction 5/18/2016   • Osteoporosis 5/18/2016   •  Sepsis     uro   • Type 2 diabetes mellitus 5/18/2016       No Known Allergies    Past Surgical History:   Procedure Laterality Date   • APPENDECTOMY     • CHOLECYSTECTOMY     • KYPHOPLASTY N/A 12/7/2017    Procedure: KYPHOPLASTY L4;  Surgeon: Marc Pham MD;  Location: [facility];  Service:        Family History   Problem Relation Age of Onset   • Diabetes Mother    • No Known Problems Father    • No Known Problems Sister    • No Known Problems Brother    • No Known Problems Son    • No Known Problems Daughter        Social History     Social History   • Marital status:      Spouse name: N/A   • Number of children: N/A   • Years of education: N/A     Occupational History   • Retired from [employer]      Social History Main Topics   • Smoking status: Former Smoker     Types: Cigarettes     Quit date: 2007   • Smokeless tobacco: Never Used   • Alcohol use No   • Drug use: No   • Sexual activity: No     Other Topics Concern   • None     Social History Narrative         Objective   Physical Exam   Constitutional: She is oriented to person, place, and time. She appears well-developed and well-nourished. No distress.   HENT:   Head: Normocephalic and atraumatic.   Nose: Nose normal.   Eyes: Conjunctivae are normal. No scleral icterus.   Neck: Normal range of motion. Neck supple.   Cardiovascular: Normal rate, regular rhythm and normal heart sounds.    No murmur heard.  Pulmonary/Chest: Effort normal and breath sounds normal. No respiratory distress. She exhibits tenderness.   Patient has exquisite left anterior chest wall tenderness. Chest wall excursion is slightly limited.   Abdominal: Soft. There is no tenderness. There is no CVA tenderness.   Musculoskeletal: Normal range of motion. She exhibits no tenderness (No sub-costal tenderness).   Neurological: She is alert and oriented to person, place, and time.   Skin: Skin is warm and dry. No rash noted. She is not diaphoretic. No erythema.   Patient's  kyphoplasty site is clean, dry, and intact with no signs of infection. Patient's leg is red, warm, and unchanged.   Psychiatric: She has a normal mood and affect. Her behavior is normal.   Nursing note and vitals reviewed.      Procedures         ED Course  ED Course   Value Comment By Time   Creatinine: 1.20 (Reviewed) Abdifatah Silva MD 12/11 1656    I discussed findings at length with the patient.  She is serially reevaluated throughout the ED course with last reevaluation now.  She is quite comfortable though still has discomfort with movement.  I've treated with analgesics here in the ED.  She is on hydrocodone throughout the day at home, and evidence to continue this.  Overdose of hydrocodone prior to discharge as well.  VQ scan was negative in the emergency department.  Chest x-ray had no infiltrate.I discussed follow-up with the patient and she has PCP or here at [facility].  I asked her to follow up for the chest wall pain.  We'll give her teaching with an incentive spirometer and have her use that at home as she is going and discomfort with deep inspiration and movement.  This does not appear to be cardiac pain and she has easily reproducible chest wall tenderness to palpation that exactly reproduces her symptoms had each reevaluation.  She has analgesics artery ordered.  I discussed follow-up with her PCP for this but also cardiovascular reevaluation as she hasn't had a cardiovascular tests in the last 3 years.  She has nitroglycerin at home but upon determination of refill this.  Vascular to continue your aspirin which she takes on a daily basisI discussed indications for immediate return.  Patient is quite comfortable now in the ED.  Very pleasant and reliable patient verbalizes understanding agreement with the plan of care Abdifatah Silva MD 12/11 2127     Recent Results (from the past 24 hour(s))   Comprehensive Metabolic Panel    Collection Time: 12/11/17  3:42 PM   Result Value Ref Range    Glucose 149  (H) 70 - 100 mg/dL    BUN 27 (H) 9 - 23 mg/dL    Creatinine 1.20 0.60 - 1.30 mg/dL    Sodium 138 132 - 146 mmol/L    Potassium 4.4 3.5 - 5.5 mmol/L    Chloride 101 99 - 109 mmol/L    CO2 26.0 20.0 - 31.0 mmol/L    Calcium 8.7 8.7 - 10.4 mg/dL    Total Protein 6.9 5.7 - 8.2 g/dL    Albumin 3.90 3.20 - 4.80 g/dL    ALT (SGPT) 45 (H) 7 - 40 U/L    AST (SGOT) 44 (H) 0 - 33 U/L    Alkaline Phosphatase 216 (H) 25 - 100 U/L    Total Bilirubin 0.6 0.3 - 1.2 mg/dL    eGFR Non African Amer 44 (L) >60 mL/min/1.73    Globulin 3.0 gm/dL    A/G Ratio 1.3 (L) 1.5 - 2.5 g/dL    BUN/Creatinine Ratio 22.5 7.0 - 25.0    Anion Gap 11.0 3.0 - 11.0 mmol/L   Lipase    Collection Time: 12/11/17  3:42 PM   Result Value Ref Range    Lipase 68 (H) 6 - 51 U/L   BNP    Collection Time: 12/11/17  3:42 PM   Result Value Ref Range    BNP 71.0 0.0 - 100.0 pg/mL   Light Blue Top    Collection Time: 12/11/17  3:42 PM   Result Value Ref Range    Extra Tube hold for add-on    Green Top (Gel)    Collection Time: 12/11/17  3:42 PM   Result Value Ref Range    Extra Tube Hold for add-ons.    Lavender Top    Collection Time: 12/11/17  3:42 PM   Result Value Ref Range    Extra Tube hold for add-on    Gold Top - SST    Collection Time: 12/11/17  3:42 PM   Result Value Ref Range    Extra Tube Hold for add-ons.    CBC Auto Differential    Collection Time: 12/11/17  3:42 PM   Result Value Ref Range    WBC 13.87 (H) 3.50 - 10.80 10*3/mm3    RBC 4.09 3.89 - 5.14 10*6/mm3    Hemoglobin 12.5 11.5 - 15.5 g/dL    Hematocrit 38.0 34.5 - 44.0 %    MCV 92.9 80.0 - 99.0 fL    MCH 30.6 27.0 - 31.0 pg    MCHC 32.9 32.0 - 36.0 g/dL    RDW 15.2 (H) 11.3 - 14.5 %    RDW-SD 51.9 37.0 - 54.0 fl    MPV 10.7 6.0 - 12.0 fL    Platelets 306 150 - 450 10*3/mm3    Neutrophil % 74.6 (H) 41.0 - 71.0 %    Lymphocyte % 17.9 (L) 24.0 - 44.0 %    Monocyte % 5.1 0.0 - 12.0 %    Eosinophil % 1.9 0.0 - 3.0 %    Basophil % 0.2 0.0 - 1.0 %    Immature Grans % 0.3 0.0 - 0.6 %    Neutrophils,  Absolute 10.34 (H) 1.50 - 8.30 10*3/mm3    Lymphocytes, Absolute 2.48 0.60 - 4.80 10*3/mm3    Monocytes, Absolute 0.71 0.00 - 1.00 10*3/mm3    Eosinophils, Absolute 0.27 0.00 - 0.30 10*3/mm3    Basophils, Absolute 0.03 0.00 - 0.20 10*3/mm3    Immature Grans, Absolute 0.04 (H) 0.00 - 0.03 10*3/mm3   POC Troponin, Rapid    Collection Time: 12/11/17  3:45 PM   Result Value Ref Range    Troponin I 0.00 0.00 - 0.07 ng/mL   POC Troponin, Rapid    Collection Time: 12/11/17  7:08 PM   Result Value Ref Range    Troponin I 0.00 0.00 - 0.07 ng/mL   Urinalysis With / Culture If Indicated - Urine, Clean Catch    Collection Time: 12/11/17  8:27 PM   Result Value Ref Range    Color, UA Dark Yellow (A) Yellow, Straw    Appearance, UA Cloudy (A) Clear    pH, UA 6.0 5.0 - 8.0    Specific Gravity, UA 1.021 1.001 - 1.030    Glucose,  mg/dL (Trace) (A) Negative    Ketones, UA Negative Negative    Bilirubin, UA Small (1+) (A) Negative    Blood, UA Negative Negative    Protein, UA Trace (A) Negative    Leuk Esterase, UA Trace (A) Negative    Nitrite, UA Negative Negative    Urobilinogen, UA 1.0 E.U./dL 0.2 - 1.0 E.U./dL   Urinalysis, Microscopic Only - Urine, Clean Catch    Collection Time: 12/11/17  8:27 PM   Result Value Ref Range    RBC, UA 0-2 None Seen, 0-2 /HPF    WBC, UA 3-5 (A) None Seen /HPF    Bacteria, UA None Seen None Seen, Trace /HPF    Squamous Epithelial Cells, UA 7-12 (A) None Seen, 0-2 /HPF    Hyaline Casts, UA 0-6 0 - 6 /LPF    Methodology Automated Microscopy      Note: In addition to lab results from this visit, the labs listed above may include labs taken at another facility or during a different encounter within the last 24 hours. Please correlate lab times with ED admission and discharge times for further clarification of the services performed during this visit.    NM Lung Ventilation Perfusion   Final Result   No evidence of pulmonary embolism.      Mild obstructive pulmonary disease.      THIS DOCUMENT HAS  BEEN ELECTRONICALLY SIGNED BY ASHANTI KNOWLES MD      XR Chest 1 View   Preliminary Result   Heart is enlarged with no evidence of acute parenchymal   disease.       D:  12/11/2017   E:  12/11/2017                    Vitals:    12/11/17 1730 12/11/17 1800 12/11/17 1905 12/11/17 1930   BP: 141/69 151/56 159/70 145/77   Pulse: 72 72 77 82   Resp:       Temp:       SpO2: 96% 97% 100% 93%   Weight:       Height:         Medications   sodium chloride 0.9 % flush 10 mL (not administered)   aspirin chewable tablet 324 mg (324 mg Oral Not Given 12/11/17 1557)   sodium chloride 0.9 % infusion (125 mL/hr Intravenous New Bag 12/11/17 1651)   HYDROcodone-acetaminophen (NORCO)  MG per tablet 1 tablet (not administered)   sodium chloride 0.9 % bolus 500 mL (0 mL Intravenous Stopped 12/11/17 1805)   xenon xe 133 gas 10 mCi 12.97 millicurie (12.97 millicuries Inhalation Given 12/11/17 1817)   technetium albumin aggregated (MAA) solution 1 dose (1 dose Intravenous Given 12/11/17 1830)   HYDROmorphone (DILAUDID) injection 0.5 mg (0.5 mg Intravenous Given 12/11/17 1938)   ondansetron (ZOFRAN) injection 4 mg (4 mg Intravenous Given 12/11/17 1939)     ECG/EMG Results (last 24 hours)     Procedure Component Value Units Date/Time    ECG 12 Lead [[id number]] Collected:  12/11/17 1519     Updated:  12/11/17 1614    Narrative:       Test Reason : CHEST PAIN  Blood Pressure : **/** mmHG  Vent. Rate : 068 BPM     Atrial Rate : 068 BPM     P-R Int : 186 ms          QRS Dur : 082 ms      QT Int : 380 ms       P-R-T Axes : 048 012 070 degrees     QTc Int : 404 ms    Sinus rhythm with occasional premature ventricular complexes  Possible Inferior infarct , age undetermined  Anterior infarct (cited on or before 06-DEC-2017)  Abnormal ECG  When compared with ECG of 07-DEC-2017 17:25,  premature ventricular complexes are now present  Borderline criteria for Inferior infarct are now present  Confirmed by ABDIFATAH ANGLIN, HOLIS ([id number]) on 12/11/2017 4:14:04  PM    Referred By: MD ER           Confirmed By:KAYDEN SILVA MD    ECG 12 Lead [[id number]] Collected:  12/11/17 1912     Updated:  12/11/17 2053    Narrative:       Test Reason : 2ND SET  Blood Pressure : **/** mmHG  Vent. Rate : 076 BPM     Atrial Rate : 076 BPM     P-R Int : 188 ms          QRS Dur : 086 ms      QT Int : 376 ms       P-R-T Axes : 039 023 023 degrees     QTc Int : 423 ms    Normal sinus rhythm  Low voltage QRS  Cannot rule out Anterior infarct (cited on or before 06-DEC-2017)  Abnormal ECG  When compared with ECG of 11-DEC-2017 15:19,  premature ventricular complexes are no longer present  Confirmed by KAYDEN SILVA MD ([id number]) on 12/11/2017 8:53:18 PM    Referred By:  ed md           Confirmed By:KAYDEN SILVA MD                        [facility]    Final diagnoses:   Chest wall pain   Hyperglycemia   Elevated liver function tests   Leukocytosis, unspecified type       Documentation assistance provided by vic Edwards.  Information recorded by the scribe was done at my direction and has been verified and validated by me.     Lianne Edwards  12/11/17 1651       Lianne Edwards  12/11/17 2118       Abdifatah Silva MD  12/11/17 2127

## 2017-12-12 ENCOUNTER — TELEPHONE (OUTPATIENT)
Dept: FAMILY MEDICINE CLINIC | Facility: CLINIC | Age: 72
End: 2017-12-12

## 2017-12-12 NOTE — TELEPHONE ENCOUNTER
Spoke to radha and she will fax over forms for the order.     ----- Message from Chaz Rico DNP, APRN sent at 12/12/2017  3:52 PM EST -----  Contact: [phone]  I have not seen a form. They need to send a form over for MD to complete  ----- Message -----     From: Shannan Lo MA     Sent: 12/12/2017   2:58 PM       To: Chaz Rico DNP, APRN     Have you order diabetic shoes for ?   ----- Message -----     From: Danitza Rollins     Sent: 12/12/2017   2:30 PM       To: Shannan Lo MA     EXT. [employer] REQUESTS A CALL BACK TO CHECK THE STATUS OF A REQUEST FOR DIABETIC SHOES

## 2017-12-12 NOTE — OUTREACH NOTE
MERY call completed.  Please refer to TCM call flowsheet for call documentation.  Patient did go to ED yesterday for chest pain.  She is still having the same pain.  She is with PT at this time.

## 2017-12-12 NOTE — TELEPHONE ENCOUNTER
ANGUS Dean,  Ms Velázquez has already been back to th ED since her discharge. She has home health OT and PT.  She is high risk and needs a care coordinator. She may need home health with nursing care as well. Can you read her discharge information and talk to the  who took care of her and see if she was to have nursing care at home as well.  Thanks  Jose Angel

## 2017-12-12 NOTE — DISCHARGE INSTRUCTIONS
Continue your current medications.  Continue your hydrocodone for pain    Use your incentive spirometer 10 inspirations every hour while you're awake.  Continue your ambulation and nonexertional movement as previously directed    Continue your inhaler and pain medications as previously ordered.    Rest with no vigorous activity.    Follow-up with her cardiologist within the next 7 days for evaluation and consideration of noninvasive testing.  I refilled your prescription for nitroglycerin, though this does not appear to be cardiac pain in the ED today.  Continue your aspirin daily as previous    Follow-up with your primary care provider for evaluation of the chest wall pain, elevated liver function studies, and continued medical care.  Call tomorrow for an appointment    Immediately return to the ED if you develop any acute or worsening symptoms.

## 2017-12-12 NOTE — TELEPHONE ENCOUNTER
Aden Rico gave  Verbal authorization for ot for  to start pt for 6 weeks two times a week. Upper strength training and adc training.

## 2017-12-13 ENCOUNTER — TELEPHONE (OUTPATIENT)
Dept: FAMILY MEDICINE CLINIC | Facility: CLINIC | Age: 72
End: 2017-12-13

## 2017-12-13 NOTE — DISCHARGE PLACEMENT REQUEST
"  Georgia Palumbo (72 y.o. Female)     Please add skilled nursing.     Danitza Gilmore, [phone]      Date of Birth Social Security Number Address Home Phone MRN    1945  [address]  [address]  [facility] [address] [phone] [id number]    Islam Marital Status          Caodaism        Admission Date Admission Type Admitting Provider Attending Provider Department, Room/Bed    12/4/17 Emergency Cailin Rosales,   [facility] 3H, S371/1    Discharge Date Discharge Disposition Discharge Destination        12/8/2017 Home or Self Care Home            Attending Provider: (none)    Allergies:  No Known Allergies    Isolation:  None   Infection:  None   Code Status:  Prior    Ht:  154.9 cm (61\")   Wt:  111 kg (245 lb)    Admission Cmt:  None   Principal Problem:  Closed compression fracture of L4 lumbar vertebra [S32.040A] More...                 Active Insurance as of 12/4/2017     Primary Coverage     Payor Plan Insurance Group Employer/Plan Group    MEDICARE MEDICARE A & B      Payor Plan Address Payor Plan Phone Number Effective From Effective To    [address] [phone] 5/1/2008     [place] [address]       Subscriber Name Subscriber Birth Date Member ID       DEEPALIGEORGIA GIULIANA 1945 [id number]           Secondary Coverage     Payor Plan Insurance Group Employer/Plan Group    [place]      Payor Plan Address Payor Plan Phone Number Effective From Effective To    [address] [address]  1/1/2014     [place] [address]       Subscriber Name Subscriber Birth Date Member ID       GEORGIA PALUMBO GIULIANA 1945 [id number]                 Emergency Contacts      (Rel.) Home Phone Work Phone Mobile Phone    Omaira Patel (Daughter) [phone] -- --    DeepaliFlakito (Son) [phone] -- --            [address]  [address] [facility] [address]  Phone:  [phone]  Fax:   Date: " Dec 13, 2017      Ambulatory Referral to Home Health     Patient:  Georgia Velázquez MRN:  [id number]   [address]  [address]  [facility] [address] :  1945  SSN:    Phone: [phone] Sex:  F      INSURANCE PAYOR PLAN GROUP # SUBSCRIBER ID   Primary:  Secondary: MEDICARE  AETNA [facility] [id number]  [id number]   [id number]  [id number]      Referring Provider Information:  CAILIN TEE Phone: [phone] Fax:       Referral Information:   # Visits:  1 Referral Type: Home Health [42]   Urgency:  Routine Referral Reason: Specialty Services Required   Start Date: Dec 13, 2017 End Date:  To be determined by Insurer   Diagnosis: Impaired functional mobility, balance, gait, and endurance (Z74.09 [ICD-10-CM] V49.89 [ICD-9-CM])  Impaired mobility and ADLs (Z74.09 [ICD-10-CM] 799.89 [ICD-9-CM])  Closed compression fracture of fourth lumbar vertebra, sequela (S32.040S [ICD-10-CM] 905.1 [ICD-9-CM])      Refer to Dept:   Refer to Provider:   Refer to Facility:       Face to Face Visit Date: 2017  Follow-up Provider for Plan of Care? I treated the patient in an acute care facility and will not continue treatment after discharge.  Follow-up Provider: KIRA CHUNG [[id number]]  Reason/Clinical Findings: post-op care  Describe mobility limitations that make leaving home difficult: post-op care  Nursing/Therapeutic Services Requested: Skilled Nursing  Skilled nursing orders: Medication education (Assess)     This document serves as a request of services and does not constitute Insurance authorization or approval of services.  To determine eligibility, please contact the members Insurance carrier to verify and review coverage.     If you have medical questions regarding this request for services. Please contact [address] at [phone] between the hours of 8:00am - 5:00pm (Mon-Fri).             Verbal Order Mode: Telephone with readback   Authorizing Provider: Cailin VENEGAS  DO Bobby  Authorizing Provider's NPI: [id number]     Order Entered By: Danitza Gilmore RN 12/13/2017  1:15 PM

## 2017-12-13 NOTE — TELEPHONE ENCOUNTER
----- Message from Jermaine Allen LPN sent at 12/13/2017  1:56 PM EST -----  Regarding: FW: ACO high risk  Jose Angel,  I spoke with patient's care manager Danitza.  HH with skilled nursing has been ordered.  I have also contacted the care advisors to see if they are able to assist the patient in any way.      Thanks,  Jermaine  ----- Message -----     From: Chaz Rico, FORTUNATO, APRN     Sent: 12/12/2017   2:01 PM       To: Jermaine Allen LPN  Subject: ACO high risk                                    ANGUS Dean,  Ms Velázquez has already been back to  ED since her discharge. She has home health OT and PT.  She is high risk and needs a care coordinator. She may need home health with nursing care as well. Can you read her discharge information and talk to the  who took care of her and see if she was to have nursing care at home as well.  Thanks  Jose Angel

## 2017-12-14 ENCOUNTER — OFFICE VISIT (OUTPATIENT)
Dept: FAMILY MEDICINE CLINIC | Facility: CLINIC | Age: 72
End: 2017-12-14

## 2017-12-14 VITALS
RESPIRATION RATE: 16 BRPM | HEART RATE: 83 BPM | DIASTOLIC BLOOD PRESSURE: 80 MMHG | BODY MASS INDEX: 46.26 KG/M2 | SYSTOLIC BLOOD PRESSURE: 122 MMHG | WEIGHT: 245 LBS | HEIGHT: 61 IN | OXYGEN SATURATION: 98 % | TEMPERATURE: 97.5 F

## 2017-12-14 DIAGNOSIS — N30.01 ACUTE CYSTITIS WITH HEMATURIA: ICD-10-CM

## 2017-12-14 DIAGNOSIS — M94.0 COSTOCHONDRITIS, ACUTE: ICD-10-CM

## 2017-12-14 DIAGNOSIS — R74.8 ELEVATED ALKALINE PHOSPHATASE LEVEL: ICD-10-CM

## 2017-12-14 DIAGNOSIS — E11.9 TYPE 2 DIABETES MELLITUS WITHOUT COMPLICATION, UNSPECIFIED LONG TERM INSULIN USE STATUS: ICD-10-CM

## 2017-12-14 DIAGNOSIS — R01.1 MURMUR, CARDIAC: Primary | ICD-10-CM

## 2017-12-14 LAB
ALBUMIN SERPL-MCNC: 3.9 G/DL (ref 3.2–4.8)
ALBUMIN/GLOB SERPL: 1.3 G/DL (ref 1.5–2.5)
ALP SERPL-CCNC: 179 U/L (ref 25–100)
ALT SERPL W P-5'-P-CCNC: 30 U/L (ref 7–40)
ANION GAP SERPL CALCULATED.3IONS-SCNC: 12 MMOL/L (ref 3–11)
AST SERPL-CCNC: 26 U/L (ref 0–33)
BILIRUB BLD-MCNC: ABNORMAL MG/DL
BILIRUB SERPL-MCNC: 0.6 MG/DL (ref 0.3–1.2)
BUN BLD-MCNC: 18 MG/DL (ref 9–23)
BUN/CREAT SERPL: 16.4 (ref 7–25)
CALCIUM SPEC-SCNC: 8.6 MG/DL (ref 8.7–10.4)
CHLORIDE SERPL-SCNC: 101 MMOL/L (ref 99–109)
CLARITY, POC: CLEAR
CO2 SERPL-SCNC: 26 MMOL/L (ref 20–31)
COLOR UR: YELLOW
CREAT BLD-MCNC: 1.1 MG/DL (ref 0.6–1.3)
GFR SERPL CREATININE-BSD FRML MDRD: 49 ML/MIN/1.73
GLOBULIN UR ELPH-MCNC: 3 GM/DL
GLUCOSE BLD-MCNC: 138 MG/DL (ref 70–100)
GLUCOSE UR STRIP-MCNC: NEGATIVE MG/DL
KETONES UR QL: ABNORMAL
LEUKOCYTE EST, POC: NEGATIVE
NITRITE UR-MCNC: NEGATIVE MG/ML
PH UR: 6 [PH] (ref 5–8)
POTASSIUM BLD-SCNC: 4.7 MMOL/L (ref 3.5–5.5)
PROT SERPL-MCNC: 6.9 G/DL (ref 5.7–8.2)
PROT UR STRIP-MCNC: ABNORMAL MG/DL
RBC # UR STRIP: NEGATIVE /UL
SODIUM BLD-SCNC: 139 MMOL/L (ref 132–146)
SP GR UR: 1.02 (ref 1–1.03)
UROBILINOGEN UR QL: NORMAL

## 2017-12-14 PROCEDURE — 80053 COMPREHEN METABOLIC PANEL: CPT | Performed by: NURSE PRACTITIONER

## 2017-12-14 PROCEDURE — 36415 COLL VENOUS BLD VENIPUNCTURE: CPT | Performed by: NURSE PRACTITIONER

## 2017-12-14 PROCEDURE — 81003 URINALYSIS AUTO W/O SCOPE: CPT | Performed by: NURSE PRACTITIONER

## 2017-12-14 PROCEDURE — 99496 TRANSJ CARE MGMT HIGH F2F 7D: CPT | Performed by: NURSE PRACTITIONER

## 2017-12-14 NOTE — PATIENT INSTRUCTIONS
Costochondritis  Costochondritis, sometimes called Tietze syndrome, is a swelling and irritation (inflammation) of the tissue (cartilage) that connects your ribs with your breastbone (sternum). It causes pain in the chest and rib area. Costochondritis usually goes away on its own over time. It can take up to 6 weeks or longer to get better, especially if you are unable to limit your activities.  CAUSES   Some cases of costochondritis have no known cause. Possible causes include:  · Injury (trauma).  · Exercise or activity such as lifting.  · Severe coughing.  SIGNS AND SYMPTOMS  · Pain and tenderness in the chest and rib area.  · Pain that gets worse when coughing or taking deep breaths.  · Pain that gets worse with specific movements.  DIAGNOSIS   Your health care provider will do a physical exam and ask about your symptoms. Chest X-rays or other tests may be done to rule out other problems.  TREATMENT   Costochondritis usually goes away on its own over time. Your health care provider may prescribe medicine to help relieve pain.  HOME CARE INSTRUCTIONS   · Avoid exhausting physical activity. Try not to strain your ribs during normal activity. This would include any activities using chest, abdominal, and side muscles, especially if heavy weights are used.  · Apply ice to the affected area for the first 2 days after the pain begins.  ¨ Put ice in a plastic bag.  ¨ Place a towel between your skin and the bag.  ¨ Leave the ice on for 20 minutes, 2-3 times a day.  · Only take over-the-counter or prescription medicines as directed by your health care provider.  SEEK MEDICAL CARE IF:  · You have redness or swelling at the rib joints. These are signs of infection.  · Your pain does not go away despite rest or medicine.  SEEK IMMEDIATE MEDICAL CARE IF:   · Your pain increases or you are very uncomfortable.  · You have shortness of breath or difficulty breathing.  · You cough up blood.  · You have worse chest pains,  sweating, or vomiting.  · You have a fever or persistent symptoms for more than 2-3 days.  · You have a fever and your symptoms suddenly get worse.  MAKE SURE YOU:   · Understand these instructions.  · Will watch your condition.  · Will get help right away if you are not doing well or get worse.     This information is not intended to replace advice given to you by your health care provider. Make sure you discuss any questions you have with your health care provider.     Document Released: 09/27/2006 Document Revised: 10/08/2014 Document Reviewed: 07/22/2014  [employer] Interactive Patient Education ©2017 [employer]

## 2017-12-14 NOTE — PROGRESS NOTES
Transitional Care Follow Up Visit  Subjective     Georgianeptali Velázquez is a 72 y.o. female who presents for a transitional care management visit.    Within 48 business hours after discharge our office contacted her via telephone to coordinate her care and needs.      I reviewed and discussed the details of that call along with the discharge summary, hospital problems, inpatient lab results, inpatient diagnostic studies, and consultation reports with Georgia.     Current outpatient and discharge medications have been reconciled for the patient.    Date of TCM Phone Call 12/12/2017   [facility]   Date of Admission 12/5/2017   Date of Discharge 12/8/2017   Discharge Disposition Home or Self Care     Risk for Readmission (LACE) Score: 10    History of Present Illness   Course During Hospital Stay:  Patient was treated for UTI and had kyphoplasty  By Dr Coelho. She also had renal failure that resolved. She went to her daughter's home with PT/OT. Developed severe left chest pain. Would last 1-5 minutes. Not associated with sob, palpitations, eating etc. Made worse by movement. She was taken back to the hospital and PE and MI were ruled out. She is here today with her son. She is still having the chest pain. It is definitely worse with movement.  Denies fever, sob, wheezing, constipation or diarrhea. No dysuria or incontinence. Good appetite. Taking her meds as directed. Home health is now in the home. She is ambulating with a walker.       The following portions of the patient's history were reviewed and updated as appropriate: allergies, current medications, past family history, past medical history, past social history, past surgical history and problem list.    Review of Systems   Constitutional: Negative for fatigue, fever and unexpected weight change.   HENT: Negative for congestion, hearing loss, nosebleeds, rhinorrhea, sore throat, trouble swallowing and voice change.    Eyes: Negative for  pain, discharge, redness and visual disturbance.   Respiratory: Negative for cough, chest tightness, shortness of breath and wheezing.    Cardiovascular: Positive for chest pain. Negative for palpitations and leg swelling.        Chest wall pain.   Gastrointestinal: Negative for abdominal distention, abdominal pain, anal bleeding, blood in stool, constipation, diarrhea, nausea and vomiting.   Endocrine: Negative for cold intolerance, heat intolerance, polydipsia, polyphagia and polyuria.   Genitourinary: Negative for dysuria, flank pain, frequency and hematuria.   Musculoskeletal: Negative for arthralgias, gait problem, joint swelling and myalgias.   Skin: Negative for color change and rash.   Neurological: Negative for dizziness, tremors, seizures, syncope, speech difficulty, weakness, numbness and headaches.   Hematological: Negative.    Psychiatric/Behavioral: Negative.        Objective   Physical Exam   Constitutional: She is oriented to person, place, and time. She appears well-developed and well-nourished. No distress.   HENT:   Head: Normocephalic and atraumatic.   Right Ear: Tympanic membrane and external ear normal.   Left Ear: Tympanic membrane and external ear normal.   Nose: Nose normal.   Mouth/Throat: Oropharynx is clear and moist. No oropharyngeal exudate.   Eyes: Conjunctivae are normal. Pupils are equal, round, and reactive to light. Right eye exhibits no discharge. Left eye exhibits no discharge. No scleral icterus.   Neck: Neck supple. No tracheal deviation present. No thyromegaly present.   Cardiovascular: Normal rate and regular rhythm.  Exam reveals no gallop and no friction rub.    Murmur heard.   Systolic murmur is present with a grade of 2/6   Aortic murmur   Pulmonary/Chest: Effort normal and breath sounds normal. No respiratory distress. She has no wheezes.   I can reproduce her pain with palpation of the left sternal border.   Abdominal: Soft. Bowel sounds are normal. She exhibits no  distension and no mass. There is no tenderness.   Musculoskeletal: She exhibits no edema or deformity.   Lymphadenopathy:     She has no cervical adenopathy.   Neurological: She is alert and oriented to person, place, and time. Coordination normal.   Skin: Skin is warm and dry. No rash noted. No erythema.        Two small dressings on her back are intact. Scant drainage on the dressing has been marked. No erythema or discharge.   Psychiatric: She has a normal mood and affect. Her speech is normal and behavior is normal. Judgment and thought content normal.   Nursing note and vitals reviewed.      Assessment/Plan   Georgia was seen today for breast pain.    Diagnoses and all orders for this visit:    Murmur, cardiac  -     Ambulatory Referral to Cardiology    Elevated alkaline phosphatase level  -     Comprehensive Metabolic Panel    Acute cystitis with hematuria  -     POC Urinalysis Dipstick, Automated    Costochondritis, acute    Type 2 diabetes mellitus without complication, unspecified long term insulin use status    We reviewed her chart. Discussed findings.  Discussed diagnosis of costochondritis. Provided written info. I think it is from pulling herself up.  Check labs and UA today.  I want her to see cardiology at [facility] for better continuity of care.  Rx written for diabetic shoes.  Follow up in one month.  Discussed the nature of the disease including, risks, complications, implications, management, safe and proper use of medications. Encouraged therapeutic lifestyle changes including low calorie diet with plenty of fruits and vegetables, daily exercise, medication compliance, and keeping scheduled follow up appointments with me and any other providers. Encouraged patient to have appointment for complete physical, fasting labs, appropriate screenings, and immunizations on an annual basis.  Follow up symptoms persist or worsen.  I personally spent over half of a total 40 minutes face to face with the  patient in counseling and discussion and/or coordination of care as described above.

## 2017-12-15 ENCOUNTER — TELEPHONE (OUTPATIENT)
Dept: FAMILY MEDICINE CLINIC | Facility: CLINIC | Age: 72
End: 2017-12-15

## 2017-12-15 NOTE — TELEPHONE ENCOUNTER
Spoke albert gaytan from [employer] and informed her that pt does not have heart failure.       ----- Message from Chaz Rico DNP, APRDOROTHEA sent at 12/14/2017  5:11 PM EST -----  She doesn't have heart failure. She has CAD, cardiomegally, type 2 dm, lumbar fracture.  ----- Message -----     From: Shannan Lo MA     Sent: 12/14/2017   3:53 PM       To: Chaz Rico DNP, APRDOROTHEA        ----- Message -----     From: Jeanette Zuniga     Sent: 12/14/2017   3:26 PM       To: Shannan Lo MA    EUSEBIA WITH [facility] NEEDS TO KNOW WHAT KIND OF HEART FAILURE SHE HAS    P--[phone]

## 2017-12-19 ENCOUNTER — TELEPHONE (OUTPATIENT)
Dept: FAMILY MEDICINE CLINIC | Facility: CLINIC | Age: 72
End: 2017-12-19

## 2017-12-19 NOTE — TELEPHONE ENCOUNTER
Called multiple time and received no answer or vm box.     ----- Message from Chaz Rico DNP, APRN sent at 12/19/2017  1:28 PM EST -----  Contact: [phone]  We have to wait for physician signature. It will be a few weeks.  ----- Message -----     From: Shannan Lo MA     Sent: 12/19/2017  10:50 AM       To: Chaz Rico DNP, DNO    What should I tell karolyn?   ----- Message -----     From: Danitza Rollins     Sent: 12/18/2017   1:10 PM       To: Shannan Lo MA     [facility] . STATUS OF A REQUEST FOR DIABETIC SHOES

## 2017-12-20 ENCOUNTER — TELEPHONE (OUTPATIENT)
Dept: FAMILY MEDICINE CLINIC | Facility: CLINIC | Age: 72
End: 2017-12-20

## 2017-12-20 NOTE — TELEPHONE ENCOUNTER
----- Message from Chaz Rico DNP, APRN sent at 12/20/2017  4:29 PM EST -----  Contact: [phone]  She needs to get that from Dr Coelho. I do not prescribe controlled substances.  ----- Message -----     From: Shannan Lo MA     Sent: 12/20/2017   4:22 PM       To: Chaz Rico DNP, APRN     Is it ok to refill the norco    ----- Message -----     From: Danitza Rollins     Sent: 12/20/2017   1:17 PM       To: Shannan Lo MA    PATIENT IS REQUESTING A CALL BACK TO DISCUSS A REFILL FOR HER PAIN MEDICATION

## 2017-12-26 ENCOUNTER — TELEPHONE (OUTPATIENT)
Dept: NEUROSURGERY | Facility: CLINIC | Age: 72
End: 2017-12-26

## 2017-12-26 ENCOUNTER — TELEPHONE (OUTPATIENT)
Dept: FAMILY MEDICINE CLINIC | Facility: CLINIC | Age: 72
End: 2017-12-26

## 2017-12-26 DIAGNOSIS — S32.040S CLOSED COMPRESSION FRACTURE OF FOURTH LUMBAR VERTEBRA, SEQUELA: Primary | ICD-10-CM

## 2017-12-26 DIAGNOSIS — L30.9 DERMATITIS: ICD-10-CM

## 2017-12-26 NOTE — TELEPHONE ENCOUNTER
Provider:  Edy  Caller: physical therapy  Time of call:   0966  Phone #:  [phone]  Surgery:  L4 kypho  Surgery Date:  12/7/17  Last visit:   11/30/17  Next visit:    12/29/17    Reason for call:         Physical therapist left message on behalf of pt stating that she was experiencing pain in her entire right leg when she bears weight on said leg, the pain is sharp in nature and is considered to be 8 out of 10 in intensity.

## 2017-12-26 NOTE — TELEPHONE ENCOUNTER
Attempted to call Luisa from [employer]. Phone number documented is wrong number.     ----- Message from Jozef Austin sent at 12/26/2017  1:27 PM EST -----  Regarding: NEEDS A REFERAL  Contact: [phone]  SHE IS A PT OF [facility]. LUISA FROM [facility] CALLED, PT HAS HAD 5 LB WEIGHT GAIN SINCE Friday. PT DAUGHTER WOULD LIKE HER GO BACK TO REHAB SINCE SHE IS NOT MAKING GOOD DECISIONS SINCE GETTING OUT. SHE NEEDS A REFERRAL TO GO BACK SINCE IT HAS BEEN LESS THAN 30 DAYS SINCE SHE HAS GOTTEN OUT. PLEASE CALL LUISA BACK [phone]

## 2017-12-26 NOTE — TELEPHONE ENCOUNTER
Patient had an L4 kyphoplasty done by Dr. Pham on 12/7/17.  When she first presented to the ED deanna 11/24, she complained of back pain that sometimes radiated down her right leg.     Spoke with patient. She is having pain from waist to toes in her right leg down to her toes. She says it started about a week after surgery. She is able to stand and walk on it. The back is not hurting as much as her leg is. She denies bowel/bladder dysfunction. No new falls or injuries to her back or to her leg.     She has an appointment on Friday. We will have her get new xrays of her thoracic and lumbar spine before her appointment. At the appointment, we can decide if she needs repeat MRI

## 2017-12-28 ENCOUNTER — HOSPITAL ENCOUNTER (OUTPATIENT)
Dept: GENERAL RADIOLOGY | Facility: HOSPITAL | Age: 72
Discharge: HOME | End: 2017-12-28

## 2017-12-28 ENCOUNTER — HOSPITAL ENCOUNTER (OUTPATIENT)
Dept: GENERAL RADIOLOGY | Facility: HOSPITAL | Age: 72
Discharge: HOME | End: 2017-12-28
Admitting: PHYSICIAN ASSISTANT

## 2017-12-28 DIAGNOSIS — M81.0 AGE RELATED OSTEOPOROSIS, UNSPECIFIED PATHOLOGICAL FRACTURE PRESENCE: ICD-10-CM

## 2017-12-28 DIAGNOSIS — S32.040S CLOSED COMPRESSION FRACTURE OF FOURTH LUMBAR VERTEBRA, SEQUELA: ICD-10-CM

## 2017-12-28 DIAGNOSIS — S32.040A CLOSED COMPRESSION FRACTURE OF FOURTH LUMBAR VERTEBRA, INITIAL ENCOUNTER: ICD-10-CM

## 2017-12-28 PROCEDURE — 72080 X-RAY EXAM THORACOLMB 2/> VW: CPT

## 2017-12-28 PROCEDURE — 72120 X-RAY BEND ONLY L-S SPINE: CPT

## 2017-12-29 ENCOUNTER — PATIENT OUTREACH (OUTPATIENT)
Dept: CASE MANAGEMENT | Facility: OTHER | Age: 72
End: 2017-12-29

## 2017-12-29 ENCOUNTER — OFFICE VISIT (OUTPATIENT)
Dept: NEUROSURGERY | Facility: CLINIC | Age: 72
End: 2017-12-29

## 2017-12-29 ENCOUNTER — DOCUMENTATION (OUTPATIENT)
Dept: NEUROSURGERY | Facility: CLINIC | Age: 72
End: 2017-12-29

## 2017-12-29 VITALS
SYSTOLIC BLOOD PRESSURE: 126 MMHG | WEIGHT: 241.2 LBS | HEIGHT: 61 IN | DIASTOLIC BLOOD PRESSURE: 68 MMHG | BODY MASS INDEX: 45.54 KG/M2 | TEMPERATURE: 97.8 F

## 2017-12-29 DIAGNOSIS — S32.040S CLOSED COMPRESSION FRACTURE OF FOURTH LUMBAR VERTEBRA, SEQUELA: Primary | ICD-10-CM

## 2017-12-29 PROCEDURE — 99212 OFFICE O/P EST SF 10 MIN: CPT | Performed by: PHYSICIAN ASSISTANT

## 2017-12-29 RX ORDER — GABAPENTIN 100 MG/1
100 CAPSULE ORAL 2 TIMES DAILY
Qty: 60 CAPSULE | Refills: 0 | Status: SHIPPED | OUTPATIENT
Start: 2017-12-29 | End: 2018-01-05

## 2017-12-29 NOTE — PROGRESS NOTES
"Subjective     Chief Complaint:  Georgia Velázquez is a 72 y.o. female here today for follow up after L4 vertebroplasty on 12/7/17.     History of Present Illness  Georgia Velázquez is a 72 y.o. female with PMH significant for CAD, HTN, HLD, T2DM, GERD, osteoporosis.  She was seen at [facility] ED on 12/4/17 c/o low back pain and leg pain as well as intermittent episodes of urinary incontinence without saddle anesthesia or bowel incontinence. MRI of her lumbar spine showed a recent L4 compression fracture.   Upon presentation, she also had a a UTI with large leukocytes and 4+ bacteria. Her labs revealed ISR[place] with BUN 62 and Cr 2.40, baseline Cr 1.30. She also has leukocytosis (WBC 11.45).  She was admitted and once her UTI (with Klebsiella, sensitive to cephalosporins) and kidney function had improved, she underwent kyphoplasty at L4 for stabilization of her compression fracture.  She did reasonably well after surgery and was discharged to her daughter's home on 12/8 with skilled nursing HH and home physical therapy.  She has struggled since discharge with right leg pain and her home health nursing staff ([facility]) have recommended that she be considered for inpatient rehab for a period of time.  She is here today for follow up from her surgery.  She states that she is having right leg pain that began about one week after surgery. It hurts from her mid back, down the front of the thigh to the knee , then wraps to the back of the calf on the right, stopping at the ankle. She describes her pain as \"sharp\" in character and has been treating it with intermittent ibuprofen or muscle relaxants. Muscle relaxants are not particularly helpful, though the NSAIDS can be.   She is relatively comfortable lying down, but it hurts especially with standing and walking and with pivoting/turning to use the restroom or to get in and out of bed.  Family has made accommodations, but she is having some trouble with her " activities of daily living.   She has responded some to home PT and is able to walk with the the help of her walker. She uses a bedside commode.     The following portions of the patient's history were reviewed and updated as appropriate: allergies, current medications, past family history, past medical history, past social history, past surgical history and problem list.    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        • COPD (chronic obstructive pulmonary disease)    • Coronary artery disease 5/18/2016   • Essential hypertension 5/18/2016   • GERD (gastroesophageal reflux disease) 5/18/2016   • History of echocardiogram 10/16/2015    TDS.Est EF is 45-50%.Mild distal septal, anteroapical hypokinesia.Mild mitral stenosis.Mean gradient across mitral valve is 6 mmHg.Trace TR   • Hyperlipemia 5/18/2016   • Myocardial infarction 5/18/2016   • Osteoporosis 5/18/2016   • Sepsis     uro   • Type 2 diabetes mellitus  Chronic kidney disease  History of UTI   5/18/2016     Past Surgical History:   Procedure Laterality Date   • APPENDECTOMY     • BACK SURGERY     • CHOLECYSTECTOMY     • KYPHOPLASTY N/A 12/7/2017    Procedure: KYPHOPLASTY L4;  Surgeon: Marc Pham MD;  Location: [facility];  Service:            Family history:   Family History   Problem Relation Age of Onset   • Diabetes Mother    • No Known Problems Father    • No Known Problems Sister    • No Known Problems Brother    • No Known Problems Son    • No Known Problems Daughter        Social history:   Social History     Social History   • Marital status:      Spouse name: N/A   • Number of children: N/A   • Years of education: N/A     Occupational History   • Retired from [employer]      Social History Main Topics   • Smoking status: Former Smoker     Types: Cigarettes     Quit date: 2007   • Smokeless tobacco: Never Used   • Alcohol use No   • Drug use: No   • Sexual activity: No     Other Topics  Concern   • Not on file     Social History Narrative   • No narrative on file     Current Outpatient Prescriptions on File Prior to Visit   Medication Sig Dispense Refill   • albuterol (PROVENTIL HFA;VENTOLIN HFA) 108 (90 BASE) MCG/ACT inhaler Inhale 2 puffs every 4 (four) hours as needed for wheezing. 1 inhaler 11   • alendronate (FOSAMAX) 70 MG tablet Take 1 tablet by mouth Every 7 (Seven) Days. 4 tablet 5   • aspirin 81 MG chewable tablet Chew 1 tablet Daily. 30 tablet 3   • atorvastatin (LIPITOR) 40 MG tablet Take 1 tablet by mouth Daily. 30 tablet 3   • carvedilol (COREG) 6.25 MG tablet Take 1 tablet by mouth 2 (Two) Times a Day With Meals for 30 days. 60 tablet 0   • glucose blood (ACCU-CHEK ANNIE PLUS) test strip Use once daily 100 each 12   • glucose blood test strip Use as instructed 100 each 12   • metFORMIN (GLUCOPHAGE) 850 MG tablet Take 1 tablet by mouth 2 (Two) Times a Day With Meals. 60 tablet 2   • nitroglycerin (NITROSTAT) 0.4 MG SL tablet Place 1 tablet under the tongue Every 5 (Five) Minutes As Needed for Chest Pain. Take no more than 3 doses in 15 minutes. 100 tablet 0   • pantoprazole (PROTONIX) 40 MG EC tablet Take 1 tablet by mouth Daily. 30 tablet 1   • TiZANidine (ZANAFLEX) 2 MG capsule Take 1 capsule by mouth 3 (Three) Times a Day. 60 capsule 0   • urea (CARMOL) 10 % cream APPLY TO AFFECTED AREA(S) TWO TIMES A DAY 57 g 0   • [DISCONTINUED] cyclobenzaprine (FLEXERIL) 10 MG tablet 1/2 po bid prn 30 tablet 0   • [DISCONTINUED] HYDROcodone-acetaminophen (NORCO)  MG per tablet Take 1 tablet by mouth Every 6 (Six) Hours As Needed for Severe Pain . 24 tablet 0     No current facility-administered medications on file prior to visit.          Review of Systems   Constitutional: Positive for activity change, appetite change and fatigue. Negative for chills, diaphoresis, fever and unexpected weight change.   HENT: Negative for congestion, dental problem, drooling, ear discharge, ear pain, facial  "swelling, hearing loss, mouth sores, nosebleeds, postnasal drip, rhinorrhea, sinus pressure, sneezing, sore throat, tinnitus, trouble swallowing and voice change.    Eyes: Negative for photophobia, pain, discharge, redness, itching and visual disturbance.   Respiratory: Negative for apnea, cough, choking, chest tightness, shortness of breath, wheezing and stridor.    Cardiovascular: Negative for chest pain, palpitations and leg swelling.   Gastrointestinal: Negative for abdominal distention, abdominal pain, anal bleeding, blood in stool, constipation, diarrhea, nausea, rectal pain and vomiting.   Endocrine: Negative for cold intolerance, heat intolerance, polydipsia, polyphagia and polyuria.   Genitourinary: Negative for decreased urine volume, difficulty urinating, dysuria, enuresis, flank pain, frequency, genital sores, hematuria and urgency.   Musculoskeletal: Positive for back pain, gait problem and myalgias. Negative for arthralgias, joint swelling, neck pain and neck stiffness.   Skin: Negative for color change, pallor, rash and wound.   Allergic/Immunologic: Negative for environmental allergies, food allergies and immunocompromised state.   Neurological: Negative for dizziness, tremors, seizures, syncope, facial asymmetry, speech difficulty, weakness, light-headedness, numbness and headaches.   Hematological: Negative for adenopathy. Does not bruise/bleed easily.   Psychiatric/Behavioral: Negative for agitation, behavioral problems, confusion, decreased concentration, dysphoric mood, hallucinations, self-injury, sleep disturbance and suicidal ideas. The patient is not nervous/anxious and is not hyperactive.    All other systems reviewed and are negative.      Objective   Blood pressure 126/68, temperature 97.8 °F (36.6 °C), temperature source Temporal Artery , height 154.9 cm (61\"), weight 109 kg (241 lb 3.2 oz), not currently breastfeeding.  Body mass index is 45.57 kg/(m^2).    Physical Exam "   Constitutional: She is oriented to person, place, and time. She appears well-developed and well-nourished. No distress.   Moderately obese, appears stated age   HENT:   Head: Normocephalic and atraumatic.   Eyes: EOM are normal. Pupils are equal, round, and reactive to light.   Neck: Normal range of motion. Neck supple.   Cardiovascular: Normal rate and regular rhythm.    Pulmonary/Chest: Effort normal. No respiratory distress.   Abdominal: Soft. She exhibits no distension.   Musculoskeletal: Normal range of motion. She exhibits no edema or deformity.   Neurological: She is alert and oriented to person, place, and time. She has normal reflexes. No cranial nerve deficit. Coordination normal.   Skin: Skin is warm and dry.   Lumbar incisions closed with two sutures on each side. These were cleaned well with alcohol and the sutures were removed.    Psychiatric: She has a normal mood and affect.     xrays of the thoracolumbar spine were taken today and reviewed. These show good filling of the L4 compression fracture, which is stable. There are diffuse degenerative changes, particularly at T-11/T-12 with a mild levoscoliosis.       Assessment/Plan   We will start Ms. Velázquez on a small dose of gabapentin (100mg BID). She will begin this at night for a few days, and then transistion to taking it twice a day if tolerated.  This may help with her burning pain.  We are in agreement with her PT and home health nurse that she would benefit from some inpatient rehab and will start this referral.  We will plan to see her back in about 6 weeks. If her leg pain persists despite these conservative treatments, we may get a new MRI, but hopefully we can keep her functional and moving and avoid pursuing further surgery.     Georgia was seen today for back pain.    Diagnoses and all orders for this visit:    Closed compression fracture of fourth lumbar vertebra, sequela    [place] [facility]  [phone]    Return in about 6 weeks (around 2/9/2018).

## 2017-12-29 NOTE — PROGRESS NOTES
Gabapentin 100mg 1 tab BID #45 NR called in to pt's pharmacy per Griselda.  Freida Prince, CMA 1:02PM

## 2018-01-02 NOTE — OUTREACH NOTE
Talked with patient about HRCM.  She is currently being followed by [facility], PT and OT.  -  States she still has some pain in right leg.  Uses a Walker to stand and walk.  Has all her meds. And voiced compliance.  Denies falls.  States she is doing ok.  Explained to patient CMANNIE. Will follow-up by phone after Home Health is completed.  She voiced understanding.

## 2018-01-03 DIAGNOSIS — K29.00 OTHER ACUTE GASTRITIS WITHOUT HEMORRHAGE: ICD-10-CM

## 2018-01-04 ENCOUNTER — TELEPHONE (OUTPATIENT)
Dept: FAMILY MEDICINE CLINIC | Facility: CLINIC | Age: 73
End: 2018-01-04

## 2018-01-04 ENCOUNTER — TELEPHONE (OUTPATIENT)
Dept: NEUROSURGERY | Facility: CLINIC | Age: 73
End: 2018-01-04

## 2018-01-04 ENCOUNTER — LAB REQUISITION (OUTPATIENT)
Dept: LAB | Facility: HOSPITAL | Age: 73
End: 2018-01-04

## 2018-01-04 DIAGNOSIS — Z00.00 ROUTINE GENERAL MEDICAL EXAMINATION AT A HEALTH CARE FACILITY: ICD-10-CM

## 2018-01-04 DIAGNOSIS — S32.040S CLOSED COMPRESSION FRACTURE OF FOURTH LUMBAR VERTEBRA, SEQUELA: Primary | ICD-10-CM

## 2018-01-04 LAB
BACTERIA UR QL AUTO: ABNORMAL /HPF
BILIRUB UR QL STRIP: NEGATIVE
CLARITY UR: ABNORMAL
COLOR UR: YELLOW
GLUCOSE UR STRIP-MCNC: NEGATIVE MG/DL
HGB UR QL STRIP.AUTO: NEGATIVE
HYALINE CASTS UR QL AUTO: ABNORMAL /LPF
KETONES UR QL STRIP: NEGATIVE
LEUKOCYTE ESTERASE UR QL STRIP.AUTO: ABNORMAL
NITRITE UR QL STRIP: NEGATIVE
PH UR STRIP.AUTO: 6 [PH] (ref 5–8)
PROT UR QL STRIP: NEGATIVE
RBC # UR: ABNORMAL /HPF
REF LAB TEST METHOD: ABNORMAL
SP GR UR STRIP: 1.02 (ref 1–1.03)
SQUAMOUS #/AREA URNS HPF: ABNORMAL /HPF
UROBILINOGEN UR QL STRIP: ABNORMAL
WBC UR QL AUTO: ABNORMAL /HPF

## 2018-01-04 PROCEDURE — 81001 URINALYSIS AUTO W/SCOPE: CPT

## 2018-01-04 NOTE — TELEPHONE ENCOUNTER
Provider:  Edy Pham  Caller: JEANMARIE Thomas at [facility]  Time of call:  10:51am   Phone #:  [phone]  Surgery: Kypho L4  Surgery Date:    12/07/17   Last visit:   12/29/17  Next visit: LORENA RAO:         Reason for call:         JEANMARIE Thomas from [facility] reports that the pt was mobile yesterday- walking with walker, etc.  Today pt is c/o new symptoms including increased LBP and sharp shooting pains in both legs that comes in waves.   Pain is so severe that pt is unable to bear weight, participate in PT, get up to use bathroom which she is currently using bed pan and has difficulty even rolling to side for placement of bed pan.  She has not had any BB issues and denies any numbness in her extremities or elsewhere.  She is currently taking Gabapentin and Tizanidine.

## 2018-01-04 NOTE — TELEPHONE ENCOUNTER
Spoke to Martha, she would like the patient to see Griselda on Friday if possible. Also said that the patient's daughter said that Griselda mentioned getting a MRI at the last appt, wanted to check that.     I spoke to Griselda, she opened a slot on her schedule tomorrow and I rescheduled the patient for 12:00 tomorrow afternoon in case she needs to be admitted. Griselda said they would discuss possible Tramadol at f/u appt.     I called Martha back and adv of the above, she understood. Adv that the patient should be brought to the hospital around 11:00 so that she can get the xrays done at the [facility] hospital before her appt, she understood.

## 2018-01-04 NOTE — TELEPHONE ENCOUNTER
Called the [facility] @ [phone] and spoke to JEANMARIE Thomas. Asked firstly that they do a UA on the patient to make sure she doesn't have a UTI, she said she would add the order to the labs the patient had ordered already for her. Adv that we would order some xrays for the patient and see her back in the office, she wanted to go ahead and schedule it. All the PA's were booked Friday and Tuesday so I scheduled patient for next Wed 01/10 @ 10:30 with [facility].     She asked if the patient could have tylenol for pain, adv her that I would have to ask Griselda and I would call her back, she understood.     Spoke to Griselda who asked me to order thoracic and lumbar ap/lateral xrays for the patient and that the patient could have tylenol or Tramadol. She did say that she would open up her schedule for Friday if the patient was in so much pain that she needed to be seen sooner.      Attempted to call Martha back, was unable to reach a nurse and was re-routed back to the main option line every time I tried to connect to a nurse. Will try to call back soon.

## 2018-01-04 NOTE — TELEPHONE ENCOUNTER
radha was informed to expect an answer by the end of January when a MD returns.       ----- Message from Chaz Rico DNP, APRN sent at 1/4/2018 12:18 PM EST -----  Contact: [phone]  Let her know it will be after 1/15 when dr quinonez gets back and to re-fax it.  ----- Message -----     From: Shannan Lo MA     Sent: 1/4/2018  11:58 AM       To: Chaz Rico DNP, APRN        ----- Message -----     From: Georgina Hines MA     Sent: 1/4/2018  11:54 AM       To: Shannan Lo MA    Minerva, from [facility], called to check the status of the order for patient's diabetic shoes. States she spoke with you before the holidays, and you were just waiting for the order to be signed.    [phone]

## 2018-01-04 NOTE — TELEPHONE ENCOUNTER
Tried to call, but the person answering (a resident?) does not know who I am asking for or how to connect me.  If she is having new pain, we need to get her in for xrays and a follow up, preferably when Dr. Pham is here to review the images.    The [facility] should also check a UA to make sure her UTI is not flaring up again as well.

## 2018-01-05 ENCOUNTER — APPOINTMENT (OUTPATIENT)
Dept: MRI IMAGING | Facility: HOSPITAL | Age: 73
End: 2018-01-05
Attending: EMERGENCY MEDICINE

## 2018-01-05 ENCOUNTER — HOSPITAL ENCOUNTER (EMERGENCY)
Facility: HOSPITAL | Age: 73
Discharge: HOME | End: 2018-01-05
Attending: EMERGENCY MEDICINE | Admitting: EMERGENCY MEDICINE

## 2018-01-05 ENCOUNTER — TELEPHONE (OUTPATIENT)
Dept: FAMILY MEDICINE CLINIC | Facility: CLINIC | Age: 73
End: 2018-01-05

## 2018-01-05 ENCOUNTER — TELEPHONE (OUTPATIENT)
Dept: NEUROSURGERY | Facility: CLINIC | Age: 73
End: 2018-01-05

## 2018-01-05 VITALS
HEIGHT: 61 IN | WEIGHT: 242 LBS | SYSTOLIC BLOOD PRESSURE: 160 MMHG | DIASTOLIC BLOOD PRESSURE: 88 MMHG | HEART RATE: 86 BPM | RESPIRATION RATE: 18 BRPM | TEMPERATURE: 97.6 F | OXYGEN SATURATION: 98 % | BODY MASS INDEX: 45.69 KG/M2

## 2018-01-05 DIAGNOSIS — M48.56XS COMPRESSION FRACTURE OF LUMBAR VERTEBRAE, NON-TRAUMATIC, SEQUELA: ICD-10-CM

## 2018-01-05 DIAGNOSIS — M54.42 BILATERAL LOW BACK PAIN WITH BILATERAL SCIATICA, UNSPECIFIED CHRONICITY: Primary | ICD-10-CM

## 2018-01-05 DIAGNOSIS — M54.41 BILATERAL LOW BACK PAIN WITH BILATERAL SCIATICA, UNSPECIFIED CHRONICITY: Primary | ICD-10-CM

## 2018-01-05 LAB
BACTERIA UR QL AUTO: ABNORMAL /HPF
BILIRUB UR QL STRIP: NEGATIVE
CLARITY UR: ABNORMAL
COLOR UR: YELLOW
GLUCOSE UR STRIP-MCNC: NEGATIVE MG/DL
HGB UR QL STRIP.AUTO: NEGATIVE
HYALINE CASTS UR QL AUTO: ABNORMAL /LPF
KETONES UR QL STRIP: NEGATIVE
LEUKOCYTE ESTERASE UR QL STRIP.AUTO: NEGATIVE
NITRITE UR QL STRIP: NEGATIVE
PH UR STRIP.AUTO: 6 [PH] (ref 5–8)
PROT UR QL STRIP: ABNORMAL
RBC # UR: ABNORMAL /HPF
REF LAB TEST METHOD: ABNORMAL
SP GR UR STRIP: 1.02 (ref 1–1.03)
SQUAMOUS #/AREA URNS HPF: ABNORMAL /HPF
UROBILINOGEN UR QL STRIP: ABNORMAL
WBC UR QL AUTO: ABNORMAL /HPF

## 2018-01-05 PROCEDURE — 72148 MRI LUMBAR SPINE W/O DYE: CPT

## 2018-01-05 PROCEDURE — 99284 EMERGENCY DEPT VISIT MOD MDM: CPT

## 2018-01-05 PROCEDURE — 81001 URINALYSIS AUTO W/SCOPE: CPT | Performed by: EMERGENCY MEDICINE

## 2018-01-05 PROCEDURE — 25010000002 KETOROLAC TROMETHAMINE PER 15 MG: Performed by: EMERGENCY MEDICINE

## 2018-01-05 PROCEDURE — 96372 THER/PROPH/DIAG INJ SC/IM: CPT

## 2018-01-05 RX ORDER — TRAMADOL HYDROCHLORIDE 50 MG/1
50 TABLET ORAL ONCE
Status: COMPLETED | OUTPATIENT
Start: 2018-01-05 | End: 2018-01-05

## 2018-01-05 RX ORDER — TRAMADOL HYDROCHLORIDE 50 MG/1
50 TABLET ORAL 2 TIMES DAILY PRN
Qty: 20 TABLET | Refills: 0 | Status: SHIPPED | OUTPATIENT
Start: 2018-01-05 | End: 2018-01-12 | Stop reason: SDUPTHER

## 2018-01-05 RX ORDER — KETOROLAC TROMETHAMINE 15 MG/ML
7.5 INJECTION, SOLUTION INTRAMUSCULAR; INTRAVENOUS ONCE
Status: DISCONTINUED | OUTPATIENT
Start: 2018-01-05 | End: 2018-01-05

## 2018-01-05 RX ORDER — GABAPENTIN 100 MG/1
100 CAPSULE ORAL ONCE
Status: COMPLETED | OUTPATIENT
Start: 2018-01-05 | End: 2018-01-05

## 2018-01-05 RX ORDER — SODIUM CHLORIDE 0.9 % (FLUSH) 0.9 %
10 SYRINGE (ML) INJECTION AS NEEDED
Status: DISCONTINUED | OUTPATIENT
Start: 2018-01-05 | End: 2018-01-05 | Stop reason: HOSPADM

## 2018-01-05 RX ORDER — KETOROLAC TROMETHAMINE 15 MG/ML
7.5 INJECTION, SOLUTION INTRAMUSCULAR; INTRAVENOUS ONCE
Status: COMPLETED | OUTPATIENT
Start: 2018-01-05 | End: 2018-01-05

## 2018-01-05 RX ORDER — GABAPENTIN 100 MG/1
CAPSULE ORAL
Qty: 150 CAPSULE | Refills: 0 | Status: SHIPPED | OUTPATIENT
Start: 2018-01-05 | End: 2018-01-16 | Stop reason: SDUPTHER

## 2018-01-05 RX ADMIN — KETOROLAC TROMETHAMINE 7.5 MG: 15 INJECTION, SOLUTION INTRAMUSCULAR; INTRAVENOUS at 13:44

## 2018-01-05 RX ADMIN — TRAMADOL HYDROCHLORIDE 50 MG: 50 TABLET, COATED ORAL at 15:42

## 2018-01-05 RX ADMIN — GABAPENTIN 100 MG: 100 CAPSULE ORAL at 15:42

## 2018-01-05 NOTE — TELEPHONE ENCOUNTER
Call patient.  Patient is currently in [facility] ER.  ER doctor has ordered MRI.  And ER doctor said that he would discuss with Edy.  After the scan.    Patient is having radicular pain into both legs.  Now left goes from lateral hip into the knee and right goes down the lateral aspect to the ankle.    Patient was already at the ER when I called and scan was Rito and motion.

## 2018-01-05 NOTE — TELEPHONE ENCOUNTER
Provider:  William  Caller: Oma[facility]   Phone #:  [phone]  Surgery:  Kypho L4  Surgery Date:  12/7/17  Last visit:   12/29/17  Next visit: Today(pt unable to make appt)    MAIRA:         Reason for call:           Pt daughter called, states that Pt will not be able to make appt today, states that transport needed a 24 hour notice to bring pt. States that pt is having increased pain on her left side, is currently a pt at the [facility]. Requests to speak with Griselda regarding pt.

## 2018-01-05 NOTE — TELEPHONE ENCOUNTER
----- Message from Shannan Lo MA sent at 1/5/2018 11:17 AM EST -----  Regarding: FW: PLEASE CALL  Contact: [phone]  I spoke with Mrs. Patel and she informed me that Ms. Velázquez is going to the ER due to increased back pain. They wanted to talk to you because the  neurosurgeon would not increase her  Gabapentin.   ----- Message -----     From: Adelina Washington     Sent: 1/5/2018  10:45 AM       To: Shannan Lo MA  Subject: PLEASE CALL                                      JARVIS PATEL [phone] DAUGHTER PLEASE CALL    JOVAN GEORGES SAID JARVIS NEEDS TO SPEAK TO [facility] BECAUSE NEUROSURGEON AT [facility] WILL NOT RETURN THERE CALL.    PT IS AT REHAB AT [facility]        Patient in ER with severe back pain with radiculopathy.

## 2018-01-05 NOTE — ED PROVIDER NOTES
"Subjective   HPI Comments: Ms. Georgia Velázquez is a 72 year old female who presents to the ED with c/o back pain and bilateral leg pain. She recently had a compression fracture of the L4 and Dr. Pham performed a kyphoplasty and she was discharged home. Home health has been taking care of her in her daughter's home. She developed pain in her right leg which she states was an 8/10 at it's worst and she was started on Gabapentin 100 mg twice a day. She started rehab 3 days ago and developed the same pain in her left leg which was so severe that she couldn't ambulate or even get out of bed. She states Dr. Pham wouldn't increase the Gabapentin dosage without re-evaluating the patient. She also states she feels numbness and tingling \"after the pain\". She also states her back pain is not new or worsening. She reports a history of DM. There are no other known complaints at this time.    Patient is a 72 y.o. female presenting with lower extremity pain.   History provided by:  Patient and relative  Lower Extremity Issue   Location:  Leg  Leg location:  R leg and L leg  Pain details:     Radiates to:  Does not radiate    Severity:  Severe    Onset quality:  Sudden    Timing:  Constant    Progression:  Worsening  Chronicity:  New  Foreign body present:  No foreign bodies  Tetanus status:  Unknown  Prior injury to area:  Yes  Relieved by:  None tried (Compression fracture of L4)  Worsened by:  Nothing  Ineffective treatments:  None tried  Associated symptoms: back pain, numbness and tingling        Review of Systems   Musculoskeletal: Positive for back pain.        Bilateral leg pain.   Neurological: Positive for numbness.   All other systems reviewed and are negative.      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   • CHF (congestive heart failure)    • COPD (chronic obstructive pulmonary disease)    • Coronary artery disease 5/18/2016   • Essential hypertension 5/18/2016 "   • GERD (gastroesophageal reflux disease) 5/18/2016   • History of echocardiogram 10/16/2015    TDS.Est EF is 45-50%.Mild distal septal, anteroapical hypokinesia.Mild mitral stenosis.Mean gradient across mitral valve is 6 mmHg.Trace TR   • Hyperlipemia 5/18/2016   • Myocardial infarction 5/18/2016   • Osteoporosis 5/18/2016   • Sepsis     uro   • Type 2 diabetes mellitus 5/18/2016       No Known Allergies    Past Surgical History:   Procedure Laterality Date   • APPENDECTOMY     • BACK SURGERY     • CHOLECYSTECTOMY     • KYPHOPLASTY N/A 12/7/2017    Procedure: KYPHOPLASTY L4;  Surgeon: Marc Pham MD;  Location: [facility];  Service:        Family History   Problem Relation Age of Onset   • Diabetes Mother    • No Known Problems Father    • No Known Problems Sister    • No Known Problems Brother    • No Known Problems Son    • No Known Problems Daughter        Social History     Social History   • Marital status:      Spouse name: N/A   • Number of children: N/A   • Years of education: N/A     Occupational History   • Retired from [employer]      Social History Main Topics   • Smoking status: Former Smoker     Types: Cigarettes     Quit date: 2007   • Smokeless tobacco: Never Used   • Alcohol use No   • Drug use: No   • Sexual activity: No     Other Topics Concern   • None     Social History Narrative         Objective   Physical Exam   Constitutional: She is oriented to person, place, and time. She appears well-developed and well-nourished.   HENT:   Head: Normocephalic and atraumatic.   Nose: Nose normal.   Eyes: Conjunctivae are normal. No scleral icterus.   Neck: Normal range of motion.   Cardiovascular: Normal rate, regular rhythm, normal heart sounds and intact distal pulses.    Pulmonary/Chest: Effort normal and breath sounds normal. No respiratory distress.   Abdominal: Soft. There is no tenderness.   Musculoskeletal: She exhibits edema (bilateral lower extremities).   Neurological: She is alert  and oriented to person, place, and time. She has normal reflexes.   Sensory and motor neuro intact.   Skin: Skin is warm and dry.   Chronic statis dermatitis of bilateral lower extremities.     Psychiatric: She has a normal mood and affect. Her behavior is normal.   Nursing note and vitals reviewed.      Procedures         ED Course  ED Course   Comment By Time   Dr. Downing spoke with GEORGIA Koroma neurosurgery, who agrees with imaging and will see the patient here in the ED afterwards. Lonnie AYALA Candelario 01/05 1247   I reviewed the case with Miss Davenport with neurosurgery.  She personally evaluated the patient here in the ER and reviewed the MRI with her attending physician.  They recommend increasing the dose of Neurontin, writing Ultram, and having the patient follow-up with Dr. Pham in 3 weeks. Ye Downing MD 01/05 1513   Dr. Downing is at the bedside notifying the patient of the lab and radiology findings and plan of discharge. Patient is agreeable. Lonnie Palomino 01/05 1526     Recent Results (from the past 24 hour(s))   Urinalysis With / Culture If Indicated - Urine, Clean Catch    Collection Time: 01/05/18  1:52 PM   Result Value Ref Range    Color, UA Yellow Yellow, Straw    Appearance, UA Cloudy (A) Clear    pH, UA 6.0 5.0 - 8.0    Specific Gravity, UA 1.025 1.001 - 1.030    Glucose, UA Negative Negative    Ketones, UA Negative Negative    Bilirubin, UA Negative Negative    Blood, UA Negative Negative    Protein, UA Trace (A) Negative    Leuk Esterase, UA Negative Negative    Nitrite, UA Negative Negative    Urobilinogen, UA 0.2 E.U./dL 0.2 - 1.0 E.U./dL   Urinalysis, Microscopic Only - Urine, Clean Catch    Collection Time: 01/05/18  1:52 PM   Result Value Ref Range    RBC, UA 0-2 None Seen, 0-2 /HPF    WBC, UA 0-2 (A) None Seen /HPF    Bacteria, UA None Seen None Seen, Trace /HPF    Squamous Epithelial Cells, UA 7-12 (A) None Seen, 0-2 /HPF    Hyaline Casts, UA 0-6 0 - 6 /LPF    Methodology  "Automated Microscopy      Note: In addition to lab results from this visit, the labs listed above may include labs taken at another facility or during a different encounter within the last 24 hours. Please correlate lab times with ED admission and discharge times for further clarification of the services performed during this visit.    MRI Lumbar Spine Without Contrast   Final Result   1.  Significant fragmented compression fracture L4 vertebral body is   noted without anterior subluxation.   2.  Retropulsion of fragmented bone from the posterior superior middle   column of L4 producing dominant dural sac defect which in combination   with facet arthropathy and bossing produces significant central and   lateral recess impingement stenosis. This degree of impingement may have   slightly increased centrally by 1 to 2 mm only, however, this could be   technical related. Significant or high grade change is not identified in   the central and lateral recess impingement even though the patient has   had a kyphoplasty over the interval.   3.  There is a small soft disc protrusion L5-S1 producing an anterior   rightward effacement of the right nerve root sleeve at L5-S1. This area   has been noted previously, however, the projections are slightly   different to determine slight progression of the anterior rightward   L5-S1 disc protrusion.   4.  No other high grade interval change is noted since one month ago.        D:  01/05/2018   E:  01/05/2018       This report was finalized on 1/5/2018 2:16 PM by Dr. Curt Hills MD.            Vitals:    01/05/18 1133 01/05/18 1230 01/05/18 1555   BP: 168/76 178/85 160/88   BP Location: Left arm  Left arm   Patient Position: Sitting  Sitting   Pulse: 88  86   Resp: 20  18   Temp: 97.6 °F (36.4 °C)     SpO2: 95% 97% 98%   Weight: 110 kg (242 lb)     Height: 154.9 cm (61\")       Medications   ketorolac (TORADOL) injection 7.5 mg (7.5 mg Intramuscular Given 1/5/18 1344)   gabapentin " (NEURONTIN) capsule 100 mg (100 mg Oral Given 1/5/18 1542)   traMADol (ULTRAM) tablet 50 mg (50 mg Oral Given 1/5/18 1542)     ECG/EMG Results (last 24 hours)     ** No results found for the last 24 hours. **                        MDM  Number of Diagnoses or Management Options  Bilateral low back pain with bilateral sciatica, unspecified chronicity:   Compression fracture of lumbar vertebrae, non-traumatic, sequela:      Amount and/or Complexity of Data Reviewed  Clinical lab tests: reviewed  Tests in the radiology section of CPT®: reviewed  Decide to obtain previous medical records or to obtain history from someone other than the patient: yes  Obtain history from someone other than the patient: yes  Review and summarize past medical records: yes  Discuss the patient with other providers: yes  Independent visualization of images, tracings, or specimens: yes        Final diagnoses:   Bilateral low back pain with bilateral sciatica, unspecified chronicity   Compression fracture of lumbar vertebrae, non-traumatic, sequela       Documentation assistance provided by vic Palomino.  Information recorded by the scribe was done at my direction and has been verified and validated by me.     Lonnie Palomino  01/05/18 1246       Lonnie Palomino  01/05/18 1533       Ye Downing MD  01/05/18 5468

## 2018-01-05 NOTE — TELEPHONE ENCOUNTER
I reviewed the MRI with Dr. Pham. She has some moderate lumbar stenosis, but no new fracture or acute problem.  We will increase her neurontin to 100mg in the morning, 100mg in the afternoon and 300mg at night.  She was given Tramadol BID in the ER as well.  Dr. Pham would like her to participate fully with PT at the [facility] for a few weeks and see him in the office in about 3 weeks with flexion/extension xrays of the lumbar spine.   I saw the patient in the ER and reviewed her MRI with her and with her daughter and discussed Dr. Pham's recommendation, and communicated this with Dr. Downing in the ER.

## 2018-01-07 RX ORDER — PANTOPRAZOLE SODIUM 40 MG/1
TABLET, DELAYED RELEASE ORAL
Qty: 30 TABLET | Refills: 0 | Status: SHIPPED | OUTPATIENT
Start: 2018-01-07 | End: 2018-02-11 | Stop reason: SDUPTHER

## 2018-01-08 ENCOUNTER — PATIENT OUTREACH (OUTPATIENT)
Dept: CASE MANAGEMENT | Facility: OTHER | Age: 73
End: 2018-01-08

## 2018-01-08 NOTE — OUTREACH NOTE
Skilled Nursing Facility Discharge Flowsheet:     Skilled Nursing Facility Discharge Assessment 1/8/2018   Acute Facility Discharged From [facility]   Acute Discharge Date 1/3/2018   Name [facility] the Skilled Nursing Facility? [facility]   Tier Level of the Skilled Nursing Facility 4   Purpose of SNF Admission PT;OT   Estimated length of stay for the patient? 8 WEEKS   Who is the insurance provider or payor of patient stay? Medicare   Progression of Patient? GETTING THERAPY - PER REVIEW OF EMR, PATIENT WAS DC'd FROM [facility]. HOSP. ON 12-8-17, TO HOME WITH HOME HEALTH.  PER NAVJOT IN BUSINESS OFFICE  AT THE [facility], PATIENT ADMITTED TO SNF ON 1-3-18 FROM HOME, AND IS UNDER MEDICARE (WITHIN 30-DAY WINDOW OF LAST HOSPITALIZATION).

## 2018-01-12 ENCOUNTER — OFFICE VISIT (OUTPATIENT)
Dept: FAMILY MEDICINE CLINIC | Facility: CLINIC | Age: 73
End: 2018-01-12

## 2018-01-12 VITALS
SYSTOLIC BLOOD PRESSURE: 132 MMHG | WEIGHT: 244 LBS | TEMPERATURE: 98 F | DIASTOLIC BLOOD PRESSURE: 82 MMHG | HEART RATE: 73 BPM | OXYGEN SATURATION: 96 % | RESPIRATION RATE: 16 BRPM | HEIGHT: 61 IN | BODY MASS INDEX: 46.07 KG/M2

## 2018-01-12 DIAGNOSIS — I10 ESSENTIAL HYPERTENSION: ICD-10-CM

## 2018-01-12 DIAGNOSIS — I25.83 CORONARY ARTERY DISEASE DUE TO LIPID RICH PLAQUE: ICD-10-CM

## 2018-01-12 DIAGNOSIS — E78.5 HYPERLIPIDEMIA, UNSPECIFIED HYPERLIPIDEMIA TYPE: ICD-10-CM

## 2018-01-12 DIAGNOSIS — I25.10 CORONARY ARTERY DISEASE DUE TO LIPID RICH PLAQUE: ICD-10-CM

## 2018-01-12 DIAGNOSIS — E66.01 MORBID OBESITY DUE TO EXCESS CALORIES (HCC): ICD-10-CM

## 2018-01-12 DIAGNOSIS — S32.040A CLOSED COMPRESSION FRACTURE OF FOURTH LUMBAR VERTEBRA, INITIAL ENCOUNTER: Primary | ICD-10-CM

## 2018-01-12 DIAGNOSIS — E11.9 TYPE 2 DIABETES MELLITUS WITHOUT COMPLICATION, UNSPECIFIED LONG TERM INSULIN USE STATUS: ICD-10-CM

## 2018-01-12 PROCEDURE — 99214 OFFICE O/P EST MOD 30 MIN: CPT | Performed by: NURSE PRACTITIONER

## 2018-01-12 RX ORDER — TRAMADOL HYDROCHLORIDE 50 MG/1
50 TABLET ORAL 2 TIMES DAILY PRN
Qty: 30 TABLET | Refills: 0 | Status: SHIPPED | OUTPATIENT
Start: 2018-01-12 | End: 2018-02-16 | Stop reason: SDUPTHER

## 2018-01-12 NOTE — PROGRESS NOTES
Subjective   Georgia Velázquez is a 72 y.o. female.     History of Present Illness Ms Velázquez presents with her son, daughter was on speaker phone. Currently she is in the [facility] at [facility]. Since her surgery she has had persistent back pain that radiates down the right leg. She was seen in the ER and MRI showed spinal stenosis. She is on gabapentin and ultram bid. States she can only do PT every other day due to pain. Pain is sharp and feels like the leg with give out. After the pain she will have numbness. States she cannot live alone at this time, but desires to be independent.    The following portions of the patient's history were reviewed and updated as appropriate: allergies, current medications, past family history, past medical history, past social history, past surgical history and problem list.    Review of Systems   Constitutional: Negative for fatigue, fever and unexpected weight change.   HENT: Negative for congestion, hearing loss, nosebleeds, rhinorrhea, sore throat, trouble swallowing and voice change.    Eyes: Negative for pain, discharge, redness and visual disturbance.   Respiratory: Negative for cough, chest tightness, shortness of breath and wheezing.    Cardiovascular: Negative for chest pain, palpitations and leg swelling.   Gastrointestinal: Negative for abdominal distention, abdominal pain, anal bleeding, blood in stool, constipation, diarrhea, nausea and vomiting.   Endocrine: Negative for cold intolerance, heat intolerance, polydipsia, polyphagia and polyuria.   Genitourinary: Negative for dysuria, flank pain, frequency and hematuria.   Musculoskeletal: Positive for back pain. Negative for arthralgias, gait problem, joint swelling and myalgias.   Skin: Negative for color change and rash.   Neurological: Positive for numbness. Negative for dizziness, tremors, seizures, syncope, speech difficulty, weakness and headaches.   Hematological: Negative.    Psychiatric/Behavioral: Negative.         Objective   Physical Exam   Constitutional: She is oriented to person, place, and time. She appears well-developed and well-nourished. No distress.   HENT:   Head: Normocephalic and atraumatic.   Right Ear: Tympanic membrane and external ear normal.   Left Ear: Tympanic membrane and external ear normal.   Nose: Nose normal.   Mouth/Throat: Oropharynx is clear and moist. No oropharyngeal exudate.   Eyes: Conjunctivae are normal. Pupils are equal, round, and reactive to light. Right eye exhibits no discharge. Left eye exhibits no discharge. No scleral icterus.   Neck: Neck supple. No tracheal deviation present. No thyromegaly present.   Cardiovascular: Normal rate, regular rhythm and normal heart sounds.  Exam reveals no gallop and no friction rub.    No murmur heard.  Pulmonary/Chest: Effort normal and breath sounds normal. No respiratory distress. She has no wheezes.   Abdominal: Soft. Bowel sounds are normal. She exhibits no distension and no mass. There is no tenderness.   Musculoskeletal: She exhibits no edema or deformity.   She can ambulate well if she uses the rolling walker. She is reluctant to take any steps without the walker and limps. No pain with palpation of the spine, SI joint and sciatic notch.   Lymphadenopathy:     She has no cervical adenopathy.   Neurological: She is alert and oriented to person, place, and time. Coordination normal.   Skin: Skin is warm and dry. No rash noted. No erythema.   Psychiatric: She has a normal mood and affect. Her speech is normal and behavior is normal. Judgment and thought content normal.   Nursing note and vitals reviewed.      Assessment/Plan   Georgia was seen today for back pain and leg pain.    Diagnoses and all orders for this visit:    Closed compression fracture of fourth lumbar vertebra, initial encounter  -     traMADol (ULTRAM) 50 MG tablet; Take 1 tablet by mouth 2 (Two) Times a Day As Needed for Moderate Pain .    Type 2 diabetes mellitus without  complication, unspecified long term insulin use status    Coronary artery disease due to lipid rich plaque    Essential hypertension    Hyperlipidemia, unspecified hyperlipidemia type    Morbid obesity due to excess calories      I reviewed her chart and talked at length with the entire family. We agree that she needs to try harder to participate in PT twice a day. She will have follow up appt with Dr Coelho in 2 weeks. We discussed that she may end up with a referral to pain management.   Discussed the nature of the disease including, risks, complications, implications, management, safe and proper use of medications. Encouraged therapeutic lifestyle changes including low calorie diet with plenty of fruits and vegetables, daily exercise, medication compliance, and keeping scheduled follow up appointments with me and any other providers. Encouraged patient to have appointment for complete physical, fasting labs, appropriate screenings, and immunizations on an annual basis.   As a part of this patient's therapy a controlled substance was prescribed. Instructed on the safe and proper use of this medication along with potential risks. Controlled substance contract signed and scanned. [facility] will be reviewed and UDS obtained as indicated. New contract signed. [facility] #[id number]

## 2018-01-15 ENCOUNTER — PATIENT OUTREACH (OUTPATIENT)
Dept: CASE MANAGEMENT | Facility: OTHER | Age: 73
End: 2018-01-15

## 2018-01-15 NOTE — OUTREACH NOTE
Skilled Nursing Facility Discharge Flowsheet:     Skilled Nursing Facility Discharge Assessment 1/15/2018   Acute Facility Discharged From [facility]   Acute Discharge Date 1/3/2018   Name of the Skilled Nursing Facility? [facility]   Tier Level of the Skilled Nursing Facility 4   Purpose of SNF Admission PT;OT   Estimated length of stay for the patient? 8 WEEKS   Who is the insurance provider or payor of patient stay? Medicare   Progression of Patient? Progressing with Therapy - Contact made with GENNY Swann at [facility].  No DC date set yet.

## 2018-01-16 RX ORDER — GABAPENTIN 100 MG/1
100 CAPSULE ORAL 3 TIMES DAILY
Qty: 60 CAPSULE | Refills: 0 | OUTPATIENT
Start: 2018-01-16 | End: 2018-02-16 | Stop reason: SDUPTHER

## 2018-01-16 NOTE — TELEPHONE ENCOUNTER
Provider:  Ankit  Caller:  Automated refill request  Surgery:  KYPHO L4  Surgery Date:  12/07/17  Last visit:   12/29/17  Next visit:  01/26/17    Reason for call:         Requested Prescriptions     Pending Prescriptions Disp Refills   • gabapentin (NEURONTIN) 100 MG capsule [Pharmacy Med Name: GABAPENTIN 100 MG CAPSULE] 45 capsule 0     Sig: TAKE ONE CAPSULE BY MOUTH TWICE A DAY

## 2018-01-22 ENCOUNTER — PATIENT OUTREACH (OUTPATIENT)
Dept: CASE MANAGEMENT | Facility: OTHER | Age: 73
End: 2018-01-22

## 2018-01-22 NOTE — OUTREACH NOTE
Skilled Nursing Facility Discharge Flowsheet:     Skilled Nursing Facility Discharge Assessment 1/22/2018   Acute Facility Discharged From [facility]   Acute Discharge Date 1/3/2018   Name of the Skilled Nursing Facility? [facility]   Tier Level of the Skilled Nursing Facility 4   Purpose of SNF Admission PT;OT   Estimated length of stay for the patient? 8 WEEKS   Who is the insurance provider or payor of patient stay? Medicare   Progression of Patient? PROGRESSING WITH THERAPY - TALKED WITH GENNY TAVERAS AT THE [facility].  NO DC DATE SET YET.

## 2018-01-26 ENCOUNTER — OFFICE VISIT (OUTPATIENT)
Dept: NEUROSURGERY | Facility: CLINIC | Age: 73
End: 2018-01-26

## 2018-01-26 ENCOUNTER — HOSPITAL ENCOUNTER (OUTPATIENT)
Dept: GENERAL RADIOLOGY | Facility: HOSPITAL | Age: 73
Discharge: HOME | End: 2018-01-26
Admitting: PHYSICIAN ASSISTANT

## 2018-01-26 VITALS — WEIGHT: 237 LBS | BODY MASS INDEX: 44.75 KG/M2 | HEIGHT: 61 IN | TEMPERATURE: 98.4 F

## 2018-01-26 DIAGNOSIS — Z98.890 HISTORY OF KYPHOPLASTY: Primary | ICD-10-CM

## 2018-01-26 DIAGNOSIS — M51.36 DEGENERATIVE DISC DISEASE, LUMBAR: ICD-10-CM

## 2018-01-26 DIAGNOSIS — S32.040S CLOSED COMPRESSION FRACTURE OF FOURTH LUMBAR VERTEBRA, SEQUELA: ICD-10-CM

## 2018-01-26 DIAGNOSIS — S32.040D CLOSED COMPRESSION FRACTURE OF L4 LUMBAR VERTEBRA WITH ROUTINE HEALING, SUBSEQUENT ENCOUNTER: ICD-10-CM

## 2018-01-26 PROCEDURE — 72070 X-RAY EXAM THORAC SPINE 2VWS: CPT

## 2018-01-26 PROCEDURE — 72100 X-RAY EXAM L-S SPINE 2/3 VWS: CPT

## 2018-01-26 PROCEDURE — 99213 OFFICE O/P EST LOW 20 MIN: CPT | Performed by: NEUROLOGICAL SURGERY

## 2018-01-26 NOTE — PROGRESS NOTES
Patient: Georgia Velázquez  : 1945    Primary Care Provider: Chaz Rico, DNP, APRN    Requesting Provider: As above        History    Chief Complaint: Low back and right leg pain.    History of Present Illness: Ms. Velázquez is a 72-year-old woman who is seen in follow-up.  She was seen as an inpatient consultation and on 17 underwent L4 kyphoplasty.  She did well for a couple of weeks but then began developing increasing low back pain that extends somewhat vaguely into the right leg.  Sometimes the pain is on the front of her thigh toward the knee and at other times is on the back of her thigh.  Today she indicates it is better with walking and worse with changing positions.  She is currently in a formal rehabilitation setting.  Her pain comes and goes.  Ultram is very helpful.  She's taking Neurontin 2 in the morning, 1 in the afternoon, and 3 at night.    Review of Systems   Constitutional: Positive for activity change, appetite change and fatigue. Negative for chills, diaphoresis, fever and unexpected weight change.   HENT: Negative for congestion, dental problem, drooling, ear discharge, ear pain, facial swelling, hearing loss, mouth sores, nosebleeds, postnasal drip, rhinorrhea, sinus pressure, sneezing, sore throat, tinnitus, trouble swallowing and voice change.    Eyes: Negative for photophobia, pain, discharge, redness, itching and visual disturbance.   Respiratory: Negative for apnea, cough, choking, chest tightness, shortness of breath, wheezing and stridor.    Cardiovascular: Negative for chest pain, palpitations and leg swelling.   Gastrointestinal: Negative for abdominal distention, abdominal pain, anal bleeding, blood in stool, constipation, diarrhea, nausea, rectal pain and vomiting.   Endocrine: Negative for cold intolerance, heat intolerance, polydipsia, polyphagia and polyuria.   Genitourinary: Negative for decreased urine volume, difficulty urinating, dysuria, enuresis,  "flank pain, frequency, genital sores, hematuria and urgency.   Musculoskeletal: Positive for back pain, gait problem and myalgias. Negative for arthralgias, joint swelling, neck pain and neck stiffness.   Skin: Negative for color change, pallor, rash and wound.   Allergic/Immunologic: Negative for environmental allergies, food allergies and immunocompromised state.   Neurological: Negative for dizziness, tremors, seizures, syncope, facial asymmetry, speech difficulty, weakness, light-headedness, numbness and headaches.   Hematological: Negative for adenopathy. Does not bruise/bleed easily.   Psychiatric/Behavioral: Negative for agitation, behavioral problems, confusion, decreased concentration, dysphoric mood, hallucinations, self-injury, sleep disturbance and suicidal ideas. The patient is not nervous/anxious and is not hyperactive.    All other systems reviewed and are negative.      The patient's past medical history, past surgical history, family history, and social history have been reviewed at length in the electronic medical record.    Physical Exam:   Temp 98.4 °F (36.9 °C)  Ht 154.9 cm (61\")  Wt 108 kg (237 lb)  LMP  (LMP Unknown)  BMI 44.78 kg/m2  MUSCULOSKELETAL:  Straight leg raising is negative.  Giles's Sign is negative.  The patient ambulates with a fairly spry fashion utilizing a rolling walker.  Tenderness in the back to palpation is not observed.  NEUROLOGICAL:  Strength is intact in the lower extremities to direct testing.  Muscle tone is normal throughout.  Station and gait are normal.  Sensation is intact to light touch testing throughout.      Medical Decision Making    Data Review:   Plain films reveal good positioning of her methylmethacrylate at the L4 level.  MRI of the lumbar spine dated 1/5/18 demonstrate changes related to her prior kyphoplasty.  There is some narrowing at L3-4 and L4-5 due to facet overgrowth.  There is a small area of posterior bone displacement off of the " superior endplate of L4 as a result of her compression fracture.    Diagnosis:   It's unclear to me why the patient has started having recurrent symptoms that now extend down the leg.  We may be dealing with some degree of nerve root compromise that emanates from the L3-4 or L4-5 levels.    Treatment Options:   She will increase her Neurontin to 2 in the morning, 1 afternoon, and 3 at night.  I'm going to refer her for a lumbar epidural injection or 2.  She will follow up thereafter.  She should continue her Ultram as needed.       Diagnosis Plan   1. History of kyphoplasty  Ambulatory Referral to Pain Management   2. Closed compression fracture of L4 lumbar vertebra with routine healing, subsequent encounter     3. Degenerative disc disease, lumbar       Scribed for Marc Pham MD by Caprice Dietrich CMA on 01/26/2018 at 3:39 PM    I, Dr. Pham, personally performed the services described in the documentation, as scribed in my presence, and it is both accurate and complete.

## 2018-01-29 ENCOUNTER — PATIENT OUTREACH (OUTPATIENT)
Dept: CASE MANAGEMENT | Facility: OTHER | Age: 73
End: 2018-01-29

## 2018-01-29 ENCOUNTER — TELEPHONE (OUTPATIENT)
Dept: PAIN MEDICINE | Facility: CLINIC | Age: 73
End: 2018-01-29

## 2018-01-29 DIAGNOSIS — Z98.890 S/P KYPHOPLASTY: Primary | ICD-10-CM

## 2018-01-29 PROBLEM — M47.816 LUMBAR FACET ARTHROPATHY: Status: ACTIVE | Noted: 2018-01-29

## 2018-01-29 PROBLEM — E11.69 DIABETES MELLITUS TYPE 2 IN OBESE (HCC): Status: ACTIVE | Noted: 2018-01-29

## 2018-01-29 PROBLEM — M48.062 LUMBAR STENOSIS WITH NEUROGENIC CLAUDICATION: Status: ACTIVE | Noted: 2018-01-29

## 2018-01-29 PROBLEM — E66.01 MORBID OBESITY DUE TO EXCESS CALORIES (HCC): Status: ACTIVE | Noted: 2018-01-29

## 2018-01-29 PROBLEM — E66.9 DIABETES MELLITUS TYPE 2 IN OBESE (HCC): Status: ACTIVE | Noted: 2018-01-29

## 2018-01-29 RX ORDER — GABAPENTIN 100 MG/1
100 CAPSULE ORAL 3 TIMES DAILY
Qty: 120 CAPSULE | Refills: 3 | Status: SHIPPED | OUTPATIENT
Start: 2018-01-29 | End: 2018-01-30

## 2018-01-29 NOTE — PROGRESS NOTES
"Chief Complaint: \"Pain in my lower back and right leg.\"     History of Present Illness:   Patient: Ms. Georgia Velázquez, 72 y.o. female   Referring physician: Dr. Marc Pham  Reason for referral: Consultation for intractable lower back and leg pain.   Pain history: Patient reports a 1 month history of pain, which began after surgery. Pain has progressed in intensity over the past 2 weeks. Patient underwent kyphoplasty. Patient reports that current pain was present before her kyphoplasty.  Pain description: intermittent pain, described as sharp, shooting and stabbing sensation.   Radiation of pain: The lower back pain radiates down into the anterior aspect of the right thigh to the knee. At times the pain travels down into the left leg.  The thigh pain is more severe than the lower back pain.  Pain intensity today: 0/10 (sitting) 7/10 (standing or walking)  Average pain intensity last week: 4/10  Pain intensity ranges from: 0/10 to 8/10  Aggravating factors: Pain increases with twisting, bending, sitting longer than 5 minutes, standing longer than 5 minutes and ambulating more than 5 minutes.  Alleviating factors: Pain decreases with lying down.   Associated symptoms:   Patient reports pain but denies numbness or weakness in the lower extremities.   Patient denies any new bladder or bowel problems.   Patient denies difficulties with her balance but denies falls. Patient uses a walker (>1year).      Review of previous therapies and additional medical records:  Georgia Velázquez has already failed the following measures, including:   Conservative measures: oral analgesics, physical therapy and heat   Interventional measures: no interventional measures  Surgical measures: Patient underwent L4 kyphoplasty with very little relief the first 2 days, then, pain returned fully.  Georgia Velázquez underwent neurosurgical consultation with Dr. Marc Pham on 01/26/2018, and was found not to be a surgical candidate " at this time.  Georgia Velázquez presents with significant comorbidity including diabetes type 2, morbid obesity, osteoporosis, engaged in treatment.   In terms of current analgesics, Georgia Velázquez takes: tramadol, gabapentin, tizanidine  I have reviewed James Report #[id number] consistent to medication reconciliation.     Review of Diagnostic Studies:    EXAMINATION: MRI LUMBAR SPINE WO CONTRAST 01/05/2018. I have reviewed images. Compression fracture of L4 vertebral body treated with kyphoplasty. Compressed L4 vertebral body is fragmented with marked compression of the superior endplate, fragmentation, and posterior retropulsion with bossing of the posterior superior aspect of the middle column of L4. As a result, there is significant impingement and defect on the dural sac and on the cauda equina. This is compounded by facet hypertrophy. Substantial central and lateral recess stenosis produced by the compression fracture, posterior retropulsion, disc protrusion, and facet arthropathic bossing has slightly increased in the degree of central compression. Narrowing at L3-L4 and L4-L5 due to facet hypertrophy and a small area of posterior bone displacement off of the superior endplate of L4 from the compression fracture. No subluxation. No other compression fracture. No evidence of impingement at other levels. Mild soft disc protrusion at L5-S1 producing a slight rightward impingement on the right nerve root sleeve at L5-S1.   XR SPINE LUMBAR AP AND LATERAL- 01/26/2018. Minimal anterior displacement of L4 relative to L5. There is compression fracture and kyphoplasty at L4. There are degenerative changes at L5-S1 with disc space narrowing. There is facet hypertrophy in the lower lumbar region. There are no acute findings when compared with 12/28/2017. IMPRESSION: There are no acute findings and there has been no change since 12/28/2017.   XR SPINE THORACIC 2 VW - 01/26/2018. Overall normal alignment without  focal subluxation in the anterior or posterior direction of vertebral body segments. Vertebral body heights are fairly well-preserved with multilevel intervertebral disc height space loss and degenerative disc disease. Bridging osteophyte formations are noted. Facet arthropathy.    Review of Systems   Respiratory: Positive for shortness of breath.    Endocrine: Positive for polydipsia and polyuria.   Musculoskeletal: Positive for back pain.   All other systems reviewed and are negative.     Patient Active Problem List   Diagnosis   • Essential hypertension   • Type 2 diabetes mellitus   • Hyperlipemia   • Myocardial infarction   • Coronary artery disease   • Osteoporosis   • GERD (gastroesophageal reflux disease)   • Arthritis of shoulder   • Acute cystitis without hematuria   • Urinary tract infection   • Acute kidney injury   • Constipation   • Abdominal pain   • Leukocytosis   • Closed compression fracture of L4 lumbar vertebra   • Lumbar facet arthropathy   • Lumbar stenosis with neurogenic claudication   • Morbid obesity due to excess calories   • Diabetes mellitus type 2 in obese   • Physical deconditioning   • History of kyphoplasty       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   • CHF (congestive heart failure)    • COPD (chronic obstructive pulmonary disease)    • Coronary artery disease 5/18/2016   • Essential hypertension 5/18/2016   • Extremity pain    • GERD (gastroesophageal reflux disease) 5/18/2016   • History of echocardiogram 10/16/2015    TDS.Est EF is 45-50%.Mild distal septal, anteroapical hypokinesia.Mild mitral stenosis.Mean gradient across mitral valve is 6 mmHg.Trace TR   • Hyperlipemia 5/18/2016   • Low back pain    • Myocardial infarction 5/18/2016   • Osteoporosis 5/18/2016   • Sepsis     uro   • Type 2 diabetes mellitus 5/18/2016         Past Surgical History:   Procedure Laterality Date   • APPENDECTOMY     • BACK SURGERY     •  CHOLECYSTECTOMY     • KYPHOPLASTY N/A 12/7/2017    Procedure: KYPHOPLASTY L4;  Surgeon: Marc Pham MD;  Location: [facility];  Service:          Family History   Problem Relation Age of Onset   • Diabetes Mother    • No Known Problems Father    • No Known Problems Sister    • No Known Problems Brother    • No Known Problems Son    • No Known Problems Daughter          Social History     Social History   • Marital status:      Spouse name: N/A   • Number of children: N/A   • Years of education: N/A     Occupational History   • Retired from [employer]      Social History Main Topics   • Smoking status: Former Smoker     Types: Cigarettes     Quit date: 2007   • Smokeless tobacco: Never Used   • Alcohol use No   • Drug use: No   • Sexual activity: No     Other Topics Concern   • None     Social History Narrative        Current Outpatient Prescriptions:   •  alendronate (FOSAMAX) 70 MG tablet, Take 1 tablet by mouth Every 7 (Seven) Days., Disp: 4 tablet, Rfl: 5  •  aspirin 81 MG chewable tablet, Chew 1 tablet Daily., Disp: 30 tablet, Rfl: 3  •  gabapentin (NEURONTIN) 100 MG capsule, Take 1 capsule by mouth 3 (Three) Times a Day. Take 1 capsule in the morning, 1 capsule in the afternoon and 3 capsules at bedtime, Disp: 60 capsule, Rfl: 0  •  glucose blood (ACCU-CHEK ANNIE PLUS) test strip, Use once daily, Disp: 100 each, Rfl: 12  •  glucose blood test strip, Use as instructed, Disp: 100 each, Rfl: 12  •  metFORMIN (GLUCOPHAGE) 850 MG tablet, Take 1 tablet by mouth 2 (Two) Times a Day With Meals., Disp: 60 tablet, Rfl: 2  •  nitroglycerin (NITROSTAT) 0.4 MG SL tablet, Place 1 tablet under the tongue Every 5 (Five) Minutes As Needed for Chest Pain. Take no more than 3 doses in 15 minutes., Disp: 100 tablet, Rfl: 0  •  pantoprazole (PROTONIX) 40 MG EC tablet, TAKE ONE TABLET BY MOUTH DAILY, Disp: 30 tablet, Rfl: 0  •  TiZANidine (ZANAFLEX) 2 MG capsule, Take 1 capsule by mouth 3 (Three) Times a Day., Disp: 60  "capsule, Rfl: 0  •  traMADol (ULTRAM) 50 MG tablet, Take 1 tablet by mouth 2 (Two) Times a Day As Needed for Moderate Pain ., Disp: 30 tablet, Rfl: 0  •  urea (CARMOL) 10 % cream, APPLY TO AFFECTED AREA(S) TWO TIMES A DAY, Disp: 57 g, Rfl: 0      No Known Allergies      /80 (BP Location: Left arm, Patient Position: Sitting)  Pulse 57  Temp 97.8 °F (36.6 °C) (Temporal Artery )   Resp 20  Ht 154.9 cm (61\")  Wt 108 kg (238 lb)  LMP  (LMP Unknown)  SpO2 97%  BMI 44.97 kg/m2      Physical Exam:  Constitutional: Patient is oriented to person, place, and time. Vital signs are normal. Patient appears well-developed and well-nourished.   HENT: Head: Normocephalic and atraumatic. Eyes: Conjunctivae and lids are normal. Pupils: Equal, round, reactive to light.   Neck: Trachea normal. Neck supple. No JVD present.   Pulmonary Respiratory effort: No increased work of breathing or signs of respiratory distress. Auscultation of lungs: Clear to auscultation.   Cardiovascular Auscultation of heart: Normal rate and rhythm, normal S1 and S2, no murmurs.   Abdomen: The abdomen was soft and nontender. Bowel sounds were normal.   Musculoskeletal   Gait and station: Gait evaluation demonstrated shuffling. Walker dependent for ambulation  Lumbar spine: The range of motion of the lumbar spine is appropriate for her age and condition. Extension, flexion, lateral flexion, rotation of the lumbar spine did not increase or reproduce pain. Lumbar facet joint loading maneuvers are negative.   Giles and Gaenslen's tests are negative   Piriformis maneuvers are negative   Palpation of the bilateral ischial tuberosities, unrevealing   Palpation of the bilateral greater trochanter, unrevealing   Examination of the Iliotibial band: unrevealing   Hip joints: The range of motion of the hip joints is full and without pain   Neurological: Patient is alert and oriented to person, place, and time. Speech: speech is normal. Cortical function: " Normal mental status.   Cranial nerves: Cranial nerves 2-12 intact.   Reflex Scores:  Right patellar: 0+  Left patellar: 0+  Right achilles: 0+  Left achilles: 0+  Motor strength: 5/5  Motor Tone: normal tone.   Involuntary movements: none.   Superficial/Primitive Reflexes: primitive reflexes were absent.   Right Jerry: absent  Left Jerry: absent  Right ankle clonus: absent  Left ankle clonus: absent   Negative long tract signs. Straight leg raising test is negative. Femoral stretch sign is negative.   Sensation: No sensory loss. Sensory exam: intact to light touch, intact pain and temperature sensation, intact vibration sensation and normal proprioception.   Coordination: Normal finger to nose and heel to shin. Normal balance and negative. Romberg's sign negative.   Skin and subcutaneous tissue: Skin is warm and intact. No rash noted. No cyanosis.   Psychiatric: Judgment and insight: Normal. Orientation to person, place and time: Normal. Recent and remote memory: Intact. Mood and affect: Normal.     ASSESSMENT:   1. Lumbar stenosis with neurogenic claudication    2. Closed compression fracture of L4 lumbar vertebra with routine healing, subsequent encounter    3. History of kyphoplasty    4. Lumbar facet arthropathy    5. Coronary artery disease due to lipid rich plaque    6. Age related osteoporosis, unspecified pathological fracture presence    7. Morbid obesity due to excess calories    8. Diabetes mellitus type 2 in obese    9. Physical deconditioning      PLAN/MEDICAL DECISION MAKING: I had a lengthy conversation with Ms. Georgia Velázquez regarding her chronic pain condition and potential therapeutic options including risks, benefits, alternative therapies, to name a few. Patient has failed to obtain pain relief with conservative measures and previous surgical intervention, as referenced above. Patient presents with severe lower back and right leg pain. On 12/07/2017, patient underwent L4 kyphoplasty.  She did well for a couple of weeks but then began developing increasing lower back pain that extends vaguely into the right leg. Patient has failed conservative measures including formal rehabilitation,  Tramadol, gabapentin.I have reviewed all available patient's medical records as well as previous therapies as referenced above. X-rays reveal good positioning of methylmethacrylate at the L4 level. MRI of the lumbar spine 01/05/2018, revealed changes related to her prior kyphoplasty. There is narrowing at L3-L4 and L4-L5. Area of posterior bone displacement off of the superior endplate of L4 as a result of her compression fracture. Therefore, I have proposed the following plan:  1. Pharmacological measures: Reviewed. Discussed. Patient takes tramadol, gabapentin, tizanidine  2. Interventional pain management measures: Patient will be scheduled for diagnostic and therapeutic right L3-L4 and right L4-L5 transforaminal epidural steroid injection. We may repeat another epidural depending on patient's outcome.  Patient will follow-up with Dr. Pham thereafter.  3. Long-term rehabilitation efforts:  A. The patient does not have a history of falls. I did complete a risk assessment for falls. Fall precautions: Patient has been instructed regarding universal fall precautions, such as;   · Using gait aids a rolling walker at the appropriate height at all times for ambulation   · Removing all area rugs and coffee tables to create a safe environment at home  · Ensure clean, dry floors  · Wearing supportive footwear and properly fitting clothing  · Ensure bed/chair is appropriate height and patient's feet can touch the floor  · Using a shower transfer bench  · Using walk-in shower and having shower safety bars installed  · Ensure proper lighting, minimize glare  · Have nightlights operational and in use  · Participation in an exercise program for gait training, balance training and strength  · Ensure proper compliance and  organization of medications to avoid errors   · Avoid use of over the counter sedatives and alcohol consumption  · Ensure easy access to call bell, glasses, TV control, telephone  · Ensure glasses/hearing aids are in use or close by (on top of night table)  B.  Patient will start a comprehensive physical therapy program for water therapy, therapeutic exercise, core strengthening, gait and balance training and neurodynamics once pain is under control  C.  Start an exercise program such as water therapy  D.  Contrast therapy: Apply ice-packs for 15-20 minutes, followed by heating pads for 15-20 minutes to affected area   E.  Referral to [facility]  4.  The patient and her family have been instructed to contact my office with any questions or difficulties. The patient understands the plan and agrees to proceed accordingly.       Patient Care Team:  Chaz Rico DNP, APRN as PCP - General (Family Medicine)  Chaz Rico DNP, APRN as PCP - Claims Attributed  Carmine Pérez MD as Consulting Physician (Interventional Cardiology)  Tatiana Albarado RN as Care Coordinator (Population Health)  Marc Pham MD as Consulting Physician (Neurosurgery)  Prieto Flores MD as Consulting Physician (Pain Medicine)  Rajeev Sharif MD as Consulting Physician (Pulmonary Disease)     No orders of the defined types were placed in this encounter.        No future appointments.      Prieto Flores MD     EMR Dragon/Transcription disclaimer:  Much of this encounter note is an electronic transcription of spoken language to printed text. Electronic transcription of spoken language may permit erroneous, or at times, nonsensical words or phrases to be inadvertently transcribed. Although I have reviewed the note for such errors, some may still exist.

## 2018-01-29 NOTE — TELEPHONE ENCOUNTER
Rx printed- and signed by .     Printed OV note to fax Rx-      Everything has been faxed to Martha.      Encounter closed.

## 2018-01-29 NOTE — TELEPHONE ENCOUNTER
Provider:  Ankit  Surgery: Kypho L4   Surgery Date:  12/07/17  Last visit:   01/26/18  Next visit: PRN- referral to PM given at OV     James:12/03/2017 Oxycodone/Acetaminophen  325MG/7.5MG  1945 30 7 Edy Jimmy [facility] [id number] [place] 48 1  12/29/2017 Gabapentin 100MG 1945 45 22 Gerald Sandhu [facility] [address] [place] 1    01/19/2018 Gabapentin 100MG 1945 30 15 Von Unrug, Kalin [facility] 2    01/19/2018 Tramadol Hcl 50MG 1945 60 15 Von Unrug, Kalin [facility]is [facility] 20 2    01/22/2018 Gabapentin 300MG 1945 15 15 Von Unrug, Kalin Mathis [facility] 2      Reason for call: JEANMARIE Thomas from the [facility] called this morning and stated that pt's daughter said Meliton Lorenzo increased her mothers Gabapentin. - I confirmed with Martha that  did increase, Martha states she will need a new PRINTED RX w/ Sig and OV note  Faxed to [phone] attn Martha       -I have pended a aime RX- Please adjust #   SET TO PRINT- I WILL NEED TO FAX     [facility] OV note 01/26/18-   Treatment Options:   She will increase her Neurontin to 2 in the morning, 1 afternoon, and 3 at night.  I'm going to refer her for a lumbar epidural injection or 2.  She will follow up thereafter.  She should continue her Ultram as needed.

## 2018-01-29 NOTE — OUTREACH NOTE
Skilled Nursing Facility Discharge Flowsheet:     Skilled Nursing Facility Discharge Assessment 1/29/2018   Acute Facility Discharged From [facility]   Acute Discharge Date 1/3/2018   Name of the Skilled Nursing Facility? [facility]   Tier Level of the Skilled Nursing Facility 4   Purpose of SNF Admission PT;OT   Estimated length of stay for the patient? 2-1-18   Who is the insurance provider or payor of patient stay? Medicare   Progression of Patient? Contact made with GENNY Swann at [facility].  Plans are for patient to go home on Thursday, 2-1-18.

## 2018-01-30 ENCOUNTER — OFFICE VISIT (OUTPATIENT)
Dept: PAIN MEDICINE | Facility: CLINIC | Age: 73
End: 2018-01-30

## 2018-01-30 ENCOUNTER — TELEPHONE (OUTPATIENT)
Dept: FAMILY MEDICINE CLINIC | Facility: CLINIC | Age: 73
End: 2018-01-30

## 2018-01-30 VITALS
BODY MASS INDEX: 44.93 KG/M2 | SYSTOLIC BLOOD PRESSURE: 114 MMHG | HEART RATE: 57 BPM | WEIGHT: 238 LBS | RESPIRATION RATE: 20 BRPM | DIASTOLIC BLOOD PRESSURE: 80 MMHG | OXYGEN SATURATION: 97 % | TEMPERATURE: 97.8 F | HEIGHT: 61 IN

## 2018-01-30 DIAGNOSIS — I25.83 CORONARY ARTERY DISEASE DUE TO LIPID RICH PLAQUE: ICD-10-CM

## 2018-01-30 DIAGNOSIS — M47.816 LUMBAR FACET ARTHROPATHY: ICD-10-CM

## 2018-01-30 DIAGNOSIS — E66.9 DIABETES MELLITUS TYPE 2 IN OBESE (HCC): ICD-10-CM

## 2018-01-30 DIAGNOSIS — E11.69 DIABETES MELLITUS TYPE 2 IN OBESE (HCC): ICD-10-CM

## 2018-01-30 DIAGNOSIS — E66.01 MORBID OBESITY DUE TO EXCESS CALORIES (HCC): ICD-10-CM

## 2018-01-30 DIAGNOSIS — R53.81 PHYSICAL DECONDITIONING: ICD-10-CM

## 2018-01-30 DIAGNOSIS — S32.040D CLOSED COMPRESSION FRACTURE OF L4 LUMBAR VERTEBRA WITH ROUTINE HEALING, SUBSEQUENT ENCOUNTER: ICD-10-CM

## 2018-01-30 DIAGNOSIS — Z98.890 HISTORY OF KYPHOPLASTY: ICD-10-CM

## 2018-01-30 DIAGNOSIS — E66.01 MORBID OBESITY DUE TO EXCESS CALORIES (HCC): Primary | ICD-10-CM

## 2018-01-30 DIAGNOSIS — M81.0 AGE RELATED OSTEOPOROSIS, UNSPECIFIED PATHOLOGICAL FRACTURE PRESENCE: ICD-10-CM

## 2018-01-30 DIAGNOSIS — M48.062 LUMBAR STENOSIS WITH NEUROGENIC CLAUDICATION: ICD-10-CM

## 2018-01-30 DIAGNOSIS — I25.10 CORONARY ARTERY DISEASE DUE TO LIPID RICH PLAQUE: ICD-10-CM

## 2018-01-30 PROCEDURE — 99203 OFFICE O/P NEW LOW 30 MIN: CPT | Performed by: ANESTHESIOLOGY

## 2018-01-30 NOTE — TELEPHONE ENCOUNTER
[facility] will be faxing a new order form.       ----- Message from Chaz Rico DNP, APRN sent at 1/30/2018  8:26 AM EST -----  Regarding: RE: ORDER FOR DIABETIC SHOES  Contact: [phone]  They need to fax back a paper order and I will have Dr Sorensen sign it.  ----- Message -----     From: Shannan Lo MA     Sent: 1/30/2018   8:11 AM       To: Chaz Rico DNP, APRN  Subject: FW: ORDER FOR DIABETIC SHOES                      Where do we stand on that order?  ----- Message -----     From: Kasey García     Sent: 1/29/2018  11:59 AM       To: Shannan Lo MA  Subject: ORDER FOR DIABETIC SHOES                         EXT:[id number]  [facility] WITH [facility] REGARDING DIABETIC SHOE ORDER..

## 2018-02-05 ENCOUNTER — TELEPHONE (OUTPATIENT)
Dept: FAMILY MEDICINE CLINIC | Facility: CLINIC | Age: 73
End: 2018-02-05

## 2018-02-05 NOTE — TELEPHONE ENCOUNTER
Confirmed with chandler that they did receive the order for diabetes shoes today. 2/5/2018.      ----- Message from Kasey García sent at 1/29/2018 11:59 AM EST -----  Regarding: ORDER FOR DIABETIC SHOES  Contact: [phone]  EXT:[id number]  [facility] WITH [facility] REGARDING DIABETIC SHOE ORDER..

## 2018-02-06 ENCOUNTER — PATIENT OUTREACH (OUTPATIENT)
Dept: CASE MANAGEMENT | Facility: OTHER | Age: 73
End: 2018-02-06

## 2018-02-06 NOTE — OUTREACH NOTE
Skilled Nursing Facility Discharge Flowsheet:     Skilled Nursing Facility Discharge Assessment 2/6/2018   Acute Facility Discharged From [place]   Acute Discharge Date 1/3/2018   Name of the Skilled Nursing Facility? [facility]   Tier Level of the Skilled Nursing Facility 4   Purpose of SNF Admission PT;OT   Estimated length of stay for the patient? 2-1-18   Who is the insurance provider or payor of patient stay? Medicare   Progression of Patient? Verified with [facility] that patient did DC home on 2-1-18   Skilled Nursing Discharge Date? 2/1/2018   Where was the patient discharged to? Home

## 2018-02-07 ENCOUNTER — OUTSIDE FACILITY SERVICE (OUTPATIENT)
Dept: PAIN MEDICINE | Facility: CLINIC | Age: 73
End: 2018-02-07

## 2018-02-07 PROCEDURE — 64484 NJX AA&/STRD TFRM EPI L/S EA: CPT | Performed by: ANESTHESIOLOGY

## 2018-02-07 PROCEDURE — 99152 MOD SED SAME PHYS/QHP 5/>YRS: CPT | Performed by: ANESTHESIOLOGY

## 2018-02-07 PROCEDURE — 64483 NJX AA&/STRD TFRM EPI L/S 1: CPT | Performed by: ANESTHESIOLOGY

## 2018-02-08 ENCOUNTER — TELEPHONE (OUTPATIENT)
Dept: FAMILY MEDICINE CLINIC | Facility: CLINIC | Age: 73
End: 2018-02-08

## 2018-02-08 NOTE — TELEPHONE ENCOUNTER
----- Message from Chaz Rico DNP, APRN sent at 2/8/2018 11:53 AM EST -----  Okay. Thanks  ----- Message -----     From: Georgina Hines MA     Sent: 2/8/2018   9:48 AM       To: Chaz Rico DNP, DON    [facility] called stating that patient fell on 02/05/2018. States she has bruising on her bottom and shoulders. States patient saw Ortho yesterday, and did not think she needed to be seen here.  Just an FYI

## 2018-02-11 DIAGNOSIS — K29.00 OTHER ACUTE GASTRITIS WITHOUT HEMORRHAGE: ICD-10-CM

## 2018-02-11 DIAGNOSIS — R05.8 ACE-INHIBITOR COUGH: ICD-10-CM

## 2018-02-11 DIAGNOSIS — T46.4X5A ACE-INHIBITOR COUGH: ICD-10-CM

## 2018-02-11 RX ORDER — PANTOPRAZOLE SODIUM 40 MG/1
TABLET, DELAYED RELEASE ORAL
Qty: 30 TABLET | Refills: 5 | OUTPATIENT
Start: 2018-02-11 | End: 2019-03-21

## 2018-02-11 RX ORDER — VALSARTAN AND HYDROCHLOROTHIAZIDE 160; 25 MG/1; MG/1
TABLET ORAL
Qty: 30 TABLET | Refills: 5 | Status: SHIPPED | OUTPATIENT
Start: 2018-02-11 | End: 2019-06-14 | Stop reason: SDUPTHER

## 2018-02-13 ENCOUNTER — EPISODE CHANGES (OUTPATIENT)
Dept: CASE MANAGEMENT | Facility: OTHER | Age: 73
End: 2018-02-13

## 2018-02-16 ENCOUNTER — OFFICE VISIT (OUTPATIENT)
Dept: NEUROSURGERY | Facility: CLINIC | Age: 73
End: 2018-02-16

## 2018-02-16 ENCOUNTER — DOCUMENTATION (OUTPATIENT)
Dept: NEUROSURGERY | Facility: CLINIC | Age: 73
End: 2018-02-16

## 2018-02-16 VITALS — WEIGHT: 231 LBS | TEMPERATURE: 97.2 F | HEIGHT: 61 IN | BODY MASS INDEX: 43.61 KG/M2

## 2018-02-16 DIAGNOSIS — M51.36 DEGENERATIVE DISC DISEASE, LUMBAR: ICD-10-CM

## 2018-02-16 DIAGNOSIS — Z98.890 HISTORY OF KYPHOPLASTY: Primary | ICD-10-CM

## 2018-02-16 DIAGNOSIS — S32.040D CLOSED COMPRESSION FRACTURE OF L4 LUMBAR VERTEBRA WITH ROUTINE HEALING, SUBSEQUENT ENCOUNTER: ICD-10-CM

## 2018-02-16 DIAGNOSIS — S32.040A CLOSED COMPRESSION FRACTURE OF FOURTH LUMBAR VERTEBRA, INITIAL ENCOUNTER: ICD-10-CM

## 2018-02-16 PROCEDURE — 99213 OFFICE O/P EST LOW 20 MIN: CPT | Performed by: NEUROLOGICAL SURGERY

## 2018-02-16 RX ORDER — TRAMADOL HYDROCHLORIDE 50 MG/1
50 TABLET ORAL 2 TIMES DAILY PRN
Qty: 60 TABLET | Refills: 0 | OUTPATIENT
Start: 2018-02-16 | End: 2018-03-19 | Stop reason: SDUPTHER

## 2018-02-16 RX ORDER — GABAPENTIN 100 MG/1
100 CAPSULE ORAL TAKE AS DIRECTED
Qty: 180 CAPSULE | Refills: 1 | OUTPATIENT
Start: 2018-02-16 | End: 2018-07-16 | Stop reason: SDUPTHER

## 2018-02-16 NOTE — PROGRESS NOTES
Patient: Georgia Velázquez  : 1945    Primary Care Provider: Chaz Rico, DNP, APRN    Requesting Provider: As above      History    Chief Complaint: Low back and now left leg pain.    History of Present Illness: Ms. Velázquez is a 72-year-old woman who is seen in follow-up.  She was seen as an inpatient consultation and on 17 underwent L4 kyphoplasty.  She did well for a couple of weeks but then began developing increasing low back pain that extended somewhat vaguely into the right leg.  Since I've seen her last her pain is switched over to the left leg.  It extends from her buttock into the front of her thigh to the left knee.  Her right-sided symptoms are now absent.  She has no significant low back pain.  Her son reports that she is doing better overall.  Ultram is very helpful.  She's taking Neurontin 2 in the morning, 1 in the afternoon, and 3 at night.    Review of Systems   Constitutional: Negative for activity change, appetite change, chills, diaphoresis, fatigue, fever and unexpected weight change.   HENT: Negative for congestion, dental problem, drooling, ear discharge, ear pain, facial swelling, hearing loss, mouth sores, nosebleeds, postnasal drip, rhinorrhea, sinus pressure, sneezing, sore throat, tinnitus, trouble swallowing and voice change.    Eyes: Negative for photophobia, pain, discharge, redness, itching and visual disturbance.   Respiratory: Negative for apnea, cough, choking, chest tightness, shortness of breath, wheezing and stridor.    Cardiovascular: Negative for chest pain, palpitations and leg swelling.   Gastrointestinal: Negative for abdominal distention, abdominal pain, anal bleeding, blood in stool, constipation, diarrhea, nausea, rectal pain and vomiting.   Endocrine: Negative for cold intolerance, heat intolerance, polydipsia, polyphagia and polyuria.   Genitourinary: Negative for decreased urine volume, difficulty urinating, dysuria, enuresis, flank pain,  "frequency, genital sores, hematuria and urgency.   Musculoskeletal: Positive for joint swelling. Negative for arthralgias, back pain, gait problem, myalgias, neck pain and neck stiffness.   Skin: Negative for color change, pallor, rash and wound.   Allergic/Immunologic: Negative for environmental allergies, food allergies and immunocompromised state.   Neurological: Negative for dizziness, tremors, seizures, syncope, facial asymmetry, speech difficulty, weakness, light-headedness, numbness and headaches.   Hematological: Negative for adenopathy. Does not bruise/bleed easily.   Psychiatric/Behavioral: Negative for agitation, behavioral problems, confusion, decreased concentration, dysphoric mood, hallucinations, self-injury, sleep disturbance and suicidal ideas. The patient is not nervous/anxious and is not hyperactive.        The patient's past medical history, past surgical history, family history, and social history have been reviewed at length in the electronic medical record.    Physical Exam:   Temp 97.2 °F (36.2 °C)  Ht 154.9 cm (61\")  Wt 105 kg (231 lb)  LMP  (LMP Unknown)  BMI 43.65 kg/m2  MUSCULOSKELETAL:  Straight leg raising is negative.  Giles's Sign is negative.  Tenderness in the back to palpation is not observed.  NEUROLOGICAL:  Strength is intact in the lower extremities to direct testing.  Muscle tone is normal throughout.  Station and gait are normal.  Sensation is intact to light touch testing throughout.    Medical Decision Making    Data Review:   MRI of the lumbar spine dated 1/5/18 demonstrate changes related to her prior kyphoplasty.  There is some narrowing at L3-4 and L4-5 due to facet overgrowth.  There is a small area of posterior bone displacement off of the superior endplate of L4 as a result of her compression fracture.    Diagnosis:   1.  History of L4 compression fracture status post kyphoplasty  2.  Left thigh pain of uncertain etiology.    Treatment Options:   I would like to " give the patient some time.  Overall she seems to be progressing.  She will utilize tramadol by mouth twice a day when necessary.  She'll follow-up with me in about 6 weeks to check on her progress.       Diagnosis Plan   1. History of kyphoplasty     2. Closed compression fracture of L4 lumbar vertebra with routine healing, subsequent encounter     3. Degenerative disc disease, lumbar     4. Closed compression fracture of fourth lumbar vertebra, initial encounter  traMADol (ULTRAM) 50 MG tablet     Scribed for Marc Pham MD by Caprice Dietrich CMA on 02/16/2018 at 1:24 PM    I, Dr. Pham, personally performed the services described in the documentation, as scribed in my presence, and it is both accurate and complete.

## 2018-02-19 ENCOUNTER — TELEPHONE (OUTPATIENT)
Dept: NEUROSURGERY | Facility: CLINIC | Age: 73
End: 2018-02-19

## 2018-02-19 NOTE — TELEPHONE ENCOUNTER
Provider: Ankit    Caller: Trenton with home health   Time of call: 1:13  Phone #: [phone]   Surgery: Kypho 12/7/17   Last visit: 02/16/18     Next visit: 03/27/18        Reason for call:   Trenton from home health PT called and stated the pt is complaining of pain so intense that she is refusing to participate in any exercises. She also states the pt says her pain is so intense in the morning that she refuses to get up and use the bathroom, which has resulted in multiple bladder accidents in bed. HH was ordered by a hospitalist, do we need to recommend pursing or d/c this?

## 2018-02-19 NOTE — TELEPHONE ENCOUNTER
Please call patient back and get more specifics about her pain.  Dr. Pham saw her 3 days ago and did not note any terrible pain.  Dr. Pham actually thought that she was doing better.  See what has changed and we may need to discuss this with Dr. Pham tomorrow.

## 2018-02-21 NOTE — TELEPHONE ENCOUNTER
HH called again and LVM.    - I called pt and she states the pain is the same as when she seen  on 2/16, shes struggling or with standing to sit position and sitting to stand- which is also why she is having accidents in the morning. States both leg ache all day long- worse QAM.     She is taking her Tramadol 50mg BID, and Gabapentin 200mg QAM, 100mg Qafternoon and 300mg QHS   - states she gets no relief from medication.      - I asked pt if she was refusing to do her exercises, she states no- that she tries but sometimes its just to much pain for her to continue.         Please advise.

## 2018-02-23 ENCOUNTER — TELEPHONE (OUTPATIENT)
Dept: NEUROSURGERY | Facility: CLINIC | Age: 73
End: 2018-02-23

## 2018-02-23 NOTE — TELEPHONE ENCOUNTER
The physical therapist said the patient is very difficult to work with. She refuses to do anything for them because she is in constant pain. She said this has been ongoing for several weeks and is nothing new.  Patient was just seen by Dr. Pham last week as was getting by.  I called Ms. Velázquez and she was surprised by what the physical therapist said.  She said she still has some pain but she is managing.  She is getting up and walking around and is reportedly doing exercises on her own.  I'm not sure what to make of this. Assuming maybe Ms. Velázqeuz does not like having the therapy. We will give her a break from this for awhile.  She will call us if she has worsening symptoms.

## 2018-02-23 NOTE — TELEPHONE ENCOUNTER
Provider:  Ankit  Caller: [facility]  Time of call:  14:18  Phone #:  [phone]  Surgery:  KYPHOPLASTY L4  Surgery Date:  12/7/2017  Last visit:   2/16/2018  Next visit: 03/27/2018    MAIRA:         Reason for call:         Orestes from [facility] called stating that they are wanting to put a hold on pts PT.  Pt is in severe pain to he point to is urinating on herself because she cannot get off the couch to use the bathroom.  O[facility] said that pt needs to be seen by us before they will resume PT.

## 2018-02-26 ENCOUNTER — OFFICE VISIT (OUTPATIENT)
Dept: FAMILY MEDICINE CLINIC | Facility: CLINIC | Age: 73
End: 2018-02-26

## 2018-02-26 VITALS
DIASTOLIC BLOOD PRESSURE: 68 MMHG | BODY MASS INDEX: 42.29 KG/M2 | RESPIRATION RATE: 16 BRPM | HEIGHT: 61 IN | HEART RATE: 68 BPM | WEIGHT: 224 LBS | SYSTOLIC BLOOD PRESSURE: 99 MMHG | OXYGEN SATURATION: 97 % | TEMPERATURE: 98.7 F

## 2018-02-26 DIAGNOSIS — S32.040G CLOSED COMPRESSION FRACTURE OF L4 LUMBAR VERTEBRA WITH DELAYED HEALING, SUBSEQUENT ENCOUNTER: ICD-10-CM

## 2018-02-26 DIAGNOSIS — R74.8 ELEVATED ALKALINE PHOSPHATASE LEVEL: ICD-10-CM

## 2018-02-26 DIAGNOSIS — M81.0 AGE-RELATED OSTEOPOROSIS WITHOUT CURRENT PATHOLOGICAL FRACTURE: ICD-10-CM

## 2018-02-26 DIAGNOSIS — N39.42 URINARY INCONTINENCE WITHOUT SENSORY AWARENESS: ICD-10-CM

## 2018-02-26 DIAGNOSIS — E78.5 HYPERLIPIDEMIA, UNSPECIFIED HYPERLIPIDEMIA TYPE: ICD-10-CM

## 2018-02-26 DIAGNOSIS — Z00.00 MEDICARE ANNUAL WELLNESS VISIT, SUBSEQUENT: Primary | ICD-10-CM

## 2018-02-26 DIAGNOSIS — R60.0 EDEMA OF RIGHT LOWER EXTREMITY: ICD-10-CM

## 2018-02-26 DIAGNOSIS — M48.062 LUMBAR STENOSIS WITH NEUROGENIC CLAUDICATION: ICD-10-CM

## 2018-02-26 DIAGNOSIS — I25.83 CORONARY ARTERY DISEASE DUE TO LIPID RICH PLAQUE: ICD-10-CM

## 2018-02-26 DIAGNOSIS — E11.9 TYPE 2 DIABETES MELLITUS WITHOUT COMPLICATION, UNSPECIFIED LONG TERM INSULIN USE STATUS: ICD-10-CM

## 2018-02-26 DIAGNOSIS — I10 ESSENTIAL HYPERTENSION: ICD-10-CM

## 2018-02-26 DIAGNOSIS — E66.01 MORBID OBESITY DUE TO EXCESS CALORIES (HCC): ICD-10-CM

## 2018-02-26 DIAGNOSIS — R53.81 PHYSICAL DECONDITIONING: ICD-10-CM

## 2018-02-26 DIAGNOSIS — I25.10 CORONARY ARTERY DISEASE DUE TO LIPID RICH PLAQUE: ICD-10-CM

## 2018-02-26 PROBLEM — I51.9 HEART DISEASE: Status: ACTIVE | Noted: 2018-02-26

## 2018-02-26 LAB
ALBUMIN SERPL-MCNC: 4.4 G/DL (ref 3.2–4.8)
ALBUMIN/GLOB SERPL: 1.5 G/DL (ref 1.5–2.5)
ALP SERPL-CCNC: 106 U/L (ref 25–100)
ALT SERPL W P-5'-P-CCNC: 23 U/L (ref 7–40)
ANION GAP SERPL CALCULATED.3IONS-SCNC: 15 MMOL/L (ref 3–11)
ARTICHOKE IGE QN: 46 MG/DL (ref 0–130)
AST SERPL-CCNC: 25 U/L (ref 0–33)
BILIRUB SERPL-MCNC: 0.4 MG/DL (ref 0.3–1.2)
BUN BLD-MCNC: 28 MG/DL (ref 9–23)
BUN/CREAT SERPL: 21.5 (ref 7–25)
CALCIUM SPEC-SCNC: 9.1 MG/DL (ref 8.7–10.4)
CHLORIDE SERPL-SCNC: 101 MMOL/L (ref 99–109)
CHOLEST SERPL-MCNC: 100 MG/DL (ref 0–200)
CO2 SERPL-SCNC: 24 MMOL/L (ref 20–31)
CREAT BLD-MCNC: 1.3 MG/DL (ref 0.6–1.3)
EXPIRATION DATE: NORMAL
GFR SERPL CREATININE-BSD FRML MDRD: 40 ML/MIN/1.73
GLOBULIN UR ELPH-MCNC: 2.9 GM/DL
GLUCOSE BLD-MCNC: 148 MG/DL (ref 70–100)
HBA1C MFR BLD: 5.5 %
HCT VFR BLDA CALC: 44.2 %
HDLC SERPL-MCNC: 38 MG/DL (ref 40–60)
HGB BLDA-MCNC: 14.1 G/DL
Lab: NORMAL
MCH, POC: 30.6
MCHC, POC: 31.9
MCV, POC: 95.8
PLATELET # BLD AUTO: 105 10*3/MM3
PMV BLD: 10.2 FL
POTASSIUM BLD-SCNC: 4.1 MMOL/L (ref 3.5–5.5)
PROT SERPL-MCNC: 7.3 G/DL (ref 5.7–8.2)
RBC, POC: 4.61
RDW, POC: 13.9
SODIUM BLD-SCNC: 140 MMOL/L (ref 132–146)
TRIGL SERPL-MCNC: 120 MG/DL (ref 0–150)
WBC # BLD: 8.3 10*3/UL

## 2018-02-26 PROCEDURE — 83036 HEMOGLOBIN GLYCOSYLATED A1C: CPT | Performed by: NURSE PRACTITIONER

## 2018-02-26 PROCEDURE — 80061 LIPID PANEL: CPT | Performed by: NURSE PRACTITIONER

## 2018-02-26 PROCEDURE — 85027 COMPLETE CBC AUTOMATED: CPT | Performed by: NURSE PRACTITIONER

## 2018-02-26 PROCEDURE — G0439 PPPS, SUBSEQ VISIT: HCPCS | Performed by: NURSE PRACTITIONER

## 2018-02-26 PROCEDURE — 36415 COLL VENOUS BLD VENIPUNCTURE: CPT | Performed by: NURSE PRACTITIONER

## 2018-02-26 PROCEDURE — 80053 COMPREHEN METABOLIC PANEL: CPT | Performed by: NURSE PRACTITIONER

## 2018-02-26 RX ORDER — ATORVASTATIN CALCIUM 10 MG/1
10 TABLET, FILM COATED ORAL NIGHTLY
Qty: 30 TABLET | Refills: 5 | Status: SHIPPED | OUTPATIENT
Start: 2018-02-26 | End: 2018-12-28 | Stop reason: SDUPTHER

## 2018-02-26 NOTE — PROGRESS NOTES
QUICK REFERENCE INFORMATION:  The ABCs of the Annual Wellness Visit    Subsequent Medicare Wellness Visit    HEALTH RISK ASSESSMENT    1945    Recent Hospitalizations:  Recently treated at the following:  [facility].        Current Medical Providers:  Patient Care Team:  Chaz Rico DNP, APRN as PCP - General (Family Medicine)  Chaz Rico DNP, APRN as PCP - Claims Attributed  Carmine Pérez MD as Consulting Physician (Interventional Cardiology)  Tatiana Albarado RN as Care Coordinator (Population Health)  Marc Pham MD as Consulting Physician (Neurosurgery)  Prieto Flores MD as Consulting Physician (Pain Medicine)  Rajeev Sharif MD as Consulting Physician (Pulmonary Disease)        Smoking Status:  History   Smoking Status   • Former Smoker   • Types: Cigarettes   • Quit date: 2007   Smokeless Tobacco   • Never Used       Alcohol Consumption:  History   Alcohol Use No       Depression Screen:   PHQ-2/PHQ-9 Depression Screening 5/16/2017   Little interest or pleasure in doing things 0   Feeling down, depressed, or hopeless 0   Trouble falling or staying asleep, or sleeping too much 0   Feeling tired or having little energy 0   Poor appetite or overeating 0   Feeling bad about yourself - or that you are a failure or have let yourself or your family down 0   Trouble concentrating on things, such as reading the newspaper or watching television 0   Moving or speaking so slowly that other people could have noticed. Or the opposite - being so fidgety or restless that you have been moving around a lot more than usual 0   Thoughts that you would be better off dead, or of hurting yourself in some way 0   Total Score 0   If you checked off any problems, how difficult have these problems made it for you to do your work, take care of things at home, or get along with other people? Not difficult at all       Health Habits and Functional and Cognitive Screening:  Functional  & Cognitive Status 2/26/2018   Do you have difficulty preparing food and eating? No   Do you have difficulty bathing yourself, getting dressed or grooming yourself? Yes   Do you have difficulty using the toilet? No   Do you have difficulty moving around from place to place? No   Do you have trouble with steps or getting out of a bed or a chair? Yes   In the past year have you fallen or experienced a near fall? Yes   Current Diet Well Balanced Diet   Dental Exam Unknown   Eye Exam Not up to date   Exercise (times per week) 2 times per week   Current Exercise Activities Include (No Data)   Do you need help using the phone?  No   Are you deaf or do you have serious difficulty hearing?  Yes   Do you need help with transportation? Yes   Do you need help shopping? Yes   Do you need help preparing meals?  Yes   Do you need help with housework?  Yes   Do you need help with laundry? Yes   Do you need help taking your medications? No   Do you need help managing money? No   Have you felt unusual stress, anger or loneliness in the last month? No   Who do you live with? Child   If you need help, do you have trouble finding someone available to you? No   Have you been bothered in the last four weeks by sexual problems? No   Do you have difficulty concentrating, remembering or making decisions? Yes           Does the patient have evidence of cognitive impairment? No    Aspirin use counseling: Taking ASA appropriately as indicated      Recent Lab Results:  CMP:  Lab Results   Component Value Date    BUN 18 12/14/2017    CREATININE 1.10 12/14/2017    EGFRIFNONA 49 (L) 12/14/2017    BCR 16.4 12/14/2017     12/14/2017    K 4.7 12/14/2017    CO2 26.0 12/14/2017    CALCIUM 8.6 (L) 12/14/2017    ALBUMIN 3.90 12/14/2017    LABIL2 1.3 (L) 12/14/2017    BILITOT 0.6 12/14/2017    ALKPHOS 179 (H) 12/14/2017    AST 26 12/14/2017    ALT 30 12/14/2017     Lipid Panel:  Lab Results   Component Value Date    CHOL 146 11/07/2017    TRIG 112  11/07/2017    HDL 41 11/07/2017     HbA1c:  Lab Results   Component Value Date    HGBA1C 5.7 11/07/2017       Visual Acuity:  No exam data present    Age-appropriate Screening Schedule:  Refer to the list below for future screening recommendations based on patient's age, sex and/or medical conditions. Orders for these recommended tests are listed in the plan section. The patient has been provided with a written plan.    Health Maintenance   Topic Date Due   • MAMMOGRAM  05/18/2016   • ZOSTER VACCINE  05/18/2016   • DXA SCAN  08/22/2016   • DIABETIC EYE EXAM  08/22/2017   • HEMOGLOBIN A1C  05/07/2018   • PNEUMOCOCCAL VACCINES (65+ LOW/MEDIUM RISK) (2 of 2 - PCV13) 05/16/2018   • DIABETIC FOOT EXAM  05/16/2018   • LIPID PANEL  11/07/2018   • URINE MICROALBUMIN  11/07/2018   • COLONOSCOPY  08/22/2026   • TDAP/TD VACCINES (2 - Td) 11/07/2027   • INFLUENZA VACCINE  Completed        Subjective   History of Present Illness    Georgia Velázquez is a 72 y.o. female who presents for an Subsequent Wellness Visit.    The following portions of the patient's history were reviewed and updated as appropriate: allergies, current medications, past family history, past medical history, past social history, past surgical history and problem list.    Outpatient Medications Prior to Visit   Medication Sig Dispense Refill   • alendronate (FOSAMAX) 70 MG tablet Take 1 tablet by mouth Every 7 (Seven) Days. 4 tablet 5   • aspirin 81 MG chewable tablet Chew 1 tablet Daily. 30 tablet 3   • gabapentin (NEURONTIN) 100 MG capsule Take 1 capsule by mouth Take As Directed. Take 2 capsule in the morning, 1 capsule in the afternoon and 3 capsules at bedtime 180 capsule 1   • glucose blood (ACCU-CHEK ANNIE PLUS) test strip Use once daily 100 each 12   • glucose blood test strip Use as instructed 100 each 12   • metFORMIN (GLUCOPHAGE) 850 MG tablet Take 1 tablet by mouth 2 (Two) Times a Day With Meals. 60 tablet 2   • nitroglycerin (NITROSTAT) 0.4 MG  SL tablet Place 1 tablet under the tongue Every 5 (Five) Minutes As Needed for Chest Pain. Take no more than 3 doses in 15 minutes. 100 tablet 0   • pantoprazole (PROTONIX) 40 MG EC tablet TAKE ONE TABLET BY MOUTH DAILY 30 tablet 5   • TiZANidine (ZANAFLEX) 2 MG capsule Take 1 capsule by mouth 3 (Three) Times a Day. 60 capsule 0   • traMADol (ULTRAM) 50 MG tablet Take 1 tablet by mouth 2 (Two) Times a Day As Needed for Moderate Pain . 60 tablet 0   • urea (CARMOL) 10 % cream APPLY TO AFFECTED AREA(S) TWO TIMES A DAY 57 g 0   • valsartan-hydrochlorothiazide (DIOVAN-HCT) 160-25 MG per tablet TAKE ONE TABLET BY MOUTH DAILY 30 tablet 5     No facility-administered medications prior to visit.        Patient Active Problem List   Diagnosis   • Essential hypertension   • Type 2 diabetes mellitus   • Hyperlipemia   • Myocardial infarction   • Coronary artery disease   • Osteoporosis   • GERD (gastroesophageal reflux disease)   • Arthritis of shoulder   • Acute cystitis without hematuria   • Urinary tract infection   • Acute kidney injury   • Constipation   • Abdominal pain   • Leukocytosis   • Closed compression fracture of L4 lumbar vertebra   • Lumbar facet arthropathy   • Lumbar stenosis with neurogenic claudication   • Morbid obesity due to excess calories   • Diabetes mellitus type 2 in obese   • Physical deconditioning   • History of kyphoplasty       Advance Care Planning:  has NO advance directive - information provided to the patient today    Identification of Risk Factors:  Risk factors include: weight , cardiovascular risk, increased fall risk, chronic pain, incontinence and polypharmacy.    Review of Systems    Compared to one year ago, the patient feels her physical health is worse.  Compared to one year ago, the patient feels her mental health is the same.    Objective     Physical Exam   Constitutional: She is oriented to person, place, and time. She appears well-developed and well-nourished.   HENT:   Head:  "Normocephalic and atraumatic.   Eyes: Conjunctivae are normal. Pupils are equal, round, and reactive to light. Right eye exhibits no discharge. Left eye exhibits no discharge. No scleral icterus.   Neck: Normal range of motion. Neck supple. No tracheal deviation present. No thyromegaly present.   Cardiovascular: Normal rate and regular rhythm.    Murmur heard.   Systolic murmur is present with a grade of 2/6    Diastolic murmur is present with a grade of 6/6   Pulmonary/Chest: Effort normal and breath sounds normal.   Abdominal: Soft. Bowel sounds are normal. She exhibits no distension and no mass. There is no tenderness. There is guarding.   Musculoskeletal: She exhibits edema and tenderness.   Right LE with significant edema from knee down. Neg Cheryle's. No erythema.  Chronic low back pain. Walks with walker    Georgia had a diabetic foot exam performed (Feet are much better. Cool to touch, but plaques are being removed. Monofilament 10/10) today.   During the foot exam she had a monofilament test performed.    Vascular Status -  Her exam exhibits no right foot edema. Her exam exhibits no left foot edema.   Skin Integrity  -  Her right foot skin is not intact. She has callous right foot.   Georgia's left foot skin is not intact. She has callous left foot..  Neurological: She is alert and oriented to person, place, and time. No cranial nerve deficit. Coordination normal.   Skin: Skin is warm. Rash noted.   Heavy crusts on legs and feet are much better. Still a few places left but daughter is working on them.   Psychiatric: She has a normal mood and affect. Her behavior is normal. Judgment and thought content normal.       Vitals:    02/26/18 1257   BP: 99/68   BP Location: Right arm   Patient Position: Sitting   Cuff Size: Adult   Resp: 16   Temp: 98.7 °F (37.1 °C)   TempSrc: Oral   Weight: 102 kg (224 lb)   Height: 154.9 cm (61\")     Georgia was seen today for annual exam.    Diagnoses and all orders for this " visit:    Medicare annual wellness visit, subsequent    Hyperlipidemia, unspecified hyperlipidemia type  -     atorvastatin (LIPITOR) 10 MG tablet; Take 1 tablet by mouth Every Night.  -     Lipid Panel    Type 2 diabetes mellitus without complication, unspecified long term insulin use status  -     POC Glycosylated Hemoglobin (Hb A1C)  -     Comprehensive Metabolic Panel  -     POCT CBC    Coronary artery disease due to lipid rich plaque  -     Cancel: CBC & Differential    Morbid obesity due to excess calories    Elevated alkaline phosphatase level  -     Comprehensive Metabolic Panel    Essential hypertension    Edema of right lower extremity  -     Duplex Venous Lower Extremity - Right CAR; Future    Urinary incontinence without sensory awareness    Age-related osteoporosis without current pathological fracture    Physical deconditioning    Lumbar stenosis with neurogenic claudication    Closed compression fracture of L4 lumbar vertebra with delayed healing, subsequent encounter          Body mass index is 42.32 kg/(m^2).  Discussed the patient's BMI with her. BMI is above normal parameters. Follow-up plan includes:  exercise counseling and nutrition counseling.    Assessment/Plan   Patient Self-Management and Personalized Health Advice  The patient has been provided with information about: diet, exercise, weight management, prevention of cardiac or vascular disease, the relationship between weight and GERD, fall prevention and designing advance directives and preventive services including:   · Advance directive, Colorectal cancer screening, cologuard test ordered, Counseling for cardiovascular disease risk reduction, Exercise counseling provided, Fall Risk assessment done, Glaucoma screening recommended, Nutrition counseling provided.        Outpatient Encounter Prescriptions as of 2/26/2018   Medication Sig Dispense Refill   • alendronate (FOSAMAX) 70 MG tablet Take 1 tablet by mouth Every 7 (Seven) Days. 4  tablet 5   • aspirin 81 MG chewable tablet Chew 1 tablet Daily. 30 tablet 3   • gabapentin (NEURONTIN) 100 MG capsule Take 1 capsule by mouth Take As Directed. Take 2 capsule in the morning, 1 capsule in the afternoon and 3 capsules at bedtime 180 capsule 1   • glucose blood (ACCU-CHEK ANNIE PLUS) test strip Use once daily 100 each 12   • glucose blood test strip Use as instructed 100 each 12   • metFORMIN (GLUCOPHAGE) 850 MG tablet Take 1 tablet by mouth 2 (Two) Times a Day With Meals. 60 tablet 2   • nitroglycerin (NITROSTAT) 0.4 MG SL tablet Place 1 tablet under the tongue Every 5 (Five) Minutes As Needed for Chest Pain. Take no more than 3 doses in 15 minutes. 100 tablet 0   • pantoprazole (PROTONIX) 40 MG EC tablet TAKE ONE TABLET BY MOUTH DAILY 30 tablet 5   • TiZANidine (ZANAFLEX) 2 MG capsule Take 1 capsule by mouth 3 (Three) Times a Day. 60 capsule 0   • traMADol (ULTRAM) 50 MG tablet Take 1 tablet by mouth 2 (Two) Times a Day As Needed for Moderate Pain . 60 tablet 0   • urea (CARMOL) 10 % cream APPLY TO AFFECTED AREA(S) TWO TIMES A DAY 57 g 0   • valsartan-hydrochlorothiazide (DIOVAN-HCT) 160-25 MG per tablet TAKE ONE TABLET BY MOUTH DAILY 30 tablet 5     No facility-administered encounter medications on file as of 2/26/2018.        Reviewed use of high risk medication in the elderly: yes  Reviewed for potential of harmful drug interactions in the elderly: yes    Follow Up:  No Follow-up on file.     An After Visit Summary and PPPS with all of these plans were given to the patient.      She still needs  Diabetic eye exam  Mammogram-this will be her last one  Colorectal screen with patricia. She has the bucket at home and states she will use and send in.  She does not need pap smear.  She needs a DEXA scan.  Zostavax.  Sleep study.  Needs to follow up with Cardiology.  I will discuss all of this with Ms Velázquez and her daughter. They are exhausted from so many appointment regarding the back pain issue. We  will follow up on these more chronic problems this spring.  Send for  Venous doppler us of RLE today to rule DVT-it was negative and daughter informed.

## 2018-02-27 ENCOUNTER — TELEPHONE (OUTPATIENT)
Dept: FAMILY MEDICINE CLINIC | Facility: CLINIC | Age: 73
End: 2018-02-27

## 2018-02-27 DIAGNOSIS — N18.3 ACUTE RENAL FAILURE SUPERIMPOSED ON STAGE 3 CHRONIC KIDNEY DISEASE, UNSPECIFIED ACUTE RENAL FAILURE TYPE: Primary | ICD-10-CM

## 2018-02-27 DIAGNOSIS — N17.9 ACUTE RENAL FAILURE SUPERIMPOSED ON STAGE 3 CHRONIC KIDNEY DISEASE, UNSPECIFIED ACUTE RENAL FAILURE TYPE: Primary | ICD-10-CM

## 2018-02-27 PROBLEM — N18.30 STAGE 3 CHRONIC KIDNEY DISEASE (HCC): Status: ACTIVE | Noted: 2018-02-27

## 2018-03-15 ENCOUNTER — TELEPHONE (OUTPATIENT)
Dept: NEUROSURGERY | Facility: CLINIC | Age: 73
End: 2018-03-15

## 2018-03-15 DIAGNOSIS — E78.5 HYPERLIPIDEMIA, UNSPECIFIED HYPERLIPIDEMIA TYPE: ICD-10-CM

## 2018-03-15 RX ORDER — ATORVASTATIN CALCIUM 40 MG/1
TABLET, FILM COATED ORAL
Qty: 30 TABLET | Refills: 2 | Status: SHIPPED | OUTPATIENT
Start: 2018-03-15 | End: 2018-12-01 | Stop reason: SDUPTHER

## 2018-03-15 NOTE — TELEPHONE ENCOUNTER
Provider: Ankit   Caller: dorian  Time of call:2:54  Phone #:  [phone]  Surgery: N/A  Surgery Date:    Last visit:  2/16/18   Next visit: 3/27/18

## 2018-03-15 NOTE — TELEPHONE ENCOUNTER
Pt states she is having pain in her buttocks, both sides. That sometimes radiates down into both legs. Pt states no burning/numbness only severe pain.     Pt denies B/B issues, and numbness around groin area.     Pt states she hardly has any pain in her back, mostly in her buttocks.     Pt is taking Gabapentin    Please adv on what to tell the pt?

## 2018-03-15 NOTE — TELEPHONE ENCOUNTER
She has called a couple times now since seeing Dr. Pham complaining of severe pain.  Depending on who you speak to the story changes a bit.  I think we should try to schedule her follow up with him sooner to see what is going on. Please see if he has an opening next week.

## 2018-03-19 DIAGNOSIS — S32.040A CLOSED COMPRESSION FRACTURE OF FOURTH LUMBAR VERTEBRA, INITIAL ENCOUNTER: ICD-10-CM

## 2018-03-19 RX ORDER — TRAMADOL HYDROCHLORIDE 50 MG/1
50 TABLET ORAL 2 TIMES DAILY PRN
Qty: 60 TABLET | Refills: 0 | OUTPATIENT
Start: 2018-03-19 | End: 2019-03-21

## 2018-03-19 NOTE — TELEPHONE ENCOUNTER
Provider:  Ankit  Pharmacy: [facility]  Surgery:  Kypho L4  Surgery Date:  12/07/17  Last visit:   02/16/18  Next visit: 03/27/18    MAIRA: (only 2 results on maira)  01/19/2018 Tramadol Hcl 50MG 1945 60 15 Kalin Rm  02/17/2018 Tramadol 50mg 1945 60 30 Marc Pham    Reason for call:       Automated refill request from patient's pharmacy

## 2018-03-19 NOTE — TELEPHONE ENCOUNTER
Called in Tramadol 50 mg BID PRN #[id number] to patient's pharmacy.     Automated refill request, no further action required, closing encounter.

## 2018-03-27 ENCOUNTER — OFFICE VISIT (OUTPATIENT)
Dept: NEUROSURGERY | Facility: CLINIC | Age: 73
End: 2018-03-27

## 2018-03-27 VITALS — BODY MASS INDEX: 40.02 KG/M2 | TEMPERATURE: 97.3 F | WEIGHT: 212 LBS | HEIGHT: 61 IN

## 2018-03-27 DIAGNOSIS — S32.040D CLOSED COMPRESSION FRACTURE OF L4 LUMBAR VERTEBRA WITH ROUTINE HEALING, SUBSEQUENT ENCOUNTER: ICD-10-CM

## 2018-03-27 DIAGNOSIS — Z98.890 HISTORY OF KYPHOPLASTY: Primary | ICD-10-CM

## 2018-03-27 DIAGNOSIS — S32.040S CLOSED COMPRESSION FRACTURE OF FOURTH LUMBAR VERTEBRA, SEQUELA: ICD-10-CM

## 2018-03-27 DIAGNOSIS — M51.36 DEGENERATIVE DISC DISEASE, LUMBAR: ICD-10-CM

## 2018-03-27 PROCEDURE — 99212 OFFICE O/P EST SF 10 MIN: CPT | Performed by: NEUROLOGICAL SURGERY

## 2018-03-27 NOTE — PROGRESS NOTES
Patient: Georgia Velázquez  : 1945    Primary Care Provider: Chaz Rico, DNP, APRN    Requesting Provider: As above        History    Chief Complaint: Low back pain.    History of Present Illness: Ms. Velázquez is a 72-year-old woman who was seen as an inpatient and on 17 and subsequently underwent L4 kyphoplasty.  Her back pain was better but she developed pain in her right leg that subsequently switched over to the left leg.  Her leg pain is now absent.  She is complaining of some pain in her buttocks.  Her low back pain is modest.  She has been increasingly active.  She continues to take Neurontin.    Review of Systems   Constitutional: Negative for activity change, appetite change, chills, diaphoresis, fatigue, fever and unexpected weight change.   HENT: Negative for congestion, dental problem, drooling, ear discharge, ear pain, facial swelling, hearing loss, mouth sores, nosebleeds, postnasal drip, rhinorrhea, sinus pressure, sneezing, sore throat, tinnitus, trouble swallowing and voice change.    Eyes: Negative for photophobia, pain, discharge, redness, itching and visual disturbance.   Respiratory: Negative for apnea, cough, choking, chest tightness, shortness of breath, wheezing and stridor.    Cardiovascular: Negative for chest pain, palpitations and leg swelling.   Gastrointestinal: Negative for abdominal distention, abdominal pain, anal bleeding, blood in stool, constipation, diarrhea, nausea, rectal pain and vomiting.   Endocrine: Negative for cold intolerance, heat intolerance, polydipsia, polyphagia and polyuria.   Genitourinary: Negative for decreased urine volume, difficulty urinating, dysuria, enuresis, flank pain, frequency, genital sores, hematuria and urgency.   Musculoskeletal: Positive for joint swelling. Negative for arthralgias, back pain, gait problem, myalgias, neck pain and neck stiffness.   Skin: Negative for color change, pallor, rash and wound.  "  Allergic/Immunologic: Negative for environmental allergies, food allergies and immunocompromised state.   Neurological: Negative for dizziness, tremors, seizures, syncope, facial asymmetry, speech difficulty, weakness, light-headedness, numbness and headaches.   Hematological: Negative for adenopathy. Does not bruise/bleed easily.   Psychiatric/Behavioral: Negative for agitation, behavioral problems, confusion, decreased concentration, dysphoric mood, hallucinations, self-injury, sleep disturbance and suicidal ideas. The patient is not nervous/anxious and is not hyperactive.        The patient's past medical history, past surgical history, family history, and social history have been reviewed at length in the electronic medical record.    Physical Exam:   Ht 154.9 cm (61\")   LMP  (LMP Unknown)   MUSCULOSKELETAL:  Straight leg raising is negative.  Giles's Sign is negative.  Tenderness in the back to palpation is not observed.  NEUROLOGICAL:  Strength is intact in the lower extremities to direct testing.  Muscle tone is normal throughout.  Station and gait are normal somewhat stooped and unsteady.  Sensation is intact to light touch testing throughout.      Medical Decision Making    Diagnosis:   L4 compression fracture status post kyphoplasty.    Treatment Options:   Ms. Velázquez is doing better overall.  She will slowly decrease her Neurontin by one a day for a week at a time until we get her off of the medication.  At this juncture she will follow-up on an as-needed basis.       Diagnosis Plan   1. History of kyphoplasty     2. Closed compression fracture of L4 lumbar vertebra with routine healing, subsequent encounter     3. Closed compression fracture of fourth lumbar vertebra, sequela     4. Degenerative disc disease, lumbar       Scribed for Marc Pham MD by Caprice Dietrich CMA on 03/27/2018 at 11:48 AM    I, Dr. Pham, personally performed the services described in the documentation, as " scribed in my presence, and it is both accurate and complete.

## 2018-05-20 DIAGNOSIS — E11.36 TYPE 2 DIABETES MELLITUS WITH DIABETIC CATARACT (HCC): ICD-10-CM

## 2018-07-16 RX ORDER — GABAPENTIN 100 MG/1
100 CAPSULE ORAL NIGHTLY
Qty: 30 CAPSULE | Refills: 0 | OUTPATIENT
Start: 2018-07-16 | End: 2019-03-21

## 2018-07-16 NOTE — TELEPHONE ENCOUNTER
Per Dr. Pham's last note:    Treatment Options:   Ms. Velázquez is doing better overall.  She will slowly decrease her Neurontin by one a day for a week at a time until we get her off of the medication.  At this juncture she will follow-up on an as-needed basis.        Please find out how much she is taking.  If she feels like she is going to need ongoing refills, she will need to talk to her PCP since we are not seeing her routinely. We will however refill it until she can get an appt to discuss with them.  Please just find out how much she is taking.

## 2018-07-16 NOTE — TELEPHONE ENCOUNTER
Called in Gabapentin 100 mg QHS #[id number] to patient's pharmacy.     From our conversation a few minutes ago, pt is already aware that this was going to be called in.

## 2018-07-16 NOTE — TELEPHONE ENCOUNTER
Called pt to find out how often she is taking the Gabapentin, she said she is taking 1 at bedtime and is wanting to continue weaning herself off them completely. I asked her if she felt that she could wean herself off with 1 more month's supply of the Gabapentin, she said yes.     Adjusted the gabapentin refill to reflect that and also added a note to the pharmacy explaining that she is weaning herself off.

## 2018-07-16 NOTE — TELEPHONE ENCOUNTER
Provider:  Ankit  [facility]: [facility]  Surgery:  Kypho L4  Surgery Date:  12/17  Last visit:   03/27/18  Next visit: none scheduled    MAIRA:   04/07/2018 Gabapentin 100MG 1945 120 20 MD Ankit, Marc  05/13/2018 Gabapentin 100MG 1945 180 30 MD Pham Steven  06/18/2018 Gabapentin 100MG 1945 180 30 MD Ankit, Marc    Reason for call:       Automated refill request from patient's pharmacy

## 2018-08-16 ENCOUNTER — EPISODE CHANGES (OUTPATIENT)
Dept: CASE MANAGEMENT | Facility: OTHER | Age: 73
End: 2018-08-16

## 2018-08-17 ENCOUNTER — EPISODE CHANGES (OUTPATIENT)
Dept: CASE MANAGEMENT | Facility: OTHER | Age: 73
End: 2018-08-17

## 2018-08-19 DIAGNOSIS — L30.9 DERMATITIS: ICD-10-CM

## 2018-08-20 DIAGNOSIS — M51.36 DDD (DEGENERATIVE DISC DISEASE), LUMBAR: Primary | ICD-10-CM

## 2018-08-20 RX ORDER — GABAPENTIN 100 MG/1
CAPSULE ORAL
Qty: 30 CAPSULE | Refills: 0 | OUTPATIENT
Start: 2018-08-20

## 2018-08-20 NOTE — TELEPHONE ENCOUNTER
Provider:  Ankit  [facility]arm[facility]: [facility]  Surgery:  Kypho L4  Surgery Date:  12/17  Last visit:   03/27/18  Next visit: none scheduled    MAIRA:         05/13/2018 Gabapentin 100MG 1945 180 30 MD Ankit, Marc [facility] [address] [place] [address] 1  06/18/2018 Gabapentin 100MG 1945 180 30 MD Ankit, [facility] [address] [place] [address] 1  07/23/2018 Gabapentin 100MG 1945 30 30 MD Ankit, Marc [facility] [address] [place] [address] [place] 1    Reason for call:         Automated refill request

## 2018-08-20 NOTE — TELEPHONE ENCOUNTER
Called patient, she said that she is still taking it 1 at bedtime because 'she had so many left'. She said she did not need a refill.     She did say she still wants to come off of the Gabapentin. I adv her that she doesn't need to keep taking it just because she has a lot left. Adv to take 1QHS every other day for a week or so and then she can stop taking it. She understood and said she would try that.

## 2018-08-26 RX ORDER — UREA 10 %
LOTION (ML) TOPICAL
Refills: 0 | OUTPATIENT
Start: 2018-08-26

## 2018-12-01 DIAGNOSIS — E78.5 HYPERLIPIDEMIA, UNSPECIFIED HYPERLIPIDEMIA TYPE: ICD-10-CM

## 2018-12-04 RX ORDER — ATORVASTATIN CALCIUM 40 MG/1
TABLET, FILM COATED ORAL
Qty: 30 TABLET | Refills: 1 | Status: SHIPPED | OUTPATIENT
Start: 2018-12-04 | End: 2019-06-14 | Stop reason: SDUPTHER

## 2018-12-27 ENCOUNTER — TELEPHONE (OUTPATIENT)
Dept: FAMILY MEDICINE CLINIC | Facility: CLINIC | Age: 73
End: 2018-12-27

## 2018-12-27 DIAGNOSIS — E78.5 HYPERLIPIDEMIA, UNSPECIFIED HYPERLIPIDEMIA TYPE: ICD-10-CM

## 2018-12-27 NOTE — TELEPHONE ENCOUNTER
Please clarify the correct dosage of atorvastatin 40 mg or 10 mg. Received fax from pharmacy to refill a 90 day supply.

## 2018-12-28 RX ORDER — ATORVASTATIN CALCIUM 10 MG/1
TABLET, FILM COATED ORAL
Qty: 30 TABLET | Refills: 1 | Status: SHIPPED | OUTPATIENT
Start: 2018-12-28 | End: 2019-06-14 | Stop reason: DRUGHIGH

## 2019-03-21 ENCOUNTER — APPOINTMENT (OUTPATIENT)
Dept: CT IMAGING | Facility: HOSPITAL | Age: 74
End: 2019-03-21

## 2019-03-21 ENCOUNTER — HOSPITAL ENCOUNTER (EMERGENCY)
Facility: HOSPITAL | Age: 74
Discharge: HOME | End: 2019-03-21
Attending: EMERGENCY MEDICINE | Admitting: EMERGENCY MEDICINE

## 2019-03-21 VITALS
RESPIRATION RATE: 18 BRPM | HEIGHT: 61 IN | WEIGHT: 222 LBS | BODY MASS INDEX: 41.91 KG/M2 | HEART RATE: 78 BPM | SYSTOLIC BLOOD PRESSURE: 149 MMHG | TEMPERATURE: 98.8 F | OXYGEN SATURATION: 98 % | DIASTOLIC BLOOD PRESSURE: 92 MMHG

## 2019-03-21 DIAGNOSIS — S16.1XXA ACUTE CERVICAL MYOFASCIAL STRAIN, INITIAL ENCOUNTER: Primary | ICD-10-CM

## 2019-03-21 PROCEDURE — 72125 CT NECK SPINE W/O DYE: CPT

## 2019-03-21 PROCEDURE — 99284 EMERGENCY DEPT VISIT MOD MDM: CPT

## 2019-03-21 PROCEDURE — 72128 CT CHEST SPINE W/O DYE: CPT

## 2019-03-21 RX ORDER — HYDROCODONE BITARTRATE AND ACETAMINOPHEN 5; 325 MG/1; MG/1
1 TABLET ORAL EVERY 6 HOURS PRN
Qty: 12 TABLET | Refills: 0 | Status: SHIPPED | OUTPATIENT
Start: 2019-03-21 | End: 2019-06-14

## 2019-03-21 RX ORDER — CYCLOBENZAPRINE HCL 10 MG
10 TABLET ORAL 3 TIMES DAILY PRN
Qty: 15 TABLET | Refills: 0 | Status: SHIPPED | OUTPATIENT
Start: 2019-03-21 | End: 2019-06-14

## 2019-03-21 RX ORDER — HYDROCODONE BITARTRATE AND ACETAMINOPHEN 7.5; 325 MG/1; MG/1
1 TABLET ORAL ONCE
Status: COMPLETED | OUTPATIENT
Start: 2019-03-21 | End: 2019-03-21

## 2019-03-21 RX ORDER — ONDANSETRON 4 MG/1
4 TABLET, ORALLY DISINTEGRATING ORAL ONCE
Status: COMPLETED | OUTPATIENT
Start: 2019-03-21 | End: 2019-03-21

## 2019-03-21 RX ADMIN — HYDROCODONE BITARTRATE AND ACETAMINOPHEN 1 TABLET: 7.5; 325 TABLET ORAL at 11:28

## 2019-03-21 RX ADMIN — ONDANSETRON 4 MG: 4 TABLET, ORALLY DISINTEGRATING ORAL at 12:04

## 2019-03-21 NOTE — ED PROVIDER NOTES
Subjective   Georgia Velázquez is a 73 y.o. female who presents to the ED with complaints of right sided neck pain. The patient reports that the pain began this morning when she woke up and is unaware of anything she could've done to strain her neck. She states that she is also experiencing right ear pain, but denies the pain radiating to her back, arms, or legs. She also reports that her pain increases with movement. She denies numbness or tingling in her arms and legs. She also denies fever, chills, cough, rhinorrhea, and lower back pain. There are no other acute complaints at this time.         History provided by:  Patient  Neck Pain   Pain location:  R side  Pain radiates to:  Does not radiate  Pain severity:  Moderate  Onset quality:  Sudden  Timing:  Constant  Chronicity:  New  Relieved by:  None tried  Associated symptoms: no fever, no numbness and no tingling        Review of Systems   Constitutional: Negative for chills and fever.   HENT: Positive for ear pain (right ear). Negative for rhinorrhea.    Respiratory: Negative for cough.    Musculoskeletal: Positive for neck pain. Negative for back pain.   Neurological: Negative for tingling and numbness.   All other systems reviewed and are negative.      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   • CHF (congestive heart failure) (CMS/[facility])    • COPD (chronic obstructive pulmonary disease) (CMS/[facility])    • Coronary artery disease 5/18/2016   • Elevated creatine kinase    • Essential hypertension 5/18/2016   • Extremity pain    • GERD (gastroesophageal reflux disease) 5/18/2016   • History of echocardiogram 10/16/2015    TDS.Est EF is 45-50%.Mild distal septal, anteroapical hypokinesia.Mild mitral stenosis.Mean gradient across mitral valve is 6 mmHg.Trace TR   • Hyperlipemia 5/18/2016   • Low back pain    • Myocardial infarction (CMS/HCC) 5/18/2016   • Osteoporosis 5/18/2016   • Sepsis (CMS/[facility])     uro   • Type 2  diabetes mellitus (CMS/[facility]) 2016       No Known Allergies    Past Surgical History:   Procedure Laterality Date   • APPENDECTOMY     • BACK SURGERY     • CHOLECYSTECTOMY     • KYPHOPLASTY N/A 2017    Procedure: KYPHOPLASTY L4;  Surgeon: Marc Pham MD;  Location: [facility];  Service:        Family History   Problem Relation Age of Onset   • Diabetes Mother    • No Known Problems Father    • No Known Problems Sister    • No Known Problems Brother    • No Known Problems Son    • No Known Problems Daughter        Social History     Socioeconomic History   • Marital status:      Spouse name: Not on file   • Number of children: Not on file   • Years of education: Not on file   • Highest education level: Not on file   Occupational History   • Occupation: Retired from [employer]   Tobacco Use   • Smoking status: Former Smoker     Types: Cigarettes     Last attempt to quit:      Years since quittin.2   • Smokeless tobacco: Never Used   Substance and Sexual Activity   • Alcohol use: No   • Drug use: No   • Sexual activity: No   Social History Narrative    Living w daughter.         Objective   Physical Exam   Constitutional: She is oriented to person, place, and time. She appears well-developed and well-nourished. No distress.   HENT:   Head: Normocephalic and atraumatic.   Right Ear: Tympanic membrane normal.   Left Ear: Tympanic membrane normal.   Eyes: Conjunctivae are normal. No scleral icterus.   Neck: Normal range of motion. Neck supple.   Cardiovascular: Normal rate, regular rhythm and normal heart sounds.   Pulmonary/Chest: Effort normal and breath sounds normal.   Abdominal: Soft. There is no tenderness.   Musculoskeletal: Normal range of motion. She exhibits tenderness.   Point tenderness in middle of cervical spine, but tender through cervical spine all the way down to the thoracic spine. Tenderness in paraspinal muscles throughout back and right Paraspinous muscles.    Neurological:  She is alert and oriented to person, place, and time.   Skin: Skin is warm and dry.   Psychiatric: She has a normal mood and affect. Her behavior is normal.   Nursing note and vitals reviewed.      Procedures         ED Course  ED Course as of Mar 21 1601   Thu Mar 21, 2019   1329 MAIRA report was not available.  I believe the medical necessity for and safety in prescribing the controlled substance substantially outweighs the risk of unlawful use or diversion of the controlled substance.    [LI]      ED Course User Index  [LI] Ravinder James MD       No results found for this or any previous visit (from the past 24 hour(s)).  Note: In addition to lab results from this visit, the labs listed above may include labs taken at another facility or during a different encounter within the last 24 hours. Please correlate lab times with ED admission and discharge times for further clarification of the services performed during this visit.    CT Thoracic Spine Without Contrast   Preliminary Result   Diffuse osteopenia, scoliosis and arthritic change without   acute abnormality.       D:  03/21/2019   E:  03/21/2019          CT Cervical Spine Without Contrast   Preliminary Result   Advanced osteoarthritis, primarily at C5-C6 but noted to a   lesser extent diffusely. There are no acute findings.       D:  03/21/2019   E:  03/21/2019                Vitals:    03/21/19 1044 03/21/19 1100 03/21/19 1115 03/21/19 1427   BP: (!) 195/81 143/77  149/92   BP Location:    Right arm   Patient Position:    Lying   Pulse:  86 80 78   Resp:    18   Temp:    98.8 °F (37.1 °C)   TempSrc:    Oral   SpO2:  94% 95% 98%   Weight:       Height:         Medications   HYDROcodone-acetaminophen (NORCO) 7.5-325 MG per tablet 1 tablet (1 tablet Oral Given 3/21/19 1128)   ondansetron ODT (ZOFRAN-ODT) disintegrating tablet 4 mg (4 mg Oral Given 3/21/19 1204)     ECG/EMG Results (last 24 hours)     ** No results found for the last 24 hours. **        No  orders to display                     MDM    Final diagnoses:   Acute cervical myofascial strain, initial encounter       Documentation assistance provided by vic Correa.  Information recorded by the scribe was done at my direction and has been verified and validated by me.     Charo Correa  03/21/19 1127       Ravinder James MD  03/21/19 7467

## 2019-05-31 DIAGNOSIS — E11.36 TYPE 2 DIABETES MELLITUS WITH DIABETIC CATARACT (HCC): ICD-10-CM

## 2019-06-04 DIAGNOSIS — E11.36 TYPE 2 DIABETES MELLITUS WITH DIABETIC CATARACT (HCC): ICD-10-CM

## 2019-06-05 DIAGNOSIS — E11.36 TYPE 2 DIABETES MELLITUS WITH DIABETIC CATARACT (HCC): ICD-10-CM

## 2019-06-05 DIAGNOSIS — E78.5 HYPERLIPIDEMIA, UNSPECIFIED HYPERLIPIDEMIA TYPE: ICD-10-CM

## 2019-06-05 DIAGNOSIS — R05.8 ACE-INHIBITOR COUGH: ICD-10-CM

## 2019-06-05 DIAGNOSIS — T46.4X5A ACE-INHIBITOR COUGH: ICD-10-CM

## 2019-06-05 DIAGNOSIS — E11.36 TYPE 2 DIABETES MELLITUS WITH DIABETIC CATARACT, WITHOUT LONG-TERM CURRENT USE OF INSULIN (HCC): ICD-10-CM

## 2019-06-05 RX ORDER — ATORVASTATIN CALCIUM 10 MG/1
TABLET, FILM COATED ORAL
Qty: 30 TABLET | Refills: 1 | OUTPATIENT
Start: 2019-06-05

## 2019-06-05 RX ORDER — VALSARTAN AND HYDROCHLOROTHIAZIDE 160; 25 MG/1; MG/1
1 TABLET ORAL DAILY
Qty: 30 TABLET | Refills: 5 | OUTPATIENT
Start: 2019-06-05

## 2019-06-05 RX ORDER — ATORVASTATIN CALCIUM 40 MG/1
40 TABLET, FILM COATED ORAL DAILY
Qty: 30 TABLET | Refills: 1 | OUTPATIENT
Start: 2019-06-05

## 2019-06-12 ENCOUNTER — TELEPHONE (OUTPATIENT)
Dept: FAMILY MEDICINE CLINIC | Facility: CLINIC | Age: 74
End: 2019-06-12

## 2019-06-12 NOTE — TELEPHONE ENCOUNTER
Pt hasn't been seen in 1 year 4 months, spoke with Pt and informed her she has to be seen prior to us being able to prescribe her any medication. Informed Pt that it is very important she be seen every 3 months for her diabetes. Pt declined to schedule an apt at this time and states she will call back.

## 2019-06-12 NOTE — TELEPHONE ENCOUNTER
----- Message from Jozef Sanon sent at 6/12/2019 11:18 AM EDT -----  Regarding: METFORMIN  Contact: [phone]  PT WILL CALL BACK TO MAKE AN APPOINTMENT BUT SHE IS COMPLETELY OUT OF METFORMIN AND WAS WONDERING IF SHE COULD GET A 30 DAY SUPPLY CALLED INTO THE [facility] ON [facility]

## 2019-06-14 ENCOUNTER — OFFICE VISIT (OUTPATIENT)
Dept: FAMILY MEDICINE CLINIC | Facility: CLINIC | Age: 74
End: 2019-06-14

## 2019-06-14 VITALS
BODY MASS INDEX: 49.28 KG/M2 | SYSTOLIC BLOOD PRESSURE: 152 MMHG | DIASTOLIC BLOOD PRESSURE: 80 MMHG | RESPIRATION RATE: 20 BRPM | TEMPERATURE: 98.3 F | OXYGEN SATURATION: 94 % | HEIGHT: 61 IN | WEIGHT: 261 LBS | HEART RATE: 90 BPM

## 2019-06-14 DIAGNOSIS — T46.4X5A ACE-INHIBITOR COUGH: ICD-10-CM

## 2019-06-14 DIAGNOSIS — E11.36 TYPE 2 DIABETES MELLITUS WITH DIABETIC CATARACT, WITHOUT LONG-TERM CURRENT USE OF INSULIN (HCC): ICD-10-CM

## 2019-06-14 DIAGNOSIS — I25.83 CORONARY ARTERY DISEASE DUE TO LIPID RICH PLAQUE: ICD-10-CM

## 2019-06-14 DIAGNOSIS — R53.81 PHYSICAL DECONDITIONING: ICD-10-CM

## 2019-06-14 DIAGNOSIS — L85.3 DRY SKIN DERMATITIS: ICD-10-CM

## 2019-06-14 DIAGNOSIS — E78.5 HYPERLIPIDEMIA, UNSPECIFIED HYPERLIPIDEMIA TYPE: ICD-10-CM

## 2019-06-14 DIAGNOSIS — I10 ESSENTIAL HYPERTENSION: ICD-10-CM

## 2019-06-14 DIAGNOSIS — Z00.00 MEDICARE ANNUAL WELLNESS VISIT, SUBSEQUENT: Primary | ICD-10-CM

## 2019-06-14 DIAGNOSIS — N18.3 ACUTE RENAL FAILURE SUPERIMPOSED ON STAGE 3 CHRONIC KIDNEY DISEASE, UNSPECIFIED ACUTE RENAL FAILURE TYPE: ICD-10-CM

## 2019-06-14 DIAGNOSIS — M81.0 OSTEOPOROSIS WITHOUT CURRENT PATHOLOGICAL FRACTURE, UNSPECIFIED OSTEOPOROSIS TYPE: ICD-10-CM

## 2019-06-14 DIAGNOSIS — I25.10 CORONARY ARTERY DISEASE DUE TO LIPID RICH PLAQUE: ICD-10-CM

## 2019-06-14 DIAGNOSIS — R05.8 ACE-INHIBITOR COUGH: ICD-10-CM

## 2019-06-14 DIAGNOSIS — N17.9 ACUTE RENAL FAILURE SUPERIMPOSED ON STAGE 3 CHRONIC KIDNEY DISEASE, UNSPECIFIED ACUTE RENAL FAILURE TYPE: ICD-10-CM

## 2019-06-14 DIAGNOSIS — E66.01 MORBID OBESITY DUE TO EXCESS CALORIES (HCC): ICD-10-CM

## 2019-06-14 DIAGNOSIS — Z23 IMMUNIZATION DUE: ICD-10-CM

## 2019-06-14 PROBLEM — N18.9 CHRONIC KIDNEY DISEASE: Status: ACTIVE | Noted: 2018-02-27

## 2019-06-14 PROBLEM — M54.50 LOW BACK PAIN: Status: ACTIVE | Noted: 2017-12-05

## 2019-06-14 PROBLEM — J44.9 CHRONIC OBSTRUCTIVE PULMONARY DISEASE: Status: ACTIVE | Noted: 2019-06-14

## 2019-06-14 PROBLEM — K21.00 GASTRO-ESOPHAGEAL REFLUX DISEASE WITH ESOPHAGITIS: Status: ACTIVE | Noted: 2019-06-14

## 2019-06-14 LAB
BILIRUB BLD-MCNC: NEGATIVE MG/DL
CLARITY, POC: CLEAR
COLOR UR: YELLOW
GLUCOSE UR STRIP-MCNC: NEGATIVE MG/DL
HBA1C MFR BLD: 6.4 %
KETONES UR QL: NEGATIVE
LEUKOCYTE EST, POC: ABNORMAL
NITRITE UR-MCNC: NEGATIVE MG/ML
PH UR: 7 [PH] (ref 5–8)
POC CREATININE URINE: 100
POC MICROALBUMIN URINE: 80
PROT UR STRIP-MCNC: ABNORMAL MG/DL
RBC # UR STRIP: ABNORMAL /UL
SP GR UR: 1.01 (ref 1–1.03)
UROBILINOGEN UR QL: NORMAL

## 2019-06-14 PROCEDURE — 80053 COMPREHEN METABOLIC PANEL: CPT | Performed by: NURSE PRACTITIONER

## 2019-06-14 PROCEDURE — 82306 VITAMIN D 25 HYDROXY: CPT | Performed by: NURSE PRACTITIONER

## 2019-06-14 PROCEDURE — 82044 UR ALBUMIN SEMIQUANTITATIVE: CPT | Performed by: NURSE PRACTITIONER

## 2019-06-14 PROCEDURE — G0439 PPPS, SUBSEQ VISIT: HCPCS | Performed by: NURSE PRACTITIONER

## 2019-06-14 PROCEDURE — 85025 COMPLETE CBC W/AUTO DIFF WBC: CPT | Performed by: NURSE PRACTITIONER

## 2019-06-14 PROCEDURE — 90471 IMMUNIZATION ADMIN: CPT | Performed by: NURSE PRACTITIONER

## 2019-06-14 PROCEDURE — 80061 LIPID PANEL: CPT | Performed by: NURSE PRACTITIONER

## 2019-06-14 PROCEDURE — 90670 PCV13 VACCINE IM: CPT | Performed by: NURSE PRACTITIONER

## 2019-06-14 PROCEDURE — 81003 URINALYSIS AUTO W/O SCOPE: CPT | Performed by: NURSE PRACTITIONER

## 2019-06-14 PROCEDURE — 84443 ASSAY THYROID STIM HORMONE: CPT | Performed by: NURSE PRACTITIONER

## 2019-06-14 PROCEDURE — 83036 HEMOGLOBIN GLYCOSYLATED A1C: CPT | Performed by: NURSE PRACTITIONER

## 2019-06-14 RX ORDER — VALSARTAN AND HYDROCHLOROTHIAZIDE 160; 25 MG/1; MG/1
1 TABLET ORAL DAILY
Qty: 90 TABLET | Refills: 1 | Status: SHIPPED | OUTPATIENT
Start: 2019-06-14 | End: 2019-12-04 | Stop reason: SDUPTHER

## 2019-06-14 RX ORDER — ATORVASTATIN CALCIUM 40 MG/1
40 TABLET, FILM COATED ORAL DAILY
Qty: 90 TABLET | Refills: 1 | Status: SHIPPED | OUTPATIENT
Start: 2019-06-14 | End: 2019-12-04 | Stop reason: SDUPTHER

## 2019-06-14 NOTE — PROGRESS NOTES
QUICK REFERENCE INFORMATION:  The ABCs of the Annual Wellness Visit    Subsequent Medicare Wellness Visit     HEALTH RISK ASSESSMENT    : 1945    Recent Hospitalizations:  No hospitalization(s) within the last year..  ccc      Current Medical Providers:  Patient Care Team:  Chaz Rico, FORTUNATO, APRN as PCP - General (Family Medicine)  Jimmy Cook MD as PCP - Claims Attributed  Carmine Pérez MD as Consulting Physician (Interventional Cardiology)  Marc Pham MD as Consulting Physician (Neurosurgery)  Prieto Flores MD as Consulting Physician (Pain Medicine)  Rajeev Sharif MD as Consulting Physician (Pulmonary Disease)   [facility]        Smoking Status:  Social History     Tobacco Use   Smoking Status Former Smoker   • Types: Cigarettes   • Last attempt to quit:    • Years since quittin.4   Smokeless Tobacco Never Used       Alcohol Consumption:  Social History     Substance and Sexual Activity   Alcohol Use No       Depression Screen:   PHQ-2/PHQ-9 Depression Screening 2019   Little interest or pleasure in doing things 0   Feeling down, depressed, or hopeless 0   Trouble falling or staying asleep, or sleeping too much -   Feeling tired or having little energy -   Poor appetite or overeating -   Feeling bad about yourself - or that you are a failure or have let yourself or your family down -   Trouble concentrating on things, such as reading the newspaper or watching television -   Moving or speaking so slowly that other people could have noticed. Or the opposite - being so fidgety or restless that you have been moving around a lot more than usual -   Thoughts that you would be better off dead, or of hurting yourself in some way -   Total Score 0   If you checked off any problems, how difficult have these problems made it for you to do your work, take care of things at home, or get along with other people? -       Health Habits and Functional and  Cognitive Screening:  Functional & Cognitive Status 2/26/2018   Do you have difficulty preparing food and eating? No   Do you have difficulty bathing yourself, getting dressed or grooming yourself? Yes   Do you have difficulty using the toilet? No   Do you have difficulty moving around from place to place? No   Do you have trouble with steps or getting out of a bed or a chair? Yes   In the past year have you fallen or experienced a near fall? Yes   Current Diet Well Balanced Diet   Dental Exam Unknown   Eye Exam Not up to date   Exercise (times per week) 2 times per week   Current Exercise Activities Include (No Data)   Do you need help using the phone?  No   Are you deaf or do you have serious difficulty hearing?  Yes   Do you need help with transportation? Yes   Do you need help shopping? Yes   Do you need help preparing meals?  Yes   Do you need help with housework?  Yes   Do you need help with laundry? Yes   Do you need help taking your medications? No   Do you need help managing money? No   Have you felt unusual stress, anger or loneliness in the last month? No   Who do you live with? Child   If you need help, do you have trouble finding someone available to you? No   Have you been bothered in the last four weeks by sexual problems? No   Do you have difficulty concentrating, remembering or making decisions? Yes           Does the patient have evidence of cognitive impairment? No    Asiprin use counseling: Taking ASA appropriately as indicated      Recent Lab Results:       Lab Results   Component Value Date    HGBA1C 6.4 06/14/2019     Lab Results   Component Value Date    CHOL 100 02/26/2018    TRIG 120 02/26/2018    HDL 38 (L) 02/26/2018           Age-appropriate Screening Schedule:  Refer to the list below for future screening recommendations based on patient's age, sex and/or medical conditions. Orders for these recommended tests are listed in the plan section. The patient has been provided with a written  plan.    Health Maintenance   Topic Date Due   • PNEUMOCOCCAL VACCINES (65+ LOW/MEDIUM RISK) (2 of 2 - PCV13) 05/16/2018   • ZOSTER VACCINE (1 of 2) 06/14/2019 (Originally 5/15/1993)   • INFLUENZA VACCINE  08/01/2019   • HEMOGLOBIN A1C  12/14/2019   • LIPID PANEL  06/14/2020   • URINE MICROALBUMIN  06/14/2020   • COLONOSCOPY  08/22/2026   • TDAP/TD VACCINES (2 - Td) 11/07/2027   • MAMMOGRAM  Discontinued   • DXA SCAN  Discontinued        Subjective   History of Present Illness    Georgia Velázquez is a 74 y.o. female who presents for an Annual Wellness Visit.    The following portions of the patient's history were reviewed and updated as appropriate: allergies, current medications, past family history, past medical history, past social history, past surgical history and problem list.    Outpatient Medications Prior to Visit   Medication Sig Dispense Refill   • aspirin 81 MG chewable tablet Chew 1 tablet Daily. 30 tablet 3   • glucose blood (ACCU-CHEK ANNIE PLUS) test strip Use once daily 100 each 12   • nitroglycerin (NITROSTAT) 0.4 MG SL tablet Place 1 tablet under the tongue Every 5 (Five) Minutes As Needed for Chest Pain. Take no more than 3 doses in 15 minutes. 100 tablet 0   • atorvastatin (LIPITOR) 10 MG tablet Take 1 tablet daily. NO FURTHER REFILLS UNTIL SEEN 30 tablet 1   • atorvastatin (LIPITOR) 40 MG tablet TAKE ONE TABLET BY MOUTH DAILY 30 tablet 1   • cyclobenzaprine (FLEXERIL) 10 MG tablet Take 1 tablet by mouth 3 (Three) Times a Day As Needed for Muscle Spasms. 15 tablet 0   • metFORMIN (GLUCOPHAGE) 850 MG tablet Take 1 tablet by mouth 2 (Two) Times a Day With Meals. 60 tablet 2   • valsartan-hydrochlorothiazide (DIOVAN-HCT) 160-25 MG per tablet TAKE ONE TABLET BY MOUTH DAILY 30 tablet 5   • glucose blood test strip Use as instructed 100 each 12   • HYDROcodone-acetaminophen (NORCO) 5-325 MG per tablet Take 1 tablet by mouth Every 6 (Six) Hours As Needed for Moderate Pain . 12 tablet 0   • metFORMIN  (GLUCOPHAGE) 850 MG tablet TAKE ONE TABLET BY MOUTH TWICE A DAY WITH MEALS 60 tablet 1     No facility-administered medications prior to visit.        Patient Active Problem List   Diagnosis   • Essential hypertension   • Type 2 diabetes mellitus (CMS/formerly [facility])   • Hyperlipidemia   • Myocardial infarction (CMS/formerly [facility])   • Coronary artery disease   • Osteoporosis   • GERD (gastroesophageal reflux disease)   • Arthritis of shoulder   • Acute cystitis without hematuria   • Urinary tract infection   • Acute kidney injury (CMS/formerly [facility])   • Constipation   • Low back pain   • Leukocytosis   • Closed compression fracture of L4 lumbar vertebra (CMS/formerly [facility])   • Lumbar facet arthropathy   • Lumbar stenosis with neurogenic claudication   • Morbid obesity due to excess calories (CMS/formerly [facility])   • Diabetes mellitus type 2 in obese (CMS/formerly [facility])   • Physical deconditioning   • History of kyphoplasty   • Urinary incontinence without sensory awareness   • Elevated alkaline phosphatase level   • Heart disease   • Chronic kidney disease   • Body mass index (BMI) of 45.0-49.9 in adult (CMS/formerly [facility])   • Chronic obstructive pulmonary disease (CMS/formerly [facility])   • Gastro-esophageal reflux disease with esophagitis   • Sepsis (CMS/formerly [facility])       Advance Care Planning:  Patient does not have an advance directive - not interested in additional information    Identification of Risk Factors:  Risk factors include: weight , unhealthy diet, cardiovascular risk, inactivity, increased fall risk, vision limitations and polypharmacy.    Review of Systems   Constitutional: Negative for fatigue, fever and unexpected weight change.   HENT: Negative for congestion, hearing loss, nosebleeds, rhinorrhea, sore throat, trouble swallowing and voice change.    Eyes: Negative for discharge, redness and visual disturbance.   Respiratory: Negative for cough, chest tightness, shortness of breath and wheezing.    Cardiovascular: Negative for chest pain, palpitations and leg swelling.   Gastrointestinal:  Negative for abdominal pain, blood in stool, constipation, diarrhea, nausea and vomiting.   Endocrine: Negative for polydipsia, polyphagia and polyuria.   Genitourinary: Negative for dysuria, flank pain, frequency and hematuria.   Musculoskeletal: Negative for arthralgias, joint swelling and myalgias.   Skin: Negative for color change and rash.   Neurological: Negative for dizziness, seizures, syncope, weakness and headaches.   Hematological: Negative for adenopathy. Does not bruise/bleed easily.   Psychiatric/Behavioral: Negative for dysphoric mood. The patient is not nervous/anxious.        Compared to one year ago, the patient feels her physical health is the same.  Compared to one year ago, the patient feels her mental health is the same.    Objective     Physical Exam   Constitutional: She is oriented to person, place, and time. She appears well-developed and well-nourished. No distress.   HENT:   Head: Normocephalic and atraumatic.   Right Ear: Tympanic membrane and external ear normal.   Left Ear: Tympanic membrane and external ear normal.   Nose: Nose normal.   Mouth/Throat: Oropharynx is clear and moist. No oropharyngeal exudate.   Eyes: Conjunctivae are normal. Pupils are equal, round, and reactive to light. Right eye exhibits no discharge. Left eye exhibits no discharge. No scleral icterus.   Neck: Neck supple. No tracheal deviation present. No thyromegaly present.   Cardiovascular: Normal rate and regular rhythm. Exam reveals no gallop and no friction rub.   Murmur heard.  Pulmonary/Chest: Effort normal and breath sounds normal. No respiratory distress. She has no wheezes.   Abdominal: Soft. Bowel sounds are normal. She exhibits no distension and no mass. There is no tenderness.   Musculoskeletal: She exhibits edema. She exhibits no deformity.   +2 pitting pre-tibial edema  Walks with rolling walker   Lymphadenopathy:     She has no cervical adenopathy.   Neurological: She is alert and oriented to  "person, place, and time. Coordination normal.   Skin: Skin is warm and dry. Capillary refill takes less than 2 seconds. No rash noted. No erythema.   Psychiatric: She has a normal mood and affect. Her speech is normal and behavior is normal. Judgment and thought content normal.   Nursing note and vitals reviewed.      Vitals:    06/14/19 1532   BP: 152/80   BP Location: Left arm   Patient Position: Sitting   Pulse: 90   Resp: 20   Temp: 98.3 °F (36.8 °C)   TempSrc: Temporal   SpO2: 94%   Weight: 118 kg (261 lb)   Height: 154.9 cm (61\")   PainSc: 0-No pain       Patient's Body mass index is 49.32 kg/m². BMI is above normal parameters. Recommendations include: educational material and exercise counseling.    Georgia was seen today for diabetes.    Diagnoses and all orders for this visit:    Medicare annual wellness visit, subsequent  -     CBC w AUTO Differential  -     CBC Auto Differential    Type 2 diabetes mellitus with diabetic cataract, without long-term current use of insulin (CMS/[facility])  -     POC Glycosylated Hemoglobin (Hb A1C)  -     POC Microalbumin  -     POC Urinalysis Dipstick, Automated  -     metFORMIN (GLUCOPHAGE) 850 MG tablet; Take 1 tablet by mouth 2 (Two) Times a Day With Meals.    Immunization due  -     Pneumococcal Conjugate Vaccine 13-Valent All    Hyperlipidemia, unspecified hyperlipidemia type  -     Lipid Panel  -     atorvastatin (LIPITOR) 40 MG tablet; Take 1 tablet by mouth Daily.    ACE-inhibitor cough    Essential hypertension  -     Cancel: CBC & Differential  -     Comprehensive Metabolic Panel  -     valsartan-hydrochlorothiazide (DIOVAN-HCT) 160-25 MG per tablet; Take 1 tablet by mouth Daily.  -     Cancel: CBC Auto Differential    Morbid obesity due to excess calories (CMS/HCC)  -     TSH    Coronary artery disease due to lipid rich plaque    Acute renal failure superimposed on stage 3 chronic kidney disease, unspecified acute renal failure type (CMS/[facility])    Physical " deconditioning    Osteoporosis without current pathological fracture, unspecified osteoporosis type  -     Vitamin D 25 Hydroxy    Dry skin dermatitis  -     urea (CARMOL) 10 % cream; Apply  topically to the appropriate area as directed As Needed for Dry Skin.        Visit Diagnoses:    ICD-10-CM ICD-9-CM   1. Medicare annual wellness visit, subsequent Z00.00 V70.0   2. Type 2 diabetes mellitus with diabetic cataract, without long-term current use of insulin (CMS/HCC) E11.36 250.50     366.41   3. Immunization due Z23 V05.9   4. Hyperlipidemia, unspecified hyperlipidemia type E78.5 272.4   5. ACE-inhibitor cough R05 786.2    T46.4X5A E942.6   6. Essential hypertension I10 401.9   7. Morbid obesity due to excess calories (CMS/HCC) E66.01 278.01   8. Coronary artery disease due to lipid rich plaque I25.10 414.00    I25.83 414.3   9. Acute renal failure superimposed on stage 3 chronic kidney disease, unspecified acute renal failure type (CMS/[facility]) N17.9 584.9    N18.3 585.3   10. Physical deconditioning R53.81 799.3   11. Osteoporosis without current pathological fracture, unspecified osteoporosis type M81.0 733.00   12. Dry skin dermatitis L85.3 692.89       Orders Placed This Encounter   Procedures   • Pneumococcal Conjugate Vaccine 13-Valent All   • Lipid Panel   • Comprehensive Metabolic Panel   • TSH   • Vitamin D 25 Hydroxy   • CBC Auto Differential   • POC Glycosylated Hemoglobin (Hb A1C)   • POC Microalbumin   • POC Urinalysis Dipstick, Automated   • CBC w AUTO Differential     Order Specific Question:   Manual Differential     Answer:   No       Outpatient Encounter Medications as of 6/14/2019   Medication Sig Dispense Refill   • aspirin 81 MG chewable tablet Chew 1 tablet Daily. 30 tablet 3   • atorvastatin (LIPITOR) 40 MG tablet Take 1 tablet by mouth Daily. 90 tablet 1   • glucose blood (ACCU-CHEK ANNIE PLUS) test strip Use once daily 100 each 12   • metFORMIN (GLUCOPHAGE) 850 MG tablet Take 1 tablet by  mouth 2 (Two) Times a Day With Meals. 180 tablet 1   • nitroglycerin (NITROSTAT) 0.4 MG SL tablet Place 1 tablet under the tongue Every 5 (Five) Minutes As Needed for Chest Pain. Take no more than 3 doses in 15 minutes. 100 tablet 0   • valsartan-hydrochlorothiazide (DIOVAN-HCT) 160-25 MG per tablet Take 1 tablet by mouth Daily. 90 tablet 1   • [DISCONTINUED] atorvastatin (LIPITOR) 10 MG tablet Take 1 tablet daily. NO FURTHER REFILLS UNTIL SEEN 30 tablet 1   • [DISCONTINUED] atorvastatin (LIPITOR) 40 MG tablet TAKE ONE TABLET BY MOUTH DAILY 30 tablet 1   • [DISCONTINUED] cyclobenzaprine (FLEXERIL) 10 MG tablet Take 1 tablet by mouth 3 (Three) Times a Day As Needed for Muscle Spasms. 15 tablet 0   • [DISCONTINUED] metFORMIN (GLUCOPHAGE) 850 MG tablet Take 1 tablet by mouth 2 (Two) Times a Day With Meals. 60 tablet 2   • [DISCONTINUED] valsartan-hydrochlorothiazide (DIOVAN-HCT) 160-25 MG per tablet TAKE ONE TABLET BY MOUTH DAILY 30 tablet 5   • urea (CARMOL) 10 % cream Apply  topically to the appropriate area as directed As Needed for Dry Skin. 85 g 5   • [DISCONTINUED] glucose blood test strip Use as instructed 100 each 12   • [DISCONTINUED] HYDROcodone-acetaminophen (NORCO) 5-325 MG per tablet Take 1 tablet by mouth Every 6 (Six) Hours As Needed for Moderate Pain . 12 tablet 0   • [DISCONTINUED] metFORMIN (GLUCOPHAGE) 850 MG tablet TAKE ONE TABLET BY MOUTH TWICE A DAY WITH MEALS 60 tablet 1     No facility-administered encounter medications on file as of 6/14/2019.        Reviewed use of high risk medication in the elderly: yes  Reviewed for potential of harmful drug interactions in the elderly: yes    Follow Up:  Return in about 6 months (around 12/14/2019) for Recheck.     An After Visit Summary and PPPS with all of these plans were given to the patient.

## 2019-06-15 LAB
25(OH)D3 SERPL-MCNC: 9 NG/ML (ref 30–100)
ALBUMIN SERPL-MCNC: 3.4 G/DL (ref 3.5–5.2)
ALBUMIN/GLOB SERPL: 0.8 G/DL
ALP SERPL-CCNC: 98 U/L (ref 39–117)
ALT SERPL W P-5'-P-CCNC: 17 U/L (ref 1–33)
ANION GAP SERPL CALCULATED.3IONS-SCNC: 14 MMOL/L
AST SERPL-CCNC: 20 U/L (ref 1–32)
BASOPHILS # BLD AUTO: 0.05 10*3/MM3 (ref 0–0.2)
BASOPHILS NFR BLD AUTO: 0.4 % (ref 0–1.5)
BILIRUB SERPL-MCNC: 0.3 MG/DL (ref 0.2–1.2)
BUN BLD-MCNC: 22 MG/DL (ref 8–23)
BUN/CREAT SERPL: 18.6 (ref 7–25)
CALCIUM SPEC-SCNC: 9.4 MG/DL (ref 8.6–10.5)
CHLORIDE SERPL-SCNC: 103 MMOL/L (ref 98–107)
CHOLEST SERPL-MCNC: 156 MG/DL (ref 0–200)
CO2 SERPL-SCNC: 24 MMOL/L (ref 22–29)
CREAT BLD-MCNC: 1.18 MG/DL (ref 0.57–1)
DEPRECATED RDW RBC AUTO: 50.1 FL (ref 37–54)
EOSINOPHIL # BLD AUTO: 0.08 10*3/MM3 (ref 0–0.4)
EOSINOPHIL NFR BLD AUTO: 0.6 % (ref 0.3–6.2)
ERYTHROCYTE [DISTWIDTH] IN BLOOD BY AUTOMATED COUNT: 13.7 % (ref 12.3–15.4)
GFR SERPL CREATININE-BSD FRML MDRD: 45 ML/MIN/1.73
GLOBULIN UR ELPH-MCNC: 4.3 GM/DL
GLUCOSE BLD-MCNC: 113 MG/DL (ref 65–99)
HCT VFR BLD AUTO: 43.8 % (ref 34–46.6)
HDLC SERPL-MCNC: 51 MG/DL (ref 40–60)
HGB BLD-MCNC: 13.6 G/DL (ref 12–15.9)
IMM GRANULOCYTES # BLD AUTO: 0.05 10*3/MM3 (ref 0–0.05)
IMM GRANULOCYTES NFR BLD AUTO: 0.4 % (ref 0–0.5)
LDLC SERPL CALC-MCNC: 91 MG/DL (ref 0–100)
LDLC/HDLC SERPL: 1.78 {RATIO}
LYMPHOCYTES # BLD AUTO: 2.04 10*3/MM3 (ref 0.7–3.1)
LYMPHOCYTES NFR BLD AUTO: 14.3 % (ref 19.6–45.3)
MCH RBC QN AUTO: 31 PG (ref 26.6–33)
MCHC RBC AUTO-ENTMCNC: 31.1 G/DL (ref 31.5–35.7)
MCV RBC AUTO: 99.8 FL (ref 79–97)
MONOCYTES # BLD AUTO: 0.8 10*3/MM3 (ref 0.1–0.9)
MONOCYTES NFR BLD AUTO: 5.6 % (ref 5–12)
NEUTROPHILS # BLD AUTO: 11.26 10*3/MM3 (ref 1.7–7)
NEUTROPHILS NFR BLD AUTO: 78.7 % (ref 42.7–76)
NRBC BLD AUTO-RTO: 0 /100 WBC (ref 0–0.2)
PLATELET # BLD AUTO: 194 10*3/MM3 (ref 140–450)
PMV BLD AUTO: 12.1 FL (ref 6–12)
POTASSIUM BLD-SCNC: 5.1 MMOL/L (ref 3.5–5.2)
PROT SERPL-MCNC: 7.7 G/DL (ref 6–8.5)
RBC # BLD AUTO: 4.39 10*6/MM3 (ref 3.77–5.28)
SODIUM BLD-SCNC: 141 MMOL/L (ref 136–145)
TRIGL SERPL-MCNC: 71 MG/DL (ref 0–150)
TSH SERPL DL<=0.05 MIU/L-ACNC: 2.06 MIU/ML (ref 0.27–4.2)
VLDLC SERPL-MCNC: 14.2 MG/DL (ref 5–40)
WBC NRBC COR # BLD: 14.28 10*3/MM3 (ref 3.4–10.8)

## 2019-06-17 ENCOUNTER — TELEPHONE (OUTPATIENT)
Dept: FAMILY MEDICINE CLINIC | Facility: CLINIC | Age: 74
End: 2019-06-17

## 2019-06-17 DIAGNOSIS — D72.829 LEUKOCYTOSIS, UNSPECIFIED TYPE: Primary | ICD-10-CM

## 2019-06-17 DIAGNOSIS — E55.9 VITAMIN D DEFICIENCY: Primary | ICD-10-CM

## 2019-06-17 RX ORDER — ERGOCALCIFEROL 1.25 MG/1
50000 CAPSULE ORAL WEEKLY
Qty: 12 CAPSULE | Refills: 0 | Status: SHIPPED | OUTPATIENT
Start: 2019-06-17 | End: 2019-08-01 | Stop reason: SDUPTHER

## 2019-06-17 NOTE — TELEPHONE ENCOUNTER
Called and left message for daughter to call me back. Ms Velázquez has had an apparent drastic weight gain if data is accurate. She did have quite a bit of ankle edema at her visit. Labs with slowing increasing leukocytosis, renal failure and low vit D.   Will send in vit d supplement. Make sure she is taking her meds as directed. Use compression hose and elevate legs. Make appt with Cardiology and hematology. Keep appt with nephrology. Repeat labs in 2 months.

## 2019-07-26 ENCOUNTER — OFFICE VISIT (OUTPATIENT)
Dept: FAMILY MEDICINE CLINIC | Facility: CLINIC | Age: 74
End: 2019-07-26

## 2019-07-26 VITALS
RESPIRATION RATE: 20 BRPM | HEIGHT: 61 IN | SYSTOLIC BLOOD PRESSURE: 122 MMHG | BODY MASS INDEX: 48.33 KG/M2 | OXYGEN SATURATION: 94 % | TEMPERATURE: 97.4 F | HEART RATE: 68 BPM | DIASTOLIC BLOOD PRESSURE: 64 MMHG | WEIGHT: 256 LBS

## 2019-07-26 DIAGNOSIS — R60.9 EDEMA, UNSPECIFIED TYPE: ICD-10-CM

## 2019-07-26 DIAGNOSIS — E55.9 VITAMIN D DEFICIENCY: ICD-10-CM

## 2019-07-26 DIAGNOSIS — D72.829 LEUKOCYTOSIS, UNSPECIFIED TYPE: Primary | ICD-10-CM

## 2019-07-26 DIAGNOSIS — R18.8 OTHER ASCITES: ICD-10-CM

## 2019-07-26 DIAGNOSIS — N17.9 ACUTE RENAL FAILURE SUPERIMPOSED ON STAGE 3 CHRONIC KIDNEY DISEASE, UNSPECIFIED ACUTE RENAL FAILURE TYPE: ICD-10-CM

## 2019-07-26 DIAGNOSIS — N18.3 ACUTE RENAL FAILURE SUPERIMPOSED ON STAGE 3 CHRONIC KIDNEY DISEASE, UNSPECIFIED ACUTE RENAL FAILURE TYPE: ICD-10-CM

## 2019-07-26 PROCEDURE — 99214 OFFICE O/P EST MOD 30 MIN: CPT | Performed by: NURSE PRACTITIONER

## 2019-07-26 NOTE — PROGRESS NOTES
Subjective   Georgiaferanndez Velázquez is a 74 y.o. female.     History of Present Illness Ms Velázquez is here with her daughter to follow up on multiple problems  1. Weight is about the same. Denies sob and chest pain. No cough. Patient doesn't feel like her abd is swollen but her daughter thinks her pants are more tight. No abd pain or change in bowels.  2. Legs still with significant edema.  She is elevating her feet at home. Drinking plenty of water. Can't put on the compression hose by herself. The ones they got roll down her legs.  3. She missed her appt with hematology this week. Needs repeat labs.  4. Had vit d def. Is taking vit d supplement.  5. Worsening renal failure. Has appt with nephrology next week.      Outpatient Encounter Medications as of 7/26/2019   Medication Sig Dispense Refill   • aspirin 81 MG chewable tablet Chew 1 tablet Daily. 30 tablet 3   • atorvastatin (LIPITOR) 40 MG tablet Take 1 tablet by mouth Daily. 90 tablet 1   • glucose blood (ACCU-CHEK ANNIE PLUS) test strip Use once daily 100 each 12   • metFORMIN (GLUCOPHAGE) 850 MG tablet Take 1 tablet by mouth 2 (Two) Times a Day With Meals. 180 tablet 1   • nitroglycerin (NITROSTAT) 0.4 MG SL tablet Place 1 tablet under the tongue Every 5 (Five) Minutes As Needed for Chest Pain. Take no more than 3 doses in 15 minutes. 100 tablet 0   • urea (CARMOL) 10 % cream Apply  topically to the appropriate area as directed As Needed for Dry Skin. 85 g 5   • valsartan-hydrochlorothiazide (DIOVAN-HCT) 160-25 MG per tablet Take 1 tablet by mouth Daily. 90 tablet 1   • vitamin D (ERGOCALCIFEROL) 02247 units capsule capsule Take 1 capsule by mouth 1 (One) Time Per Week. 12 capsule 0     No facility-administered encounter medications on file as of 7/26/2019.        The following portions of the patient's history were reviewed and updated as appropriate: allergies, current medications, past family history, past medical history, past social history, past surgical  "history and problem list.    Review of Systems   Constitutional: Negative for appetite change, fever, unexpected weight gain and unexpected weight loss.   HENT: Negative for congestion, nosebleeds, sore throat and trouble swallowing.    Eyes: Negative for visual disturbance.   Respiratory: Negative for cough, shortness of breath and wheezing.    Cardiovascular: Positive for leg swelling. Negative for chest pain and palpitations.        Significant +3 pitting edema. Some early discoloration of skin on RLE. No ulcer. Thickened, scaly skin on feet and ankles.   Gastrointestinal: Negative for abdominal pain, blood in stool, constipation, diarrhea, nausea and vomiting.        Abd is protruberant. Positive fluid wave. NO masses.   Endocrine: Negative for polydipsia, polyphagia and polyuria.   Genitourinary: Negative for dysuria, frequency and hematuria.   Musculoskeletal: Negative for arthralgias, joint swelling and myalgias.   Skin: Negative for rash.   Neurological: Negative for dizziness, seizures, syncope and numbness.   Hematological: Negative for adenopathy. Does not bruise/bleed easily.   Psychiatric/Behavioral: Negative for behavioral problems, sleep disturbance and depressed mood. The patient is not nervous/anxious.        Objective     Visit Vitals  /64 (BP Location: Left arm, Patient Position: Sitting)   Pulse 68   Temp 97.4 °F (36.3 °C) (Temporal)   Resp 20   Ht 154.9 cm (61\")   Wt 116 kg (256 lb)   LMP  (LMP Unknown)   SpO2 94%   BMI 48.37 kg/m²       Physical Exam   Constitutional: She is oriented to person, place, and time. She appears well-developed and well-nourished. No distress.   HENT:   Head: Normocephalic and atraumatic.   Right Ear: Tympanic membrane and external ear normal.   Left Ear: Tympanic membrane and external ear normal.   Nose: Nose normal.   Mouth/Throat: Oropharynx is clear and moist. No oropharyngeal exudate.   Eyes: Conjunctivae are normal. Pupils are equal, round, and reactive to " light. Right eye exhibits no discharge. Left eye exhibits no discharge. No scleral icterus.   Neck: Neck supple. No tracheal deviation present. No thyromegaly present.   Cardiovascular: Normal rate, regular rhythm and normal heart sounds. Exam reveals no gallop and no friction rub.   No murmur heard.  Pulmonary/Chest: Effort normal and breath sounds normal. No respiratory distress. She has no wheezes.   Abdominal: Soft. Bowel sounds are normal. She exhibits no distension and no mass. There is no tenderness.   Abd is protruberant. Positive fluid wave. NO masses.   Musculoskeletal: She exhibits edema. She exhibits no deformity.   Significant +3 pitting edema. Some early discoloration of skin on RLE. No ulcer. Thickened, scaly skin on feet and ankles.   Lymphadenopathy:     She has no cervical adenopathy.   Neurological: She is alert and oriented to person, place, and time. Coordination normal.   Skin: Skin is warm and dry. Capillary refill takes less than 2 seconds. No rash noted. No erythema.   Psychiatric: She has a normal mood and affect. Her speech is normal and behavior is normal. Judgment and thought content normal.   Nursing note and vitals reviewed.        Assessment/Plan   Georgia was seen today for abnormal lab.    Diagnoses and all orders for this visit:    Leukocytosis, unspecified type  -     Cancel: CBC & Differential  -     Cancel: CBC Auto Differential  -     CBC & Differential; Future    Vitamin D deficiency  -     Cancel: Vitamin D 25 Hydroxy  -     Vitamin D 25 Hydroxy; Future    Acute renal failure superimposed on stage 3 chronic kidney disease, unspecified acute renal failure type (CMS/HCC)  -     Cancel: Comprehensive Metabolic Panel  -     Comprehensive Metabolic Panel; Future    Other ascites  -     CT Abdomen Pelvis Without Contrast; Future  -     Comprehensive Metabolic Panel; Future    Edema, unspecified type  -     Comprehensive Metabolic Panel; Future      Will repeat labs.  She is  asymptomatic form her abd and edema. No sob or chest pain.  Will obtain CT of abd without contrast to assess for ascities or other cause of LE edema.  Go to [employer] to be fitted for compression stockings.  Close follow up.  Discussed the nature of the disease including, risks, complications, implications, management, safe and proper use of medications. Encouraged therapeutic lifestyle changes including low calorie diet with plenty of fruits and vegetables, daily exercise, medication compliance, and keeping scheduled follow up appointments with me and any other providers. Encouraged patient to have appointment for complete physical, fasting labs, appropriate screenings, and immunizations on an annual basis.  Follow up symptoms persist or worsen or go to ER.

## 2019-08-01 ENCOUNTER — TELEPHONE (OUTPATIENT)
Dept: FAMILY MEDICINE CLINIC | Facility: CLINIC | Age: 74
End: 2019-08-01

## 2019-08-01 DIAGNOSIS — E55.9 VITAMIN D DEFICIENCY: ICD-10-CM

## 2019-08-01 RX ORDER — ERGOCALCIFEROL 1.25 MG/1
50000 CAPSULE ORAL WEEKLY
Qty: 12 CAPSULE | Refills: 0 | Status: SHIPPED | OUTPATIENT
Start: 2019-08-01 | End: 2020-07-01

## 2019-08-01 NOTE — TELEPHONE ENCOUNTER
Left message for daughter Omaira to call and discuss labs. No real changes. Stage 3 renal failure. Low Vitamin D. Continue current meds. Make sure she is taking the weekly vitamin d supplement.

## 2019-08-07 ENCOUNTER — HOSPITAL ENCOUNTER (OUTPATIENT)
Dept: CT IMAGING | Facility: HOSPITAL | Age: 74
Discharge: HOME | End: 2019-08-07
Admitting: NURSE PRACTITIONER

## 2019-08-07 DIAGNOSIS — R18.8 OTHER ASCITES: ICD-10-CM

## 2019-08-07 PROCEDURE — 74176 CT ABD & PELVIS W/O CONTRAST: CPT

## 2019-08-09 ENCOUNTER — TELEPHONE (OUTPATIENT)
Dept: FAMILY MEDICINE CLINIC | Facility: CLINIC | Age: 74
End: 2019-08-09

## 2019-08-09 NOTE — TELEPHONE ENCOUNTER
Discussed CT findings.  Some thickening at distal esophagus and around kidney and bladder. Daughter wants to hold off on EGD at this time.  Has follow up with nephrology in 2 weeks.

## 2019-11-28 ENCOUNTER — HOSPITAL ENCOUNTER (EMERGENCY)
Facility: HOSPITAL | Age: 74
Discharge: HOME | End: 2019-11-28
Attending: EMERGENCY MEDICINE | Admitting: EMERGENCY MEDICINE

## 2019-11-28 ENCOUNTER — APPOINTMENT (OUTPATIENT)
Dept: GENERAL RADIOLOGY | Facility: HOSPITAL | Age: 74
End: 2019-11-28

## 2019-11-28 VITALS
WEIGHT: 223 LBS | TEMPERATURE: 97.8 F | HEIGHT: 61 IN | HEART RATE: 65 BPM | RESPIRATION RATE: 16 BRPM | SYSTOLIC BLOOD PRESSURE: 111 MMHG | OXYGEN SATURATION: 96 % | BODY MASS INDEX: 42.1 KG/M2 | DIASTOLIC BLOOD PRESSURE: 74 MMHG

## 2019-11-28 DIAGNOSIS — N39.0 ACUTE UTI: ICD-10-CM

## 2019-11-28 DIAGNOSIS — R55 VASOVAGAL SYNCOPE: Primary | ICD-10-CM

## 2019-11-28 LAB
ALBUMIN SERPL-MCNC: 3.8 G/DL (ref 3.5–5.2)
ALBUMIN/GLOB SERPL: 1.1 G/DL
ALP SERPL-CCNC: 85 U/L (ref 39–117)
ALT SERPL W P-5'-P-CCNC: 10 U/L (ref 1–33)
ANION GAP SERPL CALCULATED.3IONS-SCNC: 17 MMOL/L (ref 5–15)
AST SERPL-CCNC: 14 U/L (ref 1–32)
BACTERIA UR QL AUTO: ABNORMAL /HPF
BASOPHILS # BLD AUTO: 0.05 10*3/MM3 (ref 0–0.2)
BASOPHILS NFR BLD AUTO: 0.5 % (ref 0–1.5)
BILIRUB SERPL-MCNC: 0.4 MG/DL (ref 0.2–1.2)
BILIRUB UR QL STRIP: NEGATIVE
BUN BLD-MCNC: 45 MG/DL (ref 8–23)
BUN/CREAT SERPL: 25.6 (ref 7–25)
CALCIUM SPEC-SCNC: 9.5 MG/DL (ref 8.6–10.5)
CHLORIDE SERPL-SCNC: 104 MMOL/L (ref 98–107)
CLARITY UR: ABNORMAL
CO2 SERPL-SCNC: 21 MMOL/L (ref 22–29)
COLOR UR: YELLOW
CREAT BLD-MCNC: 1.76 MG/DL (ref 0.57–1)
DEPRECATED RDW RBC AUTO: 50.2 FL (ref 37–54)
EOSINOPHIL # BLD AUTO: 0.16 10*3/MM3 (ref 0–0.4)
EOSINOPHIL NFR BLD AUTO: 1.6 % (ref 0.3–6.2)
ERYTHROCYTE [DISTWIDTH] IN BLOOD BY AUTOMATED COUNT: 14.2 % (ref 12.3–15.4)
GFR SERPL CREATININE-BSD FRML MDRD: 28 ML/MIN/1.73
GLOBULIN UR ELPH-MCNC: 3.4 GM/DL
GLUCOSE BLD-MCNC: 174 MG/DL (ref 65–99)
GLUCOSE UR STRIP-MCNC: NEGATIVE MG/DL
HCT VFR BLD AUTO: 37.4 % (ref 34–46.6)
HGB BLD-MCNC: 11.6 G/DL (ref 12–15.9)
HGB UR QL STRIP.AUTO: NEGATIVE
HOLD SPECIMEN: NORMAL
HOLD SPECIMEN: NORMAL
HYALINE CASTS UR QL AUTO: ABNORMAL /LPF
IMM GRANULOCYTES # BLD AUTO: 0.03 10*3/MM3 (ref 0–0.05)
IMM GRANULOCYTES NFR BLD AUTO: 0.3 % (ref 0–0.5)
KETONES UR QL STRIP: NEGATIVE
LEUKOCYTE ESTERASE UR QL STRIP.AUTO: ABNORMAL
LYMPHOCYTES # BLD AUTO: 2.45 10*3/MM3 (ref 0.7–3.1)
LYMPHOCYTES NFR BLD AUTO: 24.8 % (ref 19.6–45.3)
MCH RBC QN AUTO: 30.1 PG (ref 26.6–33)
MCHC RBC AUTO-ENTMCNC: 31 G/DL (ref 31.5–35.7)
MCV RBC AUTO: 96.9 FL (ref 79–97)
MONOCYTES # BLD AUTO: 0.67 10*3/MM3 (ref 0.1–0.9)
MONOCYTES NFR BLD AUTO: 6.8 % (ref 5–12)
NEUTROPHILS # BLD AUTO: 6.52 10*3/MM3 (ref 1.7–7)
NEUTROPHILS NFR BLD AUTO: 66 % (ref 42.7–76)
NITRITE UR QL STRIP: POSITIVE
NRBC BLD AUTO-RTO: 0 /100 WBC (ref 0–0.2)
NT-PROBNP SERPL-MCNC: 401.5 PG/ML (ref 5–1800)
PH UR STRIP.AUTO: 5.5 [PH] (ref 5–8)
PLATELET # BLD AUTO: 195 10*3/MM3 (ref 140–450)
PMV BLD AUTO: 11.9 FL (ref 6–12)
POTASSIUM BLD-SCNC: 4.6 MMOL/L (ref 3.5–5.2)
PROT SERPL-MCNC: 7.2 G/DL (ref 6–8.5)
PROT UR QL STRIP: NEGATIVE
RBC # BLD AUTO: 3.86 10*6/MM3 (ref 3.77–5.28)
RBC # UR: ABNORMAL /HPF
REF LAB TEST METHOD: ABNORMAL
SODIUM BLD-SCNC: 142 MMOL/L (ref 136–145)
SP GR UR STRIP: 1.02 (ref 1–1.03)
SQUAMOUS #/AREA URNS HPF: ABNORMAL /HPF
TROPONIN T SERPL-MCNC: <0.01 NG/ML (ref 0–0.03)
UROBILINOGEN UR QL STRIP: ABNORMAL
WBC NRBC COR # BLD: 9.88 10*3/MM3 (ref 3.4–10.8)
WBC UR QL AUTO: ABNORMAL /HPF
WHOLE BLOOD HOLD SPECIMEN: NORMAL
WHOLE BLOOD HOLD SPECIMEN: NORMAL

## 2019-11-28 PROCEDURE — 87186 SC STD MICRODIL/AGAR DIL: CPT | Performed by: NURSE PRACTITIONER

## 2019-11-28 PROCEDURE — 99284 EMERGENCY DEPT VISIT MOD MDM: CPT

## 2019-11-28 PROCEDURE — 96374 THER/PROPH/DIAG INJ IV PUSH: CPT

## 2019-11-28 PROCEDURE — 84484 ASSAY OF TROPONIN QUANT: CPT | Performed by: NURSE PRACTITIONER

## 2019-11-28 PROCEDURE — 87088 URINE BACTERIA CULTURE: CPT | Performed by: NURSE PRACTITIONER

## 2019-11-28 PROCEDURE — 25010000002 ONDANSETRON PER 1 MG: Performed by: EMERGENCY MEDICINE

## 2019-11-28 PROCEDURE — 80053 COMPREHEN METABOLIC PANEL: CPT | Performed by: NURSE PRACTITIONER

## 2019-11-28 PROCEDURE — 85025 COMPLETE CBC W/AUTO DIFF WBC: CPT | Performed by: NURSE PRACTITIONER

## 2019-11-28 PROCEDURE — 83880 ASSAY OF NATRIURETIC PEPTIDE: CPT | Performed by: NURSE PRACTITIONER

## 2019-11-28 PROCEDURE — 71045 X-RAY EXAM CHEST 1 VIEW: CPT

## 2019-11-28 PROCEDURE — 81001 URINALYSIS AUTO W/SCOPE: CPT | Performed by: NURSE PRACTITIONER

## 2019-11-28 PROCEDURE — 87086 URINE CULTURE/COLONY COUNT: CPT | Performed by: NURSE PRACTITIONER

## 2019-11-28 PROCEDURE — 93005 ELECTROCARDIOGRAM TRACING: CPT | Performed by: EMERGENCY MEDICINE

## 2019-11-28 PROCEDURE — P9612 CATHETERIZE FOR URINE SPEC: HCPCS

## 2019-11-28 RX ORDER — SODIUM CHLORIDE 0.9 % (FLUSH) 0.9 %
10 SYRINGE (ML) INJECTION AS NEEDED
Status: DISCONTINUED | OUTPATIENT
Start: 2019-11-28 | End: 2019-11-28 | Stop reason: HOSPADM

## 2019-11-28 RX ORDER — ONDANSETRON 2 MG/ML
4 INJECTION INTRAMUSCULAR; INTRAVENOUS ONCE
Status: COMPLETED | OUTPATIENT
Start: 2019-11-28 | End: 2019-11-28

## 2019-11-28 RX ORDER — CEFDINIR 300 MG/1
300 CAPSULE ORAL 2 TIMES DAILY
Qty: 14 CAPSULE | Refills: 0 | Status: SHIPPED | OUTPATIENT
Start: 2019-11-28 | End: 2019-12-04

## 2019-11-28 RX ORDER — CEFDINIR 300 MG/1
300 CAPSULE ORAL ONCE
Status: COMPLETED | OUTPATIENT
Start: 2019-11-28 | End: 2019-11-28

## 2019-11-28 RX ORDER — METOCLOPRAMIDE HYDROCHLORIDE 5 MG/ML
5 INJECTION INTRAMUSCULAR; INTRAVENOUS ONCE
Status: DISCONTINUED | OUTPATIENT
Start: 2019-11-28 | End: 2019-11-28

## 2019-11-28 RX ORDER — ONDANSETRON 2 MG/ML
4 INJECTION INTRAMUSCULAR; INTRAVENOUS ONCE
Status: DISCONTINUED | OUTPATIENT
Start: 2019-11-28 | End: 2019-11-28 | Stop reason: HOSPADM

## 2019-11-28 RX ORDER — FAMOTIDINE 10 MG/ML
20 INJECTION, SOLUTION INTRAVENOUS ONCE
Status: DISCONTINUED | OUTPATIENT
Start: 2019-11-28 | End: 2019-11-28

## 2019-11-28 RX ADMIN — ONDANSETRON 4 MG: 2 INJECTION INTRAMUSCULAR; INTRAVENOUS at 15:44

## 2019-11-28 RX ADMIN — SODIUM CHLORIDE 500 ML: 9 INJECTION, SOLUTION INTRAVENOUS at 17:10

## 2019-11-28 RX ADMIN — CEFDINIR 300 MG: 300 CAPSULE ORAL at 20:03

## 2019-11-28 RX ADMIN — SODIUM CHLORIDE 500 ML: 9 INJECTION, SOLUTION INTRAVENOUS at 15:45

## 2019-11-30 LAB — BACTERIA SPEC AEROBE CULT: ABNORMAL

## 2019-12-02 ENCOUNTER — TELEPHONE (OUTPATIENT)
Dept: FAMILY MEDICINE CLINIC | Facility: CLINIC | Age: 74
End: 2019-12-02

## 2019-12-02 NOTE — TELEPHONE ENCOUNTER
Patient called in seeking an apt.   Patient went into the ER thanksgiving day  Was told to make a follow up apt with her provider.

## 2019-12-02 NOTE — TELEPHONE ENCOUNTER
Patient has appointment scheduled for ER follow up on 12/04/2019. Nothing further is needed at this time.

## 2019-12-04 ENCOUNTER — OFFICE VISIT (OUTPATIENT)
Dept: FAMILY MEDICINE CLINIC | Facility: CLINIC | Age: 74
End: 2019-12-04

## 2019-12-04 VITALS
BODY MASS INDEX: 44.4 KG/M2 | RESPIRATION RATE: 16 BRPM | OXYGEN SATURATION: 97 % | HEART RATE: 53 BPM | SYSTOLIC BLOOD PRESSURE: 120 MMHG | WEIGHT: 235 LBS | TEMPERATURE: 97.4 F | DIASTOLIC BLOOD PRESSURE: 76 MMHG

## 2019-12-04 DIAGNOSIS — E11.36 TYPE 2 DIABETES MELLITUS WITH DIABETIC CATARACT, WITHOUT LONG-TERM CURRENT USE OF INSULIN (HCC): Primary | ICD-10-CM

## 2019-12-04 DIAGNOSIS — Z23 INFLUENZA VACCINATION ADMINISTERED AT CURRENT VISIT: ICD-10-CM

## 2019-12-04 DIAGNOSIS — E78.5 HYPERLIPIDEMIA, UNSPECIFIED HYPERLIPIDEMIA TYPE: ICD-10-CM

## 2019-12-04 DIAGNOSIS — E55.9 VITAMIN D DEFICIENCY: ICD-10-CM

## 2019-12-04 DIAGNOSIS — I10 ESSENTIAL HYPERTENSION: ICD-10-CM

## 2019-12-04 DIAGNOSIS — N39.0 URINARY TRACT INFECTION WITHOUT HEMATURIA, SITE UNSPECIFIED: ICD-10-CM

## 2019-12-04 LAB
BILIRUB BLD-MCNC: NEGATIVE MG/DL
CLARITY, POC: CLEAR
COLOR UR: YELLOW
GLUCOSE UR STRIP-MCNC: NEGATIVE MG/DL
HBA1C MFR BLD: 5.6 %
KETONES UR QL: ABNORMAL
LEUKOCYTE EST, POC: NEGATIVE
NITRITE UR-MCNC: NEGATIVE MG/ML
PH UR: 5 [PH] (ref 5–8)
PROT UR STRIP-MCNC: ABNORMAL MG/DL
RBC # UR STRIP: ABNORMAL /UL
SP GR UR: 1.02 (ref 1–1.03)
UROBILINOGEN UR QL: NORMAL

## 2019-12-04 PROCEDURE — 80053 COMPREHEN METABOLIC PANEL: CPT | Performed by: NURSE PRACTITIONER

## 2019-12-04 PROCEDURE — 85025 COMPLETE CBC W/AUTO DIFF WBC: CPT | Performed by: NURSE PRACTITIONER

## 2019-12-04 PROCEDURE — G0008 ADMIN INFLUENZA VIRUS VAC: HCPCS | Performed by: NURSE PRACTITIONER

## 2019-12-04 PROCEDURE — 99214 OFFICE O/P EST MOD 30 MIN: CPT | Performed by: NURSE PRACTITIONER

## 2019-12-04 PROCEDURE — 81003 URINALYSIS AUTO W/O SCOPE: CPT | Performed by: NURSE PRACTITIONER

## 2019-12-04 PROCEDURE — 84443 ASSAY THYROID STIM HORMONE: CPT | Performed by: NURSE PRACTITIONER

## 2019-12-04 PROCEDURE — 84439 ASSAY OF FREE THYROXINE: CPT | Performed by: NURSE PRACTITIONER

## 2019-12-04 PROCEDURE — 83036 HEMOGLOBIN GLYCOSYLATED A1C: CPT | Performed by: NURSE PRACTITIONER

## 2019-12-04 PROCEDURE — 82306 VITAMIN D 25 HYDROXY: CPT | Performed by: NURSE PRACTITIONER

## 2019-12-04 PROCEDURE — 90653 IIV ADJUVANT VACCINE IM: CPT | Performed by: NURSE PRACTITIONER

## 2019-12-04 PROCEDURE — 80061 LIPID PANEL: CPT | Performed by: NURSE PRACTITIONER

## 2019-12-04 RX ORDER — ALENDRONATE SODIUM 70 MG/1
70 TABLET ORAL
COMMUNITY
End: 2020-07-01

## 2019-12-04 RX ORDER — VALSARTAN AND HYDROCHLOROTHIAZIDE 160; 25 MG/1; MG/1
1 TABLET ORAL DAILY
Qty: 90 TABLET | Refills: 2 | Status: SHIPPED | OUTPATIENT
Start: 2019-12-04 | End: 2020-09-04 | Stop reason: SDUPTHER

## 2019-12-04 RX ORDER — CARVEDILOL 6.25 MG/1
6.25 TABLET ORAL 2 TIMES DAILY WITH MEALS
COMMUNITY
End: 2019-12-04 | Stop reason: SDUPTHER

## 2019-12-04 RX ORDER — CARVEDILOL 6.25 MG/1
6.25 TABLET ORAL 2 TIMES DAILY WITH MEALS
Qty: 180 TABLET | Refills: 3 | Status: SHIPPED | OUTPATIENT
Start: 2019-12-04 | End: 2021-03-19 | Stop reason: HOSPADM

## 2019-12-04 RX ORDER — ATORVASTATIN CALCIUM 40 MG/1
40 TABLET, FILM COATED ORAL DAILY
Qty: 90 TABLET | Refills: 3 | Status: ON HOLD | OUTPATIENT
Start: 2019-12-04 | End: 2020-05-22

## 2019-12-04 RX ORDER — PANTOPRAZOLE SODIUM 40 MG/1
40 TABLET, DELAYED RELEASE ORAL DAILY
COMMUNITY
End: 2019-12-04 | Stop reason: SINTOL

## 2019-12-04 RX ORDER — LOSARTAN POTASSIUM AND HYDROCHLOROTHIAZIDE 12.5; 5 MG/1; MG/1
1 TABLET ORAL DAILY
COMMUNITY
End: 2019-12-04 | Stop reason: SINTOL

## 2019-12-04 NOTE — PROGRESS NOTES
Subjective   Georgia Velázquez is a 74 y.o. female.     History of Present Illness Georgia is here with her son and her daughter on speaker phone. She is here to follow up on UTI which caused a syncopal episode on Thanksgiving and sent her to the ER. Taking antibiotic for UTI and feels great. E GFR was quite low in ER. Has chronic renal failure and has appt with Nephrology next month.  Denies nausea, fever, abd pain, dysuria and hematuria.  She is living with her daughter and is much more compliant with diet. She has lost 30 pounds in 6 months. Her glucose has been well controlled at home. She has less ankle edema. Denies chest pain, sob and palpitations. No change in bowels.    Outpatient Encounter Medications as of 12/4/2019   Medication Sig Dispense Refill   • alendronate (FOSAMAX) 70 MG tablet Take 70 mg by mouth Every 7 (Seven) Days.     • aspirin 81 MG chewable tablet Chew 1 tablet Daily. 30 tablet 3   • atorvastatin (LIPITOR) 40 MG tablet Take 1 tablet by mouth Daily. 90 tablet 3   • carvedilol (COREG) 6.25 MG tablet Take 1 tablet by mouth 2 (Two) Times a Day With Meals. 180 tablet 3   • glucose blood (ACCU-CHEK ANNIE PLUS) test strip Use once daily 100 each 12   • nitroglycerin (NITROSTAT) 0.4 MG SL tablet Place 1 tablet under the tongue Every 5 (Five) Minutes As Needed for Chest Pain. Take no more than 3 doses in 15 minutes. 100 tablet 0   • vitamin D (ERGOCALCIFEROL) 53910 units capsule capsule Take 1 capsule by mouth 1 (One) Time Per Week. 12 capsule 0   • [DISCONTINUED] atorvastatin (LIPITOR) 40 MG tablet Take 1 tablet by mouth Daily. 90 tablet 1   • [DISCONTINUED] carvedilol (COREG) 6.25 MG tablet Take 6.25 mg by mouth 2 (Two) Times a Day With Meals.     • [DISCONTINUED] losartan-hydrochlorothiazide (HYZAAR) 50-12.5 MG per tablet Take 1 tablet by mouth Daily.     • [DISCONTINUED] metFORMIN (GLUCOPHAGE) 850 MG tablet Take 1 tablet by mouth 2 (Two) Times a Day With Meals. 180 tablet 1   •  [DISCONTINUED] pantoprazole (PROTONIX) 40 MG EC tablet Take 40 mg by mouth Daily.     • cefdinir (OMNICEF) 300 MG capsule Take 1 capsule by mouth 2 (Two) Times a Day. 14 capsule 0   • metFORMIN (GLUCOPHAGE) 500 MG tablet Take 1 tablet by mouth 2 (Two) Times a Day With Meals. 180 tablet 3   • urea (CARMOL) 10 % cream Apply  topically to the appropriate area as directed As Needed for Dry Skin. 85 g 5   • valsartan-hydrochlorothiazide (DIOVAN-HCT) 160-25 MG per tablet Take 1 tablet by mouth Daily. 90 tablet 2   • [DISCONTINUED] valsartan-hydrochlorothiazide (DIOVAN-HCT) 160-25 MG per tablet Take 1 tablet by mouth Daily. 90 tablet 1     No facility-administered encounter medications on file as of 12/4/2019.        The following portions of the patient's history were reviewed and updated as appropriate: allergies, current medications, past family history, past medical history, past social history, past surgical history and problem list.    Review of Systems   Constitutional: Negative for appetite change, fever, unexpected weight gain and unexpected weight loss.   HENT: Negative for congestion, nosebleeds, sore throat and trouble swallowing.    Eyes: Negative for visual disturbance.   Respiratory: Negative for cough, shortness of breath and wheezing.    Cardiovascular: Negative for chest pain, palpitations and leg swelling.   Gastrointestinal: Negative for abdominal pain, blood in stool, constipation, diarrhea, nausea and vomiting.   Endocrine: Negative for polydipsia, polyphagia and polyuria.   Genitourinary: Negative for dysuria, frequency and hematuria.   Musculoskeletal: Negative for arthralgias, joint swelling and myalgias.   Skin: Negative for rash.   Neurological: Positive for syncope. Negative for dizziness, seizures and numbness.   Hematological: Negative for adenopathy. Does not bruise/bleed easily.   Psychiatric/Behavioral: Negative for behavioral problems, sleep disturbance and depressed mood. The patient is  not nervous/anxious.        Objective     Visit Vitals  /76   Pulse 53   Temp 97.4 °F (36.3 °C) (Temporal)   Resp 16   Wt 107 kg (235 lb)   LMP  (LMP Unknown)   SpO2 97%   BMI 44.40 kg/m²       Physical Exam   Constitutional: She is oriented to person, place, and time. She appears well-developed and well-nourished. No distress.   HENT:   Head: Normocephalic and atraumatic.   Right Ear: Tympanic membrane and external ear normal.   Left Ear: Tympanic membrane and external ear normal.   Nose: Nose normal.   Mouth/Throat: Oropharynx is clear and moist. No oropharyngeal exudate.   Eyes: Conjunctivae are normal. Pupils are equal, round, and reactive to light. Right eye exhibits no discharge. Left eye exhibits no discharge. No scleral icterus.   Neck: Neck supple. No tracheal deviation present. No thyromegaly present.   Cardiovascular: Normal rate, regular rhythm and normal heart sounds. Exam reveals no gallop and no friction rub.   No murmur heard.  Pulmonary/Chest: Effort normal and breath sounds normal. No respiratory distress. She has no wheezes.   Abdominal: Soft. Bowel sounds are normal. She exhibits no distension and no mass. There is no tenderness.   Musculoskeletal: She exhibits no edema or deformity.   Lymphadenopathy:     She has no cervical adenopathy.   Neurological: She is alert and oriented to person, place, and time. Coordination normal.   Skin: Skin is warm and dry. Capillary refill takes less than 2 seconds. No rash noted. No erythema.   Thickened skin on ankles   Psychiatric: She has a normal mood and affect. Her speech is normal and behavior is normal. Judgment and thought content normal.   Nursing note and vitals reviewed.        Assessment/Plan   Georgia was seen today for er follow up.    Diagnoses and all orders for this visit:    Type 2 diabetes mellitus with diabetic cataract, without long-term current use of insulin (CMS/[facility])  -     POC Glycosylated Hemoglobin (Hb A1C)  -     metFORMIN  (GLUCOPHAGE) 500 MG tablet; Take 1 tablet by mouth 2 (Two) Times a Day With Meals.    Urinary tract infection without hematuria, site unspecified  -     POCT urinalysis dipstick, automated    Hyperlipidemia, unspecified hyperlipidemia type  -     atorvastatin (LIPITOR) 40 MG tablet; Take 1 tablet by mouth Daily.  -     Lipid Panel    Essential hypertension  -     carvedilol (COREG) 6.25 MG tablet; Take 1 tablet by mouth 2 (Two) Times a Day With Meals.  -     valsartan-hydrochlorothiazide (DIOVAN-HCT) 160-25 MG per tablet; Take 1 tablet by mouth Daily.  -     CBC & Differential  -     Comprehensive Metabolic Panel  -     T4, Free  -     TSH  -     CBC Auto Differential    Influenza vaccination administered at current visit  -     Fluad Tri 65yr+    Vitamin D deficiency  -     Vitamin D 25 Hydroxy    WE all reviewed and updated her meds.  Since A1c is so good will try to cut back on metformin for renal protection  Check labs today.  Flu vacc today.    Discussed the importance of low carb diet, annual dilated eye exam, nightly foot checks, annual dentist visit, daily exercise. Monitor blood sugar at least twice a week. Maintain BMI under 25. Have regular follow up appointments for labs and screenings.  Discussed the nature of the disease including, risks, complications, implications, management, safe and proper use of medications. Encouraged therapeutic lifestyle changes including low calorie diet with plenty of fruits and vegetables, daily exercise, medication compliance, and keeping scheduled follow up appointments with me and any other providers. Encouraged patient to have appointment for complete physical, fasting labs, appropriate screenings, and immunizations on an annual basis.  VIS provided.  Follow up in 3-6 months or sooner prn.

## 2019-12-05 LAB
25(OH)D3 SERPL-MCNC: 30.5 NG/ML (ref 30–100)
ALBUMIN SERPL-MCNC: 4.2 G/DL (ref 3.5–5.2)
ALBUMIN/GLOB SERPL: 1.2 G/DL
ALP SERPL-CCNC: 79 U/L (ref 39–117)
ALT SERPL W P-5'-P-CCNC: 11 U/L (ref 1–33)
ANION GAP SERPL CALCULATED.3IONS-SCNC: 16 MMOL/L (ref 5–15)
AST SERPL-CCNC: 14 U/L (ref 1–32)
BASOPHILS # BLD AUTO: 0.04 10*3/MM3 (ref 0–0.2)
BASOPHILS NFR BLD AUTO: 0.4 % (ref 0–1.5)
BILIRUB SERPL-MCNC: 0.5 MG/DL (ref 0.2–1.2)
BUN BLD-MCNC: 32 MG/DL (ref 8–23)
BUN/CREAT SERPL: 21.1 (ref 7–25)
CALCIUM SPEC-SCNC: 10.2 MG/DL (ref 8.6–10.5)
CHLORIDE SERPL-SCNC: 105 MMOL/L (ref 98–107)
CHOLEST SERPL-MCNC: 92 MG/DL (ref 0–200)
CO2 SERPL-SCNC: 23 MMOL/L (ref 22–29)
CREAT BLD-MCNC: 1.52 MG/DL (ref 0.57–1)
DEPRECATED RDW RBC AUTO: 46.6 FL (ref 37–54)
EOSINOPHIL # BLD AUTO: 0.15 10*3/MM3 (ref 0–0.4)
EOSINOPHIL NFR BLD AUTO: 1.5 % (ref 0.3–6.2)
ERYTHROCYTE [DISTWIDTH] IN BLOOD BY AUTOMATED COUNT: 13.5 % (ref 12.3–15.4)
GFR SERPL CREATININE-BSD FRML MDRD: 33 ML/MIN/1.73
GLOBULIN UR ELPH-MCNC: 3.5 GM/DL
GLUCOSE BLD-MCNC: 100 MG/DL (ref 65–99)
HCT VFR BLD AUTO: 37.8 % (ref 34–46.6)
HDLC SERPL-MCNC: 45 MG/DL (ref 40–60)
HGB BLD-MCNC: 12.5 G/DL (ref 12–15.9)
IMM GRANULOCYTES # BLD AUTO: 0.04 10*3/MM3 (ref 0–0.05)
IMM GRANULOCYTES NFR BLD AUTO: 0.4 % (ref 0–0.5)
LDLC SERPL CALC-MCNC: 29 MG/DL (ref 0–100)
LDLC/HDLC SERPL: 0.64 {RATIO}
LYMPHOCYTES # BLD AUTO: 2.33 10*3/MM3 (ref 0.7–3.1)
LYMPHOCYTES NFR BLD AUTO: 23.7 % (ref 19.6–45.3)
MCH RBC QN AUTO: 31.3 PG (ref 26.6–33)
MCHC RBC AUTO-ENTMCNC: 33.1 G/DL (ref 31.5–35.7)
MCV RBC AUTO: 94.5 FL (ref 79–97)
MONOCYTES # BLD AUTO: 0.59 10*3/MM3 (ref 0.1–0.9)
MONOCYTES NFR BLD AUTO: 6 % (ref 5–12)
NEUTROPHILS # BLD AUTO: 6.67 10*3/MM3 (ref 1.7–7)
NEUTROPHILS NFR BLD AUTO: 68 % (ref 42.7–76)
NRBC BLD AUTO-RTO: 0 /100 WBC (ref 0–0.2)
PLATELET # BLD AUTO: 193 10*3/MM3 (ref 140–450)
PMV BLD AUTO: 12 FL (ref 6–12)
POTASSIUM BLD-SCNC: 5 MMOL/L (ref 3.5–5.2)
PROT SERPL-MCNC: 7.7 G/DL (ref 6–8.5)
RBC # BLD AUTO: 4 10*6/MM3 (ref 3.77–5.28)
SODIUM BLD-SCNC: 144 MMOL/L (ref 136–145)
T4 FREE SERPL-MCNC: 1.15 NG/DL (ref 0.93–1.7)
TRIGL SERPL-MCNC: 92 MG/DL (ref 0–150)
TSH SERPL DL<=0.05 MIU/L-ACNC: 2.86 UIU/ML (ref 0.27–4.2)
VLDLC SERPL-MCNC: 18.4 MG/DL
WBC NRBC COR # BLD: 9.82 10*3/MM3 (ref 3.4–10.8)

## 2019-12-07 DIAGNOSIS — E11.36 TYPE 2 DIABETES MELLITUS WITH DIABETIC CATARACT, WITHOUT LONG-TERM CURRENT USE OF INSULIN (HCC): ICD-10-CM

## 2020-05-21 ENCOUNTER — HOSPITAL ENCOUNTER (OUTPATIENT)
Facility: HOSPITAL | Age: 75
Discharge: HOME | End: 2020-05-22
Attending: EMERGENCY MEDICINE | Admitting: INTERNAL MEDICINE

## 2020-05-21 ENCOUNTER — TELEPHONE (OUTPATIENT)
Dept: FAMILY MEDICINE CLINIC | Facility: CLINIC | Age: 75
End: 2020-05-21

## 2020-05-21 ENCOUNTER — APPOINTMENT (OUTPATIENT)
Dept: CT IMAGING | Facility: HOSPITAL | Age: 75
End: 2020-05-21

## 2020-05-21 DIAGNOSIS — N17.9 ACUTE RENAL FAILURE, UNSPECIFIED ACUTE RENAL FAILURE TYPE (HCC): ICD-10-CM

## 2020-05-21 DIAGNOSIS — N39.0 URINARY TRACT INFECTION WITH HEMATURIA, SITE UNSPECIFIED: ICD-10-CM

## 2020-05-21 DIAGNOSIS — D72.829 LEUKOCYTOSIS, UNSPECIFIED TYPE: ICD-10-CM

## 2020-05-21 DIAGNOSIS — R31.9 URINARY TRACT INFECTION WITH HEMATURIA, SITE UNSPECIFIED: ICD-10-CM

## 2020-05-21 DIAGNOSIS — R51.9 ACUTE INTRACTABLE HEADACHE, UNSPECIFIED HEADACHE TYPE: ICD-10-CM

## 2020-05-21 DIAGNOSIS — R53.1 WEAKNESS: Primary | ICD-10-CM

## 2020-05-21 PROBLEM — R19.7 VOMITING AND DIARRHEA: Status: ACTIVE | Noted: 2020-05-21

## 2020-05-21 PROBLEM — I51.9 HEART DISEASE: Status: RESOLVED | Noted: 2018-02-26 | Resolved: 2020-05-21

## 2020-05-21 PROBLEM — K59.00 CONSTIPATION: Status: RESOLVED | Noted: 2017-12-05 | Resolved: 2020-05-21

## 2020-05-21 PROBLEM — N30.00 ACUTE CYSTITIS WITHOUT HEMATURIA: Status: RESOLVED | Noted: 2017-12-05 | Resolved: 2020-05-21

## 2020-05-21 PROBLEM — R11.2 NAUSEA AND VOMITING: Status: ACTIVE | Noted: 2020-05-21

## 2020-05-21 PROBLEM — E66.01 MORBID OBESITY DUE TO EXCESS CALORIES: Status: RESOLVED | Noted: 2018-01-29 | Resolved: 2020-05-21

## 2020-05-21 PROBLEM — M47.816 LUMBAR FACET ARTHROPATHY: Status: RESOLVED | Noted: 2018-01-29 | Resolved: 2020-05-21

## 2020-05-21 PROBLEM — R74.8 ELEVATED ALKALINE PHOSPHATASE LEVEL: Status: RESOLVED | Noted: 2018-02-26 | Resolved: 2020-05-21

## 2020-05-21 PROBLEM — M48.062 LUMBAR STENOSIS WITH NEUROGENIC CLAUDICATION: Status: RESOLVED | Noted: 2018-01-29 | Resolved: 2020-05-21

## 2020-05-21 PROBLEM — Z98.890 HISTORY OF KYPHOPLASTY: Status: RESOLVED | Noted: 2018-01-30 | Resolved: 2020-05-21

## 2020-05-21 PROBLEM — E66.9 DIABETES MELLITUS TYPE 2 IN OBESE (HCC): Status: RESOLVED | Noted: 2018-01-29 | Resolved: 2020-05-21

## 2020-05-21 PROBLEM — H34.9: Status: ACTIVE | Noted: 2020-05-21

## 2020-05-21 PROBLEM — R53.81 PHYSICAL DECONDITIONING: Status: RESOLVED | Noted: 2018-01-30 | Resolved: 2020-05-21

## 2020-05-21 PROBLEM — S32.040A CLOSED COMPRESSION FRACTURE OF L4 LUMBAR VERTEBRA: Status: RESOLVED | Noted: 2017-12-04 | Resolved: 2020-05-21

## 2020-05-21 PROBLEM — N39.42 URINARY INCONTINENCE WITHOUT SENSORY AWARENESS: Status: RESOLVED | Noted: 2018-02-26 | Resolved: 2020-05-21

## 2020-05-21 PROBLEM — R11.10 VOMITING AND DIARRHEA: Status: ACTIVE | Noted: 2020-05-21

## 2020-05-21 PROBLEM — E11.69 DIABETES MELLITUS TYPE 2 IN OBESE (HCC): Status: RESOLVED | Noted: 2018-01-29 | Resolved: 2020-05-21

## 2020-05-21 LAB
ALBUMIN SERPL-MCNC: 4.5 G/DL (ref 3.5–5.2)
ALBUMIN/GLOB SERPL: 1.1 G/DL
ALP SERPL-CCNC: 91 U/L (ref 39–117)
ALT SERPL W P-5'-P-CCNC: 22 U/L (ref 1–33)
ANION GAP SERPL CALCULATED.3IONS-SCNC: 17 MMOL/L (ref 5–15)
AST SERPL-CCNC: 24 U/L (ref 1–32)
BACTERIA UR QL AUTO: ABNORMAL /HPF
BASOPHILS # BLD AUTO: 0.03 10*3/MM3 (ref 0–0.2)
BASOPHILS NFR BLD AUTO: 0.2 % (ref 0–1.5)
BILIRUB SERPL-MCNC: 0.6 MG/DL (ref 0.2–1.2)
BILIRUB UR QL STRIP: NEGATIVE
BUN BLD-MCNC: 34 MG/DL (ref 8–23)
BUN/CREAT SERPL: 24.1 (ref 7–25)
CALCIUM SPEC-SCNC: 9.6 MG/DL (ref 8.6–10.5)
CHLORIDE SERPL-SCNC: 106 MMOL/L (ref 98–107)
CLARITY UR: ABNORMAL
CO2 SERPL-SCNC: 21 MMOL/L (ref 22–29)
COLOR UR: YELLOW
CREAT BLD-MCNC: 1.41 MG/DL (ref 0.57–1)
D-LACTATE SERPL-SCNC: 1.2 MMOL/L (ref 0.5–2)
DEPRECATED RDW RBC AUTO: 50.9 FL (ref 37–54)
EOSINOPHIL # BLD AUTO: 0.02 10*3/MM3 (ref 0–0.4)
EOSINOPHIL NFR BLD AUTO: 0.1 % (ref 0.3–6.2)
ERYTHROCYTE [DISTWIDTH] IN BLOOD BY AUTOMATED COUNT: 14.6 % (ref 12.3–15.4)
GFR SERPL CREATININE-BSD FRML MDRD: 36 ML/MIN/1.73
GLOBULIN UR ELPH-MCNC: 4.1 GM/DL
GLUCOSE BLD-MCNC: 140 MG/DL (ref 65–99)
GLUCOSE UR STRIP-MCNC: NEGATIVE MG/DL
HCT VFR BLD AUTO: 42.3 % (ref 34–46.6)
HGB BLD-MCNC: 13.5 G/DL (ref 12–15.9)
HGB UR QL STRIP.AUTO: ABNORMAL
HOLD SPECIMEN: NORMAL
HOLD SPECIMEN: NORMAL
HYALINE CASTS UR QL AUTO: ABNORMAL /LPF
IMM GRANULOCYTES # BLD AUTO: 0.1 10*3/MM3 (ref 0–0.05)
IMM GRANULOCYTES NFR BLD AUTO: 0.5 % (ref 0–0.5)
KETONES UR QL STRIP: NEGATIVE
LEUKOCYTE ESTERASE UR QL STRIP.AUTO: ABNORMAL
LIPASE SERPL-CCNC: 17 U/L (ref 13–60)
LYMPHOCYTES # BLD AUTO: 3 10*3/MM3 (ref 0.7–3.1)
LYMPHOCYTES NFR BLD AUTO: 16.4 % (ref 19.6–45.3)
MCH RBC QN AUTO: 30.3 PG (ref 26.6–33)
MCHC RBC AUTO-ENTMCNC: 31.9 G/DL (ref 31.5–35.7)
MCV RBC AUTO: 95.1 FL (ref 79–97)
MONOCYTES # BLD AUTO: 0.97 10*3/MM3 (ref 0.1–0.9)
MONOCYTES NFR BLD AUTO: 5.3 % (ref 5–12)
NEUTROPHILS # BLD AUTO: 14.19 10*3/MM3 (ref 1.7–7)
NEUTROPHILS NFR BLD AUTO: 77.5 % (ref 42.7–76)
NITRITE UR QL STRIP: POSITIVE
NRBC BLD AUTO-RTO: 0 /100 WBC (ref 0–0.2)
PH UR STRIP.AUTO: <=5 [PH] (ref 5–8)
PLATELET # BLD AUTO: 218 10*3/MM3 (ref 140–450)
PMV BLD AUTO: 11.2 FL (ref 6–12)
POTASSIUM BLD-SCNC: 4.1 MMOL/L (ref 3.5–5.2)
PROCALCITONIN SERPL-MCNC: 0.13 NG/ML (ref 0.1–0.25)
PROT SERPL-MCNC: 8.6 G/DL (ref 6–8.5)
PROT UR QL STRIP: ABNORMAL
RBC # BLD AUTO: 4.45 10*6/MM3 (ref 3.77–5.28)
RBC # UR: ABNORMAL /HPF
REF LAB TEST METHOD: ABNORMAL
SODIUM BLD-SCNC: 144 MMOL/L (ref 136–145)
SP GR UR STRIP: 1.02 (ref 1–1.03)
SQUAMOUS #/AREA URNS HPF: ABNORMAL /HPF
UROBILINOGEN UR QL STRIP: ABNORMAL
WBC NRBC COR # BLD: 18.31 10*3/MM3 (ref 3.4–10.8)
WBC UR QL AUTO: ABNORMAL /HPF
WHOLE BLOOD HOLD SPECIMEN: NORMAL
WHOLE BLOOD HOLD SPECIMEN: NORMAL

## 2020-05-21 PROCEDURE — 70450 CT HEAD/BRAIN W/O DYE: CPT

## 2020-05-21 PROCEDURE — A9270 NON-COVERED ITEM OR SERVICE: HCPCS | Performed by: NURSE PRACTITIONER

## 2020-05-21 PROCEDURE — 25010000002 HEPARIN (PORCINE) PER 1000 UNITS: Performed by: NURSE PRACTITIONER

## 2020-05-21 PROCEDURE — 74176 CT ABD & PELVIS W/O CONTRAST: CPT

## 2020-05-21 PROCEDURE — 80053 COMPREHEN METABOLIC PANEL: CPT

## 2020-05-21 PROCEDURE — 85025 COMPLETE CBC W/AUTO DIFF WBC: CPT

## 2020-05-21 PROCEDURE — 63710000001 LATANOPROST 0.005 % SOLUTION 2.5 ML BOTTLE: Performed by: NURSE PRACTITIONER

## 2020-05-21 PROCEDURE — 63710000001 PREDNISOLONE ACETATE 1 % SUSPENSION 5 ML BOTTLE: Performed by: NURSE PRACTITIONER

## 2020-05-21 PROCEDURE — 96372 THER/PROPH/DIAG INJ SC/IM: CPT

## 2020-05-21 PROCEDURE — 94799 UNLISTED PULMONARY SVC/PX: CPT

## 2020-05-21 PROCEDURE — 83605 ASSAY OF LACTIC ACID: CPT | Performed by: NURSE PRACTITIONER

## 2020-05-21 PROCEDURE — 96374 THER/PROPH/DIAG INJ IV PUSH: CPT

## 2020-05-21 PROCEDURE — 71250 CT THORAX DX C-: CPT

## 2020-05-21 PROCEDURE — 25010000002 CEFTRIAXONE PER 250 MG: Performed by: EMERGENCY MEDICINE

## 2020-05-21 PROCEDURE — 25010000002 HYDROMORPHONE PER 4 MG: Performed by: EMERGENCY MEDICINE

## 2020-05-21 PROCEDURE — 87186 SC STD MICRODIL/AGAR DIL: CPT | Performed by: EMERGENCY MEDICINE

## 2020-05-21 PROCEDURE — 99220 PR INITIAL OBSERVATION CARE/DAY 70 MINUTES: CPT | Performed by: INTERNAL MEDICINE

## 2020-05-21 PROCEDURE — 87040 BLOOD CULTURE FOR BACTERIA: CPT | Performed by: NURSE PRACTITIONER

## 2020-05-21 PROCEDURE — 93005 ELECTROCARDIOGRAM TRACING: CPT | Performed by: NURSE PRACTITIONER

## 2020-05-21 PROCEDURE — 81001 URINALYSIS AUTO W/SCOPE: CPT

## 2020-05-21 PROCEDURE — 99284 EMERGENCY DEPT VISIT MOD MDM: CPT

## 2020-05-21 PROCEDURE — 83690 ASSAY OF LIPASE: CPT

## 2020-05-21 PROCEDURE — 25010000002 ONDANSETRON PER 1 MG: Performed by: EMERGENCY MEDICINE

## 2020-05-21 PROCEDURE — 87086 URINE CULTURE/COLONY COUNT: CPT | Performed by: EMERGENCY MEDICINE

## 2020-05-21 PROCEDURE — 63710000001 CARVEDILOL 6.25 MG TABLET: Performed by: NURSE PRACTITIONER

## 2020-05-21 PROCEDURE — 84145 PROCALCITONIN (PCT): CPT | Performed by: NURSE PRACTITIONER

## 2020-05-21 PROCEDURE — 87088 URINE BACTERIA CULTURE: CPT | Performed by: EMERGENCY MEDICINE

## 2020-05-21 PROCEDURE — 96375 TX/PRO/DX INJ NEW DRUG ADDON: CPT

## 2020-05-21 RX ORDER — TETRACAINE HYDROCHLORIDE 5 MG/ML
2 SOLUTION OPHTHALMIC ONCE
Status: COMPLETED | OUTPATIENT
Start: 2020-05-21 | End: 2020-05-21

## 2020-05-21 RX ORDER — ONDANSETRON 2 MG/ML
4 INJECTION INTRAMUSCULAR; INTRAVENOUS ONCE
Status: COMPLETED | OUTPATIENT
Start: 2020-05-21 | End: 2020-05-21

## 2020-05-21 RX ORDER — SODIUM CHLORIDE 0.9 % (FLUSH) 0.9 %
10 SYRINGE (ML) INJECTION EVERY 12 HOURS SCHEDULED
Status: DISCONTINUED | OUTPATIENT
Start: 2020-05-21 | End: 2020-05-22 | Stop reason: HOSPADM

## 2020-05-21 RX ORDER — LATANOPROST 50 UG/ML
1 SOLUTION/ DROPS OPHTHALMIC NIGHTLY
Status: DISCONTINUED | OUTPATIENT
Start: 2020-05-21 | End: 2020-05-22 | Stop reason: HOSPADM

## 2020-05-21 RX ORDER — DEXTROSE MONOHYDRATE 25 G/50ML
25 INJECTION, SOLUTION INTRAVENOUS
Status: DISCONTINUED | OUTPATIENT
Start: 2020-05-21 | End: 2020-05-22 | Stop reason: HOSPADM

## 2020-05-21 RX ORDER — HYDROMORPHONE HYDROCHLORIDE 1 MG/ML
0.25 INJECTION, SOLUTION INTRAMUSCULAR; INTRAVENOUS; SUBCUTANEOUS ONCE
Status: COMPLETED | OUTPATIENT
Start: 2020-05-21 | End: 2020-05-21

## 2020-05-21 RX ORDER — HEPARIN SODIUM 5000 [USP'U]/ML
5000 INJECTION, SOLUTION INTRAVENOUS; SUBCUTANEOUS EVERY 8 HOURS SCHEDULED
Status: DISCONTINUED | OUTPATIENT
Start: 2020-05-21 | End: 2020-05-22 | Stop reason: HOSPADM

## 2020-05-21 RX ORDER — ASPIRIN 81 MG/1
81 TABLET, CHEWABLE ORAL DAILY
Status: DISCONTINUED | OUTPATIENT
Start: 2020-05-22 | End: 2020-05-22 | Stop reason: HOSPADM

## 2020-05-21 RX ORDER — SODIUM CHLORIDE 9 MG/ML
75 INJECTION, SOLUTION INTRAVENOUS ONCE
Status: COMPLETED | OUTPATIENT
Start: 2020-05-21 | End: 2020-05-21

## 2020-05-21 RX ORDER — CARVEDILOL 6.25 MG/1
6.25 TABLET ORAL 2 TIMES DAILY WITH MEALS
Status: DISCONTINUED | OUTPATIENT
Start: 2020-05-21 | End: 2020-05-22 | Stop reason: HOSPADM

## 2020-05-21 RX ORDER — BRIMONIDINE TARTRATE AND TIMOLOL MALEATE 2; 5 MG/ML; MG/ML
1 SOLUTION OPHTHALMIC 3 TIMES DAILY
Status: DISCONTINUED | OUTPATIENT
Start: 2020-05-22 | End: 2020-05-22 | Stop reason: HOSPADM

## 2020-05-21 RX ORDER — SODIUM CHLORIDE 0.9 % (FLUSH) 0.9 %
10 SYRINGE (ML) INJECTION AS NEEDED
Status: DISCONTINUED | OUTPATIENT
Start: 2020-05-21 | End: 2020-05-22 | Stop reason: HOSPADM

## 2020-05-21 RX ORDER — NICOTINE POLACRILEX 4 MG
15 LOZENGE BUCCAL
Status: DISCONTINUED | OUTPATIENT
Start: 2020-05-21 | End: 2020-05-22 | Stop reason: HOSPADM

## 2020-05-21 RX ORDER — PREDNISOLONE ACETATE 10 MG/ML
1 SUSPENSION/ DROPS OPHTHALMIC 2 TIMES DAILY
COMMUNITY
Start: 2020-05-21 | End: 2020-07-08

## 2020-05-21 RX ORDER — LATANOPROST 50 UG/ML
1 SOLUTION/ DROPS OPHTHALMIC NIGHTLY
COMMUNITY
End: 2020-07-30

## 2020-05-21 RX ORDER — ONDANSETRON 4 MG/1
4 TABLET, FILM COATED ORAL EVERY 6 HOURS PRN
Status: DISCONTINUED | OUTPATIENT
Start: 2020-05-21 | End: 2020-05-22 | Stop reason: HOSPADM

## 2020-05-21 RX ORDER — BRIMONIDINE TARTRATE AND TIMOLOL MALEATE 2; 5 MG/ML; MG/ML
1 SOLUTION OPHTHALMIC 3 TIMES DAILY
COMMUNITY
End: 2020-07-20

## 2020-05-21 RX ORDER — PREDNISOLONE ACETATE 10 MG/ML
1 SUSPENSION/ DROPS OPHTHALMIC 4 TIMES DAILY
Status: DISCONTINUED | OUTPATIENT
Start: 2020-05-21 | End: 2020-05-22 | Stop reason: HOSPADM

## 2020-05-21 RX ORDER — ONDANSETRON 2 MG/ML
4 INJECTION INTRAMUSCULAR; INTRAVENOUS EVERY 6 HOURS PRN
Status: DISCONTINUED | OUTPATIENT
Start: 2020-05-21 | End: 2020-05-22 | Stop reason: HOSPADM

## 2020-05-21 RX ADMIN — CEFTRIAXONE 1 G: 1 INJECTION, POWDER, FOR SOLUTION INTRAMUSCULAR; INTRAVENOUS at 18:48

## 2020-05-21 RX ADMIN — TETRACAINE HYDROCHLORIDE 2 DROP: 5 SOLUTION OPHTHALMIC at 18:47

## 2020-05-21 RX ADMIN — SODIUM CHLORIDE 75 ML/HR: 9 INJECTION, SOLUTION INTRAVENOUS at 23:56

## 2020-05-21 RX ADMIN — HYDROMORPHONE HYDROCHLORIDE 0.25 MG: 1 INJECTION, SOLUTION INTRAMUSCULAR; INTRAVENOUS; SUBCUTANEOUS at 18:48

## 2020-05-21 RX ADMIN — PREDNISOLONE ACETATE 1 DROP: 10 SUSPENSION/ DROPS OPHTHALMIC at 23:56

## 2020-05-21 RX ADMIN — LATANOPROST 1 DROP: 50 SOLUTION OPHTHALMIC at 23:56

## 2020-05-21 RX ADMIN — ONDANSETRON 4 MG: 2 INJECTION INTRAMUSCULAR; INTRAVENOUS at 18:48

## 2020-05-21 RX ADMIN — HEPARIN SODIUM 5000 UNITS: 5000 INJECTION, SOLUTION INTRAVENOUS; SUBCUTANEOUS at 23:55

## 2020-05-21 RX ADMIN — CARVEDILOL 6.25 MG: 6.25 TABLET, FILM COATED ORAL at 23:55

## 2020-05-21 NOTE — ED PROVIDER NOTES
Subjective   Ms. Georgia Velázquez is a 75 y.o. pleasant, elderly female who presents to the ED with c/o headache. She reports this headache is located just above the right eye and onset 2 weeks ago. She reports she was seen by an ophthalmologist at [facility] who diagnosed her with embolism of the right eye and treated this with laser beam as well as eye drops. She reports her headache has been persistent daily since then. She notes the vision in her right eye is blurred. She has also had nausea, vomiting, diarrhea, rhinorrhea, and mild cough. She denies abdominal pain. She lives with her daughter and at baseline she uses a walker for ambulation but is able to go room to room without great difficulty. She reports her chronic leg swelling has been present since childhood and is unchanged. There are no other acute symptoms at this time.      History provided by:  Patient  Headache   Pain location:  Frontal (right)  Radiates to:  Does not radiate  Onset quality:  Sudden  Duration:  2 weeks  Timing:  Constant  Progression:  Unchanged  Chronicity:  New  Relieved by:  Nothing  Worsened by:  Nothing  Associated symptoms: blurred vision (right), cough (mild), nausea and vomiting    Associated symptoms: no abdominal pain        Review of Systems   HENT: Positive for rhinorrhea.    Eyes: Positive for blurred vision (right).   Respiratory: Positive for cough (mild).    Gastrointestinal: Positive for nausea and vomiting. Negative for abdominal pain.   Neurological: Positive for headaches.   All other systems reviewed and are negative.      Past Medical History:   Diagnosis Date   • Acute cystitis without hematuria 12/5/2017   • Acute kidney injury (CMS/[facility]) 12/5/2017   • Arthritis    • 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/2016   • Body mass index (BMI) of 45.0-49.9 in adult (CMS/[facility]) 6/14/2019   • CHF (congestive heart failure) (CMS/[facility])    • Closed compression fracture of L4 lumbar vertebra 12/4/2017    Added automatically  from request for surgery [id number]   • Constipation 12/5/2017   • COPD (chronic obstructive pulmonary disease) (CMS/HCC)    • Coronary artery disease 5/18/2016   • Diabetes mellitus type 2 in obese (CMS/HCC) 1/29/2018   • Elevated alkaline phosphatase level 2/26/2018   • Elevated creatine kinase    • Essential hypertension 5/18/2016   • Extremity pain    • GERD (gastroesophageal reflux disease) 5/18/2016   • History of echocardiogram 10/16/2015    TDS.Est EF is 45-50%.Mild distal septal, anteroapical hypokinesia.Mild mitral stenosis.Mean gradient across mitral valve is 6 mmHg.Trace TR   • History of kyphoplasty 1/30/2018   • Hyperlipemia 5/18/2016   • Low back pain    • Lumbar facet arthropathy 1/29/2018   • Lumbar stenosis with neurogenic claudication 1/29/2018   • Morbid obesity due to excess calories (CMS/HCC) 1/29/2018   • Myocardial infarction (CMS/HCC) 5/18/2016   • Osteoporosis 5/18/2016   • Physical deconditioning 1/30/2018   • Sepsis (CMS/HCC)     uro   • Type 2 diabetes mellitus (CMS/HCC) 5/18/2016   • Urinary incontinence without sensory awareness 2/26/2018       No Known Allergies    Past Surgical History:   Procedure Laterality Date   • APPENDECTOMY     • BACK SURGERY     • CHOLECYSTECTOMY     • KYPHOPLASTY N/A 12/7/2017    Procedure: KYPHOPLASTY L4;  Surgeon: Marc Pham MD;  Location: [facility];  Service:        Family History   Problem Relation Age of Onset   • Diabetes Mother    • No Known Problems Father    • No Known Problems Sister    • No Known Problems Brother    • No Known Problems Son    • No Known Problems Daughter        Social History     Socioeconomic History   • Marital status:      Spouse name: Not on file   • Number of children: Not on file   • Years of education: Not on file   • Highest education level: Not on file   Occupational History   • Occupation: Retired from [employer]   Tobacco Use   • Smoking status: Former Smoker     Types: Cigarettes     Last attempt to quit: 2007      Years since quittin.3   • Smokeless tobacco: Never Used   Substance and Sexual Activity   • Alcohol use: No   • Drug use: No   • Sexual activity: Never   Social History Narrative    Living w daughter.         Objective   Physical Exam   Constitutional: She is oriented to person, place, and time. She appears well-developed and well-nourished. No distress.   Alert, oriented and in no acute distress.    HENT:   Head: Normocephalic and atraumatic.   Right Ear: External ear normal.   Left Ear: External ear normal.   Nose: Nose normal.   Mouth/Throat: Oropharynx is clear and moist.   Uvula midline, normal oropharynx and nasal pharynx. No rash over the forehead. With patient closing her eyes, the right eye is tender to palpation. There is a mole on the  right which is chronic.           Eyes: Pupils are equal, round, and reactive to light. Conjunctivae and EOM are normal. Right eye exhibits no discharge. Left eye exhibits no discharge. No scleral icterus.   Her ocular pressure in the right eye was Branden-Pen is 23   Neck: Normal range of motion. Neck supple. No JVD present. Carotid bruit is not present.   No adenopathy, JVD, bruits or thyromegaly. No meningeal signs.   Cardiovascular: Normal rate, regular rhythm, normal heart sounds and intact distal pulses. Exam reveals no gallop and no friction rub.   No murmur heard.  Regular rate and rhythm. No murmurs, rubs, gallops or heaves.    Pulmonary/Chest: Effort normal and breath sounds normal. No stridor. No respiratory distress. She has no wheezes. She has no rales.   Lungs are clear. No wheezes, rales, rhonchi or crackles.   Abdominal: Soft. Bowel sounds are normal. She exhibits no distension and no mass. There is tenderness (she is tender over the bilateral flanks.). There is no rebound and no guarding.   Positive bowel sounds, soft. No organomegaly or hepatosplenomegaly. Flanks are without masses or rashes. She is moderately obese.   Musculoskeletal: Normal range  of motion. She exhibits edema (lower extremity edema is chronic since childhood). She exhibits no tenderness.   No rash, synovitis or edema.   Axilla is without rashes or masses.    Lymphadenopathy:     She has no cervical adenopathy.   Neurological: She is alert and oriented to person, place, and time. No cranial nerve deficit or sensory deficit. Coordination normal.   Face symmetric, tongue midline, voice strong, hearing, vision and speech preserved. Normal and equal strength to upper and lower extremities. Sensation intact.    Skin: Skin is warm and dry. Capillary refill takes less than 2 seconds. No rash noted. She is not diaphoretic. No erythema.   Psychiatric: She has a normal mood and affect. Her behavior is normal.   Nursing note and vitals reviewed.      Procedures         ED Course  ED Course as of May 22 0155   Thu May 21, 2020   1933 Hospitalist paged for admission.    [JW]      ED Course User Index  [JW] Lonnie Palomino     Recent Results (from the past 24 hour(s))   Comprehensive Metabolic Panel    Collection Time: 05/21/20  4:53 PM   Result Value Ref Range    Glucose 140 (H) 65 - 99 mg/dL    BUN 34 (H) 8 - 23 mg/dL    Creatinine 1.41 (H) 0.57 - 1.00 mg/dL    Sodium 144 136 - 145 mmol/L    Potassium 4.1 3.5 - 5.2 mmol/L    Chloride 106 98 - 107 mmol/L    CO2 21.0 (L) 22.0 - 29.0 mmol/L    Calcium 9.6 8.6 - 10.5 mg/dL    Total Protein 8.6 (H) 6.0 - 8.5 g/dL    Albumin 4.50 3.50 - 5.20 g/dL    ALT (SGPT) 22 1 - 33 U/L    AST (SGOT) 24 1 - 32 U/L    Alkaline Phosphatase 91 39 - 117 U/L    Total Bilirubin 0.6 0.2 - 1.2 mg/dL    eGFR Non African Amer 36 (L) >60 mL/min/1.73    Globulin 4.1 gm/dL    A/G Ratio 1.1 g/dL    BUN/Creatinine Ratio 24.1 7.0 - 25.0    Anion Gap 17.0 (H) 5.0 - 15.0 mmol/L   Lipase    Collection Time: 05/21/20  4:53 PM   Result Value Ref Range    Lipase 17 13 - 60 U/L   Urinalysis With Microscopic If Indicated (No Culture) - Urine, Clean Catch    Collection Time: 05/21/20  4:53 PM    Result Value Ref Range    Color, UA Yellow Yellow, Straw    Appearance, UA Cloudy (A) Clear    pH, UA <=5.0 5.0 - 8.0    Specific Gravity, UA 1.020 1.001 - 1.030    Glucose, UA Negative Negative    Ketones, UA Negative Negative    Bilirubin, UA Negative Negative    Blood, UA Small (1+) (A) Negative    Protein, UA 30 mg/dL (1+) (A) Negative    Leuk Esterase, UA Large (3+) (A) Negative    Nitrite, UA Positive (A) Negative    Urobilinogen, UA 1.0 E.U./dL 0.2 - 1.0 E.U./dL   Light Blue Top    Collection Time: 05/21/20  4:53 PM   Result Value Ref Range    Extra Tube hold for add-on    Green Top (Gel)    Collection Time: 05/21/20  4:53 PM   Result Value Ref Range    Extra Tube Hold for add-ons.    Lavender Top    Collection Time: 05/21/20  4:53 PM   Result Value Ref Range    Extra Tube hold for add-on    Gold Top - SST    Collection Time: 05/21/20  4:53 PM   Result Value Ref Range    Extra Tube Hold for add-ons.    CBC Auto Differential    Collection Time: 05/21/20  4:53 PM   Result Value Ref Range    WBC 18.31 (H) 3.40 - 10.80 10*3/mm3    RBC 4.45 3.77 - 5.28 10*6/mm3    Hemoglobin 13.5 12.0 - 15.9 g/dL    Hematocrit 42.3 34.0 - 46.6 %    MCV 95.1 79.0 - 97.0 fL    MCH 30.3 26.6 - 33.0 pg    MCHC 31.9 31.5 - 35.7 g/dL    RDW 14.6 12.3 - 15.4 %    RDW-SD 50.9 37.0 - 54.0 fl    MPV 11.2 6.0 - 12.0 fL    Platelets 218 140 - 450 10*3/mm3    Neutrophil % 77.5 (H) 42.7 - 76.0 %    Lymphocyte % 16.4 (L) 19.6 - 45.3 %    Monocyte % 5.3 5.0 - 12.0 %    Eosinophil % 0.1 (L) 0.3 - 6.2 %    Basophil % 0.2 0.0 - 1.5 %    Immature Grans % 0.5 0.0 - 0.5 %    Neutrophils, Absolute 14.19 (H) 1.70 - 7.00 10*3/mm3    Lymphocytes, Absolute 3.00 0.70 - 3.10 10*3/mm3    Monocytes, Absolute 0.97 (H) 0.10 - 0.90 10*3/mm3    Eosinophils, Absolute 0.02 0.00 - 0.40 10*3/mm3    Basophils, Absolute 0.03 0.00 - 0.20 10*3/mm3    Immature Grans, Absolute 0.10 (H) 0.00 - 0.05 10*3/mm3    nRBC 0.0 0.0 - 0.2 /100 WBC   Urinalysis, Microscopic Only -  Urine, Clean Catch    Collection Time: 05/21/20  4:53 PM   Result Value Ref Range    RBC, UA 3-6 (A) None Seen, 0-2 /HPF    WBC, UA Too Numerous to Count (A) None Seen, 0-2 /HPF    Bacteria, UA 4+ (A) None Seen, Trace /HPF    Squamous Epithelial Cells, UA 3-6 (A) None Seen, 0-2 /HPF    Hyaline Casts, UA 13-20 0 - 6 /LPF    Methodology Automated Microscopy    Lactic Acid, Plasma    Collection Time: 05/21/20 10:32 PM   Result Value Ref Range    Lactate 1.2 0.5 - 2.0 mmol/L   Procalcitonin    Collection Time: 05/21/20 10:32 PM   Result Value Ref Range    Procalcitonin 0.13 0.10 - 0.25 ng/mL     Note: In addition to lab results from this visit, the labs listed above may include labs taken at another facility or during a different encounter within the last 24 hours. Please correlate lab times with ED admission and discharge times for further clarification of the services performed during this visit.    CT Head Without Contrast   Final Result   Impression:   1. No clearly acute intracranial pathology demonstrated.   2. Generalized cerebral atrophy and nonspecific periventricular hypodensities which are thought to represent white matter microvascular change.      Signer Name: Curt Quan MD    Signed: 5/21/2020 7:16 PM    Workstation Name: [facility]     Radiology [facility]      CT Abdomen Pelvis Without Contrast   Final Result      1.  Cardiomegaly. Atherosclerosis of the coronary arteries and the thoracic aorta.      2.  Moderate bilateral renal cortical atrophy. No evidence of hydronephrosis. No renal or ureteral calculi.      3.  Remote compression fracture of L4 unchanged from study of August 7, 2019.               Signer Name: Curt Quan MD    Signed: 5/21/2020 7:33 PM    Workstation Name: [facility]     [facility]      CT Chest Without Contrast   Final Result      1.  Cardiomegaly. Atherosclerosis of the coronary arteries and the thoracic aorta.      2.  Moderate  "bilateral renal cortical atrophy. No evidence of hydronephrosis. No renal or ureteral calculi.      3.  Remote compression fracture of L4 unchanged from study of August 7, 2019.               Signer Name: Curt Quan MD    Signed: 5/21/2020 7:33 PM    Workstation Name: [id number]     [facility]        Vitals:    05/21/20 1618 05/21/20 1648 05/21/20 1730 05/21/20 2149   BP:   149/73 129/61   BP Location:    Left arm   Patient Position:    Lying   Pulse: 65  60 68   Resp:  18  20   Temp:    98.1 °F (36.7 °C)   TempSrc:    Oral   SpO2: 98%  97% 96%   Weight:    106 kg (232 lb 12.8 oz)   Height:    154.9 cm (61\")     Medications   sodium chloride 0.9 % flush 10 mL (has no administration in time range)   aspirin chewable tablet 81 mg (has no administration in time range)   brimonidine-timolol (COMBIGAN) 0.2-0.5 % ophthalmic solution 1 drop **PATIENT SUPPLIED** (has no administration in time range)   carvedilol (COREG) tablet 6.25 mg (6.25 mg Oral Given 5/21/20 2355)   latanoprost (XALATAN) 0.005 % ophthalmic solution 1 drop (1 drop Right Eye Given 5/21/20 2356)   prednisoLONE acetate (PRED FORTE) 1 % ophthalmic suspension 1 drop (1 drop Right Eye Given 5/21/20 2356)   dextrose (GLUTOSE) oral gel 15 g (has no administration in time range)   dextrose (D50W) 25 g/ 50mL Intravenous Solution 25 g (has no administration in time range)   glucagon (human recombinant) (GLUCAGEN DIAGNOSTIC) injection 1 mg (has no administration in time range)   sodium chloride 0.9 % flush 10 mL (has no administration in time range)   sodium chloride 0.9 % flush 10 mL (has no administration in time range)   heparin (porcine) 5000 UNIT/ML injection 5,000 Units (5,000 Units Subcutaneous Given 5/21/20 2355)   insulin lispro (humaLOG) injection 0-7 Units (has no administration in time range)   ondansetron (ZOFRAN) tablet 4 mg (has no administration in time range)     Or   ondansetron (ZOFRAN) injection 4 mg (has no " administration in time range)   cefTRIAXone (ROCEPHIN) 1 g/50 mL 0.9% NS VTB (mbp) (has no administration in time range)   cefTRIAXone (ROCEPHIN) 1 g/50 mL 0.9% NS VTB (mbp) (1 g Intravenous Given 5/21/20 1848)   ondansetron (ZOFRAN) injection 4 mg (4 mg Intravenous Given 5/21/20 1848)   HYDROmorphone (DILAUDID) injection 0.25 mg (0.25 mg Intravenous Given 5/21/20 1848)   tetracaine (ALTACAINE) 0.5 % ophthalmic solution 2 drop (2 drops Right Eye Given 5/21/20 1847)   sodium chloride 0.9 % infusion (75 mL/hr Intravenous New Bag 5/21/20 5266)     ECG/EMG Results (last 24 hours)     ** No results found for the last 24 hours. **        ECG 12 Lead   Preliminary Result   Test Reason : baseline   Blood Pressure : **/** mmHG   Vent. Rate : 067 BPM     Atrial Rate : 067 BPM      P-R Int : 212 ms          QRS Dur : 086 ms       QT Int : 398 ms       P-R-T Axes : 081 034 055 degrees      QTc Int : 420 ms      Sinus rhythm with 1st degree AV block   Low voltage QRS   Cannot rule out Anteroseptal infarct , age undetermined   Abnormal ECG   When compared with ECG of 28-NOV-2019 15:17,   No significant change was found      Referred By:             Confirmed By:                                                  MDM  Number of Diagnoses or Management Options  Acute intractable headache, unspecified headache type:   Acute renal failure, unspecified acute renal failure type (CMS/HCC):   Leukocytosis, unspecified type:   Urinary tract infection with hematuria, site unspecified:   Weakness:   Diagnosis management comments:       I reviewed all available studies at bedside with the patient.  I think the major cause of her recent decline is probably urinary tract infection for his leukocytosis.  Her CTs are fairly bland.    This point I started the patient on IV fluids and antibiotics I think she needs to be admitted to the hospital for continued treatment.    I spoke with the hospitalist to admit the patient.    In regards to her  headache.  I did do the Branden-Pen in her right eye and her intraocular pressure was 23 which is slightly elevated but really not enough to explain the headache.  Certainly her eyes bland looking clinically and she does not have features of acute angle-closure glaucoma.    All are agreeable with the plan       Amount and/or Complexity of Data Reviewed  Clinical lab tests: reviewed  Tests in the radiology section of CPT®: reviewed  Decide to obtain previous medical records or to obtain history from someone other than the patient: yes        Final diagnoses:   Weakness   Urinary tract infection with hematuria, site unspecified   Leukocytosis, unspecified type   Acute intractable headache, unspecified headache type   Acute renal failure, unspecified acute renal failure type (CMS/[facility])       Documentation assistance provided by vic Palomino.  Information recorded by the scribe was done at my direction and has been verified and validated by me.     Lonnie Palomino  05/21/20 1089       Kosta Figueroa MD  05/22/20 0158

## 2020-05-21 NOTE — TELEPHONE ENCOUNTER
DAUGHTER STATES THAT SHE HAS TAKEN MOTHER TO [facility].    PT WAS VERY SICK HAVING DIARRHEA,COUGH,FEVER.    PT IS BEING TESTED COVID 19.      PLEASE ADVISE     JARVIS DAUGHTER [phone]

## 2020-05-22 ENCOUNTER — TELEPHONE (OUTPATIENT)
Dept: FAMILY MEDICINE CLINIC | Facility: CLINIC | Age: 75
End: 2020-05-22

## 2020-05-22 VITALS
DIASTOLIC BLOOD PRESSURE: 47 MMHG | RESPIRATION RATE: 16 BRPM | BODY MASS INDEX: 43.95 KG/M2 | HEART RATE: 63 BPM | TEMPERATURE: 98.2 F | OXYGEN SATURATION: 96 % | SYSTOLIC BLOOD PRESSURE: 129 MMHG | WEIGHT: 232.8 LBS | HEIGHT: 61 IN

## 2020-05-22 LAB
ANION GAP SERPL CALCULATED.3IONS-SCNC: 11 MMOL/L (ref 5–15)
BASOPHILS # BLD AUTO: 0.03 10*3/MM3 (ref 0–0.2)
BASOPHILS NFR BLD AUTO: 0.3 % (ref 0–1.5)
BUN BLD-MCNC: 33 MG/DL (ref 8–23)
BUN/CREAT SERPL: 26.2 (ref 7–25)
CALCIUM SPEC-SCNC: 8.1 MG/DL (ref 8.6–10.5)
CHLORIDE SERPL-SCNC: 109 MMOL/L (ref 98–107)
CO2 SERPL-SCNC: 20 MMOL/L (ref 22–29)
CREAT BLD-MCNC: 1.26 MG/DL (ref 0.57–1)
DEPRECATED RDW RBC AUTO: 52.5 FL (ref 37–54)
EOSINOPHIL # BLD AUTO: 0.11 10*3/MM3 (ref 0–0.4)
EOSINOPHIL NFR BLD AUTO: 0.9 % (ref 0.3–6.2)
ERYTHROCYTE [DISTWIDTH] IN BLOOD BY AUTOMATED COUNT: 14.7 % (ref 12.3–15.4)
GFR SERPL CREATININE-BSD FRML MDRD: 41 ML/MIN/1.73
GLUCOSE BLD-MCNC: 117 MG/DL (ref 65–99)
GLUCOSE BLDC GLUCOMTR-MCNC: 109 MG/DL (ref 70–130)
GLUCOSE BLDC GLUCOMTR-MCNC: 79 MG/DL (ref 70–130)
HBA1C MFR BLD: 5.8 % (ref 4.8–5.6)
HCT VFR BLD AUTO: 35.1 % (ref 34–46.6)
HGB BLD-MCNC: 10.9 G/DL (ref 12–15.9)
IMM GRANULOCYTES # BLD AUTO: 0.03 10*3/MM3 (ref 0–0.05)
IMM GRANULOCYTES NFR BLD AUTO: 0.3 % (ref 0–0.5)
LYMPHOCYTES # BLD AUTO: 2.67 10*3/MM3 (ref 0.7–3.1)
LYMPHOCYTES NFR BLD AUTO: 22.3 % (ref 19.6–45.3)
MAGNESIUM SERPL-MCNC: 1.7 MG/DL (ref 1.6–2.4)
MCH RBC QN AUTO: 30.1 PG (ref 26.6–33)
MCHC RBC AUTO-ENTMCNC: 31.1 G/DL (ref 31.5–35.7)
MCV RBC AUTO: 97 FL (ref 79–97)
MONOCYTES # BLD AUTO: 0.69 10*3/MM3 (ref 0.1–0.9)
MONOCYTES NFR BLD AUTO: 5.8 % (ref 5–12)
NEUTROPHILS # BLD AUTO: 8.45 10*3/MM3 (ref 1.7–7)
NEUTROPHILS NFR BLD AUTO: 70.4 % (ref 42.7–76)
NRBC BLD AUTO-RTO: 0 /100 WBC (ref 0–0.2)
PLATELET # BLD AUTO: 151 10*3/MM3 (ref 140–450)
PMV BLD AUTO: 11.4 FL (ref 6–12)
POTASSIUM BLD-SCNC: 3.9 MMOL/L (ref 3.5–5.2)
RBC # BLD AUTO: 3.62 10*6/MM3 (ref 3.77–5.28)
SODIUM BLD-SCNC: 140 MMOL/L (ref 136–145)
WBC NRBC COR # BLD: 11.98 10*3/MM3 (ref 3.4–10.8)

## 2020-05-22 PROCEDURE — 96372 THER/PROPH/DIAG INJ SC/IM: CPT

## 2020-05-22 PROCEDURE — 51798 US URINE CAPACITY MEASURE: CPT

## 2020-05-22 PROCEDURE — 83735 ASSAY OF MAGNESIUM: CPT | Performed by: NURSE PRACTITIONER

## 2020-05-22 PROCEDURE — 83036 HEMOGLOBIN GLYCOSYLATED A1C: CPT | Performed by: NURSE PRACTITIONER

## 2020-05-22 PROCEDURE — 63710000001 BRIMONIDINE-TIMOLOL 0.2-0.5 % SOLUTION 5 ML BOTTLE: Performed by: NURSE PRACTITIONER

## 2020-05-22 PROCEDURE — 80048 BASIC METABOLIC PNL TOTAL CA: CPT | Performed by: NURSE PRACTITIONER

## 2020-05-22 PROCEDURE — 63710000001 ASPIRIN 81 MG CHEWABLE TABLET: Performed by: NURSE PRACTITIONER

## 2020-05-22 PROCEDURE — 99217 PR OBSERVATION CARE DISCHARGE MANAGEMENT: CPT | Performed by: INTERNAL MEDICINE

## 2020-05-22 PROCEDURE — 25010000002 HEPARIN (PORCINE) PER 1000 UNITS: Performed by: NURSE PRACTITIONER

## 2020-05-22 PROCEDURE — 85025 COMPLETE CBC W/AUTO DIFF WBC: CPT | Performed by: NURSE PRACTITIONER

## 2020-05-22 PROCEDURE — 82962 GLUCOSE BLOOD TEST: CPT

## 2020-05-22 PROCEDURE — 93010 ELECTROCARDIOGRAM REPORT: CPT | Performed by: INTERNAL MEDICINE

## 2020-05-22 PROCEDURE — 63710000001 CARVEDILOL 6.25 MG TABLET: Performed by: NURSE PRACTITIONER

## 2020-05-22 PROCEDURE — A9270 NON-COVERED ITEM OR SERVICE: HCPCS | Performed by: NURSE PRACTITIONER

## 2020-05-22 RX ORDER — SACCHAROMYCES BOULARDII 250 MG
250 CAPSULE ORAL 2 TIMES DAILY
Qty: 6 CAPSULE | Refills: 0 | Status: SHIPPED | OUTPATIENT
Start: 2020-05-22 | End: 2020-05-28

## 2020-05-22 RX ORDER — CEPHALEXIN 500 MG/1
500 CAPSULE ORAL 2 TIMES DAILY
Qty: 6 CAPSULE | Refills: 0 | Status: SHIPPED | OUTPATIENT
Start: 2020-05-22 | End: 2020-05-28

## 2020-05-22 RX ADMIN — SODIUM CHLORIDE 2000 ML: 9 INJECTION, SOLUTION INTRAVENOUS at 10:25

## 2020-05-22 RX ADMIN — BRIMONIDINE TARTRATE, TIMOLOL MALEATE 1 DROP: 2; 5 SOLUTION/ DROPS OPHTHALMIC at 16:26

## 2020-05-22 RX ADMIN — BRIMONIDINE TARTRATE, TIMOLOL MALEATE 1 DROP: 2; 5 SOLUTION/ DROPS OPHTHALMIC at 08:00

## 2020-05-22 RX ADMIN — PREDNISOLONE ACETATE 1 DROP: 10 SUSPENSION/ DROPS OPHTHALMIC at 08:01

## 2020-05-22 RX ADMIN — HEPARIN SODIUM 5000 UNITS: 5000 INJECTION, SOLUTION INTRAVENOUS; SUBCUTANEOUS at 06:32

## 2020-05-22 RX ADMIN — ASPIRIN 81 MG 81 MG: 81 TABLET ORAL at 08:01

## 2020-05-22 RX ADMIN — LATANOPROST 1 DROP: 50 SOLUTION OPHTHALMIC at 08:01

## 2020-05-22 RX ADMIN — CARVEDILOL 6.25 MG: 6.25 TABLET, FILM COATED ORAL at 07:59

## 2020-05-22 RX ADMIN — PREDNISOLONE ACETATE 1 DROP: 10 SUSPENSION/ DROPS OPHTHALMIC at 12:50

## 2020-05-22 NOTE — H&P
[facility] Medicine Services  HISTORY AND PHYSICAL    Patient Name: Georgia Velázquez  : 1945  MRN: [id number]  Primary Care Physician: Chaz Rico, DNP, APRN  Date of admission: 2020      Subjective   Subjective     Chief Complaint:  Nausea and vomiting     HPI:  Georgia Velázquez is a 75 y.o. female w/ a hx of CHF, CAD, HTN, HLD, COPD, GERD and T2DM who presented to the ED w/ c/o nausea and vomiting.   Pt c/o feeling poorly for two weeks w/ fatigue. For the past week pt has had intermittent N/V. In the past 24 hours she reports approximately 3 episodes of vomiting and diarrhea. Additionally, two days ago pt had a fever of 102; within the last 24 hours she has felt chilled but did not actually obtain a temp. Tonight pt noted an odor to her urine.   Pt denies chest pain, dyspnea, abdominal pain, melena, hematochezia, dysuria, worsening pedal edema, syncope, confusion, unilateral weakness. Pt reports a chronic am cough (no worse than baseline). Pt reports she was recently dx w/ an embolism of the right eye, s/p tx w/ laser, eye drops. Since dx pt c/o a persistent headache 2/2 embolism- no change in intensity or vision.    Pt evaluated in the ED. Urinalysis c/w acute UTI, WBC 18.31. Pt admitted to the hospital medicine service for further evaluation.       Review of Systems   Constitutional: Positive for chills, fatigue and fever.   Eyes: Positive for visual disturbance.        Right eye w/ blurry vision (dx w/ embolism).    Respiratory: Negative.    Cardiovascular: Negative.    Gastrointestinal: Positive for diarrhea, nausea and vomiting. Negative for abdominal distention, abdominal pain, anal bleeding, blood in stool, constipation and rectal pain.   Endocrine: Negative.    Genitourinary: Negative for decreased urine volume, dysuria, flank pain, frequency, hematuria, pelvic pain and urgency.        Odorous urine.    Musculoskeletal: Negative.    Skin: Negative.     Allergic/Immunologic: Negative.    Neurological: Positive for headaches. Negative for dizziness, tremors, seizures, syncope, facial asymmetry, speech difficulty, weakness, light-headedness and numbness.   Hematological: Negative.    Psychiatric/Behavioral: Negative.    All other systems reviewed and are negative.       Personal History     Past Medical History:   Diagnosis Date   • Acute cystitis without hematuria 12/5/2017   • Acute kidney injury (CMS/[facility]) 12/5/2017   • Arthritis    • 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/2016   • Body mass index (BMI) of 45.0-49.9 in adult (CMS/[facility]) 6/14/2019   • CHF (congestive heart failure) (CMS/[facility])    • Closed compression fracture of L4 lumbar vertebra 12/4/2017    Added automatically from request for surgery [id number]   • Constipation 12/5/2017   • COPD (chronic obstructive pulmonary disease) (CMS/[facility])    • Coronary artery disease 5/18/2016   • Diabetes mellitus type 2 in obese (CMS/HCC) 1/29/2018   • Elevated alkaline phosphatase level 2/26/2018   • Elevated creatine kinase    • Essential hypertension 5/18/2016   • Extremity pain    • GERD (gastroesophageal reflux disease) 5/18/2016   • History of echocardiogram 10/16/2015    TDS.Est EF is 45-50%.Mild distal septal, anteroapical hypokinesia.Mild mitral stenosis.Mean gradient across mitral valve is 6 mmHg.Trace TR   • History of kyphoplasty 1/30/2018   • Hyperlipemia 5/18/2016   • Low back pain    • Lumbar facet arthropathy 1/29/2018   • Lumbar stenosis with neurogenic claudication 1/29/2018   • Morbid obesity due to excess calories (CMS/HCC) 1/29/2018   • Myocardial infarction (CMS/[facility]) 5/18/2016   • Osteoporosis 5/18/2016   • Physical deconditioning 1/30/2018   • Sepsis (CMS/[facility])     uro   • Type 2 diabetes mellitus (CMS/[facility]) 5/18/2016   • Urinary incontinence without sensory awareness 2/26/2018       Past Surgical History:   Procedure Laterality Date   • APPENDECTOMY     • BACK SURGERY     • CHOLECYSTECTOMY     • KYPHOPLASTY N/A  12/7/2017    Procedure: KYPHOPLASTY L4;  Surgeon: Marc Pham MD;  Location: [facility];  Service:        Family History: family history includes Diabetes in her mother; No Known Problems in her brother, daughter, father, sister, and son. Otherwise pertinent FHx was reviewed and unremarkable.     Social History:  reports that she quit smoking about 13 years ago. Her smoking use included cigarettes. She has never used smokeless tobacco. She reports that she does not drink alcohol or use drugs.  Social History     Social History Narrative    Living w daughter.       Medications:  Available home medication information reviewed.  Medications Prior to Admission   Medication Sig Dispense Refill Last Dose   • brimonidine-timolol (COMBIGAN) 0.2-0.5 % ophthalmic solution Administer 1 drop to the right eye 3 (Three) Times a Day.      • latanoprost (XALATAN) 0.005 % ophthalmic solution Administer 1 drop to the right eye Every Night.      • prednisoLONE acetate (PRED FORTE) 1 % ophthalmic suspension Administer 1 drop to the right eye 4 (Four) Times a Day.      • alendronate (FOSAMAX) 70 MG tablet Take 70 mg by mouth Every 7 (Seven) Days.   Taking   • aspirin 81 MG chewable tablet Chew 1 tablet Daily. 30 tablet 3 Taking   • atorvastatin (LIPITOR) 40 MG tablet Take 1 tablet by mouth Daily. 90 tablet 3    • carvedilol (COREG) 6.25 MG tablet Take 1 tablet by mouth 2 (Two) Times a Day With Meals. 180 tablet 3    • glucose blood (ACCU-CHEK ANNIE PLUS) test strip Use once daily 100 each 12 Taking   • metFORMIN (GLUCOPHAGE) 500 MG tablet Take 1 tablet by mouth 2 (Two) Times a Day With Meals. 180 tablet 3    • nitroglycerin (NITROSTAT) 0.4 MG SL tablet Place 1 tablet under the tongue Every 5 (Five) Minutes As Needed for Chest Pain. Take no more than 3 doses in 15 minutes. 100 tablet 0 Taking   • valsartan-hydrochlorothiazide (DIOVAN-HCT) 160-25 MG per tablet Take 1 tablet by mouth Daily. 90 tablet 2    • vitamin D (ERGOCALCIFEROL)  75886 units capsule capsule Take 1 capsule by mouth 1 (One) Time Per Week. 12 capsule 0 Taking       No Known Allergies    Objective   Objective     Vital Signs:   Temp:  [98.1 °F (36.7 °C)-98.8 °F (37.1 °C)] 98.1 °F (36.7 °C)  Heart Rate:  [60-68] 68  Resp:  [18-20] 20  BP: (129-149)/(61-73) 129/61        Physical Exam   Constitutional: She is oriented to person, place, and time. She appears well-developed and well-nourished. No distress.   HENT:   Head: Normocephalic and atraumatic.   Eyes: Pupils are equal, round, and reactive to light. EOM are normal.   Injected conjunctivae- right eye.    Neck: Normal range of motion. Neck supple.   Cardiovascular: Normal rate, regular rhythm, normal heart sounds and intact distal pulses. Exam reveals no gallop and no friction rub.   No murmur heard.  Non-pitting edema BLE; chronic.    Pulmonary/Chest: Effort normal and breath sounds normal. No stridor. No respiratory distress. She has no wheezes. She has no rales. She exhibits no tenderness.   Abdominal: Soft. Bowel sounds are normal. She exhibits no distension. There is tenderness.   Suprapubic TTP.    Musculoskeletal: Normal range of motion. She exhibits no edema, tenderness or deformity.   Neurological: She is alert and oriented to person, place, and time. No cranial nerve deficit.   Skin: Skin is warm and dry. Capillary refill takes more than 3 seconds. No rash noted. She is not diaphoretic. No erythema. No pallor.   Psychiatric: She has a normal mood and affect. Her behavior is normal. Judgment and thought content normal.   Nursing note and vitals reviewed.       Results Reviewed:  I have personally reviewed current lab and radiology data.    Results from last 7 days   Lab Units 05/21/20  1653   WBC 10*3/mm3 18.31*   HEMOGLOBIN g/dL 13.5   HEMATOCRIT % 42.3   PLATELETS 10*3/mm3 218     Results from last 7 days   Lab Units 05/21/20  2232 05/21/20  1653   SODIUM mmol/L  --  144   POTASSIUM mmol/L  --  4.1   CHLORIDE mmol/L   --  106   CO2 mmol/L  --  21.0*   BUN mg/dL  --  34*   CREATININE mg/dL  --  1.41*   GLUCOSE mg/dL  --  140*   CALCIUM mg/dL  --  9.6   ALT (SGPT) U/L  --  22   AST (SGOT) U/L  --  24   LACTATE mmol/L 1.2  --      Estimated Creatinine Clearance: 37.5 mL/min (A) (by C-G formula based on SCr of 1.41 mg/dL (H)).  Brief Urine Lab Results  (Last result in the past 365 days)      Color   Clarity   Blood   Leuk Est   Nitrite   Protein   CREAT   Urine HCG        05/21/20 1653 Yellow Cloudy Small (1+) Large (3+) Positive 30 mg/dL (1+)             Imaging Results (Last 24 Hours)     Procedure Component Value Units Date/Time    CT Abdomen Pelvis Without Contrast [[id number]] Collected:  05/21/20 1933     Updated:  05/21/20 1935    Narrative:       CT Abdomen Pelvis WO, CT Chest WO    INDICATION:   Acute onset of confusion with abdominal pain and nausea and vomiting. Possible urinary tract infection.    TECHNIQUE:   CT of the abdomen and pelvis without contrast. Coronal and sagittal reconstructions were obtained.  Radiation dose reduction techniques included automated exposure control or exposure modulation based on body size. Radiation audit for number of CT and  nuclear cardiology exams performed in the last year: 1.      COMPARISON:   August 7, 2019    FINDINGS:  Chest: The lungs are free of focal infiltrates. No pleural fluid is seen. No pneumothorax.    The heart size is mildly enlarged. No pericardial fluid. Prominent coronary artery calcifications. Atherosclerosis of the thoracic aorta. No acute aortic pathology.    No threshold mediastinal or hilar adenopathy. No axillary adenopathy. Regional osseous structures are diffusely demineralized. No acute or aggressive bone lesions.    Abdomen: Noncontrasted imaging of the liver shows no focal hepatic lesions. The spleen and pancreas are within normal limits. Both kidneys demonstrate prominent cortical atrophy. No hydronephrosis. No renal or ureteral calculi. No adrenal  abnormalities.    The aorta shows atherosclerotic change without aneurysmal dilatation. No threshold retroperitoneal or mesenteric adenopathy.    Pelvis: The bowel pattern is nonobstructive. No focal inflammatory change. No free air or free fluid. The bladder outline is smooth. No threshold pelvic or inguinal adenopathy.    Regional osseous structures are diffusely demineralized. Remote compression fracture of L4.      Impression:         1.  Cardiomegaly. Atherosclerosis of the coronary arteries and the thoracic aorta.    2.  Moderate bilateral renal cortical atrophy. No evidence of hydronephrosis. No renal or ureteral calculi.    3.  Remote compression fracture of L4 unchanged from study of August 7, 2019.          Signer Name: Curt Quan MD   Signed: 5/21/2020 7:33 PM   Workstation Name: [id number]    [facility]    CT Chest Without Contrast [[id number]] Collected:  05/21/20 1933     Updated:  05/21/20 1935    Narrative:       CT Abdomen Pelvis WO, CT Chest WO    INDICATION:   Acute onset of confusion with abdominal pain and nausea and vomiting. Possible urinary tract infection.    TECHNIQUE:   CT of the abdomen and pelvis without contrast. Coronal and sagittal reconstructions were obtained.  Radiation dose reduction techniques included automated exposure control or exposure modulation based on body size. Radiation audit for number of CT and  nuclear cardiology exams performed in the last year: 1.      COMPARISON:   August 7, 2019    FINDINGS:  Chest: The lungs are free of focal infiltrates. No pleural fluid is seen. No pneumothorax.    The heart size is mildly enlarged. No pericardial fluid. Prominent coronary artery calcifications. Atherosclerosis of the thoracic aorta. No acute aortic pathology.    No threshold mediastinal or hilar adenopathy. No axillary adenopathy. Regional osseous structures are diffusely demineralized. No acute or aggressive bone lesions.    Abdomen:  Noncontrasted imaging of the liver shows no focal hepatic lesions. The spleen and pancreas are within normal limits. Both kidneys demonstrate prominent cortical atrophy. No hydronephrosis. No renal or ureteral calculi. No adrenal abnormalities.    The aorta shows atherosclerotic change without aneurysmal dilatation. No threshold retroperitoneal or mesenteric adenopathy.    Pelvis: The bowel pattern is nonobstructive. No focal inflammatory change. No free air or free fluid. The bladder outline is smooth. No threshold pelvic or inguinal adenopathy.    Regional osseous structures are diffusely demineralized. Remote compression fracture of L4.      Impression:         1.  Cardiomegaly. Atherosclerosis of the coronary arteries and the thoracic aorta.    2.  Moderate bilateral renal cortical atrophy. No evidence of hydronephrosis. No renal or ureteral calculi.    3.  Remote compression fracture of L4 unchanged from study of August 7, 2019.          Signer Name: Curt Quan MD   Signed: 5/21/2020 7:33 PM   Workstation Name: [facility]    [facility]    CT Head Without Contrast [[id number]] Collected:  05/21/20 1916     Updated:  05/21/20 1918    Narrative:       CT Head WO    HISTORY:   Acute onset of confusion    TECHNIQUE:   Axial unenhanced head CT. Radiation dose reduction techniques included automated exposure control or exposure modulation based on body size. Count of known CT and cardiac nuc med studies performed in previous 12 months: 1.     Time of scan: 6:51 PM    COMPARISON:   None.    FINDINGS:   The ventricles are generous in size. Cortical sulci are correspondingly prominent. No extraaxial fluid collections are seen.    No parenchymal or subarachnoid hemorrhage is present. Periventricular hypodensities are demonstrated which are nonspecific but likely represent white matter microvascular change in a patient of this age. No CT evidence of mass or acute infarct.    The mastoid air  cells are clear. The visualized paranasal sinuses are clear.  Orbital structures demonstrate no acute findings.      Impression:       Impression:  1. No clearly acute intracranial pathology demonstrated.  2. Generalized cerebral atrophy and nonspecific periventricular hypodensities which are thought to represent white matter microvascular change.    Signer Name: Curt Quan MD   Signed: 5/21/2020 7:16 PM   Workstation Name: [facility]    [facility]             Assessment/Plan   Assessment & Plan     Active Hospital Problems    Diagnosis POA   • **Urinary tract infection [N39.0] Yes   • Retinal emboli, right [H34.9] Yes   • Vomiting and diarrhea [R11.10, R19.7] Yes   • Chronic obstructive pulmonary disease (CMS/HCC) [J44.9] Yes   • Chronic kidney disease [N18.9] Yes   • Leukocytosis [D72.829] Yes   • Type 2 diabetes mellitus (CMS/HCC) [E11.9] Yes   • Hyperlipidemia [E78.5] Yes   • GERD (gastroesophageal reflux disease) [K21.9] Yes   • Essential hypertension [I10] Yes   • Coronary artery disease [I25.10] Yes       Assessment & Plan    75 yr old with recent nausea, vomiting, diarrhea, malaise.  Blood work done in ED; UTI noted and CTX given.  CT scan of c/a/p fairly unremarkable.      UTI   - CTX given in ED  -urine cx pending; (previous + cx E.Coli urine cx in 2019, pan-sensitive); blood cx pending    Vomiting and diarrhea  -x 1 week; approximately 3 episodes of each in last 24 hours   ** MD note:  She told me no diarrhea or vomiting today and no fevers.  -CT abd/pel unremarkable   -lactic acid pending   -light IVF overnight  -symptom mgt  -trend labs    **Leukocytosis  -likely 2/2 UTI  -urine cx and blood cx pending  -IV Rocephin     **recent Right retinal embolism 2 weeks ago  -followed by ophthalmologist at [facility]  -s/p laser treatment   -pt w/ ongoing headache since dx  -CT head unremarkable  IOP checked in ED:  23, mildly elevated.   -continue drops- latanoprost,  pred-forte, combigan     **T2DM  -hem a1c w/ am labs  -FSBG q ac/hs w/ LDSS  -hold routine metformin     **CKD Stage III  -previous creatinine 1.52 in 12/2019  -@ baseline     **CAD, HTN, HLD  -continue routine ASA and Coreg w/ hold parameters  -routine Diovan held for now while pt getting IVF; appears mildly dehydrated    **COPD  -stable  -CT chest obtained in ED; unremarkable       DVT prophylaxis:  Heparin       Admission Communication  Due to current limited visitation policies, an attempt will be made to update patient's identified best point-of-contact(s) at admission to discuss plan of care.   Contact: deferred; patient alert and oriented and stable at 2330   Relation:    Time of communication:    Notes (if applicable):          CODE STATUS:    Code Status and Medical Interventions:   Ordered at: 05/21/20 4116     Code Status:    CPR     Medical Interventions (Level of Support Prior to Arrest):    Full       Admission Status:  I believe this patient meets OBSERVATION status, however if further evaluation or treatment plans warrant, status may change.  Based upon current information, I predict patient's care encounter to be less than or equal to 2 midnights.   I feel patient’s risk for adverse outcomes and need for care warrant INPATIENT evaluation and I predict the patient’s care encounter to likely last beyond 2 midnights.      Electronically signed by DON Lee, 05/21/20, 10:20 PM.      Attending   Admission Attestation       I have seen and examined the patient, performing an independent face-to-face diagnostic evaluation with plan of care reviewed and developed with the advanced practice clinician (APC).      Brief Summary Statement:   Georgia Velázquez is a 75 y.o. female with several days of nausea, vomiting, diarrhea.  She denies fever to me.  She denies dysuria.  She has actually not had diarrhea today,and is not currently nauseous - she would like to have something to drink; this is her  main complaint at present.  She has not had anything to eat today due to being in the ED most of the day.      Remainder of detailed HPI is as noted by APC and has been reviewed and/or edited by me for completeness.    Attending Physical Exam:  Gen:  WD/WN obese woman in NAD in bed, very pleasant and cheerful.   Neuro: alert and oriented, clear speech, follows commands, grossly nonfocal  HEENT:  NC/AT PERRL, OP benign  Neck:  Supple, no LAD  Heart RRR no murmur, rub, or gallop  Lungs CTA nonlabored on RA, no W R R  Abd:  Soft, nontender, no rebound or guarding, pos BS  Extrem:  No c/c/e  Skin warm and dry, no rash  Psych nl mood and affect.     Brief Assessment/Plan :  See detailed assessment and plan developed with APC which I have reviewed and/or edited for completeness.    Electronically signed by Nidhi Wylie MD, 05/21/20, 11:32 PM.

## 2020-05-22 NOTE — DISCHARGE SUMMARY
[facility] Medicine Services  DISCHARGE SUMMARY    Patient Name: Georgia Velázquez  : 1945  MRN: [id number]    Date of Admission: 2020  3:20 PM  Date of Discharge:  2020  Primary Care Physician: Chaz Rico, DNP, APRN    Consults     No orders found for last 30 day(s).          Hospital Course     Presenting Problem:   No admission diagnoses are documented for this encounter.    Active Hospital Problems    Diagnosis  POA   • **Urinary tract infection [N39.0]  Yes   • Retinal emboli, right [H34.9]  Yes   • Vomiting and diarrhea [R11.10, R19.7]  Yes   • Chronic obstructive pulmonary disease (CMS/HCC) [J44.9]  Yes   • Chronic kidney disease [N18.9]  Yes   • Leukocytosis [D72.829]  Yes   • Type 2 diabetes mellitus (CMS/HCC) [E11.9]  Yes   • Hyperlipidemia [E78.5]  Yes   • GERD (gastroesophageal reflux disease) [K21.9]  Yes   • Essential hypertension [I10]  Yes   • Coronary artery disease [I25.10]  Yes      Resolved Hospital Problems   No resolved problems to display.          Hospital Course:  Georgia Velázquez is a 75 y.o. female with Hx CHF (?EF, data deficient), CAD, COPD, DM2 who presented with nausea vomiting of several weeks with addition of diarrhea shortly before admission. Was started on IVF's and by the time the admitting physician could see her she denied any further N/V/D. A urine culture was obtained in the ED and showed bacteruria (currently >100,000CFU E coli, without Hx of MDR) and she was started on antibiotics. For me she denies any current or prior urinary symptoms, slightly darker recently but nothing else. Labs have close to normalized overnight with volume resuscitation and she feels her usual health today. I will continue IVF's through this morning and early afternoon and discharge her this afternoon with a few more days of oral cephalosporin based on current treatment with rocephin and prior urine sensitivities. Follow up urine culture but  no risk factors for MDR organisms. She reports being at her baseline health.      Discharge Follow Up Recommendations for outpatient labs/diagnostics:   PCP 1 week, follow up likely enteritis and bacteruria    Day of Discharge     HPI:   Feels well, nausea, vomiting, diarrhea resolved today. Wanting to go home as soon as possible. No dysuria, hematuria, urgency, hesitancy, frequency    Review of Systems  Gen- No fevers, chills  CV- No chest pain, palpitations  Resp- No cough, dyspnea  GI- No N/V/D, abd pain    Vital Signs:   Temp:  [98 °F (36.7 °C)-98.4 °F (36.9 °C)] 98.2 °F (36.8 °C)  Heart Rate:  [60-91] 63  Resp:  [16-20] 16  BP: (121-149)/() 129/47     Physical Exam:  Constitutional: No acute distress, awake, alert, laying in bed eating breakfast  HENT: NCAT, mucous membranes moist  Respiratory: Clear to auscultation bilaterally, respiratory effort normal   Cardiovascular: RRR, no murmurs, rubs, or gallops, palpable radial pulses bilaterally  Gastrointestinal: Positive bowel sounds, soft, nontender, nondistended  Musculoskeletal: No bilateral ankle edema    Pertinent  and/or Most Recent Results     Results from last 7 days   Lab Units 05/22/20  1147 05/22/20  0902 05/21/20  1653   WBC 10*3/mm3  --  11.98* 18.31*   HEMOGLOBIN g/dL  --  10.9* 13.5   HEMATOCRIT %  --  35.1 42.3   PLATELETS 10*3/mm3  --  151 218   SODIUM mmol/L 140  --  144   POTASSIUM mmol/L 3.9  --  4.1   CHLORIDE mmol/L 109*  --  106   CO2 mmol/L 20.0*  --  21.0*   BUN mg/dL 33*  --  34*   CREATININE mg/dL 1.26*  --  1.41*   GLUCOSE mg/dL 117*  --  140*   CALCIUM mg/dL 8.1*  --  9.6     Results from last 7 days   Lab Units 05/21/20  1653   BILIRUBIN mg/dL 0.6   ALK PHOS U/L 91   ALT (SGPT) U/L 22   AST (SGOT) U/L 24           Invalid input(s): TG, LDLCALC, LDLREALC  Results from last 7 days   Lab Units 05/22/20  0624 05/21/20  2232   HEMOGLOBIN A1C % 5.80*  --    PROCALCITONIN ng/mL  --  0.13   LACTATE mmol/L  --  1.2       Brief Urine Lab  Results  (Last result in the past 365 days)      Color   Clarity   Blood   Leuk Est   Nitrite   Protein   CREAT   Urine HCG        05/21/20 1653 Yellow Cloudy Small (1+) Large (3+) Positive 30 mg/dL (1+)               Microbiology Results Abnormal     Procedure Component Value - Date/Time    Urine Culture - Urine, Urine, Clean Catch [[id number]]  (Abnormal) Collected:  05/21/20 1653    Lab Status:  Preliminary result Specimen:  Urine, Clean Catch Updated:  05/22/20 1226     Urine Culture >100,000 CFU/mL Escherichia coli    Blood Culture - Blood, Hand, Right [[id number]] Collected:  05/21/20 2232    Lab Status:  Preliminary result Specimen:  Blood from Hand, Right Updated:  05/22/20 0915    Narrative:       Aerobic bottle only            Imaging Results (All)     Procedure Component Value Units Date/Time    CT Abdomen Pelvis Without Contrast [[id number]] Collected:  05/21/20 1933     Updated:  05/21/20 1935    Narrative:       CT Abdomen Pelvis WO, CT Chest WO    INDICATION:   Acute onset of confusion with abdominal pain and nausea and vomiting. Possible urinary tract infection.    TECHNIQUE:   CT of the abdomen and pelvis without contrast. Coronal and sagittal reconstructions were obtained.  Radiation dose reduction techniques included automated exposure control or exposure modulation based on body size. Radiation audit for number of CT and  nuclear cardiology exams performed in the last year: 1.      COMPARISON:   August 7, 2019    FINDINGS:  Chest: The lungs are free of focal infiltrates. No pleural fluid is seen. No pneumothorax.    The heart size is mildly enlarged. No pericardial fluid. Prominent coronary artery calcifications. Atherosclerosis of the thoracic aorta. No acute aortic pathology.    No threshold mediastinal or hilar adenopathy. No axillary adenopathy. Regional osseous structures are diffusely demineralized. No acute or aggressive bone lesions.    Abdomen: Noncontrasted imaging of the liver shows no  focal hepatic lesions. The spleen and pancreas are within normal limits. Both kidneys demonstrate prominent cortical atrophy. No hydronephrosis. No renal or ureteral calculi. No adrenal abnormalities.    The aorta shows atherosclerotic change without aneurysmal dilatation. No threshold retroperitoneal or mesenteric adenopathy.    Pelvis: The bowel pattern is nonobstructive. No focal inflammatory change. No free air or free fluid. The bladder outline is smooth. No threshold pelvic or inguinal adenopathy.    Regional osseous structures are diffusely demineralized. Remote compression fracture of L4.      Impression:         1.  Cardiomegaly. Atherosclerosis of the coronary arteries and the thoracic aorta.    2.  Moderate bilateral renal cortical atrophy. No evidence of hydronephrosis. No renal or ureteral calculi.    3.  Remote compression fracture of L4 unchanged from study of August 7, 2019.          Signer Name: Curt Quan MD   Signed: 5/21/2020 7:33 PM   Workstation Name: [id number]    [facility]    CT Chest Without Contrast [[id number]] Collected:  05/21/20 1933     Updated:  05/21/20 1935    Narrative:       CT Abdomen Pelvis WO, CT Chest WO    INDICATION:   Acute onset of confusion with abdominal pain and nausea and vomiting. Possible urinary tract infection.    TECHNIQUE:   CT of the abdomen and pelvis without contrast. Coronal and sagittal reconstructions were obtained.  Radiation dose reduction techniques included automated exposure control or exposure modulation based on body size. Radiation audit for number of CT and  nuclear cardiology exams performed in the last year: 1.      COMPARISON:   August 7, 2019    FINDINGS:  Chest: The lungs are free of focal infiltrates. No pleural fluid is seen. No pneumothorax.    The heart size is mildly enlarged. No pericardial fluid. Prominent coronary artery calcifications. Atherosclerosis of the thoracic aorta. No acute aortic  pathology.    No threshold mediastinal or hilar adenopathy. No axillary adenopathy. Regional osseous structures are diffusely demineralized. No acute or aggressive bone lesions.    Abdomen: Noncontrasted imaging of the liver shows no focal hepatic lesions. The spleen and pancreas are within normal limits. Both kidneys demonstrate prominent cortical atrophy. No hydronephrosis. No renal or ureteral calculi. No adrenal abnormalities.    The aorta shows atherosclerotic change without aneurysmal dilatation. No threshold retroperitoneal or mesenteric adenopathy.    Pelvis: The bowel pattern is nonobstructive. No focal inflammatory change. No free air or free fluid. The bladder outline is smooth. No threshold pelvic or inguinal adenopathy.    Regional osseous structures are diffusely demineralized. Remote compression fracture of L4.      Impression:         1.  Cardiomegaly. Atherosclerosis of the coronary arteries and the thoracic aorta.    2.  Moderate bilateral renal cortical atrophy. No evidence of hydronephrosis. No renal or ureteral calculi.    3.  Remote compression fracture of L4 unchanged from study of August 7, 2019.          Signer Name: Curt Quan MD   Signed: 5/21/2020 7:33 PM   Workstation Name: [id number]    [facility]    CT Head Without Contrast [[id number]] Collected:  05/21/20 1916     Updated:  05/21/20 1918    Narrative:       CT Head WO    HISTORY:   Acute onset of confusion    TECHNIQUE:   Axial unenhanced head CT. Radiation dose reduction techniques included automated exposure control or exposure modulation based on body size. Count of known CT and cardiac nuc med studies performed in previous 12 months: 1.     Time of scan: 6:51 PM    COMPARISON:   None.    FINDINGS:   The ventricles are generous in size. Cortical sulci are correspondingly prominent. No extraaxial fluid collections are seen.    No parenchymal or subarachnoid hemorrhage is present. Periventricular  hypodensities are demonstrated which are nonspecific but likely represent white matter microvascular change in a patient of this age. No CT evidence of mass or acute infarct.    The mastoid air cells are clear. The visualized paranasal sinuses are clear.  Orbital structures demonstrate no acute findings.      Impression:       Impression:  1. No clearly acute intracranial pathology demonstrated.  2. Generalized cerebral atrophy and nonspecific periventricular hypodensities which are thought to represent white matter microvascular change.    Signer Name: Curt Quan MD   Signed: 5/21/2020 7:16 PM   Workstation Name: [id number]    [facility]          Results for orders placed in visit on 02/26/18   SCANNED VASCULAR STUDIES       Results for orders placed in visit on 02/26/18   SCANNED VASCULAR STUDIES       Plan for Follow-up of Pending Labs/Results: Will look for final Cx results in epic, also will be following up with PCP next week - regardless I suspect this to be asymptomatic bacteruria and doubt further abx or a change of abx would be significantly beneficial   Order Current Status    Blood Culture - Blood, Arm, Right In process    Blood Culture - Blood, Hand, Right Preliminary result    Urine Culture - Urine, Urine, Clean Catch Preliminary result        Discharge Details        Discharge Medications      New Medications      Instructions Start Date   cephalexin 500 MG capsule  Commonly known as:  Keflex   500 mg, Oral, 2 Times Daily      saccharomyces boulardii 250 MG capsule  Commonly known as:  Florastor   250 mg, Oral, 2 Times Daily         Continue These Medications      Instructions Start Date   alendronate 70 MG tablet  Commonly known as:  FOSAMAX  Notes to patient:  RESUME AS TAKING AT HOME   70 mg, Oral, Every 7 Days      aspirin 81 MG chewable tablet   81 mg, Oral, Daily      brimonidine-timolol 0.2-0.5 % ophthalmic solution  Commonly known as:  COMBIGAN   1 drop, Right Eye,  3 Times Daily      carvedilol 6.25 MG tablet  Commonly known as:  COREG   6.25 mg, Oral, 2 Times Daily With Meals      latanoprost 0.005 % ophthalmic solution  Commonly known as:  XALATAN   1 drop, Right Eye, Nightly      metFORMIN 500 MG tablet  Commonly known as:  Glucophage   500 mg, Oral, 2 Times Daily With Meals      nitroglycerin 0.4 MG SL tablet  Commonly known as:  NITROSTAT   0.4 mg, Sublingual, Every 5 Minutes PRN, Take no more than 3 doses in 15 minutes.      prednisoLONE acetate 1 % ophthalmic suspension  Commonly known as:  PRED FORTE   1 drop, Right Eye, 4 Times Daily      valsartan-hydrochlorothiazide 160-25 MG per tablet  Commonly known as:  DIOVAN-HCT   1 tablet, Oral, Daily      vitamin D 1.25 MG (73589 UT) capsule capsule  Commonly known as:  ERGOCALCIFEROL  Notes to patient:  RESUME AS TAKING AT HOME   50,000 Units, Oral, Weekly             No Known Allergies      Discharge Disposition:  Home or Self Care    Diet:  Hospital:  Diet Order   Procedures   • Diet Regular; Cardiac, Consistent Carbohydrate       Activity:  Activity Instructions     Gradually Increase Activity Until at Pre-Hospitalization Level             CODE STATUS:    Code Status and Medical Interventions:   Ordered at: 05/21/20 5112     Code Status:    CPR     Medical Interventions (Level of Support Prior to Arrest):    Full       No future appointments.    Additional Instructions for the Follow-ups that You Need to Schedule     Discharge Follow-up with PCP   As directed       Currently Documented PCP:    Chaz Rico, FORTUNATO, APRN    PCP Phone Number:    [phone]     Follow Up Details:  1 week               Time Spent on Discharge:  33 minutes    Electronically signed by Carlton Crews DO, 05/22/20, 4:51 PM.

## 2020-05-22 NOTE — PROGRESS NOTES
Continued Stay Note  [facility]     Patient Name: Georgia Velázquez  MRN: [id number]  Today's Date: 5/22/2020    Admit Date: 5/21/2020    Discharge Plan     Row Name 05/22/20 1305       Plan    Plan  Home with daughter's assistance    Patient/Family in Agreement with Plan  yes    Plan Comments  Met with Ms. Velázquez at the bedside for discharge planning.  Ms. Velázquez lives with her daughter, Omaira, in [place].  She states that she has a rolling walker, stair lift to second floor, and shower chair in the home.  She denies any DME needs.  She states that she has had home health in the past, but cannot remember the name of the agency.  She denies any home health needs.  PCP is Daryl DELAROSA.  Ms. Velázquez states that her family can assist her at home as needed and Omaira will be transporting her home when discharged.    CM will continue to follow.    Final Discharge Disposition Code  01 - home or self-care        Discharge Codes    No documentation.       Expected Discharge Date and Time     Expected Discharge Date Expected Discharge Time    May 22, 2020             Cecily Rollins RN

## 2020-05-22 NOTE — TELEPHONE ENCOUNTER
Patient is needing to schedule an appointment for a hospital follow up .    Pt callback: [phone]    Please advise.

## 2020-05-22 NOTE — PLAN OF CARE
Problem: Patient Care Overview  Goal: Plan of Care Review  Flowsheets (Taken 5/22/2020 0529)  Progress: no change  Plan of Care Reviewed With: patient  Outcome Summary: Pt admitting from ED this shift due to poor intake, UTI, N/V at home, and Diarrhea. Pt alert and oriented on floor, but had soome confusion in ED. Denies pain, no N/V since admit. Tolerating PO fluids and IV fluids.     Problem: Skin Injury Risk (Adult)  Goal: Identify Related Risk Factors and Signs and Symptoms  Flowsheets (Taken 5/22/2020 0529)  Related Risk Factors (Skin Injury Risk): advanced age; body weight extremes; edema  Goal: Skin Health and Integrity  Flowsheets (Taken 5/22/2020 0529)  Skin Health and Integrity: making progress toward outcome     Problem: Fall Risk (Adult)  Goal: Identify Related Risk Factors and Signs and Symptoms  Flowsheets (Taken 5/22/2020 0529)  Related Risk Factors (Fall Risk): age-related changes; bladder function altered  Goal: Absence of Fall  Flowsheets (Taken 5/22/2020 0529)  Absence of Fall: achieves outcome

## 2020-05-23 LAB — BACTERIA SPEC AEROBE CULT: ABNORMAL

## 2020-05-26 LAB
BACTERIA SPEC AEROBE CULT: NORMAL
BACTERIA SPEC AEROBE CULT: NORMAL

## 2020-05-27 ENCOUNTER — TELEPHONE (OUTPATIENT)
Dept: FAMILY MEDICINE CLINIC | Facility: CLINIC | Age: 75
End: 2020-05-27

## 2020-05-28 ENCOUNTER — OFFICE VISIT (OUTPATIENT)
Dept: FAMILY MEDICINE CLINIC | Facility: CLINIC | Age: 75
End: 2020-05-28

## 2020-05-28 VITALS
TEMPERATURE: 98 F | SYSTOLIC BLOOD PRESSURE: 100 MMHG | RESPIRATION RATE: 18 BRPM | DIASTOLIC BLOOD PRESSURE: 60 MMHG | BODY MASS INDEX: 43.99 KG/M2 | HEIGHT: 61 IN | OXYGEN SATURATION: 94 % | HEART RATE: 58 BPM | WEIGHT: 233 LBS

## 2020-05-28 DIAGNOSIS — E55.9 VITAMIN D DEFICIENCY: ICD-10-CM

## 2020-05-28 DIAGNOSIS — N30.01 ACUTE CYSTITIS WITH HEMATURIA: Primary | ICD-10-CM

## 2020-05-28 DIAGNOSIS — E78.5 HYPERLIPIDEMIA, UNSPECIFIED HYPERLIPIDEMIA TYPE: ICD-10-CM

## 2020-05-28 DIAGNOSIS — D72.829 LEUKOCYTOSIS, UNSPECIFIED TYPE: ICD-10-CM

## 2020-05-28 DIAGNOSIS — I51.7 CARDIOMEGALY: ICD-10-CM

## 2020-05-28 DIAGNOSIS — I10 ESSENTIAL HYPERTENSION: ICD-10-CM

## 2020-05-28 DIAGNOSIS — E11.36 TYPE 2 DIABETES MELLITUS WITH DIABETIC CATARACT, WITHOUT LONG-TERM CURRENT USE OF INSULIN (HCC): ICD-10-CM

## 2020-05-28 DIAGNOSIS — I25.83 CORONARY ARTERY DISEASE DUE TO LIPID RICH PLAQUE: ICD-10-CM

## 2020-05-28 DIAGNOSIS — H34.9 EMBOLISM INVOLVING RETINAL ARTERY: ICD-10-CM

## 2020-05-28 DIAGNOSIS — N28.9 RENAL INSUFFICIENCY: ICD-10-CM

## 2020-05-28 DIAGNOSIS — I25.10 CORONARY ARTERY DISEASE DUE TO LIPID RICH PLAQUE: ICD-10-CM

## 2020-05-28 LAB
BILIRUB BLD-MCNC: NEGATIVE MG/DL
CLARITY, POC: CLEAR
COLOR UR: YELLOW
GLUCOSE UR STRIP-MCNC: NEGATIVE MG/DL
KETONES UR QL: NEGATIVE
LEUKOCYTE EST, POC: NEGATIVE
NITRITE UR-MCNC: NEGATIVE MG/ML
PH UR: 6 [PH] (ref 5–8)
PROT UR STRIP-MCNC: NEGATIVE MG/DL
RBC # UR STRIP: ABNORMAL /UL
SP GR UR: 1 (ref 1–1.03)
UROBILINOGEN UR QL: NORMAL

## 2020-05-28 PROCEDURE — 82306 VITAMIN D 25 HYDROXY: CPT | Performed by: NURSE PRACTITIONER

## 2020-05-28 PROCEDURE — 85025 COMPLETE CBC W/AUTO DIFF WBC: CPT | Performed by: NURSE PRACTITIONER

## 2020-05-28 PROCEDURE — 87086 URINE CULTURE/COLONY COUNT: CPT | Performed by: NURSE PRACTITIONER

## 2020-05-28 PROCEDURE — 80053 COMPREHEN METABOLIC PANEL: CPT | Performed by: NURSE PRACTITIONER

## 2020-05-28 PROCEDURE — 99214 OFFICE O/P EST MOD 30 MIN: CPT | Performed by: NURSE PRACTITIONER

## 2020-05-28 RX ORDER — ACETAZOLAMIDE 500 MG/1
500 CAPSULE, EXTENDED RELEASE ORAL 2 TIMES DAILY
COMMUNITY
Start: 2020-05-13 | End: 2020-07-21

## 2020-05-28 NOTE — PROGRESS NOTES
Subjective   Georgia Velázquez is a 75 y.o. female.     History of Present Illness  Here to follow up on recent admission to hospital for UTI and nausea and vomiting.  Initially she developed right eye pain and was seen by Dr Alcaraz who diagnosed right retinal embolism and treated with injections, laser and drops. Has follow up scheduled.    A few days later she developed nausea and vomiting. Seen in ER and vomiting resolved quickly. Diagnosed with UTI of ecoli. Treated with antibiotics and IVF.  Discharged to home the next day.    Had abnormal electrolytes and needs repeat labs.     Had rapid covid test yesterday at [facility] and it was neg.     Today she is feeling well. Has followup with Dr Alcaraz. Has no history of previous embolism. Taking Asa 81 daily. Has a cardiologist but he is with [facility] and patient and daughter would like to transfer her care to a [facility] Cardiologist.      Outpatient Encounter Medications as of 5/28/2020   Medication Sig Dispense Refill   • acetaZOLAMIDE (DIAMOX) 500 MG capsule      • alendronate (FOSAMAX) 70 MG tablet Take 70 mg by mouth Every 7 (Seven) Days.     • aspirin 81 MG chewable tablet Chew 1 tablet Daily. 30 tablet 3   • brimonidine-timolol (COMBIGAN) 0.2-0.5 % ophthalmic solution Administer 1 drop to the right eye 3 (Three) Times a Day.     • carvedilol (COREG) 6.25 MG tablet Take 1 tablet by mouth 2 (Two) Times a Day With Meals. 180 tablet 3   • latanoprost (XALATAN) 0.005 % ophthalmic solution Administer 1 drop to the right eye Every Night.     • metFORMIN (GLUCOPHAGE) 500 MG tablet Take 1 tablet by mouth 2 (Two) Times a Day With Meals. 180 tablet 3   • nitroglycerin (NITROSTAT) 0.4 MG SL tablet Place 1 tablet under the tongue Every 5 (Five) Minutes As Needed for Chest Pain. Take no more than 3 doses in 15 minutes. 100 tablet 0   • prednisoLONE acetate (PRED FORTE) 1 % ophthalmic suspension Administer 1 drop to the right eye 4 (Four) Times a Day.     •  valsartan-hydrochlorothiazide (DIOVAN-HCT) 160-25 MG per tablet Take 1 tablet by mouth Daily. 90 tablet 2   • vitamin D (ERGOCALCIFEROL) 85975 units capsule capsule Take 1 capsule by mouth 1 (One) Time Per Week. 12 capsule 0   • [DISCONTINUED] cephalexin (Keflex) 500 MG capsule Take 1 capsule by mouth 2 (Two) Times a Day. 6 capsule 0   • [DISCONTINUED] saccharomyces boulardii (Florastor) 250 MG capsule Take 1 capsule by mouth 2 (Two) Times a Day. 6 capsule 0     No facility-administered encounter medications on file as of 5/28/2020.        The following portions of the patient's history were reviewed and updated as appropriate: allergies, current medications, past family history, past medical history, past social history, past surgical history and problem list.    Review of Systems   Constitutional: Positive for fatigue. Negative for appetite change, fever, unexpected weight gain and unexpected weight loss.   HENT: Negative for congestion, nosebleeds, sore throat and trouble swallowing.    Eyes: Negative for visual disturbance.   Respiratory: Negative for cough, shortness of breath and wheezing.    Cardiovascular: Negative for chest pain, palpitations and leg swelling.   Gastrointestinal: Positive for nausea. Negative for abdominal pain, blood in stool, constipation, diarrhea and vomiting.   Endocrine: Negative for polydipsia, polyphagia and polyuria.   Genitourinary: Positive for urinary incontinence. Negative for dysuria, frequency and hematuria.   Musculoskeletal: Negative for arthralgias, joint swelling and myalgias.   Skin: Negative for rash.   Neurological: Negative for dizziness, seizures, syncope and numbness.   Hematological: Negative for adenopathy. Does not bruise/bleed easily.   Psychiatric/Behavioral: Negative for behavioral problems, sleep disturbance and depressed mood. The patient is not nervous/anxious.        Objective     Visit Vitals  /60 (BP Location: Left arm, Patient Position: Sitting,  "Cuff Size: Adult)   Pulse 58   Temp 98 °F (36.7 °C) (Temporal)   Resp 18   Ht 154.9 cm (61\")   Wt 106 kg (233 lb)   LMP  (LMP Unknown)   SpO2 94%   BMI 44.02 kg/m²       Physical Exam   Constitutional: She is oriented to person, place, and time. She appears well-developed and well-nourished. No distress.   HENT:   Head: Normocephalic and atraumatic.   Right Ear: Tympanic membrane and external ear normal.   Left Ear: Tympanic membrane and external ear normal.   Nose: Nose normal.   Mouth/Throat: Oropharynx is clear and moist. No oropharyngeal exudate.   Eyes: Pupils are equal, round, and reactive to light. Conjunctivae are normal. Right eye exhibits no discharge. Left eye exhibits no discharge. No scleral icterus.   Neck: Neck supple. No tracheal deviation present. No thyromegaly present.   Cardiovascular: Normal rate, regular rhythm and normal heart sounds. Exam reveals no gallop and no friction rub.   No murmur heard.  Pulmonary/Chest: Effort normal and breath sounds normal. No respiratory distress. She has no wheezes.   Abdominal: Soft. Bowel sounds are normal. She exhibits no distension and no mass. There is no tenderness.   Musculoskeletal: She exhibits edema. She exhibits no deformity.   Moderate peripheral edema but improved for her.   Lymphadenopathy:     She has no cervical adenopathy.   Neurological: She is alert and oriented to person, place, and time. Coordination normal.   Skin: Skin is warm and dry. Capillary refill takes less than 2 seconds. No rash noted. No erythema.   Psychiatric: She has a normal mood and affect. Her speech is normal and behavior is normal. Judgment and thought content normal.   Nursing note and vitals reviewed.        Assessment/Plan   Diagnoses and all orders for this visit:    Acute cystitis with hematuria  -     POC Urinalysis Dipstick, Automated  -     Urine Culture - Urine, Urine, Clean Catch    Embolism involving retinal artery  -     Ambulatory Referral to Cardiology  -     " Cancel: US Carotid Bilateral; Future  -     Duplex Carotid Ultrasound CAR; Future  -     Adult Transthoracic Echo Complete W/ Cont if Necessary Per Protocol; Future    Type 2 diabetes mellitus with diabetic cataract, without long-term current use of insulin (CMS/[facility])  -     Ambulatory Referral to Cardiology    Hyperlipidemia, unspecified hyperlipidemia type  -     Ambulatory Referral to Cardiology    Essential hypertension  -     Ambulatory Referral to Cardiology  -     Duplex Carotid Ultrasound CAR; Future  -     Adult Transthoracic Echo Complete W/ Cont if Necessary Per Protocol; Future    Coronary artery disease due to lipid rich plaque  -     Ambulatory Referral to Cardiology  -     Cancel: US Carotid Bilateral; Future  -     Duplex Carotid Ultrasound CAR; Future  -     Adult Transthoracic Echo Complete W/ Cont if Necessary Per Protocol; Future    Cardiomegaly  -     Ambulatory Referral to Cardiology  -     Cancel: US Carotid Bilateral; Future  -     Duplex Carotid Ultrasound CAR; Future  -     Adult Transthoracic Echo Complete W/ Cont if Necessary Per Protocol; Future    Vitamin D deficiency  -     Vitamin D 25 Hydroxy    Leukocytosis, unspecified type  -     CBC & Differential  -     CBC Auto Differential    Renal insufficiency  -     Comprehensive Metabolic Panel    She was unable to void in the office. Daughter will bring back specimen for UA and culture.  Finish antibiotics.  Check labs today. She is established with nephrology. Discussed importance of drinking plenty of water.  We have had barriers to communication with her cardiologist in the past so family agrees to est care with [facility] Cardiologist. Due to retinal embolism will obtain carotid us and echo. Stay on Asa 81 until she sees cardiology.  Continue current meds.  She is doing much better since she moved in with her daughter.  I personally spent  40 minutes face to face with the patient with 30 spent in counseling and discussion and/or  coordination of care as described above for uti, retinal emobolism, htn, cad, hld, diabetes.

## 2020-05-29 ENCOUNTER — TELEPHONE (OUTPATIENT)
Dept: FAMILY MEDICINE CLINIC | Facility: CLINIC | Age: 75
End: 2020-05-29

## 2020-05-29 LAB
25(OH)D3 SERPL-MCNC: 42.3 NG/ML (ref 30–100)
ALBUMIN SERPL-MCNC: 3.9 G/DL (ref 3.5–5.2)
ALBUMIN/GLOB SERPL: 1.2 G/DL
ALP SERPL-CCNC: 64 U/L (ref 39–117)
ALT SERPL W P-5'-P-CCNC: 20 U/L (ref 1–33)
ANION GAP SERPL CALCULATED.3IONS-SCNC: 13.6 MMOL/L (ref 5–15)
AST SERPL-CCNC: 19 U/L (ref 1–32)
BASOPHILS # BLD AUTO: 0.05 10*3/MM3 (ref 0–0.2)
BASOPHILS NFR BLD AUTO: 0.5 % (ref 0–1.5)
BILIRUB SERPL-MCNC: 0.3 MG/DL (ref 0.2–1.2)
BUN BLD-MCNC: 33 MG/DL (ref 8–23)
BUN/CREAT SERPL: 18.3 (ref 7–25)
CALCIUM SPEC-SCNC: 9.5 MG/DL (ref 8.6–10.5)
CHLORIDE SERPL-SCNC: 100 MMOL/L (ref 98–107)
CO2 SERPL-SCNC: 24.4 MMOL/L (ref 22–29)
CREAT BLD-MCNC: 1.8 MG/DL (ref 0.57–1)
DEPRECATED RDW RBC AUTO: 48.5 FL (ref 37–54)
EOSINOPHIL # BLD AUTO: 0.12 10*3/MM3 (ref 0–0.4)
EOSINOPHIL NFR BLD AUTO: 1.2 % (ref 0.3–6.2)
ERYTHROCYTE [DISTWIDTH] IN BLOOD BY AUTOMATED COUNT: 14.3 % (ref 12.3–15.4)
GFR SERPL CREATININE-BSD FRML MDRD: 27 ML/MIN/1.73
GLOBULIN UR ELPH-MCNC: 3.3 GM/DL
GLUCOSE BLD-MCNC: 97 MG/DL (ref 65–99)
HCT VFR BLD AUTO: 35.9 % (ref 34–46.6)
HGB BLD-MCNC: 12 G/DL (ref 12–15.9)
IMM GRANULOCYTES # BLD AUTO: 0.04 10*3/MM3 (ref 0–0.05)
IMM GRANULOCYTES NFR BLD AUTO: 0.4 % (ref 0–0.5)
LYMPHOCYTES # BLD AUTO: 3.16 10*3/MM3 (ref 0.7–3.1)
LYMPHOCYTES NFR BLD AUTO: 31.9 % (ref 19.6–45.3)
MCH RBC QN AUTO: 30.9 PG (ref 26.6–33)
MCHC RBC AUTO-ENTMCNC: 33.4 G/DL (ref 31.5–35.7)
MCV RBC AUTO: 92.5 FL (ref 79–97)
MONOCYTES # BLD AUTO: 0.79 10*3/MM3 (ref 0.1–0.9)
MONOCYTES NFR BLD AUTO: 8 % (ref 5–12)
NEUTROPHILS # BLD AUTO: 5.74 10*3/MM3 (ref 1.7–7)
NEUTROPHILS NFR BLD AUTO: 58 % (ref 42.7–76)
NRBC BLD AUTO-RTO: 0 /100 WBC (ref 0–0.2)
PLATELET # BLD AUTO: 219 10*3/MM3 (ref 140–450)
PMV BLD AUTO: 11.5 FL (ref 6–12)
POTASSIUM BLD-SCNC: 4.4 MMOL/L (ref 3.5–5.2)
PROT SERPL-MCNC: 7.2 G/DL (ref 6–8.5)
RBC # BLD AUTO: 3.88 10*6/MM3 (ref 3.77–5.28)
SODIUM BLD-SCNC: 138 MMOL/L (ref 136–145)
WBC NRBC COR # BLD: 9.9 10*3/MM3 (ref 3.4–10.8)

## 2020-05-29 NOTE — TELEPHONE ENCOUNTER
Labs with worsening renal failure. Stop metformin. Push water. Call nephrology for sooner appt. Follow up here in 2 weeks for repeat cmp. Check glucose at home daily.

## 2020-05-30 LAB — BACTERIA SPEC AEROBE CULT: NORMAL

## 2020-06-04 ENCOUNTER — HOSPITAL ENCOUNTER (OUTPATIENT)
Dept: CARDIOLOGY | Facility: HOSPITAL | Age: 75
Discharge: HOME | End: 2020-06-04

## 2020-06-04 ENCOUNTER — HOSPITAL ENCOUNTER (OUTPATIENT)
Dept: CARDIOLOGY | Facility: HOSPITAL | Age: 75
Discharge: HOME | End: 2020-06-04
Admitting: NURSE PRACTITIONER

## 2020-06-04 VITALS — WEIGHT: 233 LBS | HEIGHT: 61 IN | BODY MASS INDEX: 43.99 KG/M2

## 2020-06-04 DIAGNOSIS — I25.10 CORONARY ARTERY DISEASE DUE TO LIPID RICH PLAQUE: ICD-10-CM

## 2020-06-04 DIAGNOSIS — I51.7 CARDIOMEGALY: ICD-10-CM

## 2020-06-04 DIAGNOSIS — H34.9 EMBOLISM INVOLVING RETINAL ARTERY: ICD-10-CM

## 2020-06-04 DIAGNOSIS — I25.83 CORONARY ARTERY DISEASE DUE TO LIPID RICH PLAQUE: ICD-10-CM

## 2020-06-04 DIAGNOSIS — I10 ESSENTIAL HYPERTENSION: ICD-10-CM

## 2020-06-04 LAB
BH CV ECHO MEAS - AI DEC SLOPE: 130.4 CM/SEC^2
BH CV ECHO MEAS - AI MAX PG: 40.5 MMHG
BH CV ECHO MEAS - AI MAX VEL: 318.2 CM/SEC
BH CV ECHO MEAS - AI P1/2T: 714.9 MSEC
BH CV ECHO MEAS - AO MAX PG: 20.3 MMHG
BH CV ECHO MEAS - AO MEAN PG: 11.7 MMHG
BH CV ECHO MEAS - AO ROOT AREA (BSA CORRECTED): 1.2
BH CV ECHO MEAS - AO ROOT AREA: 4.1 CM^2
BH CV ECHO MEAS - AO ROOT DIAM: 2.3 CM
BH CV ECHO MEAS - AO V2 MAX: 225.3 CM/SEC
BH CV ECHO MEAS - AO V2 MEAN: 161.1 CM/SEC
BH CV ECHO MEAS - AO V2 VTI: 64.7 CM
BH CV ECHO MEAS - ASC AORTA: 2.9 CM
BH CV ECHO MEAS - BSA(HAYCOCK): 2.2 M^2
BH CV ECHO MEAS - BSA: 2 M^2
BH CV ECHO MEAS - BZI_BMI: 45.5 KILOGRAMS/M^2
BH CV ECHO MEAS - BZI_METRIC_HEIGHT: 152.4 CM
BH CV ECHO MEAS - BZI_METRIC_WEIGHT: 105.7 KG
BH CV ECHO MEAS - EDV(CUBED): 84.3 ML
BH CV ECHO MEAS - EDV(MOD-SP4): 45 ML
BH CV ECHO MEAS - EDV(TEICH): 86.9 ML
BH CV ECHO MEAS - EF(CUBED): 67.3 %
BH CV ECHO MEAS - EF(MOD-SP4): 51.1 %
BH CV ECHO MEAS - EF(TEICH): 59.1 %
BH CV ECHO MEAS - ESV(CUBED): 27.6 ML
BH CV ECHO MEAS - ESV(MOD-SP4): 22 ML
BH CV ECHO MEAS - ESV(TEICH): 35.6 ML
BH CV ECHO MEAS - FS: 31.1 %
BH CV ECHO MEAS - IVS/LVPW: 1
BH CV ECHO MEAS - IVSD: 1 CM
BH CV ECHO MEAS - LA DIMENSION: 3.4 CM
BH CV ECHO MEAS - LA/AO: 1.5
BH CV ECHO MEAS - LAT PEAK E' VEL: 8.2 CM/SEC
BH CV ECHO MEAS - LATERAL E/E' RATIO: 13.2
BH CV ECHO MEAS - LV DIASTOLIC VOL/BSA (35-75): 22.6 ML/M^2
BH CV ECHO MEAS - LV MASS(C)D: 153.9 GRAMS
BH CV ECHO MEAS - LV MASS(C)DI: 77.3 GRAMS/M^2
BH CV ECHO MEAS - LV SYSTOLIC VOL/BSA (12-30): 11 ML/M^2
BH CV ECHO MEAS - LVIDD: 4.4 CM
BH CV ECHO MEAS - LVIDS: 3 CM
BH CV ECHO MEAS - LVLD AP4: 7.7 CM
BH CV ECHO MEAS - LVLS AP4: 7.3 CM
BH CV ECHO MEAS - LVPWD: 1 CM
BH CV ECHO MEAS - MED PEAK E' VEL: 5.7 CM/SEC
BH CV ECHO MEAS - MEDIAL E/E' RATIO: 18.9
BH CV ECHO MEAS - MV A MAX VEL: 112.5 CM/SEC
BH CV ECHO MEAS - MV DEC SLOPE: 366.8 CM/SEC^2
BH CV ECHO MEAS - MV DEC TIME: 0.36 SEC
BH CV ECHO MEAS - MV E MAX VEL: 110.1 CM/SEC
BH CV ECHO MEAS - MV E/A: 0.98
BH CV ECHO MEAS - MV P1/2T MAX VEL: 135.7 CM/SEC
BH CV ECHO MEAS - MV P1/2T: 108.4 MSEC
BH CV ECHO MEAS - MVA P1/2T LCG: 1.6 CM^2
BH CV ECHO MEAS - MVA(P1/2T): 2 CM^2
BH CV ECHO MEAS - PA ACC SLOPE: 628 CM/SEC^2
BH CV ECHO MEAS - PA ACC TIME: 0.18 SEC
BH CV ECHO MEAS - PA PR(ACCEL): -0.22 MMHG
BH CV ECHO MEAS - SI(AO): 134.3 ML/M^2
BH CV ECHO MEAS - SI(CUBED): 28.5 ML/M^2
BH CV ECHO MEAS - SI(MOD-SP4): 11.5 ML/M^2
BH CV ECHO MEAS - SI(TEICH): 25.8 ML/M^2
BH CV ECHO MEAS - SV(AO): 267.5 ML
BH CV ECHO MEAS - SV(CUBED): 56.7 ML
BH CV ECHO MEAS - SV(MOD-SP4): 23 ML
BH CV ECHO MEAS - SV(TEICH): 51.3 ML
BH CV ECHO MEAS - TAPSE (>1.6): 2.1 CM2
BH CV ECHO MEAS - TR MAX PG: 17 MMHG
BH CV ECHO MEAS - TR MAX VEL: 202.2 CM/SEC
BH CV ECHO MEASUREMENTS AVERAGE E/E' RATIO: 15.84
BH CV VAS BP RIGHT ARM: NORMAL MMHG
BH CV XLRA - RV BASE: 2.8 CM
BH CV XLRA - RV LENGTH: 5.8 CM
BH CV XLRA - RV MID: 2.2 CM
BH CV XLRA - TDI S': 11.7 CM/SEC
BH CV XLRA MEAS LEFT DIST CCA EDV: 21.2 CM/SEC
BH CV XLRA MEAS LEFT DIST CCA PSV: 84.9 CM/SEC
BH CV XLRA MEAS LEFT DIST ICA EDV: 40.9 CM/SEC
BH CV XLRA MEAS LEFT DIST ICA PSV: 196.4 CM/SEC
BH CV XLRA MEAS LEFT ICA/CCA RATIO: 3
BH CV XLRA MEAS LEFT MID CCA EDV: 18.1 CM/SEC
BH CV XLRA MEAS LEFT MID CCA PSV: 85.6 CM/SEC
BH CV XLRA MEAS LEFT MID ICA EDV: 64.4 CM/SEC
BH CV XLRA MEAS LEFT MID ICA PSV: 245.1 CM/SEC
BH CV XLRA MEAS LEFT PROX CCA EDV: 20.4 CM/SEC
BH CV XLRA MEAS LEFT PROX CCA PSV: 90.4 CM/SEC
BH CV XLRA MEAS LEFT PROX ECA EDV: 11 CM/SEC
BH CV XLRA MEAS LEFT PROX ECA PSV: 155.6 CM/SEC
BH CV XLRA MEAS LEFT PROX ICA EDV: 50.3 CM/SEC
BH CV XLRA MEAS LEFT PROX ICA PSV: 257.7 CM/SEC
BH CV XLRA MEAS LEFT PROX SCLA PSV: 144.6 CM/SEC
BH CV XLRA MEAS LEFT VERTEBRAL A EDV: 29.9 CM/SEC
BH CV XLRA MEAS LEFT VERTEBRAL A PSV: 83.3 CM/SEC
BH CV XLRA MEAS RIGHT DIST CCA EDV: 18.7 CM/SEC
BH CV XLRA MEAS RIGHT DIST CCA PSV: 99.8 CM/SEC
BH CV XLRA MEAS RIGHT DIST ICA EDV: 27.5 CM/SEC
BH CV XLRA MEAS RIGHT DIST ICA PSV: 88.8 CM/SEC
BH CV XLRA MEAS RIGHT ICA/CCA RATIO: 1.4
BH CV XLRA MEAS RIGHT MID CCA EDV: 13.8 CM/SEC
BH CV XLRA MEAS RIGHT MID CCA PSV: 105.3 CM/SEC
BH CV XLRA MEAS RIGHT MID ICA EDV: 24.4 CM/SEC
BH CV XLRA MEAS RIGHT MID ICA PSV: 144.6 CM/SEC
BH CV XLRA MEAS RIGHT PROX CCA EDV: 11.6 CM/SEC
BH CV XLRA MEAS RIGHT PROX CCA PSV: 90.4 CM/SEC
BH CV XLRA MEAS RIGHT PROX ECA EDV: 8.6 CM/SEC
BH CV XLRA MEAS RIGHT PROX ECA PSV: 102.1 CM/SEC
BH CV XLRA MEAS RIGHT PROX ICA EDV: 33 CM/SEC
BH CV XLRA MEAS RIGHT PROX ICA PSV: 127.3 CM/SEC
BH CV XLRA MEAS RIGHT PROX SCLA PSV: 169.7 CM/SEC
BH CV XLRA MEAS RIGHT VERTEBRAL A EDV: 14.1 CM/SEC
BH CV XLRA MEAS RIGHT VERTEBRAL A PSV: 42.4 CM/SEC
LEFT ARM BP: NORMAL MMHG
MAXIMAL PREDICTED HEART RATE: 143 BPM
RIGHT ARM BP: NORMAL MMHG
STRESS TARGET HR: 122 BPM

## 2020-06-04 PROCEDURE — 93306 TTE W/DOPPLER COMPLETE: CPT

## 2020-06-04 PROCEDURE — 93880 EXTRACRANIAL BILAT STUDY: CPT | Performed by: INTERNAL MEDICINE

## 2020-06-04 PROCEDURE — 93880 EXTRACRANIAL BILAT STUDY: CPT

## 2020-06-04 PROCEDURE — 93306 TTE W/DOPPLER COMPLETE: CPT | Performed by: INTERNAL MEDICINE

## 2020-06-04 PROCEDURE — 25010000002 SULFUR HEXAFLUORIDE MICROSPH 60.7-25 MG RECONSTITUTED SUSPENSION: Performed by: NURSE PRACTITIONER

## 2020-06-04 RX ADMIN — SULFUR HEXAFLUORIDE 4 ML: KIT at 15:10

## 2020-06-05 ENCOUNTER — TELEPHONE (OUTPATIENT)
Dept: FAMILY MEDICINE CLINIC | Facility: CLINIC | Age: 75
End: 2020-06-05

## 2020-06-05 NOTE — TELEPHONE ENCOUNTER
Called to discuss carotid US. < 50% on right. 70% on left. Has appt with Cardiology next week to discuss.

## 2020-06-12 ENCOUNTER — LAB (OUTPATIENT)
Dept: FAMILY MEDICINE CLINIC | Facility: CLINIC | Age: 75
End: 2020-06-12

## 2020-06-12 DIAGNOSIS — I10 ESSENTIAL HYPERTENSION: ICD-10-CM

## 2020-06-12 DIAGNOSIS — E55.9 VITAMIN D DEFICIENCY: ICD-10-CM

## 2020-06-12 DIAGNOSIS — E11.36 TYPE 2 DIABETES MELLITUS WITH DIABETIC CATARACT, WITHOUT LONG-TERM CURRENT USE OF INSULIN (HCC): Primary | ICD-10-CM

## 2020-06-12 DIAGNOSIS — E78.5 HYPERLIPIDEMIA, UNSPECIFIED HYPERLIPIDEMIA TYPE: ICD-10-CM

## 2020-06-12 PROCEDURE — 82306 VITAMIN D 25 HYDROXY: CPT | Performed by: NURSE PRACTITIONER

## 2020-06-12 PROCEDURE — 85025 COMPLETE CBC W/AUTO DIFF WBC: CPT | Performed by: NURSE PRACTITIONER

## 2020-06-12 PROCEDURE — 80061 LIPID PANEL: CPT | Performed by: NURSE PRACTITIONER

## 2020-06-13 LAB
25(OH)D3 SERPL-MCNC: 39.5 NG/ML (ref 30–100)
BASOPHILS # BLD AUTO: 0.05 10*3/MM3 (ref 0–0.2)
BASOPHILS NFR BLD AUTO: 0.6 % (ref 0–1.5)
CHOLEST SERPL-MCNC: 100 MG/DL (ref 0–200)
DEPRECATED RDW RBC AUTO: 48.6 FL (ref 37–54)
EOSINOPHIL # BLD AUTO: 0.11 10*3/MM3 (ref 0–0.4)
EOSINOPHIL NFR BLD AUTO: 1.3 % (ref 0.3–6.2)
ERYTHROCYTE [DISTWIDTH] IN BLOOD BY AUTOMATED COUNT: 14.1 % (ref 12.3–15.4)
HCT VFR BLD AUTO: 36.6 % (ref 34–46.6)
HDLC SERPL-MCNC: 39 MG/DL (ref 40–60)
HGB BLD-MCNC: 12 G/DL (ref 12–15.9)
IMM GRANULOCYTES # BLD AUTO: 0.04 10*3/MM3 (ref 0–0.05)
IMM GRANULOCYTES NFR BLD AUTO: 0.5 % (ref 0–0.5)
LDLC SERPL CALC-MCNC: 39 MG/DL (ref 0–100)
LDLC/HDLC SERPL: 0.99 {RATIO}
LYMPHOCYTES # BLD AUTO: 2.84 10*3/MM3 (ref 0.7–3.1)
LYMPHOCYTES NFR BLD AUTO: 32.8 % (ref 19.6–45.3)
MCH RBC QN AUTO: 31.1 PG (ref 26.6–33)
MCHC RBC AUTO-ENTMCNC: 32.8 G/DL (ref 31.5–35.7)
MCV RBC AUTO: 94.8 FL (ref 79–97)
MONOCYTES # BLD AUTO: 0.58 10*3/MM3 (ref 0.1–0.9)
MONOCYTES NFR BLD AUTO: 6.7 % (ref 5–12)
NEUTROPHILS # BLD AUTO: 5.04 10*3/MM3 (ref 1.7–7)
NEUTROPHILS NFR BLD AUTO: 58.1 % (ref 42.7–76)
NRBC BLD AUTO-RTO: 0 /100 WBC (ref 0–0.2)
PLATELET # BLD AUTO: 143 10*3/MM3 (ref 140–450)
PMV BLD AUTO: 12.9 FL (ref 6–12)
RBC # BLD AUTO: 3.86 10*6/MM3 (ref 3.77–5.28)
TRIGL SERPL-MCNC: 112 MG/DL (ref 0–150)
VLDLC SERPL-MCNC: 22.4 MG/DL (ref 5–40)
WBC NRBC COR # BLD: 8.66 10*3/MM3 (ref 3.4–10.8)

## 2020-07-01 ENCOUNTER — CONSULT (OUTPATIENT)
Dept: CARDIOLOGY | Facility: CLINIC | Age: 75
End: 2020-07-01

## 2020-07-01 VITALS
WEIGHT: 232 LBS | SYSTOLIC BLOOD PRESSURE: 128 MMHG | BODY MASS INDEX: 45.55 KG/M2 | DIASTOLIC BLOOD PRESSURE: 64 MMHG | HEART RATE: 60 BPM | TEMPERATURE: 97.3 F | HEIGHT: 60 IN | OXYGEN SATURATION: 97 %

## 2020-07-01 DIAGNOSIS — I25.10 CORONARY ARTERY DISEASE DUE TO LIPID RICH PLAQUE: Primary | ICD-10-CM

## 2020-07-01 DIAGNOSIS — E78.2 MIXED HYPERLIPIDEMIA: ICD-10-CM

## 2020-07-01 DIAGNOSIS — R00.2 PALPITATIONS: ICD-10-CM

## 2020-07-01 DIAGNOSIS — I25.83 CORONARY ARTERY DISEASE DUE TO LIPID RICH PLAQUE: Primary | ICD-10-CM

## 2020-07-01 DIAGNOSIS — H34.9 RETINAL EMBOLI, RIGHT: ICD-10-CM

## 2020-07-01 DIAGNOSIS — I10 ESSENTIAL HYPERTENSION: ICD-10-CM

## 2020-07-01 DIAGNOSIS — R00.2 PALPITATIONS: Primary | ICD-10-CM

## 2020-07-01 PROCEDURE — 99204 OFFICE O/P NEW MOD 45 MIN: CPT | Performed by: INTERNAL MEDICINE

## 2020-07-01 PROCEDURE — 93000 ELECTROCARDIOGRAM COMPLETE: CPT | Performed by: INTERNAL MEDICINE

## 2020-07-01 RX ORDER — CLOPIDOGREL BISULFATE 75 MG/1
75 TABLET ORAL DAILY
Qty: 90 TABLET | Refills: 3 | Status: SHIPPED | OUTPATIENT
Start: 2020-07-01 | End: 2021-07-13

## 2020-07-01 RX ORDER — ATORVASTATIN CALCIUM 40 MG/1
40 TABLET, FILM COATED ORAL DAILY
COMMUNITY
End: 2020-07-20

## 2020-07-01 NOTE — PROGRESS NOTES
[facility] at [facility]  Consultation H&P  Georgia Velázquez  1945    There is no work phone number on file..    VISIT DATE:  07/01/2020    PCP: Chaz Rico DNP, APRN  [id number]  [facility] [address]    CC:  Chief Complaint   Patient presents with   • Cerebrovascular Accident     Previous cardiac studies and procedures:  2000: PCI with stenting in the setting of MI, dated deficient    April 2015   echo: EF 40%, mild LVH, diastolic dysfunction  Shyanne scan myocardial perfusion imaging: EF 41%, large fixed anterior apical defect.    May 2015 cardiac catheterization: 70% distal LAD stenosis.  RCA with diffuse irregularities, 60% mid RCA stenosis.  Left circumflex with diffuse irregularities.    June 2020  Carotid duplex  · Left mid ICA near 70%, however EDV less than 100 and ratio less than 4.  · Right internal carotid artery stenosis of 0-49%.  · Bilateral antegrade vertebral flow.  Echo  · Estimated EF appears to be in the range of 51 - 55%.  · Left ventricular systolic function is normal.  · Left ventricular diastolic dysfunction (grade II) consistent with pseudonormalization.  · The following left ventricular wall segments are hypokinetic: apical anterior, apical lateral, apical inferior, apical septal and apex hypokinetic.  · Mild aortic valve regurgitation is present.    ASSESSMENT:   Diagnosis Plan   1. Coronary artery disease due to lipid rich plaque     2. Mixed hyperlipidemia     3. Essential hypertension     4. Palpitations  Holter Monitor - 72 Hour Up To 21 Days   5. Retinal emboli, right           PLAN:  Coronary artery disease: Currently stable and asymptomatic.  Continue antiplatelet therapy, high intensity statin therapy and afterload reduction.    Hypertension: Goal less than 130/80 mmHg.  Currently well controlled.  Continue current medical therapy.    Hyperlipidemia: Goal LDL less than 70, currently well controlled.  Continue atorvastatin 40 mg  "p.o. daily.    Retinal embolic event: Ophthalmology records pending.  2-week ambulatory ECG monitor to screen for atrial fibrillation.  Switching aspirin to Plavix after upcoming ophthalmologic procedure.  Otherwise continue afterload reduction high intensity statin therapy.    Left internal carotid artery stenosis: Approaching 70%.  Currently asymptomatic.  Continue afterload reduction, high intensity statin therapy.  Switching aspirin to Plavix.  Annual carotid duplex.    History of Present Illness   75-year-old female with a history of coronary disease, peripheral vascular disease and chronic congestive systolic heart failure.  Recently had right eye pain and was diagnosed with a right retinal embolic event.  Ophthalmology evaluation has been requested, unclear whether this was a venous or arterial thrombosis.  Reviewed recent echo and carotid duplex.  No significant palpitations.  She has stable dyspnea on exertion and a class II-III pattern.  Uses a wheeled walker for ambulation.  She is compliant with medical therapy and was on aspirin prior to this event.  She reports that she is currently on a statin but does not know the name or dosage, she will call us back with this information later.  Blood pressures run less than 130/80 mmHg.  She is scheduled for an ophthalmologic procedure next week.    PHYSICAL EXAMINATION:  Vitals:    07/01/20 1248   BP: 128/64   BP Location: Left arm   Patient Position: Sitting   Pulse: 60   Temp: 97.3 °F (36.3 °C)   SpO2: 97%   Weight: 105 kg (232 lb)   Height: 152.4 cm (60\")     General Appearance:    Alert, cooperative, no distress, appears stated age   Head:    Normocephalic, without obvious abnormality, atraumatic   Eyes:    conjunctiva/corneas clear, EOM's intact, fundi     benign, both eyes   Ears:    Normal TM's and external ear canals, both ears   Nose:   Nares normal, septum midline, mucosa normal, no drainage    or sinus tenderness   Throat:   Lips, mucosa, and tongue " normal; teeth and gums normal   Neck:   Supple, symmetrical, trachea midline, no adenopathy;     thyroid:  no enlargement/tenderness/nodules; no carotid    bruit or JVD   Back:     Symmetric, no curvature, ROM normal, no CVA tenderness   Lungs:     Clear to auscultation bilaterally, respirations unlabored   Chest Wall:    No tenderness or deformity    Heart:    Regular rate and rhythm, S1 and S2 normal, no murmur, rub   or gallop, normal carotid impulse bilaterally without bruit.   Abdomen:     Soft, non-tender, bowel sounds active all four quadrants,     no masses, no organomegaly   Extremities:   Extremities normal, atraumatic, no cyanosis or edema   Pulses:   2+ and symmetric all extremities   Skin:   Skin color, texture, turgor normal, no rashes or lesions   Lymph nodes:   Cervical, supraclavicular, and axillary nodes normal   Neurologic:   normal strength, sensation intact     throughout       Diagnostic Data:    ECG 12 Lead  Date/Time: 7/1/2020 12:59 PM  Performed by: Armaan Samuels III, MD  Authorized by: Armaan Samuels III, MD   Comparison: compared with previous ECG from 5/22/2020  Similar to previous ECG  Rhythm: sinus rhythm  Other findings: low voltage and poor R wave progression    Clinical impression: abnormal EKG          Lab Results   Component Value Date    TRIG 112 06/12/2020    HDL 39 (L) 06/12/2020     Lab Results   Component Value Date    GLUCOSE 97 05/28/2020    BUN 33 (H) 05/28/2020    CREATININE 1.80 (H) 05/28/2020     05/28/2020    K 4.4 05/28/2020     05/28/2020    CO2 24.4 05/28/2020     Lab Results   Component Value Date    HGBA1C 5.80 (H) 05/22/2020     Lab Results   Component Value Date    WBC 8.66 06/12/2020    HGB 12.0 06/12/2020    HCT 36.6 06/12/2020     06/12/2020       PROBLEM LIST:  Patient Active Problem List   Diagnosis   • Essential hypertension   • Type 2 diabetes mellitus (CMS/HCC)   • Hyperlipidemia   • Coronary artery disease   • GERD (gastroesophageal  1 reflux disease)   • Urinary tract infection   • Leukocytosis   • Chronic kidney disease   • Chronic obstructive pulmonary disease (CMS/HCC)   • Retinal emboli, right   • Vomiting and diarrhea       PAST MEDICAL HX  Past Medical History:   Diagnosis Date   • Acute kidney injury (CMS/HCC) 12/5/2017   • 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/2016   • Body mass index (BMI) of 45.0-49.9 in adult (CMS/HCC) 6/14/2019   • CHF (congestive heart failure) (CMS/HCC)    • Closed compression fracture of L4 lumbar vertebra 12/4/2017    Added automatically from request for surgery [id number]   • Constipation 12/5/2017   • COPD (chronic obstructive pulmonary disease) (CMS/HCC)    • Coronary artery disease 5/18/2016   • Diabetes mellitus type 2 in obese (CMS/HCC) 1/29/2018   • Elevated alkaline phosphatase level 2/26/2018   • Elevated creatine kinase    • Essential hypertension 5/18/2016   • GERD (gastroesophageal reflux disease) 5/18/2016   • History of echocardiogram 10/16/2015    TDS.Est EF is 45-50%.Mild distal septal, anteroapical hypokinesia.Mild mitral stenosis.Mean gradient across mitral valve is 6 mmHg.Trace TR   • History of kyphoplasty 1/30/2018   • Hyperlipemia 5/18/2016   • Lumbar facet arthropathy 1/29/2018   • Lumbar stenosis with neurogenic claudication 1/29/2018   • Morbid obesity due to excess calories (CMS/HCC) 1/29/2018   • Myocardial infarction (CMS/HCC) 5/18/2016   • Osteoporosis 5/18/2016   • Physical deconditioning 1/30/2018   • Sepsis (CMS/HCC)     uro   • Stroke (CMS/HCC)    • Type 2 diabetes mellitus (CMS/HCC) 5/18/2016   • Urinary incontinence without sensory awareness 2/26/2018       Allergies  No Known Allergies    Current Medications    Current Outpatient Medications:   •  acetaZOLAMIDE (DIAMOX) 500 MG capsule, Take 500 mg by mouth 2 (two) times a day., Disp: , Rfl:   •  aspirin 81 MG chewable tablet, Chew 1 tablet Daily., Disp: 30 tablet, Rfl: 3  •  atorvastatin (LIPITOR) 40 MG tablet, Take 40 mg by mouth Daily.,  Disp: , Rfl:   •  brimonidine-timolol (COMBIGAN) 0.2-0.5 % ophthalmic solution, Administer 1 drop to the right eye 3 (Three) Times a Day., Disp: , Rfl:   •  carvedilol (COREG) 6.25 MG tablet, Take 1 tablet by mouth 2 (Two) Times a Day With Meals., Disp: 180 tablet, Rfl: 3  •  Ergocalciferol (VITAMIN D2 PO), Take 20,000 Units by mouth Daily., Disp: , Rfl:   •  latanoprost (XALATAN) 0.005 % ophthalmic solution, Administer 1 drop to the right eye Every Night., Disp: , Rfl:   •  nitroglycerin (NITROSTAT) 0.4 MG SL tablet, Place 1 tablet under the tongue Every 5 (Five) Minutes As Needed for Chest Pain. Take no more than 3 doses in 15 minutes., Disp: 100 tablet, Rfl: 0  •  prednisoLONE acetate (PRED FORTE) 1 % ophthalmic suspension, Administer 1 drop to the right eye 2 (two) times a day., Disp: , Rfl:   •  valsartan-hydrochlorothiazide (DIOVAN-HCT) 160-25 MG per tablet, Take 1 tablet by mouth Daily., Disp: 90 tablet, Rfl: 2  •  clopidogrel (PLAVIX) 75 MG tablet, Take 1 tablet by mouth Daily., Disp: 90 tablet, Rfl: 3         ROS  Review of Systems   Constitution: Positive for malaise/fatigue.   Eyes: Positive for blurred vision.   Cardiovascular: Positive for leg swelling.   Respiratory: Positive for shortness of breath and wheezing.      All other body systems reviewed and are negative    SOCIAL HX  Social History     Socioeconomic History   • Marital status:      Spouse name: Not on file   • Number of children: Not on file   • Years of education: Not on file   • Highest education level: Not on file   Occupational History   • Occupation: Retired from [employer]   Tobacco Use   • Smoking status: Former Smoker     Types: Cigarettes     Last attempt to quit:      Years since quittin.5   • Smokeless tobacco: Never Used   Substance and Sexual Activity   • Alcohol use: No   • Drug use: No   • Sexual activity: Never   Social History Narrative    Living w daughter.       FAMILY HX  Family History   Problem Relation  Age of Onset   • Diabetes Mother    • Heart failure Mother    • Heart failure Father    • No Known Problems Sister    • No Known Problems Brother    • No Known Problems Son    • No Known Problems Daughter              Armaan Samuels III, MD, [facility]

## 2020-07-08 ENCOUNTER — HOSPITAL ENCOUNTER (OUTPATIENT)
Facility: HOSPITAL | Age: 75
Setting detail: OBSERVATION
Discharge: HOME | End: 2020-07-10
Attending: EMERGENCY MEDICINE | Admitting: INTERNAL MEDICINE

## 2020-07-08 ENCOUNTER — APPOINTMENT (OUTPATIENT)
Dept: GENERAL RADIOLOGY | Facility: HOSPITAL | Age: 75
End: 2020-07-08

## 2020-07-08 DIAGNOSIS — R11.2 NON-INTRACTABLE VOMITING WITH NAUSEA, UNSPECIFIED VOMITING TYPE: ICD-10-CM

## 2020-07-08 DIAGNOSIS — N18.30 CHRONIC RENAL FAILURE, STAGE 3 (MODERATE) (HCC): ICD-10-CM

## 2020-07-08 DIAGNOSIS — D72.829 LEUKOCYTOSIS, UNSPECIFIED TYPE: ICD-10-CM

## 2020-07-08 DIAGNOSIS — R41.82 ALTERED MENTAL STATUS, UNSPECIFIED ALTERED MENTAL STATUS TYPE: ICD-10-CM

## 2020-07-08 DIAGNOSIS — N39.0 URINARY TRACT INFECTION WITHOUT HEMATURIA, SITE UNSPECIFIED: Primary | ICD-10-CM

## 2020-07-08 LAB
ALBUMIN SERPL-MCNC: 3.9 G/DL (ref 3.5–5.2)
ALBUMIN/GLOB SERPL: 1.2 G/DL
ALP SERPL-CCNC: 96 U/L (ref 39–117)
ALT SERPL W P-5'-P-CCNC: 12 U/L (ref 1–33)
ANION GAP SERPL CALCULATED.3IONS-SCNC: 13 MMOL/L (ref 5–15)
AST SERPL-CCNC: 16 U/L (ref 1–32)
BACTERIA UR QL AUTO: ABNORMAL /HPF
BASOPHILS # BLD AUTO: 0.03 10*3/MM3 (ref 0–0.2)
BASOPHILS NFR BLD AUTO: 0.2 % (ref 0–1.5)
BILIRUB SERPL-MCNC: 0.5 MG/DL (ref 0–1.2)
BILIRUB UR QL STRIP: NEGATIVE
BUN SERPL-MCNC: 43 MG/DL (ref 8–23)
BUN/CREAT SERPL: 25.9 (ref 7–25)
CALCIUM SPEC-SCNC: 8.8 MG/DL (ref 8.6–10.5)
CHLORIDE SERPL-SCNC: 109 MMOL/L (ref 98–107)
CLARITY UR: ABNORMAL
CO2 SERPL-SCNC: 17 MMOL/L (ref 22–29)
COLOR UR: YELLOW
CREAT SERPL-MCNC: 1.66 MG/DL (ref 0.57–1)
D-LACTATE SERPL-SCNC: 1.1 MMOL/L (ref 0.5–2)
DEPRECATED RDW RBC AUTO: 53.1 FL (ref 37–54)
EOSINOPHIL # BLD AUTO: 0.02 10*3/MM3 (ref 0–0.4)
EOSINOPHIL NFR BLD AUTO: 0.1 % (ref 0.3–6.2)
ERYTHROCYTE [DISTWIDTH] IN BLOOD BY AUTOMATED COUNT: 14.6 % (ref 12.3–15.4)
GFR SERPL CREATININE-BSD FRML MDRD: 30 ML/MIN/1.73
GLOBULIN UR ELPH-MCNC: 3.3 GM/DL
GLUCOSE SERPL-MCNC: 191 MG/DL (ref 65–99)
GLUCOSE UR STRIP-MCNC: NEGATIVE MG/DL
HCT VFR BLD AUTO: 37.8 % (ref 34–46.6)
HGB BLD-MCNC: 12 G/DL (ref 12–15.9)
HGB UR QL STRIP.AUTO: ABNORMAL
HOLD SPECIMEN: NORMAL
HOLD SPECIMEN: NORMAL
HYALINE CASTS UR QL AUTO: ABNORMAL /LPF
IMM GRANULOCYTES # BLD AUTO: 0.04 10*3/MM3 (ref 0–0.05)
IMM GRANULOCYTES NFR BLD AUTO: 0.3 % (ref 0–0.5)
KETONES UR QL STRIP: NEGATIVE
LEUKOCYTE ESTERASE UR QL STRIP.AUTO: ABNORMAL
LYMPHOCYTES # BLD AUTO: 1.11 10*3/MM3 (ref 0.7–3.1)
LYMPHOCYTES NFR BLD AUTO: 8 % (ref 19.6–45.3)
MCH RBC QN AUTO: 31.4 PG (ref 26.6–33)
MCHC RBC AUTO-ENTMCNC: 31.7 G/DL (ref 31.5–35.7)
MCV RBC AUTO: 99 FL (ref 79–97)
MONOCYTES # BLD AUTO: 0.67 10*3/MM3 (ref 0.1–0.9)
MONOCYTES NFR BLD AUTO: 4.8 % (ref 5–12)
NEUTROPHILS NFR BLD AUTO: 12.07 10*3/MM3 (ref 1.7–7)
NEUTROPHILS NFR BLD AUTO: 86.6 % (ref 42.7–76)
NITRITE UR QL STRIP: NEGATIVE
NRBC BLD AUTO-RTO: 0 /100 WBC (ref 0–0.2)
NT-PROBNP SERPL-MCNC: 930.9 PG/ML (ref 0–1800)
PH UR STRIP.AUTO: <=5 [PH] (ref 5–8)
PLATELET # BLD AUTO: 157 10*3/MM3 (ref 140–450)
PMV BLD AUTO: 11.8 FL (ref 6–12)
POTASSIUM SERPL-SCNC: 3.9 MMOL/L (ref 3.5–5.2)
PROT SERPL-MCNC: 7.2 G/DL (ref 6–8.5)
PROT UR QL STRIP: ABNORMAL
RBC # BLD AUTO: 3.82 10*6/MM3 (ref 3.77–5.28)
RBC # UR: ABNORMAL /HPF
REF LAB TEST METHOD: ABNORMAL
SODIUM SERPL-SCNC: 139 MMOL/L (ref 136–145)
SP GR UR STRIP: 1.02 (ref 1–1.03)
SQUAMOUS #/AREA URNS HPF: ABNORMAL /HPF
TROPONIN T SERPL-MCNC: <0.01 NG/ML (ref 0–0.03)
UROBILINOGEN UR QL STRIP: ABNORMAL
WBC # BLD AUTO: 13.94 10*3/MM3 (ref 3.4–10.8)
WBC CASTS #/AREA URNS LPF: ABNORMAL /LPF
WBC UR QL AUTO: ABNORMAL /HPF
WHOLE BLOOD HOLD SPECIMEN: NORMAL
WHOLE BLOOD HOLD SPECIMEN: NORMAL

## 2020-07-08 PROCEDURE — 84484 ASSAY OF TROPONIN QUANT: CPT

## 2020-07-08 PROCEDURE — P9612 CATHETERIZE FOR URINE SPEC: HCPCS

## 2020-07-08 PROCEDURE — 71046 X-RAY EXAM CHEST 2 VIEWS: CPT

## 2020-07-08 PROCEDURE — G0378 HOSPITAL OBSERVATION PER HR: HCPCS

## 2020-07-08 PROCEDURE — 87088 URINE BACTERIA CULTURE: CPT | Performed by: EMERGENCY MEDICINE

## 2020-07-08 PROCEDURE — 81001 URINALYSIS AUTO W/SCOPE: CPT | Performed by: EMERGENCY MEDICINE

## 2020-07-08 PROCEDURE — 99220 PR INITIAL OBSERVATION CARE/DAY 70 MINUTES: CPT | Performed by: INTERNAL MEDICINE

## 2020-07-08 PROCEDURE — 80053 COMPREHEN METABOLIC PANEL: CPT

## 2020-07-08 PROCEDURE — 96361 HYDRATE IV INFUSION ADD-ON: CPT

## 2020-07-08 PROCEDURE — 25010000002 ONDANSETRON PER 1 MG: Performed by: EMERGENCY MEDICINE

## 2020-07-08 PROCEDURE — 83605 ASSAY OF LACTIC ACID: CPT | Performed by: INTERNAL MEDICINE

## 2020-07-08 PROCEDURE — 93005 ELECTROCARDIOGRAM TRACING: CPT

## 2020-07-08 PROCEDURE — 87086 URINE CULTURE/COLONY COUNT: CPT | Performed by: EMERGENCY MEDICINE

## 2020-07-08 PROCEDURE — 99284 EMERGENCY DEPT VISIT MOD MDM: CPT

## 2020-07-08 PROCEDURE — 93005 ELECTROCARDIOGRAM TRACING: CPT | Performed by: EMERGENCY MEDICINE

## 2020-07-08 PROCEDURE — 83735 ASSAY OF MAGNESIUM: CPT | Performed by: INTERNAL MEDICINE

## 2020-07-08 PROCEDURE — 83880 ASSAY OF NATRIURETIC PEPTIDE: CPT

## 2020-07-08 PROCEDURE — 96365 THER/PROPH/DIAG IV INF INIT: CPT

## 2020-07-08 PROCEDURE — 96375 TX/PRO/DX INJ NEW DRUG ADDON: CPT

## 2020-07-08 PROCEDURE — 87040 BLOOD CULTURE FOR BACTERIA: CPT | Performed by: INTERNAL MEDICINE

## 2020-07-08 PROCEDURE — 25010000002 CEFTRIAXONE PER 250 MG: Performed by: EMERGENCY MEDICINE

## 2020-07-08 PROCEDURE — 87186 SC STD MICRODIL/AGAR DIL: CPT | Performed by: EMERGENCY MEDICINE

## 2020-07-08 PROCEDURE — 85025 COMPLETE CBC W/AUTO DIFF WBC: CPT

## 2020-07-08 RX ORDER — SODIUM CHLORIDE 9 MG/ML
125 INJECTION, SOLUTION INTRAVENOUS CONTINUOUS
Status: DISCONTINUED | OUTPATIENT
Start: 2020-07-08 | End: 2020-07-10 | Stop reason: HOSPADM

## 2020-07-08 RX ORDER — ONDANSETRON 2 MG/ML
4 INJECTION INTRAMUSCULAR; INTRAVENOUS ONCE
Status: COMPLETED | OUTPATIENT
Start: 2020-07-08 | End: 2020-07-08

## 2020-07-08 RX ORDER — SODIUM CHLORIDE 0.9 % (FLUSH) 0.9 %
10 SYRINGE (ML) INJECTION AS NEEDED
Status: DISCONTINUED | OUTPATIENT
Start: 2020-07-08 | End: 2020-07-10 | Stop reason: HOSPADM

## 2020-07-08 RX ADMIN — SODIUM CHLORIDE 125 ML/HR: 9 INJECTION, SOLUTION INTRAVENOUS at 23:03

## 2020-07-08 RX ADMIN — CEFTRIAXONE SODIUM 1 G: 1 INJECTION, POWDER, FOR SOLUTION INTRAMUSCULAR; INTRAVENOUS at 23:03

## 2020-07-08 RX ADMIN — ONDANSETRON 4 MG: 2 INJECTION INTRAMUSCULAR; INTRAVENOUS at 23:03

## 2020-07-08 NOTE — ED NOTES
JARVIS TIJERINA: [facility]  [phone]     Gema Monzon  07/08/20 1827

## 2020-07-09 LAB
ANION GAP SERPL CALCULATED.3IONS-SCNC: 14 MMOL/L (ref 5–15)
BASOPHILS # BLD AUTO: 0.02 10*3/MM3 (ref 0–0.2)
BASOPHILS NFR BLD AUTO: 0.2 % (ref 0–1.5)
BUN SERPL-MCNC: 41 MG/DL (ref 8–23)
BUN/CREAT SERPL: 29.5 (ref 7–25)
CALCIUM SPEC-SCNC: 8.5 MG/DL (ref 8.6–10.5)
CHLORIDE SERPL-SCNC: 111 MMOL/L (ref 98–107)
CO2 SERPL-SCNC: 13 MMOL/L (ref 22–29)
CREAT SERPL-MCNC: 1.39 MG/DL (ref 0.57–1)
DEPRECATED RDW RBC AUTO: 53.6 FL (ref 37–54)
EOSINOPHIL # BLD AUTO: 0.08 10*3/MM3 (ref 0–0.4)
EOSINOPHIL NFR BLD AUTO: 0.8 % (ref 0.3–6.2)
ERYTHROCYTE [DISTWIDTH] IN BLOOD BY AUTOMATED COUNT: 14.6 % (ref 12.3–15.4)
GFR SERPL CREATININE-BSD FRML MDRD: 37 ML/MIN/1.73
GLUCOSE BLDC GLUCOMTR-MCNC: 101 MG/DL (ref 70–130)
GLUCOSE BLDC GLUCOMTR-MCNC: 103 MG/DL (ref 70–130)
GLUCOSE BLDC GLUCOMTR-MCNC: 106 MG/DL (ref 70–130)
GLUCOSE BLDC GLUCOMTR-MCNC: 109 MG/DL (ref 70–130)
GLUCOSE BLDC GLUCOMTR-MCNC: 75 MG/DL (ref 70–130)
GLUCOSE BLDC GLUCOMTR-MCNC: 98 MG/DL (ref 70–130)
GLUCOSE SERPL-MCNC: 90 MG/DL (ref 65–99)
HCT VFR BLD AUTO: 37.8 % (ref 34–46.6)
HGB BLD-MCNC: 11.6 G/DL (ref 12–15.9)
IMM GRANULOCYTES # BLD AUTO: 0.03 10*3/MM3 (ref 0–0.05)
IMM GRANULOCYTES NFR BLD AUTO: 0.3 % (ref 0–0.5)
LYMPHOCYTES # BLD AUTO: 2.19 10*3/MM3 (ref 0.7–3.1)
LYMPHOCYTES NFR BLD AUTO: 21.4 % (ref 19.6–45.3)
MAGNESIUM SERPL-MCNC: 1.9 MG/DL (ref 1.6–2.4)
MAGNESIUM SERPL-MCNC: 2.6 MG/DL (ref 1.6–2.4)
MCH RBC QN AUTO: 30.9 PG (ref 26.6–33)
MCHC RBC AUTO-ENTMCNC: 30.7 G/DL (ref 31.5–35.7)
MCV RBC AUTO: 100.5 FL (ref 79–97)
MONOCYTES # BLD AUTO: 0.72 10*3/MM3 (ref 0.1–0.9)
MONOCYTES NFR BLD AUTO: 7 % (ref 5–12)
NEUTROPHILS NFR BLD AUTO: 7.18 10*3/MM3 (ref 1.7–7)
NEUTROPHILS NFR BLD AUTO: 70.3 % (ref 42.7–76)
NRBC BLD AUTO-RTO: 0 /100 WBC (ref 0–0.2)
PLATELET # BLD AUTO: 135 10*3/MM3 (ref 140–450)
PMV BLD AUTO: 11.8 FL (ref 6–12)
POTASSIUM SERPL-SCNC: 3.5 MMOL/L (ref 3.5–5.2)
RBC # BLD AUTO: 3.76 10*6/MM3 (ref 3.77–5.28)
SODIUM SERPL-SCNC: 138 MMOL/L (ref 136–145)
WBC # BLD AUTO: 10.22 10*3/MM3 (ref 3.4–10.8)

## 2020-07-09 PROCEDURE — 82962 GLUCOSE BLOOD TEST: CPT

## 2020-07-09 PROCEDURE — 96361 HYDRATE IV INFUSION ADD-ON: CPT

## 2020-07-09 PROCEDURE — 96372 THER/PROPH/DIAG INJ SC/IM: CPT

## 2020-07-09 PROCEDURE — G0378 HOSPITAL OBSERVATION PER HR: HCPCS

## 2020-07-09 PROCEDURE — 80048 BASIC METABOLIC PNL TOTAL CA: CPT | Performed by: INTERNAL MEDICINE

## 2020-07-09 PROCEDURE — 96366 THER/PROPH/DIAG IV INF ADDON: CPT

## 2020-07-09 PROCEDURE — 85025 COMPLETE CBC W/AUTO DIFF WBC: CPT | Performed by: INTERNAL MEDICINE

## 2020-07-09 PROCEDURE — 25010000002 CEFTRIAXONE PER 250 MG: Performed by: INTERNAL MEDICINE

## 2020-07-09 PROCEDURE — 83735 ASSAY OF MAGNESIUM: CPT | Performed by: INTERNAL MEDICINE

## 2020-07-09 PROCEDURE — 99225 PR SBSQ OBSERVATION CARE/DAY 25 MINUTES: CPT | Performed by: INTERNAL MEDICINE

## 2020-07-09 PROCEDURE — 25010000002 HEPARIN (PORCINE) PER 1000 UNITS: Performed by: INTERNAL MEDICINE

## 2020-07-09 RX ORDER — SODIUM CHLORIDE 0.9 % (FLUSH) 0.9 %
10 SYRINGE (ML) INJECTION AS NEEDED
Status: DISCONTINUED | OUTPATIENT
Start: 2020-07-09 | End: 2020-07-10 | Stop reason: HOSPADM

## 2020-07-09 RX ORDER — HEPARIN SODIUM 5000 [USP'U]/ML
5000 INJECTION, SOLUTION INTRAVENOUS; SUBCUTANEOUS EVERY 8 HOURS SCHEDULED
Status: DISCONTINUED | OUTPATIENT
Start: 2020-07-09 | End: 2020-07-10 | Stop reason: HOSPADM

## 2020-07-09 RX ORDER — ASPIRIN 81 MG/1
81 TABLET, CHEWABLE ORAL DAILY
Status: DISCONTINUED | OUTPATIENT
Start: 2020-07-09 | End: 2020-07-10 | Stop reason: HOSPADM

## 2020-07-09 RX ORDER — NICOTINE POLACRILEX 4 MG
15 LOZENGE BUCCAL
Status: DISCONTINUED | OUTPATIENT
Start: 2020-07-09 | End: 2020-07-10 | Stop reason: HOSPADM

## 2020-07-09 RX ORDER — ATORVASTATIN CALCIUM 40 MG/1
40 TABLET, FILM COATED ORAL DAILY
Status: DISCONTINUED | OUTPATIENT
Start: 2020-07-09 | End: 2020-07-10 | Stop reason: HOSPADM

## 2020-07-09 RX ORDER — ONDANSETRON 2 MG/ML
4 INJECTION INTRAMUSCULAR; INTRAVENOUS EVERY 6 HOURS PRN
Status: DISCONTINUED | OUTPATIENT
Start: 2020-07-09 | End: 2020-07-10 | Stop reason: HOSPADM

## 2020-07-09 RX ORDER — DEXTROSE MONOHYDRATE 25 G/50ML
25 INJECTION, SOLUTION INTRAVENOUS
Status: DISCONTINUED | OUTPATIENT
Start: 2020-07-09 | End: 2020-07-10 | Stop reason: HOSPADM

## 2020-07-09 RX ORDER — LATANOPROST 50 UG/ML
1 SOLUTION/ DROPS OPHTHALMIC NIGHTLY
Status: DISCONTINUED | OUTPATIENT
Start: 2020-07-09 | End: 2020-07-10 | Stop reason: HOSPADM

## 2020-07-09 RX ORDER — SODIUM CHLORIDE 0.9 % (FLUSH) 0.9 %
10 SYRINGE (ML) INJECTION EVERY 12 HOURS SCHEDULED
Status: DISCONTINUED | OUTPATIENT
Start: 2020-07-09 | End: 2020-07-10 | Stop reason: HOSPADM

## 2020-07-09 RX ORDER — CLOPIDOGREL BISULFATE 75 MG/1
75 TABLET ORAL DAILY
Status: DISCONTINUED | OUTPATIENT
Start: 2020-07-09 | End: 2020-07-10 | Stop reason: HOSPADM

## 2020-07-09 RX ORDER — TIMOLOL MALEATE 5 MG/ML
1 SOLUTION/ DROPS OPHTHALMIC EVERY 12 HOURS SCHEDULED
Status: DISCONTINUED | OUTPATIENT
Start: 2020-07-09 | End: 2020-07-10 | Stop reason: HOSPADM

## 2020-07-09 RX ADMIN — ATORVASTATIN CALCIUM 40 MG: 40 TABLET, FILM COATED ORAL at 14:14

## 2020-07-09 RX ADMIN — SODIUM CHLORIDE 125 ML/HR: 9 INJECTION, SOLUTION INTRAVENOUS at 21:08

## 2020-07-09 RX ADMIN — SODIUM CHLORIDE, PRESERVATIVE FREE 10 ML: 5 INJECTION INTRAVENOUS at 21:09

## 2020-07-09 RX ADMIN — CLOPIDOGREL BISULFATE 75 MG: 75 TABLET ORAL at 14:14

## 2020-07-09 RX ADMIN — LATANOPROST 1 DROP: 50 SOLUTION OPHTHALMIC at 05:39

## 2020-07-09 RX ADMIN — LATANOPROST 1 DROP: 50 SOLUTION OPHTHALMIC at 21:09

## 2020-07-09 RX ADMIN — TIMOLOL MALEATE 1 DROP: 5 SOLUTION/ DROPS OPHTHALMIC at 21:09

## 2020-07-09 RX ADMIN — SODIUM CHLORIDE 125 ML/HR: 9 INJECTION, SOLUTION INTRAVENOUS at 03:02

## 2020-07-09 RX ADMIN — TIMOLOL MALEATE 1 DROP: 5 SOLUTION/ DROPS OPHTHALMIC at 08:48

## 2020-07-09 RX ADMIN — HEPARIN SODIUM 5000 UNITS: 5000 INJECTION INTRAVENOUS; SUBCUTANEOUS at 14:14

## 2020-07-09 RX ADMIN — HEPARIN SODIUM 5000 UNITS: 5000 INJECTION INTRAVENOUS; SUBCUTANEOUS at 21:09

## 2020-07-09 RX ADMIN — HEPARIN SODIUM 5000 UNITS: 5000 INJECTION INTRAVENOUS; SUBCUTANEOUS at 08:47

## 2020-07-09 RX ADMIN — ASPIRIN 81 MG: 81 TABLET, CHEWABLE ORAL at 14:14

## 2020-07-09 RX ADMIN — CEFTRIAXONE SODIUM 1 G: 1 INJECTION, POWDER, FOR SOLUTION INTRAMUSCULAR; INTRAVENOUS at 21:08

## 2020-07-09 NOTE — ED PROVIDER NOTES
Subjective   Georgia Velázqeuz is a pleasant 75 y.o. female who presents to the ED with complaints of altered mental status. The patient lives with her daughter and uses a walker to ambulate. She denies any recent falls. The patient suffers from type 2 diabetes mellitus, CHF, CAD, COPD, hypertension, hyperlipidemia, and morbid obesity. She was seen here in 05/2020 and diagnosed with a urinary tract infection. She presents today with similar symptoms. Of note, the patient is supposed to have surgery for her glaucoma in 2 days.    Her daughter states that the patient began to act confused yesterday. Yesterday, the patient asked her daughter what year it was and this morning, the patient came downstairs without pants on. Associated symptoms include vomiting and slight cough. She denies any fever, dysuria, hematuria, dysuria, frequency, urgency, or rhinorrhea. Her bowel movement has been normal.    She has a past medical history of type 2 diabetes mellitus, sepsis, CHF, CAD, COPD, hypertension, hyperlipidemia, myocardial infarction, stroke, osteoporosis, GERD, arthritis of shoulder, elevated creatine kinase, acute kidney injury, morbid obesity, and urinary incontinence without sensory awareness. She denies any history of sleep apnea. She takes Diamox, Aspirin, Lipitor, Coreg, Plavix, Vitamin D-2, Nitrostat, and Diovan-HCT. Her surgical history consists of a cholecystectomy, appendectomy, kyphoplasty, and back surgery. Her PCP is Chaz Rico. There are no other acute complaints at this time.      History provided by:  Patient, medical records, caregiver and relative  Altered Mental Status   Presenting symptoms: confusion    Severity:  Moderate  Most recent episode:  Yesterday  Episode history:  Multiple  Duration:  1 day  Progression:  Unchanged  Chronicity:  New  Associated symptoms: vomiting    Associated symptoms: no fever        Review of Systems   Constitutional: Negative for fever.   HENT: Negative for  rhinorrhea.    Respiratory: Positive for cough.    Gastrointestinal: Positive for vomiting. Negative for blood in stool.   Genitourinary: Negative for dysuria, frequency, hematuria and urgency.   Psychiatric/Behavioral: Positive for confusion.   All other systems reviewed and are negative.      Past Medical History:   Diagnosis Date   • Acute kidney injury (CMS/[facility]) 12/5/2017   • 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/2016   • Body mass index (BMI) of 45.0-49.9 in adult (CMS/[facility]) 6/14/2019   • CHF (congestive heart failure) (CMS/[facility])    • Closed compression fracture of L4 lumbar vertebra 12/4/2017    Added automatically from request for surgery [id number]   • Constipation 12/5/2017   • COPD (chronic obstructive pulmonary disease) (CMS/[facility])    • Coronary artery disease 5/18/2016   • Diabetes mellitus type 2 in obese (CMS/[facility]) 1/29/2018   • Elevated alkaline phosphatase level 2/26/2018   • Elevated creatine kinase    • Essential hypertension 5/18/2016   • GERD (gastroesophageal reflux disease) 5/18/2016   • History of echocardiogram 10/16/2015    TDS.Est EF is 45-50%.Mild distal septal, anteroapical hypokinesia.Mild mitral stenosis.Mean gradient across mitral valve is 6 mmHg.Trace TR   • History of kyphoplasty 1/30/2018   • Hyperlipemia 5/18/2016   • Lumbar facet arthropathy 1/29/2018   • Lumbar stenosis with neurogenic claudication 1/29/2018   • Morbid obesity due to excess calories (CMS/[facility]) 1/29/2018   • Myocardial infarction (CMS/[facility]) 5/18/2016   • Osteoporosis 5/18/2016   • Physical deconditioning 1/30/2018   • Sepsis (CMS/[facility])     uro   • Stroke (CMS/[facility])    • Type 2 diabetes mellitus (CMS/[facility]) 5/18/2016   • Urinary incontinence without sensory awareness 2/26/2018     [facility]  Consultation H&P  Georgia Velázquez  1945     There is no work phone number on file..     VISIT DATE:  07/01/2020     PCP: Chaz Rico, FORTUNATO, APRN  [address] [place] [address] [place] [address]  [id number]     CC:      Chief Complaint   Patient presents with   • Cerebrovascular Accident      Previous cardiac studies and procedures:  2000: PCI with stenting in the setting of MI, dated deficient     April 2015   echo: EF 40%, mild LVH, diastolic dysfunction  Shyanne scan myocardial perfusion imaging: EF 41%, large fixed anterior apical defect.     May 2015 cardiac catheterization: 70% distal LAD stenosis.  RCA with diffuse irregularities, 60% mid RCA stenosis.  Left circumflex with diffuse irregularities.     June 2020  Carotid duplex  · Left mid ICA near 70%, however EDV less than 100 and ratio less than 4.  · Right internal carotid artery stenosis of 0-49%.  · Bilateral antegrade vertebral flow.  Echo  · Estimated EF appears to be in the range of 51 - 55%.  · Left ventricular systolic function is normal.  · Left ventricular diastolic dysfunction (grade II) consistent with pseudonormalization.  · The following left ventricular wall segments are hypokinetic: apical anterior, apical lateral, apical inferior, apical septal and apex hypokinetic.  · Mild aortic valve regurgitation is present.     ASSESSMENT:    Diagnosis Plan   1. Coronary artery disease due to lipid rich plaque      2. Mixed hyperlipidemia      3. Essential hypertension      4. Palpitations  Holter Monitor - 72 Hour Up To 21 Days   5. Retinal emboli, right               PLAN:  Coronary artery disease: Currently stable and asymptomatic.  Continue antiplatelet therapy, high intensity statin therapy and afterload reduction.     Hypertension: Goal less than 130/80 mmHg.  Currently well controlled.  Continue current medical therapy.     Hyperlipidemia: Goal LDL less than 70, currently well controlled.  Continue atorvastatin 40 mg p.o. daily.     Retinal embolic event: Ophthalmology records pending.  2-week ambulatory ECG monitor to screen for atrial fibrillation.  Switching aspirin to Plavix after upcoming ophthalmologic procedure.  Otherwise continue  afterload reduction high intensity statin therapy.     Left internal carotid artery stenosis: Approaching 70%.  Currently asymptomatic.  Continue afterload reduction, high intensity statin therapy.  Switching aspirin to Plavix.  Annual carotid duplex.     History of Present Illness   75-year-old female with a history of coronary disease, peripheral vascular disease and chronic congestive systolic heart failure.  Recently had right eye pain and was diagnosed with a right retinal embolic event.  Ophthalmology evaluation has been requested, unclear whether this was a venous or arterial thrombosis.  Reviewed recent echo and carotid duplex.  No significant palpitations.  She has stable dyspnea on exertion and a class II-III pattern.  Uses a wheeled walker for ambulation.  She is compliant with medical therapy and was on aspirin prior to this event.  She reports that she is currently on a statin but does not know the name or dosage, she will call us back with this information later.  Blood pressures run less than 130/80 mmHg.  She is scheduled for an ophthalmologic procedure next week.    No Known Allergies    Past Surgical History:   Procedure Laterality Date   • APPENDECTOMY     • BACK SURGERY     • CHOLECYSTECTOMY     • KYPHOPLASTY N/A 12/7/2017    Procedure: KYPHOPLASTY L4;  Surgeon: Marc Pham MD;  Location: [facility];  Service:        Family History   Problem Relation Age of Onset   • Diabetes Mother    • Heart failure Mother    • Heart failure Father    • No Known Problems Sister    • No Known Problems Brother    • No Known Problems Son    • No Known Problems Daughter        Social History     Socioeconomic History   • Marital status:      Spouse name: Not on file   • Number of children: Not on file   • Years of education: Not on file   • Highest education level: Not on file   Occupational History   • Occupation: Retired from [employer]   Tobacco Use   • Smoking status: Former Smoker     Types:  Cigarettes     Last attempt to quit: 2007     Years since quittin.5   • Smokeless tobacco: Never Used   Substance and Sexual Activity   • Alcohol use: No   • Drug use: No   • Sexual activity: Never   Social History Narrative    Living w daughter.           Objective   Physical Exam   Constitutional: She appears well-developed and well-nourished.   Alert. A little disoriented. Not feeling well.   HENT:   Head: Normocephalic and atraumatic.   Nose: Nose normal.   HENT normal.   Eyes: Conjunctivae are normal. No scleral icterus.   Neck: Normal range of motion. Neck supple.   Neck normal.   Cardiovascular: Normal rate, regular rhythm and normal heart sounds.   No murmur heard.  Heart normal. Good pulses in feet.   Pulmonary/Chest: Effort normal. No respiratory distress. She has decreased breath sounds.   Decreased breath sounds bilaterally.   Abdominal: Soft. She exhibits no distension. There is no tenderness.   Obese. Normal. Flank non-tender.   Musculoskeletal: Normal range of motion. She exhibits edema. She exhibits no deformity.   Upper extremities normal. Lower extremities have moderate edema up to the knees, which is chronic and unchanged from baseline. Good pulses in feet.   Neurological: She is alert. She has normal strength and normal reflexes. No sensory deficit.   Not at baseline. A little confused. Weakness with no focality.  Face symmetric. Voice strong. Tongue midline. Vision, hearing, and speech are all preserved.   Skin: Skin is warm and dry.   Psychiatric:   Not at baseline. A little confused.   Nursing note and vitals reviewed.      Procedures           ED Course      Recent Results (from the past 24 hour(s))   Comprehensive Metabolic Panel    Collection Time: 20  7:04 PM   Result Value Ref Range    Glucose 191 (H) 65 - 99 mg/dL    BUN 43 (H) 8 - 23 mg/dL    Creatinine 1.66 (H) 0.57 - 1.00 mg/dL    Sodium 139 136 - 145 mmol/L    Potassium 3.9 3.5 - 5.2 mmol/L    Chloride 109 (H) 98 - 107  mmol/L    CO2 17.0 (L) 22.0 - 29.0 mmol/L    Calcium 8.8 8.6 - 10.5 mg/dL    Total Protein 7.2 6.0 - 8.5 g/dL    Albumin 3.90 3.50 - 5.20 g/dL    ALT (SGPT) 12 1 - 33 U/L    AST (SGOT) 16 1 - 32 U/L    Alkaline Phosphatase 96 39 - 117 U/L    Total Bilirubin 0.5 0.0 - 1.2 mg/dL    eGFR Non African Amer 30 (L) >60 mL/min/1.73    Globulin 3.3 gm/dL    A/G Ratio 1.2 g/dL    BUN/Creatinine Ratio 25.9 (H) 7.0 - 25.0    Anion Gap 13.0 5.0 - 15.0 mmol/L   BNP    Collection Time: 07/08/20  7:04 PM   Result Value Ref Range    proBNP 930.9 0.0-1,800.0 pg/mL   Troponin    Collection Time: 07/08/20  7:04 PM   Result Value Ref Range    Troponin T <0.010 0.000 - 0.030 ng/mL   Light Blue Top    Collection Time: 07/08/20  7:04 PM   Result Value Ref Range    Extra Tube hold for add-on    Green Top (Gel)    Collection Time: 07/08/20  7:04 PM   Result Value Ref Range    Extra Tube Hold for add-ons.    Lavender Top    Collection Time: 07/08/20  7:04 PM   Result Value Ref Range    Extra Tube hold for add-on    Gold Top - SST    Collection Time: 07/08/20  7:04 PM   Result Value Ref Range    Extra Tube Hold for add-ons.    CBC Auto Differential    Collection Time: 07/08/20  7:04 PM   Result Value Ref Range    WBC 13.94 (H) 3.40 - 10.80 10*3/mm3    RBC 3.82 3.77 - 5.28 10*6/mm3    Hemoglobin 12.0 12.0 - 15.9 g/dL    Hematocrit 37.8 34.0 - 46.6 %    MCV 99.0 (H) 79.0 - 97.0 fL    MCH 31.4 26.6 - 33.0 pg    MCHC 31.7 31.5 - 35.7 g/dL    RDW 14.6 12.3 - 15.4 %    RDW-SD 53.1 37.0 - 54.0 fl    MPV 11.8 6.0 - 12.0 fL    Platelets 157 140 - 450 10*3/mm3    Neutrophil % 86.6 (H) 42.7 - 76.0 %    Lymphocyte % 8.0 (L) 19.6 - 45.3 %    Monocyte % 4.8 (L) 5.0 - 12.0 %    Eosinophil % 0.1 (L) 0.3 - 6.2 %    Basophil % 0.2 0.0 - 1.5 %    Immature Grans % 0.3 0.0 - 0.5 %    Neutrophils, Absolute 12.07 (H) 1.70 - 7.00 10*3/mm3    Lymphocytes, Absolute 1.11 0.70 - 3.10 10*3/mm3    Monocytes, Absolute 0.67 0.10 - 0.90 10*3/mm3    Eosinophils, Absolute 0.02  0.00 - 0.40 10*3/mm3    Basophils, Absolute 0.03 0.00 - 0.20 10*3/mm3    Immature Grans, Absolute 0.04 0.00 - 0.05 10*3/mm3    nRBC 0.0 0.0 - 0.2 /100 WBC   Urinalysis With Culture If Indicated - Urine, Catheter    Collection Time: 07/08/20  8:57 PM   Result Value Ref Range    Color, UA Yellow Yellow, Straw    Appearance, UA Cloudy (A) Clear    pH, UA <=5.0 5.0 - 8.0    Specific Gravity, UA 1.017 1.001 - 1.030    Glucose, UA Negative Negative    Ketones, UA Negative Negative    Bilirubin, UA Negative Negative    Blood, UA Small (1+) (A) Negative    Protein, UA Trace (A) Negative    Leuk Esterase, UA Large (3+) (A) Negative    Nitrite, UA Negative Negative    Urobilinogen, UA 1.0 E.U./dL 0.2 - 1.0 E.U./dL   Urinalysis, Microscopic Only - Urine, Catheter    Collection Time: 07/08/20  8:57 PM   Result Value Ref Range    RBC, UA 0-2 None Seen, 0-2 /HPF    WBC, UA Too Numerous to Count (A) None Seen, 0-2 /HPF    Bacteria, UA 4+ (A) None Seen, Trace /HPF    Squamous Epithelial Cells, UA 0-2 None Seen, 0-2 /HPF    Hyaline Casts, UA 7-12 0 - 6 /LPF    WBC Casts 3-6 None Seen /LPF    Methodology Manual Light Microscopy    Lactic Acid, Plasma    Collection Time: 07/08/20 10:14 PM   Result Value Ref Range    Lactate 1.1 0.5 - 2.0 mmol/L     Note: In addition to lab results from this visit, the labs listed above may include labs taken at another facility or during a different encounter within the last 24 hours. Please correlate lab times with ED admission and discharge times for further clarification of the services performed during this visit.    XR Chest PA & Lateral   Final Result   No acute cardiopulmonary findings.      Signer Name: Julita Melendez MD    Signed: 7/8/2020 8:28 PM    Workstation Name: [id number]     [facility]        Vitals:    07/08/20 2130 07/08/20 2200 07/08/20 2230 07/08/20 2300   BP: 110/57 115/59 90/52 92/62   BP Location:       Patient Position:       Pulse: 54 55 58 55    Resp:       Temp:       TempSrc:       SpO2: 97% 98% 95% 98%   Weight:       Height:         Medications   sodium chloride 0.9 % flush 10 mL (has no administration in time range)   sodium chloride 0.9 % infusion (125 mL/hr Intravenous New Bag 7/8/20 2303)   cefTRIAXone (ROCEPHIN) 1 g/100 mL 0.9% NS (MBP) (1 g Intravenous New Bag 7/8/20 2303)   ondansetron (ZOFRAN) injection 4 mg (4 mg Intravenous Given 7/8/20 2303)     ECG/EMG Results (last 24 hours)     Procedure Component Value Units Date/Time    ECG 12 Lead [[id number]] Collected:  07/08/20 1856     Updated:  07/08/20 1858        ECG 12 Lead               COVID-19 RISK SCREEN     1. Has the patient had close contact without PPE with a lab confirmed COVID-19 (+) person or a person under investigation (PUI) for COVID-19 infection?  -- No     2. Has the patient had respiratory symptoms, worsened/new cough and/or SOA, unexplained fever, or sudden loss of smell and/or taste in the past 7 days? --  No    3. Does the patient have baseline higher exposure risk such as working in healthcare field or currently residing in healthcare facility?  --  No                                          MDM  Number of Diagnoses or Management Options  Altered mental status, unspecified altered mental status type:   Chronic renal failure, stage 3 (moderate) (CMS/HCC):   Leukocytosis, unspecified type:   Non-intractable vomiting with nausea, unspecified vomiting type:   Urinary tract infection without hematuria, site unspecified:   Diagnosis management comments:       I reviewed all available studies the bedside with the patient and her daughter.  I think the likely cause of her mental status changes and subacute decline is a urinary tract infection.  She has had this before and has behaved similarly.    I started the patient on IV fluids and IV antibiotics I think she needs to be admitted to the hospital for serial exams she continues to be a nauseated and cannot reliably take p.o..   Hopefully she will improve fairly quickly the goal I think would be to get her out in time to get her surgery on Friday though whether this is obtainable or not is difficult to say.    I have spoken with hospital medicine service and they will admit the patient.    All are agreeable with plan       Amount and/or Complexity of Data Reviewed  Clinical lab tests: reviewed  Tests in the radiology section of CPT®: reviewed  Tests in the medicine section of CPT®: reviewed  Decide to obtain previous medical records or to obtain history from someone other than the patient: yes        Final diagnoses:   Urinary tract infection without hematuria, site unspecified   Altered mental status, unspecified altered mental status type   Leukocytosis, unspecified type   Chronic renal failure, stage 3 (moderate) (CMS/HCC)   Non-intractable vomiting with nausea, unspecified vomiting type       t  Documentation assistance provided by [employer] acting as a scribe for      MD Yunier Serrato Kendal M  07/08/20 [id number]       Kosta Figueroa MD  07/08/20 [id number]       Kosta Figueroa MD  08/14/20 [id number]

## 2020-07-09 NOTE — PROGRESS NOTES
[facility] Medicine Services  PROGRESS NOTE    Patient Name: Georgia Velázquez  : 1945  MRN: [id number]    Date of Admission: 2020  Primary Care Physician: Chaz Rico, DNP, APRN    Subjective   Subjective     CC: Follow-up AMS, UTI    HPI: No acute events overnight, patient slept well, she is awake alert and oriented.  Denies any fevers or chills.    Review of Systems  Gen- No fevers, chills  CV- No chest pain, palpitations  Resp- No cough, dyspnea  GI- No N/V/D, abd pain    All systems reviewed currently negative except as mentioned in HPI above    Objective   Objective     Vital Signs:   Temp:  [97.7 °F (36.5 °C)-98.7 °F (37.1 °C)] 97.7 °F (36.5 °C)  Heart Rate:  [53-62] 59  Resp:  [14-18] 14  BP: ()/(39-73) 113/45        Physical Exam:  Constitutional: Elderly lady, in no no acute distress, awake, alert  HENT: NCAT, mucous membranes moist  Respiratory: Clear to auscultation bilaterally, respiratory effort normal   Cardiovascular: RRR, no murmurs, rubs, or gallops, palpable pedal pulses bilaterally  Gastrointestinal: Positive bowel sounds, soft, nontender, nondistended  Musculoskeletal: No bilateral ankle edema  Psychiatric: Appropriate affect, cooperative  Neurologic: Oriented x 3, no focal deficits  Skin: No rashes    Results Reviewed:  Results from last 7 days   Lab Units 20  0611 20  1904   WBC 10*3/mm3 10.22 13.94*   HEMOGLOBIN g/dL 11.6* 12.0   HEMATOCRIT % 37.8 37.8   PLATELETS 10*3/mm3 135* 157     Results from last 7 days   Lab Units 20  0611 20  1904   SODIUM mmol/L 138 139   POTASSIUM mmol/L 3.5 3.9   CHLORIDE mmol/L 111* 109*   CO2 mmol/L 13.0* 17.0*   BUN mg/dL 41* 43*   CREATININE mg/dL 1.39* 1.66*   GLUCOSE mg/dL 90 191*   CALCIUM mg/dL 8.5* 8.8   ALT (SGPT) U/L  --  12   AST (SGOT) U/L  --  16   TROPONIN T ng/mL  --  <0.010   PROBNP pg/mL  --  930.9     Estimated Creatinine Clearance: 39 mL/min (A) (by C-G formula  based on SCr of 1.39 mg/dL (H)).    Microbiology Results Abnormal     Procedure Component Value - Date/Time    Urine Culture - Urine, Urine, Catheter [[id number]]  (Abnormal) Collected:  07/08/20 2057    Lab Status:  Preliminary result Specimen:  Urine, Catheter Updated:  07/09/20 0919     Urine Culture >100,000 CFU/mL Escherichia coli          Imaging Results (Last 24 Hours)     Procedure Component Value Units Date/Time    XR Chest PA & Lateral [[id number]] Collected:  07/08/20 2028     Updated:  07/08/20 2030    Narrative:       CR Chest 2 Vws    INDICATION:    75-year-old emergency department patient with complaint of shortness of air today. Patient's daughter thinks she may have a urinary tract infection. Vomiting today    COMPARISON:    Chest CT 5/21/2020, chest x-ray 11/28/2019    FINDINGS:   PA and lateral views of the chest.  Heart size is normal. Mediastinal, hilar and aortic contours are normal. The lungs are clear. No effusions.        Impression:       No acute cardiopulmonary findings.    Signer Name: Julita Mleendez MD   Signed: 7/8/2020 8:28 PM   Workstation Name: [id number]    [facility]          Results for orders placed during the hospital encounter of 06/04/20   Adult Transthoracic Echo Complete W/ Cont if Necessary Per Protocol    Narrative · Estimated EF appears to be in the range of 51 - 55%.  · Left ventricular systolic function is normal.  · Left ventricular diastolic dysfunction (grade II) consistent with   pseudonormalization.  · The following left ventricular wall segments are hypokinetic: apical   anterior, apical lateral, apical inferior, apical septal and apex   hypokinetic.  · Mild aortic valve regurgitation is present.          I have reviewed the medications:  Scheduled Meds:    aspirin 81 mg Oral Daily   atorvastatin 40 mg Oral Daily   cefTRIAXone 1 g Intravenous Q24H   clopidogrel 75 mg Oral Daily   heparin (porcine) 5,000 Units Subcutaneous Q8H   insulin  regular 0-7 Units Subcutaneous Q6H   latanoprost 1 drop Right Eye Nightly   sodium chloride 10 mL Intravenous Q12H   timolol 1 drop Right Eye Q12H     Continuous Infusions:    sodium chloride 125 mL/hr Last Rate: 125 mL/hr (07/09/20 0740)     PRN Meds:.dextrose  •  dextrose  •  glucagon (human recombinant)  •  ondansetron  •  sodium chloride  •  sodium chloride    Assessment/Plan   Assessment & Plan     Active Hospital Problems    Diagnosis  POA   • **Urinary tract infection [N39.0]  Yes   • Chronic obstructive pulmonary disease (CMS/HCC) [J44.9]  Yes   • Chronic kidney disease [N18.9]  Yes   • Essential hypertension [I10]  Yes   • Type 2 diabetes mellitus (CMS/[facility]) [E11.9]  Yes   • Hyperlipidemia [E78.5]  Yes   • Coronary artery disease [I25.10]  Yes   • GERD (gastroesophageal reflux disease) [K21.9]  Yes      Resolved Hospital Problems   No resolved problems to display.        Brief Hospital Course to date:  Georgia Velázquez is a 75 y.o. female with past medical history of CKD stage III, type 2 diabetes, hypertension, hyperlipidemia, CAD, COPD.  Patient presented to ED with intermittent confusion admitted with acute encephalopathy like secondary to UTI    Plan  Acute encephalopathy, resolved  -Suspect this is secondary to underlying UTI, continue antibiotics as below  -Follow-up blood cultures    Ecoli UTI  -Per UA with 4+ bacteria, large leuks, urine cultures growing E. coli  -continue IV Rocephin for now, sensitivities pending.    CKD stage III-renal function seems to be at baseline, continue to monitor for now, avoid nephrotoxins, d/c IVF    Well-controlled type 2 diabetes with A1c 5.2%  -FSBG's have been reviewed and currently appropriate, continue SSI for now.    CAD-continue aspirin, statin    COPD-not in exacerbation    DVT Prophylaxis: SQH    Disposition: TBD    CODE STATUS:   Code Status and Medical Interventions:   Ordered at: 07/08/20 2200     Level Of Support Discussed With:    Patient     Code  Status:    CPR     Medical Interventions (Level of Support Prior to Arrest):    Full       Electronically signed by Rickey Garrison MD, 07/09/20, 09:47.

## 2020-07-09 NOTE — PROGRESS NOTES
Discharge Planning Assessment  [facility]     Patient Name: Georgia Velázquez  MRN: [id number]  Today's Date: 7/9/2020    Admit Date: 7/8/2020    Discharge Needs Assessment     Row Name 07/09/20 0829       Living Environment    Lives With  child(chandler), adult    Name(s) of Who Lives With Patient  Omaira Nair (daughter) [phone]    Current Living Arrangements  home/apartment/condo    Primary Care Provided by  self    Provides Primary Care For  no one    Family Caregiver if Needed  child(chandler), adult    Family Caregiver Names  Omaira Nari (daughter) [phone]    Quality of Family Relationships  helpful;involved;supportive    Able to Return to Prior Arrangements  yes       Resource/Environmental Concerns    Resource/Environmental Concerns  none    Transportation Concerns  car, none       Transition Planning    Patient/Family Anticipates Transition to  home with family    Patient/Family Anticipated Services at Transition  none    Transportation Anticipated  family or friend will provide       Discharge Needs Assessment    Readmission Within the Last 30 Days  no previous admission in last 30 days    Concerns to be Addressed  denies needs/concerns at this time    Equipment Currently Used at Home  bath bench;walker, rolling    Anticipated Changes Related to Illness  none    Equipment Needed After Discharge  none        Discharge Plan     Row Name 07/09/20 0830       Plan    Plan  Home with family    Patient/Family in Agreement with Plan  yes    Plan Comments  Spoke with patient at bedside. Lives with Omaira Nair (daughter) [phone] in [place]. Is independent with ADL's. No problems with [employer]/ [employer] or medications. Has a bath bench and rolling walker at home. Plan is home with daughter. CM will continue to follow.    Final Discharge Disposition Code  01 - home or self-care        Destination      Coordination has not been started for this encounter.      Durable Medical Equipment       Coordination has not been started for this encounter.      Dialysis/Infusion      Coordination has not been started for this encounter.      Home Medical Care      Coordination has not been started for this encounter.      Therapy      Coordination has not been started for this encounter.      Community Resources      Coordination has not been started for this encounter.          Demographic Summary     Row Name 07/09/20 0828       General Information    Admission Type  observation    Arrived From  emergency department    Referral Source  admission list    Reason for Consult  discharge planning    Preferred Language  English     Used During This Interaction  no       Contact Information    Permission Granted to Share Info With      Contact Information Obtained for      Contact Information Comments  PCP has Chaz Rico        Functional Status     Row Name 07/09/20 0829       Functional Status    Usual Activity Tolerance  moderate    Current Activity Tolerance  moderate       Functional Status, IADL    Medications  independent    Meal Preparation  independent    Housekeeping  independent    Laundry  independent    Shopping  independent       Mental Status    General Appearance WDL  WDL       Mental Status Summary    Recent Changes in Mental Status/Cognitive Functioning  no changes       Employment/    Employment Status  retired        Psychosocial    No documentation.       Abuse/Neglect    No documentation.       Legal    No documentation.       Substance Abuse    No documentation.       Patient Forms    No documentation.           Eitan Carbajal RN

## 2020-07-09 NOTE — H&P
[facility]  HISTORY AND PHYSICAL    Patient Name: Georgia Velázquez  : 1945  MRN: [id number]  Primary Care Physician: Chaz Rico, DNP, APRN  Date of admission: 2020      Subjective   Subjective     Chief Complaint:  Ams    HPI:  Georgia Velázquez is a 75 y.o. female with past medical history of COPD, CKD stage III, hypertension, type 2 diabetes, hyperlipidemia, coronary artery disease, GERD, and recurrent urinary tract infections who presents to the ER with altered mental status and was found to have laboratory data consistent with possible urinary tract infection.    Patient complains of some mild nausea and vomiting which has improved tonight.  She denies any confusion, however her daughter reports that she has been having issues with intermittent confusion over the last several days compared to her baseline.  Patient and family deny any cough, fever, exposure to COVID-19 positive patients, abdominal pain, diarrhea, or any other symptoms.    Review of Systems   Gen- No fevers, chills  CV- No chest pain, palpitations  Resp- No cough, dyspnea  GI- No N/V/D, abd pain      All other systems reviewed and are negative.     Personal History     Past Medical History:   Diagnosis Date   • Acute kidney injury (CMS/[facility]) 2017   • Arthritis of shoulder 2016   • Body mass index (BMI) of 45.0-49.9 in adult (CMS/[facility]) 2019   • CHF (congestive heart failure) (CMS/[facility])    • Closed compression fracture of L4 lumbar vertebra 2017    Added automatically from request for surgery [id number]   • Constipation 2017   • COPD (chronic obstructive pulmonary disease) (CMS/[facility])    • Coronary artery disease 2016   • Diabetes mellitus type 2 in obese (CMS/[facility]) 2018   • Elevated alkaline phosphatase level 2018   • Elevated creatine kinase    • Essential hypertension 2016   • GERD (gastroesophageal reflux disease) 2016   • History of  echocardiogram 10/16/2015    TDS.Est EF is 45-50%.Mild distal septal, anteroapical hypokinesia.Mild mitral stenosis.Mean gradient across mitral valve is 6 mmHg.Trace TR   • History of kyphoplasty 1/30/2018   • Hyperlipemia 5/18/2016   • Lumbar facet arthropathy 1/29/2018   • Lumbar stenosis with neurogenic claudication 1/29/2018   • Morbid obesity due to excess calories (CMS/[facility]) 1/29/2018   • Myocardial infarction (CMS/HCC) 5/18/2016   • Osteoporosis 5/18/2016   • Physical deconditioning 1/30/2018   • Sepsis (CMS/[facility])     uro   • Stroke (CMS/[facility])    • Type 2 diabetes mellitus (CMS/[facility]) 5/18/2016   • Urinary incontinence without sensory awareness 2/26/2018       Past Surgical History:   Procedure Laterality Date   • APPENDECTOMY     • BACK SURGERY     • CHOLECYSTECTOMY     • KYPHOPLASTY N/A 12/7/2017    Procedure: KYPHOPLASTY L4;  Surgeon: Marc Pham MD;  Location: [facility];  Service:        Family History: family history includes Diabetes in her mother; Heart failure in her father and mother; No Known Problems in her brother, daughter, sister, and son. Otherwise pertinent FHx was reviewed and unremarkable.     Social History:  reports that she quit smoking about 13 years ago. Her smoking use included cigarettes. She has never used smokeless tobacco. She reports that she does not drink alcohol or use drugs.  Social History     Social History Narrative    Living w daughter.       Medications:  Available home medication information reviewed.    (Not in a hospital admission)    No Known Allergies    Objective   Objective     Vital Signs:   Temp:  [98.6 °F (37 °C)] 98.6 °F (37 °C)  Heart Rate:  [53-62] 57  Resp:  [18] 18  BP: ()/(39-68) 114/68        Physical Exam   Constitutional: Awake, alert  Eyes: PERRLA, sclerae anicteric, no conjunctival injection  HENT: NCAT, mucous membranes dry  Neck: Supple, no thyromegaly, no lymphadenopathy, trachea midline  Respiratory: Clear to auscultation bilaterally,  nonlabored respirations   Cardiovascular: RRR, no murmurs, rubs, or gallops, palpable pedal pulses bilaterally  Gastrointestinal: Positive bowel sounds, soft, nontender, nondistended  Musculoskeletal: trace bilateral ankle edema, no clubbing or cyanosis to extremities  Psychiatric: Appropriate affect, cooperative  Neurologic: Oriented to person, place, she thinks Ja is the president, strength symmetric in all extremities, Cranial Nerves grossly intact to confrontation, speech clear  Skin: No rashes      Results Reviewed:  I have personally reviewed current lab and radiology data.    Results from last 7 days   Lab Units 07/08/20  1904   WBC 10*3/mm3 13.94*   HEMOGLOBIN g/dL 12.0   HEMATOCRIT % 37.8   PLATELETS 10*3/mm3 157     Results from last 7 days   Lab Units 07/08/20  1904   SODIUM mmol/L 139   POTASSIUM mmol/L 3.9   CHLORIDE mmol/L 109*   CO2 mmol/L 17.0*   BUN mg/dL 43*   CREATININE mg/dL 1.66*   GLUCOSE mg/dL 191*   CALCIUM mg/dL 8.8   ALT (SGPT) U/L 12   AST (SGOT) U/L 16   TROPONIN T ng/mL <0.010   PROBNP pg/mL 930.9     Estimated Creatinine Clearance: 32.7 mL/min (A) (by C-G formula based on SCr of 1.66 mg/dL (H)).  Brief Urine Lab Results  (Last result in the past 365 days)      Color   Clarity   Blood   Leuk Est   Nitrite   Protein   CREAT   Urine HCG        07/08/20 2057 Yellow Cloudy Small (1+) Large (3+) Negative Trace             Imaging Results (Last 24 Hours)     Procedure Component Value Units Date/Time    XR Chest PA & Lateral [[id number]] Collected:  07/08/20 2028     Updated:  07/08/20 2030    Narrative:       CR Chest 2 Vws    INDICATION:    75-year-old emergency department patient with complaint of shortness of air today. Patient's daughter thinks she may have a urinary tract infection. Vomiting today    COMPARISON:    Chest CT 5/21/2020, chest x-ray 11/28/2019    FINDINGS:   PA and lateral views of the chest.  Heart size is normal. Mediastinal, hilar and aortic contours are normal. The  lungs are clear. No effusions.        Impression:       No acute cardiopulmonary findings.    Signer Name: Julita Melendez MD   Signed: 7/8/2020 8:28 PM   Workstation Name: [id number]    [facility]        Results for orders placed during the hospital encounter of 06/04/20   Adult Transthoracic Echo Complete W/ Cont if Necessary Per Protocol    Narrative · Estimated EF appears to be in the range of 51 - 55%.  · Left ventricular systolic function is normal.  · Left ventricular diastolic dysfunction (grade II) consistent with   pseudonormalization.  · The following left ventricular wall segments are hypokinetic: apical   anterior, apical lateral, apical inferior, apical septal and apex   hypokinetic.  · Mild aortic valve regurgitation is present.          Assessment/Plan   Assessment & Plan     Active Hospital Problems    Diagnosis POA   • **Urinary tract infection [N39.0] Yes   • Chronic obstructive pulmonary disease (CMS/[facility]) [J44.9] Yes   • Chronic kidney disease [N18.9] Yes   • Essential hypertension [I10] Yes   • Type 2 diabetes mellitus (CMS/[facility]) [E11.9] Yes   • Hyperlipidemia [E78.5] Yes   • Coronary artery disease [I25.10] Yes   • GERD (gastroesophageal reflux disease) [K21.9] Yes       Pt is a 75-year-old female with past medical history of COPD, CKD stage III, hypertension, type 2 diabetes, hyperlipidemia, coronary artery disease, GERD, and recurrent urinary tract infections who presents to the ER with altered mental status and was found to have laboratory data consistent with possible urinary tract infection.    1.  Altered mental status  2.  Urinary tract infection, present on admission  3.  COPD  4.  CKD stage III  5.  Hypertension  6.  Hyperlipidemia  7.  Type 2 diabetes  8.  Coronary artery disease  10.  GERD    --Similar presentation in May of this year with E. coli urinary tract infection.  Review of antibiotics sensitivity reveals pan sensitivity.  --Ceftriaxone IV for now  as patient is having some nausea  --Hold on CT head for now, no new focal deficit  --Bed alarm, fall precautions    DVT prophylaxis:  heparin      Admission Communication  Due to current limited visitation policies, an attempt will be made to update patient's identified best point-of-contact(s) at admission to discuss plan of care.   Contact: Mrs. Patel   Relation: alice   Time of communication: 2210   Notes (if applicable): Splane diagnosis, condition, treatment plan         CODE STATUS:  Code status  Code Status and Medical Interventions:   Ordered at: 07/08/20 2200     Level Of Support Discussed With:    Patient     Code Status:    CPR     Medical Interventions (Level of Support Prior to Arrest):    Full       Admission Status:  I believe this patient meets OBSERVATION status, however if further evaluation or treatment plans warrant, status may change.  Based upon current information, I predict patient's care encounter to be less than or equal to 2 midnights.      Electronically signed by Ismael Thomas DO, 07/08/20, 10:01 PM.

## 2020-07-10 ENCOUNTER — READMISSION MANAGEMENT (OUTPATIENT)
Dept: CALL CENTER | Facility: HOSPITAL | Age: 75
End: 2020-07-10

## 2020-07-10 VITALS
TEMPERATURE: 98.1 F | DIASTOLIC BLOOD PRESSURE: 46 MMHG | RESPIRATION RATE: 18 BRPM | OXYGEN SATURATION: 96 % | SYSTOLIC BLOOD PRESSURE: 125 MMHG | HEART RATE: 54 BPM | HEIGHT: 62 IN | BODY MASS INDEX: 43.24 KG/M2 | WEIGHT: 235 LBS

## 2020-07-10 PROBLEM — N39.0 URINARY TRACT INFECTION: Status: RESOLVED | Noted: 2017-12-05 | Resolved: 2020-07-10

## 2020-07-10 LAB
ANION GAP SERPL CALCULATED.3IONS-SCNC: 12 MMOL/L (ref 5–15)
BACTERIA SPEC AEROBE CULT: ABNORMAL
BASOPHILS # BLD AUTO: 0.03 10*3/MM3 (ref 0–0.2)
BASOPHILS NFR BLD AUTO: 0.4 % (ref 0–1.5)
BUN SERPL-MCNC: 35 MG/DL (ref 8–23)
BUN/CREAT SERPL: 28.2 (ref 7–25)
CALCIUM SPEC-SCNC: 7.9 MG/DL (ref 8.6–10.5)
CHLORIDE SERPL-SCNC: 116 MMOL/L (ref 98–107)
CO2 SERPL-SCNC: 12 MMOL/L (ref 22–29)
CREAT SERPL-MCNC: 1.24 MG/DL (ref 0.57–1)
DEPRECATED RDW RBC AUTO: 59.9 FL (ref 37–54)
EOSINOPHIL # BLD AUTO: 0.1 10*3/MM3 (ref 0–0.4)
EOSINOPHIL NFR BLD AUTO: 1.2 % (ref 0.3–6.2)
ERYTHROCYTE [DISTWIDTH] IN BLOOD BY AUTOMATED COUNT: 14.6 % (ref 12.3–15.4)
GFR SERPL CREATININE-BSD FRML MDRD: 42 ML/MIN/1.73
GLUCOSE BLDC GLUCOMTR-MCNC: 88 MG/DL (ref 70–130)
GLUCOSE SERPL-MCNC: 88 MG/DL (ref 65–99)
HCT VFR BLD AUTO: 37.5 % (ref 34–46.6)
HGB BLD-MCNC: 10.4 G/DL (ref 12–15.9)
IMM GRANULOCYTES # BLD AUTO: 0.03 10*3/MM3 (ref 0–0.05)
IMM GRANULOCYTES NFR BLD AUTO: 0.4 % (ref 0–0.5)
LYMPHOCYTES # BLD AUTO: 1.86 10*3/MM3 (ref 0.7–3.1)
LYMPHOCYTES NFR BLD AUTO: 22.7 % (ref 19.6–45.3)
MAGNESIUM SERPL-MCNC: 2.3 MG/DL (ref 1.6–2.4)
MCH RBC QN AUTO: 30.6 PG (ref 26.6–33)
MCHC RBC AUTO-ENTMCNC: 27.7 G/DL (ref 31.5–35.7)
MCV RBC AUTO: 110.3 FL (ref 79–97)
MONOCYTES # BLD AUTO: 0.7 10*3/MM3 (ref 0.1–0.9)
MONOCYTES NFR BLD AUTO: 8.6 % (ref 5–12)
NEUTROPHILS NFR BLD AUTO: 5.46 10*3/MM3 (ref 1.7–7)
NEUTROPHILS NFR BLD AUTO: 66.7 % (ref 42.7–76)
NRBC BLD AUTO-RTO: 0 /100 WBC (ref 0–0.2)
PLATELET # BLD AUTO: 116 10*3/MM3 (ref 140–450)
PMV BLD AUTO: 11.8 FL (ref 6–12)
POTASSIUM SERPL-SCNC: 3.5 MMOL/L (ref 3.5–5.2)
RBC # BLD AUTO: 3.4 10*6/MM3 (ref 3.77–5.28)
SODIUM SERPL-SCNC: 140 MMOL/L (ref 136–145)
WBC # BLD AUTO: 8.18 10*3/MM3 (ref 3.4–10.8)

## 2020-07-10 PROCEDURE — 85025 COMPLETE CBC W/AUTO DIFF WBC: CPT | Performed by: INTERNAL MEDICINE

## 2020-07-10 PROCEDURE — 80048 BASIC METABOLIC PNL TOTAL CA: CPT | Performed by: INTERNAL MEDICINE

## 2020-07-10 PROCEDURE — 96372 THER/PROPH/DIAG INJ SC/IM: CPT

## 2020-07-10 PROCEDURE — G0378 HOSPITAL OBSERVATION PER HR: HCPCS

## 2020-07-10 PROCEDURE — 96361 HYDRATE IV INFUSION ADD-ON: CPT

## 2020-07-10 PROCEDURE — 83735 ASSAY OF MAGNESIUM: CPT | Performed by: INTERNAL MEDICINE

## 2020-07-10 PROCEDURE — 25010000002 HEPARIN (PORCINE) PER 1000 UNITS: Performed by: INTERNAL MEDICINE

## 2020-07-10 PROCEDURE — 99217 PR OBSERVATION CARE DISCHARGE MANAGEMENT: CPT | Performed by: INTERNAL MEDICINE

## 2020-07-10 PROCEDURE — 82962 GLUCOSE BLOOD TEST: CPT

## 2020-07-10 RX ORDER — CEPHALEXIN 500 MG/1
500 CAPSULE ORAL 2 TIMES DAILY
Qty: 6 CAPSULE | Refills: 0 | Status: SHIPPED | OUTPATIENT
Start: 2020-07-10 | End: 2020-07-13

## 2020-07-10 RX ADMIN — ASPIRIN 81 MG: 81 TABLET, CHEWABLE ORAL at 08:28

## 2020-07-10 RX ADMIN — CLOPIDOGREL BISULFATE 75 MG: 75 TABLET ORAL at 08:28

## 2020-07-10 RX ADMIN — TIMOLOL MALEATE 1 DROP: 5 SOLUTION/ DROPS OPHTHALMIC at 08:28

## 2020-07-10 RX ADMIN — SODIUM CHLORIDE, PRESERVATIVE FREE 10 ML: 5 INJECTION INTRAVENOUS at 08:28

## 2020-07-10 RX ADMIN — SODIUM CHLORIDE 125 ML/HR: 9 INJECTION, SOLUTION INTRAVENOUS at 06:39

## 2020-07-10 RX ADMIN — HEPARIN SODIUM 5000 UNITS: 5000 INJECTION INTRAVENOUS; SUBCUTANEOUS at 06:39

## 2020-07-10 RX ADMIN — ATORVASTATIN CALCIUM 40 MG: 40 TABLET, FILM COATED ORAL at 08:28

## 2020-07-10 NOTE — DISCHARGE SUMMARY
[facility]  DISCHARGE SUMMARY    Patient Name: Georgia Velázquez  : 1945  MRN: [id number]    Date of Admission: 2020  8:06 PM  Date of Discharge: 7/10/2020  Primary Care Physician: Chaz Rico, DNP, APRN    Consults     No orders found from 2020 to 2020.          Hospital Course     Presenting Problem:   Urinary tract infection without hematuria, site unspecified [N39.0]    Active Hospital Problems    Diagnosis  POA   • Chronic obstructive pulmonary disease (CMS/[facility]) [J44.9]  Yes   • Chronic kidney disease [N18.9]  Yes   • Essential hypertension [I10]  Yes   • Type 2 diabetes mellitus (CMS/[facility]) [E11.9]  Yes   • Hyperlipidemia [E78.5]  Yes   • Coronary artery disease [I25.10]  Yes   • GERD (gastroesophageal reflux disease) [K21.9]  Yes      Resolved Hospital Problems    Diagnosis Date Resolved POA   • **Urinary tract infection [N39.0] 07/10/2020 Yes      Hospital Course:  Georgia Velázquez is a 75 y.o. female with past medical history of CKD stage III, type 2 diabetes, hypertension, hyperlipidemia, CAD, COPD.  Patient presented to ED with intermittent confusion admitted with acute encephalopathy like secondary to UTI     Acute encephalopathy,  resolved  -This was secondary to UTI, UA had 4+ bacteria, large leuks, urine cultures grew pansensitive E. coli, blood cultures NGTD.  She was started on IV fluids and IV Rocephin, she received 2 doses, will discharge home with Keflex 500 mg twice daily to complete a 5-day course.     CKD stage III-renal function is at baseline     Well-controlled type 2 diabetes with A1c 5.2%  -FSBG's were reviewed and remained appropriate, she was on SSI.  Okay to resume home regimen at discharge     CAD-continue aspirin, statin     COPD-not in exacerbation    Patient has planned eye surgery next week, from our standpoint she will be ok to proceed with this    Discharge Follow Up Recommendations for outpatient  labs/diagnostics:  Follow-up with PCP in 1 week    Day of Discharge     HPI: No acute events overnight, patient slept well, she is awake alert and appropriate, denies any fevers or chills.    Review of Systems  Gen- No fevers, chills  CV- No chest pain, palpitations  Resp- No cough, dyspnea  GI- No N/V/D, abd pain    All systems reviewed and currently negative except as mentioned in HPI above    Vital Signs:   Temp:  [97.3 °F (36.3 °C)-98.1 °F (36.7 °C)] 98.1 °F (36.7 °C)  Heart Rate:  [50-62] 62  Resp:  [16-18] 18  BP: (118-126)/(40-78) 125/46     Physical Exam:  Constitutional: Elderly lady, in no acute distress, awake, alert  HENT: NCAT, mucous membranes moist  Respiratory: Clear to auscultation bilaterally, respiratory effort normal   Cardiovascular: RRR, no murmurs, rubs, or gallops, palpable pedal pulses bilaterally  Gastrointestinal: Positive bowel sounds, soft, nontender, nondistended  Musculoskeletal: No bilateral ankle edema  Psychiatric: Appropriate affect, cooperative  Neurologic: Oriented x 3, no focal deficits  Skin: No rashes    Pertinent  and/or Most Recent Results     Results from last 7 days   Lab Units 07/10/20  0513 07/09/20  0611 07/08/20  1904   WBC 10*3/mm3 8.18 10.22 13.94*   HEMOGLOBIN g/dL 10.4* 11.6* 12.0   HEMATOCRIT % 37.5 37.8 37.8   PLATELETS 10*3/mm3 116* 135* 157   SODIUM mmol/L 140 138 139   POTASSIUM mmol/L 3.5 3.5 3.9   CHLORIDE mmol/L 116* 111* 109*   CO2 mmol/L 12.0* 13.0* 17.0*   BUN mg/dL 35* 41* 43*   CREATININE mg/dL 1.24* 1.39* 1.66*   GLUCOSE mg/dL 88 90 191*   CALCIUM mg/dL 7.9* 8.5* 8.8     Results from last 7 days   Lab Units 07/08/20  1904   BILIRUBIN mg/dL 0.5   ALK PHOS U/L 96   ALT (SGPT) U/L 12   AST (SGOT) U/L 16           Invalid input(s): TG, LDLCALC, LDLREALC  Results from last 7 days   Lab Units 07/08/20  2214 07/08/20  1904   PROBNP pg/mL  --  930.9   TROPONIN T ng/mL  --  <0.010   LACTATE mmol/L 1.1  --        Brief Urine Lab Results  (Last result in the  past 365 days)      Color   Clarity   Blood   Leuk Est   Nitrite   Protein   CREAT   Urine HCG        07/08/20 2057 Yellow Cloudy Small (1+) Large (3+) Negative Trace               Microbiology Results Abnormal     Procedure Component Value - Date/Time    Urine Culture - Urine, Urine, Catheter [[id number]]  (Abnormal)  (Susceptibility) Collected:  07/08/20 2057    Lab Status:  Final result Specimen:  Urine, Catheter Updated:  07/10/20 0331     Urine Culture >100,000 CFU/mL Escherichia coli    Susceptibility      Escherichia coli     MIGUEL ANGEL     Ampicillin Susceptible     Ampicillin + Sulbactam Susceptible     Cefazolin Susceptible     Cefepime Susceptible     Ceftazidime Susceptible     Ceftriaxone Susceptible     Gentamicin Susceptible     Levofloxacin Susceptible     Nitrofurantoin Susceptible     Piperacillin + Tazobactam Susceptible     Tetracycline Susceptible     Trimethoprim + Sulfamethoxazole Susceptible                    Blood Culture - Blood, Arm, Left [[id number]] Collected:  07/08/20 2216    Lab Status:  Preliminary result Specimen:  Blood from Arm, Left Updated:  07/09/20 2245     Blood Culture No growth at 24 hours    Blood Culture - Blood, Arm, Right [[id number]] Collected:  07/08/20 2205    Lab Status:  Preliminary result Specimen:  Blood from Arm, Right Updated:  07/09/20 2245     Blood Culture No growth at 24 hours          Imaging Results (All)     Procedure Component Value Units Date/Time    XR Chest PA & Lateral [[id number]] Collected:  07/08/20 2028     Updated:  07/08/20 2030    Narrative:       CR Chest 2 Vws    INDICATION:    75-year-old emergency department patient with complaint of shortness of air today. Patient's daughter thinks she may have a urinary tract infection. Vomiting today    COMPARISON:    Chest CT 5/21/2020, chest x-ray 11/28/2019    FINDINGS:   PA and lateral views of the chest.  Heart size is normal. Mediastinal, hilar and aortic contours are normal. The lungs are clear. No  effusions.        Impression:       No acute cardiopulmonary findings.    Signer Name: Julita Melendez MD   Signed: 7/8/2020 8:28 PM   Workstation Name: [id number]    [facility]          Results for orders placed during the hospital encounter of 06/04/20   Duplex Carotid Ultrasound CAR    Narrative · Left mid ICA near 70%, however EDV less than 100 and ratio less than 4.  · Right internal carotid artery stenosis of 0-49%.  · Bilateral antegrade vertebral flow.          Results for orders placed during the hospital encounter of 06/04/20   Duplex Carotid Ultrasound CAR    Narrative · Left mid ICA near 70%, however EDV less than 100 and ratio less than 4.  · Right internal carotid artery stenosis of 0-49%.  · Bilateral antegrade vertebral flow.          Results for orders placed during the hospital encounter of 06/04/20   Adult Transthoracic Echo Complete W/ Cont if Necessary Per Protocol    Narrative · Estimated EF appears to be in the range of 51 - 55%.  · Left ventricular systolic function is normal.  · Left ventricular diastolic dysfunction (grade II) consistent with   pseudonormalization.  · The following left ventricular wall segments are hypokinetic: apical   anterior, apical lateral, apical inferior, apical septal and apex   hypokinetic.  · Mild aortic valve regurgitation is present.          Plan for Follow-up of Pending Labs/Results: PCP   Order Current Status    Blood Culture - Blood, Arm, Left Preliminary result    Blood Culture - Blood, Arm, Right Preliminary result        Discharge Details        Discharge Medications      New Medications      Instructions Start Date   cephalexin 500 MG capsule  Commonly known as:  Keflex   500 mg, Oral, 2 Times Daily         Continue These Medications      Instructions Start Date   acetaZOLAMIDE 500 MG capsule  Commonly known as:  DIAMOX   500 mg, Oral, 2 times daily      aspirin 81 MG chewable tablet   81 mg, Oral, Daily      atorvastatin 40  MG tablet  Commonly known as:  LIPITOR   40 mg, Oral, Daily      brimonidine-timolol 0.2-0.5 % ophthalmic solution  Commonly known as:  COMBIGAN   1 drop, Right Eye, 3 Times Daily      carvedilol 6.25 MG tablet  Commonly known as:  COREG   6.25 mg, Oral, 2 Times Daily With Meals      clopidogrel 75 MG tablet  Commonly known as:  PLAVIX   75 mg, Oral, Daily      latanoprost 0.005 % ophthalmic solution  Commonly known as:  XALATAN   1 drop, Right Eye, Nightly      nitroglycerin 0.4 MG SL tablet  Commonly known as:  NITROSTAT   0.4 mg, Sublingual, Every 5 Minutes PRN, Take no more than 3 doses in 15 minutes.      valsartan-hydrochlorothiazide 160-25 MG per tablet  Commonly known as:  DIOVAN-HCT   1 tablet, Oral, Daily      VITAMIN D2 PO   20,000 Units, Oral, Daily             No Known Allergies      Discharge Disposition: Home  Home or Self Care    Diet:  Hospital:  Diet Order   Procedures   • Diet Regular; Consistent Carbohydrate       Activity: As tolerated    Restrictions or Other Recommendations:  None       CODE STATUS:    Code Status and Medical Interventions:   Ordered at: 07/08/20 2200     Level Of Support Discussed With:    Patient     Code Status:    CPR     Medical Interventions (Level of Support Prior to Arrest):    Full       Future Appointments   Date Time Provider Department Center   10/7/2020  1:15 PM Armaan Samuels III, MD [facility] None       Electronically signed by Rickey Garrison MD, 07/10/20, 7:57 AM.    Time Spent on Discharge:  I spent  30  minutes on this discharge activity which included: face-to-face encounter with the patient, reviewing the data in the system, coordination of the care with the nursing staff as well as consultants, documentation, and entering orders.

## 2020-07-11 NOTE — OUTREACH NOTE
Prep Survey      Responses   [facility] facility patient discharged from?  [place]   Is LACE score < 7 ?  No   Eligibility  [facility]   Date of Admission  07/08/20   Date of Discharge  07/10/20   Discharge Disposition  Home or Self Care   Discharge diagnosis  UTI   COVID-19 Test Status  Not tested   Does the patient have one of the following disease processes/diagnoses(primary or secondary)?  Other   Does the patient have Home health ordered?  No   Is there a DME ordered?  No   Prep survey completed?  Yes          Jeanne Leger, RN

## 2020-07-13 ENCOUNTER — TRANSITIONAL CARE MANAGEMENT TELEPHONE ENCOUNTER (OUTPATIENT)
Dept: CALL CENTER | Facility: HOSPITAL | Age: 75
End: 2020-07-13

## 2020-07-13 LAB
BACTERIA SPEC AEROBE CULT: NORMAL
BACTERIA SPEC AEROBE CULT: NORMAL

## 2020-07-13 NOTE — OUTREACH NOTE
Call Center TCM Note      Responses   [facility] patient discharged from?  [place]   COVID-19 Test Status  Not tested   Does the patient have one of the following disease processes/diagnoses(primary or secondary)?  Other   TCM attempt successful?  Yes   Call start time  1202   Call end time  1208   Discharge diagnosis  UTI   Is patient permission given to speak with other caregiver?  Yes   List who call center can speak with  Omaira Patel, daughter   Person spoke with today (if not patient) and relationship  Omaira Patel, daughter   Meds reviewed with patient/caregiver?  Yes   Is the patient having any side effects they believe may be caused by any medication additions or changes?  No   Does the patient have all medications ordered at discharge?  Yes   Is the patient taking all medications as directed (includes completed medication regime)?  Yes   Medication comments  Daughter reports patient finished antibiotics.    Does the patient have a primary care provider?   Yes   Does the patient have an appointment with their PCP within 7 days of discharge?  No   Comments regarding PCP  PCP Petar OCHOA. Hospital follow up not in place. Message routed to provider with patient appt requests   Nursing Interventions  Educated patient on importance of making appointment, Advised patient to make appointment   Has the patient kept scheduled appointments due by today?  N/A   Comments  Daughter reports patient planned for eye surgery on Wednesday.    Has home health visited the patient within 72 hours of discharge?  N/A   Pulse Ox monitoring  None   Psychosocial issues?  No   Comments  Daughter reports she has patient drinking more water and doing bladder training with going to bathroom every 2 hours.    Did the patient receive a copy of their discharge instructions?  Yes   Nursing interventions  Reviewed instructions with patient [daughter]   What is the patient's perception of their health status since discharge?   Improving   Is the patient/caregiver able to teach back signs and symptoms related to disease process for when to call PCP?  Yes   Is the patient/caregiver able to teach back signs and symptoms related to disease process for when to call 911?  Yes   Is the patient/caregiver able to teach back the hierarchy of who to call/visit for symptoms/problems? PCP, Specialist, Home health nurse, Urgent Care, ED, 911  Yes   TCM call completed?  Yes          Awilda Kamara RN    7/13/2020, 12:08

## 2020-07-20 ENCOUNTER — OFFICE VISIT (OUTPATIENT)
Dept: FAMILY MEDICINE CLINIC | Facility: CLINIC | Age: 75
End: 2020-07-20

## 2020-07-20 VITALS
RESPIRATION RATE: 20 BRPM | DIASTOLIC BLOOD PRESSURE: 82 MMHG | HEIGHT: 62 IN | OXYGEN SATURATION: 98 % | TEMPERATURE: 98 F | HEART RATE: 52 BPM | BODY MASS INDEX: 42.73 KG/M2 | SYSTOLIC BLOOD PRESSURE: 138 MMHG | WEIGHT: 232.2 LBS

## 2020-07-20 DIAGNOSIS — E11.36 TYPE 2 DIABETES MELLITUS WITH DIABETIC CATARACT, WITHOUT LONG-TERM CURRENT USE OF INSULIN (HCC): ICD-10-CM

## 2020-07-20 DIAGNOSIS — R82.71 BACTERIURIA: ICD-10-CM

## 2020-07-20 DIAGNOSIS — N30.01 ACUTE CYSTITIS WITH HEMATURIA: Primary | ICD-10-CM

## 2020-07-20 PROCEDURE — 99213 OFFICE O/P EST LOW 20 MIN: CPT | Performed by: NURSE PRACTITIONER

## 2020-07-20 RX ORDER — ATROPINE SULFATE 10 MG/ML
SOLUTION/ DROPS OPHTHALMIC
COMMUNITY
Start: 2020-07-15 | End: 2020-07-30

## 2020-07-20 RX ORDER — ATORVASTATIN CALCIUM 40 MG/1
40 TABLET, FILM COATED ORAL DAILY
COMMUNITY
End: 2021-01-11

## 2020-07-20 RX ORDER — MOXIFLOXACIN 5 MG/ML
SOLUTION/ DROPS OPHTHALMIC
COMMUNITY
Start: 2020-07-15 | End: 2020-07-30

## 2020-07-20 RX ORDER — PREDNISOLONE ACETATE 10 MG/ML
SUSPENSION/ DROPS OPHTHALMIC
COMMUNITY
Start: 2020-07-15 | End: 2020-10-07

## 2020-07-20 NOTE — PROGRESS NOTES
Subjective   Georgia Velázquez is a 75 y.o. female.     History of Present Illness Here to follow up on recent hospital stay for UTI. Had mental status changes. Urine with E coli. Treated with antibiotics and discharged to home on Keflex. Finished Keflex 5 days ago. Feeling well. No fever, nausea or vomiting. No dysuria. Had right eye surgery yesterday with blurred vision today. She's not sure what type of surgery she had. No pain. Denies dysuria.    The following portions of the patient's history were reviewed and updated as appropriate: allergies, current medications, past family history, past medical history, past social history, past surgical history and problem list.    Review of Systems   Constitutional: Negative for appetite change, fever, unexpected weight gain and unexpected weight loss.   HENT: Negative for congestion, nosebleeds, sore throat and trouble swallowing.    Eyes: Positive for blurred vision. Negative for visual disturbance.   Respiratory: Negative for cough, shortness of breath and wheezing.    Cardiovascular: Negative for chest pain, palpitations and leg swelling.   Gastrointestinal: Negative for abdominal pain, blood in stool, constipation, diarrhea, nausea and vomiting.   Endocrine: Negative for polydipsia, polyphagia and polyuria.   Genitourinary: Negative for dysuria, frequency and hematuria.   Musculoskeletal: Negative for arthralgias, joint swelling and myalgias.   Skin: Negative for rash.   Neurological: Negative for dizziness, seizures, syncope and numbness.   Hematological: Negative for adenopathy. Does not bruise/bleed easily.   Psychiatric/Behavioral: Negative for behavioral problems, sleep disturbance and depressed mood. The patient is not nervous/anxious.        Objective   Physical Exam   Constitutional: She is oriented to person, place, and time. She appears well-developed and well-nourished. No distress.   HENT:   Head: Normocephalic and atraumatic.   Right Ear: Tympanic  membrane and external ear normal.   Left Ear: Tympanic membrane and external ear normal.   Eyes: Pupils are equal, round, and reactive to light. Right eye exhibits no discharge. Left eye exhibits no discharge. No scleral icterus.   Right sclera is injected   Neck: Neck supple. No tracheal deviation present. No thyromegaly present.   Cardiovascular: Normal rate, regular rhythm and normal heart sounds. Exam reveals no gallop and no friction rub.   No murmur heard.  Pulmonary/Chest: Effort normal and breath sounds normal. No respiratory distress. She has no wheezes.   Abdominal: Soft. Bowel sounds are normal. She exhibits no distension and no mass. There is no tenderness.   Musculoskeletal: She exhibits no edema or deformity.   Lymphadenopathy:     She has no cervical adenopathy.   Neurological: She is alert and oriented to person, place, and time. Coordination normal.   Skin: Skin is warm and dry. Capillary refill takes less than 2 seconds. No rash noted. No erythema.   Psychiatric: She has a normal mood and affect. Her speech is normal and behavior is normal. Judgment and thought content normal.   Nursing note and vitals reviewed.        Assessment/Plan   Georgia was seen today for hospital follow up.    Diagnoses and all orders for this visit:    Acute cystitis with hematuria  -     Cancel: POC Urinalysis Dipstick, Automated  -     Cancel: POC Microalbumin    Type 2 diabetes mellitus with diabetic cataract, without long-term current use of insulin (CMS/[facility])  -     Cancel: Urine Culture - Urine, Urine, Clean Catch    Bacteriuria   -     Cancel: Urine Culture - Urine, Urine, Clean Catch    She missed the speci-hat. Will have her return on Monday for Medicare wellness and will get UA at that time.  She is asymptomatic at this time.  Return on Monday.

## 2020-07-21 ENCOUNTER — APPOINTMENT (OUTPATIENT)
Dept: CT IMAGING | Facility: HOSPITAL | Age: 75
End: 2020-07-21

## 2020-07-21 ENCOUNTER — HOSPITAL ENCOUNTER (OUTPATIENT)
Facility: HOSPITAL | Age: 75
Setting detail: OBSERVATION
LOS: 1 days | Discharge: HOME | End: 2020-07-23
Attending: EMERGENCY MEDICINE | Admitting: INTERNAL MEDICINE

## 2020-07-21 DIAGNOSIS — R11.2 NAUSEA VOMITING AND DIARRHEA: Primary | ICD-10-CM

## 2020-07-21 DIAGNOSIS — R55 SYNCOPE, UNSPECIFIED SYNCOPE TYPE: ICD-10-CM

## 2020-07-21 DIAGNOSIS — K52.9 COLITIS: ICD-10-CM

## 2020-07-21 DIAGNOSIS — R19.7 NAUSEA VOMITING AND DIARRHEA: Primary | ICD-10-CM

## 2020-07-21 PROBLEM — I51.89 DIASTOLIC DYSFUNCTION: Status: ACTIVE | Noted: 2020-07-21

## 2020-07-21 PROBLEM — H40.9 GLAUCOMA: Status: ACTIVE | Noted: 2020-07-21

## 2020-07-21 LAB
ALBUMIN SERPL-MCNC: 3.9 G/DL (ref 3.5–5.2)
ALBUMIN/GLOB SERPL: 1.3 G/DL
ALP SERPL-CCNC: 94 U/L (ref 39–117)
ALT SERPL W P-5'-P-CCNC: 11 U/L (ref 1–33)
ANION GAP SERPL CALCULATED.3IONS-SCNC: 14 MMOL/L (ref 5–15)
AST SERPL-CCNC: 15 U/L (ref 1–32)
BASOPHILS # BLD AUTO: 0.03 10*3/MM3 (ref 0–0.2)
BASOPHILS NFR BLD AUTO: 0.3 % (ref 0–1.5)
BILIRUB SERPL-MCNC: 0.6 MG/DL (ref 0–1.2)
BILIRUB UR QL STRIP: NEGATIVE
BUN SERPL-MCNC: 15 MG/DL (ref 8–23)
BUN/CREAT SERPL: 10.8 (ref 7–25)
CALCIUM SPEC-SCNC: 9.2 MG/DL (ref 8.6–10.5)
CHLORIDE SERPL-SCNC: 106 MMOL/L (ref 98–107)
CLARITY UR: CLEAR
CO2 SERPL-SCNC: 21 MMOL/L (ref 22–29)
COLOR UR: YELLOW
CREAT SERPL-MCNC: 1.39 MG/DL (ref 0.57–1)
DEPRECATED RDW RBC AUTO: 52.9 FL (ref 37–54)
EOSINOPHIL # BLD AUTO: 0.08 10*3/MM3 (ref 0–0.4)
EOSINOPHIL NFR BLD AUTO: 0.9 % (ref 0.3–6.2)
ERYTHROCYTE [DISTWIDTH] IN BLOOD BY AUTOMATED COUNT: 14.6 % (ref 12.3–15.4)
GFR SERPL CREATININE-BSD FRML MDRD: 37 ML/MIN/1.73
GLOBULIN UR ELPH-MCNC: 3.1 GM/DL
GLUCOSE BLDC GLUCOMTR-MCNC: 145 MG/DL (ref 70–130)
GLUCOSE SERPL-MCNC: 153 MG/DL (ref 65–99)
GLUCOSE UR STRIP-MCNC: NEGATIVE MG/DL
HCT VFR BLD AUTO: 36.9 % (ref 34–46.6)
HGB BLD-MCNC: 11.5 G/DL (ref 12–15.9)
HGB UR QL STRIP.AUTO: NEGATIVE
HOLD SPECIMEN: NORMAL
HOLD SPECIMEN: NORMAL
IMM GRANULOCYTES # BLD AUTO: 0.04 10*3/MM3 (ref 0–0.05)
IMM GRANULOCYTES NFR BLD AUTO: 0.5 % (ref 0–0.5)
KETONES UR QL STRIP: NEGATIVE
LEUKOCYTE ESTERASE UR QL STRIP.AUTO: NEGATIVE
LYMPHOCYTES # BLD AUTO: 1.67 10*3/MM3 (ref 0.7–3.1)
LYMPHOCYTES NFR BLD AUTO: 19.1 % (ref 19.6–45.3)
MAGNESIUM SERPL-MCNC: 1.8 MG/DL (ref 1.6–2.4)
MCH RBC QN AUTO: 30.7 PG (ref 26.6–33)
MCHC RBC AUTO-ENTMCNC: 31.2 G/DL (ref 31.5–35.7)
MCV RBC AUTO: 98.4 FL (ref 79–97)
MONOCYTES # BLD AUTO: 0.44 10*3/MM3 (ref 0.1–0.9)
MONOCYTES NFR BLD AUTO: 5 % (ref 5–12)
NEUTROPHILS NFR BLD AUTO: 6.5 10*3/MM3 (ref 1.7–7)
NEUTROPHILS NFR BLD AUTO: 74.2 % (ref 42.7–76)
NITRITE UR QL STRIP: NEGATIVE
NRBC BLD AUTO-RTO: 0 /100 WBC (ref 0–0.2)
PH UR STRIP.AUTO: <=5 [PH] (ref 5–8)
PLATELET # BLD AUTO: 164 10*3/MM3 (ref 140–450)
PMV BLD AUTO: 11.1 FL (ref 6–12)
POTASSIUM SERPL-SCNC: 3.6 MMOL/L (ref 3.5–5.2)
PROT SERPL-MCNC: 7 G/DL (ref 6–8.5)
PROT UR QL STRIP: ABNORMAL
RBC # BLD AUTO: 3.75 10*6/MM3 (ref 3.77–5.28)
SODIUM SERPL-SCNC: 141 MMOL/L (ref 136–145)
SP GR UR STRIP: 1.01 (ref 1–1.03)
TROPONIN T SERPL-MCNC: <0.01 NG/ML (ref 0–0.03)
UROBILINOGEN UR QL STRIP: ABNORMAL
WBC # BLD AUTO: 8.76 10*3/MM3 (ref 3.4–10.8)
WHOLE BLOOD HOLD SPECIMEN: NORMAL
WHOLE BLOOD HOLD SPECIMEN: NORMAL

## 2020-07-21 PROCEDURE — 71250 CT THORAX DX C-: CPT

## 2020-07-21 PROCEDURE — 93005 ELECTROCARDIOGRAM TRACING: CPT

## 2020-07-21 PROCEDURE — 74176 CT ABD & PELVIS W/O CONTRAST: CPT

## 2020-07-21 PROCEDURE — 80053 COMPREHEN METABOLIC PANEL: CPT | Performed by: EMERGENCY MEDICINE

## 2020-07-21 PROCEDURE — 96375 TX/PRO/DX INJ NEW DRUG ADDON: CPT

## 2020-07-21 PROCEDURE — 93005 ELECTROCARDIOGRAM TRACING: CPT | Performed by: EMERGENCY MEDICINE

## 2020-07-21 PROCEDURE — P9612 CATHETERIZE FOR URINE SPEC: HCPCS

## 2020-07-21 PROCEDURE — 99220 PR INITIAL OBSERVATION CARE/DAY 70 MINUTES: CPT | Performed by: FAMILY MEDICINE

## 2020-07-21 PROCEDURE — 84484 ASSAY OF TROPONIN QUANT: CPT | Performed by: EMERGENCY MEDICINE

## 2020-07-21 PROCEDURE — 85025 COMPLETE CBC W/AUTO DIFF WBC: CPT

## 2020-07-21 PROCEDURE — G0378 HOSPITAL OBSERVATION PER HR: HCPCS

## 2020-07-21 PROCEDURE — 96361 HYDRATE IV INFUSION ADD-ON: CPT

## 2020-07-21 PROCEDURE — 82962 GLUCOSE BLOOD TEST: CPT

## 2020-07-21 PROCEDURE — 83735 ASSAY OF MAGNESIUM: CPT | Performed by: EMERGENCY MEDICINE

## 2020-07-21 PROCEDURE — 70450 CT HEAD/BRAIN W/O DYE: CPT

## 2020-07-21 PROCEDURE — 81003 URINALYSIS AUTO W/O SCOPE: CPT | Performed by: EMERGENCY MEDICINE

## 2020-07-21 PROCEDURE — 25010000002 ONDANSETRON PER 1 MG: Performed by: EMERGENCY MEDICINE

## 2020-07-21 PROCEDURE — 99284 EMERGENCY DEPT VISIT MOD MDM: CPT

## 2020-07-21 RX ORDER — IPRATROPIUM BROMIDE AND ALBUTEROL SULFATE 2.5; .5 MG/3ML; MG/3ML
3 SOLUTION RESPIRATORY (INHALATION)
Status: DISCONTINUED | OUTPATIENT
Start: 2020-07-21 | End: 2020-07-23 | Stop reason: HOSPADM

## 2020-07-21 RX ORDER — CLOPIDOGREL BISULFATE 75 MG/1
75 TABLET ORAL DAILY
Status: DISCONTINUED | OUTPATIENT
Start: 2020-07-22 | End: 2020-07-23 | Stop reason: HOSPADM

## 2020-07-21 RX ORDER — HEPARIN SODIUM 5000 [USP'U]/ML
5000 INJECTION, SOLUTION INTRAVENOUS; SUBCUTANEOUS EVERY 12 HOURS SCHEDULED
Status: DISCONTINUED | OUTPATIENT
Start: 2020-07-21 | End: 2020-07-23 | Stop reason: HOSPADM

## 2020-07-21 RX ORDER — CARVEDILOL 6.25 MG/1
6.25 TABLET ORAL 2 TIMES DAILY WITH MEALS
Status: DISCONTINUED | OUTPATIENT
Start: 2020-07-21 | End: 2020-07-23 | Stop reason: HOSPADM

## 2020-07-21 RX ORDER — ATORVASTATIN CALCIUM 40 MG/1
40 TABLET, FILM COATED ORAL DAILY
Status: DISCONTINUED | OUTPATIENT
Start: 2020-07-22 | End: 2020-07-23 | Stop reason: HOSPADM

## 2020-07-21 RX ORDER — DEXTROSE MONOHYDRATE 25 G/50ML
25 INJECTION, SOLUTION INTRAVENOUS
Status: DISCONTINUED | OUTPATIENT
Start: 2020-07-21 | End: 2020-07-23 | Stop reason: HOSPADM

## 2020-07-21 RX ORDER — NICOTINE POLACRILEX 4 MG
15 LOZENGE BUCCAL
Status: DISCONTINUED | OUTPATIENT
Start: 2020-07-21 | End: 2020-07-23 | Stop reason: HOSPADM

## 2020-07-21 RX ORDER — SODIUM CHLORIDE 9 MG/ML
125 INJECTION, SOLUTION INTRAVENOUS CONTINUOUS
Status: DISCONTINUED | OUTPATIENT
Start: 2020-07-21 | End: 2020-07-21

## 2020-07-21 RX ORDER — PREDNISOLONE ACETATE 10 MG/ML
1 SUSPENSION/ DROPS OPHTHALMIC EVERY 6 HOURS SCHEDULED
Status: DISCONTINUED | OUTPATIENT
Start: 2020-07-22 | End: 2020-07-23 | Stop reason: HOSPADM

## 2020-07-21 RX ORDER — SODIUM CHLORIDE 0.9 % (FLUSH) 0.9 %
10 SYRINGE (ML) INJECTION AS NEEDED
Status: DISCONTINUED | OUTPATIENT
Start: 2020-07-21 | End: 2020-07-23 | Stop reason: HOSPADM

## 2020-07-21 RX ORDER — ONDANSETRON 2 MG/ML
4 INJECTION INTRAMUSCULAR; INTRAVENOUS
Status: DISCONTINUED | OUTPATIENT
Start: 2020-07-21 | End: 2020-07-23 | Stop reason: HOSPADM

## 2020-07-21 RX ORDER — ATROPINE SULFATE 10 MG/ML
1 SOLUTION/ DROPS OPHTHALMIC EVERY 12 HOURS SCHEDULED
Status: DISCONTINUED | OUTPATIENT
Start: 2020-07-22 | End: 2020-07-23 | Stop reason: HOSPADM

## 2020-07-21 RX ORDER — MOXIFLOXACIN 5 MG/ML
1 SOLUTION/ DROPS OPHTHALMIC EVERY 6 HOURS SCHEDULED
Status: DISCONTINUED | OUTPATIENT
Start: 2020-07-22 | End: 2020-07-23 | Stop reason: HOSPADM

## 2020-07-21 RX ORDER — SODIUM CHLORIDE 9 MG/ML
75 INJECTION, SOLUTION INTRAVENOUS CONTINUOUS
Status: DISCONTINUED | OUTPATIENT
Start: 2020-07-21 | End: 2020-07-22

## 2020-07-21 RX ORDER — SODIUM CHLORIDE 0.9 % (FLUSH) 0.9 %
10 SYRINGE (ML) INJECTION EVERY 12 HOURS SCHEDULED
Status: DISCONTINUED | OUTPATIENT
Start: 2020-07-21 | End: 2020-07-23 | Stop reason: HOSPADM

## 2020-07-21 RX ADMIN — ONDANSETRON 4 MG: 2 INJECTION INTRAMUSCULAR; INTRAVENOUS at 18:21

## 2020-07-21 RX ADMIN — SODIUM CHLORIDE 125 ML/HR: 9 INJECTION, SOLUTION INTRAVENOUS at 21:29

## 2020-07-21 NOTE — ED PROVIDER NOTES
EMERGENCY DEPARTMENT ENCOUNTER      Pt Name: Georgia Velázquez  MRN: [id number]  YOB: 1945  Date of evaluation: 7/21/2020  Provider: Geo Qiu DO    CHIEF COMPLAINT       Chief Complaint   Patient presents with   • Syncope     Pt reports decrease appatite since yesterday, after dr appointment around 1500 pt reports that she had diarrhea, nausea and vomiting and syncope x 2.  ems reports pt was seated upon arrival, but pale and diaphoretic.  Pt denies abd pain.     • Vomiting         HISTORY OF PRESENT ILLNESS  (Location/Symptom, Timing/Onset, Context/Setting, Quality, Duration, Modifying Factors, Severity.)   Georgia Velázquez is a 75 y.o. female who presents to the emergency department for evaluation of fever complaints.  She notes decreased appetite for the last few days, nausea, vomiting and diarrhea for the last couple days.  Earlier today she was in the bathroom secondary to her diarrhea she got diaphoretic, clammy and states she had a syncopal episode.  She notes nonbloody vomiting and diarrhea, denies any chest pain or palpitation associated with the incident.  Denies any headache, head injury.  She does state she has had ocular surgery recently for possible blockage and states that her vision feels normal, nonpainful.  No abdominal pain, no urinary complaints.  Patient states she is been taking her medications including her antibiotics for recent urinary tract infection.  Denies any fevers or chills.  No history of recurrent syncopal episodes, denies any lateral weakness, numbness or tingling.  During my examination states she feels okay, at her baseline, no active chest pain, no nausea or vomiting, does not want any medications.  She did vomit after arrival to the ED and states she feels better after this.  No other acute systemic complaints.      Nursing notes were reviewed.    REVIEW OF SYSTEMS    (2-9 systems for level 4, 10 or more for level 5)   ROS:  General:  No fevers,  no chills, no weakness  Cardiovascular:  No chest pain, no palpitations  Respiratory:  No shortness of breath, no cough, no wheezing  Gastrointestinal:  No pain, positive nausea, vomiting, no diarrhea  Musculoskeletal:  No muscle pain, no joint pain  Skin:  No rash, no easy bruising  Neurologic:  No speech problems, no headache, no extremity numbness, no extremity tingling, no extremity weakness  Psychiatric:  No anxiety  Genitourinary:  No dysuria, no hematuria    Except as noted above the remainder of the review of systems was reviewed and negative.       PAST MEDICAL HISTORY     Past Medical History:   Diagnosis Date   • Acute kidney injury (CMS/[facility]) 12/5/2017   • 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/2016   • Body mass index (BMI) of 45.0-49.9 in adult (CMS/[facility]) 6/14/2019   • CHF (congestive heart failure) (CMS/[facility])    • Closed compression fracture of L4 lumbar vertebra 12/4/2017    Added automatically from request for surgery [id number]   • Constipation 12/5/2017   • COPD (chronic obstructive pulmonary disease) (CMS/[facility])    • Coronary artery disease 5/18/2016   • Diabetes mellitus type 2 in obese (CMS/[facility]) 1/29/2018   • Elevated alkaline phosphatase level 2/26/2018   • Elevated creatine kinase    • Essential hypertension 5/18/2016   • GERD (gastroesophageal reflux disease) 5/18/2016   • History of echocardiogram 10/16/2015    TDS.Est EF is 45-50%.Mild distal septal, anteroapical hypokinesia.Mild mitral stenosis.Mean gradient across mitral valve is 6 mmHg.Trace TR   • History of kyphoplasty 1/30/2018   • Hyperlipemia 5/18/2016   • Lumbar facet arthropathy 1/29/2018   • Lumbar stenosis with neurogenic claudication 1/29/2018   • Morbid obesity due to excess calories (CMS/[facility]) 1/29/2018   • Myocardial infarction (CMS/[facility]) 5/18/2016   • Osteoporosis 5/18/2016   • Physical deconditioning 1/30/2018   • Sepsis (CMS/[facility])     uro   • Stroke (CMS/[facility])    • Type 2 diabetes mellitus (CMS/[facility]) 5/18/2016   • Urinary incontinence without  sensory awareness 2/26/2018         SURGICAL HISTORY       Past Surgical History:   Procedure Laterality Date   • APPENDECTOMY     • BACK SURGERY     • CHOLECYSTECTOMY     • EYE SURGERY     • KYPHOPLASTY N/A 12/7/2017    Procedure: KYPHOPLASTY L4;  Surgeon: Marc Pham MD;  Location: [facility];  Service:          CURRENT MEDICATIONS       Current Facility-Administered Medications:   •  ondansetron (ZOFRAN) injection 4 mg, 4 mg, Intravenous, Q30 Min PRN, Geo Qiu DO, 4 mg at 07/21/20 1821  •  sodium chloride 0.9 % flush 10 mL, 10 mL, Intravenous, PRN, Geo Qiu DO  •  sodium chloride 0.9 % infusion, 125 mL/hr, Intravenous, Continuous, Geo Qiu DO    Current Outpatient Medications:   •  acetaZOLAMIDE (DIAMOX) 500 MG capsule, Take 500 mg by mouth 2 (two) times a day., Disp: , Rfl:   •  aspirin 81 MG chewable tablet, Chew 1 tablet Daily., Disp: 30 tablet, Rfl: 3  •  atorvastatin (LIPITOR) 40 MG tablet, Take 40 mg by mouth Daily., Disp: , Rfl:   •  atropine 1 % ophthalmic solution, , Disp: , Rfl:   •  carvedilol (COREG) 6.25 MG tablet, Take 1 tablet by mouth 2 (Two) Times a Day With Meals., Disp: 180 tablet, Rfl: 3  •  clopidogrel (PLAVIX) 75 MG tablet, Take 1 tablet by mouth Daily., Disp: 90 tablet, Rfl: 3  •  Ergocalciferol (VITAMIN D2 PO), Take 20,000 Units by mouth Daily., Disp: , Rfl:   •  latanoprost (XALATAN) 0.005 % ophthalmic solution, Administer 1 drop to the right eye Every Night., Disp: , Rfl:   •  moxifloxacin (VIGAMOX) 0.5 % ophthalmic solution, , Disp: , Rfl:   •  nitroglycerin (NITROSTAT) 0.4 MG SL tablet, Place 1 tablet under the tongue Every 5 (Five) Minutes As Needed for Chest Pain. Take no more than 3 doses in 15 minutes., Disp: 100 tablet, Rfl: 0  •  prednisoLONE acetate (PRED FORTE) 1 % ophthalmic suspension, , Disp: , Rfl:   •  valsartan-hydrochlorothiazide (DIOVAN-HCT) 160-25 MG per tablet, Take 1 tablet by mouth Daily., Disp: 90 tablet, Rfl:  2    ALLERGIES     Patient has no known allergies.    FAMILY HISTORY       Family History   Problem Relation Age of Onset   • Diabetes Mother    • Heart failure Mother    • Heart failure Father    • No Known Problems Sister    • No Known Problems Brother    • No Known Problems Son    • No Known Problems Daughter           SOCIAL HISTORY       Social History     Socioeconomic History   • Marital status:      Spouse name: Not on file   • Number of children: Not on file   • Years of education: Not on file   • Highest education level: Not on file   Occupational History   • Occupation: Retired from [employer]   Tobacco Use   • Smoking status: Former Smoker     Types: Cigarettes     Last attempt to quit:      Years since quittin.5   • Smokeless tobacco: Never Used   Substance and Sexual Activity   • Alcohol use: No   • Drug use: No   • Sexual activity: Never   Social History Narrative    Living w daughter.         PHYSICAL EXAM    (up to 7 for level 4, 8 or more for level 5)     Vitals:    20 1700 20 1730 20 1800 20 1830   BP: 115/55 125/50 138/56 147/63   Patient Position:       Pulse: 51 58 59 56   Resp:       Temp:       TempSrc:       SpO2: 94% 98% 96% 97%   Weight:       Height:           Physical Exam  General :Patient is awake, alert, oriented, in no acute distress, nontoxic appearing  HEENT: Pupils are equally round and reactive to light, EOMI, conjunctivae clear, sclerae white, there is no injection no icterus.  Oral mucosa is moist, no exudate. Uvula is midline  Neck: Neck is supple, full range of motion, trachea midline  Cardiac: Heart bradycardic rate, regular rhythm, no murmurs rubs or gallops.  Lungs: Lungs with decreased breath bilaterally, scattered expiratory wheezes in the right lung fields.  No accessory muscle usage.  Chest wall: There is no tenderness to palpation over the chest wall or over ribs  Abdomen: Abdomen is soft, obese, nontender, nondistended.  No  peritoneal signs, bowel sounds present throughout.  There is no firm or pulsatile masses, no rebound rigidity or guarding.   Musculoskeletal: Trace edema.  5 out of 5 strength in all 4 extremities.  No focal muscle deficits are appreciated  Neuro: Motor intact, sensory intact, level of consciousness is normal, GCS 15, no focal neurological deficit.  Dermatology: Skin is warm and dry  Psych: Mentation is grossly normal, cognition is grossly normal. Affect is appropriate.      DIAGNOSTIC RESULTS     EKG: All EKG's are interpreted by the Emergency Department Physician who either signs or Co-signs this chart in the absence of a cardiologist.    ECG 12 Lead   Final Result   Test Reason : Syncope triage protocol   Blood Pressure : **/** mmHG   Vent. Rate : 055 BPM     Atrial Rate : 055 BPM      P-R Int : 224 ms          QRS Dur : 088 ms       QT Int : 476 ms       P-R-T Axes : 103 015 051 degrees      QTc Int : 455 ms      Sinus bradycardia with 1st degree AV block   Low voltage QRS   Possible Inferior infarct , age undetermined   Cannot rule out Anterior infarct , age undetermined   Abnormal ECG   When compared with ECG of 08-JUL-2020 18:56,   ID interval has increased   Confirmed by RUFUS PINO MD ([id number]) on 7/21/2020 5:21:45 PM      Referred By:  EDMD           Confirmed By:RUFUS PINO MD          RADIOLOGY:   Non-plain film images such as CT, Ultrasound and MRI are read by the radiologist. Plain radiographic images are visualized and preliminarily interpreted by the emergency physician with the below findings:      [] Radiologist's Report Reviewed:  CT Head Without Contrast   Final Result   Senescent changes without acute abnormality. No change from head CT 5/21/2020.               Signer Name: DEBBIE Cotton MD    Signed: 7/21/2020 7:02 PM    Workstation Name: [email or web address]\""     [facility]      CT Chest Without Contrast   Final Result      1.  Mild cardiomegaly. No acute infiltrates.       2.  Bowel wall thickening involving the right colon and transverse colon in a pattern which can be seen in colitis, either infectious or inflammatory.      3.  Thickening of the urinary bladder wall. This could be due to decompression. Cystitis could offer a similar appearance.               Signer Name: Curt Quan MD    Signed: 7/21/2020 7:17 PM    Workstation Name: [facility]     [facility]iology [facility]      CT Abdomen Pelvis Without Contrast   Final Result      1.  Mild cardiomegaly. No acute infiltrates.      2.  Bowel wall thickening involving the right colon and transverse colon in a pattern which can be seen in colitis, either infectious or inflammatory.      3.  Thickening of the urinary bladder wall. This could be due to decompression. Cystitis could offer a similar appearance.               Signer Name: Curt Quan MD    Signed: 7/21/2020 7:17 PM    Workstation Name: [facility]     [facility]            ED BEDSIDE ULTRASOUND:   Performed by ED Physician - none    LABS:    I have reviewed and interpreted all of the currently available lab results from this visit (if applicable):  Results for orders placed or performed during the hospital encounter of 07/21/20   Comprehensive Metabolic Panel   Result Value Ref Range    Glucose 153 (H) 65 - 99 mg/dL    BUN 15 8 - 23 mg/dL    Creatinine 1.39 (H) 0.57 - 1.00 mg/dL    Sodium 141 136 - 145 mmol/L    Potassium 3.6 3.5 - 5.2 mmol/L    Chloride 106 98 - 107 mmol/L    CO2 21.0 (L) 22.0 - 29.0 mmol/L    Calcium 9.2 8.6 - 10.5 mg/dL    Total Protein 7.0 6.0 - 8.5 g/dL    Albumin 3.90 3.50 - 5.20 g/dL    ALT (SGPT) 11 1 - 33 U/L    AST (SGOT) 15 1 - 32 U/L    Alkaline Phosphatase 94 39 - 117 U/L    Total Bilirubin 0.6 0.0 - 1.2 mg/dL    eGFR Non African Amer 37 (L) >60 mL/min/1.73    Globulin 3.1 gm/dL    A/G Ratio 1.3 g/dL    BUN/Creatinine Ratio 10.8 7.0 - 25.0    Anion Gap 14.0 5.0 - 15.0 mmol/L   Magnesium   Result  Value Ref Range    Magnesium 1.8 1.6 - 2.4 mg/dL   Troponin   Result Value Ref Range    Troponin T <0.010 0.000 - 0.030 ng/mL   CBC Auto Differential   Result Value Ref Range    WBC 8.76 3.40 - 10.80 10*3/mm3    RBC 3.75 (L) 3.77 - 5.28 10*6/mm3    Hemoglobin 11.5 (L) 12.0 - 15.9 g/dL    Hematocrit 36.9 34.0 - 46.6 %    MCV 98.4 (H) 79.0 - 97.0 fL    MCH 30.7 26.6 - 33.0 pg    MCHC 31.2 (L) 31.5 - 35.7 g/dL    RDW 14.6 12.3 - 15.4 %    RDW-SD 52.9 37.0 - 54.0 fl    MPV 11.1 6.0 - 12.0 fL    Platelets 164 140 - 450 10*3/mm3    Neutrophil % 74.2 42.7 - 76.0 %    Lymphocyte % 19.1 (L) 19.6 - 45.3 %    Monocyte % 5.0 5.0 - 12.0 %    Eosinophil % 0.9 0.3 - 6.2 %    Basophil % 0.3 0.0 - 1.5 %    Immature Grans % 0.5 0.0 - 0.5 %    Neutrophils, Absolute 6.50 1.70 - 7.00 10*3/mm3    Lymphocytes, Absolute 1.67 0.70 - 3.10 10*3/mm3    Monocytes, Absolute 0.44 0.10 - 0.90 10*3/mm3    Eosinophils, Absolute 0.08 0.00 - 0.40 10*3/mm3    Basophils, Absolute 0.03 0.00 - 0.20 10*3/mm3    Immature Grans, Absolute 0.04 0.00 - 0.05 10*3/mm3    nRBC 0.0 0.0 - 0.2 /100 WBC   Urinalysis With Microscopic If Indicated (No Culture) - Urine, Catheter   Result Value Ref Range    Color, UA Yellow Yellow, Straw    Appearance, UA Clear Clear    pH, UA <=5.0 5.0 - 8.0    Specific Gravity, UA 1.015 1.001 - 1.030    Glucose, UA Negative Negative    Ketones, UA Negative Negative    Bilirubin, UA Negative Negative    Blood, UA Negative Negative    Protein, UA Trace (A) Negative    Leuk Esterase, UA Negative Negative    Nitrite, UA Negative Negative    Urobilinogen, UA 0.2 E.U./dL 0.2 - 1.0 E.U./dL   POC Glucose Once   Result Value Ref Range    Glucose 145 (H) 70 - 130 mg/dL   Light Blue Top   Result Value Ref Range    Extra Tube hold for add-on    Green Top (Gel)   Result Value Ref Range    Extra Tube Hold for add-ons.    Lavender Top   Result Value Ref Range    Extra Tube hold for add-on    Gold Top - SST   Result Value Ref Range    Extra Tube Hold  for add-ons.         All other labs were within normal range or not returned as of this dictation.      EMERGENCY DEPARTMENT COURSE and DIFFERENTIAL DIAGNOSIS/MDM:   Vitals:    Vitals:    07/21/20 1700 07/21/20 1730 07/21/20 1800 07/21/20 1830   BP: 115/55 125/50 138/56 147/63   Patient Position:       Pulse: 51 58 59 56   Resp:       Temp:       TempSrc:       SpO2: 94% 98% 96% 97%   Weight:       Height:                Patient with syncopal episode with preceding nausea, vomiting and diarrhea.  On arrival she is awake and alert, nontoxic-appearing.  States she feels much better after round of emesis in the ED which is nonbloody in nature.  We do obtain IV, labs, advanced imaging for further evaluation.  EKG with normal sinus rhythm, heart rate in the 50s, first-degree block is noted.  Remainder vital signs are stable.  White count 8.7, hemoglobin 1.5, urinalysis with no significant infectious etiology, creatinine slightly elevated 1.39, electrolytes are stable.  CT of the abdomen/pelvis addition to head and chest obtained and reviewed, underlying colitis, cystitis appreciated, no other significant pathology.  Secondary to recent antibiotic usage from her UTI will obtain C. difficile studies, plan for gentle fluid hydration, symptomatic therapies and with her syncopal episodes x2 plan for admission to the hospital for further evaluation.  Case discussed with our hospitalist team for admission.        MEDICATIONS ADMINISTERED IN ED:  Medications   sodium chloride 0.9 % flush 10 mL (has no administration in time range)   ondansetron (ZOFRAN) injection 4 mg (4 mg Intravenous Given 7/21/20 1821)   sodium chloride 0.9 % infusion (has no administration in time range)       PROCEDURES:  Procedures    CRITICAL CARE TIME    Total Critical Care time was 0 minutes, excluding separately reportable procedures.   There was a high probability of clinically significant/life threatening deterioration in the patient's condition  which required my urgent intervention.      FINAL IMPRESSION      1. Nausea vomiting and diarrhea    2. Colitis    3. Syncope, unspecified syncope type          DISPOSITION/PLAN     ED Disposition     ED Disposition Condition Comment    Decision to Admit            PATIENT REFERRED TO:  No follow-up provider specified.    DISCHARGE MEDICATIONS:     Medication List      ASK your doctor about these medications    acetaZOLAMIDE 500 MG capsule  Commonly known as:  DIAMOX     aspirin 81 MG chewable tablet  Chew 1 tablet Daily.     atorvastatin 40 MG tablet  Commonly known as:  LIPITOR     atropine 1 % ophthalmic solution     carvedilol 6.25 MG tablet  Commonly known as:  COREG  Take 1 tablet by mouth 2 (Two) Times a Day With Meals.     clopidogrel 75 MG tablet  Commonly known as:  PLAVIX  Take 1 tablet by mouth Daily.     latanoprost 0.005 % ophthalmic solution  Commonly known as:  XALATAN     moxifloxacin 0.5 % ophthalmic solution  Commonly known as:  VIGAMOX     nitroglycerin 0.4 MG SL tablet  Commonly known as:  NITROSTAT  Place 1 tablet under the tongue Every 5 (Five) Minutes As Needed for Chest   Pain. Take no more than 3 doses in 15 minutes.     prednisoLONE acetate 1 % ophthalmic suspension  Commonly known as:  PRED FORTE     valsartan-hydrochlorothiazide 160-25 MG per tablet  Commonly known as:  DIOVAN-HCT  Take 1 tablet by mouth Daily.     VITAMIN D2 PO              Comment: Please note this report has been produced using speech recognition software.      Geo Qiu DO  Attending Emergency Physician               Geo Qiu DO  07/21/20 2034

## 2020-07-22 PROBLEM — R19.7 NAUSEA VOMITING AND DIARRHEA: Status: ACTIVE | Noted: 2020-07-22

## 2020-07-22 PROBLEM — R11.2 NAUSEA VOMITING AND DIARRHEA: Status: ACTIVE | Noted: 2020-07-22

## 2020-07-22 PROBLEM — I65.22 LEFT CAROTID ARTERY STENOSIS: Status: ACTIVE | Noted: 2020-07-22

## 2020-07-22 LAB
ALBUMIN SERPL-MCNC: 3.7 G/DL (ref 3.5–5.2)
ALBUMIN/GLOB SERPL: 1.2 G/DL
ALP SERPL-CCNC: 88 U/L (ref 39–117)
ALT SERPL W P-5'-P-CCNC: 9 U/L (ref 1–33)
ANION GAP SERPL CALCULATED.3IONS-SCNC: 10 MMOL/L (ref 5–15)
AST SERPL-CCNC: 12 U/L (ref 1–32)
BASOPHILS # BLD AUTO: 0.02 10*3/MM3 (ref 0–0.2)
BASOPHILS NFR BLD AUTO: 0.2 % (ref 0–1.5)
BILIRUB SERPL-MCNC: 0.5 MG/DL (ref 0–1.2)
BUN SERPL-MCNC: 14 MG/DL (ref 8–23)
BUN/CREAT SERPL: 12 (ref 7–25)
CALCIUM SPEC-SCNC: 9 MG/DL (ref 8.6–10.5)
CHLORIDE SERPL-SCNC: 107 MMOL/L (ref 98–107)
CHOLEST SERPL-MCNC: 101 MG/DL (ref 0–200)
CO2 SERPL-SCNC: 24 MMOL/L (ref 22–29)
CREAT SERPL-MCNC: 1.17 MG/DL (ref 0.57–1)
D-LACTATE SERPL-SCNC: 1.3 MMOL/L (ref 0.5–2)
DEPRECATED RDW RBC AUTO: 52.1 FL (ref 37–54)
EOSINOPHIL # BLD AUTO: 0.12 10*3/MM3 (ref 0–0.4)
EOSINOPHIL NFR BLD AUTO: 1.4 % (ref 0.3–6.2)
ERYTHROCYTE [DISTWIDTH] IN BLOOD BY AUTOMATED COUNT: 14.4 % (ref 12.3–15.4)
GFR SERPL CREATININE-BSD FRML MDRD: 45 ML/MIN/1.73
GLOBULIN UR ELPH-MCNC: 3.1 GM/DL
GLUCOSE BLDC GLUCOMTR-MCNC: 117 MG/DL (ref 70–130)
GLUCOSE BLDC GLUCOMTR-MCNC: 124 MG/DL (ref 70–130)
GLUCOSE BLDC GLUCOMTR-MCNC: 132 MG/DL (ref 70–130)
GLUCOSE BLDC GLUCOMTR-MCNC: 98 MG/DL (ref 70–130)
GLUCOSE SERPL-MCNC: 110 MG/DL (ref 65–99)
HBA1C MFR BLD: 5.4 % (ref 4.8–5.6)
HCT VFR BLD AUTO: 35.9 % (ref 34–46.6)
HDLC SERPL-MCNC: 39 MG/DL (ref 40–60)
HGB BLD-MCNC: 11.3 G/DL (ref 12–15.9)
IMM GRANULOCYTES # BLD AUTO: 0.03 10*3/MM3 (ref 0–0.05)
IMM GRANULOCYTES NFR BLD AUTO: 0.4 % (ref 0–0.5)
LDLC SERPL CALC-MCNC: 45 MG/DL (ref 0–100)
LDLC/HDLC SERPL: 1.16 {RATIO}
LYMPHOCYTES # BLD AUTO: 2.42 10*3/MM3 (ref 0.7–3.1)
LYMPHOCYTES NFR BLD AUTO: 28.5 % (ref 19.6–45.3)
MAGNESIUM SERPL-MCNC: 2.8 MG/DL (ref 1.6–2.4)
MCH RBC QN AUTO: 31 PG (ref 26.6–33)
MCHC RBC AUTO-ENTMCNC: 31.5 G/DL (ref 31.5–35.7)
MCV RBC AUTO: 98.4 FL (ref 79–97)
MONOCYTES # BLD AUTO: 0.63 10*3/MM3 (ref 0.1–0.9)
MONOCYTES NFR BLD AUTO: 7.4 % (ref 5–12)
NEUTROPHILS NFR BLD AUTO: 5.28 10*3/MM3 (ref 1.7–7)
NEUTROPHILS NFR BLD AUTO: 62.1 % (ref 42.7–76)
NRBC BLD AUTO-RTO: 0 /100 WBC (ref 0–0.2)
PLATELET # BLD AUTO: 150 10*3/MM3 (ref 140–450)
PMV BLD AUTO: 11.2 FL (ref 6–12)
POTASSIUM SERPL-SCNC: 3.4 MMOL/L (ref 3.5–5.2)
POTASSIUM SERPL-SCNC: 3.8 MMOL/L (ref 3.5–5.2)
PROCALCITONIN SERPL-MCNC: 0.13 NG/ML (ref 0–0.25)
PROT SERPL-MCNC: 6.8 G/DL (ref 6–8.5)
RBC # BLD AUTO: 3.65 10*6/MM3 (ref 3.77–5.28)
SODIUM SERPL-SCNC: 141 MMOL/L (ref 136–145)
TRIGL SERPL-MCNC: 84 MG/DL (ref 0–150)
TROPONIN T SERPL-MCNC: <0.01 NG/ML (ref 0–0.03)
TROPONIN T SERPL-MCNC: <0.01 NG/ML (ref 0–0.03)
TSH SERPL DL<=0.05 MIU/L-ACNC: 0.72 UIU/ML (ref 0.27–4.2)
VLDLC SERPL-MCNC: 16.8 MG/DL
WBC # BLD AUTO: 8.5 10*3/MM3 (ref 3.4–10.8)

## 2020-07-22 PROCEDURE — 82962 GLUCOSE BLOOD TEST: CPT

## 2020-07-22 PROCEDURE — 96365 THER/PROPH/DIAG IV INF INIT: CPT

## 2020-07-22 PROCEDURE — 96372 THER/PROPH/DIAG INJ SC/IM: CPT

## 2020-07-22 PROCEDURE — 25010000002 HEPARIN (PORCINE) PER 1000 UNITS: Performed by: FAMILY MEDICINE

## 2020-07-22 PROCEDURE — 99225 PR SBSQ OBSERVATION CARE/DAY 25 MINUTES: CPT | Performed by: INTERNAL MEDICINE

## 2020-07-22 PROCEDURE — 83735 ASSAY OF MAGNESIUM: CPT | Performed by: FAMILY MEDICINE

## 2020-07-22 PROCEDURE — 93010 ELECTROCARDIOGRAM REPORT: CPT | Performed by: INTERNAL MEDICINE

## 2020-07-22 PROCEDURE — 84443 ASSAY THYROID STIM HORMONE: CPT | Performed by: FAMILY MEDICINE

## 2020-07-22 PROCEDURE — 84132 ASSAY OF SERUM POTASSIUM: CPT | Performed by: INTERNAL MEDICINE

## 2020-07-22 PROCEDURE — 83036 HEMOGLOBIN GLYCOSYLATED A1C: CPT | Performed by: FAMILY MEDICINE

## 2020-07-22 PROCEDURE — 83605 ASSAY OF LACTIC ACID: CPT | Performed by: FAMILY MEDICINE

## 2020-07-22 PROCEDURE — 85025 COMPLETE CBC W/AUTO DIFF WBC: CPT | Performed by: FAMILY MEDICINE

## 2020-07-22 PROCEDURE — 25010000003 MAGNESIUM SULFATE 4 GM/100ML SOLUTION: Performed by: NURSE PRACTITIONER

## 2020-07-22 PROCEDURE — 80053 COMPREHEN METABOLIC PANEL: CPT | Performed by: FAMILY MEDICINE

## 2020-07-22 PROCEDURE — 94640 AIRWAY INHALATION TREATMENT: CPT

## 2020-07-22 PROCEDURE — 80061 LIPID PANEL: CPT | Performed by: FAMILY MEDICINE

## 2020-07-22 PROCEDURE — 94799 UNLISTED PULMONARY SVC/PX: CPT

## 2020-07-22 PROCEDURE — 84145 PROCALCITONIN (PCT): CPT | Performed by: FAMILY MEDICINE

## 2020-07-22 PROCEDURE — 84484 ASSAY OF TROPONIN QUANT: CPT | Performed by: FAMILY MEDICINE

## 2020-07-22 PROCEDURE — 93005 ELECTROCARDIOGRAM TRACING: CPT | Performed by: FAMILY MEDICINE

## 2020-07-22 PROCEDURE — 87040 BLOOD CULTURE FOR BACTERIA: CPT | Performed by: FAMILY MEDICINE

## 2020-07-22 PROCEDURE — G0378 HOSPITAL OBSERVATION PER HR: HCPCS

## 2020-07-22 RX ORDER — MAGNESIUM SULFATE HEPTAHYDRATE 40 MG/ML
2 INJECTION, SOLUTION INTRAVENOUS AS NEEDED
Status: DISCONTINUED | OUTPATIENT
Start: 2020-07-22 | End: 2020-07-23 | Stop reason: HOSPADM

## 2020-07-22 RX ORDER — POTASSIUM CHLORIDE 1.5 G/1.77G
40 POWDER, FOR SOLUTION ORAL AS NEEDED
Status: DISCONTINUED | OUTPATIENT
Start: 2020-07-22 | End: 2020-07-23 | Stop reason: HOSPADM

## 2020-07-22 RX ORDER — POTASSIUM CHLORIDE 750 MG/1
40 CAPSULE, EXTENDED RELEASE ORAL AS NEEDED
Status: DISCONTINUED | OUTPATIENT
Start: 2020-07-22 | End: 2020-07-23 | Stop reason: HOSPADM

## 2020-07-22 RX ORDER — MAGNESIUM SULFATE HEPTAHYDRATE 40 MG/ML
4 INJECTION, SOLUTION INTRAVENOUS AS NEEDED
Status: DISCONTINUED | OUTPATIENT
Start: 2020-07-22 | End: 2020-07-23 | Stop reason: HOSPADM

## 2020-07-22 RX ADMIN — PREDNISOLONE ACETATE 1 DROP: 10 SUSPENSION/ DROPS OPHTHALMIC at 01:21

## 2020-07-22 RX ADMIN — ATROPINE SULFATE 1 DROP: 10 SOLUTION OPHTHALMIC at 01:22

## 2020-07-22 RX ADMIN — VANCOMYCIN HYDROCHLORIDE 125 MG: KIT at 17:31

## 2020-07-22 RX ADMIN — MOXIFLOXACIN 0.05 ML: 5 SOLUTION/ DROPS OPHTHALMIC at 17:36

## 2020-07-22 RX ADMIN — Medication 10 ML: at 22:11

## 2020-07-22 RX ADMIN — MOXIFLOXACIN 0.05 ML: 5 SOLUTION/ DROPS OPHTHALMIC at 12:47

## 2020-07-22 RX ADMIN — POTASSIUM CHLORIDE 40 MEQ: 10 CAPSULE, COATED, EXTENDED RELEASE ORAL at 17:31

## 2020-07-22 RX ADMIN — METRONIDAZOLE 500 MG: 500 INJECTION, SOLUTION INTRAVENOUS at 08:46

## 2020-07-22 RX ADMIN — MOXIFLOXACIN 0.05 ML: 5 SOLUTION/ DROPS OPHTHALMIC at 01:21

## 2020-07-22 RX ADMIN — VANCOMYCIN HYDROCHLORIDE 125 MG: KIT at 11:17

## 2020-07-22 RX ADMIN — IPRATROPIUM BROMIDE AND ALBUTEROL SULFATE 3 ML: 2.5; .5 SOLUTION RESPIRATORY (INHALATION) at 07:32

## 2020-07-22 RX ADMIN — PREDNISOLONE ACETATE 1 DROP: 10 SUSPENSION/ DROPS OPHTHALMIC at 08:31

## 2020-07-22 RX ADMIN — PREDNISOLONE ACETATE 1 DROP: 10 SUSPENSION/ DROPS OPHTHALMIC at 17:35

## 2020-07-22 RX ADMIN — HEPARIN SODIUM 5000 UNITS: 5000 INJECTION INTRAVENOUS; SUBCUTANEOUS at 01:22

## 2020-07-22 RX ADMIN — CARVEDILOL 6.25 MG: 6.25 TABLET, FILM COATED ORAL at 01:22

## 2020-07-22 RX ADMIN — PREDNISOLONE ACETATE 1 DROP: 10 SUSPENSION/ DROPS OPHTHALMIC at 11:17

## 2020-07-22 RX ADMIN — IPRATROPIUM BROMIDE AND ALBUTEROL SULFATE 3 ML: 2.5; .5 SOLUTION RESPIRATORY (INHALATION) at 12:35

## 2020-07-22 RX ADMIN — IPRATROPIUM BROMIDE AND ALBUTEROL SULFATE 3 ML: 2.5; .5 SOLUTION RESPIRATORY (INHALATION) at 20:17

## 2020-07-22 RX ADMIN — ATROPINE SULFATE 1 DROP: 10 SOLUTION OPHTHALMIC at 08:32

## 2020-07-22 RX ADMIN — METRONIDAZOLE 500 MG: 500 INJECTION, SOLUTION INTRAVENOUS at 01:21

## 2020-07-22 RX ADMIN — ATROPINE SULFATE 1 DROP: 10 SOLUTION OPHTHALMIC at 21:51

## 2020-07-22 RX ADMIN — POTASSIUM CHLORIDE 40 MEQ: 10 CAPSULE, COATED, EXTENDED RELEASE ORAL at 12:46

## 2020-07-22 RX ADMIN — HEPARIN SODIUM 5000 UNITS: 5000 INJECTION INTRAVENOUS; SUBCUTANEOUS at 08:32

## 2020-07-22 RX ADMIN — MAGNESIUM SULFATE 4 G: 4 INJECTION INTRAVENOUS at 04:59

## 2020-07-22 RX ADMIN — VANCOMYCIN HYDROCHLORIDE 125 MG: KIT at 08:33

## 2020-07-22 RX ADMIN — VANCOMYCIN HYDROCHLORIDE 125 MG: KIT at 01:21

## 2020-07-22 RX ADMIN — HEPARIN SODIUM 5000 UNITS: 5000 INJECTION INTRAVENOUS; SUBCUTANEOUS at 21:51

## 2020-07-22 RX ADMIN — ATORVASTATIN CALCIUM 40 MG: 40 TABLET, FILM COATED ORAL at 08:34

## 2020-07-22 RX ADMIN — CARVEDILOL 6.25 MG: 6.25 TABLET, FILM COATED ORAL at 08:34

## 2020-07-22 RX ADMIN — CLOPIDOGREL BISULFATE 75 MG: 75 TABLET ORAL at 08:34

## 2020-07-22 NOTE — PROGRESS NOTES
Discharge Planning Assessment  [facility]     Patient Name: Georgia Velázquez  MRN: [id number]  Today's Date: 7/22/2020    Admit Date: 7/21/2020    Discharge Needs Assessment     Row Name 07/22/20 1051       Living Environment    Lives With  child(chandler), adult;other relative(s)    Name(s) of Who Lives With Patient  Adelfo Castano and her son in law.     Current Living Arrangements  home/apartment/condo Lives in [facility]    Primary Care Provided by  self    Provides Primary Care For  no one, unable/limited ability to care for self    Family Caregiver if Needed  child(chandler), adult    Family Caregiver Names  Omaira (Daughter) [phone]    Quality of Family Relationships  unable to assess    Able to Return to Prior Arrangements  yes       Resource/Environmental Concerns    Resource/Environmental Concerns  none    Transportation Concerns  car, none       Transition Planning    Patient/Family Anticipates Transition to  home with family    Patient/Family Anticipated Services at Transition  none    Transportation Anticipated  family or friend will provide       Discharge Needs Assessment    Concerns to be Addressed  denies needs/concerns at this time    Equipment Currently Used at Home  bath bench;walker, rolling    Anticipated Changes Related to Illness  none    Equipment Needed After Discharge  none        Discharge Plan     Row Name 07/22/20 1053       Plan    Plan  Home with family    Patient/Family in Agreement with Plan  yes    Plan Comments  Met with pt at . She is independent of ADL's and able to bathe and dress herself. She does use a walker for ambulation as needed. She lives with her daughter and son in law in [facility]. Denies HH. Pt plans to d/c home with family and denies d/c needs. CM will follow. Christin ext. [phone]    Final Discharge Disposition Code  01 - home or self-care        Destination      Coordination has not been started for this encounter.      Durable Medical Equipment      Coordination has  not been started for this encounter.      Dialysis/Infusion      Coordination has not been started for this encounter.      Home Medical Care      Coordination has not been started for this encounter.      Therapy      Coordination has not been started for this encounter.      Community Resources      Coordination has not been started for this encounter.        Expected Discharge Date and Time     Expected Discharge Date Expected Discharge Time    Jul 24, 2020         Demographic Summary     Row Name 07/22/20 1050       General Information    Referral Source  admission list    Preferred Language  English     Used During This Interaction  no    General Information Comments  PCP is Chaz Rico.        Contact Information    Permission Granted to Share Info With          Functional Status     Row Name 07/22/20 1051       Functional Status    Usual Activity Tolerance  moderate    Current Activity Tolerance  moderate       Functional Status, IADL    Medications  independent    Meal Preparation  assistive person    Housekeeping  assistive person    Laundry  assistive person    Shopping  assistive person    IADL Comments  Daughter does most of cooking and cleaning.        Employment/    Employment/ Comments  Has [facility] [facility] with no concerns affording meds. Uses [facility] on [address].         Psychosocial    No documentation.       Abuse/Neglect    No documentation.       Legal    No documentation.       Substance Abuse    No documentation.       Patient Forms    No documentation.           Christin Bosch RN

## 2020-07-22 NOTE — PLAN OF CARE
Problem: Patient Care Overview  Goal: Plan of Care Review  Outcome: Ongoing (interventions implemented as appropriate)  Flowsheets  Taken 7/22/2020 0319  Progress: no change  Taken 7/22/2020 0006  Plan of Care Reviewed With: patient  Goal: Discharge Needs Assessment  Outcome: Ongoing (interventions implemented as appropriate)  Flowsheets  Taken 7/22/2020 0020  Equipment Currently Used at Home: bath bench;grab bar;walker, rolling  Taken 7/22/2020 0319  Anticipated Changes Related to Illness: none  Transportation Anticipated: family or friend will provide  Transportation Concerns: car, none  Concerns to be Addressed: denies needs/concerns at this time  Readmission Within the Last 30 Days: no previous admission in last 30 days  Taken 7/22/2020 0013  Patient/Family Anticipated Services at Transition: none  Patient/Family Anticipates Transition to: home with family     Problem: Skin Injury Risk (Adult)  Goal: Skin Health and Integrity  Description  Patient will demonstrate the desired outcomes by discharge/transition of care.  Outcome: Ongoing (interventions implemented as appropriate)  Flowsheets (Taken 7/22/2020 0319)  Skin Health and Integrity: making progress toward outcome     Problem: Fall Risk (Adult)  Goal: Absence of Fall  Description  Patient will demonstrate the desired outcomes by discharge/transition of care.  Outcome: Ongoing (interventions implemented as appropriate)  Flowsheets (Taken 7/22/2020 0319)  Absence of Fall: making progress toward outcome

## 2020-07-22 NOTE — PROGRESS NOTES
[facility] Medicine Services  PROGRESS NOTE    Patient Name: Georgia Velázquez  : 1945  MRN: [id number]    Date of Admission: 2020  Primary Care Physician: Chaz Rico, DNP, APRN    Subjective   Subjective     CC:  F/u syncope    HPI:  Patient feels well.  No bowel movement since admission.  Tolerating diet.  Hasn't been out of bed yed.  Denies fever, abd pain, N/V.    Review of Systems  Gen- No fevers, chills  CV- No chest pain, palpitations  Resp- No cough, dyspnea  GI- as above    Objective   Objective     Vital Signs:   Temp:  [97.8 °F (36.6 °C)-98.1 °F (36.7 °C)] 97.8 °F (36.6 °C)  Heart Rate:  [51-80] 56  Resp:  [16-20] 16  BP: (115-177)/(50-82) 142/67        Physical Exam:  Constitutional: No acute distress, awake, alert, lying in bed, age appropriate  HENT: NCAT, mucous membranes moist  Respiratory: Clear to auscultation bilaterally, respiratory effort normal   Cardiovascular: RRR, no murmurs, rubs, or gallops  Gastrointestinal: obese, Positive bowel sounds, soft, nontender, nondistended  Musculoskeletal: No bilateral ankle edema  Psychiatric: Appropriate affect, cooperative  Neurologic: Oriented x 3, no focal deficits  Skin: No rashes      Results Reviewed:  Results from last 7 days   Lab Units 20  0536 20  0024 20  1647   WBC 10*3/mm3 8.50  --  8.76   HEMOGLOBIN g/dL 11.3*  --  11.5*   HEMATOCRIT % 35.9  --  36.9   PLATELETS 10*3/mm3 150  --  164   PROCALCITONIN ng/mL  --  0.13  --      Results from last 7 days   Lab Units 20  0751 20  0024 20  1647   SODIUM mmol/L 141  --  141   POTASSIUM mmol/L 3.4*  --  3.6   CHLORIDE mmol/L 107  --  106   CO2 mmol/L 24.0  --  21.0*   BUN mg/dL 14  --  15   CREATININE mg/dL 1.17*  --  1.39*   GLUCOSE mg/dL 110*  --  153*   CALCIUM mg/dL 9.0  --  9.2   ALT (SGPT) U/L 9  --  11   AST (SGOT) U/L 12  --  15   TROPONIN T ng/mL <0.010 <0.010 <0.010     Estimated Creatinine Clearance: 45.9  mL/min (A) (by C-G formula based on SCr of 1.17 mg/dL (H)).    Microbiology Results Abnormal     None          Imaging Results (Last 24 Hours)     Procedure Component Value Units Date/Time    CT Chest Without Contrast [[id number]] Collected:  07/21/20 1917     Updated:  07/21/20 1920    Narrative:       CT Chest WO, CT Abdomen Pelvis WO    INDICATION:   Acute onset of syncope and nausea and vomiting    TECHNIQUE:   CT of the chest, abdomen and pelvis without contrast. Coronal and sagittal reconstructions were obtained.  Radiation dose reduction techniques included automated exposure control or exposure modulation based on body size. Radiation audit for number of CT  and nuclear cardiology exams performed in the last year: 6.      COMPARISON:   May 21, 2020    FINDINGS:  Chest: The lungs remain free of acute infiltrates. No pleural fluid. No pneumothorax. The heart size remains mildly enlarged. No pericardial fluid. Prominent coronary artery calcifications. Atherosclerosis of the thoracic aorta. No clearly acute aortic  pathology.    No threshold mediastinal or hilar adenopathy. No axillary adenopathy. Regional osseous structures are diffusely demineralized. No acute or aggressive bone lesions.    Abdomen: Noncontrasted imaging of the liver shows no acute findings. The gallbladder is surgically absent. The spleen is within normal limits. Fatty atrophy of the pancreas. The kidneys show bilateral renal cortical atrophy. No hydronephrosis. No  perinephric stranding.    Atherosclerosis of the abdominal aorta without aneurysmal dilatation. No threshold retroperitoneal adenopathy.    Pelvis: The bowel pattern shows evidence of bowel wall thickening involving the right right colon and transverse colon. This finding can be seen in colitis, either infectious or inflammatory. The bladder is decompressed. Bladder wall thickening noted. No  free air. No free fluid. Regional osseous structures are diffusely demineralized.  Redemonstrated is the compression fracture of L4 unchanged from the prior study.      Impression:         1.  Mild cardiomegaly. No acute infiltrates.    2.  Bowel wall thickening involving the right colon and transverse colon in a pattern which can be seen in colitis, either infectious or inflammatory.    3.  Thickening of the urinary bladder wall. This could be due to decompression. Cystitis could offer a similar appearance.          Signer Name: Curt Quan MD   Signed: 7/21/2020 7:17 PM   Workstation Name: [id number]    [facility]    CT Abdomen Pelvis Without Contrast [[id number]] Collected:  07/21/20 1917     Updated:  07/21/20 1920    Narrative:       CT Chest WO, CT Abdomen Pelvis WO    INDICATION:   Acute onset of syncope and nausea and vomiting    TECHNIQUE:   CT of the chest, abdomen and pelvis without contrast. Coronal and sagittal reconstructions were obtained.  Radiation dose reduction techniques included automated exposure control or exposure modulation based on body size. Radiation audit for number of CT  and nuclear cardiology exams performed in the last year: 6.      COMPARISON:   May 21, 2020    FINDINGS:  Chest: The lungs remain free of acute infiltrates. No pleural fluid. No pneumothorax. The heart size remains mildly enlarged. No pericardial fluid. Prominent coronary artery calcifications. Atherosclerosis of the thoracic aorta. No clearly acute aortic  pathology.    No threshold mediastinal or hilar adenopathy. No axillary adenopathy. Regional osseous structures are diffusely demineralized. No acute or aggressive bone lesions.    Abdomen: Noncontrasted imaging of the liver shows no acute findings. The gallbladder is surgically absent. The spleen is within normal limits. Fatty atrophy of the pancreas. The kidneys show bilateral renal cortical atrophy. No hydronephrosis. No  perinephric stranding.    Atherosclerosis of the abdominal aorta without aneurysmal dilatation.  No threshold retroperitoneal adenopathy.    Pelvis: The bowel pattern shows evidence of bowel wall thickening involving the right right colon and transverse colon. This finding can be seen in colitis, either infectious or inflammatory. The bladder is decompressed. Bladder wall thickening noted. No  free air. No free fluid. Regional osseous structures are diffusely demineralized. Redemonstrated is the compression fracture of L4 unchanged from the prior study.      Impression:         1.  Mild cardiomegaly. No acute infiltrates.    2.  Bowel wall thickening involving the right colon and transverse colon in a pattern which can be seen in colitis, either infectious or inflammatory.    3.  Thickening of the urinary bladder wall. This could be due to decompression. Cystitis could offer a similar appearance.          Signer Name: Curt Quan MD   Signed: 7/21/2020 7:17 PM   Workstation Name: [id number]    [facility]    CT Head Without Contrast [[id number]] Collected:  07/21/20 1902     Updated:  07/21/20 1904    Narrative:       CT Head WO    HISTORY:   Syncopal episode today with nausea and vomiting.    TECHNIQUE:   Axial unenhanced head CT. Radiation dose reduction techniques included automated exposure control or exposure modulation based on body size. Count of known CT and cardiac nuc med studies performed in previous 12 months: 4.     Time of scan: 1838 hours    COMPARISON:   Head CT 5/21/2020    FINDINGS:   No intracranial hemorrhage, mass, or infarct. No hydrocephalus or extra-axial fluid collection. There are senescent changes, including volume loss and nonspecific white matter change, but no acute abnormality is seen. The skull base, calvarium, and  extracranial soft tissues are normal.      Impression:       Senescent changes without acute abnormality. No change from head CT 5/21/2020.          Signer Name: DEBBIE Cotton MD   Signed: 7/21/2020 7:02 PM   Workstation Name:  [id number]    [facility]          Results for orders placed during the hospital encounter of 06/04/20   Adult Transthoracic Echo Complete W/ Cont if Necessary Per Protocol    Narrative · Estimated EF appears to be in the range of 51 - 55%.  · Left ventricular systolic function is normal.  · Left ventricular diastolic dysfunction (grade II) consistent with   pseudonormalization.  · The following left ventricular wall segments are hypokinetic: apical   anterior, apical lateral, apical inferior, apical septal and apex   hypokinetic.  · Mild aortic valve regurgitation is present.          I have reviewed the medications:  Scheduled Meds:  atorvastatin 40 mg Oral Daily   atropine 1 drop Right Eye Q12H   carvedilol 6.25 mg Oral BID With Meals   clopidogrel 75 mg Oral Daily   heparin (porcine) 5,000 Units Subcutaneous Q12H   insulin lispro 0-7 Units Subcutaneous TID AC   ipratropium-albuterol 3 mL Nebulization 4x Daily - RT   moxifloxacin 1 drop Right Eye Q6H   prednisoLONE acetate 1 drop Right Eye Q6H   sodium chloride 10 mL Intravenous Q12H   vancomycin 125 mg Oral Q6H     Continuous Infusions:  Pharmacy Consult      PRN Meds:.dextrose  •  dextrose  •  glucagon (human recombinant)  •  magnesium sulfate **OR** magnesium sulfate **OR** magnesium sulfate  •  ondansetron  •  Pharmacy Consult  •  potassium chloride  •  potassium chloride  •  sodium chloride  •  sodium chloride    Assessment/Plan   Assessment & Plan     Active Hospital Problems    Diagnosis  POA   • **Syncope and collapse [R55]  Yes     Priority: Medium   • Left carotid artery stenosis [I65.22]  Yes   • Diastolic dysfunction [I51.89]  Yes   • Colitis [K52.9]  Yes   • Glaucoma [H40.9]  Yes   • Vomiting and diarrhea [R11.10, R19.7]  Yes   • Retinal emboli, right [H34.9]  Yes   • Stage 3 chronic kidney disease (CMS/HCC) [N18.3]  Yes   • Type 2 diabetes mellitus (CMS/HCC) [E11.9]  Yes   • Essential hypertension [I10]  Yes   • Coronary  artery disease [I25.10]  Yes      Resolved Hospital Problems   No resolved problems to display.        Brief Hospital Course to date:  Georgia Velázquez is a 75 y.o. female with a history of recent mission for UTI, CKD stage III, type 2 diabetes and hypertension, left carotid artery stenosis, retinal emboli who presented from home with complaints of syncope on toilet with associated nausea vomiting and diarrhea.    - reviewed CT scan.  Mild colon thickening c/w colitis.  At risk for c diff given recent admissions and abx for UTI.  No further diarrhea since admission.  No fever, WBC, pain.  Will continue empiric PO vanc for now, see if she can give us sample.  D/c IV flagyl.  - monitor off IVF  - syncope, may have been vagal response to diarrhea?  Recent echo last month reviewed.  Will d/c repeat one ordered.  - of note, Dr. Samuels arranaged holter monitor at home d/t retinal emboli.  Dtr says will arrive today.  Monitor rhythm while here.  - CKD 3 is stable  - recent left ICA stenosis 70%, continue plavix and statin, aymptomatic.  - continue on low dose SSI, excellent glc currently.  - d/w dtr on phone.  Given all of the above, will continue to monitor overnight.  Ambulate, +/- PT eval if needed.  If does well, anticipate d/c home in AM.      DVT Prophylaxis:  heparin    Disposition: I expect the patient to be discharged home tomorrow.    CODE STATUS:   Code Status and Medical Interventions:   Ordered at: 07/21/20 1634     Level Of Support Discussed With:    Patient     Code Status:    CPR     Medical Interventions (Level of Support Prior to Arrest):    Full         Electronically signed by Anselmo Verma MD, 07/22/20, 11:01.

## 2020-07-22 NOTE — H&P
[facility]  HISTORY AND PHYSICAL    Patient Name: Georgia Velázquez  : 1945  MRN: [id number]  Primary Care Physician: Chaz Rico, DNP, APRN  Date of admission: 2020      Subjective   Subjective     Chief Complaint:  Nausea, vomiting and diarrhea associated with Syncope and collapse     HPI:  Georgia Velázquez is a 75 y.o. female with past medical history of hypertension, type 2 diabetes mellitus, coronary artery disease, diastolic dysfunction, recent UTI, and recent right retinal emboli. Pt was brought to [facility] ED by EMS after 2 syncopal episodes at home.  Patient had gone for follow-up on recent eye surgery.  Around 2 PM after arriving home patient developed sudden onset of abdominal pain.  She had not recently eaten.  Suddenly developed diarrhea.  Patient lives with her daughter who states that she noted her mother he was yelling for her when she arrived the patient was lethargic and sitting on the toilet.  Her daughter witnessed to syncopal episodes.  Which lasted 2 minutes.  She noted no tonic-clonic activity. She had been incontinent of stool. Pt had stated she had some diarrhea earlier in the morning as well.     Patient had recent retinal emboli and was being evaluated for source of emboli.  She is scheduled for an outpatient Holter by Dr. Samuels. And she was changes from ASA to Plavix by Dr Samuels. The pt underwent surgery on her right eye last week.  She did have preop COVID testing last Tuesday.  She was recently admitted to [facility] for UTI and was discharged on Keflex.    Work-up in the ED noted that patient had colitis on CT of the abdomen and pelvis.  Therefore the decision was made to admit patient to [facility] for further work-up and evaluation.    COVID-19 RISK SCREEN     1. Has the patient had close contact without PPE with a lab confirmed COVID-19 (+) person or a person under investigation (PUI) for COVID-19 infection?  -- No     2. Has the  "patient had respiratory symptoms, worsened/new cough and/or SOA, unexplained fever, or sudden loss of smell and/or taste in the past 7 days? --  No    3. Does the patient have baseline higher exposure risk such as working in healthcare field, currently residing in healthcare facility, or ongoing hemodialysis?  --  Yes, daughter works at [employer], in patient coordinator  neurology department.          Review of Systems   Constitutional: Positive for diaphoresis. Negative for chills, fatigue and fever.   HENT: Negative for congestion, ear pain, rhinorrhea and sore throat.    Respiratory: Negative for cough, shortness of breath and wheezing.    Cardiovascular: Negative for chest pain, palpitations and leg swelling.   Gastrointestinal: Positive for diarrhea, nausea and vomiting. Negative for abdominal pain, blood in stool and constipation.        \"stomach felt funny\"    Genitourinary: Negative for dysuria and hematuria.   Musculoskeletal: Negative for arthralgias, back pain and myalgias.   Skin: Negative.    Neurological: Positive for syncope and headaches. Negative for dizziness and light-headedness.   Psychiatric/Behavioral: Negative for dysphoric mood and sleep disturbance. The patient is not nervous/anxious.         All other systems reviewed and are negative.     Personal History     Past Medical History:   Diagnosis Date   • Acute kidney injury (CMS/[facility]) 12/5/2017   • Aortic regurgitation    • 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/2016   • Body mass index (BMI) of 45.0-49.9 in adult (CMS/[facility]) 6/14/2019   • CHF (congestive heart failure) (CMS/[facility])    • Closed compression fracture of L4 lumbar vertebra 12/4/2017    Added automatically from request for surgery [id number]   • Constipation 12/5/2017   • COPD (chronic obstructive pulmonary disease) (CMS/[facility])    • Coronary artery disease 5/18/2016   • Diabetes mellitus type 2 in obese (CMS/[facility]) 1/29/2018   • Elevated alkaline phosphatase level 2/26/2018   • Elevated creatine kinase    • " Essential hypertension 5/18/2016   • GERD (gastroesophageal reflux disease) 5/18/2016   • Glaucoma 7/21/2020   • History of echocardiogram 10/16/2015    TDS.Est EF is 45-50%.Mild distal septal, anteroapical hypokinesia.Mild mitral stenosis.Mean gradient across mitral valve is 6 mmHg.Trace TR   • History of kyphoplasty 1/30/2018   • Hyperlipemia 5/18/2016   • Lumbar facet arthropathy 1/29/2018   • Lumbar stenosis with neurogenic claudication 1/29/2018   • Morbid obesity due to excess calories (CMS/[facility]) 1/29/2018   • Myocardial infarction (CMS/[facility]) 5/18/2016   • Osteoporosis 5/18/2016   • Physical deconditioning 1/30/2018   • Sepsis (CMS/[facility])     uro   • Stroke (CMS/[facility])    • Type 2 diabetes mellitus (CMS/[facility]) 5/18/2016   • Urinary incontinence without sensory awareness 2/26/2018       Past Surgical History:   Procedure Laterality Date   • APPENDECTOMY     • BACK SURGERY     • CHOLECYSTECTOMY     • EYE SURGERY     • KYPHOPLASTY N/A 12/7/2017    Procedure: KYPHOPLASTY L4;  Surgeon: Marc Pham MD;  Location: [facility];  Service:        Family History: family history includes Diabetes in her mother; Heart failure in her father and mother; No Known Problems in her brother, daughter, sister, and son. Otherwise pertinent FHx was reviewed and unremarkable.     Social History:  reports that she quit smoking about 20 years ago. Her smoking use included cigarettes. She has never used smokeless tobacco. She reports that she does not drink alcohol or use drugs.  Social History     Social History Narrative    Living w daughter.       Medications:  Available home medication information reviewed.    No current facility-administered medications on file prior to encounter.      Current Outpatient Medications on File Prior to Encounter   Medication Sig Dispense Refill   • atorvastatin (LIPITOR) 40 MG tablet Take 40 mg by mouth Daily.     • atropine 1 % ophthalmic solution      • carvedilol (COREG) 6.25 MG tablet Take 1 tablet by  mouth 2 (Two) Times a Day With Meals. 180 tablet 3   • clopidogrel (PLAVIX) 75 MG tablet Take 1 tablet by mouth Daily. 90 tablet 3   • Ergocalciferol (VITAMIN D2 PO) Take 20,000 Units by mouth Daily.     • latanoprost (XALATAN) 0.005 % ophthalmic solution Administer 1 drop to the right eye Every Night.     • moxifloxacin (VIGAMOX) 0.5 % ophthalmic solution      • nitroglycerin (NITROSTAT) 0.4 MG SL tablet Place 1 tablet under the tongue Every 5 (Five) Minutes As Needed for Chest Pain. Take no more than 3 doses in 15 minutes. 100 tablet 0   • prednisoLONE acetate (PRED FORTE) 1 % ophthalmic suspension      • valsartan-hydrochlorothiazide (DIOVAN-HCT) 160-25 MG per tablet Take 1 tablet by mouth Daily. 90 tablet 2   • [DISCONTINUED] acetaZOLAMIDE (DIAMOX) 500 MG capsule Take 500 mg by mouth 2 (two) times a day.     • [DISCONTINUED] aspirin 81 MG chewable tablet Chew 1 tablet Daily. 30 tablet 3         No Known Allergies    Objective   Objective     Vital Signs:   Temp:  [97.8 °F (36.6 °C)] 97.8 °F (36.6 °C)  Heart Rate:  [51-59] 56  Resp:  [18] 18  BP: (115-147)/(50-80) 147/63        Physical Exam   Constitutional: She is oriented to person, place, and time. She appears well-nourished. No distress.   HENT:   Head: Atraumatic.   Right Ear: External ear normal.   Left Ear: External ear normal.   Eyes: Conjunctivae are normal. Right eye exhibits no discharge. Left eye exhibits no discharge.   Neck: Normal range of motion. Neck supple.   Cardiovascular: Normal rate and regular rhythm.   No murmur heard.  Pulmonary/Chest: Effort normal. She has wheezes. She has no rales.   Abdominal: Soft. She exhibits no distension. Bowel sounds are decreased. There is no tenderness.   Musculoskeletal: She exhibits edema.   Neurological: She is alert and oriented to person, place, and time.   Skin: No rash noted. She is not diaphoretic.   Psychiatric: She has a normal mood and affect.   Nursing note and vitals reviewed.     PPE worn at  time of exam including N95 mask, gown, eye protection and gloves.     Results Reviewed:  I have personally reviewed current lab and radiology data.    Results from last 7 days   Lab Units 07/21/20  1647   WBC 10*3/mm3 8.76   HEMOGLOBIN g/dL 11.5*   HEMATOCRIT % 36.9   PLATELETS 10*3/mm3 164     Results from last 7 days   Lab Units 07/21/20  1647   SODIUM mmol/L 141   POTASSIUM mmol/L 3.6   CHLORIDE mmol/L 106   CO2 mmol/L 21.0*   BUN mg/dL 15   CREATININE mg/dL 1.39*   GLUCOSE mg/dL 153*   CALCIUM mg/dL 9.2   ALT (SGPT) U/L 11   AST (SGOT) U/L 15   TROPONIN T ng/mL <0.010     Estimated Creatinine Clearance: 37.8 mL/min (A) (by C-G formula based on SCr of 1.39 mg/dL (H)).  Brief Urine Lab Results  (Last result in the past 365 days)      Color   Clarity   Blood   Leuk Est   Nitrite   Protein   CREAT   Urine HCG        07/21/20 1735 Yellow Clear Negative Negative Negative Trace             Imaging Results (Last 24 Hours)     Procedure Component Value Units Date/Time    CT Chest Without Contrast [[id number]] Collected:  07/21/20 1917     Updated:  07/21/20 1920    Narrative:       CT Chest WO, CT Abdomen Pelvis WO    INDICATION:   Acute onset of syncope and nausea and vomiting    TECHNIQUE:   CT of the chest, abdomen and pelvis without contrast. Coronal and sagittal reconstructions were obtained.  Radiation dose reduction techniques included automated exposure control or exposure modulation based on body size. Radiation audit for number of CT  and nuclear cardiology exams performed in the last year: 6.      COMPARISON:   May 21, 2020    FINDINGS:  Chest: The lungs remain free of acute infiltrates. No pleural fluid. No pneumothorax. The heart size remains mildly enlarged. No pericardial fluid. Prominent coronary artery calcifications. Atherosclerosis of the thoracic aorta. No clearly acute aortic  pathology.    No threshold mediastinal or hilar adenopathy. No axillary adenopathy. Regional osseous structures are diffusely  demineralized. No acute or aggressive bone lesions.    Abdomen: Noncontrasted imaging of the liver shows no acute findings. The gallbladder is surgically absent. The spleen is within normal limits. Fatty atrophy of the pancreas. The kidneys show bilateral renal cortical atrophy. No hydronephrosis. No  perinephric stranding.    Atherosclerosis of the abdominal aorta without aneurysmal dilatation. No threshold retroperitoneal adenopathy.    Pelvis: The bowel pattern shows evidence of bowel wall thickening involving the right right colon and transverse colon. This finding can be seen in colitis, either infectious or inflammatory. The bladder is decompressed. Bladder wall thickening noted. No  free air. No free fluid. Regional osseous structures are diffusely demineralized. Redemonstrated is the compression fracture of L4 unchanged from the prior study.      Impression:         1.  Mild cardiomegaly. No acute infiltrates.    2.  Bowel wall thickening involving the right colon and transverse colon in a pattern which can be seen in colitis, either infectious or inflammatory.    3.  Thickening of the urinary bladder wall. This could be due to decompression. Cystitis could offer a similar appearance.          Signer Name: Curt Quan MD   Signed: 7/21/2020 7:17 PM   Workstation Name: [id number]    [facility]    CT Abdomen Pelvis Without Contrast [[id number]] Collected:  07/21/20 1917     Updated:  07/21/20 1920    Narrative:       CT Chest WO, CT Abdomen Pelvis WO    INDICATION:   Acute onset of syncope and nausea and vomiting    TECHNIQUE:   CT of the chest, abdomen and pelvis without contrast. Coronal and sagittal reconstructions were obtained.  Radiation dose reduction techniques included automated exposure control or exposure modulation based on body size. Radiation audit for number of CT  and nuclear cardiology exams performed in the last year: 6.      COMPARISON:   May 21,  2020    FINDINGS:  Chest: The lungs remain free of acute infiltrates. No pleural fluid. No pneumothorax. The heart size remains mildly enlarged. No pericardial fluid. Prominent coronary artery calcifications. Atherosclerosis of the thoracic aorta. No clearly acute aortic  pathology.    No threshold mediastinal or hilar adenopathy. No axillary adenopathy. Regional osseous structures are diffusely demineralized. No acute or aggressive bone lesions.    Abdomen: Noncontrasted imaging of the liver shows no acute findings. The gallbladder is surgically absent. The spleen is within normal limits. Fatty atrophy of the pancreas. The kidneys show bilateral renal cortical atrophy. No hydronephrosis. No  perinephric stranding.    Atherosclerosis of the abdominal aorta without aneurysmal dilatation. No threshold retroperitoneal adenopathy.    Pelvis: The bowel pattern shows evidence of bowel wall thickening involving the right right colon and transverse colon. This finding can be seen in colitis, either infectious or inflammatory. The bladder is decompressed. Bladder wall thickening noted. No  free air. No free fluid. Regional osseous structures are diffusely demineralized. Redemonstrated is the compression fracture of L4 unchanged from the prior study.      Impression:         1.  Mild cardiomegaly. No acute infiltrates.    2.  Bowel wall thickening involving the right colon and transverse colon in a pattern which can be seen in colitis, either infectious or inflammatory.    3.  Thickening of the urinary bladder wall. This could be due to decompression. Cystitis could offer a similar appearance.          Signer Name: Curt Quan MD   Signed: 7/21/2020 7:17 PM   Workstation Name: [id number]    [facility]    CT Head Without Contrast [[id number]] Collected:  07/21/20 1902     Updated:  07/21/20 1904    Narrative:       CT Head WO    HISTORY:   Syncopal episode today with nausea and  vomiting.    TECHNIQUE:   Axial unenhanced head CT. Radiation dose reduction techniques included automated exposure control or exposure modulation based on body size. Count of known CT and cardiac nuc med studies performed in previous 12 months: 4.     Time of scan: 1838 hours    COMPARISON:   Head CT 5/21/2020    FINDINGS:   No intracranial hemorrhage, mass, or infarct. No hydrocephalus or extra-axial fluid collection. There are senescent changes, including volume loss and nonspecific white matter change, but no acute abnormality is seen. The skull base, calvarium, and  extracranial soft tissues are normal.      Impression:       Senescent changes without acute abnormality. No change from head CT 5/21/2020.          Signer Name: DEBBIE Cotton MD   Signed: 7/21/2020 7:02 PM   Workstation Name: [facility]    [facility]        Results for orders placed during the hospital encounter of 06/04/20   Adult Transthoracic Echo Complete W/ Cont if Necessary Per Protocol    Narrative · Estimated EF appears to be in the range of 51 - 55%.  · Left ventricular systolic function is normal.  · Left ventricular diastolic dysfunction (grade II) consistent with   pseudonormalization.  · The following left ventricular wall segments are hypokinetic: apical   anterior, apical lateral, apical inferior, apical septal and apex   hypokinetic.  · Mild aortic valve regurgitation is present.          Assessment/Plan   Assessment & Plan     Active Hospital Problems    Diagnosis POA   • **Syncope and collapse [R55] Unknown     Priority: High   • Colitis [K52.9] Yes     Priority: High   • Vomiting and diarrhea [R11.10, R19.7] Yes     Priority: High   • Diastolic dysfunction [I51.89] Unknown   • Glaucoma [H40.9] Unknown   • Retinal emboli, right [H34.9] Yes   • Chronic kidney disease [N18.9] Yes   • Type 2 diabetes mellitus (CMS/HCC) [E11.9] Yes   • Essential hypertension [I10] Yes   • Coronary artery disease [I25.10]  Yes     PLAN:  -Admit patient to telemetry. Syncope thought to be secondary to sudden onset of diarrhea. Ct of head was negative in the ED. Neuro checks tonight. Consider more extensive work up based on hospital course.   -recent antibioitc exposure and now with diarrhea and colitis. Start IV Flagyl and PO vanc for now while we await testing for C diff   -Duonebs for COPD. Pt with some wheezing on exam. CT of chest was negative. Recent Covid testing last Tuesday was negative.   -Consider CTA of abd and pelvis. For now will monitor Creatinine. Hydrate gradually with NS at 75 mL/hour. Recheck Creatinine in the am.   -continue eye drops from recent surgery    -trend troponin   -pt had recent echo in June.   -hold ARB and HCTZ for now. Continue Coreg  -Sliding scale insulin.   -called and spoke with daughter Lorene and update her about the pt status and obtained additional information regarding history and medications.     DVT prophylaxis: SCDs and SubQ heparin       CODE STATUS:    Code Status and Medical Interventions:   Ordered at: 07/21/20 2331     Level Of Support Discussed With:    Patient     Code Status:    CPR     Medical Interventions (Level of Support Prior to Arrest):    Full       Admission Status:  I believe this patient meets INPATIENT status due to colitis and syncope and collapse.  I feel patient’s risk for adverse outcomes and need for care warrant INPATIENT evaluation and I predict the patient’s care encounter to likely last beyond 2 midnights.      Electronically signed by Eli Reed DO, 07/21/20, 9:22 PM.

## 2020-07-23 ENCOUNTER — READMISSION MANAGEMENT (OUTPATIENT)
Dept: CALL CENTER | Facility: HOSPITAL | Age: 75
End: 2020-07-23

## 2020-07-23 VITALS
WEIGHT: 225 LBS | DIASTOLIC BLOOD PRESSURE: 98 MMHG | HEIGHT: 63 IN | SYSTOLIC BLOOD PRESSURE: 142 MMHG | RESPIRATION RATE: 16 BRPM | HEART RATE: 50 BPM | OXYGEN SATURATION: 95 % | TEMPERATURE: 98 F | BODY MASS INDEX: 39.87 KG/M2

## 2020-07-23 LAB — GLUCOSE BLDC GLUCOMTR-MCNC: 86 MG/DL (ref 70–130)

## 2020-07-23 PROCEDURE — 96372 THER/PROPH/DIAG INJ SC/IM: CPT

## 2020-07-23 PROCEDURE — 94799 UNLISTED PULMONARY SVC/PX: CPT

## 2020-07-23 PROCEDURE — G0378 HOSPITAL OBSERVATION PER HR: HCPCS

## 2020-07-23 PROCEDURE — 99217 PR OBSERVATION CARE DISCHARGE MANAGEMENT: CPT | Performed by: INTERNAL MEDICINE

## 2020-07-23 PROCEDURE — 25010000002 HEPARIN (PORCINE) PER 1000 UNITS: Performed by: FAMILY MEDICINE

## 2020-07-23 PROCEDURE — 82962 GLUCOSE BLOOD TEST: CPT

## 2020-07-23 RX ADMIN — VANCOMYCIN HYDROCHLORIDE 125 MG: KIT at 05:38

## 2020-07-23 RX ADMIN — MOXIFLOXACIN 0.05 ML: 5 SOLUTION/ DROPS OPHTHALMIC at 00:27

## 2020-07-23 RX ADMIN — HEPARIN SODIUM 5000 UNITS: 5000 INJECTION INTRAVENOUS; SUBCUTANEOUS at 08:23

## 2020-07-23 RX ADMIN — PREDNISOLONE ACETATE 1 DROP: 10 SUSPENSION/ DROPS OPHTHALMIC at 05:32

## 2020-07-23 RX ADMIN — VANCOMYCIN HYDROCHLORIDE 125 MG: KIT at 00:26

## 2020-07-23 RX ADMIN — ATROPINE SULFATE 1 DROP: 10 SOLUTION OPHTHALMIC at 08:24

## 2020-07-23 RX ADMIN — PREDNISOLONE ACETATE 1 DROP: 10 SUSPENSION/ DROPS OPHTHALMIC at 00:27

## 2020-07-23 RX ADMIN — IPRATROPIUM BROMIDE AND ALBUTEROL SULFATE 3 ML: 2.5; .5 SOLUTION RESPIRATORY (INHALATION) at 07:40

## 2020-07-23 RX ADMIN — CLOPIDOGREL BISULFATE 75 MG: 75 TABLET ORAL at 08:24

## 2020-07-23 RX ADMIN — MOXIFLOXACIN 0.05 ML: 5 SOLUTION/ DROPS OPHTHALMIC at 05:32

## 2020-07-23 RX ADMIN — CARVEDILOL 6.25 MG: 6.25 TABLET, FILM COATED ORAL at 08:24

## 2020-07-23 RX ADMIN — ATORVASTATIN CALCIUM 40 MG: 40 TABLET, FILM COATED ORAL at 08:24

## 2020-07-23 NOTE — PLAN OF CARE
Problem: Patient Care Overview  Goal: Plan of Care Review  Outcome: Ongoing (interventions implemented as appropriate)     Problem: Patient Care Overview  Goal: Individualization and Mutuality  Outcome: Ongoing (interventions implemented as appropriate)     Problem: Patient Care Overview  Goal: Discharge Needs Assessment  Outcome: Ongoing (interventions implemented as appropriate)     Problem: Patient Care Overview  Goal: Interprofessional Rounds/Family Conf  Outcome: Ongoing (interventions implemented as appropriate)     Problem: Skin Injury Risk (Adult)  Goal: Skin Health and Integrity  Outcome: Ongoing (interventions implemented as appropriate)     Problem: Fall Risk (Adult)  Goal: Absence of Fall  Outcome: Ongoing (interventions implemented as appropriate)

## 2020-07-23 NOTE — OUTREACH NOTE
Prep Survey      Responses   [facility] patient discharged from?  [place]   Is LACE score < 7 ?  No   Eligibility  [facility]   Date of Admission  07/21/20   Date of Discharge  07/23/20   Discharge Disposition  Home or Self Care   Discharge diagnosis  Syncope and collapse  Holter monitor    COVID-19 Test Status  Not tested   Does the patient have one of the following disease processes/diagnoses(primary or secondary)?  Other   Does the patient have Home health ordered?  No   Is there a DME ordered?  No   Prep survey completed?  Yes          Awilda Stinson RN

## 2020-07-23 NOTE — DISCHARGE SUMMARY
[facility] Medicine Services  DISCHARGE SUMMARY    Patient Name: Georgia Velázquez  : 1945  MRN: [id number]    Date of Admission: 2020  4:11 PM  Date of Discharge:  20  Primary Care Physician: Chaz Rico, DNP, APRN    Consults     No orders found from 2020 to 2020.          Hospital Course     Presenting Problem:   Colitis [K52.9]  Nausea vomiting and diarrhea [R11.2, R19.7]    Active Hospital Problems    Diagnosis  POA   • **Syncope and collapse [R55]  Yes     Priority: Medium   • Left carotid artery stenosis [I65.22]  Yes   • Nausea vomiting and diarrhea [R11.2, R19.7]  Yes   • Diastolic dysfunction [I51.89]  Yes   • Colitis [K52.9]  Yes   • Glaucoma [H40.9]  Yes   • Vomiting and diarrhea [R11.10, R19.7]  Yes   • Retinal emboli, right [H34.9]  Yes   • Stage 3 chronic kidney disease (CMS/HCC) [N18.3]  Yes   • Type 2 diabetes mellitus (CMS/HCC) [E11.9]  Yes   • Essential hypertension [I10]  Yes   • Coronary artery disease [I25.10]  Yes      Resolved Hospital Problems   No resolved problems to display.          Hospital Course:  Georgia Velázquez is a 75 y.o. female with a history of hypertension, type 2 diabetes, coronary artery disease, chronic diastolic dysfunction, recent right retinal emboli and diagnosis of left carotid artery stenosis.  Presented to the emergency room after a syncopal episode at home.  She was on the toilet and apparently developed some diarrhea along with nausea and vomiting.  Initial work-up in the emergency room was generally unremarkable with exception of CT scan of her abdomen showing some nonspecific colitis.  Of note she was recently in hospital in May and earlier this month and treated with antibiotics for UTI.  She has been also following with Dr. Samuels as an outpatient and transition from aspirin to Plavix and also arrange for an outpatient Holter monitor to rule out any occult A. fib.  She was made for further  evaluation.    Due to his factors of recent admissions and antibiotics to start empirically on therapy for C. difficile.  However greater than 24 hours she was here she was unable to use a bowel movement.  She had no other concerning signs and at this time did not feel clinically concern for C. difficile so this was stopped.  Also no other concern for infectious colitis and so no antibiotics will be continued at discharge.  In regards to her syncope there may be a vagal effect from being on the toilet.  Recent echocardiogram was reviewed and unremarkable.  No evidence of arrhythmia on long hospital.  She will follow-up with a Holter monitor at schedule as outpatient with Dr. Samuels.  No other issues while here and she is now anxious active at home with her daughter.  Likely as a primary care physician within 1 week.  Living her changes to her chronic medications.      Day of Discharge     HPI:   No problems overnight.  No N/V.  No BM.  No fevers, no abd pain. Eating well.  Strength is at baseline.  She would like to home.    Review of Systems  Gen- No fevers, chills  CV- No chest pain, palpitations  Resp- No cough, dyspnea  GI- as above      Vital Signs:   Temp:  [97.7 °F (36.5 °C)-98 °F (36.7 °C)] 98 °F (36.7 °C)  Heart Rate:  [50-57] 50  Resp:  [16-20] 16  BP: (101-158)/(47-98) 142/98     Physical Exam:  Constitutional: No acute distress, awake, alert, sitting up on side of bed  HENT: NCAT, mucous membranes moist  Respiratory: Clear to auscultation bilaterally, respiratory effort normal   Cardiovascular: RRR, no murmurs, rubs, or gallops  Gastrointestinal: obese, Positive bowel sounds, soft, nontender, nondistended  Musculoskeletal: No bilateral ankle edema  Psychiatric: Appropriate affect, cooperative  Neurologic: Oriented x 3, no focal deficits  Skin: No rashes      Pertinent  and/or Most Recent Results     Results from last 7 days   Lab Units 07/22/20 2058 07/22/20  0751 07/22/20  0536 07/21/20  1647   WBC  10*3/mm3  --   --  8.50 8.76   HEMOGLOBIN g/dL  --   --  11.3* 11.5*   HEMATOCRIT %  --   --  35.9 36.9   PLATELETS 10*3/mm3  --   --  150 164   SODIUM mmol/L  --  141  --  141   POTASSIUM mmol/L 3.8 3.4*  --  3.6   CHLORIDE mmol/L  --  107  --  106   CO2 mmol/L  --  24.0  --  21.0*   BUN mg/dL  --  14  --  15   CREATININE mg/dL  --  1.17*  --  1.39*   GLUCOSE mg/dL  --  110*  --  153*   CALCIUM mg/dL  --  9.0  --  9.2     Results from last 7 days   Lab Units 07/22/20  0751 07/21/20  1647   BILIRUBIN mg/dL 0.5 0.6   ALK PHOS U/L 88 94   ALT (SGPT) U/L 9 11   AST (SGOT) U/L 12 15     Results from last 7 days   Lab Units 07/22/20  0751   CHOLESTEROL mg/dL 101   TRIGLYCERIDES mg/dL 84   HDL CHOL mg/dL 39*     Results from last 7 days   Lab Units 07/22/20  0751 07/22/20  0536 07/22/20  0024 07/21/20  1647   TSH uIU/mL 0.717  --   --   --    HEMOGLOBIN A1C %  --  5.40  --   --    TROPONIN T ng/mL <0.010  --  <0.010 <0.010   PROCALCITONIN ng/mL  --   --  0.13  --    LACTATE mmol/L  --   --  1.3  --        Brief Urine Lab Results  (Last result in the past 365 days)      Color   Clarity   Blood   Leuk Est   Nitrite   Protein   CREAT   Urine HCG        07/21/20 1735 Yellow Clear Negative Negative Negative Trace               Microbiology Results Abnormal     Procedure Component Value - Date/Time    Blood Culture - Blood, Arm, Right [[id number]] Collected:  07/22/20 0025    Lab Status:  Preliminary result Specimen:  Blood from Arm, Right Updated:  07/23/20 0045     Blood Culture No growth at 24 hours    Blood Culture - Blood, Arm, Left [[id number]] Collected:  07/22/20 0020    Lab Status:  Preliminary result Specimen:  Blood from Arm, Left Updated:  07/23/20 0045     Blood Culture No growth at 24 hours          Imaging Results (All)     Procedure Component Value Units Date/Time    CT Chest Without Contrast [[id number]] Collected:  07/21/20 1917     Updated:  07/21/20 1920    Narrative:       CT Chest WO, CT Abdomen Pelvis  WO    INDICATION:   Acute onset of syncope and nausea and vomiting    TECHNIQUE:   CT of the chest, abdomen and pelvis without contrast. Coronal and sagittal reconstructions were obtained.  Radiation dose reduction techniques included automated exposure control or exposure modulation based on body size. Radiation audit for number of CT  and nuclear cardiology exams performed in the last year: 6.      COMPARISON:   May 21, 2020    FINDINGS:  Chest: The lungs remain free of acute infiltrates. No pleural fluid. No pneumothorax. The heart size remains mildly enlarged. No pericardial fluid. Prominent coronary artery calcifications. Atherosclerosis of the thoracic aorta. No clearly acute aortic  pathology.    No threshold mediastinal or hilar adenopathy. No axillary adenopathy. Regional osseous structures are diffusely demineralized. No acute or aggressive bone lesions.    Abdomen: Noncontrasted imaging of the liver shows no acute findings. The gallbladder is surgically absent. The spleen is within normal limits. Fatty atrophy of the pancreas. The kidneys show bilateral renal cortical atrophy. No hydronephrosis. No  perinephric stranding.    Atherosclerosis of the abdominal aorta without aneurysmal dilatation. No threshold retroperitoneal adenopathy.    Pelvis: The bowel pattern shows evidence of bowel wall thickening involving the right right colon and transverse colon. This finding can be seen in colitis, either infectious or inflammatory. The bladder is decompressed. Bladder wall thickening noted. No  free air. No free fluid. Regional osseous structures are diffusely demineralized. Redemonstrated is the compression fracture of L4 unchanged from the prior study.      Impression:         1.  Mild cardiomegaly. No acute infiltrates.    2.  Bowel wall thickening involving the right colon and transverse colon in a pattern which can be seen in colitis, either infectious or inflammatory.    3.  Thickening of the urinary  bladder wall. This could be due to decompression. Cystitis could offer a similar appearance.          Signer Name: Curt Quan MD   Signed: 7/21/2020 7:17 PM   Workstation Name: [id number]    [facility]    CT Abdomen Pelvis Without Contrast [[id number]] Collected:  07/21/20 1917     Updated:  07/21/20 1920    Narrative:       CT Chest WO, CT Abdomen Pelvis WO    INDICATION:   Acute onset of syncope and nausea and vomiting    TECHNIQUE:   CT of the chest, abdomen and pelvis without contrast. Coronal and sagittal reconstructions were obtained.  Radiation dose reduction techniques included automated exposure control or exposure modulation based on body size. Radiation audit for number of CT  and nuclear cardiology exams performed in the last year: 6.      COMPARISON:   May 21, 2020    FINDINGS:  Chest: The lungs remain free of acute infiltrates. No pleural fluid. No pneumothorax. The heart size remains mildly enlarged. No pericardial fluid. Prominent coronary artery calcifications. Atherosclerosis of the thoracic aorta. No clearly acute aortic  pathology.    No threshold mediastinal or hilar adenopathy. No axillary adenopathy. Regional osseous structures are diffusely demineralized. No acute or aggressive bone lesions.    Abdomen: Noncontrasted imaging of the liver shows no acute findings. The gallbladder is surgically absent. The spleen is within normal limits. Fatty atrophy of the pancreas. The kidneys show bilateral renal cortical atrophy. No hydronephrosis. No  perinephric stranding.    Atherosclerosis of the abdominal aorta without aneurysmal dilatation. No threshold retroperitoneal adenopathy.    Pelvis: The bowel pattern shows evidence of bowel wall thickening involving the right right colon and transverse colon. This finding can be seen in colitis, either infectious or inflammatory. The bladder is decompressed. Bladder wall thickening noted. No  free air. No free fluid. Regional  osseous structures are diffusely demineralized. Redemonstrated is the compression fracture of L4 unchanged from the prior study.      Impression:         1.  Mild cardiomegaly. No acute infiltrates.    2.  Bowel wall thickening involving the right colon and transverse colon in a pattern which can be seen in colitis, either infectious or inflammatory.    3.  Thickening of the urinary bladder wall. This could be due to decompression. Cystitis could offer a similar appearance.          Signer Name: Curt Quan MD   Signed: 7/21/2020 7:17 PM   Workstation Name: [facility]    CT Head Without Contrast [[id number]] Collected:  07/21/20 1902     Updated:  07/21/20 1904    Narrative:       CT Head WO    HISTORY:   Syncopal episode today with nausea and vomiting.    TECHNIQUE:   Axial unenhanced head CT. Radiation dose reduction techniques included automated exposure control or exposure modulation based on body size. Count of known CT and cardiac nuc med studies performed in previous 12 months: 4.     Time of scan: 1838 hours    COMPARISON:   Head CT 5/21/2020    FINDINGS:   No intracranial hemorrhage, mass, or infarct. No hydrocephalus or extra-axial fluid collection. There are senescent changes, including volume loss and nonspecific white matter change, but no acute abnormality is seen. The skull base, calvarium, and  extracranial soft tissues are normal.      Impression:       Senescent changes without acute abnormality. No change from head CT 5/21/2020.          Signer Name: DEBBIE Cotton MD   Signed: 7/21/2020 7:02 PM   Workstation Name: [facility]          Results for orders placed during the hospital encounter of 06/04/20   Duplex Carotid Ultrasound CAR    Narrative · Left mid ICA near 70%, however EDV less than 100 and ratio less than 4.  · Right internal carotid artery stenosis of 0-49%.  · Bilateral antegrade vertebral flow.           Results for orders placed during the hospital encounter of 06/04/20   Duplex Carotid Ultrasound CAR    Narrative · Left mid ICA near 70%, however EDV less than 100 and ratio less than 4.  · Right internal carotid artery stenosis of 0-49%.  · Bilateral antegrade vertebral flow.          Results for orders placed during the hospital encounter of 06/04/20   Adult Transthoracic Echo Complete W/ Cont if Necessary Per Protocol    Narrative · Estimated EF appears to be in the range of 51 - 55%.  · Left ventricular systolic function is normal.  · Left ventricular diastolic dysfunction (grade II) consistent with   pseudonormalization.  · The following left ventricular wall segments are hypokinetic: apical   anterior, apical lateral, apical inferior, apical septal and apex   hypokinetic.  · Mild aortic valve regurgitation is present.          Plan for Follow-up of Pending Labs/Results: I will follow up   Order Current Status    Blood Culture - Blood, Arm, Left Preliminary result    Blood Culture - Blood, Arm, Right Preliminary result        Discharge Details        Discharge Medications      Continue These Medications      Instructions Start Date   atorvastatin 40 MG tablet  Commonly known as:  LIPITOR   40 mg, Oral, Daily      atropine 1 % ophthalmic solution   No dose, route, or frequency recorded.      carvedilol 6.25 MG tablet  Commonly known as:  COREG   6.25 mg, Oral, 2 Times Daily With Meals      clopidogrel 75 MG tablet  Commonly known as:  PLAVIX   75 mg, Oral, Daily      latanoprost 0.005 % ophthalmic solution  Commonly known as:  XALATAN   1 drop, Right Eye, Nightly      moxifloxacin 0.5 % ophthalmic solution  Commonly known as:  VIGAMOX   No dose, route, or frequency recorded.      nitroglycerin 0.4 MG SL tablet  Commonly known as:  NITROSTAT   0.4 mg, Sublingual, Every 5 Minutes PRN, Take no more than 3 doses in 15 minutes.      prednisoLONE acetate 1 % ophthalmic suspension  Commonly known as:  PRED  FORTE   No dose, route, or frequency recorded.      valsartan-hydrochlorothiazide 160-25 MG per tablet  Commonly known as:  DIOVAN-HCT   1 tablet, Oral, Daily      VITAMIN D2 PO   20,000 Units, Oral, Daily             No Known Allergies      Discharge Disposition:  Home or Self Care    Diet:  Hospital:  Diet Order   Procedures   • Diet Regular; Consistent Carbohydrate       Activity:  Activity Instructions     Activity as Tolerated             CODE STATUS:    Code Status and Medical Interventions:   Ordered at: 07/21/20 2331     Level Of Support Discussed With:    Patient     Code Status:    CPR     Medical Interventions (Level of Support Prior to Arrest):    Full       Future Appointments   Date Time Provider Department Center   8/5/2020  2:45 PM Chaz Rico DNP, APRN [id number] None   10/7/2020  1:15 PM Armaan Samuels III, MD [facility] None       Additional Instructions for the Follow-ups that You Need to Schedule     Discharge Follow-up with PCP   As directed       Currently Documented PCP:    Chaz Rico DNP, DON    PCP Phone Number:    [phone]     Follow Up Details:  PCP within 1 week                 Electronically signed by Anselmo Verma MD, 07/23/20, 8:20 AM.      Time Spent on Discharge:  I spent  32 minutes on this discharge activity which included: face-to-face encounter with the patient, reviewing the data in the system, coordination of the care with the nursing staff as well as consultants, documentation, and entering orders.

## 2020-07-24 ENCOUNTER — TRANSITIONAL CARE MANAGEMENT TELEPHONE ENCOUNTER (OUTPATIENT)
Dept: CALL CENTER | Facility: HOSPITAL | Age: 75
End: 2020-07-24

## 2020-07-24 NOTE — OUTREACH NOTE
Call Center TCM Note      Responses   [facility] patient discharged from?  [place]   COVID-19 Test Status  Not tested   Does the patient have one of the following disease processes/diagnoses(primary or secondary)?  Other   TCM attempt successful?  Yes   Call start time  0914   Call end time  0916   Discharge diagnosis  Syncope and collapse,  Holter monitor    Is patient permission given to speak with other caregiver?  Yes   List who call center can speak with  Daughter- Omaira   Person spoke with today (if not patient) and relationship  Daughter- Omaira   Meds reviewed with patient/caregiver?  N/A   Does the patient have all medications ordered at discharge?  N/A   Is the patient taking all medications as directed (includes completed medication regime)?  Yes   Does the patient have a primary care provider?   Yes   Does the patient have an appointment with their PCP within 7 days of discharge?  Yes   Comments regarding PCP  f/u appt on 7/30   Has the patient kept scheduled appointments due by today?  N/A   Psychosocial issues?  No   Did the patient receive a copy of their discharge instructions?  Yes   Nursing interventions  Reviewed instructions with patient   What is the patient's perception of their health status since discharge?  Improving   Is the patient/caregiver able to teach back signs and symptoms related to disease process for when to call PCP?  Yes   Is the patient/caregiver able to teach back signs and symptoms related to disease process for when to call 911?  Yes   Is the patient/caregiver able to teach back the hierarchy of who to call/visit for symptoms/problems? PCP, Specialist, Home health nurse, Urgent Care, ED, 911  Yes   TCM call completed?  Yes   Wrap up additional comments  Per daughter, pt is doing well, she is trying to get her to drink more water, confirmed appt for next week, no questions or concerns at this time.          Yulissa Armendariz RN    7/24/2020, 09:17

## 2020-07-27 LAB
BACTERIA SPEC AEROBE CULT: NORMAL
BACTERIA SPEC AEROBE CULT: NORMAL

## 2020-07-30 ENCOUNTER — OFFICE VISIT (OUTPATIENT)
Dept: FAMILY MEDICINE CLINIC | Facility: CLINIC | Age: 75
End: 2020-07-30

## 2020-07-30 VITALS
HEIGHT: 63 IN | HEART RATE: 60 BPM | OXYGEN SATURATION: 97 % | BODY MASS INDEX: 41.46 KG/M2 | RESPIRATION RATE: 16 BRPM | WEIGHT: 234 LBS | SYSTOLIC BLOOD PRESSURE: 130 MMHG | DIASTOLIC BLOOD PRESSURE: 79 MMHG | TEMPERATURE: 98 F

## 2020-07-30 DIAGNOSIS — Z76.89 ENCOUNTER FOR SUPPORT AND COORDINATION OF TRANSITION OF CARE: Primary | ICD-10-CM

## 2020-07-30 DIAGNOSIS — R19.7 DIARRHEA, UNSPECIFIED TYPE: ICD-10-CM

## 2020-07-30 DIAGNOSIS — R55 SYNCOPE, UNSPECIFIED SYNCOPE TYPE: ICD-10-CM

## 2020-07-30 PROCEDURE — 99495 TRANSJ CARE MGMT MOD F2F 14D: CPT | Performed by: NURSE PRACTITIONER

## 2020-07-30 RX ORDER — ACETAMINOPHEN 160 MG
2000 TABLET,DISINTEGRATING ORAL DAILY
COMMUNITY

## 2020-07-30 NOTE — PROGRESS NOTES
Transitional Care Follow Up Visit  Subjective     Georgia GIORDANO Eber is a 75 y.o. female who presents for a transitional care management visit.    Within 48 business hours after discharge our office contacted her via telephone to coordinate her care and needs.      I reviewed and discussed the details of that call along with the discharge summary, hospital problems, inpatient lab results, inpatient diagnostic studies, and consultation reports with Georgia.     Current outpatient and discharge medications have been reconciled for the patient.  Reviewed by: Chaz Rico, FORTUNATO, APRN      Date of TCM Phone Call 7/23/2020   [facility]   Date of Admission 7/21/2020   Date of Discharge 7/23/2020   Discharge Disposition Home or Self Care     Risk for Readmission (LACE) Score: 10 (7/23/2020  6:00 AM)      History of Present Illness   Course During Hospital Stay:  Ms Velázquez was admitted for two witnessed syncopal spells with diarrhea. Though to be possible colitis or c diff from recent antibiotic use for uti. Also she had had a recent retinal emboli surgical procedure. While hospitalized she had no further diarrhea, was afebrile and had a good appetite. It was felt she could be discharged to home.  She is here today with her daughter who states she has been fine since discharge on 7/23/20. No fever, nausea, vomiting or diarrhea. Had a normal stool today. Drinking plenty of water. Glucose is controlled. No further syncopal episodes.     The following portions of the patient's history were reviewed and updated as appropriate: allergies, current medications, past family history, past medical history, past social history, past surgical history and problem list.    Review of Systems   Constitutional: Negative for fatigue, fever and unexpected weight change.   HENT: Negative for congestion, hearing loss, nosebleeds, rhinorrhea, sore throat, trouble swallowing and voice change.    Eyes: Negative for discharge,  redness and visual disturbance.   Respiratory: Negative for cough, chest tightness, shortness of breath and wheezing.    Cardiovascular: Negative for chest pain, palpitations and leg swelling.   Gastrointestinal: Negative for abdominal pain, blood in stool, constipation, diarrhea, nausea and vomiting.   Endocrine: Negative for polydipsia, polyphagia and polyuria.   Genitourinary: Negative for decreased urine volume, dysuria, flank pain, frequency and hematuria.   Musculoskeletal: Negative for arthralgias, joint swelling and myalgias.   Skin: Negative for color change and rash.   Neurological: Negative for dizziness, seizures, syncope, weakness and headaches.   Hematological: Negative for adenopathy. Does not bruise/bleed easily.   Psychiatric/Behavioral: Negative for dysphoric mood. The patient is not nervous/anxious.        Objective   Physical Exam   Constitutional: She is oriented to person, place, and time. She appears well-developed and well-nourished. No distress.   HENT:   Head: Normocephalic and atraumatic.   Right Ear: Tympanic membrane and external ear normal.   Left Ear: Tympanic membrane and external ear normal.   Nose: Nose normal.   Mouth/Throat: Oropharynx is clear and moist. No oropharyngeal exudate.   Eyes: Pupils are equal, round, and reactive to light. Conjunctivae are normal. Right eye exhibits no discharge. Left eye exhibits no discharge. No scleral icterus.   Neck: Neck supple. No tracheal deviation present. No thyromegaly present.   Cardiovascular: Normal rate, regular rhythm and normal heart sounds. Exam reveals no gallop and no friction rub.   No murmur heard.  Pulmonary/Chest: Effort normal and breath sounds normal. No respiratory distress. She has no wheezes.   Abdominal: Soft. Bowel sounds are normal. She exhibits no distension and no mass. There is no tenderness.   Musculoskeletal: She exhibits no edema or deformity.   Lymphadenopathy:     She has no cervical adenopathy.   Neurological:  She is alert and oriented to person, place, and time.   Walks with walker.   Skin: Skin is warm and dry. Capillary refill takes less than 2 seconds. No rash noted. No erythema.   Bruises bilat forearms.   Psychiatric: She has a normal mood and affect. Her speech is normal and behavior is normal. Judgment and thought content normal.   Nursing note and vitals reviewed.      Assessment/Plan   Georgia was seen today for syncope and diarrhea.    Diagnoses and all orders for this visit:    Encounter for support and coordination of transition of care    Diarrhea, unspecified type    Syncope, unspecified syncope type        Chart, labs, CT reviewed. Likely a vasovagal episode from having diarrhea.  Continue current meds.  Stay hydrated. Continue low carb diet and avoid sweets.  Follow up for [facility].

## 2020-08-03 ENCOUNTER — READMISSION MANAGEMENT (OUTPATIENT)
Dept: CALL CENTER | Facility: HOSPITAL | Age: 75
End: 2020-08-03

## 2020-08-03 NOTE — OUTREACH NOTE
Medical Week 2 Survey      Responses   [facility] patient discharged from?  [place]   COVID-19 Test Status  Not tested   Does the patient have one of the following disease processes/diagnoses(primary or secondary)?  Other   Week 2 attempt successful?  No   Unsuccessful attempts  Attempt 1          Isaura Nowak RN

## 2020-08-05 ENCOUNTER — READMISSION MANAGEMENT (OUTPATIENT)
Dept: CALL CENTER | Facility: HOSPITAL | Age: 75
End: 2020-08-05

## 2020-08-05 NOTE — OUTREACH NOTE
Medical Week 2 Survey      Responses   [facility] patient discharged from?  [place]   COVID-19 Test Status  Not tested   Does the patient have one of the following disease processes/diagnoses(primary or secondary)?  Other   Week 2 attempt successful?  Yes   Call start time  0858   Call end time  0900   Meds reviewed with patient/caregiver?  Yes   Comments regarding appointments  Pt has seen pcp last week.   Has the patient kept scheduled appointments due by today?  Yes   Week 2 Call Completed?  Yes   Graduated  Yes   Did the patient feel the follow up calls were helpful during their recovery period?  Yes   Was the number of calls appropriate?  Yes   Wrap up additional comments  Pt's daughter reprots she is doing great and has seen her pcp with no problems.          Yadira Ding RN

## 2020-09-04 DIAGNOSIS — I10 ESSENTIAL HYPERTENSION: ICD-10-CM

## 2020-09-04 RX ORDER — VALSARTAN AND HYDROCHLOROTHIAZIDE 160; 25 MG/1; MG/1
TABLET ORAL
Qty: 90 TABLET | Refills: 1 | Status: SHIPPED | OUTPATIENT
Start: 2020-09-04 | End: 2021-03-19 | Stop reason: HOSPADM

## 2020-10-07 ENCOUNTER — OFFICE VISIT (OUTPATIENT)
Dept: CARDIOLOGY | Facility: CLINIC | Age: 75
End: 2020-10-07

## 2020-10-07 VITALS — HEIGHT: 63 IN | BODY MASS INDEX: 41.46 KG/M2 | WEIGHT: 234 LBS

## 2020-10-07 DIAGNOSIS — I10 ESSENTIAL HYPERTENSION: Primary | ICD-10-CM

## 2020-10-07 DIAGNOSIS — I25.83 CORONARY ARTERY DISEASE DUE TO LIPID RICH PLAQUE: ICD-10-CM

## 2020-10-07 DIAGNOSIS — I65.22 LEFT CAROTID ARTERY STENOSIS: ICD-10-CM

## 2020-10-07 DIAGNOSIS — I25.10 CORONARY ARTERY DISEASE DUE TO LIPID RICH PLAQUE: ICD-10-CM

## 2020-10-07 DIAGNOSIS — E78.2 MIXED HYPERLIPIDEMIA: ICD-10-CM

## 2020-10-07 PROCEDURE — 99442 PR PHYS/QHP TELEPHONE EVALUATION 11-20 MIN: CPT | Performed by: INTERNAL MEDICINE

## 2020-10-07 NOTE — PROGRESS NOTES
[facility] at [facility]  Office visit  Georgia Velázquez  1945  [phone]  There is no work phone number on file.    VISIT DATE:  10/7/2020    This patient has consented to a telehealth visit via telephone. The visit was scheduled as a routine visit to comply with patient safety concerns in accordance with CDC recommendations.  All vitals recorded within this visit are reported by the patient.  I spent 15 minutes in total including but not limited to the 11 minutes spent in direct conversation with this patient.     PCP: Chaz Rico, DNP, APRN  [address]  [facility] [id number]    CC:  Chief Complaint   Patient presents with   • Coronary Artery Disease       Previous cardiac studies and procedures:  2000: PCI with stenting in the setting of MI, dated deficient     April 2015   echo: EF 40%, mild LVH, diastolic dysfunction  Shyanne scan myocardial perfusion imaging: EF 41%, large fixed anterior apical defect.     May 2015 cardiac catheterization: 70% distal LAD stenosis.  RCA with diffuse irregularities, 60% mid RCA stenosis.  Left circumflex with diffuse irregularities.     June 2020  Carotid duplex  · Left mid ICA near 70%, however EDV less than 100 and ratio less than 4.  · Right internal carotid artery stenosis of 0-49%.  · Bilateral antegrade vertebral flow.  Echo  · Estimated EF appears to be in the range of 51 - 55%.  · Left ventricular systolic function is normal.  · Left ventricular diastolic dysfunction (grade II) consistent with pseudonormalization.  · The following left ventricular wall segments are hypokinetic: apical anterior, apical lateral, apical inferior, apical septal and apex hypokinetic.  · Mild aortic valve regurgitation is present.    8/2020 30 day monitor  · A relatively benign monitor study.  · Occasional premature atrial contractions. Rare PVCs which are intermittently symptomatic.    ASSESSMENT:   Diagnosis Plan   1. Essential  hypertension     2. Mixed hyperlipidemia     3. Coronary artery disease due to lipid rich plaque     4. Left carotid artery stenosis         PLAN:  Coronary artery disease: Currently stable and asymptomatic.  Continue antiplatelet therapy, high intensity statin therapy and afterload reduction.     Hypertension: Goal less than 130/80 mmHg.  Currently well controlled.  Continue current medical therapy.     Hyperlipidemia: Goal LDL less than 70, currently well controlled.  Continue atorvastatin 40 mg p.o. daily.     Retinal embolic event: Continue current medical therapy and ophthalmology evaluation.  Evaluation negative for atrial arrhythmias.     Left internal carotid artery stenosis: Approaching 70%.  Currently asymptomatic.  Continue afterload reduction, high intensity statin therapy.    Continue Plavix.  Annual carotid duplex.    Subjective  Interval assessment: No change in baseline functional capacity.  Denies chest pain, palpitations or dyspnea.  Blood pressures running less than 140/90 mmHg.  She is compliant with medical therapy.    Initial eval: 75-year-old female with a history of coronary disease, peripheral vascular disease and chronic congestive systolic heart failure.  Recently had right eye pain and was diagnosed with a right retinal embolic event.  Ophthalmology evaluation has been requested, unclear whether this was a venous or arterial thrombosis.  Reviewed recent echo and carotid duplex.  No significant palpitations.  She has stable dyspnea on exertion and a class II-III pattern.  Uses a wheeled walker for ambulation.  She is compliant with medical therapy and was on aspirin prior to this event.  She reports that she is currently on a statin but does not know the name or dosage, she will call us back with this information later.  Blood pressures run less than 130/80 mmHg.  She is scheduled for an ophthalmologic procedure next week.    PHYSICAL EXAMINATION:  Vitals:    10/07/20 1313   Weight: 106 kg  "(234 lb)   Height: 160 cm (63\")         Diagnostic Data:  Procedures  Lab Results   Component Value Date    TRIG 84 07/22/2020    HDL 39 (L) 07/22/2020     Lab Results   Component Value Date    GLUCOSE 110 (H) 07/22/2020    BUN 14 07/22/2020    CREATININE 1.17 (H) 07/22/2020     07/22/2020    K 3.8 07/22/2020     07/22/2020    CO2 24.0 07/22/2020     Lab Results   Component Value Date    HGBA1C 5.40 07/22/2020     Lab Results   Component Value Date    WBC 8.50 07/22/2020    HGB 11.3 (L) 07/22/2020    HCT 35.9 07/22/2020     07/22/2020       Allergies  No Known Allergies    Current Medications    Current Outpatient Medications:   •  atorvastatin (LIPITOR) 40 MG tablet, Take 40 mg by mouth Daily., Disp: , Rfl:   •  carvedilol (COREG) 6.25 MG tablet, Take 1 tablet by mouth 2 (Two) Times a Day With Meals., Disp: 180 tablet, Rfl: 3  •  Cholecalciferol (VITAMIN D3) 125 MCG (5000 UT) capsule capsule, Take 5,000 Units by mouth Daily. 2000 units, Disp: , Rfl:   •  clopidogrel (PLAVIX) 75 MG tablet, Take 1 tablet by mouth Daily., Disp: 90 tablet, Rfl: 3  •  nitroglycerin (NITROSTAT) 0.4 MG SL tablet, Place 1 tablet under the tongue Every 5 (Five) Minutes As Needed for Chest Pain. Take no more than 3 doses in 15 minutes., Disp: 100 tablet, Rfl: 0  •  valsartan-hydrochlorothiazide (DIOVAN-HCT) 160-25 MG per tablet, TAKE ONE TABLET BY MOUTH DAILY, Disp: 90 tablet, Rfl: 1          ROS  Review of Systems   Cardiovascular: Negative for chest pain, dyspnea on exertion, irregular heartbeat and palpitations.   Respiratory: Negative for shortness of breath.          SOCIAL HX  Social History     Socioeconomic History   • Marital status:      Spouse name: Not on file   • Number of children: Not on file   • Years of education: Not on file   • Highest education level: Not on file   Occupational History   • Occupation: Retired from [employer]   Tobacco Use   • Smoking status: Former Smoker     Types: Cigarettes     " Quit date:      Years since quittin.7   • Smokeless tobacco: Never Used   Substance and Sexual Activity   • Alcohol use: No   • Drug use: No   • Sexual activity: Never   Social History Narrative    Living w daughter. Daughter works at [employer], in process of getting medical POA.        FAMILY HX  Family History   Problem Relation Age of Onset   • Diabetes Mother    • Heart failure Mother    • Heart failure Father    • No Known Problems Sister    • No Known Problems Brother    • No Known Problems Son    • No Known Problems Daughter              Armaan Samuels III, MD, FACC

## 2020-10-09 ENCOUNTER — OFFICE VISIT (OUTPATIENT)
Dept: FAMILY MEDICINE CLINIC | Facility: CLINIC | Age: 75
End: 2020-10-09

## 2020-10-09 VITALS
WEIGHT: 244 LBS | DIASTOLIC BLOOD PRESSURE: 60 MMHG | TEMPERATURE: 97.4 F | BODY MASS INDEX: 43.23 KG/M2 | OXYGEN SATURATION: 97 % | HEART RATE: 57 BPM | HEIGHT: 63 IN | SYSTOLIC BLOOD PRESSURE: 110 MMHG | RESPIRATION RATE: 24 BRPM

## 2020-10-09 DIAGNOSIS — I25.10 CORONARY ARTERY DISEASE DUE TO LIPID RICH PLAQUE: ICD-10-CM

## 2020-10-09 DIAGNOSIS — J43.9 PULMONARY EMPHYSEMA, UNSPECIFIED EMPHYSEMA TYPE (HCC): ICD-10-CM

## 2020-10-09 DIAGNOSIS — E11.36 TYPE 2 DIABETES MELLITUS WITH DIABETIC CATARACT, WITHOUT LONG-TERM CURRENT USE OF INSULIN (HCC): ICD-10-CM

## 2020-10-09 DIAGNOSIS — N28.9 RENAL INSUFFICIENCY: ICD-10-CM

## 2020-10-09 DIAGNOSIS — Z23 FLU VACCINE NEED: ICD-10-CM

## 2020-10-09 DIAGNOSIS — Z00.00 MEDICARE ANNUAL WELLNESS VISIT, SUBSEQUENT: Primary | ICD-10-CM

## 2020-10-09 DIAGNOSIS — N18.31 STAGE 3A CHRONIC KIDNEY DISEASE (HCC): ICD-10-CM

## 2020-10-09 DIAGNOSIS — I10 ESSENTIAL HYPERTENSION: ICD-10-CM

## 2020-10-09 DIAGNOSIS — Z11.59 NEED FOR HEPATITIS C SCREENING TEST: ICD-10-CM

## 2020-10-09 DIAGNOSIS — H34.9 RETINAL EMBOLI, RIGHT: ICD-10-CM

## 2020-10-09 DIAGNOSIS — E55.9 VITAMIN D DEFICIENCY: ICD-10-CM

## 2020-10-09 DIAGNOSIS — I65.22 LEFT CAROTID ARTERY STENOSIS: ICD-10-CM

## 2020-10-09 DIAGNOSIS — I25.83 CORONARY ARTERY DISEASE DUE TO LIPID RICH PLAQUE: ICD-10-CM

## 2020-10-09 DIAGNOSIS — K21.9 GASTROESOPHAGEAL REFLUX DISEASE WITHOUT ESOPHAGITIS: ICD-10-CM

## 2020-10-09 DIAGNOSIS — E78.5 HYPERLIPIDEMIA, UNSPECIFIED HYPERLIPIDEMIA TYPE: ICD-10-CM

## 2020-10-09 LAB
EXPIRATION DATE: ABNORMAL
Lab: ABNORMAL
POC CREATININE URINE: 300
POC MICROALBUMIN URINE: 30

## 2020-10-09 PROCEDURE — 82044 UR ALBUMIN SEMIQUANTITATIVE: CPT | Performed by: NURSE PRACTITIONER

## 2020-10-09 PROCEDURE — G0439 PPPS, SUBSEQ VISIT: HCPCS | Performed by: NURSE PRACTITIONER

## 2020-10-09 PROCEDURE — 90653 IIV ADJUVANT VACCINE IM: CPT | Performed by: NURSE PRACTITIONER

## 2020-10-09 PROCEDURE — G0008 ADMIN INFLUENZA VIRUS VAC: HCPCS | Performed by: NURSE PRACTITIONER

## 2020-10-09 NOTE — PROGRESS NOTES
The ABCs of the Annual Wellness Visit  Subsequent Medicare Wellness Visit    Chief Complaint   Patient presents with   • Medicare Wellness-subsequent       Subjective   History of Present Illness:  Georgia Velázquez is a 75 y.o. female who presents for a Subsequent Medicare Wellness Visit.    HEALTH RISK ASSESSMENT    Recent Hospitalizations:  Recently treated at the following:  [facility]    Current Medical Providers:  Patient Care Team:  Chaz Rico, FORTUNATO, APRN as PCP - General (Family Medicine)  Jimmy Cook MD as PCP - Claims Attributed  Carmine Pérez MD as Consulting Physician (Interventional Cardiology)  Marc Pham MD as Consulting Physician (Neurosurgery)  Prieto Flores MD as Consulting Physician (Pain Medicine)  Rajeev Sharif MD as Consulting Physician (Pulmonary Disease)   Retinal Specialist-[facility]-Dr [facility]les[facility]    Smoking Status:  Social History     Tobacco Use   Smoking Status Former Smoker   • Types: Cigarettes   • Quit date:    • Years since quittin.7   Smokeless Tobacco Never Used       Alcohol Consumption:  Social History     Substance and Sexual Activity   Alcohol Use No       Depression Screen:   PHQ-2/PHQ-9 Depression Screening 10/9/2020   Little interest or pleasure in doing things 0   Feeling down, depressed, or hopeless 0   Trouble falling or staying asleep, or sleeping too much -   Feeling tired or having little energy -   Poor appetite or overeating -   Feeling bad about yourself - or that you are a failure or have let yourself or your family down -   Trouble concentrating on things, such as reading the newspaper or watching television -   Moving or speaking so slowly that other people could have noticed. Or the opposite - being so fidgety or restless that you have been moving around a lot more than usual -   Thoughts that you would be better off dead, or of hurting yourself in some way -   Total Score 0   If  you checked off any problems, how difficult have these problems made it for you to do your work, take care of things at home, or get along with other people? -       Fall Risk Screen:  ZINA Fall Risk Assessment has not been completed.    Health Habits and Functional and Cognitive Screening:  Functional & Cognitive Status 10/9/2020   Do you have difficulty preparing food and eating? No   Do you have difficulty bathing yourself, getting dressed or grooming yourself? No   Do you have difficulty using the toilet? No   Do you have difficulty moving around from place to place? No   Do you have trouble with steps or getting out of a bed or a chair? Yes   Current Diet Unhealthy Diet   Dental Exam Up to date   Eye Exam Up to date   Exercise (times per week) 0 times per week   Current Exercise Activities Include None   Do you need help using the phone?  No   Are you deaf or do you have serious difficulty hearing?  -   Do you need help with transportation? No   Do you need help shopping? No   Do you need help preparing meals?  No   Do you need help with housework?  No   Do you need help with laundry? No   Do you need help taking your medications? No   Do you need help managing money? No   Do you ever drive or ride in a car without wearing a seat belt? No   Have you felt unusual stress, anger or loneliness in the last month? No   Who do you live with? Child   If you need help, do you have trouble finding someone available to you? No   Have you been bothered in the last four weeks by sexual problems? No   Do you have difficulty concentrating, remembering or making decisions? -         Does the patient have evidence of cognitive impairment? No    Asprin use counseling:Does not need ASA (and currently is not on it)    Age-appropriate Screening Schedule:  Refer to the list below for future screening recommendations based on patient's age, sex and/or medical conditions. Orders for these recommended tests are listed in the plan  section. The patient has been provided with a written plan.    Health Maintenance   Topic Date Due   • ZOSTER VACCINE (1 of 2) 05/15/1993   • URINE MICROALBUMIN  06/14/2020   • INFLUENZA VACCINE  08/01/2020   • HEMOGLOBIN A1C  01/22/2021   • LIPID PANEL  07/22/2021   • DIABETIC EYE EXAM  09/08/2021   • COLONOSCOPY  08/22/2026   • TDAP/TD VACCINES (2 - Td) 11/07/2027   • MAMMOGRAM  Discontinued   • DXA SCAN  Discontinued          The following portions of the patient's history were reviewed and updated as appropriate: allergies, current medications, past family history, past medical history, past social history, past surgical history and problem list.    Outpatient Medications Prior to Visit   Medication Sig Dispense Refill   • atorvastatin (LIPITOR) 40 MG tablet Take 40 mg by mouth Daily.     • carvedilol (COREG) 6.25 MG tablet Take 1 tablet by mouth 2 (Two) Times a Day With Meals. 180 tablet 3   • Cholecalciferol (VITAMIN D3) 125 MCG (5000 UT) capsule capsule Take 5,000 Units by mouth Daily. 2000 units     • clopidogrel (PLAVIX) 75 MG tablet Take 1 tablet by mouth Daily. 90 tablet 3   • nitroglycerin (NITROSTAT) 0.4 MG SL tablet Place 1 tablet under the tongue Every 5 (Five) Minutes As Needed for Chest Pain. Take no more than 3 doses in 15 minutes. 100 tablet 0   • valsartan-hydrochlorothiazide (DIOVAN-HCT) 160-25 MG per tablet TAKE ONE TABLET BY MOUTH DAILY 90 tablet 1     No facility-administered medications prior to visit.        Patient Active Problem List   Diagnosis   • Essential hypertension   • Type 2 diabetes mellitus (CMS/HCC)   • Hyperlipidemia   • Coronary artery disease   • GERD (gastroesophageal reflux disease)   • Leukocytosis   • Stage 3 chronic kidney disease   • Chronic obstructive pulmonary disease (CMS/HCC)   • Retinal emboli, right   • Vomiting and diarrhea   • Diastolic dysfunction   • Colitis   • Glaucoma   • Syncope and collapse   • Left carotid artery stenosis   • Nausea vomiting and  "diarrhea       Advanced Care Planning:  ACP discussion was held with the patient during this visit. Patient does not have an advance directive, information provided.    Review of Systems   Constitutional: Negative for fatigue, fever and unexpected weight change.   HENT: Positive for hearing loss. Negative for congestion, nosebleeds, rhinorrhea, sore throat, trouble swallowing and voice change.    Eyes: Negative for discharge, redness and visual disturbance.   Respiratory: Negative for cough, chest tightness, shortness of breath and wheezing.    Cardiovascular: Negative for chest pain, palpitations and leg swelling.   Gastrointestinal: Negative for abdominal pain, blood in stool, constipation, diarrhea, nausea and vomiting.   Endocrine: Negative for polydipsia, polyphagia and polyuria.   Genitourinary: Negative for dysuria, flank pain, frequency and hematuria.        Urinary incontinence   Musculoskeletal: Positive for arthralgias and back pain. Negative for joint swelling and myalgias.   Skin: Negative for color change and rash.   Neurological: Negative for dizziness, seizures, syncope, weakness and headaches.   Hematological: Negative for adenopathy. Does not bruise/bleed easily.   Psychiatric/Behavioral: Negative for dysphoric mood. The patient is not nervous/anxious.        Compared to one year ago, the patient feels her physical health is better.  Compared to one year ago, the patient feels her mental health is better.    Reviewed chart for potential of high risk medication in the elderly: yes  Reviewed chart for potential of harmful drug interactions in the elderly:yes    Objective         Vitals:    10/09/20 1453   BP: 110/60   Pulse: 57   Resp: 24   Temp: 97.4 °F (36.3 °C)   SpO2: 97%   Weight: 111 kg (244 lb)   Height: 160 cm (63\")   PainSc: 0-No pain       Body mass index is 43.22 kg/m².  Discussed the patient's BMI with her. The BMI is above average; BMI management plan is completed.    Physical " Exam  Vitals signs and nursing note reviewed.   Constitutional:       General: She is not in acute distress.     Appearance: She is well-developed.   HENT:      Head: Normocephalic and atraumatic.      Right Ear: Tympanic membrane and external ear normal.      Left Ear: Tympanic membrane and external ear normal.      Nose: Nose normal.   Eyes:      General: No scleral icterus.        Right eye: No discharge.         Left eye: No discharge.      Conjunctiva/sclera: Conjunctivae normal.      Pupils: Pupils are equal, round, and reactive to light.   Neck:      Musculoskeletal: Neck supple.      Thyroid: No thyromegaly.      Trachea: No tracheal deviation.   Cardiovascular:      Rate and Rhythm: Normal rate and regular rhythm.      Pulses: Normal pulses.      Heart sounds: Normal heart sounds. No murmur. No friction rub. No gallop.    Pulmonary:      Effort: Pulmonary effort is normal. No respiratory distress.      Breath sounds: Normal breath sounds. No wheezing.   Abdominal:      General: Bowel sounds are normal. There is no distension.      Palpations: Abdomen is soft. There is no mass.      Tenderness: There is no abdominal tenderness.   Musculoskeletal:         General: No deformity.      Right lower leg: Edema present.      Left lower leg: Edema present.      Comments: Forward posture with rolling walker.   Lymphadenopathy:      Cervical: No cervical adenopathy.   Skin:     General: Skin is warm and dry.      Capillary Refill: Capillary refill takes less than 2 seconds.      Findings: Lesion and rash present. No erythema.      Comments: Toenails are long and broken. Dorsal aspect of feet and ankles with thick keratinized skin plaques. +1 pretibial edema bilat   Neurological:      Mental Status: She is alert. Mental status is at baseline. She is disoriented.      Coordination: Coordination normal.   Psychiatric:         Mood and Affect: Mood normal.         Speech: Speech normal.         Behavior: Behavior normal.          Thought Content: Thought content normal.         Judgment: Judgment normal.         Lab Results   Component Value Date    TRIG 84 07/22/2020    HDL 39 (L) 07/22/2020    LDL 45 07/22/2020    VLDL 16.8 07/22/2020    HGBA1C 5.40 07/22/2020        Assessment/Plan   Medicare Risks and Personalized Health Plan  CMS Preventative Services Quick Reference  Advance Directive Discussion  Cardiovascular risk  Chronic Pain   Dementia/Memory   Depression/Dysphoria  Diabetic Lab Screening   Hearing Problem  Immunizations Discussed/Encouraged (specific immunizations; Influenza )  Inactivity/Sedentary  Obesity/Overweight   Polypharmacy  Urinary Incontinence    The above risks/problems have been discussed with the patient.  Pertinent information has been shared with the patient in the After Visit Summary.  Follow up plans and orders are seen below in the Assessment/Plan Section.    Diagnoses and all orders for this visit:    1. Medicare annual wellness visit, subsequent (Primary)    2. Flu vaccine need  -     Fluad Tri 65yr+    3. Essential hypertension  -     Cancel: CBC & Differential  -     Cancel: CBC Auto Differential  -     CBC & Differential; Future    4. Type 2 diabetes mellitus with diabetic cataract, without long-term current use of insulin (CMS/[facility])  -     Cancel: Comprehensive Metabolic Panel  -     Cancel: Hemoglobin A1c  -     POC Microalbumin  -     Ambulatory Referral to Podiatry  -     Hemoglobin A1c; Future  -     Comprehensive Metabolic Panel; Future    5. Hyperlipidemia, unspecified hyperlipidemia type  -     Cancel: Lipid Panel  -     Lipid Panel; Future    6. Vitamin D deficiency  -     Cancel: Vitamin D 25 Hydroxy  -     Vitamin D 25 Hydroxy; Future    7. Renal insufficiency    8. Need for hepatitis C screening test  -     Cancel: Hepatitis C Antibody  -     Hepatitis C Antibody; Future    9. Coronary artery disease due to lipid rich plaque    10. Retinal emboli, right    11. Left carotid artery  stenosis    12. Pulmonary emphysema, unspecified emphysema type (CMS/[facility])    13. Gastroesophageal reflux disease without esophagitis    14. Stage 3a chronic kidney disease      Discussed the importance of low carb diet, annual dilated eye exam, nightly foot checks, annual dentist visit, daily exercise. Monitor blood sugar at least twice a week. Maintain BMI under 25. Have regular follow up appointments for labs and screenings.  Monitor bp at home and bring log. Encourage DASH diet and 150 minutes of weekly exercise. Reviewed signs and symptoms of MI and stroke. If symptoms persist or worsen go to the ER.  Work on exercising more in the house. Use walker to prevent falls.  Refer to Pod to help with foot and toenail care.  Discussed the nature of the disease including, risks, complications, implications, management, safe and proper use of medications. Encouraged therapeutic lifestyle changes including low calorie diet with plenty of fruits and vegetables, daily exercise, medication compliance, and keeping scheduled follow up appointments with me and any other providers. Encouraged patient to have appointment for complete physical, fasting labs, appropriate screenings, and immunizations on an annual basis.      Follow Up:    Follow up in March.    An After Visit Summary and PPPS were given to the patient.

## 2020-10-12 ENCOUNTER — LAB (OUTPATIENT)
Dept: FAMILY MEDICINE CLINIC | Facility: CLINIC | Age: 75
End: 2020-10-12

## 2020-10-12 DIAGNOSIS — Z11.59 NEED FOR HEPATITIS C SCREENING TEST: ICD-10-CM

## 2020-10-12 DIAGNOSIS — E55.9 VITAMIN D DEFICIENCY: ICD-10-CM

## 2020-10-12 DIAGNOSIS — E11.36 TYPE 2 DIABETES MELLITUS WITH DIABETIC CATARACT, WITHOUT LONG-TERM CURRENT USE OF INSULIN (HCC): ICD-10-CM

## 2020-10-12 DIAGNOSIS — I10 ESSENTIAL HYPERTENSION: ICD-10-CM

## 2020-10-12 DIAGNOSIS — E78.5 HYPERLIPIDEMIA, UNSPECIFIED HYPERLIPIDEMIA TYPE: ICD-10-CM

## 2020-10-12 PROCEDURE — 86803 HEPATITIS C AB TEST: CPT | Performed by: NURSE PRACTITIONER

## 2020-10-12 PROCEDURE — 82306 VITAMIN D 25 HYDROXY: CPT | Performed by: NURSE PRACTITIONER

## 2020-10-12 PROCEDURE — 80053 COMPREHEN METABOLIC PANEL: CPT | Performed by: NURSE PRACTITIONER

## 2020-10-12 PROCEDURE — 85025 COMPLETE CBC W/AUTO DIFF WBC: CPT | Performed by: NURSE PRACTITIONER

## 2020-10-12 PROCEDURE — 83036 HEMOGLOBIN GLYCOSYLATED A1C: CPT | Performed by: NURSE PRACTITIONER

## 2020-10-12 PROCEDURE — 80061 LIPID PANEL: CPT | Performed by: NURSE PRACTITIONER

## 2020-10-13 LAB
25(OH)D3 SERPL-MCNC: 41.6 NG/ML (ref 30–100)
ALBUMIN SERPL-MCNC: 3.7 G/DL (ref 3.5–5.2)
ALBUMIN/GLOB SERPL: 1 G/DL
ALP SERPL-CCNC: 99 U/L (ref 39–117)
ALT SERPL W P-5'-P-CCNC: 11 U/L (ref 1–33)
ANION GAP SERPL CALCULATED.3IONS-SCNC: 9.9 MMOL/L (ref 5–15)
AST SERPL-CCNC: 11 U/L (ref 1–32)
BASOPHILS # BLD AUTO: 0.03 10*3/MM3 (ref 0–0.2)
BASOPHILS NFR BLD AUTO: 0.4 % (ref 0–1.5)
BILIRUB SERPL-MCNC: 0.3 MG/DL (ref 0–1.2)
BUN SERPL-MCNC: 30 MG/DL (ref 8–23)
BUN/CREAT SERPL: 20.4 (ref 7–25)
CALCIUM SPEC-SCNC: 9.5 MG/DL (ref 8.6–10.5)
CHLORIDE SERPL-SCNC: 106 MMOL/L (ref 98–107)
CHOLEST SERPL-MCNC: 112 MG/DL (ref 0–200)
CO2 SERPL-SCNC: 25.1 MMOL/L (ref 22–29)
CREAT SERPL-MCNC: 1.47 MG/DL (ref 0.57–1)
DEPRECATED RDW RBC AUTO: 43.3 FL (ref 37–54)
EOSINOPHIL # BLD AUTO: 0.13 10*3/MM3 (ref 0–0.4)
EOSINOPHIL NFR BLD AUTO: 1.8 % (ref 0.3–6.2)
ERYTHROCYTE [DISTWIDTH] IN BLOOD BY AUTOMATED COUNT: 12.6 % (ref 12.3–15.4)
GFR SERPL CREATININE-BSD FRML MDRD: 34 ML/MIN/1.73
GLOBULIN UR ELPH-MCNC: 3.7 GM/DL
GLUCOSE SERPL-MCNC: 91 MG/DL (ref 65–99)
HBA1C MFR BLD: 5.9 % (ref 4.8–5.6)
HCT VFR BLD AUTO: 35.4 % (ref 34–46.6)
HCV AB SER DONR QL: NORMAL
HDLC SERPL-MCNC: 46 MG/DL (ref 40–60)
HGB BLD-MCNC: 11.7 G/DL (ref 12–15.9)
IMM GRANULOCYTES # BLD AUTO: 0.01 10*3/MM3 (ref 0–0.05)
IMM GRANULOCYTES NFR BLD AUTO: 0.1 % (ref 0–0.5)
LDLC SERPL CALC-MCNC: 47 MG/DL (ref 0–100)
LDLC/HDLC SERPL: 0.99 {RATIO}
LYMPHOCYTES # BLD AUTO: 2.35 10*3/MM3 (ref 0.7–3.1)
LYMPHOCYTES NFR BLD AUTO: 33.3 % (ref 19.6–45.3)
MCH RBC QN AUTO: 30.8 PG (ref 26.6–33)
MCHC RBC AUTO-ENTMCNC: 33.1 G/DL (ref 31.5–35.7)
MCV RBC AUTO: 93.2 FL (ref 79–97)
MONOCYTES # BLD AUTO: 0.74 10*3/MM3 (ref 0.1–0.9)
MONOCYTES NFR BLD AUTO: 10.5 % (ref 5–12)
NEUTROPHILS NFR BLD AUTO: 3.79 10*3/MM3 (ref 1.7–7)
NEUTROPHILS NFR BLD AUTO: 53.9 % (ref 42.7–76)
NRBC BLD AUTO-RTO: 0 /100 WBC (ref 0–0.2)
PLATELET # BLD AUTO: 172 10*3/MM3 (ref 140–450)
PMV BLD AUTO: 11.5 FL (ref 6–12)
POTASSIUM SERPL-SCNC: 4.3 MMOL/L (ref 3.5–5.2)
PROT SERPL-MCNC: 7.4 G/DL (ref 6–8.5)
RBC # BLD AUTO: 3.8 10*6/MM3 (ref 3.77–5.28)
SODIUM SERPL-SCNC: 141 MMOL/L (ref 136–145)
TRIGL SERPL-MCNC: 103 MG/DL (ref 0–150)
VLDLC SERPL-MCNC: 19 MG/DL (ref 5–40)
WBC # BLD AUTO: 7.05 10*3/MM3 (ref 3.4–10.8)

## 2021-01-11 RX ORDER — ATORVASTATIN CALCIUM 40 MG/1
TABLET, FILM COATED ORAL
Qty: 90 TABLET | Refills: 2 | Status: SHIPPED | OUTPATIENT
Start: 2021-01-11 | End: 2021-10-21 | Stop reason: SDUPTHER

## 2021-03-17 ENCOUNTER — HOSPITAL ENCOUNTER (OUTPATIENT)
Facility: HOSPITAL | Age: 76
Setting detail: OBSERVATION
Discharge: HOME | End: 2021-03-19
Attending: EMERGENCY MEDICINE | Admitting: INTERNAL MEDICINE

## 2021-03-17 ENCOUNTER — APPOINTMENT (OUTPATIENT)
Dept: GENERAL RADIOLOGY | Facility: HOSPITAL | Age: 76
End: 2021-03-17

## 2021-03-17 ENCOUNTER — APPOINTMENT (OUTPATIENT)
Dept: CT IMAGING | Facility: HOSPITAL | Age: 76
End: 2021-03-17

## 2021-03-17 DIAGNOSIS — R11.2 NAUSEA VOMITING AND DIARRHEA: ICD-10-CM

## 2021-03-17 DIAGNOSIS — I51.89 DIASTOLIC DYSFUNCTION: ICD-10-CM

## 2021-03-17 DIAGNOSIS — N28.9 RENAL INSUFFICIENCY: ICD-10-CM

## 2021-03-17 DIAGNOSIS — R00.1 BRADYCARDIA: ICD-10-CM

## 2021-03-17 DIAGNOSIS — R19.7 NAUSEA VOMITING AND DIARRHEA: ICD-10-CM

## 2021-03-17 DIAGNOSIS — R55 SYNCOPE AND COLLAPSE: ICD-10-CM

## 2021-03-17 DIAGNOSIS — R55 SYNCOPE, UNSPECIFIED SYNCOPE TYPE: Primary | ICD-10-CM

## 2021-03-17 DIAGNOSIS — N39.0 ACUTE UTI: ICD-10-CM

## 2021-03-17 DIAGNOSIS — R11.0 NAUSEA: ICD-10-CM

## 2021-03-17 PROBLEM — N17.9 AKI (ACUTE KIDNEY INJURY) (HCC): Status: ACTIVE | Noted: 2021-03-17

## 2021-03-17 LAB
ALBUMIN SERPL-MCNC: 3.7 G/DL (ref 3.5–5.2)
ALBUMIN/GLOB SERPL: 1.2 G/DL
ALP SERPL-CCNC: 83 U/L (ref 39–117)
ALT SERPL W P-5'-P-CCNC: 19 U/L (ref 1–33)
ANION GAP SERPL CALCULATED.3IONS-SCNC: 9 MMOL/L (ref 5–15)
AST SERPL-CCNC: 24 U/L (ref 1–32)
BACTERIA UR QL AUTO: ABNORMAL /HPF
BASOPHILS # BLD AUTO: 0.02 10*3/MM3 (ref 0–0.2)
BASOPHILS NFR BLD AUTO: 0.3 % (ref 0–1.5)
BILIRUB SERPL-MCNC: 0.6 MG/DL (ref 0–1.2)
BILIRUB UR QL STRIP: NEGATIVE
BUN SERPL-MCNC: 33 MG/DL (ref 8–23)
BUN/CREAT SERPL: 21.3 (ref 7–25)
CALCIUM SPEC-SCNC: 8.4 MG/DL (ref 8.6–10.5)
CHLORIDE SERPL-SCNC: 105 MMOL/L (ref 98–107)
CLARITY UR: ABNORMAL
CO2 SERPL-SCNC: 26 MMOL/L (ref 22–29)
COLOR UR: YELLOW
CREAT SERPL-MCNC: 1.55 MG/DL (ref 0.57–1)
DEPRECATED RDW RBC AUTO: 51 FL (ref 37–54)
EOSINOPHIL # BLD AUTO: 0.01 10*3/MM3 (ref 0–0.4)
EOSINOPHIL NFR BLD AUTO: 0.1 % (ref 0.3–6.2)
ERYTHROCYTE [DISTWIDTH] IN BLOOD BY AUTOMATED COUNT: 14.4 % (ref 12.3–15.4)
FLUAV RNA RESP QL NAA+PROBE: NOT DETECTED
FLUBV RNA RESP QL NAA+PROBE: NOT DETECTED
GFR SERPL CREATININE-BSD FRML MDRD: 32 ML/MIN/1.73
GLOBULIN UR ELPH-MCNC: 3.1 GM/DL
GLUCOSE BLDC GLUCOMTR-MCNC: 201 MG/DL (ref 70–130)
GLUCOSE SERPL-MCNC: 172 MG/DL (ref 65–99)
GLUCOSE UR STRIP-MCNC: NEGATIVE MG/DL
HCT VFR BLD AUTO: 40.2 % (ref 34–46.6)
HGB BLD-MCNC: 12.7 G/DL (ref 12–15.9)
HGB UR QL STRIP.AUTO: ABNORMAL
HOLD SPECIMEN: NORMAL
HYALINE CASTS UR QL AUTO: ABNORMAL /LPF
IMM GRANULOCYTES # BLD AUTO: 0.03 10*3/MM3 (ref 0–0.05)
IMM GRANULOCYTES NFR BLD AUTO: 0.4 % (ref 0–0.5)
KETONES UR QL STRIP: NEGATIVE
LEUKOCYTE ESTERASE UR QL STRIP.AUTO: ABNORMAL
LYMPHOCYTES # BLD AUTO: 1.59 10*3/MM3 (ref 0.7–3.1)
LYMPHOCYTES NFR BLD AUTO: 22.8 % (ref 19.6–45.3)
MAGNESIUM SERPL-MCNC: 1.8 MG/DL (ref 1.6–2.4)
MCH RBC QN AUTO: 30.9 PG (ref 26.6–33)
MCHC RBC AUTO-ENTMCNC: 31.6 G/DL (ref 31.5–35.7)
MCV RBC AUTO: 97.8 FL (ref 79–97)
MONOCYTES # BLD AUTO: 0.55 10*3/MM3 (ref 0.1–0.9)
MONOCYTES NFR BLD AUTO: 7.9 % (ref 5–12)
NEUTROPHILS NFR BLD AUTO: 4.76 10*3/MM3 (ref 1.7–7)
NEUTROPHILS NFR BLD AUTO: 68.5 % (ref 42.7–76)
NITRITE UR QL STRIP: POSITIVE
NRBC BLD AUTO-RTO: 0 /100 WBC (ref 0–0.2)
PH UR STRIP.AUTO: 5.5 [PH] (ref 5–8)
PLATELET # BLD AUTO: 148 10*3/MM3 (ref 140–450)
PMV BLD AUTO: 10.5 FL (ref 6–12)
POTASSIUM SERPL-SCNC: 4.2 MMOL/L (ref 3.5–5.2)
PROT SERPL-MCNC: 6.8 G/DL (ref 6–8.5)
PROT UR QL STRIP: NEGATIVE
RBC # BLD AUTO: 4.11 10*6/MM3 (ref 3.77–5.28)
RBC # UR: ABNORMAL /HPF
REF LAB TEST METHOD: ABNORMAL
SARS-COV-2 RNA RESP QL NAA+PROBE: NOT DETECTED
SODIUM SERPL-SCNC: 140 MMOL/L (ref 136–145)
SP GR UR STRIP: 1.04 (ref 1–1.03)
SQUAMOUS #/AREA URNS HPF: ABNORMAL /HPF
TROPONIN T SERPL-MCNC: <0.01 NG/ML (ref 0–0.03)
TROPONIN T SERPL-MCNC: <0.01 NG/ML (ref 0–0.03)
UROBILINOGEN UR QL STRIP: ABNORMAL
WBC # BLD AUTO: 6.96 10*3/MM3 (ref 3.4–10.8)
WBC UR QL AUTO: ABNORMAL /HPF
WHOLE BLOOD HOLD SPECIMEN: NORMAL
WHOLE BLOOD HOLD SPECIMEN: NORMAL

## 2021-03-17 PROCEDURE — 84484 ASSAY OF TROPONIN QUANT: CPT | Performed by: EMERGENCY MEDICINE

## 2021-03-17 PROCEDURE — 99285 EMERGENCY DEPT VISIT HI MDM: CPT

## 2021-03-17 PROCEDURE — 87086 URINE CULTURE/COLONY COUNT: CPT | Performed by: NURSE PRACTITIONER

## 2021-03-17 PROCEDURE — G0378 HOSPITAL OBSERVATION PER HR: HCPCS

## 2021-03-17 PROCEDURE — 83735 ASSAY OF MAGNESIUM: CPT | Performed by: EMERGENCY MEDICINE

## 2021-03-17 PROCEDURE — 25010000002 CEFTRIAXONE PER 250 MG: Performed by: NURSE PRACTITIONER

## 2021-03-17 PROCEDURE — 93005 ELECTROCARDIOGRAM TRACING: CPT | Performed by: EMERGENCY MEDICINE

## 2021-03-17 PROCEDURE — 84484 ASSAY OF TROPONIN QUANT: CPT | Performed by: NURSE PRACTITIONER

## 2021-03-17 PROCEDURE — 82962 GLUCOSE BLOOD TEST: CPT

## 2021-03-17 PROCEDURE — 0 IOPAMIDOL PER 1 ML: Performed by: EMERGENCY MEDICINE

## 2021-03-17 PROCEDURE — 99220 PR INITIAL OBSERVATION CARE/DAY 70 MINUTES: CPT | Performed by: NURSE PRACTITIONER

## 2021-03-17 PROCEDURE — 71045 X-RAY EXAM CHEST 1 VIEW: CPT

## 2021-03-17 PROCEDURE — 70450 CT HEAD/BRAIN W/O DYE: CPT

## 2021-03-17 PROCEDURE — 80053 COMPREHEN METABOLIC PANEL: CPT | Performed by: EMERGENCY MEDICINE

## 2021-03-17 PROCEDURE — 81001 URINALYSIS AUTO W/SCOPE: CPT | Performed by: EMERGENCY MEDICINE

## 2021-03-17 PROCEDURE — 87186 SC STD MICRODIL/AGAR DIL: CPT | Performed by: NURSE PRACTITIONER

## 2021-03-17 PROCEDURE — 96361 HYDRATE IV INFUSION ADD-ON: CPT

## 2021-03-17 PROCEDURE — 96365 THER/PROPH/DIAG IV INF INIT: CPT

## 2021-03-17 PROCEDURE — 87088 URINE BACTERIA CULTURE: CPT | Performed by: NURSE PRACTITIONER

## 2021-03-17 PROCEDURE — 96372 THER/PROPH/DIAG INJ SC/IM: CPT

## 2021-03-17 PROCEDURE — 71275 CT ANGIOGRAPHY CHEST: CPT

## 2021-03-17 PROCEDURE — 85025 COMPLETE CBC W/AUTO DIFF WBC: CPT | Performed by: EMERGENCY MEDICINE

## 2021-03-17 PROCEDURE — 87636 SARSCOV2 & INF A&B AMP PRB: CPT | Performed by: EMERGENCY MEDICINE

## 2021-03-17 PROCEDURE — C9803 HOPD COVID-19 SPEC COLLECT: HCPCS

## 2021-03-17 PROCEDURE — 25010000002 HEPARIN (PORCINE) PER 1000 UNITS: Performed by: NURSE PRACTITIONER

## 2021-03-17 RX ORDER — SODIUM CHLORIDE 0.9 % (FLUSH) 0.9 %
10 SYRINGE (ML) INJECTION AS NEEDED
Status: DISCONTINUED | OUTPATIENT
Start: 2021-03-17 | End: 2021-03-19 | Stop reason: HOSPADM

## 2021-03-17 RX ORDER — SODIUM CHLORIDE 9 MG/ML
75 INJECTION, SOLUTION INTRAVENOUS CONTINUOUS
Status: DISCONTINUED | OUTPATIENT
Start: 2021-03-17 | End: 2021-03-18

## 2021-03-17 RX ORDER — NITROGLYCERIN 0.4 MG/1
0.4 TABLET SUBLINGUAL
Status: DISCONTINUED | OUTPATIENT
Start: 2021-03-17 | End: 2021-03-19 | Stop reason: HOSPADM

## 2021-03-17 RX ORDER — NICOTINE POLACRILEX 4 MG
15 LOZENGE BUCCAL
Status: DISCONTINUED | OUTPATIENT
Start: 2021-03-17 | End: 2021-03-19 | Stop reason: HOSPADM

## 2021-03-17 RX ORDER — DEXTROSE MONOHYDRATE 25 G/50ML
25 INJECTION, SOLUTION INTRAVENOUS
Status: DISCONTINUED | OUTPATIENT
Start: 2021-03-17 | End: 2021-03-19 | Stop reason: HOSPADM

## 2021-03-17 RX ORDER — ACETAMINOPHEN 325 MG/1
650 TABLET ORAL EVERY 4 HOURS PRN
Status: DISCONTINUED | OUTPATIENT
Start: 2021-03-17 | End: 2021-03-19 | Stop reason: HOSPADM

## 2021-03-17 RX ORDER — PREDNISOLONE ACETATE 10 MG/ML
1 SUSPENSION/ DROPS OPHTHALMIC EVERY 4 HOURS
Status: DISCONTINUED | OUTPATIENT
Start: 2021-03-17 | End: 2021-03-18

## 2021-03-17 RX ORDER — ACETAMINOPHEN 650 MG/1
650 SUPPOSITORY RECTAL EVERY 4 HOURS PRN
Status: DISCONTINUED | OUTPATIENT
Start: 2021-03-17 | End: 2021-03-19 | Stop reason: HOSPADM

## 2021-03-17 RX ORDER — CARVEDILOL 6.25 MG/1
6.25 TABLET ORAL 2 TIMES DAILY WITH MEALS
Status: DISCONTINUED | OUTPATIENT
Start: 2021-03-17 | End: 2021-03-19

## 2021-03-17 RX ORDER — ATORVASTATIN CALCIUM 40 MG/1
40 TABLET, FILM COATED ORAL DAILY
Status: DISCONTINUED | OUTPATIENT
Start: 2021-03-18 | End: 2021-03-19 | Stop reason: HOSPADM

## 2021-03-17 RX ORDER — LATANOPROST 50 UG/ML
1 SOLUTION/ DROPS OPHTHALMIC NIGHTLY
COMMUNITY
End: 2021-04-12

## 2021-03-17 RX ORDER — HEPARIN SODIUM 5000 [USP'U]/ML
5000 INJECTION, SOLUTION INTRAVENOUS; SUBCUTANEOUS EVERY 12 HOURS SCHEDULED
Status: DISCONTINUED | OUTPATIENT
Start: 2021-03-17 | End: 2021-03-19 | Stop reason: HOSPADM

## 2021-03-17 RX ORDER — CLOPIDOGREL BISULFATE 75 MG/1
75 TABLET ORAL DAILY
Status: DISCONTINUED | OUTPATIENT
Start: 2021-03-18 | End: 2021-03-19 | Stop reason: HOSPADM

## 2021-03-17 RX ORDER — PREDNISOLONE ACETATE 10 MG/ML
1 SUSPENSION/ DROPS OPHTHALMIC EVERY 4 HOURS
COMMUNITY
End: 2021-10-17

## 2021-03-17 RX ORDER — SODIUM CHLORIDE 0.9 % (FLUSH) 0.9 %
10 SYRINGE (ML) INJECTION EVERY 12 HOURS SCHEDULED
Status: DISCONTINUED | OUTPATIENT
Start: 2021-03-17 | End: 2021-03-19 | Stop reason: HOSPADM

## 2021-03-17 RX ADMIN — PREDNISOLONE ACETATE 1 DROP: 10 SUSPENSION/ DROPS OPHTHALMIC at 21:40

## 2021-03-17 RX ADMIN — SODIUM CHLORIDE 75 ML/HR: 9 INJECTION, SOLUTION INTRAVENOUS at 21:32

## 2021-03-17 RX ADMIN — HEPARIN SODIUM 5000 UNITS: 5000 INJECTION INTRAVENOUS; SUBCUTANEOUS at 21:41

## 2021-03-17 RX ADMIN — SODIUM CHLORIDE 1 G: 900 INJECTION INTRAVENOUS at 21:32

## 2021-03-17 RX ADMIN — SODIUM CHLORIDE, PRESERVATIVE FREE 10 ML: 5 INJECTION INTRAVENOUS at 21:41

## 2021-03-17 RX ADMIN — IOPAMIDOL 67 ML: 755 INJECTION, SOLUTION INTRAVENOUS at 17:30

## 2021-03-17 RX ADMIN — SODIUM CHLORIDE 500 ML: 9 INJECTION, SOLUTION INTRAVENOUS at 15:45

## 2021-03-17 RX ADMIN — CARVEDILOL 6.25 MG: 6.25 TABLET, FILM COATED ORAL at 21:41

## 2021-03-17 NOTE — H&P
[facility] Medicine Services  Short Stay Unit (SSU)  HISTORY & PHYSICAL    Patient Name: Georgia Velázquez  : 1945  MRN: [id number]  Primary Care Physician: Chaz Rico, DNP, APRN  Date of admission: 3/17/2021  2:45 PM      Subjective   Subjective     Chief Complaint:  Syncope    HPI:  Georgia Velázquez is a 75 y.o. female with a history of CKD, DM, HTN, HLD, COPD, GERD, CAD, CHF, right retinal emboli, left carotid artery stenosis, presents to the ED after having a witnessed syncopal episode at home.  Patient reports that she got up from going to the bathroom after having diarrhea with associated nausea, was walking down the [place]ena to go see her daughter when she suddenly became lightheaded and dizzy.  She then yelled for her daughter and sat down on her Rollator.  Daughter reports that patient said that she felt like she was going to pass out and then became unresponsive.  Daughter reported that the patient's eyes fluttered back and she became stiff in her bilateral upper extremities.  Episode lasted approximately 45 seconds.  Upon arousal patient was briefly confused and then returned to baseline.  EMS was called and it was reported that she had another syncopal episode in route to the ER.  Patient also endorses a headache, diaphoresis, diarrhea, nausea and chronic edema in her lower extremities.   She denies fever, shortness of air, cough, chest pain, abdominal pain, dysuria, or any other complaints at this time.  Patient reports that she had similar episode several months ago with a negative work-up here at the hospital.  Review of medical record shows that she was evaluated for syncope in .  She had follow-up outpatient with Dr. Samuels for an outpatient Holter monitor.  CT scan of the abdomen showed some nonspecific colitis at that time.  Her syncope was thought to be related to a vagal effect from being on the toilet.  CTA of the chest shows no pulmonary  embolism, no acute intrathoracic process.  CTA chest no acute intracranial findings with stable appearance of senescent changes.  Patient was given 500 mL of saline in the ED.  Patient is being admitted to the hospital medicine service for further evaluation and management.    Review of Systems   Constitutional: Negative.    HENT: Negative for congestion, sore throat and trouble swallowing.    Eyes: Negative.    Respiratory: Negative.    Cardiovascular: Positive for leg swelling (chronic). Negative for chest pain and palpitations.   Gastrointestinal: Positive for diarrhea and nausea. Negative for abdominal pain and vomiting.   Endocrine: Negative.    Genitourinary: Negative.    Musculoskeletal: Negative.    Skin: Negative.    Allergic/Immunologic: Negative.    Neurological: Positive for dizziness, syncope, light-headedness and headaches. Negative for seizures, weakness and numbness.   Hematological: Negative.    Psychiatric/Behavioral: Positive for confusion.        All other systems reviewed and negative    Personal History     Past Medical History:   Diagnosis Date   • Acute kidney injury (CMS/[facility]) 12/5/2017   • Aortic regurgitation    • 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/2016   • Body mass index (BMI) of 45.0-49.9 in adult (CMS/[facility]) 6/14/2019   • CHF (congestive heart failure) (CMS/[facility])    • Closed compression fracture of L4 lumbar vertebra 12/4/2017    Added automatically from request for surgery [id number]   • Constipation 12/5/2017   • COPD (chronic obstructive pulmonary disease) (CMS/[facility])    • Coronary artery disease 5/18/2016   • Diabetes mellitus type 2 in obese (CMS/[facility]) 1/29/2018   • Elevated alkaline phosphatase level 2/26/2018   • Elevated creatine kinase    • Essential hypertension 5/18/2016   • GERD (gastroesophageal reflux disease) 5/18/2016   • Glaucoma 7/21/2020   • History of echocardiogram 10/16/2015    TDS.Est EF is 45-50%.Mild distal septal, anteroapical hypokinesia.Mild mitral stenosis.Mean gradient  across mitral valve is 6 mmHg.Trace TR   • History of kyphoplasty 1/30/2018   • Hyperlipemia 5/18/2016   • Lumbar facet arthropathy 1/29/2018   • Lumbar stenosis with neurogenic claudication 1/29/2018   • Morbid obesity due to excess calories (CMS/[facility]) 1/29/2018   • Myocardial infarction (CMS/[facility]) 5/18/2016   • Osteoporosis 5/18/2016   • Physical deconditioning 1/30/2018   • Retinal emboli, right 5/21/2020   • Sepsis (CMS/[facility])     uro   • Stroke (CMS/[facility])    • Type 2 diabetes mellitus (CMS/[facility]) 5/18/2016   • Urinary incontinence without sensory awareness 2/26/2018       Past Surgical History:   Procedure Laterality Date   • APPENDECTOMY     • BACK SURGERY     • CHOLECYSTECTOMY     • EYE SURGERY     • KYPHOPLASTY N/A 12/7/2017    Procedure: KYPHOPLASTY L4;  Surgeon: Marc Pham MD;  Location: [facility];  Service:        Family History: family history includes Diabetes in her mother; Heart failure in her father and mother; No Known Problems in her brother, daughter, sister, and son. Otherwise pertinent FHx was reviewed and unremarkable.     Social History:  reports that she quit smoking about 21 years ago. Her smoking use included cigarettes. She has never used smokeless tobacco. She reports that she does not drink alcohol and does not use drugs.  Social History     Social History Narrative    Living w daughter. Daughter works at [employer], in process of getting medical POA.        Medications:  Available home medication information reviewed.  (Not in a hospital admission)      No Known Allergies    Objective   Objective     Vital Signs:   Temp:  [97.8 °F (36.6 °C)] 97.8 °F (36.6 °C)  Heart Rate:  [53-70] 70  Resp:  [20] 20  BP: (104-141)/(30-58) 141/58        Physical Exam  Constitutional:       General: She is not in acute distress.     Appearance: She is not ill-appearing, toxic-appearing or diaphoretic.   HENT:      Head: Normocephalic.      Nose: Nose normal.      Mouth/Throat:      Mouth: Mucous  membranes are moist.   Eyes:      Pupils: Pupils are equal, round, and reactive to light.   Cardiovascular:      Rate and Rhythm: Regular rhythm. Bradycardia present.      Pulses: Normal pulses.      Heart sounds: Normal heart sounds. No murmur heard.   No friction rub. No gallop.    Pulmonary:      Effort: Pulmonary effort is normal. No respiratory distress.      Breath sounds: Normal breath sounds. No wheezing, rhonchi or rales.   Abdominal:      General: Bowel sounds are normal. There is no distension.      Palpations: Abdomen is soft.      Tenderness: There is no abdominal tenderness. There is no guarding or rebound.   Musculoskeletal:         General: No swelling, tenderness or signs of injury. Normal range of motion.      Cervical back: Normal range of motion.   Skin:     General: Skin is warm and dry.      Coloration: Skin is not pale.      Findings: No erythema or rash.   Neurological:      General: No focal deficit present.      Mental Status: She is alert and oriented to person, place, and time.      Cranial Nerves: No cranial nerve deficit.   Psychiatric:         Mood and Affect: Mood normal.         Behavior: Behavior normal.         Thought Content: Thought content normal.         Judgment: Judgment normal.            Results Reviewed:  CT Head Without Contrast    Result Date: 3/17/2021  No acute intracranial findings with stable appearance of senescent changes including at least moderately advanced chronic small vessel ischemic disease as seen on prior.  DICTATED:   03/17/2021 EDITED/ls :   03/17/2021       XR Chest 1 View    Result Date: 3/17/2021  1. Mild cardiomegaly and pulmonary venous hypertension.  2. Stable mild chronic appearing interstitial lung changes compared to 07/08/2020 exam, apparently the patient's baseline radiographic appearance. No evidence of new chest disease.  D:  03/17/2021 E:  03/17/2021        CT Angiogram Chest    Result Date: 3/17/2021  1. No pulmonary embolism.  2. No  acute intrathoracic process, specifically negative for pneumonia with an indeterminate 5 mm  noncalcified lingular segment left upper lobe pulmonary nodule stable from prior comparison 07/21/2020, however, with potential follow-up if high-risk stratification history exists.  3. Small hiatal hernia.  DICTATED:   03/17/2021 EDITED/ls :   03/17/2021            Assessment/Plan   Assessment / Plan     Active Hospital Problems    Diagnosis  POA   • **Syncope [R55]  Yes   • ISRRAEL (acute kidney injury) (CMS/[facility]) [N17.9]  Yes   • Acute UTI (urinary tract infection) [N39.0]  Yes   • Type 2 diabetes mellitus (CMS/[facility]) [E11.9]  Yes     Georgia Velázquez is a 75 y.o. female with a history of CKD, DM, HTN, HLD, COPD, GERD, CAD, CHF, right retinal emboli, left carotid artery stenosis, presents to the ED after having a witnessed syncopal episode at home.     Plan:    Syncope  Bradycardia  --CT head shows no acute intracranial findings with stable appearance of senescent changes including at least moderately advanced chronic small vessel ischemic disease.  --Syncope may be 2 to sudden onset of diarrhea.  --Monitor on telemetry  --Neurochecks  --trend troponin  --fall precautions  --orthostatics  --IV fluid  --echo in the am  --am labs    Acute UTI  --UA:  + nitrite, wbc 21-30, 4+ bacteria  --Rocephin  --urine culture  --cbc, bmp in the am    ISRRAEL  --baseline creatinine 1.1-1.4 (1.55 today)  --IV fluids  --Hold ARB and HCTZ for now  --am labs    T2DM  --fsbg with ssi    DVT prophylaxis: Heparin      CODE STATUS:   Code Status and Medical Interventions:   Ordered at: 03/17/21 2006     Level Of Support Discussed With:    Patient     Code Status:    CPR     Medical Interventions (Level of Support Prior to Arrest):    Full       Admission Status:   I believe this patient meets OBSERVATION status, however if further evaluation or treatment plans warrant, status may change.  Based upon current information, I predict patient's care encounter  to be less than or equal to 2 midnights.       Discharge Blueprint (criteria for discharge readiness):   1.  No further syncopal episodes  2.  Able to tolerate activity  3.  Negative work-up

## 2021-03-18 ENCOUNTER — APPOINTMENT (OUTPATIENT)
Dept: CARDIOLOGY | Facility: HOSPITAL | Age: 76
End: 2021-03-18

## 2021-03-18 LAB
ANION GAP SERPL CALCULATED.3IONS-SCNC: 11 MMOL/L (ref 5–15)
ASCENDING AORTA: 3.2 CM
BASOPHILS # BLD AUTO: 0.03 10*3/MM3 (ref 0–0.2)
BASOPHILS NFR BLD AUTO: 0.4 % (ref 0–1.5)
BH CV ECHO MEAS - AI DEC SLOPE: 171.5 CM/SEC^2
BH CV ECHO MEAS - AI MAX PG: 54.8 MMHG
BH CV ECHO MEAS - AI MAX VEL: 370 CM/SEC
BH CV ECHO MEAS - AI P1/2T: 631.9 MSEC
BH CV ECHO MEAS - AO MAX PG (FULL): 17.1 MMHG
BH CV ECHO MEAS - AO MAX PG: 21.7 MMHG
BH CV ECHO MEAS - AO MEAN PG (FULL): 10 MMHG
BH CV ECHO MEAS - AO MEAN PG: 12 MMHG
BH CV ECHO MEAS - AO ROOT AREA (BSA CORRECTED): 1.3
BH CV ECHO MEAS - AO ROOT AREA: 5.3 CM^2
BH CV ECHO MEAS - AO ROOT DIAM: 2.6 CM
BH CV ECHO MEAS - AO V2 MAX: 233 CM/SEC
BH CV ECHO MEAS - AO V2 MEAN: 163 CM/SEC
BH CV ECHO MEAS - AO V2 VTI: 55.1 CM
BH CV ECHO MEAS - ASC AORTA: 3.2 CM
BH CV ECHO MEAS - AVA(I,A): 1.2 CM^2
BH CV ECHO MEAS - AVA(I,D): 1.2 CM^2
BH CV ECHO MEAS - AVA(V,A): 1.2 CM^2
BH CV ECHO MEAS - AVA(V,D): 1.2 CM^2
BH CV ECHO MEAS - BSA(HAYCOCK): 2.3 M^2
BH CV ECHO MEAS - BSA: 2.1 M^2
BH CV ECHO MEAS - BZI_BMI: 46.9 KILOGRAMS/M^2
BH CV ECHO MEAS - BZI_METRIC_HEIGHT: 154.9 CM
BH CV ECHO MEAS - BZI_METRIC_WEIGHT: 112.5 KG
BH CV ECHO MEAS - EDV(CUBED): 142.2 ML
BH CV ECHO MEAS - EDV(MOD-SP2): 49 ML
BH CV ECHO MEAS - EDV(MOD-SP4): 51 ML
BH CV ECHO MEAS - EDV(TEICH): 130.7 ML
BH CV ECHO MEAS - EF(CUBED): 76.3 %
BH CV ECHO MEAS - EF(MOD-BP): 55 %
BH CV ECHO MEAS - EF(MOD-SP2): 55.1 %
BH CV ECHO MEAS - EF(MOD-SP4): 54.9 %
BH CV ECHO MEAS - EF(TEICH): 67.9 %
BH CV ECHO MEAS - ESV(CUBED): 33.7 ML
BH CV ECHO MEAS - ESV(MOD-SP2): 22 ML
BH CV ECHO MEAS - ESV(MOD-SP4): 23 ML
BH CV ECHO MEAS - ESV(TEICH): 41.9 ML
BH CV ECHO MEAS - FS: 38.1 %
BH CV ECHO MEAS - IVS/LVPW: 0.88
BH CV ECHO MEAS - IVSD: 0.75 CM
BH CV ECHO MEAS - LA DIMENSION: 3.6 CM
BH CV ECHO MEAS - LA/AO: 1.4
BH CV ECHO MEAS - LAD MAJOR: 4.5 CM
BH CV ECHO MEAS - LAT PEAK E' VEL: 7.7 CM/SEC
BH CV ECHO MEAS - LATERAL E/E' RATIO: 15.1
BH CV ECHO MEAS - LV DIASTOLIC VOL/BSA (35-75): 24.6 ML/M^2
BH CV ECHO MEAS - LV IVRT: 0.1 SEC
BH CV ECHO MEAS - LV MASS(C)D: 146.8 GRAMS
BH CV ECHO MEAS - LV MASS(C)DI: 70.9 GRAMS/M^2
BH CV ECHO MEAS - LV MAX PG: 4.7 MMHG
BH CV ECHO MEAS - LV MEAN PG: 2 MMHG
BH CV ECHO MEAS - LV SYSTOLIC VOL/BSA (12-30): 11.1 ML/M^2
BH CV ECHO MEAS - LV V1 MAX: 108 CM/SEC
BH CV ECHO MEAS - LV V1 MEAN: 70.3 CM/SEC
BH CV ECHO MEAS - LV V1 VTI: 25.4 CM
BH CV ECHO MEAS - LVIDD: 5.2 CM
BH CV ECHO MEAS - LVIDS: 3.2 CM
BH CV ECHO MEAS - LVLD AP2: 6.6 CM
BH CV ECHO MEAS - LVLD AP4: 7.7 CM
BH CV ECHO MEAS - LVLS AP2: 6.1 CM
BH CV ECHO MEAS - LVLS AP4: 7.1 CM
BH CV ECHO MEAS - LVOT AREA (M): 2.5 CM^2
BH CV ECHO MEAS - LVOT AREA: 2.5 CM^2
BH CV ECHO MEAS - LVOT DIAM: 1.8 CM
BH CV ECHO MEAS - LVPWD: 0.86 CM
BH CV ECHO MEAS - MED PEAK E' VEL: 7 CM/SEC
BH CV ECHO MEAS - MEDIAL E/E' RATIO: 16.5
BH CV ECHO MEAS - MV A MAX VEL: 115 CM/SEC
BH CV ECHO MEAS - MV DEC SLOPE: 479 CM/SEC^2
BH CV ECHO MEAS - MV DEC TIME: 0.34 SEC
BH CV ECHO MEAS - MV E MAX VEL: 116 CM/SEC
BH CV ECHO MEAS - MV E/A: 1
BH CV ECHO MEAS - MV MAX PG: 12.1 MMHG
BH CV ECHO MEAS - MV MEAN PG: 5 MMHG
BH CV ECHO MEAS - MV P1/2T MAX VEL: 179 CM/SEC
BH CV ECHO MEAS - MV P1/2T: 109.5 MSEC
BH CV ECHO MEAS - MV V2 MAX: 174 CM/SEC
BH CV ECHO MEAS - MV V2 MEAN: 103 CM/SEC
BH CV ECHO MEAS - MV V2 VTI: 57.7 CM
BH CV ECHO MEAS - MVA P1/2T LCG: 1.2 CM^2
BH CV ECHO MEAS - MVA(P1/2T): 2 CM^2
BH CV ECHO MEAS - MVA(VTI): 1.1 CM^2
BH CV ECHO MEAS - PA ACC SLOPE: 790 CM/SEC^2
BH CV ECHO MEAS - PA ACC TIME: 0.13 SEC
BH CV ECHO MEAS - PA MAX PG: 5.8 MMHG
BH CV ECHO MEAS - PA PR(ACCEL): 22.8 MMHG
BH CV ECHO MEAS - PA V2 MAX: 120.5 CM/SEC
BH CV ECHO MEAS - RAP SYSTOLE: 3 MMHG
BH CV ECHO MEAS - RVSP: 32 MMHG
BH CV ECHO MEAS - SI(AO): 141.3 ML/M^2
BH CV ECHO MEAS - SI(CUBED): 52.4 ML/M^2
BH CV ECHO MEAS - SI(LVOT): 31.2 ML/M^2
BH CV ECHO MEAS - SI(MOD-SP2): 13 ML/M^2
BH CV ECHO MEAS - SI(MOD-SP4): 13.5 ML/M^2
BH CV ECHO MEAS - SI(TEICH): 42.9 ML/M^2
BH CV ECHO MEAS - SV(AO): 292.5 ML
BH CV ECHO MEAS - SV(CUBED): 108.5 ML
BH CV ECHO MEAS - SV(LVOT): 64.6 ML
BH CV ECHO MEAS - SV(MOD-SP2): 27 ML
BH CV ECHO MEAS - SV(MOD-SP4): 28 ML
BH CV ECHO MEAS - SV(TEICH): 88.8 ML
BH CV ECHO MEAS - TAPSE (>1.6): 1.8 CM
BH CV ECHO MEAS - TR MAX PG: 29 MMHG
BH CV ECHO MEAS - TR MAX VEL: 270 CM/SEC
BH CV ECHO MEASUREMENTS AVERAGE E/E' RATIO: 15.78
BH CV VAS BP LEFT ARM: NORMAL MMHG
BH CV XLRA - RV BASE: 3.2 CM
BH CV XLRA - RV LENGTH: 6.3 CM
BH CV XLRA - RV MID: 2.8 CM
BH CV XLRA - TDI S': 14.7 CM/SEC
BUN SERPL-MCNC: 32 MG/DL (ref 8–23)
BUN/CREAT SERPL: 24.2 (ref 7–25)
CALCIUM SPEC-SCNC: 8.2 MG/DL (ref 8.6–10.5)
CHLORIDE SERPL-SCNC: 106 MMOL/L (ref 98–107)
CO2 SERPL-SCNC: 23 MMOL/L (ref 22–29)
CREAT SERPL-MCNC: 1.32 MG/DL (ref 0.57–1)
DEPRECATED RDW RBC AUTO: 50.3 FL (ref 37–54)
EOSINOPHIL # BLD AUTO: 0.07 10*3/MM3 (ref 0–0.4)
EOSINOPHIL NFR BLD AUTO: 1 % (ref 0.3–6.2)
ERYTHROCYTE [DISTWIDTH] IN BLOOD BY AUTOMATED COUNT: 14.1 % (ref 12.3–15.4)
GFR SERPL CREATININE-BSD FRML MDRD: 39 ML/MIN/1.73
GLUCOSE BLDC GLUCOMTR-MCNC: 104 MG/DL (ref 70–130)
GLUCOSE BLDC GLUCOMTR-MCNC: 109 MG/DL (ref 70–130)
GLUCOSE BLDC GLUCOMTR-MCNC: 137 MG/DL (ref 70–130)
GLUCOSE BLDC GLUCOMTR-MCNC: 145 MG/DL (ref 70–130)
GLUCOSE SERPL-MCNC: 102 MG/DL (ref 65–99)
HCT VFR BLD AUTO: 38.8 % (ref 34–46.6)
HGB BLD-MCNC: 12.4 G/DL (ref 12–15.9)
IMM GRANULOCYTES # BLD AUTO: 0.02 10*3/MM3 (ref 0–0.05)
IMM GRANULOCYTES NFR BLD AUTO: 0.3 % (ref 0–0.5)
IVRT: 95 MSEC
LEFT ATRIUM VOLUME INDEX: 12.6 ML/M^2
LEFT ATRIUM VOLUME: 26 ML
LYMPHOCYTES # BLD AUTO: 2.43 10*3/MM3 (ref 0.7–3.1)
LYMPHOCYTES NFR BLD AUTO: 33.6 % (ref 19.6–45.3)
MAXIMAL PREDICTED HEART RATE: 143 BPM
MCH RBC QN AUTO: 30.8 PG (ref 26.6–33)
MCHC RBC AUTO-ENTMCNC: 32 G/DL (ref 31.5–35.7)
MCV RBC AUTO: 96.5 FL (ref 79–97)
MONOCYTES # BLD AUTO: 0.85 10*3/MM3 (ref 0.1–0.9)
MONOCYTES NFR BLD AUTO: 11.7 % (ref 5–12)
NEUTROPHILS NFR BLD AUTO: 3.84 10*3/MM3 (ref 1.7–7)
NEUTROPHILS NFR BLD AUTO: 53 % (ref 42.7–76)
NRBC BLD AUTO-RTO: 0 /100 WBC (ref 0–0.2)
PLATELET # BLD AUTO: 151 10*3/MM3 (ref 140–450)
PMV BLD AUTO: 10.5 FL (ref 6–12)
POTASSIUM SERPL-SCNC: 3.7 MMOL/L (ref 3.5–5.2)
QT INTERVAL: 414 MS
QTC INTERVAL: 423 MS
RBC # BLD AUTO: 4.02 10*6/MM3 (ref 3.77–5.28)
SODIUM SERPL-SCNC: 140 MMOL/L (ref 136–145)
STRESS TARGET HR: 122 BPM
WBC # BLD AUTO: 7.24 10*3/MM3 (ref 3.4–10.8)

## 2021-03-18 PROCEDURE — 80048 BASIC METABOLIC PNL TOTAL CA: CPT | Performed by: NURSE PRACTITIONER

## 2021-03-18 PROCEDURE — 82962 GLUCOSE BLOOD TEST: CPT

## 2021-03-18 PROCEDURE — 25010000002 HEPARIN (PORCINE) PER 1000 UNITS: Performed by: NURSE PRACTITIONER

## 2021-03-18 PROCEDURE — G0378 HOSPITAL OBSERVATION PER HR: HCPCS

## 2021-03-18 PROCEDURE — 96372 THER/PROPH/DIAG INJ SC/IM: CPT

## 2021-03-18 PROCEDURE — 93005 ELECTROCARDIOGRAM TRACING: CPT | Performed by: NURSE PRACTITIONER

## 2021-03-18 PROCEDURE — 93306 TTE W/DOPPLER COMPLETE: CPT

## 2021-03-18 PROCEDURE — 25010000002 CEFTRIAXONE PER 250 MG: Performed by: NURSE PRACTITIONER

## 2021-03-18 PROCEDURE — 96361 HYDRATE IV INFUSION ADD-ON: CPT

## 2021-03-18 PROCEDURE — 99225 PR SBSQ OBSERVATION CARE/DAY 25 MINUTES: CPT | Performed by: INTERNAL MEDICINE

## 2021-03-18 PROCEDURE — 85025 COMPLETE CBC W/AUTO DIFF WBC: CPT | Performed by: NURSE PRACTITIONER

## 2021-03-18 PROCEDURE — 96366 THER/PROPH/DIAG IV INF ADDON: CPT

## 2021-03-18 PROCEDURE — 93306 TTE W/DOPPLER COMPLETE: CPT | Performed by: INTERNAL MEDICINE

## 2021-03-18 PROCEDURE — 93010 ELECTROCARDIOGRAM REPORT: CPT | Performed by: INTERNAL MEDICINE

## 2021-03-18 RX ORDER — SODIUM CHLORIDE 9 MG/ML
100 INJECTION, SOLUTION INTRAVENOUS CONTINUOUS
Status: DISCONTINUED | OUTPATIENT
Start: 2021-03-18 | End: 2021-03-19

## 2021-03-18 RX ORDER — PREDNISOLONE ACETATE 10 MG/ML
1 SUSPENSION/ DROPS OPHTHALMIC 4 TIMES DAILY
Status: DISCONTINUED | OUTPATIENT
Start: 2021-03-18 | End: 2021-03-19 | Stop reason: HOSPADM

## 2021-03-18 RX ADMIN — CARVEDILOL 6.25 MG: 6.25 TABLET, FILM COATED ORAL at 17:52

## 2021-03-18 RX ADMIN — HEPARIN SODIUM 5000 UNITS: 5000 INJECTION INTRAVENOUS; SUBCUTANEOUS at 08:31

## 2021-03-18 RX ADMIN — ATORVASTATIN CALCIUM 40 MG: 40 TABLET, FILM COATED ORAL at 08:31

## 2021-03-18 RX ADMIN — CARVEDILOL 6.25 MG: 6.25 TABLET, FILM COATED ORAL at 08:31

## 2021-03-18 RX ADMIN — PREDNISOLONE ACETATE 1 DROP: 10 SUSPENSION/ DROPS OPHTHALMIC at 21:08

## 2021-03-18 RX ADMIN — PREDNISOLONE ACETATE 1 DROP: 10 SUSPENSION/ DROPS OPHTHALMIC at 08:31

## 2021-03-18 RX ADMIN — SODIUM CHLORIDE 1 G: 900 INJECTION INTRAVENOUS at 21:07

## 2021-03-18 RX ADMIN — PREDNISOLONE ACETATE 1 DROP: 10 SUSPENSION/ DROPS OPHTHALMIC at 12:42

## 2021-03-18 RX ADMIN — PREDNISOLONE ACETATE 1 DROP: 10 SUSPENSION/ DROPS OPHTHALMIC at 16:10

## 2021-03-18 RX ADMIN — HEPARIN SODIUM 5000 UNITS: 5000 INJECTION INTRAVENOUS; SUBCUTANEOUS at 21:08

## 2021-03-18 RX ADMIN — SODIUM CHLORIDE 100 ML/HR: 9 INJECTION, SOLUTION INTRAVENOUS at 16:10

## 2021-03-18 RX ADMIN — CLOPIDOGREL BISULFATE 75 MG: 75 TABLET ORAL at 08:31

## 2021-03-18 NOTE — PLAN OF CARE
Goal Outcome Evaluation:  Plan of Care Reviewed With: patient  Progress: no change  Outcome Summary: Patient arrived on the unit with signs and symptoms related to syncope and ISRRAEL. Patient has had a decent shift, sleeping on and off since arriving on the unit. VSS, and patient has been alert and oriented times four. No complaints of pain tonight. Nursing staff will continue to monitor and assess the patient.

## 2021-03-18 NOTE — ED PROVIDER NOTES
"Subjective   75-year-old female with extensive past medical history, most notably COPD, CHF, and coronary artery disease, presents for evaluation of syncope x2.  The patient states that she has not felt well over the past 2 to 3 days.  Over that span, she states that she has had increased generalized weakness when compared to baseline.  She states that this afternoon she was in her bathroom and after having a bowel movement got dizzy and \"passed out.\"  She denies any family history of sudden cardiac death.  EMS was called and while the patient was getting into the ambulance she had a second syncopal episode.  She denies any preceding chest pain, shortness of breath, or palpitations prior to either syncopal episode.  She endorses nausea but no vomiting.  No abdominal pain.          Review of Systems   Neurological: Positive for dizziness, syncope and weakness.   All other systems reviewed and are negative.      Past Medical History:   Diagnosis Date   • Acute kidney injury (CMS/[facility]) 12/5/2017   • Aortic regurgitation    • 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/2016   • Body mass index (BMI) of 45.0-49.9 in adult (CMS/[facility]) 6/14/2019   • CHF (congestive heart failure) (CMS/[facility])    • Closed compression fracture of L4 lumbar vertebra 12/4/2017    Added automatically from request for surgery [id number]   • Constipation 12/5/2017   • COPD (chronic obstructive pulmonary disease) (CMS/[facility])    • Coronary artery disease 5/18/2016   • Diabetes mellitus type 2 in obese (CMS/[facility]) 1/29/2018   • Elevated alkaline phosphatase level 2/26/2018   • Elevated creatine kinase    • Essential hypertension 5/18/2016   • GERD (gastroesophageal reflux disease) 5/18/2016   • Glaucoma 7/21/2020   • History of echocardiogram 10/16/2015    TDS.Est EF is 45-50%.Mild distal septal, anteroapical hypokinesia.Mild mitral stenosis.Mean gradient across mitral valve is 6 mmHg.Trace TR   • History of kyphoplasty 1/30/2018   • Hyperlipemia 5/18/2016   • Lumbar facet " arthropathy 2018   • Lumbar stenosis with neurogenic claudication 2018   • Morbid obesity due to excess calories (CMS/[facility]) 2018   • Myocardial infarction (CMS/[facility]) 2016   • Osteoporosis 2016   • Physical deconditioning 2018   • Retinal emboli, right 2020   • Sepsis (CMS/[facility])     uro   • Stroke (CMS/[facility])    • Type 2 diabetes mellitus (CMS/[facility]) 2016   • Urinary incontinence without sensory awareness 2018       No Known Allergies    Past Surgical History:   Procedure Laterality Date   • APPENDECTOMY     • BACK SURGERY     • CHOLECYSTECTOMY     • EYE SURGERY     • KYPHOPLASTY N/A 2017    Procedure: KYPHOPLASTY L4;  Surgeon: Marc Pham MD;  Location: [facility];  Service:        Family History   Problem Relation Age of Onset   • Diabetes Mother    • Heart failure Mother    • Heart failure Father    • No Known Problems Sister    • No Known Problems Brother    • No Known Problems Son    • No Known Problems Daughter        Social History     Socioeconomic History   • Marital status:      Spouse name: Not on file   • Number of children: Not on file   • Years of education: Not on file   • Highest education level: Not on file   Tobacco Use   • Smoking status: Former Smoker     Types: Cigarettes     Quit date: 2000     Years since quittin.2   • Smokeless tobacco: Never Used   Substance and Sexual Activity   • Alcohol use: No   • Drug use: No   • Sexual activity: Never           Objective   Physical Exam  Vitals and nursing note reviewed.   Constitutional:       General: She is not in acute distress.     Appearance: Normal appearance. She is well-developed. She is not diaphoretic.      Comments: Nontoxic-appearing elderly female   HENT:      Head: Normocephalic and atraumatic.   Eyes:      Extraocular Movements: Extraocular movements intact.      Pupils: Pupils are equal, round, and reactive to light.   Neck:      Comments: No midline cervical spine tenderness,  no step-off or deformity noted  Cardiovascular:      Rate and Rhythm: Normal rate and regular rhythm.      Pulses: Normal pulses.      Heart sounds: Normal heart sounds. No murmur heard.   No friction rub. No gallop.    Pulmonary:      Effort: Pulmonary effort is normal. No respiratory distress.      Breath sounds: Normal breath sounds. No wheezing or rales.   Abdominal:      General: Bowel sounds are normal. There is no distension.      Palpations: Abdomen is soft. There is no mass.      Tenderness: There is no abdominal tenderness. There is no guarding or rebound.      Comments: No focal abdominal tenderness, no peritoneal signs, no pain out of proportion to exam   Genitourinary:     Comments: No CVA tenderness present  Musculoskeletal:         General: Normal range of motion.      Cervical back: Normal range of motion and neck supple.   Skin:     General: Skin is warm and dry.      Findings: No erythema or rash.   Neurological:      General: No focal deficit present.      Mental Status: She is alert and oriented to person, place, and time.      Comments: Awake, alert, and oriented x3, clear and fluent speech, no ataxia or dysmetria, neurovascularly intact distally in all fours with bounding distal pulses and normal sensation, 5 out of 5 strength in all fours, no cranial nerve deficits noted with cranial nerves II through XII grossly intact   Psychiatric:         Mood and Affect: Mood normal.         Thought Content: Thought content normal.         Judgment: Judgment normal.         Procedures           ED Course  ED Course as of Mar 17 2146   Wed Mar 17, 2021   2141 75-year-old female presents for evaluation of generalized weakness and nausea for the past 2 days followed by 2 syncopal episodes this afternoon.  On arrival to the ED, the patient is nontoxic-appearing.  Initial EKG revealed sinus bradycardia with a first-degree AV block, heart rate of 52, and no ST segments suggestive of or concerning for ischemia  with normal AK and QT intervals and no EKG evidence of Brugada syndrome or hypertrophic cardiomyopathy.  The patient was initially mildly hypoxic with room air oxygen saturations ranging from 90 to 94%.  Moderate risk Well's.  Labs remarkable for mild renal insufficiency.  Advanced imaging negative.  Urinalysis is suggestive of infection.  SF Syncope +.  Rocephin given.  Urine culture obtained.  I feel that the patient warrants admission and at least observation at this point.  I discussed her case with Dr. Mena, the patient will be admitted under her care for further evaluation and treatment.  The patient is aware/agreeable with the plan at this time.    [DD]      ED Course User Index  [DD] Oleg Reilly MD                                   Recent Results (from the past 24 hour(s))   COVID-19 and FLU A/B PCR - Swab, Nasopharynx    Collection Time: 03/17/21  3:45 PM    Specimen: Nasopharynx; Swab   Result Value Ref Range    COVID19 Not Detected Not Detected - Ref. Range    Influenza A PCR Not Detected Not Detected    Influenza B PCR Not Detected Not Detected   Light Blue Top    Collection Time: 03/17/21  4:14 PM   Result Value Ref Range    Extra Tube hold for add-on    Comprehensive Metabolic Panel    Collection Time: 03/17/21  4:15 PM    Specimen: Blood   Result Value Ref Range    Glucose 172 (H) 65 - 99 mg/dL    BUN 33 (H) 8 - 23 mg/dL    Creatinine 1.55 (H) 0.57 - 1.00 mg/dL    Sodium 140 136 - 145 mmol/L    Potassium 4.2 3.5 - 5.2 mmol/L    Chloride 105 98 - 107 mmol/L    CO2 26.0 22.0 - 29.0 mmol/L    Calcium 8.4 (L) 8.6 - 10.5 mg/dL    Total Protein 6.8 6.0 - 8.5 g/dL    Albumin 3.70 3.50 - 5.20 g/dL    ALT (SGPT) 19 1 - 33 U/L    AST (SGOT) 24 1 - 32 U/L    Alkaline Phosphatase 83 39 - 117 U/L    Total Bilirubin 0.6 0.0 - 1.2 mg/dL    eGFR Non African Amer 32 (L) >60 mL/min/1.73    Globulin 3.1 gm/dL    A/G Ratio 1.2 g/dL    BUN/Creatinine Ratio 21.3 7.0 - 25.0    Anion Gap 9.0 5.0 - 15.0 mmol/L    Troponin    Collection Time: 03/17/21  4:15 PM    Specimen: Blood   Result Value Ref Range    Troponin T <0.010 0.000 - 0.030 ng/mL   Magnesium    Collection Time: 03/17/21  4:15 PM    Specimen: Blood   Result Value Ref Range    Magnesium 1.8 1.6 - 2.4 mg/dL   Green Top (Gel)    Collection Time: 03/17/21  4:15 PM   Result Value Ref Range    Extra Tube Hold for add-ons.    Lavender Top    Collection Time: 03/17/21  4:15 PM   Result Value Ref Range    Extra Tube hold for add-on    Gold Top - SST    Collection Time: 03/17/21  4:15 PM   Result Value Ref Range    Extra Tube Hold for add-ons.    Gray Top - Ice    Collection Time: 03/17/21  4:15 PM   Result Value Ref Range    Extra Tube Hold for add-ons.    CBC Auto Differential    Collection Time: 03/17/21  4:15 PM    Specimen: Blood   Result Value Ref Range    WBC 6.96 3.40 - 10.80 10*3/mm3    RBC 4.11 3.77 - 5.28 10*6/mm3    Hemoglobin 12.7 12.0 - 15.9 g/dL    Hematocrit 40.2 34.0 - 46.6 %    MCV 97.8 (H) 79.0 - 97.0 fL    MCH 30.9 26.6 - 33.0 pg    MCHC 31.6 31.5 - 35.7 g/dL    RDW 14.4 12.3 - 15.4 %    RDW-SD 51.0 37.0 - 54.0 fl    MPV 10.5 6.0 - 12.0 fL    Platelets 148 140 - 450 10*3/mm3    Neutrophil % 68.5 42.7 - 76.0 %    Lymphocyte % 22.8 19.6 - 45.3 %    Monocyte % 7.9 5.0 - 12.0 %    Eosinophil % 0.1 (L) 0.3 - 6.2 %    Basophil % 0.3 0.0 - 1.5 %    Immature Grans % 0.4 0.0 - 0.5 %    Neutrophils, Absolute 4.76 1.70 - 7.00 10*3/mm3    Lymphocytes, Absolute 1.59 0.70 - 3.10 10*3/mm3    Monocytes, Absolute 0.55 0.10 - 0.90 10*3/mm3    Eosinophils, Absolute 0.01 0.00 - 0.40 10*3/mm3    Basophils, Absolute 0.02 0.00 - 0.20 10*3/mm3    Immature Grans, Absolute 0.03 0.00 - 0.05 10*3/mm3    nRBC 0.0 0.0 - 0.2 /100 WBC   Urinalysis With Microscopic If Indicated (No Culture) - Urine, Clean Catch    Collection Time: 03/17/21  7:05 PM    Specimen: Urine, Clean Catch   Result Value Ref Range    Color, UA Yellow Yellow, Straw    Appearance, UA Cloudy (A) Clear    pH, UA  5.5 5.0 - 8.0    Specific Gravity, UA 1.037 (H) 1.001 - 1.030    Glucose, UA Negative Negative    Ketones, UA Negative Negative    Bilirubin, UA Negative Negative    Blood, UA Trace (A) Negative    Protein, UA Negative Negative    Leuk Esterase, UA Small (1+) (A) Negative    Nitrite, UA Positive (A) Negative    Urobilinogen, UA 0.2 E.U./dL 0.2 - 1.0 E.U./dL   Urinalysis, Microscopic Only - Urine, Clean Catch    Collection Time: 03/17/21  7:05 PM    Specimen: Urine, Clean Catch   Result Value Ref Range    RBC, UA 3-6 (A) None Seen, 0-2 /HPF    WBC, UA 21-30 (A) None Seen, 0-2 /HPF    Bacteria, UA 4+ (A) None Seen, Trace /HPF    Squamous Epithelial Cells, UA 3-6 (A) None Seen, 0-2 /HPF    Hyaline Casts, UA 0-6 0 - 6 /LPF    Methodology Automated Microscopy      Note: In addition to lab results from this visit, the labs listed above may include labs taken at another facility or during a different encounter within the last 24 hours. Please correlate lab times with ED admission and discharge times for further clarification of the services performed during this visit.    CT Angiogram Chest   Preliminary Result   1. No pulmonary embolism.       2. No acute intrathoracic process, specifically negative for pneumonia   with an indeterminate 5 mm  noncalcified lingular segment left upper   lobe pulmonary nodule stable from prior comparison 07/21/2020, however,   with potential follow-up if high-risk stratification history exists.       3. Small hiatal hernia.       DICTATED:   03/17/2021   EDITED/ls :   03/17/2021              CT Head Without Contrast   Preliminary Result   No acute intracranial findings with stable appearance of   senescent changes including at least moderately advanced chronic small   vessel ischemic disease as seen on prior.       DICTATED:   03/17/2021   EDITED/ls :   03/17/2021              XR Chest 1 View   Preliminary Result   1. Mild cardiomegaly and pulmonary venous hypertension.       2. Stable mild  chronic appearing interstitial lung changes compared to   07/08/2020 exam, apparently the patient's baseline radiographic   appearance. No evidence of new chest disease.       D:  03/17/2021   E:  03/17/2021                 Vitals:    03/17/21 1500 03/17/21 1530 03/17/21 1954 03/17/21 2059   BP: 115/45 (!) 104/30 141/58 139/95   BP Location:    Right arm   Patient Position:    Lying   Pulse: 53 57 70 72   Resp:    20   Temp:    98.2 °F (36.8 °C)   TempSrc:    Oral   SpO2: 90% 94% 95% 97%   Weight:    113 kg (248 lb 1.6 oz)   Height:         Medications   sodium chloride 0.9 % flush 10 mL (has no administration in time range)   atorvastatin (LIPITOR) tablet 40 mg (has no administration in time range)   clopidogrel (PLAVIX) tablet 75 mg (has no administration in time range)   carvedilol (COREG) tablet 6.25 mg (6.25 mg Oral Given 3/17/21 2141)   prednisoLONE acetate (PRED FORTE) 1 % ophthalmic suspension 1 drop (1 drop Right Eye Given 3/17/21 2140)   nitroglycerin (NITROSTAT) SL tablet 0.4 mg (has no administration in time range)   dextrose (GLUTOSE) oral gel 15 g (has no administration in time range)   dextrose (D50W) 25 g/ 50mL Intravenous Solution 25 g (has no administration in time range)   glucagon (human recombinant) (GLUCAGEN DIAGNOSTIC) injection 1 mg (has no administration in time range)   sodium chloride 0.9 % flush 10 mL (10 mL Intravenous Given 3/17/21 2141)   sodium chloride 0.9 % flush 10 mL (has no administration in time range)   heparin (porcine) 5000 UNIT/ML injection 5,000 Units (5,000 Units Subcutaneous Given 3/17/21 2141)   sodium chloride 0.9 % infusion (75 mL/hr Intravenous New Bag 3/17/21 2132)   acetaminophen (TYLENOL) tablet 650 mg (has no administration in time range)     Or   acetaminophen (TYLENOL) suppository 650 mg (has no administration in time range)   insulin lispro (humaLOG) injection 0-7 Units (has no administration in time range)   cefTRIAXone (ROCEPHIN) 1 g/100 mL 0.9% NS (MBP) (1 g  Intravenous New Bag 3/17/21 2132)   sodium chloride 0.9 % bolus 500 mL (0 mL Intravenous Stopped 3/17/21 1615)   iopamidol (ISOVUE-370) 76 % injection 100 mL (67 mL Intravenous Given 3/17/21 1730)     ECG/EMG Results (last 24 hours)     Procedure Component Value Units Date/Time    ECG 12 Lead [[id number]] Collected: 03/17/21 1455     Updated: 03/17/21 1457        ECG 12 Lead                     MDM    Final diagnoses:   Syncope, unspecified syncope type   Bradycardia   Nausea   Acute UTI   Renal insufficiency       ED Disposition  ED Disposition     ED Disposition Condition Comment    Decision to Admit  Level of Care: Telemetry [5]   Diagnosis: Syncope [961183]   Bed Request Comments: CDU/SSU if possible            No follow-up provider specified.       Medication List      No changes were made to your prescriptions during this visit.          Oleg Reilly MD  03/17/21 3400

## 2021-03-18 NOTE — PLAN OF CARE
Problem: Adult Inpatient Plan of Care  Goal: Plan of Care Review  Outcome: Ongoing, Progressing  Flowsheets (Taken 3/18/2021 3199)  Progress: improving  Plan of Care Reviewed With: patient  Outcome Summary:   VSS on room air   no complaints of pain   had one episode of dizziness but BP and HR stable, dizziness passed   pt tolerating diet and got cleaned up   purewick in place and seems to be working   fall and skin prevention protocols in place   will continue to monitor  Goal: Patient-Specific Goal (Individualized)  Outcome: Ongoing, Progressing  Goal: Absence of Hospital-Acquired Illness or Injury  Outcome: Ongoing, Progressing  Intervention: Identify and Manage Fall Risk  Recent Flowsheet Documentation  Taken 3/18/2021 1400 by Ericka Armstrong RN  Safety Promotion/Fall Prevention:   activity supervised   assistive device/personal items within reach   clutter free environment maintained   fall prevention program maintained   nonskid shoes/slippers when out of bed   room organization consistent   toileting scheduled   safety round/check completed  Intervention: Prevent Skin Injury  Recent Flowsheet Documentation  Taken 3/18/2021 1400 by Ericka Armstrong RN  Body Position: weight shift assistance provided  Intervention: Prevent Infection  Recent Flowsheet Documentation  Taken 3/18/2021 1400 by Ericka Armstrong RN  Infection Prevention:   single patient room provided   rest/sleep promoted  Goal: Optimal Comfort and Wellbeing  Outcome: Ongoing, Progressing  Goal: Readiness for Transition of Care  Outcome: Ongoing, Progressing     Problem: Fall Injury Risk  Goal: Absence of Fall and Fall-Related Injury  Outcome: Ongoing, Progressing  Intervention: Identify and Manage Contributors to Fall Injury Risk  Recent Flowsheet Documentation  Taken 3/18/2021 1400 by Ericka Armstrong RN  Medication Review/Management: medications reviewed  Intervention: Promote Injury-Free Environment  Recent Flowsheet  Documentation  Taken 3/18/2021 1400 by Ericka Armstrong RN  Safety Promotion/Fall Prevention:   activity supervised   assistive device/personal items within reach   clutter free environment maintained   fall prevention program maintained   nonskid shoes/slippers when out of bed   room organization consistent   toileting scheduled   safety round/check completed     Problem: Syncope  Goal: Absence of Syncopal Symptoms  Outcome: Ongoing, Progressing     Problem: Electrolyte Imbalance (Acute Kidney Injury/Impairment)  Goal: Serum Electrolyte Balance  Outcome: Ongoing, Progressing     Problem: Fluid Imbalance (Acute Kidney Injury/Impairment)  Goal: Optimal Fluid Balance  Outcome: Ongoing, Progressing     Problem: Hematologic Alteration (Acute Kidney Injury/Impairment)  Goal: Hemoglobin, Hematocrit and Platelets Within Normal Range  Outcome: Ongoing, Progressing     Problem: Oral Intake Inadequate (Acute Kidney Injury/Impairment)  Goal: Optimal Nutrition Intake  Outcome: Ongoing, Progressing     Problem: Renal Function Impairment (Acute Kidney Injury/Impairment)  Goal: Effective Renal Function  Outcome: Ongoing, Progressing  Intervention: Monitor and Support Renal Function  Recent Flowsheet Documentation  Taken 3/18/2021 1400 by Ericka Armstrong RN  Medication Review/Management: medications reviewed   Goal Outcome Evaluation:  Plan of Care Reviewed With: patient  Progress: improving  Outcome Summary: VSS on room air; no complaints of pain; had one episode of dizziness but BP and HR stable, dizziness passed; pt tolerating diet and got cleaned up; purewick in place and seems to be working; fall and skin prevention protocols in place; will continue to monitor

## 2021-03-18 NOTE — PROGRESS NOTES
[facility]  PROGRESS NOTE    Patient Name: Georgia Velázquez  : 1945  MRN: [id number]    Date of Admission: 3/17/2021  Primary Care Physician: Chaz Rico, DNP, APRN    Subjective   Subjective     CC:  Syncope x 2 at home    HPI:  Feels fine today. However, did have a spell of lightheadedness after sitting up this morning.  Has not had much to drink today.     ROS:  Gen- No fevers, chills  CV- No chest pain, palpitations  Resp- No cough, dyspnea  GI- No N/V/D, abd pain  dtr notes she is very sedentary      Objective   Objective     Vital Signs:   Temp:  [97.8 °F (36.6 °C)-98.5 °F (36.9 °C)] 98.1 °F (36.7 °C)  Heart Rate:  [52-72] 57  Resp:  [18-20] 18  BP: (104-146)/(30-95) 129/61        Physical Exam:  Constitutional: Awake, alert  Eyes: PERRLA, sclerae anicteric, no conjunctival injection  HENT: NCAT, mucous membranes moist  Neck: Supple, no thyromegaly, no lymphadenopathy, trachea midline  Respiratory: Clear to auscultation bilaterally, nonlabored respirations   Cardiovascular: RRR, no murmurs, rubs, or gallops, palpable pedal pulses bilaterally  Gastrointestinal: Positive bowel sounds, soft, nontender, nondistended  Musculoskeletal: No bilateral ankle edema, no clubbing or cyanosis to extremities  Psychiatric: Appropriate affect, cooperative  Neurologic: Oriented x 3, strength symmetric in all extremities, Cranial Nerves grossly intact to confrontation, speech clear  Skin: No rashes      Results Reviewed:  Results from last 7 days   Lab Units 21  0641 21  1615   WBC 10*3/mm3 7.24 6.96   HEMOGLOBIN g/dL 12.4 12.7   HEMATOCRIT % 38.8 40.2   PLATELETS 10*3/mm3 151 148     Results from last 7 days   Lab Units 21  0641 21  2253 21  1615   SODIUM mmol/L 140  --  140   POTASSIUM mmol/L 3.7  --  4.2   CHLORIDE mmol/L 106  --  105   CO2 mmol/L 23.0  --  26.0   BUN mg/dL 32*  --  33*   CREATININE mg/dL 1.32*  --  1.55*   GLUCOSE  mg/dL 102*  --  172*   CALCIUM mg/dL 8.2*  --  8.4*   ALT (SGPT) U/L  --   --  19   AST (SGOT) U/L  --   --  24   TROPONIN T ng/mL  --  <0.010 <0.010     Estimated Creatinine Clearance: 41.4 mL/min (A) (by C-G formula based on SCr of 1.32 mg/dL (H)).    Microbiology Results Abnormal     Procedure Component Value - Date/Time    Urine Culture - Urine, Urine, Clean Catch [[id number]]  (Abnormal) Collected: 03/17/21 1905    Lab Status: Preliminary result Specimen: Urine, Clean Catch Updated: 03/18/21 1222     Urine Culture >100,000 CFU/mL Escherichia coli    COVID-19 and FLU A/B PCR - Swab, Nasopharynx [[id number]]  (Normal) Collected: 03/17/21 1545    Lab Status: Final result Specimen: Swab from Nasopharynx Updated: 03/17/21 1704     COVID19 Not Detected     Influenza A PCR Not Detected     Influenza B PCR Not Detected    Narrative:      Fact sheet for providers: https://www.fda.gov/media/255422/download    Fact sheet for patients: https://www.fda.gov/media/061191/download    Test performed by PCR.          Imaging Results (Last 24 Hours)     Procedure Component Value Units Date/Time    XR Chest 1 View [[id number]] Collected: 03/17/21 1558     Updated: 03/17/21 2157    Narrative:      EXAMINATION: XR CHEST 1 VW- 03/17/2021     INDICATION: Weak/Dizzy/AMS triage protocol     COMPARISON: 07/08/2020     FINDINGS: Patient history indicates syncope, fever.      Heart shadow is mildly enlarged. Vasculature is cephalized. Mild  generalized coarsening of the pulmonary interstitial markings appears  similar to prior exam, likely chronic. No lung consolidation, effusion  or pneumothorax is seen.       Impression:      1. Mild cardiomegaly and pulmonary venous hypertension.     2. Stable mild chronic appearing interstitial lung changes compared to  07/08/2020 exam, apparently the patient's baseline radiographic  appearance. No evidence of new chest disease.     D:  03/17/2021  E:  03/17/2021         This report was finalized on  3/17/2021 9:53 PM by Dr. Roc Jarrett MD.             Results for orders placed during the hospital encounter of 03/17/21    Adult Transthoracic Echo Complete With Contrast if Necessary Per Protocol    Interpretation Summary  · Calculated left ventricular EF = 55% Estimated left ventricular EF was in agreement with the calculated left ventricular EF. Left ventricular systolic function is normal.  · Left ventricular diastolic function is consistent with (grade II w/high LAP) pseudonormalization.  · Estimated right ventricular systolic pressure from tricuspid regurgitation is normal (<35 mmHg). Calculated right ventricular systolic pressure from tricuspid regurgitation is 32 mmHg.  · Endocardial definition of the apical segments is limited, unable to definitively assess apical regional wall motion abnormalities.      I have reviewed the medications:  Scheduled Meds:atorvastatin, 40 mg, Oral, Daily  carvedilol, 6.25 mg, Oral, BID With Meals  cefTRIAXone, 1 g, Intravenous, Q24H  clopidogrel, 75 mg, Oral, Daily  heparin (porcine), 5,000 Units, Subcutaneous, Q12H  insulin lispro, 0-7 Units, Subcutaneous, TID AC  prednisoLONE acetate, 1 drop, Right Eye, 4x Daily  sodium chloride, 10 mL, Intravenous, Q12H      Continuous Infusions:   PRN Meds:.•  acetaminophen **OR** acetaminophen  •  dextrose  •  dextrose  •  glucagon (human recombinant)  •  nitroglycerin  •  sodium chloride  •  sodium chloride    Assessment/Plan   Assessment & Plan     Active Hospital Problems    Diagnosis  POA   • **Syncope [R55]  Yes   • ISRRAEL (acute kidney injury) (CMS/[facility]) [N17.9]  Yes   • Acute UTI (urinary tract infection) [N39.0]  Yes   • Type 2 diabetes mellitus (CMS/[facility]) [E11.9]  Yes      Resolved Hospital Problems   No resolved problems to display.        Brief Hospital Course to date:  Georgia Velázquez is a 75 y.o. female who had an episode of diarrhea at home then felt presyncopal, called for help, and had LOC with 45 seconds of twitching  witnessed by dtr.    Syncope  Bradycardia  --CT head unremarkable  --Syncope likely due to dehydration and vagal stimulus  - give more fluid.  Stop HCTZ permanently      Acute UTI  --UA:  + nitrite, wbc 21-30, 4+ bacteria  --Rocephin day 2  - GNB - history of recurrent Ecoli     ISRRAEL  --baseline creatinine 1.1-1.4 (1.55 today)  --IV fluids  --Hold ARB and HCTZ for now  --am labs     T2DM  --fsbg with ssi     DVT prophylaxis: Heparin      Disposition: I expect the patient to be discharged tomorrow.    CODE STATUS:   Code Status and Medical Interventions:   Ordered at: 03/17/21 2006     Level Of Support Discussed With:    Patient     Code Status:    CPR     Medical Interventions (Level of Support Prior to Arrest):    Full       Nidhi Wylie MD  03/18/21

## 2021-03-18 NOTE — PROGRESS NOTES
Discharge Planning Assessment  [facility]     Patient Name: Georgia Velázquez  MRN: [id number]  Today's Date: 3/18/2021    Admit Date: 3/17/2021    Discharge Needs Assessment     Row Name 03/18/21 1307       Living Environment    Lives With  child(chandler), adult    Current Living Arrangements  home/apartment/condo    Primary Care Provided by  self;child(chandler)    Provides Primary Care For  no one    Family Caregiver if Needed  child(chandler), adult    Quality of Family Relationships  supportive    Able to Return to Prior Arrangements  yes       Resource/Environmental Concerns    Resource/Environmental Concerns  none    Transportation Concerns  car, none       Transition Planning    Patient/Family Anticipates Transition to  home with help/services    Patient/Family Anticipated Services at Transition  home health care    Transportation Anticipated  family or friend will provide       Discharge Needs Assessment    Readmission Within the Last 30 Days  no previous admission in last 30 days    Current Outpatient/Agency/Support Group  homecare agency    Equipment Currently Used at Home  shower chair;walker, rolling    Concerns to be Addressed  no discharge needs identified    Anticipated Changes Related to Illness  none    Equipment Needed After Discharge  none    Outpatient/Agency/Support Group Needs  homecare agency    Discharge Facility/Level of Care Needs  home with home health    Provided Post Acute Provider List?  N/A    Provided Post Acute Provider Quality & Resource List?  N/A        Discharge Plan     Row Name 03/18/21 1311       Plan    Plan  Home with Home health    Provided Post Acute Provider List?  N/A    N/A Provider List Comment  Had [facility]  in the past and requested them again    Provided Post Acute Provider Quality & Resource List?  N/A    Patient/Family in Agreement with Plan  yes    Plan Comments  Spoke with pt and pt's daughter at bedside. Pt has been independent in ADL's and IADL's with the use of a  shower chair and walker. Pt and pt's daughter report pt could benefit from some physical therapy as been a little weaker lately. Pt and pt's daughter also interested in home health for a nurse to check on pt as daughter reports pt's blood pressure dropped and that is why pt came to the hospital. Pt reports she has had [facility] in the past and would like them again. SW called Isaura with [facility] for referral. Pt denies any other needs at this time.    Final Discharge Disposition Code  06 - home with home health care        Continued Care and Services - Admitted Since 3/17/2021    Coordination has not been started for this encounter.         Demographic Summary     Row Name 03/18/21 1307       General Information    Reason for Consult  discharge planning        Functional Status     Row Name 03/18/21 1307       Functional Status, IADL    Medications  independent    Meal Preparation  assistive equipment    Housekeeping  assistive equipment    Laundry  assistive equipment    Shopping  assistive equipment        Psychosocial    No documentation.       Abuse/Neglect    No documentation.       Legal    No documentation.       Substance Abuse    No documentation.       Patient Forms    No documentation.           MILDRED Morse

## 2021-03-19 ENCOUNTER — READMISSION MANAGEMENT (OUTPATIENT)
Dept: CALL CENTER | Facility: HOSPITAL | Age: 76
End: 2021-03-19

## 2021-03-19 VITALS
RESPIRATION RATE: 18 BRPM | TEMPERATURE: 98.1 F | BODY MASS INDEX: 46.82 KG/M2 | HEART RATE: 48 BPM | SYSTOLIC BLOOD PRESSURE: 111 MMHG | OXYGEN SATURATION: 94 % | HEIGHT: 61 IN | DIASTOLIC BLOOD PRESSURE: 89 MMHG | WEIGHT: 248 LBS

## 2021-03-19 LAB
BACTERIA SPEC AEROBE CULT: ABNORMAL
GLUCOSE BLDC GLUCOMTR-MCNC: 76 MG/DL (ref 70–130)

## 2021-03-19 PROCEDURE — G0378 HOSPITAL OBSERVATION PER HR: HCPCS

## 2021-03-19 PROCEDURE — 96372 THER/PROPH/DIAG INJ SC/IM: CPT

## 2021-03-19 PROCEDURE — 96361 HYDRATE IV INFUSION ADD-ON: CPT

## 2021-03-19 PROCEDURE — 25010000002 HEPARIN (PORCINE) PER 1000 UNITS: Performed by: NURSE PRACTITIONER

## 2021-03-19 PROCEDURE — 82962 GLUCOSE BLOOD TEST: CPT

## 2021-03-19 PROCEDURE — 99217 PR OBSERVATION CARE DISCHARGE MANAGEMENT: CPT | Performed by: INTERNAL MEDICINE

## 2021-03-19 RX ORDER — CEFUROXIME AXETIL 250 MG/1
250 TABLET ORAL 2 TIMES DAILY
Qty: 8 TABLET | Refills: 0 | Status: SHIPPED | OUTPATIENT
Start: 2021-03-19 | End: 2021-03-24

## 2021-03-19 RX ORDER — CARVEDILOL 3.12 MG/1
3.12 TABLET ORAL 2 TIMES DAILY WITH MEALS
Status: DISCONTINUED | OUTPATIENT
Start: 2021-03-19 | End: 2021-03-19 | Stop reason: HOSPADM

## 2021-03-19 RX ORDER — VALSARTAN 160 MG/1
160 TABLET ORAL DAILY
Qty: 30 TABLET | Refills: 5 | Status: SHIPPED | OUTPATIENT
Start: 2021-03-19 | End: 2021-10-21 | Stop reason: SDUPTHER

## 2021-03-19 RX ORDER — CARVEDILOL 6.25 MG/1
3.12 TABLET ORAL 2 TIMES DAILY WITH MEALS
Start: 2021-03-19 | End: 2021-07-14 | Stop reason: SDUPTHER

## 2021-03-19 RX ADMIN — PREDNISOLONE ACETATE 1 DROP: 10 SUSPENSION/ DROPS OPHTHALMIC at 07:59

## 2021-03-19 RX ADMIN — CARVEDILOL 6.25 MG: 6.25 TABLET, FILM COATED ORAL at 08:00

## 2021-03-19 RX ADMIN — ACETAMINOPHEN 650 MG: 325 TABLET ORAL at 08:04

## 2021-03-19 RX ADMIN — ATORVASTATIN CALCIUM 40 MG: 40 TABLET, FILM COATED ORAL at 08:00

## 2021-03-19 RX ADMIN — CLOPIDOGREL BISULFATE 75 MG: 75 TABLET ORAL at 08:00

## 2021-03-19 RX ADMIN — HEPARIN SODIUM 5000 UNITS: 5000 INJECTION INTRAVENOUS; SUBCUTANEOUS at 08:06

## 2021-03-19 RX ADMIN — SODIUM CHLORIDE, PRESERVATIVE FREE 10 ML: 5 INJECTION INTRAVENOUS at 08:06

## 2021-03-19 NOTE — PROGRESS NOTES
Case Management Discharge Note      Final Note: SW called Isaura with [facility] to notify her of pt's discharge home today. Pt plans to discharge home with her daughter and [facility] and had no other discharge needs or concerns.    Provided Post Acute Provider List?: N/A  N/A Provider List Comment: Had [facility] in the past and requested them again  Provided Post Acute Provider Quality & Resource List?: N/A    Selected Continued Care - Admitted Since 3/17/2021     Destination    No services have been selected for the patient.              Durable Medical Equipment    No services have been selected for the patient.              Dialysis/Infusion    No services have been selected for the patient.              Home Medical Care Coordination complete    Service Provider Selected Services Address Phone Fax Patient Preferred    [facility]  [facility] [address] [address] [phone] --          Therapy    No services have been selected for the patient.              Community Resources    No services have been selected for the patient.                       Final Discharge Disposition Code: 06 - home with home health care

## 2021-03-19 NOTE — OUTREACH NOTE
Prep Survey      Responses   [facility] facility patient discharged from?  [place]   Is LACE score < 7 ?  No   Emergency Room discharge w/ pulse ox?  No   Eligibility  [facility]   Date of Admission  03/17/21   Date of Discharge  03/19/21   Discharge Disposition  Home or Self Care   Discharge diagnosis  Syncope  ISRRAEL Acute UTI   Does the patient have one of the following disease processes/diagnoses(primary or secondary)?  Other   Does the patient have Home health ordered?  Yes   What is the Home health agency?   [facility]    Is there a DME ordered?  No   Prep survey completed?  Yes          Awilda Stinson RN

## 2021-03-19 NOTE — DISCHARGE SUMMARY
[facility] Medicine Services  DISCHARGE SUMMARY    Patient Name: Georgia Velázquez  : 1945  MRN: [id number]    Date of Admission: 3/17/2021  2:45 PM  Date of Discharge:  3/19/2021  Primary Care Physician: Chaz Rico, DNP, APRN    Consults     No orders found from 2021 to 3/18/2021.          Hospital Course     Presenting Problem:   Syncope [R55]    Active Hospital Problems    Diagnosis  POA   • **Syncope [R55]  Yes   • ISRRAEL (acute kidney injury) (CMS/[facility]) [N17.9]  Yes   • Acute UTI (urinary tract infection) [N39.0]  Yes   • Type 2 diabetes mellitus (CMS/[facility]) [E11.9]  Yes      Resolved Hospital Problems   No resolved problems to display.          Hospital Course:  Georgia Velázquez is a 75 y.o. female with history of UTIs and past syncope after BM, worked up during a hospital stay in 2020.  She had a similar presentation this time:  At home, had large-volume diarrhea then got up, walked down the cadena, felt presyncopal, called out to her daughter for help, and passed out.  For about 45 seconds she had some twitching of extremities but no B/B incontinence and no post-ictal mental status changes.      In the hospital, head CT and chest CT-A were unremarkable.   Echo showed EF 55%.  UTI was noted (pansensitive E coli) and treated.  Fluids were given and creatinine improved.      She does not drink much fluids, so HCTZ is stopped and she continues on valsartan alone. She was bradycardic (sinus) to 48 so coreg was halved.     She is sedentary but is amenable to starting a walking program with family.    On day of discharge, she got up and walked to chair, was not dizzy, not orthostatic, and feels ready for discharge.        Discharge Follow Up Recommendations for outpatient labs/diagnostics:   *PCP please note:  HCTZ stopped and carvedilol decreased this admission:  Watch BP    *Encourage daily ambulation      Day of Discharge     HPI:  Slept well last night and feels  good.  Had no problems walking from bed to chair with standby assist.  Notes she usually uses a Rollator at home.  Agrees with plan for discharge.    Review of Systems  Gen- No fevers, chills  No dizziness sitting or standing   CV- No chest pain, palpitations  Resp- No cough, dyspnea  GI- No N/V/D, abd pain        Vital Signs:   Temp:  [98.1 °F (36.7 °C)-98.4 °F (36.9 °C)] 98.1 °F (36.7 °C)  Heart Rate:  [48-62] 48  Resp:  [18] 18  BP: ()/(43-89) 111/89     Physical Exam:  Constitutional: Awake, alert  In chair, NAD.  Dozing but wakes easily   Eyes: PER, sclerae anicteric, no conjunctival injection  HENT: NCAT, mucous membranes moist  Neck: Supple,  trachea midline  Respiratory: Clear to auscultation bilaterally, nonlabored respirations   Cardiovascular: shen RR, no murmurs,  Gastrointestinal: Positive bowel sounds, soft, nontender, nondistended  Musculoskeletal: No bilateral ankle edema, no clubbing or cyanosis to extremities  Psychiatric: Appropriate affect, cooperative  Neurologic: Oriented x 3, strength symmetric in all extremities, Cranial Nerves grossly intact to confrontation, speech clear  Skin: No rashes      Pertinent  and/or Most Recent Results     LAB RESULTS:      Lab 03/18/21  0641 03/17/21  1615   WBC 7.24 6.96   HEMOGLOBIN 12.4 12.7   HEMATOCRIT 38.8 40.2   PLATELETS 151 148   NEUTROS ABS 3.84 4.76   IMMATURE GRANS (ABS) 0.02 0.03   LYMPHS ABS 2.43 1.59   MONOS ABS 0.85 0.55   EOS ABS 0.07 0.01   MCV 96.5 97.8*         Lab 03/18/21  0641 03/17/21  1615   SODIUM 140 140   POTASSIUM 3.7 4.2   CHLORIDE 106 105   CO2 23.0 26.0   ANION GAP 11.0 9.0   BUN 32* 33*   CREATININE 1.32* 1.55*   GLUCOSE 102* 172*   CALCIUM 8.2* 8.4*   MAGNESIUM  --  1.8         Lab 03/17/21  1615   TOTAL PROTEIN 6.8   ALBUMIN 3.70   GLOBULIN 3.1   ALT (SGPT) 19   AST (SGOT) 24   BILIRUBIN 0.6   ALK PHOS 83         Lab 03/17/21  2253 03/17/21  1615   TROPONIN T <0.010 <0.010                 Brief Urine Lab Results  (Last  result in the past 365 days)      Color   Clarity   Blood   Leuk Est   Nitrite   Protein   CREAT   Urine HCG        03/17/21 1905 Yellow Cloudy Trace Small (1+) Positive Negative             Microbiology Results (last 10 days)     Procedure Component Value - Date/Time    Urine Culture - Urine, Urine, Clean Catch [[id number]]  (Abnormal)  (Susceptibility) Collected: 03/17/21 1905    Lab Status: Final result Specimen: Urine, Clean Catch Updated: 03/19/21 0204     Urine Culture >100,000 CFU/mL Escherichia coli    Susceptibility      Escherichia coli      MIGUEL ANGEL      Ampicillin Susceptible      Ampicillin + Sulbactam Susceptible      Cefazolin Susceptible      Cefepime Susceptible      Ceftazidime Susceptible      Ceftriaxone Susceptible      Gentamicin Susceptible      Levofloxacin Susceptible      Nitrofurantoin Susceptible      Piperacillin + Tazobactam Susceptible      Tetracycline Susceptible      Trimethoprim + Sulfamethoxazole Susceptible               Linear View                   COVID-19 and FLU A/B PCR - Swab, Nasopharynx [[id number]]  (Normal) Collected: 03/17/21 1545    Lab Status: Final result Specimen: Swab from Nasopharynx Updated: 03/17/21 1704     COVID19 Not Detected     Influenza A PCR Not Detected     Influenza B PCR Not Detected    Narrative:      Fact sheet for providers: https://www.fda.gov/media/981208/download    Fact sheet for patients: https://www.fda.gov/media/759490/download    Test performed by PCR.          Adult Transthoracic Echo Complete With Contrast if Necessary Per Protocol    Result Date: 3/18/2021  · Calculated left ventricular EF = 55% Estimated left ventricular EF was in agreement with the calculated left ventricular EF. Left ventricular systolic function is normal. · Left ventricular diastolic function is consistent with (grade II w/high LAP) pseudonormalization. · Estimated right ventricular systolic pressure from tricuspid regurgitation is normal (<35 mmHg). Calculated right  ventricular systolic pressure from tricuspid regurgitation is 32 mmHg. · Endocardial definition of the apical segments is limited, unable to definitively assess apical regional wall motion abnormalities.      CT Head Without Contrast    Result Date: 3/18/2021  EXAMINATION: CT HEAD WO CONTRAST - 03/17/2021  INDICATION: Syncope  TECHNIQUE: CT head without intravenous contrast.  The radiation dose reduction device was turned on for each scan per the ALARA (As Low as Reasonably Achievable) protocol.  COMPARISON: CT head dated 07/21/2020.  FINDINGS: Chronic changes including moderate chronic small vessel ischemic disease similar to prior without intra-axial hemorrhage or extra-axial fluid collection. No midline shift or hydrocephalus. Globes and orbits unremarkable. Paranasal sinuses and mastoid air cells are grossly clear and well pneumatized. Calvarium intact.      No acute intracranial findings with stable appearance of senescent changes including at least moderately advanced chronic small vessel ischemic disease as seen on prior.  DICTATED:   03/17/2021 EDITED/ls :   03/17/2021   This report was finalized on 3/18/2021 5:06 PM by Dr. Jamison Nunn.      XR Chest 1 View    Result Date: 3/17/2021  EXAMINATION: XR CHEST 1 VW- 03/17/2021  INDICATION: Weak/Dizzy/AMS triage protocol  COMPARISON: 07/08/2020  FINDINGS: Patient history indicates syncope, fever.  Heart shadow is mildly enlarged. Vasculature is cephalized. Mild generalized coarsening of the pulmonary interstitial markings appears similar to prior exam, likely chronic. No lung consolidation, effusion or pneumothorax is seen.      1. Mild cardiomegaly and pulmonary venous hypertension.  2. Stable mild chronic appearing interstitial lung changes compared to 07/08/2020 exam, apparently the patient's baseline radiographic appearance. No evidence of new chest disease.  D:  03/17/2021 E:  03/17/2021    This report was finalized on 3/17/2021 9:53 PM by Dr. Roc Jarrett MD.       CT Angiogram Chest    Result Date: 3/18/2021  EXAMINATION: CT ANGIOGRAM CHEST - 03/17/2021  INDICATION: Syncope, hypoxia.  TECHNIQUE: CT angiogram chest with intravenous contrast following PE protocol. 2D reconstructions performed.  The radiation dose reduction device was turned on for each scan per the ALARA (As Low as Reasonably Achievable) protocol.  COMPARISON: CT chest dated 07/21/2020.  FINDINGS:  Thyroid is heterogeneous in attenuation. No bulky mediastinal adenopathy. Central airways are patent. Esophagus in normal course to the distal segment where there is a small hiatal hernia. Atherosclerotic nonaneurysmal patent thoracic aorta with patent great vessel origins. Satisfactory opacification of the pulmonary arterial tree without filling defect to suggest pulmonary embolus. Cardiac size within normal limits and without pericardial effusion demonstrating aortic valvular and coronary calcifications. Extended lung windows without focal opacification or consolidation. Small noncalcified lingular segment 5 mm pulmonary nodule without pleural effusion. Degenerative changes of the spine without aggressive osseous lesion. No soft tissue body wall findings of acuity. Visualized portions of the upper abdomen reveal prior cholecystectomy.      1. No pulmonary embolism.  2. No acute intrathoracic process, specifically negative for pneumonia with an indeterminate 5 mm  noncalcified lingular segment left upper lobe pulmonary nodule stable from prior comparison 07/21/2020, however, with potential follow-up if high-risk stratification history exists.  3. Small hiatal hernia.  DICTATED:   03/17/2021 EDITED/ls :   03/17/2021   This report was finalized on 3/18/2021 5:06 PM by Dr. Jamison Nunn.        Results for orders placed during the hospital encounter of 06/04/20    Duplex Carotid Ultrasound CAR    Interpretation Summary  · Left mid ICA near 70%, however EDV less than 100 and ratio less than 4.  · Right internal  carotid artery stenosis of 0-49%.  · Bilateral antegrade vertebral flow.      Results for orders placed during the hospital encounter of 06/04/20    Duplex Carotid Ultrasound CAR    Interpretation Summary  · Left mid ICA near 70%, however EDV less than 100 and ratio less than 4.  · Right internal carotid artery stenosis of 0-49%.  · Bilateral antegrade vertebral flow.      Results for orders placed during the hospital encounter of 03/17/21    Adult Transthoracic Echo Complete With Contrast if Necessary Per Protocol    Interpretation Summary  · Calculated left ventricular EF = 55% Estimated left ventricular EF was in agreement with the calculated left ventricular EF. Left ventricular systolic function is normal.  · Left ventricular diastolic function is consistent with (grade II w/high LAP) pseudonormalization.  · Estimated right ventricular systolic pressure from tricuspid regurgitation is normal (<35 mmHg). Calculated right ventricular systolic pressure from tricuspid regurgitation is 32 mmHg.  · Endocardial definition of the apical segments is limited, unable to definitively assess apical regional wall motion abnormalities.        Discharge Details        Discharge Medications      New Medications      Instructions Start Date   cefuroxime 250 MG tablet  Commonly known as: CEFTIN   250 mg, Oral, 2 Times Daily      valsartan 160 MG tablet  Commonly known as: Diovan   160 mg, Oral, Daily         Changes to Medications      Instructions Start Date   atorvastatin 40 MG tablet  Commonly known as: LIPITOR  What changed: when to take this   TAKE ONE TABLET BY MOUTH DAILY         Continue These Medications      Instructions Start Date   clopidogrel 75 MG tablet  Commonly known as: PLAVIX   75 mg, Oral, Daily      latanoprost 0.005 % ophthalmic solution  Commonly known as: XALATAN   1 drop, Right Eye, Nightly      nitroglycerin 0.4 MG SL tablet  Commonly known as: NITROSTAT   0.4 mg, Sublingual, Every 5 Minutes PRN, Take no  more than 3 doses in 15 minutes.      prednisoLONE acetate 1 % ophthalmic suspension  Commonly known as: PRED FORTE   1 drop, Right Eye, Every 4 Hours, Waking hours       Vitamin D3 50 MCG (2000 UT) capsule   2,000 Units, Oral, Daily, 2000 units         Stop These Medications    valsartan-hydrochlorothiazide 160-25 MG per tablet  Commonly known as: DIOVAN-HCT        ASK your doctor about these medications      Instructions Start Date   carvedilol 6.25 MG tablet  Commonly known as: COREG  Ask about: Which instructions should I use?   6.25 mg, Oral, 2 Times Daily With Meals      carvedilol 6.25 MG tablet  Commonly known as: COREG  Ask about: Which instructions should I use?   3.125 mg, Oral, 2 Times Daily With Meals             No Known Allergies      Discharge Disposition:  Home or Self Care    Diet:  Hospital:  Diet Order   Procedures   • Diet Regular; Cardiac, Consistent Carbohydrate       Activity:  PT at home.  Begin walking daily with family and try to increase distance.         CODE STATUS:    Code Status and Medical Interventions:   Ordered at: 03/17/21 2006     Level Of Support Discussed With:    Patient     Code Status:    CPR     Medical Interventions (Level of Support Prior to Arrest):    Full       Future Appointments   Date Time Provider Department Center   4/12/2021  2:15 PM Armaan Samuels III, MD [facility] None       Additional Instructions for the Follow-ups that You Need to Schedule     Ambulatory Referral to Home Health   As directed      Face to Face Visit Date: 3/18/2021    Follow-up provider for Plan of Care?: I treated the patient in an acute care facility and will not continue treatment after discharge.    Follow-up provider: ALBERTINA MOORE [[id number]]    Reason/Clinical Findings: Weakness, Nausea and vomiting    Describe mobility limitations that make leaving home difficult: Impaired mobility    Nursing/Therapeutic Services Requested: Skilled Nursing (Aide) Physical Therapy Other    Skilled nursing  orders: Medication education    PT orders: Therapeutic exercise Gait Training Strengthening Home safety assessment    Weight Bearing Status: Full Weight Bearing    Frequency: 1 Week 1         Discharge Follow-up with PCP   As directed       Currently Documented PCP:    Chaz Rico DNP, APRN    PCP Phone Number:    [phone]     Follow Up Details: per routine                     Nidhi Wylie MD  03/19/21      Time Spent on Discharge:  I spent  30  minutes on this discharge activity which included: face-to-face encounter with the patient, reviewing the data in the system, coordination of the care with the nursing staff as well as consultants, documentation, and entering orders.

## 2021-03-19 NOTE — PLAN OF CARE
Goal Outcome Evaluation:  Plan of Care Reviewed With: patient  Progress: improving  VSS; Pt s/w confused upon awakening, but has been sleeping deeply; A/O x3 after reorientation. Pt slept well during the night. PureWick in place and draining properly to suction.

## 2021-03-19 NOTE — PLAN OF CARE
Problem: Adult Inpatient Plan of Care  Goal: Plan of Care Review  Outcome: Ongoing, Progressing  Flowsheets (Taken 3/19/2021 1057)  Progress: improving  Plan of Care Reviewed With: patient  Outcome Summary:   VSS on room air   no complaints of pain   no complaints of dizziness   pt got cleaned up and up in chair   anticipating discharge  Goal: Patient-Specific Goal (Individualized)  Outcome: Ongoing, Progressing  Goal: Absence of Hospital-Acquired Illness or Injury  Outcome: Ongoing, Progressing  Goal: Optimal Comfort and Wellbeing  Outcome: Ongoing, Progressing  Goal: Readiness for Transition of Care  Outcome: Ongoing, Progressing     Problem: Fall Injury Risk  Goal: Absence of Fall and Fall-Related Injury  Outcome: Ongoing, Progressing     Problem: Syncope  Goal: Absence of Syncopal Symptoms  Outcome: Ongoing, Progressing     Problem: Electrolyte Imbalance (Acute Kidney Injury/Impairment)  Goal: Serum Electrolyte Balance  Outcome: Ongoing, Progressing     Problem: Fluid Imbalance (Acute Kidney Injury/Impairment)  Goal: Optimal Fluid Balance  Outcome: Ongoing, Progressing     Problem: Hematologic Alteration (Acute Kidney Injury/Impairment)  Goal: Hemoglobin, Hematocrit and Platelets Within Normal Range  Outcome: Ongoing, Progressing     Problem: Oral Intake Inadequate (Acute Kidney Injury/Impairment)  Goal: Optimal Nutrition Intake  Outcome: Ongoing, Progressing     Problem: Renal Function Impairment (Acute Kidney Injury/Impairment)  Goal: Effective Renal Function  Outcome: Ongoing, Progressing   Goal Outcome Evaluation:  Plan of Care Reviewed With: patient  Progress: improving  Outcome Summary: VSS on room air; no complaints of pain; no complaints of dizziness; pt got cleaned up and up in chair; anticipating discharge

## 2021-03-20 LAB
QT INTERVAL: 448 MS
QTC INTERVAL: 416 MS

## 2021-03-22 ENCOUNTER — TRANSITIONAL CARE MANAGEMENT TELEPHONE ENCOUNTER (OUTPATIENT)
Dept: CALL CENTER | Facility: HOSPITAL | Age: 76
End: 2021-03-22

## 2021-03-22 NOTE — OUTREACH NOTE
Call Center TCM Note      Responses   [facility] [place] patient discharged from?  [facility]   Does the patient have one of the following disease processes/diagnoses(primary or secondary)?  Other   TCM attempt successful?  No [Daughter answered but no verbal release within 6 months]          Mary Ellen Gagnon LPN    3/22/2021, 09:09 EDT

## 2021-03-22 NOTE — OUTREACH NOTE
"Call Center TCM Note      Responses   [facility] patient discharged from?  [place]   Does the patient have one of the following disease processes/diagnoses(primary or secondary)?  Other   TCM attempt successful?  Yes   Call start time  1314   Call end time  1317   Discharge diagnosis  Syncope  ISRRAEL Acute UTI   Meds reviewed with patient/caregiver?  Yes   Is the patient having any side effects they believe may be caused by any medication additions or changes?  No   Does the patient have all medications ordered at discharge?  Yes   Is the patient taking all medications as directed (includes completed medication regime)?  Yes   Does the patient have a primary care provider?   Yes   Does the patient have an appointment with their PCP within 7 days of discharge?  Yes   Has the patient kept scheduled appointments due by today?  N/A   What is the Home health agency?   [facility]     Has home health visited the patient within 72 hours of discharge?  Yes   Psychosocial issues?  No   Did the patient receive a copy of their discharge instructions?  Yes   Nursing interventions  Reviewed instructions with patient   What is the patient's perception of their health status since discharge?  Improving   Is the patient/caregiver able to teach back signs and symptoms related to disease process for when to call PCP?  Yes   Is the patient/caregiver able to teach back signs and symptoms related to disease process for when to call 911?  Yes   Is the patient/caregiver able to teach back the hierarchy of who to call/visit for symptoms/problems? PCP, Specialist, Home health nurse, Urgent Care, ED, 911  Yes   Additional teach back comments  States she is doing well.  Blood sugars have been \"fine\".  Home health is suppose to be back today.     TCM call completed?  Yes   Wrap up additional comments  Denies questions or needs at this time          Mary Ellen Gagnon LPN    3/22/2021, 13:18 EDT      "

## 2021-03-24 ENCOUNTER — OFFICE VISIT (OUTPATIENT)
Dept: FAMILY MEDICINE CLINIC | Facility: CLINIC | Age: 76
End: 2021-03-24

## 2021-03-24 VITALS
TEMPERATURE: 97.1 F | HEART RATE: 61 BPM | SYSTOLIC BLOOD PRESSURE: 118 MMHG | BODY MASS INDEX: 48.59 KG/M2 | OXYGEN SATURATION: 96 % | DIASTOLIC BLOOD PRESSURE: 74 MMHG | WEIGHT: 257 LBS | RESPIRATION RATE: 16 BRPM

## 2021-03-24 DIAGNOSIS — Z76.89 ENCOUNTER FOR SUPPORT AND COORDINATION OF TRANSITION OF CARE: ICD-10-CM

## 2021-03-24 DIAGNOSIS — R55 SYNCOPE AND COLLAPSE: ICD-10-CM

## 2021-03-24 DIAGNOSIS — I51.89 DIASTOLIC DYSFUNCTION: ICD-10-CM

## 2021-03-24 DIAGNOSIS — I10 ESSENTIAL HYPERTENSION: ICD-10-CM

## 2021-03-24 DIAGNOSIS — N30.01 ACUTE CYSTITIS WITH HEMATURIA: Primary | ICD-10-CM

## 2021-03-24 DIAGNOSIS — H40.9 GLAUCOMA OF RIGHT EYE, UNSPECIFIED GLAUCOMA TYPE: ICD-10-CM

## 2021-03-24 LAB
BILIRUB BLD-MCNC: NEGATIVE MG/DL
CLARITY, POC: CLEAR
COLOR UR: YELLOW
GLUCOSE UR STRIP-MCNC: NEGATIVE MG/DL
KETONES UR QL: NEGATIVE
LEUKOCYTE EST, POC: ABNORMAL
NITRITE UR-MCNC: NEGATIVE MG/ML
PH UR: 5.5 [PH] (ref 5–8)
PROT UR STRIP-MCNC: NEGATIVE MG/DL
RBC # UR STRIP: ABNORMAL /UL
SP GR UR: 1.02 (ref 1–1.03)
UROBILINOGEN UR QL: NORMAL

## 2021-03-24 PROCEDURE — 99495 TRANSJ CARE MGMT MOD F2F 14D: CPT | Performed by: NURSE PRACTITIONER

## 2021-03-24 PROCEDURE — 81003 URINALYSIS AUTO W/O SCOPE: CPT | Performed by: NURSE PRACTITIONER

## 2021-03-24 PROCEDURE — 1111F DSCHRG MED/CURRENT MED MERGE: CPT | Performed by: NURSE PRACTITIONER

## 2021-03-24 PROCEDURE — 87086 URINE CULTURE/COLONY COUNT: CPT | Performed by: NURSE PRACTITIONER

## 2021-03-24 NOTE — PROGRESS NOTES
Transitional Care Follow Up Visit  Subjective     Georgiafernandez Velázquez is a 75 y.o. female who presents for a transitional care management visit.    Within 48 business hours after discharge our office contacted her via telephone to coordinate her care and needs.      I reviewed and discussed the details of that call along with the discharge summary, hospital problems, inpatient lab results, inpatient diagnostic studies, and consultation reports with Georgia.     Current outpatient and discharge medications have been reconciled for the patient.  Reviewed by: Chaz Rico, FORTUNATO, APRN      Date of TCM Phone Call 3/19/2021   [facility]   Date of Admission 3/17/2021   Date of Discharge 3/19/2021   Discharge Disposition Home or Self Care     Risk for Readmission (LACE) Score: 8 (3/19/2021  6:01 AM)  Discharge Summary by Nidhi Wylie MD (03/19/2021 10:42)      History of Present Illness   Course During Hospital Stay:  Patient admitted 3/17-3/19 for syncopal episode after large liquid stool. LOC for 45 sec. Neg CT head and chest. Diagnosed and treated for UTI. Finished antibiotics yesterday. Had some bradycardia and coreg dose decreased by half. Also stopped hctz for possible vasovagal syncope. Since she has been home bp is stable. Has mild lower ext edema but refuses to elevate her legs. No fever or dysuria. She is incontinent and uses depends. Overall she is feeling well today.   Being treated for glaucoma and will see eye provider next week. Hopes to be cleared for covid vaccine.     The following portions of the patient's history were reviewed and updated as appropriate: allergies, current medications, past family history, past medical history, past social history, past surgical history and problem list.    Review of Systems   Constitutional: Negative for fatigue, fever and unexpected weight change.   HENT: Negative for congestion, hearing loss, nosebleeds, rhinorrhea, sore throat, trouble swallowing  and voice change.    Eyes: Positive for discharge. Negative for redness and visual disturbance.        Right eye watering   Respiratory: Negative for cough, chest tightness, shortness of breath and wheezing.    Cardiovascular: Negative for chest pain, palpitations and leg swelling.   Gastrointestinal: Negative for abdominal pain, blood in stool, constipation, diarrhea, nausea and vomiting.   Endocrine: Negative for polydipsia, polyphagia and polyuria.   Genitourinary: Negative for dysuria, flank pain, frequency and hematuria.   Musculoskeletal: Negative for arthralgias, joint swelling and myalgias.   Skin: Negative for color change and rash.   Neurological: Negative for dizziness, seizures, syncope, weakness and headaches.   Hematological: Negative for adenopathy. Does not bruise/bleed easily.   Psychiatric/Behavioral: Negative for dysphoric mood. The patient is not nervous/anxious.        Objective   Physical Exam  Vitals and nursing note reviewed.   Constitutional:       General: She is not in acute distress.     Appearance: She is well-developed.   HENT:      Head: Normocephalic and atraumatic.      Right Ear: Tympanic membrane and external ear normal.      Left Ear: Tympanic membrane and external ear normal.      Nose: Nose normal.      Mouth/Throat:      Pharynx: No oropharyngeal exudate.   Eyes:      General: No scleral icterus.        Right eye: No discharge.         Left eye: No discharge.      Conjunctiva/sclera: Conjunctivae normal.      Pupils: Pupils are equal, round, and reactive to light.      Comments: Right with with clear discharge   Neck:      Thyroid: No thyromegaly.      Trachea: No tracheal deviation.   Cardiovascular:      Rate and Rhythm: Normal rate and regular rhythm.      Heart sounds: Normal heart sounds. No murmur heard.   No friction rub. No gallop.    Pulmonary:      Effort: Pulmonary effort is normal. No respiratory distress.      Breath sounds: Normal breath sounds. No wheezing.    Abdominal:      General: Bowel sounds are normal. There is no distension.      Palpations: Abdomen is soft. There is no mass.      Tenderness: There is no abdominal tenderness.   Musculoskeletal:         General: No deformity.      Cervical back: Neck supple.      Right lower leg: Edema present.      Left lower leg: Edema present.      Comments: Mild bilat ankle edema   Lymphadenopathy:      Cervical: No cervical adenopathy.   Skin:     General: Skin is warm and dry.      Capillary Refill: Capillary refill takes less than 2 seconds.      Findings: No erythema or rash.   Neurological:      Mental Status: She is alert and oriented to person, place, and time.      Coordination: Coordination normal.   Psychiatric:         Speech: Speech normal.         Behavior: Behavior normal.         Thought Content: Thought content normal.         Judgment: Judgment normal.     POCT urinalysis dipstick, automated (03/24/2021 11:21)    Diagnoses and all orders for this visit:    1. Acute cystitis with hematuria (Primary)  -     POCT urinalysis dipstick, automated  -     Urine Culture - Urine, Urine, Catheter In/Out    2. Encounter for support and coordination of transition of care    3. Diastolic dysfunction  Comments:  coreg 6.25 1/2 tab bid  plavix    4. Essential hypertension  Comments:  valsartan 160.  HCTZ discontinued in hosp    5. Glaucoma of right eye, unspecified glaucoma type  Comments:  pred forte gtts    6. Syncope and collapse    Continue current meds. Monitor bp at home. Elevate feet when possible. Keep follow up with Eye provider.  Follow up here in 3 months or sooner prn.  Monitor bp at home and bring log. Encourage DASH diet and 150 minutes of weekly exercise. Reviewed signs and symptoms of MI and stroke. If symptoms persist or worsen go to the ER.

## 2021-03-25 LAB — BACTERIA SPEC AEROBE CULT: NO GROWTH

## 2021-03-29 ENCOUNTER — READMISSION MANAGEMENT (OUTPATIENT)
Dept: CALL CENTER | Facility: HOSPITAL | Age: 76
End: 2021-03-29

## 2021-03-29 NOTE — OUTREACH NOTE
Medical Week 2 Survey      Responses   [facility] patient discharged from?  [place]   Does the patient have one of the following disease processes/diagnoses(primary or secondary)?  Other   Week 2 attempt successful?  Yes   Call start time  1314   Discharge diagnosis  Syncope,  [place],  Acute UTI   Call end time  1316   Meds reviewed with patient/caregiver?  Yes   Is the patient taking all medications as directed (includes completed medication regime)?  Yes   Comments regarding PCP  3/24/21   Has the patient kept scheduled appointments due by today?  Yes   What is the patient's perception of their health status since discharge?  Improving   If the patient is a current smoker, are they able to teach back resources for cessation?  Not a smoker   Week 2 Call Completed?  Yes          Isaura Nowak RN

## 2021-04-02 ENCOUNTER — TELEPHONE (OUTPATIENT)
Dept: FAMILY MEDICINE CLINIC | Facility: CLINIC | Age: 76
End: 2021-04-02

## 2021-04-02 NOTE — TELEPHONE ENCOUNTER
----- Message from Robyn Diez sent at 4/2/2021  3:37 PM EDT -----  Regarding: RE: [facility]  Contact: [phone] I would just call and let them know you need to give verbal orders.   ----- Message -----  From: Ruby Mcdonald CMA  Sent: 4/2/2021   3:33 PM EDT  To: Robyn Diez  Subject: FW: [facility]                      Do you recall who you spoke to at [facility] so I can try to find their phone number for this?  ----- Message -----  From: John Romero MD  Sent: 3/26/2021   5:55 PM EDT  To: Linda Mata  Subject: RE: [facility]                      Please approve the verbal order for occupational therapy.  ----- Message -----  From: Linda Mata  Sent: 3/26/2021   1:26 PM EDT  To: John Romero MD  Subject: FW: [facility]                      On call message  ----- Message -----  From: Robyn Diez  Sent: 3/26/2021  10:28 AM EDT  To: Linda Mata  Subject: [facility]                          REQUESTING VERBAL ORDERS FOR OT 1X A WEEK FOR 3 WEEKS. JULIET PT, I WAS NOT SURE WHO TO SEND THIS TO.

## 2021-04-05 ENCOUNTER — READMISSION MANAGEMENT (OUTPATIENT)
Dept: CALL CENTER | Facility: HOSPITAL | Age: 76
End: 2021-04-05

## 2021-04-05 NOTE — OUTREACH NOTE
Medical Week 3 Survey      Responses   [facility] patient discharged from?  [place]   Does the patient have one of the following disease processes/diagnoses(primary or secondary)?  Other   Week 3 attempt successful?  Yes   Call start time  0946   Is patient permission given to speak with other caregiver?  Yes   List who call center can speak with  daughter   Meds reviewed with patient/caregiver?  Yes   Is the patient taking all medications as directed (includes completed medication regime)?  Yes   Comments regarding appointments  Pt has seen pcp.   Has the patient kept scheduled appointments due by today?  Yes          Yadira Ding RN

## 2021-04-07 ENCOUNTER — TELEPHONE (OUTPATIENT)
Dept: FAMILY MEDICINE CLINIC | Facility: CLINIC | Age: 76
End: 2021-04-07

## 2021-04-07 NOTE — TELEPHONE ENCOUNTER
This was Jacky patient.  She was recently in the hospital.  Her hydrochlorothiazide was held because of orthostatic drop in blood pressure.  Please let the home health nurse know to recheck the blood pressure.  Patient needs to establish with someone in our office and also follow-up with cardiology regarding her blood pressure

## 2021-04-08 NOTE — TELEPHONE ENCOUNTER
Contacted Promise advised we need to get patients b/p rechecked and encourage follow up here in our office and with cardiology. She will gets this completed and call back if needed.

## 2021-04-12 ENCOUNTER — TELEPHONE (OUTPATIENT)
Dept: FAMILY MEDICINE CLINIC | Facility: CLINIC | Age: 76
End: 2021-04-12

## 2021-04-12 ENCOUNTER — OFFICE VISIT (OUTPATIENT)
Dept: CARDIOLOGY | Facility: CLINIC | Age: 76
End: 2021-04-12

## 2021-04-12 VITALS
BODY MASS INDEX: 48.15 KG/M2 | HEIGHT: 61 IN | SYSTOLIC BLOOD PRESSURE: 128 MMHG | TEMPERATURE: 97 F | OXYGEN SATURATION: 97 % | DIASTOLIC BLOOD PRESSURE: 72 MMHG | HEART RATE: 77 BPM | WEIGHT: 255 LBS

## 2021-04-12 DIAGNOSIS — I25.10 CORONARY ARTERY DISEASE DUE TO LIPID RICH PLAQUE: Primary | ICD-10-CM

## 2021-04-12 DIAGNOSIS — E78.2 MIXED HYPERLIPIDEMIA: ICD-10-CM

## 2021-04-12 DIAGNOSIS — I25.83 CORONARY ARTERY DISEASE DUE TO LIPID RICH PLAQUE: Primary | ICD-10-CM

## 2021-04-12 DIAGNOSIS — I65.23 BILATERAL CAROTID ARTERY STENOSIS: ICD-10-CM

## 2021-04-12 DIAGNOSIS — I51.89 DIASTOLIC DYSFUNCTION: ICD-10-CM

## 2021-04-12 DIAGNOSIS — I10 ESSENTIAL HYPERTENSION: ICD-10-CM

## 2021-04-12 PROCEDURE — 99214 OFFICE O/P EST MOD 30 MIN: CPT | Performed by: INTERNAL MEDICINE

## 2021-04-12 RX ORDER — BRIMONIDINE TARTRATE AND TIMOLOL MALEATE 2; 5 MG/ML; MG/ML
1 SOLUTION OPHTHALMIC DAILY
COMMUNITY
End: 2022-04-25

## 2021-04-12 NOTE — TELEPHONE ENCOUNTER
gil at [facility] number provided that we will need to see her in office before another provider will be able to approve orders requested.

## 2021-04-12 NOTE — PROGRESS NOTES
[facility] at [facility]  Office visit  Georgia Velázquez  1945  [phone]  There is no work phone number on file.    VISIT DATE:  4/12/2021    PCP: Chaz Rico, DNP, APRN  [address]  [facility] [address]    CC:  Chief Complaint   Patient presents with   • Coronary Artery Disease       Previous cardiac studies and procedures:  2000: PCI with stenting in the setting of MI, dated deficient     April 2015   echo: EF 40%, mild LVH, diastolic dysfunction  Shyanne scan myocardial perfusion imaging: EF 41%, large fixed anterior apical defect.     May 2015 cardiac catheterization: 70% distal LAD stenosis.  RCA with diffuse irregularities, 60% mid RCA stenosis.  Left circumflex with diffuse irregularities.     June 2020  Carotid duplex  · Left mid ICA near 70%, however EDV less than 100 and ratio less than 4.  · Right internal carotid artery stenosis of 0-49%.  · Bilateral antegrade vertebral flow.  Echo  · Estimated EF appears to be in the range of 51 - 55%.  · Left ventricular systolic function is normal.  · Left ventricular diastolic dysfunction (grade II) consistent with pseudonormalization.  · The following left ventricular wall segments are hypokinetic: apical anterior, apical lateral, apical inferior, apical septal and apex hypokinetic.  · Mild aortic valve regurgitation is present.     8/2020 30 day monitor  · A relatively benign monitor study.  · Occasional premature atrial contractions. Rare PVCs which are intermittently symptomatic.    ASSESSMENT:   Diagnosis Plan   1. Coronary artery disease due to lipid rich plaque     2. Diastolic dysfunction     3. Essential hypertension     4. Mixed hyperlipidemia         PLAN:  Coronary artery disease: Currently stable and asymptomatic.  Continue antiplatelet therapy, high intensity statin therapy and afterload reduction.     Hypertension: Goal less than 130/80 mmHg.  Currently well controlled.  Continue current medical  "therapy.     Hyperlipidemia: Goal LDL less than 70, currently well controlled.  Continue atorvastatin 40 mg p.o. daily.     Retinal embolic event: Continue current medical therapy and ophthalmology evaluation.  Evaluation negative for atrial arrhythmias.     Left internal carotid artery stenosis: Approaching 70%.  Currently asymptomatic.  Continue afterload reduction, high intensity statin therapy.    Continue Plavix.  Annual carotid duplex pending prior to follow-up in 6 months.    Subjective  Interval assessment: Recent episode of syncope secondary to symptomatic hypotension.  Stable transthoracic echo.  Thiazide diuretic discontinued and carvedilol down titrated.  She has had no further episodes of presyncope or syncope and her blood pressures remain less than 130/80 mmHg.  Still using a wheeled walker for ambulation.    Initial eval: 75-year-old female with a history of coronary disease, peripheral vascular disease and chronic congestive systolic heart failure.  Recently had right eye pain and was diagnosed with a right retinal embolic event.  Ophthalmology evaluation has been requested, unclear whether this was a venous or arterial thrombosis.  Reviewed recent echo and carotid duplex.  No significant palpitations.  She has stable dyspnea on exertion and a class II-III pattern.  Uses a wheeled walker for ambulation.  She is compliant with medical therapy and was on aspirin prior to this event.  She reports that she is currently on a statin but does not know the name or dosage, she will call us back with this information later.  Blood pressures run less than 130/80 mmHg.  She is scheduled for an ophthalmologic procedure next week.    PHYSICAL EXAMINATION:  Vitals:    04/12/21 1414   BP: 128/72   BP Location: Left arm   Patient Position: Sitting   Pulse: 77   Temp: 97 °F (36.1 °C)   SpO2: 97%   Weight: 116 kg (255 lb)   Height: 154.9 cm (61\")     General Appearance:    Alert, cooperative, no distress, appears " stated age   Head:    Normocephalic, without obvious abnormality, atraumatic   Eyes:    conjunctiva/corneas clear   Nose:   Nares normal, septum midline, mucosa normal, no drainage   Throat:   Lips, teeth and gums normal   Neck:   Supple, symmetrical, trachea midline, no carotid    bruit or JVD   Lungs:     Clear to auscultation bilaterally, respirations unlabored   Chest Wall:    No tenderness or deformity    Heart:    Regular rate and rhythm, S1 and S2 normal, 2/6 early peaking systolic murmur right upper sternal border, rub   or gallop, normal carotid impulse bilaterally.  Left carotid bruit   Abdomen:     Soft, non-tender   Extremities:   Extremities normal, atraumatic, no cyanosis.  Trivial edema   Pulses:   2+ and symmetric all extremities   Skin:   Skin color, texture, turgor normal, no rashes or lesions       Diagnostic Data:  Procedures  Lab Results   Component Value Date    TRIG 103 10/12/2020    HDL 46 10/12/2020     Lab Results   Component Value Date    GLUCOSE 102 (H) 03/18/2021    BUN 32 (H) 03/18/2021    CREATININE 1.32 (H) 03/18/2021     03/18/2021    K 3.7 03/18/2021     03/18/2021    CO2 23.0 03/18/2021     Lab Results   Component Value Date    HGBA1C 5.90 (H) 10/12/2020     Lab Results   Component Value Date    WBC 7.24 03/18/2021    HGB 12.4 03/18/2021    HCT 38.8 03/18/2021     03/18/2021       Allergies  No Known Allergies    Current Medications    Current Outpatient Medications:   •  atorvastatin (LIPITOR) 40 MG tablet, TAKE ONE TABLET BY MOUTH DAILY (Patient taking differently: Take 40 mg by mouth Every Night.), Disp: 90 tablet, Rfl: 2  •  brimonidine-timolol (COMBIGAN) 0.2-0.5 % ophthalmic solution, 1 drop Daily. Left eye, Disp: , Rfl:   •  carvedilol (COREG) 6.25 MG tablet, Take 0.5 tablets by mouth 2 (Two) Times a Day With Meals., Disp: , Rfl:   •  Cholecalciferol (Vitamin D3) 50 MCG (2000 UT) capsule, Take 2,000 Units by mouth Daily. 2000 units , Disp: , Rfl:   •   clopidogrel (PLAVIX) 75 MG tablet, Take 1 tablet by mouth Daily., Disp: 90 tablet, Rfl: 3  •  nitroglycerin (NITROSTAT) 0.4 MG SL tablet, Place 1 tablet under the tongue Every 5 (Five) Minutes As Needed for Chest Pain. Take no more than 3 doses in 15 minutes., Disp: 100 tablet, Rfl: 0  •  prednisoLONE acetate (PRED FORTE) 1 % ophthalmic suspension, Administer 1 drop to the right eye Every 4 (Four) Hours. Waking hours, Disp: , Rfl:   •  valsartan (Diovan) 160 MG tablet, Take 1 tablet by mouth Daily., Disp: 30 tablet, Rfl: 5          ROS  ROS      SOCIAL HX  Social History     Socioeconomic History   • Marital status:      Spouse name: Not on file   • Number of children: Not on file   • Years of education: Not on file   • Highest education level: Not on file   Tobacco Use   • Smoking status: Former Smoker     Types: Cigarettes     Quit date:      Years since quittin.2   • Smokeless tobacco: Never Used   Vaping Use   • Vaping Use: Never used   Substance and Sexual Activity   • Alcohol use: No   • Drug use: No   • Sexual activity: Never       FAMILY HX  Family History   Problem Relation Age of Onset   • Diabetes Mother    • Heart failure Mother    • Heart failure Father    • No Known Problems Sister    • No Known Problems Brother    • No Known Problems Son    • No Known Problems Daughter              Armaan Samuels III, MD, [facility]

## 2021-04-12 NOTE — TELEPHONE ENCOUNTER
[facility] WOULD LIKE TO REQUESTING ORDERS FOR OCCUPATIONAL THERAPY ONCE A WEEK FOR THREE WEEKS. PLEASE CALL [facility] TO LET THEM KNOW IF THE ORDER CAN BE APPROVED       CALL [phone]

## 2021-04-23 ENCOUNTER — OFFICE VISIT (OUTPATIENT)
Dept: FAMILY MEDICINE CLINIC | Facility: CLINIC | Age: 76
End: 2021-04-23

## 2021-04-23 VITALS
TEMPERATURE: 98.4 F | HEART RATE: 79 BPM | RESPIRATION RATE: 20 BRPM | WEIGHT: 262 LBS | DIASTOLIC BLOOD PRESSURE: 78 MMHG | SYSTOLIC BLOOD PRESSURE: 148 MMHG | HEIGHT: 61 IN | OXYGEN SATURATION: 99 % | BODY MASS INDEX: 49.47 KG/M2

## 2021-04-23 DIAGNOSIS — Z79.4 TYPE 2 DIABETES MELLITUS WITH STAGE 3B CHRONIC KIDNEY DISEASE, WITH LONG-TERM CURRENT USE OF INSULIN (HCC): Primary | ICD-10-CM

## 2021-04-23 DIAGNOSIS — E11.22 TYPE 2 DIABETES MELLITUS WITH STAGE 3B CHRONIC KIDNEY DISEASE, WITH LONG-TERM CURRENT USE OF INSULIN (HCC): Primary | ICD-10-CM

## 2021-04-23 DIAGNOSIS — I10 ESSENTIAL HYPERTENSION: Chronic | ICD-10-CM

## 2021-04-23 DIAGNOSIS — N18.32 TYPE 2 DIABETES MELLITUS WITH STAGE 3B CHRONIC KIDNEY DISEASE, WITH LONG-TERM CURRENT USE OF INSULIN (HCC): Primary | ICD-10-CM

## 2021-04-23 PROCEDURE — 99214 OFFICE O/P EST MOD 30 MIN: CPT | Performed by: NURSE PRACTITIONER

## 2021-04-23 RX ORDER — BLOOD-GLUCOSE METER
1 KIT MISCELLANEOUS AS NEEDED
Qty: 1 EACH | Refills: 0 | Status: ON HOLD | OUTPATIENT
Start: 2021-04-23 | End: 2022-06-13

## 2021-04-23 RX ORDER — LANCETS 33 GAUGE
1 EACH MISCELLANEOUS 3 TIMES DAILY
Qty: 100 EACH | Refills: 12 | Status: ON HOLD | OUTPATIENT
Start: 2021-04-23 | End: 2022-06-13

## 2021-04-25 PROBLEM — N39.0 ACUTE UTI (URINARY TRACT INFECTION): Status: RESOLVED | Noted: 2021-03-17 | Resolved: 2021-04-25

## 2021-04-25 PROBLEM — D72.829 LEUKOCYTOSIS: Status: RESOLVED | Noted: 2017-12-05 | Resolved: 2021-04-25

## 2021-04-25 PROBLEM — K52.9 COLITIS: Chronic | Status: ACTIVE | Noted: 2020-07-21

## 2021-04-25 PROBLEM — I65.22 LEFT CAROTID ARTERY STENOSIS: Chronic | Status: ACTIVE | Noted: 2020-07-22

## 2021-04-25 PROBLEM — I51.89 DIASTOLIC DYSFUNCTION: Chronic | Status: ACTIVE | Noted: 2020-07-21

## 2021-04-25 PROBLEM — R74.8 ELEVATED CREATINE KINASE: Status: RESOLVED | Noted: 2018-02-26 | Resolved: 2021-04-25

## 2021-04-25 PROBLEM — H40.9 GLAUCOMA: Chronic | Status: ACTIVE | Noted: 2020-07-21

## 2021-04-25 PROBLEM — R19.7 VOMITING AND DIARRHEA: Status: RESOLVED | Noted: 2020-05-21 | Resolved: 2021-04-25

## 2021-04-25 PROBLEM — R11.10 VOMITING AND DIARRHEA: Status: RESOLVED | Noted: 2020-05-21 | Resolved: 2021-04-25

## 2021-04-25 PROBLEM — R11.2 NAUSEA VOMITING AND DIARRHEA: Status: RESOLVED | Noted: 2020-07-22 | Resolved: 2021-04-25

## 2021-04-25 PROBLEM — N18.30 STAGE 3 CHRONIC KIDNEY DISEASE: Chronic | Status: ACTIVE | Noted: 2018-02-27

## 2021-04-25 PROBLEM — R19.7 NAUSEA VOMITING AND DIARRHEA: Status: RESOLVED | Noted: 2020-07-22 | Resolved: 2021-04-25

## 2021-04-25 PROBLEM — N17.9 AKI (ACUTE KIDNEY INJURY): Status: RESOLVED | Noted: 2021-03-17 | Resolved: 2021-04-25

## 2021-04-25 PROBLEM — R55 SYNCOPE AND COLLAPSE: Status: RESOLVED | Noted: 2020-07-21 | Resolved: 2021-04-25

## 2021-04-25 PROBLEM — J44.9 CHRONIC OBSTRUCTIVE PULMONARY DISEASE (HCC): Chronic | Status: ACTIVE | Noted: 2019-06-14

## 2021-04-25 RX ORDER — BLOOD-GLUCOSE,RECEIVER,CONT
1 EACH MISCELLANEOUS ONCE
Qty: 1 EACH | Refills: 0 | Status: SHIPPED | OUTPATIENT
Start: 2021-04-25 | End: 2021-04-25

## 2021-04-25 NOTE — PROGRESS NOTES
Follow Up Office Note     Patient Name: Georgia Velázquez  : 1945   MRN: [id number]     Chief Complaint:    Chief Complaint   Patient presents with   • Establish Care       History of Present Illness: Georgia Velázquez is a 75 y.o. female who presents today to establish care with a new PCP. Patient is off-boarding from DON [facility]. Patient has a history of DM2 which has been stable and well-controlled. Her last A1C was 5.9 on 10/12/20. She is requesting a prescription for a new glucometer as well as test strips and lancets. She states that she would like to try the Dexcom glucose monitoring device.   Patient also has a history of HTN which has been stable and controlled on current medications.  She has no complaints or health concerns today.      Subjective      Review of Systems:   Review of Systems   Constitutional: Negative for activity change, appetite change, chills, diaphoresis, fatigue, fever and unexpected weight change.   Respiratory: Negative.    Cardiovascular: Negative.    Gastrointestinal: Negative.    Endocrine: Negative for polydipsia, polyphagia and polyuria.   Genitourinary: Negative.    Skin: Negative.    Neurological: Negative.         Past Medical History:   Past Medical History:   Diagnosis Date   • Acute kidney injury (CMS/[facility]) 2017   • Aortic regurgitation    • Arthritis of shoulder 2016   • Body mass index (BMI) of 45.0-49.9 in adult (CMS/[facility]) 2019   • CHF (congestive heart failure) (CMS/[facility])    • Closed compression fracture of L4 lumbar vertebra 2017    Added automatically from request for surgery [id number]   • Constipation 2017   • Coronary artery disease 2016   • Diabetes mellitus type 2 in obese (CMS/[facility]) 2018   • Elevated alkaline phosphatase level 2018   • Elevated creatine kinase    • Essential hypertension 2016   • GERD (gastroesophageal reflux disease) 2016   • Glaucoma 2020   • History of kyphoplasty  1/30/2018   • Lumbar facet arthropathy 1/29/2018   • Lumbar stenosis with neurogenic claudication 1/29/2018   • Morbid obesity due to excess calories (CMS/HCC) 1/29/2018   • Myocardial infarction (CMS/HCC) 5/18/2016   • Osteoporosis 5/18/2016   • Physical deconditioning 1/30/2018   • Retinal emboli, right 5/21/2020   • Sepsis (CMS/HCC)     uro   • Stroke (CMS/HCC)    • Type 2 diabetes mellitus (CMS/HCC) 5/18/2016   • Urinary incontinence without sensory awareness 2/26/2018         Medications:     Current Outpatient Medications:   •  atorvastatin (LIPITOR) 40 MG tablet, TAKE ONE TABLET BY MOUTH DAILY (Patient taking differently: Take 40 mg by mouth Every Night.), Disp: 90 tablet, Rfl: 2  •  brimonidine-timolol (COMBIGAN) 0.2-0.5 % ophthalmic solution, 1 drop Daily. Left eye, Disp: , Rfl:   •  carvedilol (COREG) 6.25 MG tablet, Take 0.5 tablets by mouth 2 (Two) Times a Day With Meals., Disp: , Rfl:   •  Cholecalciferol (Vitamin D3) 50 MCG (2000 UT) capsule, Take 2,000 Units by mouth Daily. 2000 units , Disp: , Rfl:   •  clopidogrel (PLAVIX) 75 MG tablet, Take 1 tablet by mouth Daily., Disp: 90 tablet, Rfl: 3  •  nitroglycerin (NITROSTAT) 0.4 MG SL tablet, Place 1 tablet under the tongue Every 5 (Five) Minutes As Needed for Chest Pain. Take no more than 3 doses in 15 minutes., Disp: 100 tablet, Rfl: 0  •  prednisoLONE acetate (PRED FORTE) 1 % ophthalmic suspension, Administer 1 drop to the right eye Every 4 (Four) Hours. Waking hours, Disp: , Rfl:   •  valsartan (Diovan) 160 MG tablet, Take 1 tablet by mouth Daily., Disp: 30 tablet, Rfl: 5  •  Continuous Blood Gluc  (Dexcom G5  Kit) device, 1 kit 1 (One) Time for 1 dose., Disp: 1 each, Rfl: 0  •  glucose blood test strip, Use as instructed, Disp: 100 each, Rfl: 12  •  glucose monitor monitoring kit, 1 each As Needed (glucose monitoring). Please provide patient with glucose monitor and strips covered by her insurance., Disp: 1 each, Rfl: 0  •  OneTouch  "Delica Lancets 33G misc, 1 Device 3 (Three) Times a Day., Disp: 100 each, Rfl: 12    Allergies:   No Known Allergies      Objective     Physical Exam:  Vital Signs:   Vitals:    04/23/21 1333   BP: 148/78   BP Location: Right arm   Patient Position: Sitting   Cuff Size: Adult   Pulse: 79   Resp: 20   Temp: 98.4 °F (36.9 °C)   SpO2: 99%   Weight: 119 kg (262 lb)   Height: 154.9 cm (61\")   PainSc: 0-No pain     Body mass index is 49.5 kg/m².     Physical Exam  Vitals and nursing note reviewed.   Constitutional:       General: She is not in acute distress.     Appearance: Normal appearance. She is well-developed. She is not ill-appearing, toxic-appearing or diaphoretic.   HENT:      Head: Normocephalic and atraumatic.   Cardiovascular:      Rate and Rhythm: Normal rate and regular rhythm.      Heart sounds: Normal heart sounds.   Pulmonary:      Effort: Pulmonary effort is normal. No respiratory distress.      Breath sounds: Normal breath sounds. No stridor. No wheezing.   Skin:     General: Skin is warm and dry.   Neurological:      General: No focal deficit present.      Mental Status: She is alert and oriented to person, place, and time.   Psychiatric:         Mood and Affect: Mood normal.         Behavior: Behavior normal. Behavior is cooperative.         Thought Content: Thought content normal.         Judgment: Judgment normal.         Assessment / Plan      Assessment/Plan:   Diagnoses and all orders for this visit:    1. Type 2 diabetes mellitus with stage 3b chronic kidney disease, with long-term current use of insulin (CMS/[facility]) (Primary)  -     glucose monitor monitoring kit; 1 each As Needed (glucose monitoring). Please provide patient with glucose monitor and strips covered by her insurance.  Dispense: 1 each; Refill: 0  -     glucose blood test strip; Use as instructed  Dispense: 100 each; Refill: 12  -     OneTouch Delica Lancets 33G misc; 1 Device 3 (Three) Times a Day.  Dispense: 100 each; Refill: 12  - "     Continuous Blood Gluc  (Dexcom G5  Kit) device; 1 kit 1 (One) Time for 1 dose.  Dispense: 1 each; Refill: 0    2. Essential hypertension        -    Continue current medications         -    Continue with routine cardiology visits (Dr. Armaan Samuels)    Follow Up:   PRN and 3 months    Discussed the nature of the medical condition(s) risks, complications, implications, management, safe and proper use of medications. Encouraged medication compliance, and keeping scheduled follow up appointments with me and any other providers.      RTC if symptoms fail to improve, to ER if symptoms worsen.      DON Veronica  [facility] [place]       Please note that portions of this note may have been completed with a voice recognition program. Efforts were made to edit the dictations, but occasionally words are mistranscribed.

## 2021-04-25 NOTE — PATIENT INSTRUCTIONS
Diabetes Mellitus and Standards of Medical Care  Managing diabetes (diabetes mellitus) can be complicated. Your diabetes treatment may be managed by a team of health care providers, including:  · A physician who specializes in diabetes (endocrinologist).  · A nurse practitioner or physician assistant.  · Nurses.  · A diet and nutrition specialist (registered dietitian).  · A certified diabetes educator (CDE).  · An exercise specialist.  · A pharmacist.  · An eye doctor.  · A foot specialist (podiatrist).  · A dentist.  · A primary care provider.  · A mental health provider.  Your health care providers follow guidelines to help you get the best quality of care. The following schedule is a general guideline for your diabetes management plan. Your health care providers may give you more specific instructions.  Physical exams  Upon being diagnosed with diabetes mellitus, and each year after that, your health care provider will ask about your medical and family history. He or she will also do a physical exam. Your exam may include:  · Measuring your height, weight, and body mass index (BMI).  · Checking your blood pressure. This will be done at every routine medical visit. Your target blood pressure may vary depending on your medical conditions, your age, and other factors.  · Thyroid gland exam.  · Skin exam.  · Screening for damage to your nerves (peripheral neuropathy). This may include checking the pulse in your legs and feet and checking the level of sensation in your hands and feet.  · A complete foot exam to inspect the structure and skin of your feet, including checking for cuts, bruises, redness, blisters, sores, or other problems.  · Screening for blood vessel (vascular) problems, which may include checking the pulse in your legs and feet and checking your temperature.  Blood tests  Depending on your treatment plan and your personal needs, you may have the following tests done:  · HbA1c (hemoglobin A1c). This  test provides information about blood sugar (glucose) control over the previous 2-3 months. It is used to adjust your treatment plan, if needed. This test will be done:  ? At least 2 times a year, if you are meeting your treatment goals.  ? 4 times a year, if you are not meeting your treatment goals or if treatment goals have changed.  · Lipid testing, including total, LDL, and HDL cholesterol and triglyceride levels.  ? The goal for LDL is less than 100 mg/dL (5.5 mmol/L). If you are at high risk for complications, the goal is less than 70 mg/dL (3.9 mmol/L).  ? The goal for HDL is 40 mg/dL (2.2 mmol/L) or higher for men and 50 mg/dL (2.8 mmol/L) or higher for women. An HDL cholesterol of 60 mg/dL (3.3 mmol/L) or higher gives some protection against heart disease.  ? The goal for triglycerides is less than 150 mg/dL (8.3 mmol/L).  · Liver function tests.  · Kidney function tests.  · Thyroid function tests.  Dental and eye exams  · Visit your dentist two times a year.  · If you have type 1 diabetes, your health care provider may recommend an eye exam 3-5 years after you are diagnosed, and then once a year after your first exam.  ? For children with type 1 diabetes, a health care provider may recommend an eye exam when your child is age 10 or older and has had diabetes for 3-5 years. After the first exam, your child should get an eye exam once a year.  · If you have type 2 diabetes, your health care provider may recommend an eye exam as soon as you are diagnosed, and then once a year after your first exam.  Immunizations    · The yearly flu (influenza) vaccine is recommended for everyone 6 months or older who has diabetes.  · The pneumonia (pneumococcal) vaccine is recommended for everyone 2 years or older who has diabetes. If you are 65 or older, you may get the pneumonia vaccine as a series of two separate shots.  · The hepatitis B vaccine is recommended for adults shortly after being diagnosed with  diabetes.  · Adults and children with diabetes should receive all other vaccines according to age-specific recommendations from the Centers for Disease Control and Prevention (CDC).  Mental and emotional health  Screening for symptoms of eating disorders, anxiety, and depression is recommended at the time of diagnosis and afterward as needed. If your screening shows that you have symptoms (positive screening result), you may need more evaluation and you may work with a mental health care provider.  Treatment plan  Your treatment plan will be reviewed at every medical visit. You and your health care provider will discuss:  · How you are taking your medicines, including insulin.  · Any side effects you are experiencing.  · Your blood glucose target goals.  · The frequency of your blood glucose monitoring.  · Lifestyle habits, such as activity level as well as tobacco, alcohol, and substance use.  Diabetes self-management education  Your health care provider will assess how well you are monitoring your blood glucose levels and whether you are taking your insulin correctly. He or she may refer you to:  · A certified diabetes educator to manage your diabetes throughout your life, starting at diagnosis.  · A registered dietitian who can create or review your personal nutrition plan.  · An exercise specialist who can discuss your activity level and exercise plan.  Summary  · Managing diabetes (diabetes mellitus) can be complicated. Your diabetes treatment may be managed by a team of health care providers.  · Your health care providers follow guidelines in order to help you get the best quality of care.  · Standards of care including having regular physical exams, blood tests, blood pressure monitoring, immunizations, screening tests, and education about how to manage your diabetes.  · Your health care providers may also give you more specific instructions based on your individual health.  This information is not intended  to replace advice given to you by your health care provider. Make sure you discuss any questions you have with your health care provider.  Document Revised: 09/06/2019 Document Reviewed: 09/15/2017  [employer] Patient Education © 2020 [employer]    Hypertension, Adult  Hypertension is another name for high blood pressure. High blood pressure forces your heart to work harder to pump blood. This can cause problems over time.  There are two numbers in a blood pressure reading. There is a top number (systolic) over a bottom number (diastolic). It is best to have a blood pressure that is below 120/80. Healthy choices can help lower your blood pressure, or you may need medicine to help lower it.  What are the causes?  The cause of this condition is not known. Some conditions may be related to high blood pressure.  What increases the risk?  · Smoking.  · Having type 2 diabetes mellitus, high cholesterol, or both.  · Not getting enough exercise or physical activity.  · Being overweight.  · Having too much fat, sugar, calories, or salt (sodium) in your diet.  · Drinking too much alcohol.  · Having long-term (chronic) kidney disease.  · Having a family history of high blood pressure.  · Age. Risk increases with age.  · Race. You may be at higher risk if you are .  · Gender. Men are at higher risk than women before age 45. After age 65, women are at higher risk than men.  · Having obstructive sleep apnea.  · Stress.  What are the signs or symptoms?  · High blood pressure may not cause symptoms. Very high blood pressure (hypertensive crisis) may cause:  ? Headache.  ? Feelings of worry or nervousness (anxiety).  ? Shortness of breath.  ? Nosebleed.  ? A feeling of being sick to your stomach (nausea).  ? Throwing up (vomiting).  ? Changes in how you see.  ? Very bad chest pain.  ? Seizures.  How is this treated?  · This condition is treated by making healthy lifestyle changes, such as:  ? Eating healthy  foods.  ? Exercising more.  ? Drinking less alcohol.  · Your health care provider may prescribe medicine if lifestyle changes are not enough to get your blood pressure under control, and if:  ? Your top number is above 130.  ? Your bottom number is above 80.  · Your personal target blood pressure may vary.  Follow these instructions at home:  Eating and drinking    · If told, follow the DASH eating plan. To follow this plan:  ? Fill one half of your plate at each meal with fruits and vegetables.  ? Fill one fourth of your plate at each meal with whole grains. Whole grains include whole-wheat pasta, brown rice, and whole-grain bread.  ? Eat or drink low-fat dairy products, such as skim milk or low-fat yogurt.  ? Fill one fourth of your plate at each meal with low-fat (lean) proteins. Low-fat proteins include fish, chicken without skin, eggs, beans, and tofu.  ? Avoid fatty meat, cured and processed meat, or chicken with skin.  ? Avoid pre-made or processed food.  · Eat less than 1,500 mg of salt each day.  · Do not drink alcohol if:  ? Your doctor tells you not to drink.  ? You are pregnant, may be pregnant, or are planning to become pregnant.  · If you drink alcohol:  ? Limit how much you use to:  § 0-1 drink a day for women.  § 0-2 drinks a day for men.  ? Be aware of how much alcohol is in your drink. In the [place] one drink equals one 12 oz bottle of beer (355 mL), one 5 oz glass of wine (148 mL), or one 1½ oz glass of hard liquor (44 mL).  Lifestyle    · Work with your doctor to stay at a healthy weight or to lose weight. Ask your doctor what the best weight is for you.  · Get at least 30 minutes of exercise most days of the week. This may include walking, swimming, or biking.  · Get at least 30 minutes of exercise that strengthens your muscles (resistance exercise) at least 3 days a week. This may include lifting weights or doing Pilates.  · Do not use any products that contain nicotine or tobacco, such as  cigarettes, e-cigarettes, and chewing tobacco. If you need help quitting, ask your doctor.  · Check your blood pressure at home as told by your doctor.  · Keep all follow-up visits as told by your doctor. This is important.  Medicines  · Take over-the-counter and prescription medicines only as told by your doctor. Follow directions carefully.  · Do not skip doses of blood pressure medicine. The medicine does not work as well if you skip doses. Skipping doses also puts you at risk for problems.  · Ask your doctor about side effects or reactions to medicines that you should watch for.  Contact a doctor if you:  · Think you are having a reaction to the medicine you are taking.  · Have headaches that keep coming back (recurring).  · Feel dizzy.  · Have swelling in your ankles.  · Have trouble with your vision.  Get help right away if you:  · Get a very bad headache.  · Start to feel mixed up (confused).  · Feel weak or numb.  · Feel faint.  · Have very bad pain in your:  ? Chest.  ? Belly (abdomen).  · Throw up more than once.  · Have trouble breathing.  Summary  · Hypertension is another name for high blood pressure.  · High blood pressure forces your heart to work harder to pump blood.  · For most people, a normal blood pressure is less than 120/80.  · Making healthy choices can help lower blood pressure. If your blood pressure does not get lower with healthy choices, you may need to take medicine.  This information is not intended to replace advice given to you by your health care provider. Make sure you discuss any questions you have with your health care provider.  Document Revised: 08/28/2019 Document Reviewed: 08/28/2019  [employer] Patient Education © 2021 [employer]    Living With Diabetes  Diabetes (type 1 diabetes mellitus or type 2 diabetes mellitus) is a condition in which the body does not have enough of a hormone called insulin, or the body does not respond properly to insulin. Normally, insulin allows  sugars (glucose) to enter cells in the body. The cells use glucose for energy. With diabetes, extra glucose builds up in the blood instead of going into cells, which results in high blood glucose (hyperglycemia).  How to manage lifestyle changes  Managing diabetes includes medical treatments as well as lifestyle changes. If diabetes is not managed well, serious physical and emotional complications can occur. Taking good care of yourself means that you are responsible for:  · Monitoring glucose regularly.  · Eating a healthy diet.  · Exercising regularly.  · Meeting with health care providers.  · Taking medicines as directed.  Some people may feel a lot of stress about managing their diabetes. This is known as emotional distress, and it is very common. Living with diabetes can place you at risk for emotional distress, depression, or anxiety. These disorders can be confusing and can make diabetes management more difficult.  How to recognize stress  Emotional distress  Symptoms of emotional distress include:  · Anger about having a diagnosis of diabetes.  · Fear or frustration about your diagnosis and the changes you need to make to manage the condition.  · Being overly worried about the care that you need or the cost of the care that you need.  · Feeling like you caused your condition by doing something wrong.  · Fear of unpredictable situations, like low or high blood glucose.  · Feeling judged by your health care providers.  · Feeling very alone with the disease.  · Getting too tired or worn out with the demands of daily care.  Depression  Having diabetes means that you are at a higher risk for depression. Having depression also means that you are at a higher risk for diabetes. Your health care provider may test (screen) you for symptoms of depression. It is important to recognize depression symptoms and to start treatment for depression soon after it is diagnosed. The following are some symptoms of  depression:  · Loss of interest in things that you used to enjoy.  · Trouble sleeping, or often waking up early and not being able to get back to sleep.  · A change in appetite.  · Feeling tired most of the day.  · Feeling nervous and anxious.  · Feeling guilty and worrying that you are a burden to others.  · Feeling depressed more often than you do not feel that way.  · Thoughts of hurting yourself or feeling that you want to die.  If you have any of these symptoms for 2 weeks or longer, reach out to a health care provider.  Follow these instructions at home:  Managing emotional distress  The following are some ways to manage emotional distress:  · Talk with your health care provider or certified diabetes educator. Consider working with a counselor or therapist.  · Learn as much as you can about diabetes and its treatment. Meet with a certified diabetes educator or take a class to learn how to manage your condition.  · Keep a journal of your thoughts and concerns.  · Accept that some things are out of your control.  · Talk with other people who have diabetes. It can help to talk with others about the emotional distress that you feel.  · Find ways to manage stress that work for you. These may include art or music therapy, exercise, meditation, and hobbies.  · Seek support from spiritual leaders, family, and friends.  General instructions  · Follow your diabetes management plan.  · Keep all follow-up visits as told by your health care provider. This is important.  Where to find support    · Ask your health care provider to recommend a therapist who understands both depression and diabetes.  · Search for information and support from the American Diabetes Association: www.diabetes.org  · Find a certified diabetes educator and make an appointment through American Association of Diabetes Educators: www.diabeteseducator.org  Get help right away if:  · You have thoughts about hurting yourself or others.  If you ever feel  like you may hurt yourself or others, or have thoughts about taking your own life, get help right away. You can go to your nearest emergency department or call:  · Your local emergency services (911 in the [place]  · A suicide crisis helpline, such as the National Suicide Prevention Lifeline at 1-586.494.5601. This is open 24 hours a day.  Summary  · Diabetes (type 1 diabetes mellitus or type 2 diabetes mellitus) is a condition in which the body does not have enough of a hormone called insulin, or the body does not respond properly to insulin.  · Living with diabetes puts you at risk for medical issues, and it also puts you at risk for emotional issues such as emotional distress, depression, and anxiety.  · Recognizing the symptoms of emotional distress and depression may help you avoid problems with your diabetes control. It is important to start treatment for emotional distress and depression soon after they are diagnosed.  · Having diabetes means that you are at a higher risk for depression. Ask your health care provider to recommend a therapist who understands both depression and diabetes.  · If you experience symptoms of emotional distress or depression, it is important to discuss this with your health care provider, certified diabetes educator, or therapist.  This information is not intended to replace advice given to you by your health care provider. Make sure you discuss any questions you have with your health care provider.  Document Revised: 12/30/2019 Document Reviewed: 05/03/2018  [employer] Patient Education © 2021 [employer]    Type 2 Diabetes Mellitus, Self Care, Adult  Caring for yourself after you have been diagnosed with type 2 diabetes (type 2 diabetes mellitus) means keeping your blood sugar (glucose) under control with a balance of:  · Nutrition.  · Exercise.  · Lifestyle changes.  · Medicines or insulin, if necessary.  · Support from your team of health care providers and others.  The following  information explains what you need to know to manage your diabetes at home.  What are the risks?  Having diabetes can put you at risk for other long-term (chronic) conditions, such as heart disease and kidney disease. Your health care provider may prescribe medicines to help prevent complications from diabetes. These medicines may include:  · Aspirin.  · Medicine to lower cholesterol.  · Medicine to control blood pressure.  How to monitor blood glucose    · Check your blood glucose every day, as often as told by your health care provider.  · Have your A1c (hemoglobin A1c) level checked two or more times a year, or as often as told by your health care provider.  Your health care provider will set individualized treatment goals for you. Generally, the goal of treatment is to maintain the following blood glucose levels:  · Before meals (preprandial):  mg/dL (4.4-7.2 mmol/L).  · After meals (postprandial): below 180 mg/dL (10 mmol/L).  · A1c level: less than 7%.  How to manage hyperglycemia and hypoglycemia  Hyperglycemia symptoms  Hyperglycemia, also called high blood glucose, occurs when blood glucose is too high. Make sure you know the early signs of hyperglycemia, such as:  · Increased thirst.  · Hunger.  · Feeling very tired.  · Needing to urinate more often than usual.  · Blurry vision.  Hypoglycemia symptoms  Hypoglycemia, also called low blood glucose, occurs with a blood glucose level at or below 70 mg/dL (3.9 mmol/L). The risk for hypoglycemia increases during or after exercise, during sleep, during illness, and when skipping meals or not eating for a long time (fasting).  It is important to know the symptoms of hypoglycemia and treat it right away. Always have a 15-gram rapid-acting carbohydrate snack with you to treat low blood glucose. Family members and close friends should also know the symptoms and should understand how to treat hypoglycemia, in case you are not able to treat yourself. Symptoms may  include:  · Hunger.  · Anxiety.  · Sweating and feeling clammy.  · Confusion.  · Dizziness or feeling light-headed.  · Sleepiness.  · Nausea.  · Increased heart rate.  · Headache.  · Blurry vision.  · Irritability.  · A change in coordination.  · Tingling or numbness around the mouth, lips, or tongue.  · Restless sleep.  · Fainting.  · Seizure.  Treating hypoglycemia  If you are alert and able to swallow safely, follow the 15:15 rule:  · Take 15 grams of a rapid-acting carbohydrate. Talk with your health care provider about how much you should take.  · Rapid-acting options include:  ? Glucose pills (take 15 grams).  ? 6-8 pieces of hard candy.  ? 4-6 oz (120-150 mL) of fruit juice.  ? 4-6 oz (120-150 mL) of regular (not diet) soda.  ? 1 Tbsp (15 mL) honey or sugar.  · Check your blood glucose 15 minutes after you take the carbohydrate.  · If the repeat blood glucose level is still at or below 70 mg/dL (3.9 mmol/L), take 15 grams of a carbohydrate again.  · If your blood glucose level does not increase above 70 mg/dL (3.9 mmol/L) after 3 tries, seek emergency medical care.  · After your blood glucose level returns to normal, eat a meal or a snack within 1 hour.  Treating severe hypoglycemia  Severe hypoglycemia is when your blood glucose level is at or below 54 mg/dL (3 mmol/L). Severe hypoglycemia is an emergency. Do not wait to see if the symptoms will go away. Get medical help right away. Call your local emergency services (911 in the [place]  If you have severe hypoglycemia and you cannot eat or drink, you may need an injection of glucagon. A family member or close friend should learn how to check your blood glucose and how to give you a glucagon injection. Ask your health care provider if you need to have an emergency glucagon injection kit available.  Severe hypoglycemia may need to be treated in a hospital. The treatment may include getting glucose through an IV. You may also need treatment for the cause of  your hypoglycemia.  Follow these instructions at home:  Take diabetes medicines as told  · If your health care provider prescribed insulin or diabetes medicines, take them every day.  · Do not run out of insulin or other diabetes medicines that you take. Plan ahead so you always have these available.  · If you use insulin, adjust your dosage based on how physically active you are and what foods you eat. Your health care provider will tell you how to adjust your dosage.  Make healthy food choices    The things that you eat and drink affect your blood glucose and your insulin dosage. Making good choices helps to control your diabetes and prevent other health problems. A healthy meal plan includes eating lean proteins, complex carbohydrates, fresh fruits and vegetables, low-fat dairy products, and healthy fats.  Make an appointment to see a diet and nutrition specialist (registered dietitian) to help you create an eating plan that is right for you. Make sure that you:  · Follow instructions from your health care provider about eating or drinking restrictions.  · Drink enough fluid to keep your urine pale yellow.  · Keep a record of the carbohydrates that you eat. Do this by reading food labels and learning the standard serving sizes of foods.  · Follow your sick day plan whenever you cannot eat or drink as usual. Make this plan in advance with your health care provider.    Stay active  Exercise regularly, as told by your health care provider. This may include:  · Stretching and doing strength exercises, such as yoga or weightlifting, 2 or more times a week.  · Doing 150 minutes or more of moderate-intensity or vigorous-intensity exercise each week. This could be brisk walking, biking, or water aerobics.  ? Spread out your activity over 3 or more days of the week.  ? Do not go more than 2 days in a row without doing some kind of physical activity.  When you start a new exercise or activity, work with your health care  provider to adjust your insulin, medicines, or food intake as needed.  Make healthy lifestyle choices  · Do not use any tobacco products, such as cigarettes, chewing tobacco, and e-cigarettes. If you need help quitting, ask your health care provider.  · If your health care provider says that alcohol is safe for you, limit alcohol intake to no more than 1 drink per day for nonpregnant women and 2 drinks per day for men. One drink equals 12 oz of beer (355 mL), 5 oz of wine (148 mL), or 1½ oz of hard liquor (44 mL).  · Learn to manage stress. If you need help with this, ask your health care provider.  Care for your body    · Keep your immunizations up to date. In addition to getting vaccinations as told by your health care provider, it is recommended that you get vaccinated against the following illnesses:  ? The flu (influenza). Get a flu shot every year.  ? Pneumonia.  ? Hepatitis B.  · Schedule an eye exam soon after your diagnosis, and then one time every year after that.  · Check your skin and feet every day for cuts, bruises, redness, blisters, or sores. Schedule a foot exam with your health care provider once every year.  · Brush your teeth and gums two times a day, and floss one or more times a day. Visit your dentist one or more times every 6 months.  · Maintain a healthy weight.  General instructions  · Take over-the-counter and prescription medicines only as told by your health care provider.  · Share your diabetes management plan with people in your workplace, school, and household.  · Carry a medical alert card or wear medical alert jewelry.  · Keep all follow-up visits as told by your health care provider. This is important.  Questions to ask your health care provider  · Do I need to meet with a diabetes educator?  · Where can I find a support group for people with diabetes?  Where to find more information  For more information about diabetes, visit:  · American Diabetes Association (ADA):  www.diabetes.org  · American Association of Diabetes Educators (AADE): www.diabeteseducator.org  Summary  · Caring for yourself after you have been diagnosed with (type 2 diabetes mellitus) means keeping your blood sugar (glucose) under control with a balance of nutrition, exercise, lifestyle changes, and medicine.  · Check your blood glucose every day, as often as told by your health care provider.  · Having diabetes can put you at risk for other long-term (chronic) conditions, such as heart disease and kidney disease. Your health care provider may prescribe medicines to help prevent complications from diabetes.  · Keep all follow-up visits as told by your health care provider. This is important.  This information is not intended to replace advice given to you by your health care provider. Make sure you discuss any questions you have with your health care provider.  Document Revised: 06/10/2019 Document Reviewed: 01/20/2017  Elsevier Patient Education © 2020 [employer]    Blood Glucose Monitoring, Adult  Monitoring your blood sugar (glucose) is an important part of managing your diabetes. Blood glucose monitoring involves checking your blood glucose as often as directed and keeping a log or record of your results over time.  Checking your blood glucose regularly and keeping a blood glucose log can:  · Help you and your health care provider adjust your diabetes management plan as needed, including your medicines or insulin.  · Help you understand how food, exercise, illnesses, and medicines affect your blood glucose.  · Let you know what your blood glucose is at any time. You can quickly find out if you have low blood glucose (hypoglycemia) or high blood glucose (hyperglycemia).  Your health care provider will set individualized treatment goals for you. Your goals will be based on your age, other medical conditions you have, and how you respond to diabetes treatment. Generally, the goal of treatment is to  maintain the following blood glucose levels:  · Before meals (preprandial):  mg/dL (4.4-7.2 mmol/L).  · After meals (postprandial): below 180 mg/dL (10 mmol/L).  · A1c level: less than 7%.  Supplies needed:  · Blood glucose meter.  · Test strips for your meter. Each meter has its own strips. You must use the strips that came with your meter.  · A needle to prick your finger (lancet). Do not use a lancet more than one time.  · A device that holds the lancet (lancing device).  · A journal or log book to write down your results.  How to check your blood glucose    Checking your blood glucose  1. Wash your hands for at least 20 seconds with soap and water.  2. Prick the side of your finger (not the tip) with the lancet. Use a different finger each time.  3. Gently rub the finger until a small drop of blood appears.  4. Follow instructions that come with your meter for inserting the test strip, applying blood to the strip, and using your blood glucose meter.  5. Write down your result and any notes.  Using alternative sites  Some meters allow you to use areas of your body other than your finger (alternative sites) to test your blood. The most common alternative sites are the forearm, the thigh, and the palm of your hand.  Alternative sites may not be as accurate as the fingers because blood flow is slower in those areas. This means that the result you get may be delayed, and it may be different from the result that you would get from your finger.  Use the finger only, and do not use alternative sites, if:  · You think you have hypoglycemia.  · You sometimes do not know that your blood glucose is getting low (hypoglycemia unawareness).  General tips and recommendations  Blood glucose log    · Every time you check your blood glucose, write down your result. Also write down any notes about things that may be affecting your blood glucose, such as your diet and exercise for the day. This information can help you and  your health care provider:  ? Look for patterns in your blood glucose over time.  ? Adjust your diabetes management plan as needed.  · Check if your meter allows you to download your records to a computer. Most glucose meters store a record of glucose readings in the meter.  If you have type 1 diabetes:  · Check your blood glucose 4 or more times a day if you are on intensive insulin therapy with multiple daily injections (MDI) or are using an insulin pump. Check your blood glucose:  ? Before every meal and snack.  ? Before bedtime.  · Also check your blood glucose:  ? If you have symptoms of hypoglycemia.  ? After treating a low blood glucose.  ? Before doing activities that create a risk for injury, like driving or using heavy machinery.  ? Before and after exercise.  ? 2 hours after a meal.  ? Occasionally between 2 a.m. and 3 a.m., as directed.  · You may need to check your blood glucose more often, 6-10 times per day if:  ? You have diabetes that is not well controlled.  ? You are ill.  ? You have a history of severe hypoglycemia.  ? You have hypoglycemia unawareness.  If you have type 2 diabetes:  · If you take insulin or other diabetes medicines, check your blood glucose 2 or more times a day.  · If you are on intensive insulin therapy, check your blood glucose 4 or more times a day. Occasionally, you may also need to check your glucose between 2 a.m. and 3 a.m., as directed.  · Also check your blood glucose:  ? Before and after exercise.  ? Before doing activities that create a risk for injury, like driving or using heavy machinery.  · You may need to check your blood glucose more often if:  ? Your medicine is being adjusted.  ? Your diabetes is not well controlled.  ? You are ill.  General tips  · Make sure you always have your supplies with you.  · After you use a few boxes of test strips, adjust (calibrate) your blood glucose meter by following instructions that came with your meter.  · If you have  questions or need help, all blood glucose meters have a 24-hour hotline phone number that you can call. You may also contact your health care provider.  Where to find more information  · American Diabetes Association: www.diabetes.org  Contact a health care provider if:  · Your blood glucose is at or above 240 mg/dL (13.3 mmol/L) for 2 days in a row.  · You have been sick or have had a fever for 2 days or longer, and you are not getting better.  · You have any of the following problems for more than 6 hours:  ? You cannot eat or drink.  ? You have nausea or vomiting.  ? You have diarrhea.  Get help right away if:  · Your blood glucose is lower than 54 mg/dL (3 mmol/L).  · You become confused or you have trouble thinking clearly.  · You have difficulty breathing.  · You have moderate or large ketone levels in your urine.  Summary  · Monitoring your blood sugar (glucose) is an important part of managing your diabetes.  · Blood glucose monitoring involves checking your blood glucose as often as directed and keeping a log or record of your results over time.  · Your health care provider will set individualized treatment goals for you. Your goals will be based on your age, other medical conditions you have, and how you respond to diabetes treatment.  · Every time you check your blood glucose, write down your result. Also write down any notes about things that may be affecting your blood glucose, such as your diet and exercise for the day.  This information is not intended to replace advice given to you by your health care provider. Make sure you discuss any questions you have with your health care provider.  Document Revised: 11/02/2020 Document Reviewed: 11/02/2020  Elsevier Patient Education © 2021 [employer]    Continuous Glucose Monitoring, Adult    Continuous glucose monitoring (CGM) is a way to check your blood sugar (or blood glucose) level at any time. A CGM system includes a sensor that attaches to the skin  of your belly or arm. The sensor reads the amount of glucose in the fluid between the cells under your skin. Every few minutes throughout the day and night, the sensor wirelessly sends signals to a glucose monitor that records and saves the readings. With most CGM systems, you also need to do a finger stick twice each day to make sure your finger stick reading matches the reading from your CGM device. CGM reduces the number of times you need to do a finger stick throughout the day.  CGM lets you to see your glucose levels in real time. This information can help you make decisions about your diet, physical activity, and diabetes medicine. With most monitors, you also need to do a finger stick and check your blood sugar level using a regular glucose monitor before making any medicine decisions.  Your health care provider may prescribe a CGM system if:  · You have type 1 diabetes.  · You have type 2 diabetes and use insulin.  · Your diabetes needs to be under tight control.  · You do not always have awareness of the warning symptoms of low blood sugar.  · You often have episodes of high blood sugar (hyperglycemia) or low blood sugar (hypoglycemia).  Options for CGM systems  There are several options for CGM systems. Some include monitors that:  · You carry or wear.  · Attach to and control an insulin pump.  · Send readings to your smartphone, tablet, or health care provider.  · Set off an alarm if you have low blood sugar or high blood sugar.  Work with your health care provider and diabetes educator to find the best option for you. Before you start using a CGM system at home, you will be trained in how to use it. Using a CGM can help you improve the results of your A1C test, which shows your average blood sugar levels for the past 3 months. Using a CGM also can help you avoid hyperglycemia or hypoglycemia. Let your health care provider know if you do not understand how to use your CGM system or have questions.  What  are the risks?  Generally, these devices are safe to use. However, it is possible that the insertion site may become irritated or infected.  Tips for using a CGM system:  · Follow the instructions for your device carefully. Instructions are different for different systems.  · Make sure you understand how to place the CGM sensors and set up the monitor before you start using it at home. Most devices require that you replace the CGM sensor every 3 to 7 days, depending on the model.  · Work closely with your health care provider and diabetes educator to learn about your CGM and make sure you are using your CGM system safely and effectively.  · Do not take baths, swim, or use a hot tub unless your health care provider approves. CGM sensors are usually waterproof and can be left on in the bath or shower. CGM monitors are usually not waterproof. These should be kept dry.  Follow these instructions at home:  · Follow your diabetes treatment plan to keep your blood sugar within your target range.  · Take action when the CGM alarm alerts you of high or low blood sugar levels.  · Check your CGM system insertion site every day for signs of infection. Check for:  ? Redness, swelling, or pain.  ? Fluid or blood.  ? Warmth.  ? Pus or a bad smell.  · Keep all follow-up visits as told by your health care provider. This is important.  Where to find more information  · National Institutes of Health, Continuous Glucose Monitoring: niddk.nih.gov  Contact a health care provider if:  · You are not sure how to use your CGM system or how to interpret the readings.  · You have finger sticks that do not match your monitor readings.  · Your CGM often alerts you of hyperglycemia or hypoglycemia.  · You have a fever.  Get help right away if:  · You have signs of infection at your insertion site.  · You have symptoms of hyperglycemia or hypoglycemia that do not get better with treatment.  Summary  · Continuous glucose monitoring (CGM) is a way  to check your blood sugar (or bloodglucose) level at any time.  · A CGM system includes a sensor that attaches to the skin of your belly or arm and a monitor to view the readings.  · CGM lets you to see your blood sugar levels in real time. This information can help you make decisions about your diet, physical activity, and diabetes medicine.  · Let your health care provider or diabetes educator know if you do not understand how to use your CGM system or have questions.  This information is not intended to replace advice given to you by your health care provider. Make sure you discuss any questions you have with your health care provider.  Document Revised: 01/15/2021 Document Reviewed: 01/15/2021  Elsevier Patient Education © 2021 [employer].

## 2021-04-27 NOTE — TELEPHONE ENCOUNTER
Pharmacy called stating they need the rest of the glucose reader called in, they did not get all parts of the equipment

## 2021-04-29 RX ORDER — BLOOD-GLUCOSE METER
EACH MISCELLANEOUS
Status: ON HOLD | COMMUNITY
Start: 2021-04-23 | End: 2022-06-13

## 2021-04-30 NOTE — TELEPHONE ENCOUNTER
Spoke with selin and he stated that he had everything for the onetouch and he only had the  for the dexcom, if you want her to be on the dexcom he would need you to send in everything needed for that

## 2021-05-01 DIAGNOSIS — E11.36 TYPE 2 DIABETES MELLITUS WITH DIABETIC CATARACT, WITHOUT LONG-TERM CURRENT USE OF INSULIN (HCC): ICD-10-CM

## 2021-05-01 DIAGNOSIS — E11.22 TYPE 2 DIABETES MELLITUS WITH STAGE 3B CHRONIC KIDNEY DISEASE, WITH LONG-TERM CURRENT USE OF INSULIN (HCC): Primary | ICD-10-CM

## 2021-05-01 DIAGNOSIS — Z79.4 TYPE 2 DIABETES MELLITUS WITH STAGE 3B CHRONIC KIDNEY DISEASE, WITH LONG-TERM CURRENT USE OF INSULIN (HCC): Primary | ICD-10-CM

## 2021-05-01 DIAGNOSIS — N18.32 TYPE 2 DIABETES MELLITUS WITH STAGE 3B CHRONIC KIDNEY DISEASE, WITH LONG-TERM CURRENT USE OF INSULIN (HCC): Primary | ICD-10-CM

## 2021-05-01 RX ORDER — BLOOD-GLUCOSE TRANSMITTER
1 EACH MISCELLANEOUS ONCE
Qty: 1 EACH | Refills: 3 | Status: SHIPPED | OUTPATIENT
Start: 2021-05-01 | End: 2021-05-01

## 2021-05-01 RX ORDER — BLOOD-GLUCOSE SENSOR
1 EACH MISCELLANEOUS
Qty: 4 EACH | Refills: 11 | Status: ON HOLD | OUTPATIENT
Start: 2021-05-01 | End: 2022-06-13

## 2021-07-13 RX ORDER — CLOPIDOGREL BISULFATE 75 MG/1
TABLET ORAL
Qty: 90 TABLET | Refills: 2 | Status: SHIPPED | OUTPATIENT
Start: 2021-07-13 | End: 2022-03-31

## 2021-07-14 RX ORDER — CARVEDILOL 6.25 MG/1
3.12 TABLET ORAL 2 TIMES DAILY WITH MEALS
Qty: 30 TABLET | Refills: 3 | Status: SHIPPED | OUTPATIENT
Start: 2021-07-14 | End: 2021-10-13 | Stop reason: SDUPTHER

## 2021-07-14 NOTE — TELEPHONE ENCOUNTER
Caller: Omaira Patel    Relationship: Emergency Contact    Best call back number: [phone]    Medication needed:   Requested Prescriptions     Pending Prescriptions Disp Refills   • carvedilol (COREG) 6.25 MG tablet       Sig: Take 0.5 tablets by mouth 2 (Two) Times a Day With Meals.       What additional details did the patient provide when requesting the medication: HOSPITAL CUT THE COREG DOSAGE IN HALF, NEED 3.125 MG BID.      Does the patient have less than a 3 day supply:  [x] Yes  [] No    What is the patient's preferred pharmacy: [facility] [address] [place] [address] [address] AT [facility] & [address] - [phone]  - [phone] FX

## 2021-07-30 ENCOUNTER — TELEPHONE (OUTPATIENT)
Dept: FAMILY MEDICINE CLINIC | Facility: CLINIC | Age: 76
End: 2021-07-30

## 2021-08-04 NOTE — TELEPHONE ENCOUNTER
Called provided number, the service took a note of who was calling in and our number, she said someone from agency will call us back 8/4/2021

## 2021-08-09 NOTE — TELEPHONE ENCOUNTER
HUB CAN READ:  Have called several times, they keep asking for a number to reach us back but no one has called back

## 2021-10-13 RX ORDER — CARVEDILOL 6.25 MG/1
3.12 TABLET ORAL 2 TIMES DAILY WITH MEALS
Qty: 30 TABLET | Refills: 3 | Status: SHIPPED | OUTPATIENT
Start: 2021-10-13 | End: 2021-10-15

## 2021-10-15 ENCOUNTER — OFFICE VISIT (OUTPATIENT)
Dept: FAMILY MEDICINE CLINIC | Facility: CLINIC | Age: 76
End: 2021-10-15

## 2021-10-15 VITALS
TEMPERATURE: 98.2 F | BODY MASS INDEX: 49.28 KG/M2 | OXYGEN SATURATION: 98 % | SYSTOLIC BLOOD PRESSURE: 124 MMHG | HEIGHT: 61 IN | DIASTOLIC BLOOD PRESSURE: 68 MMHG | RESPIRATION RATE: 18 BRPM | WEIGHT: 261 LBS | HEART RATE: 65 BPM

## 2021-10-15 DIAGNOSIS — I10 ESSENTIAL HYPERTENSION: Chronic | ICD-10-CM

## 2021-10-15 DIAGNOSIS — E11.22 TYPE 2 DIABETES MELLITUS WITH STAGE 3B CHRONIC KIDNEY DISEASE, WITH LONG-TERM CURRENT USE OF INSULIN (HCC): Chronic | ICD-10-CM

## 2021-10-15 DIAGNOSIS — Z79.4 TYPE 2 DIABETES MELLITUS WITH STAGE 3B CHRONIC KIDNEY DISEASE, WITH LONG-TERM CURRENT USE OF INSULIN (HCC): Chronic | ICD-10-CM

## 2021-10-15 DIAGNOSIS — J01.00 ACUTE NON-RECURRENT MAXILLARY SINUSITIS: ICD-10-CM

## 2021-10-15 DIAGNOSIS — Z23 NEED FOR INFLUENZA VACCINATION: ICD-10-CM

## 2021-10-15 DIAGNOSIS — Z00.00 MEDICARE ANNUAL WELLNESS VISIT, SUBSEQUENT: Primary | ICD-10-CM

## 2021-10-15 DIAGNOSIS — E78.2 MIXED HYPERLIPIDEMIA: Chronic | ICD-10-CM

## 2021-10-15 DIAGNOSIS — Z76.0 MEDICATION REFILL: ICD-10-CM

## 2021-10-15 DIAGNOSIS — J43.9 PULMONARY EMPHYSEMA, UNSPECIFIED EMPHYSEMA TYPE (HCC): Chronic | ICD-10-CM

## 2021-10-15 DIAGNOSIS — I51.89 DIASTOLIC DYSFUNCTION: Chronic | ICD-10-CM

## 2021-10-15 DIAGNOSIS — N18.32 TYPE 2 DIABETES MELLITUS WITH STAGE 3B CHRONIC KIDNEY DISEASE, WITH LONG-TERM CURRENT USE OF INSULIN (HCC): Chronic | ICD-10-CM

## 2021-10-15 LAB
BILIRUB BLD-MCNC: NEGATIVE MG/DL
CLARITY, POC: CLEAR
COLOR UR: YELLOW
EXPIRATION DATE: ABNORMAL
EXPIRATION DATE: NORMAL
GLUCOSE UR STRIP-MCNC: NEGATIVE MG/DL
KETONES UR QL: NEGATIVE
LEUKOCYTE EST, POC: ABNORMAL
Lab: ABNORMAL
Lab: NORMAL
NITRITE UR-MCNC: NEGATIVE MG/ML
PH UR: 6 [PH] (ref 5–8)
POC CREATININE URINE: NORMAL
POC MICROALBUMIN URINE: NORMAL
PROT UR STRIP-MCNC: NEGATIVE MG/DL
RBC # UR STRIP: ABNORMAL /UL
SP GR UR: 1.02 (ref 1–1.03)
UROBILINOGEN UR QL: NORMAL

## 2021-10-15 PROCEDURE — 90662 IIV NO PRSV INCREASED AG IM: CPT | Performed by: NURSE PRACTITIONER

## 2021-10-15 PROCEDURE — 1159F MED LIST DOCD IN RCRD: CPT | Performed by: NURSE PRACTITIONER

## 2021-10-15 PROCEDURE — 81003 URINALYSIS AUTO W/O SCOPE: CPT | Performed by: NURSE PRACTITIONER

## 2021-10-15 PROCEDURE — 82044 UR ALBUMIN SEMIQUANTITATIVE: CPT | Performed by: NURSE PRACTITIONER

## 2021-10-15 PROCEDURE — G0008 ADMIN INFLUENZA VIRUS VAC: HCPCS | Performed by: NURSE PRACTITIONER

## 2021-10-15 PROCEDURE — 1126F AMNT PAIN NOTED NONE PRSNT: CPT | Performed by: NURSE PRACTITIONER

## 2021-10-15 PROCEDURE — 99213 OFFICE O/P EST LOW 20 MIN: CPT | Performed by: NURSE PRACTITIONER

## 2021-10-15 PROCEDURE — G0439 PPPS, SUBSEQ VISIT: HCPCS | Performed by: NURSE PRACTITIONER

## 2021-10-15 RX ORDER — ALBUTEROL SULFATE 90 UG/1
2 AEROSOL, METERED RESPIRATORY (INHALATION) EVERY 4 HOURS PRN
Qty: 18 G | Refills: 3 | Status: SHIPPED | OUTPATIENT
Start: 2021-10-15 | End: 2022-12-05 | Stop reason: SDUPTHER

## 2021-10-15 RX ORDER — CARVEDILOL 3.12 MG/1
3.12 TABLET ORAL 2 TIMES DAILY WITH MEALS
Qty: 60 TABLET | Refills: 2 | Status: SHIPPED | OUTPATIENT
Start: 2021-10-15 | End: 2022-01-11 | Stop reason: SDUPTHER

## 2021-10-15 RX ORDER — CEPHALEXIN 500 MG/1
500 CAPSULE ORAL 2 TIMES DAILY
Qty: 20 CAPSULE | Refills: 0 | Status: SHIPPED | OUTPATIENT
Start: 2021-10-15 | End: 2021-10-25

## 2021-10-15 NOTE — PROGRESS NOTES
The ABCs of the Annual Wellness Visit  Subsequent Medicare Wellness Visit    Chief Complaint   Patient presents with   • Medicare Wellness-subsequent      Subjective    History of Present Illness:  Georgia Velázquez is a 76 y.o. female who presents for a Subsequent Medicare Wellness Visit.  She is also c/o sinus congestion, post-nasal drainage x 1 week. She denies fever, chills, body aches, loss of taste or smell.    The following portions of the patient's history were reviewed and   updated as appropriate: allergies, current medications, past family history, past medical history, past social history, past surgical history and problem list.    Compared to one year ago, the patient feels her physical   health is better.    Compared to one year ago, the patient feels her mental   health is better.    Recent Hospitalizations:  This patient has had a [facility] admission record on file within the last 365 days.    Current Medical Providers:  Patient Care Team:  Georgina Ahumada APRN as PCP - General (Family Medicine)  Carmine Pérez MD as Consulting Physician (Interventional Cardiology)  Marc Pham MD as Consulting Physician (Neurosurgery)  Prieto Flores MD as Consulting Physician (Pain Medicine)  Rajeev Sharif MD as Consulting Physician (Pulmonary Disease)  Armaan Samuels III, MD as Cardiologist (Cardiology)    Outpatient Medications Prior to Visit   Medication Sig Dispense Refill   • atorvastatin (LIPITOR) 40 MG tablet TAKE ONE TABLET BY MOUTH DAILY (Patient taking differently: Take 40 mg by mouth Every Night.) 90 tablet 2   • Blood Glucose Monitoring Suppl (OneTouch Verio Flex System) w/Device kit      • brimonidine-timolol (COMBIGAN) 0.2-0.5 % ophthalmic solution 1 drop Daily. Left eye     • Cholecalciferol (Vitamin D3) 50 MCG (2000 UT) capsule Take 2,000 Units by mouth Daily. 2000 units      • clopidogrel (PLAVIX) 75 MG tablet TAKE ONE TABLET BY MOUTH DAILY 90 tablet 2   • Continuous  Blood Gluc Sensor (Dexcom G5 Mob/G4 Plat Sensor) misc 1 each Every 7 (Seven) Days. 4 each 11   • glucose blood test strip Use as instructed 100 each 12   • glucose monitor monitoring kit 1 each As Needed (glucose monitoring). Please provide patient with glucose monitor and strips covered by her insurance. 1 each 0   • nitroglycerin (NITROSTAT) 0.4 MG SL tablet Place 1 tablet under the tongue Every 5 (Five) Minutes As Needed for Chest Pain. Take no more than 3 doses in 15 minutes. 100 tablet 0   • OneTouch Delica Lancets 33G misc 1 Device 3 (Three) Times a Day. 100 each 12   • valsartan (Diovan) 160 MG tablet Take 1 tablet by mouth Daily. 30 tablet 5   • carvedilol (COREG) 6.25 MG tablet Take 0.5 tablets by mouth 2 (Two) Times a Day With Meals. 30 tablet 3   • prednisoLONE acetate (PRED FORTE) 1 % ophthalmic suspension Administer 1 drop to the right eye Every 4 (Four) Hours. Waking hours       No facility-administered medications prior to visit.       No opioid medication identified on active medication list. I have reviewed chart for other potential  high risk medication/s and harmful drug interactions in the elderly.          Aspirin is not on active medication list.  Aspirin use is not indicated based on review of current medical condition/s. Risk of harm outweighs potential benefits.  .    Patient Active Problem List   Diagnosis   • Essential hypertension   • Type 2 diabetes mellitus (HCC)   • Hyperlipidemia   • Coronary artery disease   • GERD (gastroesophageal reflux disease)   • Stage 3 chronic kidney disease (CMS/HCC)   • Chronic obstructive pulmonary disease (HCC)   • Retinal emboli, right   • Diastolic dysfunction   • Colitis   • Glaucoma   • Left carotid artery stenosis   • Syncope   • Aortic regurgitation     Advance Care Planning  Advance Directive is not on file.  ACP discussion was held with the patient during this visit. Patient does not have an advance directive, information provided.    Review of  "Systems   Constitutional: Negative for activity change, appetite change, chills, diaphoresis, fatigue and fever.   HENT: Positive for congestion, ear pain, postnasal drip and sinus pressure. Negative for ear discharge, facial swelling, sore throat, trouble swallowing and voice change.    Respiratory: Negative for cough, chest tightness, shortness of breath, wheezing and stridor.    Cardiovascular: Negative for chest pain, palpitations and leg swelling.   Gastrointestinal: Negative.    Genitourinary: Negative for dysuria, flank pain, hematuria and pelvic pain.   Skin: Negative for rash.   Neurological: Negative.         Objective    Vitals:    10/15/21 1035   BP: 124/68   Pulse: 65   Resp: 18   Temp: 98.2 °F (36.8 °C)   SpO2: 98%   Weight: 118 kg (261 lb)   Height: 154.9 cm (61\")   PainSc: 0-No pain     BMI Readings from Last 1 Encounters:   10/15/21 49.32 kg/m²   BMI is above normal parameters. Recommendations include: educational material    Does the patient have evidence of cognitive impairment? No    Physical Exam  Vitals and nursing note reviewed.   Constitutional:       General: She is not in acute distress.     Appearance: Normal appearance. She is well-developed. She is not ill-appearing, toxic-appearing or diaphoretic.   HENT:      Head: Normocephalic and atraumatic.      Right Ear: External ear normal. A middle ear effusion is present. Tympanic membrane is bulging. Tympanic membrane is not erythematous.      Left Ear: External ear normal. A middle ear effusion is present. Tympanic membrane is bulging. Tympanic membrane is not erythematous.      Nose: Mucosal edema and congestion present.      Right Turbinates: Swollen.      Left Turbinates: Swollen.      Right Sinus: Maxillary sinus tenderness present.      Left Sinus: Maxillary sinus tenderness present.      Mouth/Throat:      Lips: Pink.      Mouth: Mucous membranes are moist.      Pharynx: Posterior oropharyngeal erythema (mild with cobblestoning) " present.   Cardiovascular:      Rate and Rhythm: Normal rate and regular rhythm.      Heart sounds: Normal heart sounds.   Pulmonary:      Effort: Pulmonary effort is normal. No respiratory distress.      Breath sounds: Normal breath sounds. No stridor. No wheezing.   Skin:     General: Skin is warm and dry.   Neurological:      General: No focal deficit present.      Mental Status: She is alert and oriented to person, place, and time.   Psychiatric:         Mood and Affect: Mood normal.         Behavior: Behavior normal. Behavior is cooperative.         Thought Content: Thought content normal.         Judgment: Judgment normal.                 HEALTH RISK ASSESSMENT    Smoking Status:  Social History     Tobacco Use   Smoking Status Former Smoker   • Types: Cigarettes   • Quit date:    • Years since quittin.8   Smokeless Tobacco Never Used     Alcohol Consumption:  Social History     Substance and Sexual Activity   Alcohol Use No     Fall Risk Screen:    STEADI Fall Risk Assessment was completed, and patient is at MODERATE risk for falls. Assessment completed on:10/15/2021    Depression Screening:  PHQ-2/PHQ-9 Depression Screening 10/15/2021   Little interest or pleasure in doing things 0   Feeling down, depressed, or hopeless 0   Trouble falling or staying asleep, or sleeping too much -   Feeling tired or having little energy -   Poor appetite or overeating -   Feeling bad about yourself - or that you are a failure or have let yourself or your family down -   Trouble concentrating on things, such as reading the newspaper or watching television -   Moving or speaking so slowly that other people could have noticed. Or the opposite - being so fidgety or restless that you have been moving around a lot more than usual -   Thoughts that you would be better off dead, or of hurting yourself in some way -   Total Score 0   If you checked off any problems, how difficult have these problems made it for you to do  your work, take care of things at home, or get along with other people? -       Health Habits and Functional and Cognitive Screening:  Functional & Cognitive Status 10/15/2021   Do you have difficulty preparing food and eating? Yes   Do you have difficulty bathing yourself, getting dressed or grooming yourself? No   Do you have difficulty using the toilet? Yes   Do you have difficulty moving around from place to place? Yes   Do you have trouble with steps or getting out of a bed or a chair? Yes   Current Diet Unhealthy Diet   Dental Exam Up to date   Eye Exam Up to date   Exercise (times per week) 0 times per week   Current Exercises Include No Regular Exercise   Current Exercise Activities Include -   Do you need help using the phone?  No   Are you deaf or do you have serious difficulty hearing?  Yes   Do you need help with transportation? Yes   Do you need help shopping? Yes   Do you need help preparing meals?  Yes   Do you need help with housework?  Yes   Do you need help with laundry? Yes   Do you need help taking your medications? No   Do you need help managing money? No   Do you ever drive or ride in a car without wearing a seat belt? No   Have you felt unusual stress, anger or loneliness in the last month? -   Who do you live with? -   If you need help, do you have trouble finding someone available to you? -   Have you been bothered in the last four weeks by sexual problems? -   Do you have difficulty concentrating, remembering or making decisions? -       Age-appropriate Screening Schedule:  Refer to the list below for future screening recommendations based on patient's age, sex and/or medical conditions. Orders for these recommended tests are listed in the plan section. The patient has been provided with a written plan.    Health Maintenance   Topic Date Due   • ZOSTER VACCINE (1 of 2) Never done   • HEMOGLOBIN A1C  04/12/2021   • DIABETIC EYE EXAM  09/08/2021   • LIPID PANEL  10/12/2021   • URINE  MICROALBUMIN  10/15/2022   • TDAP/TD VACCINES (2 - Td or Tdap) 11/07/2027   • INFLUENZA VACCINE  Completed   • MAMMOGRAM  Discontinued   • DXA SCAN  Discontinued              Assessment/Plan   CMS Preventative Services Quick Reference  Risk Factors Identified During Encounter  Cardiovascular Disease  Glaucoma or Family History of Glaucoma  Hearing Problem  Immunizations Discussed/Encouraged (specific Immunizations; Shingrix and COVID19  Obesity/Overweight   The above risks/problems have been discussed with the patient.  Follow up actions/plans if indicated are seen below in the Assessment/Plan Section.  Pertinent information has been shared with the patient in the After Visit Summary.    Diagnoses and all orders for this visit:    1. Medicare annual wellness visit, subsequent (Primary)    2. Acute non-recurrent maxillary sinusitis  -     cephalexin (KEFLEX) 500 MG capsule; Take 1 capsule by mouth 2 (Two) Times a Day for 10 days.  Dispense: 20 capsule; Refill: 0    3. Essential hypertension  -     Comprehensive Metabolic Panel  -     CBC Auto Differential  -     POC Urinalysis Dipstick, Automated  -     carvedilol (COREG) 3.125 MG tablet; Take 1 tablet by mouth 2 (Two) Times a Day With Meals.  Dispense: 60 tablet; Refill: 2    4. Type 2 diabetes mellitus with stage 3b chronic kidney disease, with long-term current use of insulin (HCC)  -     POC Microalbumin  -     Hemoglobin A1c        -     Reminded to get annual diabetic eye exam    5. Mixed hyperlipidemia  -     Lipid Panel    6. Pulmonary emphysema, unspecified emphysema type (HCC)  -     albuterol sulfate  (90 Base) MCG/ACT inhaler; Inhale 2 puffs Every 4 (Four) Hours As Needed for Wheezing or Shortness of Air.  Dispense: 18 g; Refill: 3    7. Diastolic dysfunction  -     carvedilol (COREG) 3.125 MG tablet; Take 1 tablet by mouth 2 (Two) Times a Day With Meals.  Dispense: 60 tablet; Refill: 2    8. Need for influenza vaccination  -     Fluzone High-Dose  65+yrs (3627-6026)    9. Medication refill  -     albuterol sulfate  (90 Base) MCG/ACT inhaler; Inhale 2 puffs Every 4 (Four) Hours As Needed for Wheezing or Shortness of Air.  Dispense: 18 g; Refill: 3  -     carvedilol (COREG) 3.125 MG tablet; Take 1 tablet by mouth 2 (Two) Times a Day With Meals.  Dispense: 60 tablet; Refill: 2        Follow Up:   No follow-ups on file.     An After Visit Summary and PPPS were made available to the patient.               Discussed the nature of the medical condition(s) risks, complications, implications, management, safe and proper use of medications. Encouraged medication compliance, and keeping scheduled follow up appointments with me and any other providers.     Call office or RTC if symptoms fail to improve. To ER if symptoms worsen.

## 2021-10-17 NOTE — PATIENT INSTRUCTIONS
Health Maintenance After Age 65  After age 65, you are at a higher risk for certain long-term diseases and infections as well as injuries from falls. Falls are a major cause of broken bones and head injuries in people who are older than age 65. Getting regular preventive care can help to keep you healthy and well. Preventive care includes getting regular testing and making lifestyle changes as recommended by your health care provider. Talk with your health care provider about:  · Which screenings and tests you should have. A screening is a test that checks for a disease when you have no symptoms.  · A diet and exercise plan that is right for you.  What should I know about screenings and tests to prevent falls?  Screening and testing are the best ways to find a health problem early. Early diagnosis and treatment give you the best chance of managing medical conditions that are common after age 65. Certain conditions and lifestyle choices may make you more likely to have a fall. Your health care provider may recommend:  · Regular vision checks. Poor vision and conditions such as cataracts can make you more likely to have a fall. If you wear glasses, make sure to get your prescription updated if your vision changes.  · Medicine review. Work with your health care provider to regularly review all of the medicines you are taking, including over-the-counter medicines. Ask your health care provider about any side effects that may make you more likely to have a fall. Tell your health care provider if any medicines that you take make you feel dizzy or sleepy.  · Osteoporosis screening. Osteoporosis is a condition that causes the bones to get weaker. This can make the bones weak and cause them to break more easily.  · Blood pressure screening. Blood pressure changes and medicines to control blood pressure can make you feel dizzy.  · Strength and balance checks. Your health care provider may recommend certain tests to check your  strength and balance while standing, walking, or changing positions.  · Foot health exam. Foot pain and numbness, as well as not wearing proper footwear, can make you more likely to have a fall.  · Depression screening. You may be more likely to have a fall if you have a fear of falling, feel emotionally low, or feel unable to do activities that you used to do.  · Alcohol use screening. Using too much alcohol can affect your balance and may make you more likely to have a fall.  What actions can I take to lower my risk of falls?  General instructions  · Talk with your health care provider about your risks for falling. Tell your health care provider if:  ? You fall. Be sure to tell your health care provider about all falls, even ones that seem minor.  ? You feel dizzy, sleepy, or off-balance.  · Take over-the-counter and prescription medicines only as told by your health care provider. These include any supplements.  · Eat a healthy diet and maintain a healthy weight. A healthy diet includes low-fat dairy products, low-fat (lean) meats, and fiber from whole grains, beans, and lots of fruits and vegetables.  Home safety  · Remove any tripping hazards, such as rugs, cords, and clutter.  · Install safety equipment such as grab bars in bathrooms and safety rails on stairs.  · Keep rooms and walkways well-lit.  Activity    · Follow a regular exercise program to stay fit. This will help you maintain your balance. Ask your health care provider what types of exercise are appropriate for you.  · If you need a cane or walker, use it as recommended by your health care provider.  · Wear supportive shoes that have nonskid soles.    Lifestyle  · Do not drink alcohol if your health care provider tells you not to drink.  · If you drink alcohol, limit how much you have:  ? 0-1 drink a day for women.  ? 0-2 drinks a day for men.  · Be aware of how much alcohol is in your drink. In the [place] one drink equals one typical bottle of beer  (12 oz), one-half glass of wine (5 oz), or one shot of hard liquor (1½ oz).  · Do not use any products that contain nicotine or tobacco, such as cigarettes and e-cigarettes. If you need help quitting, ask your health care provider.  Summary  · Having a healthy lifestyle and getting preventive care can help to protect your health and wellness after age 65.  · Screening and testing are the best way to find a health problem early and help you avoid having a fall. Early diagnosis and treatment give you the best chance for managing medical conditions that are more common for people who are older than age 65.  · Falls are a major cause of broken bones and head injuries in people who are older than age 65. Take precautions to prevent a fall at home.  · Work with your health care provider to learn what changes you can make to improve your health and wellness and to prevent falls.  This information is not intended to replace advice given to you by your health care provider. Make sure you discuss any questions you have with your health care provider.  Document Revised: 04/09/2020 Document Reviewed: 10/31/2018  [employer] Patient Education © 2021 [employer]    Preventive Care 65 Years and Older, Female  Preventive care refers to lifestyle choices and visits with your health care provider that can promote health and wellness. This includes:  · A yearly physical exam. This is also called an annual well check.  · Regular dental and eye exams.  · Immunizations.  · Screening for certain conditions.  · Healthy lifestyle choices, such as diet and exercise.  What can I expect for my preventive care visit?  Physical exam  Your health care provider will check:  · Height and weight. These may be used to calculate body mass index (BMI), which is a measurement that tells if you are at a healthy weight.  · Heart rate and blood pressure.  · Your skin for abnormal spots.  Counseling  Your health care provider may ask you questions  about:  · Alcohol, tobacco, and drug use.  · Emotional well-being.  · Home and relationship well-being.  · Sexual activity.  · Eating habits.  · History of falls.  · Memory and ability to understand (cognition).  · Work and work environment.  · Pregnancy and menstrual history.  What immunizations do I need?    Influenza (flu) vaccine  · This is recommended every year.  Tetanus, diphtheria, and pertussis (Tdap) vaccine  · You may need a Td booster every 10 years.  Varicella (chickenpox) vaccine  · You may need this vaccine if you have not already been vaccinated.  Zoster (shingles) vaccine  · You may need this after age 60.  Pneumococcal conjugate (PCV13) vaccine  · One dose is recommended after age 65.  Pneumococcal polysaccharide (PPSV23) vaccine  · One dose is recommended after age 65.  Measles, mumps, and rubella (MMR) vaccine  · You may need at least one dose of MMR if you were born in 1957 or later. You may also need a second dose.  Meningococcal conjugate (MenACWY) vaccine  · You may need this if you have certain conditions.  Hepatitis A vaccine  · You may need this if you have certain conditions or if you travel or work in places where you may be exposed to hepatitis A.  Hepatitis B vaccine  · You may need this if you have certain conditions or if you travel or work in places where you may be exposed to hepatitis B.  Haemophilus influenzae type b (Hib) vaccine  · You may need this if you have certain conditions.  You may receive vaccines as individual doses or as more than one vaccine together in one shot (combination vaccines). Talk with your health care provider about the risks and benefits of combination vaccines.  What tests do I need?  Blood tests  · Lipid and cholesterol levels. These may be checked every 5 years, or more frequently depending on your overall health.  · Hepatitis C test.  · Hepatitis B test.  Screening  · Lung cancer screening. You may have this screening every year starting at age 55 if  you have a 30-pack-year history of smoking and currently smoke or have quit within the past 15 years.  · Colorectal cancer screening. All adults should have this screening starting at age 50 and continuing until age 75. Your health care provider may recommend screening at age 45 if you are at increased risk. You will have tests every 1-10 years, depending on your results and the type of screening test.  · Diabetes screening. This is done by checking your blood sugar (glucose) after you have not eaten for a while (fasting). You may have this done every 1-3 years.  · Mammogram. This may be done every 1-2 years. Talk with your health care provider about how often you should have regular mammograms.  · BRCA-related cancer screening. This may be done if you have a family history of breast, ovarian, tubal, or peritoneal cancers.  Other tests  · Sexually transmitted disease (STD) testing.  · Bone density scan. This is done to screen for osteoporosis. You may have this done starting at age 65.  Follow these instructions at home:  Eating and drinking  · Eat a diet that includes fresh fruits and vegetables, whole grains, lean protein, and low-fat dairy products. Limit your intake of foods with high amounts of sugar, saturated fats, and salt.  · Take vitamin and mineral supplements as recommended by your health care provider.  · Do not drink alcohol if your health care provider tells you not to drink.  · If you drink alcohol:  ? Limit how much you have to 0-1 drink a day.  ? Be aware of how much alcohol is in your drink. In the [place] one drink equals one 12 oz bottle of beer (355 mL), one 5 oz glass of wine (148 mL), or one 1½ oz glass of hard liquor (44 mL).  Lifestyle  · Take daily care of your teeth and gums.  · Stay active. Exercise for at least 30 minutes on 5 or more days each week.  · Do not use any products that contain nicotine or tobacco, such as cigarettes, e-cigarettes, and chewing tobacco. If you need help  quitting, ask your health care provider.  · If you are sexually active, practice safe sex. Use a condom or other form of protection in order to prevent STIs (sexually transmitted infections).  · Talk with your health care provider about taking a low-dose aspirin or statin.  What's next?  · Go to your health care provider once a year for a well check visit.  · Ask your health care provider how often you should have your eyes and teeth checked.  · Stay up to date on all vaccines.  This information is not intended to replace advice given to you by your health care provider. Make sure you discuss any questions you have with your health care provider.  Document Revised: 12/12/2019 Document Reviewed: 12/12/2019  [employer] Patient Education © 2020 [employer]    Sinusitis, Adult  Sinusitis is inflammation of your sinuses. Sinuses are hollow spaces in the bones around your face. Your sinuses are located:  · Around your eyes.  · In the middle of your forehead.  · Behind your nose.  · In your cheekbones.  Mucus normally drains out of your sinuses. When your nasal tissues become inflamed or swollen, mucus can become trapped or blocked. This allows bacteria, viruses, and fungi to grow, which leads to infection. Most infections of the sinuses are caused by a virus.  Sinusitis can develop quickly. It can last for up to 4 weeks (acute) or for more than 12 weeks (chronic). Sinusitis often develops after a cold.  What are the causes?  This condition is caused by anything that creates swelling in the sinuses or stops mucus from draining. This includes:  · Allergies.  · Asthma.  · Infection from bacteria or viruses.  · Deformities or blockages in your nose or sinuses.  · Abnormal growths in the nose (nasal polyps).  · Pollutants, such as chemicals or irritants in the air.  · Infection from fungi (rare).  What increases the risk?  You are more likely to develop this condition if you:  · Have a weak body defense system (immune  system).  · Do a lot of swimming or diving.  · Overuse nasal sprays.  · Smoke.  What are the signs or symptoms?  The main symptoms of this condition are pain and a feeling of pressure around the affected sinuses. Other symptoms include:  · Stuffy nose or congestion.  · Thick drainage from your nose.  · Swelling and warmth over the affected sinuses.  · Headache.  · Upper toothache.  · A cough that may get worse at night.  · Extra mucus that collects in the throat or the back of the nose (postnasal drip).  · Decreased sense of smell and taste.  · Fatigue.  · A fever.  · Sore throat.  · Bad breath.  How is this diagnosed?  This condition is diagnosed based on:  · Your symptoms.  · Your medical history.  · A physical exam.  · Tests to find out if your condition is acute or chronic. This may include:  ? Checking your nose for nasal polyps.  ? Viewing your sinuses using a device that has a light (endoscope).  ? Testing for allergies or bacteria.  ? Imaging tests, such as an MRI or CT scan.  In rare cases, a bone biopsy may be done to rule out more serious types of fungal sinus disease.  How is this treated?  Treatment for sinusitis depends on the cause and whether your condition is chronic or acute.  · If caused by a virus, your symptoms should go away on their own within 10 days. You may be given medicines to relieve symptoms. They include:  ? Medicines that shrink swollen nasal passages (topical intranasal decongestants).  ? Medicines that treat allergies (antihistamines).  ? A spray that eases inflammation of the nostrils (topical intranasal corticosteroids).  ? Rinses that help get rid of thick mucus in your nose (nasal saline washes).  · If caused by bacteria, your health care provider may recommend waiting to see if your symptoms improve. Most bacterial infections will get better without antibiotic medicine. You may be given antibiotics if you have:  ? A severe infection.  ? A weak immune system.  · If caused by  narrow nasal passages or nasal polyps, you may need to have surgery.  Follow these instructions at home:  Medicines  · Take, use, or apply over-the-counter and prescription medicines only as told by your health care provider. These may include nasal sprays.  · If you were prescribed an antibiotic medicine, take it as told by your health care provider. Do not stop taking the antibiotic even if you start to feel better.  Hydrate and humidify    · Drink enough fluid to keep your urine pale yellow. Staying hydrated will help to thin your mucus.  · Use a cool mist humidifier to keep the humidity level in your home above 50%.  · Inhale steam for 10-15 minutes, 3-4 times a day, or as told by your health care provider. You can do this in the bathroom while a hot shower is running.  · Limit your exposure to cool or dry air.    Rest  · Rest as much as possible.  · Sleep with your head raised (elevated).  · Make sure you get enough sleep each night.  General instructions    · Apply a warm, moist washcloth to your face 3-4 times a day or as told by your health care provider. This will help with discomfort.  · Wash your hands often with soap and water to reduce your exposure to germs. If soap and water are not available, use hand .  · Do not smoke. Avoid being around people who are smoking (secondhand smoke).  · Keep all follow-up visits as told by your health care provider. This is important.    Contact a health care provider if:  · You have a fever.  · Your symptoms get worse.  · Your symptoms do not improve within 10 days.  Get help right away if:  · You have a severe headache.  · You have persistent vomiting.  · You have severe pain or swelling around your face or eyes.  · You have vision problems.  · You develop confusion.  · Your neck is stiff.  · You have trouble breathing.  Summary  · Sinusitis is soreness and inflammation of your sinuses. Sinuses are hollow spaces in the bones around your face.  · This  condition is caused by nasal tissues that become inflamed or swollen. The swelling traps or blocks the flow of mucus. This allows bacteria, viruses, and fungi to grow, which leads to infection.  · If you were prescribed an antibiotic medicine, take it as told by your health care provider. Do not stop taking the antibiotic even if you start to feel better.  · Keep all follow-up visits as told by your health care provider. This is important.  This information is not intended to replace advice given to you by your health care provider. Make sure you discuss any questions you have with your health care provider.  Document Revised: 2019 Document Reviewed: 2019  [employer] Patient Education ©  [employer]    Medicare Wellness  Personal Prevention Plan of Service     Date of Office Visit:  10/15/2021  Encounter Provider:  DON Veronica  Place of Service:  [facility]  Patient Name: Georgai Velázquez  :  1945    As part of the Medicare Wellness portion of your visit today, we are providing you with this personalized preventive plan of services (PPPS). This plan is based upon recommendations of the [place] Preventive Services Task Force (USPSTF) and the Advisory Committee on Immunization Practices (ACIP).    This lists the preventive care services that should be considered, and provides dates of when you are due. Items listed as completed are up-to-date and do not require any further intervention.    Health Maintenance   Topic Date Due   • COVID-19 Vaccine (1) Never done   • HEMOGLOBIN A1C  2021   • DIABETIC EYE EXAM  2021   • LIPID PANEL  10/12/2021   • ZOSTER VACCINE (1 of 2) 10/17/2021 (Originally 5/15/1993)   • ANNUAL WELLNESS VISIT  10/15/2022   • URINE MICROALBUMIN  10/15/2022   • COLORECTAL CANCER SCREENING  2026   • TDAP/TD VACCINES (2 - Td or Tdap) 2027   • HEPATITIS C SCREENING  Completed   • INFLUENZA VACCINE   Completed   • Pneumococcal Vaccine 65+  Completed   • MAMMOGRAM  Discontinued   • DXA SCAN  Discontinued       Orders Placed This Encounter   Procedures   • Fluzone High-Dose 65+yrs (1750-5120)   • Comprehensive Metabolic Panel     Order Specific Question:   Release to patient     Answer:   Immediate   • CBC Auto Differential     Order Specific Question:   Release to patient     Answer:   Immediate   • Lipid Panel   • Hemoglobin A1c     Order Specific Question:   Release to patient     Answer:   Immediate   • POC Urinalysis Dipstick, Automated     Order Specific Question:   Release to patient     Answer:   Immediate   • POC Microalbumin     Order Specific Question:   Release to patient     Answer:   Immediate       Return in about 3 months (around 1/15/2022), or if symptoms worsen or fail to improve, for Next scheduled follow up.

## 2021-10-20 ENCOUNTER — OFFICE VISIT (OUTPATIENT)
Dept: CARDIOLOGY | Facility: CLINIC | Age: 76
End: 2021-10-20

## 2021-10-20 ENCOUNTER — HOSPITAL ENCOUNTER (OUTPATIENT)
Dept: CARDIOLOGY | Facility: HOSPITAL | Age: 76
Discharge: HOME | End: 2021-10-20
Admitting: INTERNAL MEDICINE

## 2021-10-20 VITALS — WEIGHT: 260.14 LBS | HEIGHT: 61 IN | BODY MASS INDEX: 49.12 KG/M2

## 2021-10-20 VITALS
HEART RATE: 70 BPM | HEIGHT: 61 IN | OXYGEN SATURATION: 98 % | SYSTOLIC BLOOD PRESSURE: 132 MMHG | WEIGHT: 258 LBS | BODY MASS INDEX: 48.71 KG/M2 | DIASTOLIC BLOOD PRESSURE: 68 MMHG

## 2021-10-20 DIAGNOSIS — I10 ESSENTIAL HYPERTENSION: Chronic | ICD-10-CM

## 2021-10-20 DIAGNOSIS — I35.1 NONRHEUMATIC AORTIC VALVE INSUFFICIENCY: Primary | Chronic | ICD-10-CM

## 2021-10-20 DIAGNOSIS — I65.23 BILATERAL CAROTID ARTERY STENOSIS: ICD-10-CM

## 2021-10-20 DIAGNOSIS — I25.83 CORONARY ARTERY DISEASE DUE TO LIPID RICH PLAQUE: Chronic | ICD-10-CM

## 2021-10-20 DIAGNOSIS — I51.89 DIASTOLIC DYSFUNCTION: Chronic | ICD-10-CM

## 2021-10-20 DIAGNOSIS — E78.2 MIXED HYPERLIPIDEMIA: Chronic | ICD-10-CM

## 2021-10-20 DIAGNOSIS — I65.22 LEFT CAROTID ARTERY STENOSIS: Chronic | ICD-10-CM

## 2021-10-20 DIAGNOSIS — I25.10 CORONARY ARTERY DISEASE DUE TO LIPID RICH PLAQUE: Chronic | ICD-10-CM

## 2021-10-20 LAB
BH CV ECHO MEAS - BSA(HAYCOCK): 2.3 M^2
BH CV ECHO MEAS - BSA: 2.1 M^2
BH CV ECHO MEAS - BZI_BMI: 49.3 KILOGRAMS/M^2
BH CV ECHO MEAS - BZI_METRIC_HEIGHT: 154.9 CM
BH CV ECHO MEAS - BZI_METRIC_WEIGHT: 118.4 KG
BH CV XLRA MEAS LEFT DIST CCA EDV: 14.4 CM/SEC
BH CV XLRA MEAS LEFT DIST CCA PSV: 91.2 CM/SEC
BH CV XLRA MEAS LEFT DIST ICA EDV: 11.4 CM/SEC
BH CV XLRA MEAS LEFT DIST ICA PSV: 72 CM/SEC
BH CV XLRA MEAS LEFT ICA/CCA RATIO: 2.83
BH CV XLRA MEAS LEFT MID CCA EDV: 11.8 CM/SEC
BH CV XLRA MEAS LEFT MID CCA PSV: 84.7 CM/SEC
BH CV XLRA MEAS LEFT MID ICA EDV: 27.7 CM/SEC
BH CV XLRA MEAS LEFT MID ICA PSV: 240 CM/SEC
BH CV XLRA MEAS LEFT PROX CCA EDV: 13.2 CM/SEC
BH CV XLRA MEAS LEFT PROX CCA PSV: 99.9 CM/SEC
BH CV XLRA MEAS LEFT PROX ECA EDV: 18.7 CM/SEC
BH CV XLRA MEAS LEFT PROX ECA PSV: 102 CM/SEC
BH CV XLRA MEAS LEFT PROX ICA EDV: 46.5 CM/SEC
BH CV XLRA MEAS LEFT PROX ICA PSV: 232.6 CM/SEC
BH CV XLRA MEAS LEFT PROX SCLA PSV: 184.8 CM/SEC
BH CV XLRA MEAS LEFT VERTEBRAL A EDV: 18.2 CM/SEC
BH CV XLRA MEAS LEFT VERTEBRAL A PSV: 101 CM/SEC
BH CV XLRA MEAS RIGHT DIST CCA EDV: 13.2 CM/SEC
BH CV XLRA MEAS RIGHT DIST CCA PSV: 95.4 CM/SEC
BH CV XLRA MEAS RIGHT DIST ICA EDV: 11.2 CM/SEC
BH CV XLRA MEAS RIGHT DIST ICA PSV: 56.7 CM/SEC
BH CV XLRA MEAS RIGHT ICA/CCA RATIO: 1.03
BH CV XLRA MEAS RIGHT MID CCA EDV: 11.9 CM/SEC
BH CV XLRA MEAS RIGHT MID CCA PSV: 127.8 CM/SEC
BH CV XLRA MEAS RIGHT MID ICA EDV: 14.8 CM/SEC
BH CV XLRA MEAS RIGHT MID ICA PSV: 89.9 CM/SEC
BH CV XLRA MEAS RIGHT PROX CCA EDV: 16.1 CM/SEC
BH CV XLRA MEAS RIGHT PROX CCA PSV: 118 CM/SEC
BH CV XLRA MEAS RIGHT PROX ECA EDV: 12.6 CM/SEC
BH CV XLRA MEAS RIGHT PROX ECA PSV: 127 CM/SEC
BH CV XLRA MEAS RIGHT PROX ICA EDV: 17.5 CM/SEC
BH CV XLRA MEAS RIGHT PROX ICA PSV: 131.3 CM/SEC
BH CV XLRA MEAS RIGHT PROX SCLA PSV: 317.8 CM/SEC
BH CV XLRA MEAS RIGHT VERTEBRAL A EDV: 5.7 CM/SEC
BH CV XLRA MEAS RIGHT VERTEBRAL A PSV: 23.4 CM/SEC
LEFT ARM BP: NORMAL MMHG
MAXIMAL PREDICTED HEART RATE: 142 BPM
RIGHT ARM BP: NORMAL MMHG
STRESS TARGET HR: 121 BPM

## 2021-10-20 PROCEDURE — 99214 OFFICE O/P EST MOD 30 MIN: CPT | Performed by: INTERNAL MEDICINE

## 2021-10-20 PROCEDURE — 93880 EXTRACRANIAL BILAT STUDY: CPT

## 2021-10-20 PROCEDURE — 93880 EXTRACRANIAL BILAT STUDY: CPT | Performed by: INTERNAL MEDICINE

## 2021-10-20 NOTE — PROGRESS NOTES
[facility] at [facility]  Office visit  Georgia Velázquez  1945  [phone]  There is no work phone number on file.    VISIT DATE:  10/20/2021    PCP: Georgina Ahumada, APRN  [address]  [facility] [address]    CC:  Chief Complaint   Patient presents with   • Coronary artery disease due to lipid rich plaque       Previous cardiac studies and procedures:  2000: PCI with stenting in the setting of MI, dated deficient     April 2015   echo: EF 40%, mild LVH, diastolic dysfunction  Shyanne scan myocardial perfusion imaging: EF 41%, large fixed anterior apical defect.     May 2015 cardiac catheterization: 70% distal LAD stenosis.  RCA with diffuse irregularities, 60% mid RCA stenosis.  Left circumflex with diffuse irregularities.     June 2020  Carotid duplex  · Left mid ICA near 70%, however EDV less than 100 and ratio less than 4.  · Right internal carotid artery stenosis of 0-49%.  · Bilateral antegrade vertebral flow.  Echo  · Estimated EF appears to be in the range of 51 - 55%.  · Left ventricular systolic function is normal.  · Left ventricular diastolic dysfunction (grade II) consistent with pseudonormalization.  · The following left ventricular wall segments are hypokinetic: apical anterior, apical lateral, apical inferior, apical septal and apex hypokinetic.  · Mild aortic valve regurgitation is present.     8/2020 30 day monitor  · A relatively benign monitor study.  · Occasional premature atrial contractions. Rare PVCs which are intermittently symptomatic.    ASSESSMENT:   Diagnosis Plan   1. Nonrheumatic aortic valve insufficiency     2. Coronary artery disease due to lipid rich plaque     3. Diastolic dysfunction     4. Essential hypertension     5. Mixed hyperlipidemia     6. Left carotid artery stenosis         PLAN:  Coronary artery disease: Currently stable and asymptomatic.  Continue antiplatelet therapy, high intensity statin therapy and afterload  "reduction.     Hypertension: Goal less than 130/80 mmHg.  Currently well controlled.  Continue current medical therapy.     Hyperlipidemia: Goal LDL less than 70, currently well controlled.  Continue atorvastatin 40 mg p.o. daily.     Retinal embolic event, right: Continue current medical therapy and ophthalmology evaluation.  Evaluation negative for atrial arrhythmias.     Left internal carotid artery stenosis: 50 to 69%.  Currently asymptomatic.  Continue afterload reduction, high intensity statin therapy.    Continue Plavix.  Annual carotid duplex.    Subjective  Interval assessment: Using a wheeled walker for ambulation.  Blood pressures running less than 135/80 mmHg.  No change in baseline functional capacity.  Stable shortness of breath in a class II pattern.  Denies chest discomfort.  No palpitations.  Personally reviewed recent carotid duplex imaging today.    Initial eval: 75-year-old female with a history of coronary disease, peripheral vascular disease and chronic congestive systolic heart failure.  Recently had right eye pain and was diagnosed with a right retinal embolic event.  Ophthalmology evaluation has been requested, unclear whether this was a venous or arterial thrombosis.  Reviewed recent echo and carotid duplex.  No significant palpitations.  She has stable dyspnea on exertion and a class II-III pattern.  Uses a wheeled walker for ambulation.  She is compliant with medical therapy and was on aspirin prior to this event.  She reports that she is currently on a statin but does not know the name or dosage, she will call us back with this information later.  Blood pressures run less than 130/80 mmHg.  She is scheduled for an ophthalmologic procedure next week.    PHYSICAL EXAMINATION:  Vitals:    10/20/21 1528   BP: 132/68   BP Location: Left arm   Patient Position: Sitting   Pulse: 70   SpO2: 98%   Weight: 117 kg (258 lb)   Height: 154.9 cm (60.98\")     General Appearance:    Alert, cooperative, " no distress, appears stated age   Head:    Normocephalic, without obvious abnormality, atraumatic   Eyes:    conjunctiva/corneas clear   Nose:   Nares normal, septum midline, mucosa normal, no drainage   Throat:   Lips, teeth and gums normal   Neck:   Supple, symmetrical, trachea midline, no carotid    bruit or JVD   Lungs:     Clear to auscultation bilaterally, respirations unlabored   Chest Wall:    No tenderness or deformity    Heart:    Regular rate and rhythm, S1 and S2 normal, 2/6 early peaking systolic murmur right upper sternal border, rub   or gallop, normal carotid impulse bilaterally.  Left carotid bruit   Abdomen:     Soft, non-tender   Extremities:   Extremities normal, atraumatic, no cyanosis.  Trivial edema   Pulses:   2+ and symmetric all extremities   Skin:   Skin color, texture, turgor normal, no rashes or lesions       Diagnostic Data:  Procedures  Lab Results   Component Value Date    TRIG 103 10/12/2020    HDL 46 10/12/2020     Lab Results   Component Value Date    GLUCOSE 102 (H) 03/18/2021    BUN 32 (H) 03/18/2021    CREATININE 1.32 (H) 03/18/2021     03/18/2021    K 3.7 03/18/2021     03/18/2021    CO2 23.0 03/18/2021     Lab Results   Component Value Date    HGBA1C 5.90 (H) 10/12/2020     Lab Results   Component Value Date    WBC 7.24 03/18/2021    HGB 12.4 03/18/2021    HCT 38.8 03/18/2021     03/18/2021       Allergies  No Known Allergies    Current Medications    Current Outpatient Medications:   •  albuterol sulfate  (90 Base) MCG/ACT inhaler, Inhale 2 puffs Every 4 (Four) Hours As Needed for Wheezing or Shortness of Air., Disp: 18 g, Rfl: 3  •  atorvastatin (LIPITOR) 40 MG tablet, TAKE ONE TABLET BY MOUTH DAILY (Patient taking differently: Take 40 mg by mouth Every Night.), Disp: 90 tablet, Rfl: 2  •  Blood Glucose Monitoring Suppl (OneTouch Verio Flex System) w/Device kit, , Disp: , Rfl:   •  brimonidine-timolol (COMBIGAN) 0.2-0.5 % ophthalmic solution, 1 drop  Daily. Left eye, Disp: , Rfl:   •  carvedilol (COREG) 3.125 MG tablet, Take 1 tablet by mouth 2 (Two) Times a Day With Meals., Disp: 60 tablet, Rfl: 2  •  cephalexin (KEFLEX) 500 MG capsule, Take 1 capsule by mouth 2 (Two) Times a Day for 10 days., Disp: 20 capsule, Rfl: 0  •  Cholecalciferol (Vitamin D3) 50 MCG (2000 UT) capsule, Take 2,000 Units by mouth Daily. 2000 units , Disp: , Rfl:   •  clopidogrel (PLAVIX) 75 MG tablet, TAKE ONE TABLET BY MOUTH DAILY, Disp: 90 tablet, Rfl: 2  •  Continuous Blood Gluc Sensor (Dexcom G5 Mob/G4 Plat Sensor) misc, 1 each Every 7 (Seven) Days., Disp: 4 each, Rfl: 11  •  glucose blood test strip, Use as instructed, Disp: 100 each, Rfl: 12  •  glucose monitor monitoring kit, 1 each As Needed (glucose monitoring). Please provide patient with glucose monitor and strips covered by her insurance., Disp: 1 each, Rfl: 0  •  nitroglycerin (NITROSTAT) 0.4 MG SL tablet, Place 1 tablet under the tongue Every 5 (Five) Minutes As Needed for Chest Pain. Take no more than 3 doses in 15 minutes., Disp: 100 tablet, Rfl: 0  •  OneTouch Delica Lancets 33G misc, 1 Device 3 (Three) Times a Day., Disp: 100 each, Rfl: 12  •  valsartan (Diovan) 160 MG tablet, Take 1 tablet by mouth Daily., Disp: 30 tablet, Rfl: 5          ROS  ROS      SOCIAL HX  Social History     Socioeconomic History   • Marital status:    Tobacco Use   • Smoking status: Former Smoker     Types: Cigarettes     Quit date:      Years since quittin.8   • Smokeless tobacco: Never Used   Vaping Use   • Vaping Use: Never used   Substance and Sexual Activity   • Alcohol use: No   • Drug use: No   • Sexual activity: Never       FAMILY HX  Family History   Problem Relation Age of Onset   • Diabetes Mother    • Heart failure Mother    • Heart failure Father    • No Known Problems Sister    • No Known Problems Brother    • No Known Problems Son    • No Known Problems Daughter              Armaan Samuels III, MD, [facility]

## 2021-10-21 ENCOUNTER — TELEPHONE (OUTPATIENT)
Dept: FAMILY MEDICINE CLINIC | Facility: CLINIC | Age: 76
End: 2021-10-21

## 2021-10-21 DIAGNOSIS — E78.2 MIXED HYPERLIPIDEMIA: ICD-10-CM

## 2021-10-21 DIAGNOSIS — I10 ESSENTIAL HYPERTENSION: Primary | ICD-10-CM

## 2021-10-21 RX ORDER — ATORVASTATIN CALCIUM 40 MG/1
40 TABLET, FILM COATED ORAL NIGHTLY
Qty: 90 TABLET | Refills: 3 | Status: SHIPPED | OUTPATIENT
Start: 2021-10-21 | End: 2022-10-07 | Stop reason: SDUPTHER

## 2021-10-21 RX ORDER — VALSARTAN 160 MG/1
160 TABLET ORAL DAILY
Qty: 90 TABLET | Refills: 3 | Status: SHIPPED | OUTPATIENT
Start: 2021-10-21 | End: 2022-01-11 | Stop reason: SDUPTHER

## 2021-10-21 NOTE — TELEPHONE ENCOUNTER
Caller: Omaira Patel    Relationship: Emergency Contact    Medication requested (name and dosage):      valsartan (Diovan) 160 MG tablet     COMPLETELY OUT OF THE MEDICATION ABOVE     atorvastatin (LIPITOR) 40 MG tablet     HAS APPROXIMATELY A (6) DAY SUPPLY LEFT    CALLER/DAUGHTER STATED THE TWO MEDICATIONS LISTED ABOVE NEED A  REORDER - NEW PRESCRIPTION ACCORDING TO PHARMACY    Requested Prescriptions:      N/A    Pharmacy where request should be sent:    [place], [place]    TELEPHONE CONTACT:    [phone]    Additional details provided by patient:     N/A    Best call back number:     [phone]     Does the patient have less than a 3 day supply:  [x] Yes  [] No    VALSARTAN    Ramona Mcguire, Jozef Rep   10/21/21 11:37 EDT     MAXWELL ROSENBAUM

## 2021-11-15 ENCOUNTER — TELEPHONE (OUTPATIENT)
Dept: FAMILY MEDICINE CLINIC | Facility: CLINIC | Age: 76
End: 2021-11-15

## 2021-11-18 ENCOUNTER — LAB (OUTPATIENT)
Dept: LAB | Facility: HOSPITAL | Age: 76
End: 2021-11-18

## 2021-11-18 ENCOUNTER — HOSPITAL ENCOUNTER (OUTPATIENT)
Dept: GENERAL RADIOLOGY | Facility: HOSPITAL | Age: 76
Discharge: HOME | End: 2021-11-18

## 2021-11-18 ENCOUNTER — OFFICE VISIT (OUTPATIENT)
Dept: FAMILY MEDICINE CLINIC | Facility: CLINIC | Age: 76
End: 2021-11-18

## 2021-11-18 VITALS
RESPIRATION RATE: 20 BRPM | HEIGHT: 61 IN | SYSTOLIC BLOOD PRESSURE: 142 MMHG | WEIGHT: 258 LBS | BODY MASS INDEX: 48.71 KG/M2 | TEMPERATURE: 96 F | HEART RATE: 76 BPM | OXYGEN SATURATION: 93 % | DIASTOLIC BLOOD PRESSURE: 98 MMHG

## 2021-11-18 DIAGNOSIS — J44.1 COPD WITH EXACERBATION (HCC): ICD-10-CM

## 2021-11-18 DIAGNOSIS — J44.1 COPD WITH EXACERBATION (HCC): Primary | ICD-10-CM

## 2021-11-18 DIAGNOSIS — R06.02 SHORTNESS OF BREATH: ICD-10-CM

## 2021-11-18 DIAGNOSIS — R73.9 HYPERGLYCEMIA: ICD-10-CM

## 2021-11-18 DIAGNOSIS — E11.65 TYPE 2 DIABETES MELLITUS WITH HYPERGLYCEMIA, WITHOUT LONG-TERM CURRENT USE OF INSULIN (HCC): ICD-10-CM

## 2021-11-18 DIAGNOSIS — Z79.4 TYPE 2 DIABETES MELLITUS WITH STAGE 3B CHRONIC KIDNEY DISEASE, WITH LONG-TERM CURRENT USE OF INSULIN (HCC): ICD-10-CM

## 2021-11-18 DIAGNOSIS — E11.22 TYPE 2 DIABETES MELLITUS WITH STAGE 3B CHRONIC KIDNEY DISEASE, WITH LONG-TERM CURRENT USE OF INSULIN (HCC): ICD-10-CM

## 2021-11-18 DIAGNOSIS — N18.32 TYPE 2 DIABETES MELLITUS WITH STAGE 3B CHRONIC KIDNEY DISEASE, WITH LONG-TERM CURRENT USE OF INSULIN (HCC): ICD-10-CM

## 2021-11-18 LAB
ANISOCYTOSIS BLD QL: ABNORMAL
BURR CELLS BLD QL SMEAR: ABNORMAL
DEPRECATED RDW RBC AUTO: 44.4 FL (ref 37–54)
ERYTHROCYTE [DISTWIDTH] IN BLOOD BY AUTOMATED COUNT: 13.7 % (ref 12.3–15.4)
HCT VFR BLD AUTO: 41.4 % (ref 34–46.6)
HGB BLD-MCNC: 14.2 G/DL (ref 12–15.9)
LYMPHOCYTES # BLD MANUAL: 0.85 10*3/MM3 (ref 0.7–3.1)
LYMPHOCYTES NFR BLD MANUAL: 2.3 % (ref 5–12)
MCH RBC QN AUTO: 30.7 PG (ref 26.6–33)
MCHC RBC AUTO-ENTMCNC: 34.3 G/DL (ref 31.5–35.7)
MCV RBC AUTO: 89.4 FL (ref 79–97)
MICROCYTES BLD QL: ABNORMAL
MONOCYTES # BLD: 0.24 10*3/MM3 (ref 0.1–0.9)
NEUTROPHILS # BLD AUTO: 9.52 10*3/MM3 (ref 1.7–7)
NEUTROPHILS NFR BLD MANUAL: 89.7 % (ref 42.7–76)
NT-PROBNP SERPL-MCNC: 1177 PG/ML (ref 0–1800)
PLAT MORPH BLD: NORMAL
PLATELET # BLD AUTO: 245 10*3/MM3 (ref 140–450)
PMV BLD AUTO: 11.6 FL (ref 6–12)
POIKILOCYTOSIS BLD QL SMEAR: ABNORMAL
POLYCHROMASIA BLD QL SMEAR: ABNORMAL
RBC # BLD AUTO: 4.63 10*6/MM3 (ref 3.77–5.28)
VARIANT LYMPHS NFR BLD MANUAL: 8 % (ref 19.6–45.3)
WBC MORPH BLD: NORMAL
WBC NRBC COR # BLD: 10.61 10*3/MM3 (ref 3.4–10.8)

## 2021-11-18 PROCEDURE — 96372 THER/PROPH/DIAG INJ SC/IM: CPT | Performed by: NURSE PRACTITIONER

## 2021-11-18 PROCEDURE — U0004 COV-19 TEST NON-CDC HGH THRU: HCPCS | Performed by: NURSE PRACTITIONER

## 2021-11-18 PROCEDURE — 71046 X-RAY EXAM CHEST 2 VIEWS: CPT

## 2021-11-18 PROCEDURE — 80053 COMPREHEN METABOLIC PANEL: CPT | Performed by: NURSE PRACTITIONER

## 2021-11-18 PROCEDURE — 85025 COMPLETE CBC W/AUTO DIFF WBC: CPT | Performed by: NURSE PRACTITIONER

## 2021-11-18 PROCEDURE — 99214 OFFICE O/P EST MOD 30 MIN: CPT | Performed by: NURSE PRACTITIONER

## 2021-11-18 PROCEDURE — 83880 ASSAY OF NATRIURETIC PEPTIDE: CPT | Performed by: NURSE PRACTITIONER

## 2021-11-18 PROCEDURE — 85007 BL SMEAR W/DIFF WBC COUNT: CPT | Performed by: NURSE PRACTITIONER

## 2021-11-18 RX ORDER — IPRATROPIUM BROMIDE AND ALBUTEROL SULFATE 2.5; .5 MG/3ML; MG/3ML
SOLUTION RESPIRATORY (INHALATION)
Qty: 360 ML | Refills: 0 | Status: SHIPPED | OUTPATIENT
Start: 2021-11-18 | End: 2022-12-05 | Stop reason: SDUPTHER

## 2021-11-18 RX ORDER — DOXYCYCLINE 100 MG/1
100 CAPSULE ORAL 2 TIMES DAILY
Qty: 20 CAPSULE | Refills: 0 | Status: SHIPPED | OUTPATIENT
Start: 2021-11-18 | End: 2021-11-28

## 2021-11-18 RX ORDER — METHYLPREDNISOLONE SODIUM SUCCINATE 40 MG/ML
40 INJECTION, POWDER, LYOPHILIZED, FOR SOLUTION INTRAMUSCULAR; INTRAVENOUS ONCE
Status: COMPLETED | OUTPATIENT
Start: 2021-11-18 | End: 2021-11-18

## 2021-11-18 RX ADMIN — METHYLPREDNISOLONE SODIUM SUCCINATE 40 MG: 40 INJECTION, POWDER, LYOPHILIZED, FOR SOLUTION INTRAMUSCULAR; INTRAVENOUS at 11:04

## 2021-11-18 NOTE — PROGRESS NOTES
Follow Up Office Note     Patient Name: Georgia Velázquez  : 1945   MRN: [id number]     Chief Complaint:    Chief Complaint   Patient presents with   • Cough   • Wheezing       History of Present Illness: Georgia Velázquez is a 76 y.o. female who presents today with c/o chest congestion, wheezing, SOA which has been progressively worsening for the past month. Patient was seen by me on 10/15/21 at which time I treated her with a course of cephalexin. Patient states that she has taken the medication as directed but is feeling worse.   Cough  This is a new problem. The current episode started more than 1 month ago. The problem has been gradually worsening. The problem occurs every few minutes. The cough is non-productive. Associated symptoms include nasal congestion, postnasal drip, shortness of breath and wheezing. Pertinent negatives include no chest pain, chills, ear congestion, ear pain, fever, hemoptysis, myalgias, rash, sore throat, sweats or weight loss. The symptoms are aggravated by lying down. She has tried a beta-agonist inhaler for the symptoms. The treatment provided mild relief. Her past medical history is significant for COPD. and CHF, CKD        Subjective      Review of Systems:   Review of Systems   Constitutional: Positive for activity change. Negative for appetite change, chills, diaphoresis, fatigue, fever, unexpected weight change and weight loss.   HENT: Positive for congestion and postnasal drip. Negative for ear discharge, ear pain, facial swelling, sinus pain, sore throat, trouble swallowing and voice change.    Respiratory: Positive for cough, chest tightness, shortness of breath and wheezing. Negative for hemoptysis, choking and stridor.    Cardiovascular: Negative for chest pain, palpitations and leg swelling.   Gastrointestinal: Negative.    Musculoskeletal: Negative for myalgias.   Skin: Negative for rash.   Neurological: Negative.         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/2016   • Body mass index (BMI) of 45.0-49.9 in adult ([facility]) 6/14/2019   • CHF (congestive heart failure) ([facility])    • Closed compression fracture of L4 lumbar vertebra 12/4/2017    Added automatically from request for surgery [id number]   • Constipation 12/5/2017   • Coronary artery disease 5/18/2016   • Diabetes mellitus type 2 in obese ([facility]) 1/29/2018   • Elevated alkaline phosphatase level 2/26/2018   • Elevated creatine kinase    • Essential hypertension 5/18/2016   • GERD (gastroesophageal reflux disease) 5/18/2016   • Glaucoma 7/21/2020   • History of kyphoplasty 1/30/2018   • Lumbar facet arthropathy 1/29/2018   • Lumbar stenosis with neurogenic claudication 1/29/2018   • Morbid obesity due to excess calories ([facility]) 1/29/2018   • Myocardial infarction ([facility]) 5/18/2016   • Osteoporosis 5/18/2016   • Physical deconditioning 1/30/2018   • Retinal emboli, right 5/21/2020   • Sepsis ([facility])     uro   • Stroke ([facility])    • Type 2 diabetes mellitus ([facility]) 5/18/2016   • Urinary incontinence without sensory awareness 2/26/2018         Medications:     Current Outpatient Medications:   •  albuterol sulfate  (90 Base) MCG/ACT inhaler, Inhale 2 puffs Every 4 (Four) Hours As Needed for Wheezing or Shortness of Air., Disp: 18 g, Rfl: 3  •  atorvastatin (LIPITOR) 40 MG tablet, Take 1 tablet by mouth Every Night., Disp: 90 tablet, Rfl: 3  •  Blood Glucose Monitoring Suppl (OneTouch Verio Flex System) w/Device kit, , Disp: , Rfl:   •  brimonidine-timolol (COMBIGAN) 0.2-0.5 % ophthalmic solution, 1 drop Daily. Left eye, Disp: , Rfl:   •  carvedilol (COREG) 3.125 MG tablet, Take 1 tablet by mouth 2 (Two) Times a Day With Meals., Disp: 60 tablet, Rfl: 2  •  Cholecalciferol (Vitamin D3) 50 MCG (2000 UT) capsule, Take 2,000 Units by mouth Daily. 2000 units , Disp: , Rfl:   •  clopidogrel (PLAVIX) 75 MG  "tablet, TAKE ONE TABLET BY MOUTH DAILY, Disp: 90 tablet, Rfl: 2  •  Continuous Blood Gluc Sensor (Dexcom G5 Mob/G4 Plat Sensor) misc, 1 each Every 7 (Seven) Days., Disp: 4 each, Rfl: 11  •  glucose blood test strip, Use as instructed, Disp: 100 each, Rfl: 12  •  glucose monitor monitoring kit, 1 each As Needed (glucose monitoring). Please provide patient with glucose monitor and strips covered by her insurance., Disp: 1 each, Rfl: 0  •  nitroglycerin (NITROSTAT) 0.4 MG SL tablet, Place 1 tablet under the tongue Every 5 (Five) Minutes As Needed for Chest Pain. Take no more than 3 doses in 15 minutes., Disp: 100 tablet, Rfl: 0  •  OneTouch Delica Lancets 33G misc, 1 Device 3 (Three) Times a Day., Disp: 100 each, Rfl: 12  •  valsartan (Diovan) 160 MG tablet, Take 1 tablet by mouth Daily., Disp: 90 tablet, Rfl: 3  •  doxycycline (MONODOX) 100 MG capsule, Take 1 capsule by mouth 2 (Two) Times a Day for 10 days., Disp: 20 capsule, Rfl: 0  •  ipratropium-albuterol (DUO-NEB) 0.5-2.5 mg/3 ml nebulizer, Take 3 ml by nebulization every TID-QID prn wheezing or shortness of breath, Disp: 360 mL, Rfl: 0    Allergies:   No Known Allergies      Objective     Physical Exam:  Vital Signs:   Vitals:    11/18/21 1012   BP: 142/98   Pulse: 76   Resp: 20   Temp: 96 °F (35.6 °C)   SpO2: 93%   Weight: 117 kg (258 lb)   Height: 154.9 cm (60.98\")   PainSc: 0-No pain     Body mass index is 48.78 kg/m².     Physical Exam  Vitals and nursing note reviewed.   Constitutional:       General: She is not in acute distress.     Appearance: Normal appearance. She is well-developed. She is not ill-appearing, toxic-appearing or diaphoretic.   HENT:      Head: Normocephalic and atraumatic.      Right Ear: External ear normal. A middle ear effusion is present. Tympanic membrane is bulging. Tympanic membrane is not erythematous.      Left Ear: External ear normal. A middle ear effusion is present. Tympanic membrane is bulging. Tympanic membrane is not " erythematous.      Nose: Congestion present.      Right Sinus: No maxillary sinus tenderness or frontal sinus tenderness.      Left Sinus: No maxillary sinus tenderness or frontal sinus tenderness.      Mouth/Throat:      Lips: Pink.      Mouth: Mucous membranes are moist.      Pharynx: Posterior oropharyngeal erythema (mild) present. No pharyngeal swelling or uvula swelling.   Cardiovascular:      Rate and Rhythm: Normal rate and regular rhythm.      Heart sounds: Normal heart sounds, S1 normal and S2 normal. No murmur heard.      Pulmonary:      Effort: Pulmonary effort is normal. No tachypnea or respiratory distress.      Breath sounds: No stridor. Examination of the right-upper field reveals decreased breath sounds and wheezing. Examination of the left-upper field reveals wheezing. Examination of the right-middle field reveals wheezing. Examination of the left-middle field reveals wheezing. Decreased breath sounds, wheezing and rhonchi present. No rales.   Musculoskeletal:      Right lower leg: No edema.      Left lower leg: No edema.   Skin:     General: Skin is warm and dry.   Neurological:      General: No focal deficit present.      Mental Status: She is alert and oriented to person, place, and time.   Psychiatric:         Mood and Affect: Mood normal.         Behavior: Behavior normal. Behavior is cooperative.         Thought Content: Thought content normal.         Judgment: Judgment normal.         Assessment / Plan      Assessment/Plan:   Diagnoses and all orders for this visit:    1. COPD with exacerbation (HCC) (Primary)  Assessment & Plan:  COPD is worsening. Breath sounds concerning for pneumonia.  Warning signs of respiratory distress were reviewed with the patient.   Medication changes per orders.  Discussed medication dosage, use, side effects, and goals of treatment in detail.    Discussed technique for using MDIs and/or nebulizer.   Diagnostic imaging per orders. Further recommendation after  xray evaluation.  Laboratory studies per orders.  Nebulizer provided to patient through [facility].  Quarantine at home per CDC guidelines pending Covid-19 testing results.    Orders:  -     XR Chest PA & Lateral; Future  -     COVID-19 PCR, [facility], NP SWAB IN [facility] TRANSPORT MEDIA/ORAL SWISH 24-30 HR TAT - Swab, Nasopharynx  -     Comprehensive Metabolic Panel  -     CBC Auto Differential  -     methylPREDNISolone sodium succinate (SOLU-Medrol) injection 40 mg  -     doxycycline (MONODOX) 100 MG capsule; Take 1 capsule by mouth 2 (Two) Times a Day for 10 days.  Dispense: 20 capsule; Refill: 0  -     ipratropium-albuterol (DUO-NEB) 0.5-2.5 mg/3 ml nebulizer; Take 3 ml by nebulization every TID-QID prn wheezing or shortness of breath  Dispense: 360 mL; Refill: 0  -     Manual Differential  -     Home Nebulizer    2. Shortness of breath  -     proBNP  -     methylPREDNISolone sodium succinate (SOLU-Medrol) injection 40 mg  -     doxycycline (MONODOX) 100 MG capsule; Take 1 capsule by mouth 2 (Two) Times a Day for 10 days.  Dispense: 20 capsule; Refill: 0  -     ipratropium-albuterol (DUO-NEB) 0.5-2.5 mg/3 ml nebulizer; Take 3 ml by nebulization every TID-QID prn wheezing or shortness of breath  Dispense: 360 mL; Refill: 0  -     QUESTIONNAIRE SERIES       Follow Up:   PRN and at next scheduled appointment(s) with PCP.    Discussed the nature of the medical condition(s) risks, complications, implications, management, safe and proper use of medications. Encouraged medication compliance, and keeping scheduled follow up appointments with me and any other providers.      Laboratory testing ordered today. Further recommendations after lab evaluation.    RTC if symptoms fail to improve, to ER if symptoms worsen.      DON Veronica  [facility]

## 2021-11-19 DIAGNOSIS — N18.32 TYPE 2 DIABETES MELLITUS WITH STAGE 3B CHRONIC KIDNEY DISEASE, WITH LONG-TERM CURRENT USE OF INSULIN (HCC): Primary | ICD-10-CM

## 2021-11-19 DIAGNOSIS — Z79.4 TYPE 2 DIABETES MELLITUS WITH STAGE 3B CHRONIC KIDNEY DISEASE, WITH LONG-TERM CURRENT USE OF INSULIN (HCC): Primary | ICD-10-CM

## 2021-11-19 DIAGNOSIS — E11.22 TYPE 2 DIABETES MELLITUS WITH STAGE 3B CHRONIC KIDNEY DISEASE, WITH LONG-TERM CURRENT USE OF INSULIN (HCC): Primary | ICD-10-CM

## 2021-11-19 DIAGNOSIS — N18.32 STAGE 3B CHRONIC KIDNEY DISEASE (HCC): ICD-10-CM

## 2021-11-19 PROBLEM — J44.1 COPD WITH EXACERBATION (HCC): Status: ACTIVE | Noted: 2021-11-19

## 2021-11-19 LAB
ALBUMIN SERPL-MCNC: 4.1 G/DL (ref 3.5–5.2)
ALBUMIN/GLOB SERPL: 1.1 G/DL
ALP SERPL-CCNC: 88 U/L (ref 39–117)
ALT SERPL W P-5'-P-CCNC: 18 U/L (ref 1–33)
ANION GAP SERPL CALCULATED.3IONS-SCNC: 16.3 MMOL/L (ref 5–15)
AST SERPL-CCNC: 27 U/L (ref 1–32)
BILIRUB SERPL-MCNC: 0.5 MG/DL (ref 0–1.2)
BUN SERPL-MCNC: 28 MG/DL (ref 8–23)
BUN/CREAT SERPL: 19.3 (ref 7–25)
CALCIUM SPEC-SCNC: 9.6 MG/DL (ref 8.6–10.5)
CHLORIDE SERPL-SCNC: 105 MMOL/L (ref 98–107)
CO2 SERPL-SCNC: 19.7 MMOL/L (ref 22–29)
CREAT SERPL-MCNC: 1.45 MG/DL (ref 0.57–1)
GFR SERPL CREATININE-BSD FRML MDRD: 35 ML/MIN/1.73
GLOBULIN UR ELPH-MCNC: 3.7 GM/DL
GLUCOSE SERPL-MCNC: 177 MG/DL (ref 65–99)
POTASSIUM SERPL-SCNC: 3.9 MMOL/L (ref 3.5–5.2)
PROT SERPL-MCNC: 7.8 G/DL (ref 6–8.5)
SARS-COV-2 RNA NOSE QL NAA+PROBE: NOT DETECTED
SODIUM SERPL-SCNC: 141 MMOL/L (ref 136–145)

## 2021-11-19 NOTE — PATIENT INSTRUCTIONS
Chronic Obstructive Pulmonary Disease Exacerbation    Chronic obstructive pulmonary disease (COPD) is a long-term (chronic) condition that affects the lungs. COPD is a general term that can be used to describe many different lung problems that cause lung swelling (inflammation) and limit airflow, including chronic bronchitis and emphysema. COPD exacerbations are episodes when breathing symptoms become much worse and require extra treatment.  COPD exacerbations are usually caused by infections. Without treatment, COPD exacerbations can be severe and even life threatening. Frequent COPD exacerbations can cause further damage to the lungs.  What are the causes?  This condition may be caused by:  · Respiratory infections, including viral and bacterial infections.  · Exposure to smoke.  · Exposure to air pollution, chemical fumes, or dust.  · Things that give you an allergic reaction (allergens).  · Not taking your usual COPD medicines as directed.  · Underlying medical problems, such as congestive heart failure or infections not involving the lungs.  In many cases, the cause (trigger) of this condition is not known.  What increases the risk?  The following factors may make you more likely to develop this condition:  · Smoking cigarettes.  · Old age.  · Frequent prior COPD exacerbations.  What are the signs or symptoms?  Symptoms of this condition include:  · Increased coughing.  · Increased production of mucus from your lungs (sputum).  · Increased wheezing.  · Increased shortness of breath.  · Rapid or labored breathing.  · Chest tightness.  · Less energy than usual.  · Sleep disruption from symptoms.  · Confusion or increased sleepiness.  Often these symptoms happen or get worse even with the use of medicines.  How is this diagnosed?  This condition is diagnosed based on:  · Your medical history.  · A physical exam.  You may also have tests, including:  · A chest X-ray.  · Blood tests.  · Lung (pulmonary) function  tests.  How is this treated?  Treatment for this condition depends on the severity and cause of the symptoms. You may need to be admitted to a hospital for treatment. Some of the treatments commonly used to treat COPD exacerbations are:  · Antibiotic medicines. These may be used for severe exacerbations caused by a lung infection, such as pneumonia.  · Bronchodilators. These are inhaled medicines that expand the air passages and allow increased airflow.  · Steroid medicines. These act to reduce inflammation in the airways. They may be given with an inhaler, taken by mouth, or given through an IV tube inserted into one of your veins.  · Supplemental oxygen therapy.  · Airway clearing techniques, such as noninvasive ventilation (NIV) and positive expiratory pressure (PEP). These provide respiratory support through a mask or other noninvasive device. An example of this would be using a continuous positive airway pressure (CPAP) machine to improve delivery of oxygen into your lungs.  Follow these instructions at home:  Medicines  · Take over-the-counter and prescription medicines only as told by your health care provider. It is important to use correct technique with inhaled medicines.  · If you were prescribed an antibiotic medicine or oral steroid, take it as told by your health care provider. Do not stop taking the medicine even if you start to feel better.  Lifestyle  · Eat a healthy diet.  · Exercise regularly.  · Get plenty of sleep.  · Avoid exposure to all substances that irritate the airway, especially to tobacco smoke.  · Wash your hands often with soap and water to reduce the risk of infection. If soap and water are not available, use hand .  · During flu season, avoid enclosed spaces that are crowded with people.  General instructions  · Drink enough fluid to keep your urine clear or pale yellow (unless you have a medical condition that requires fluid restriction).  · Use a cool mist vaporizer. This  humidifies the air and makes it easier for you to clear your chest when you cough.  · If you have a home nebulizer and oxygen, continue to use them as told by your health care provider.  · Keep all follow-up visits as told by your health care provider. This is important.  How is this prevented?  · Stay up-to-date on pneumococcal and influenza (flu) vaccines. A flu shot is recommended every year to help prevent exacerbations.  · Do not use any products that contain nicotine or tobacco, such as cigarettes and e-cigarettes. Quitting smoking is very important in preventing COPD from getting worse and in preventing exacerbations from happening as often. If you need help quitting, ask your health care provider.  · Follow all instructions for pulmonary rehabilitation after a recent exacerbation. This can help prevent future exacerbations.  · Work with your health care provider to develop and follow an action plan. This tells you what steps to take when you experience certain symptoms.  Contact a health care provider if:  · You have a worsening of your regular COPD symptoms.  Get help right away if:  · You have worsening shortness of breath, even when resting.  · You have trouble talking.  · You have severe chest pain.  · You cough up blood.  · You have a fever.  · You have weakness, vomit repeatedly, or faint.  · You feel confused.  · You are not able to sleep because of your symptoms.  · You have trouble doing daily activities.  Summary  · COPD exacerbations are episodes when breathing symptoms become much worse and require extra treatment above your normal treatment.  · Exacerbations can be severe and even life threatening. Frequent COPD exacerbations can cause further damage to your lungs.  · COPD exacerbations are usually triggered by infections such as the flu, colds, and even pneumonia.  · Treatment for this condition depends on the severity and cause of the symptoms. You may need to be admitted to a hospital for  treatment.  · Quitting smoking is very important to prevent COPD from getting worse and to prevent exacerbations from happening as often.  This information is not intended to replace advice given to you by your health care provider. Make sure you discuss any questions you have with your health care provider.  Document Revised: 11/30/2018 Document Reviewed: 01/22/2018  [employer] Patient Education © 2021 [employer]    Shortness of Breath, Adult  Shortness of breath is when a person has trouble breathing enough air or when a person feels like she or he is having trouble breathing in enough air. Shortness of breath could be a sign of a medical problem.  Follow these instructions at home:    · Pay attention to any changes in your symptoms.  · Do not use any products that contain nicotine or tobacco, such as cigarettes, e-cigarettes, and chewing tobacco.  · Do not smoke. Smoking is a common cause of shortness of breath. If you need help quitting, ask your health care provider.  · Avoid things that can irritate your airways, such as:  ? Mold.  ? Dust.  ? Air pollution.  ? Chemical fumes.  ? Things that can cause allergy symptoms (allergens), if you have allergies.  · Keep your living space clean and free of mold and dust.  · Rest as needed. Slowly return to your usual activities.  · Take over-the-counter and prescription medicines only as told by your health care provider. This includes oxygen therapy and inhaled medicines.  · Keep all follow-up visits as told by your health care provider. This is important.  Contact a health care provider if:  · Your condition does not improve as soon as expected.  · You have a hard time doing your normal activities, even after you rest.  · You have new symptoms.  Get help right away if:  · Your shortness of breath gets worse.  · You have shortness of breath when you are resting.  · You feel light-headed or you faint.  · You have a cough that is not controlled with medicines.  · You  cough up blood.  · You have pain with breathing.  · You have pain in your chest, arms, shoulders, or abdomen.  · You have a fever.  · You cannot walk up stairs or exercise the way that you normally do.  These symptoms may represent a serious problem that is an emergency. Do not wait to see if the symptoms will go away. Get medical help right away. Call your local emergency services (911 in the [place] Do not drive yourself to the hospital.  Summary  · Shortness of breath is when a person has trouble breathing enough air. It can be a sign of a medical problem.  · Avoid things that irritate your lungs, such as smoking, pollution, mold, and dust.  · Pay attention to changes in your symptoms and contact your health care provider if you have a hard time completing daily activities because of shortness of breath.  This information is not intended to replace advice given to you by your health care provider. Make sure you discuss any questions you have with your health care provider.  Document Revised: 05/20/2019 Document Reviewed: 05/20/2019  [employer] Patient Education © 2021 [employer]    COPD Action Plan  A COPD action plan is a description of what to do when you have a flare (exacerbation) of chronic obstructive pulmonary disease (COPD). Your action plan is a color-coded plan that lists the symptoms that indicate whether or not your condition is under control and what actions to take.  · If you have symptoms in the green zone, it means you are doing well that day.  · If you have symptoms in the yellow zone, it means you are having a bad day or an exacerbation.  · If you have symptoms in the red zone, you need urgent medical care.  Follow the plan you and your health care provider developed. Review your plan with your health care provider at each visit.  Red zone  Symptoms in this zone mean that you should get medical help right away. They include:  · Feeling very short of breath, even when you are resting.  · Not  "being able to do any activities because of poor breathing.  · Not being able to sleep because of poor breathing.  · Fever or shaking chills.  · Feeling confused or very sleepy.  · Chest pain.  · Coughing up blood.  If you have any of these symptoms, call emergency services (911 in the [place] or go to the nearest emergency room.  Yellow zone  Symptoms in this zone mean that your condition may be getting worse. They include:  · Feeling more short of breath than usual.  · Having less energy for daily activities than usual.  · Phlegm or mucus that is thicker than usual.  · Needing to use your rescue inhaler or nebulizer more often than usual.  · More ankle swelling than usual.  · Coughing more than usual.  · Feeling like you have a chest cold.  · Trouble sleeping due to COPD symptoms.  · Decreased appetite.  · COPD medicines not helping as much as usual.  If you experience any \"yellow\" symptoms:  · Keep taking your daily medicines as directed.  · Use your quick-relief inhaler as told by your health care provider.  · If you were prescribed steroid medicine to take by mouth (oral medicine), start taking it as told by your health care provider.  · If you were prescribed an antibiotic, start taking it as told by your health care provider. Do not stop taking the antibiotic even if you start to feel better.  · Use oxygen as told by your health care provider.  · Get more rest.  · Do your pursed-lip breathing exercises.  · Do not smoke. Avoid any irritants in the air.  If your signs and symptoms do not improve after taking these steps, call your health care provider right away.  Green zone  Symptoms in this zone mean that you are doing well. They include:  · Being able to do your usual activities and exercise.  · Having the usual amount of coughing, including the same amount of phlegm or mucus.  · Being able to sleep well.  · Having a good appetite.  Follow these instructions at home:  · Continue taking your daily medicines as " told by your health care provider.  · Make sure you receive all the immunizations that your health care provider recommends, especially the pneumococcal and influenza vaccines.  · Wash your hands often with soap and water. Have family members wash their hands too. Regular hand washing can help prevent infections.  · Follow your usual exercise and diet plan.  · Avoid irritants in the air, such as smoke.  · Do not use any products that contain nicotine or tobacco, such as cigarettes and e-cigarettes. If you need help quitting, ask your health care provider.  Where to find more information:  You can find more information about COPD from:  · American Lung Association, My COPD Action Plan: www.lung.org/assets/documents/copd/copd-action-plan.pdf  · COPD Foundation: www.copdfoundation.org  · National Heart, Lung, & Blood [place]: https://www.nhlbi.nih.gov/health-topics/copd  This information is not intended to replace advice given to you by your health care provider. Make sure you discuss any questions you have with your health care provider.  Document Revised: 02/01/2021 Document Reviewed: 05/02/2018  [employer] Patient Education © 2021 [employer]  Albuterol inhalation aerosol  What is this medicine?  ALBUTEROL (al BYOO ter ole) is a bronchodilator. It treats bronchospasm. Bronchospasm is when you have trouble breathing and make loud or whistling sounds when you breathe. This drug opens the airways in the lungs so it is easier to breathe.  This medicine may be used for other purposes; ask your health care provider or pharmacist if you have questions.  COMMON BRAND NAME(S): Proair HFA, Proventil, Proventil HFA, Respirol, Ventolin, Ventolin HFA  What should I tell my health care provider before I take this medicine?  They need to know if you have any of the following conditions:  · diabetes (high blood sugar)  · heart disease  · high blood pressure  · irregular heartbeat or  rhythm  · pheochromocytoma  · seizures  · thyroid disease  · an unusual or allergic reaction to albuterol, levalbuterol, other medicines, foods, dyes, or preservatives  · pregnant or trying to get pregnant  · breast-feeding  How should I use this medicine?  This medicine is for inhalation through the mouth. Follow the directions on your prescription label. Take your medicine at regular intervals. Do not use more often than directed. Make sure that you are using your inhaler correctly. Ask your doctor or health care provider if you have any questions.  Talk to your pediatrician regarding the use of this medicine in children. While this drug may be prescribed for children as young as 4 years for selected conditions, precautions do apply.  Overdosage: If you think you have taken too much of this medicine contact a poison control center or emergency room at once.  NOTE: This medicine is only for you. Do not share this medicine with others.  What if I miss a dose?  If you miss a dose, use it as soon as you can. If it is almost time for your next dose, use only that dose. Do not use double or extra doses.  What may interact with this medicine?  · anti-infectives like chloroquine and pentamidine  · caffeine  · cisapride  · diuretics  · medicines for colds  · medicines for depression or for emotional or psychotic conditions  · medicines for weight loss including some herbal products  · methadone  · some antibiotics like clarithromycin, erythromycin, levofloxacin, and linezolid  · some heart medicines  · steroid hormones like dexamethasone, cortisone, hydrocortisone  · theophylline  · thyroid hormones  This list may not describe all possible interactions. Give your health care provider a list of all the medicines, herbs, non-prescription drugs, or dietary supplements you use. Also tell them if you smoke, drink alcohol, or use illegal drugs. Some items may interact with your medicine.  What should I watch for while using this  medicine?  Visit your health care provider for regular checks on your progress. Tell your health care provider if your symptoms do not start to get better or if they get worse.  If your symptoms get worse or if you are using this drug more than normal, call your health care provider right away.  Do not treat yourself for coughs, colds or allergies without asking your health care provider for advice. Some nonprescription medicines can affect this one.  You and your health care provider should develop an Asthma Action Plan that is just for you. Be sure to know what to do if you are in the yellow (asthma is getting worse) or red (medical alert) zones.  Your mouth may get dry. Chewing sugarless gum or sucking hard candy and drinking plenty of water may help. Contact your health care provider if the problem does not go away or is severe.  What side effects may I notice from receiving this medicine?  Side effects that you should report to your doctor or health care professional as soon as possible:  · allergic reactions (skin rash, itching or hives; swelling of the face, lips, or tongue)  · fever  · heartbeat rhythm changes (trouble breathing; chest pain; dizziness; fast, irregular heartbeat; feeling faint or lightheaded, falls)  · increase in blood pressure  · muscle cramps, pain  · muscle weakness  · pain, tingling, numbness in the hands or feet  · vomiting  Side effects that usually do not require medical attention (report to your doctor or health care professional if they continue or are bothersome):  · change in taste  · cough  · dry mouth  · headache  · nasal congestion (runny or stuffy nose)  · sore throat  · tremors  · trouble sleeping  · upset stomach  This list may not describe all possible side effects. Call your doctor for medical advice about side effects. You may report side effects to FDA at 6-677-FDA-2237.  Where should I keep my medicine?  Keep out of the reach of children.  Store Proventil HFA and ProAir  "HFA at room temperature between 15 and 25 degrees C (59 and 77 degrees F). Store Ventolin HFA at room temperature between 20 and 25 degrees C (68 and 77 degrees F); it may be stored between 15 and 30 degrees C (59 and 86 degrees F) on occasion. The contents are under pressure and may burst when exposed to heat or flame. Do not freeze. This medicine does not work as well if it is too cold. Throw away the inhaler when the dose counter displays \"0\" or after the expiration date on the package, whichever comes first. Ventolin HFA should be thrown away 12 months after removing it from the foil pouch.  NOTE: This sheet is a summary. It may not cover all possible information. If you have questions about this medicine, talk to your doctor, pharmacist, or health care provider.  © 2021 [employer] (2020-12-18 12:38:45)  Doxycycline tablets or capsules  What is this medicine?  DOXYCYCLINE (dox lamar CERRATOTANYA chatmanjory) is a tetracycline antibiotic. It kills certain bacteria or stops their growth. It is used to treat many kinds of infections, like dental, skin, respiratory, and urinary tract infections. It also treats acne, Lyme disease, malaria, and certain sexually transmitted infections.  This medicine may be used for other purposes; ask your health care provider or pharmacist if you have questions.  COMMON BRAND NAME(S): Acticlate, Adoxa, Adoxa CK, Adoxa Qasim, Adoxa TT, Alodox, Avidoxy, Doxal, LYMEPAK, Mondoxyne NL, Monodox, Morgidox 1x, Morgidox 1x Kit, Morgidox 2x, Morgidox 2x Kit, NutriDox, Ocudox, Okebo, Periostat, TARGADOX, Vibra-Tabs, Vibramycin  What should I tell my health care provider before I take this medicine?  They need to know if you have any of these conditions:  · liver disease  · long exposure to sunlight like working outdoors  · stomach problems like colitis  · an unusual or allergic reaction to doxycycline, tetracycline antibiotics, other medicines, foods, dyes, or preservatives  · pregnant or trying to get " pregnant  · breast-feeding  How should I use this medicine?  Take this medicine by mouth with a full glass of water. Follow the directions on the prescription label. It is best to take this medicine without food, but if it upsets your stomach take it with food. Take your medicine at regular intervals. Do not take your medicine more often than directed. Take all of your medicine as directed even if you think you are better. Do not skip doses or stop your medicine early.  Talk to your pediatrician regarding the use of this medicine in children. While this drug may be prescribed for selected conditions, precautions do apply.  Overdosage: If you think you have taken too much of this medicine contact a poison control center or emergency room at once.  NOTE: This medicine is only for you. Do not share this medicine with others.  What if I miss a dose?  If you miss a dose, take it as soon as you can. If it is almost time for your next dose, take only that dose. Do not take double or extra doses.  What may interact with this medicine?  · antacids  · barbiturates  · birth control pills  · bismuth subsalicylate  · carbamazepine  · methoxyflurane  · other antibiotics  · phenytoin  · vitamins that contain iron  · warfarin  This list may not describe all possible interactions. Give your health care provider a list of all the medicines, herbs, non-prescription drugs, or dietary supplements you use. Also tell them if you smoke, drink alcohol, or use illegal drugs. Some items may interact with your medicine.  What should I watch for while using this medicine?  Tell your doctor or health care professional if your symptoms do not improve.  Do not treat diarrhea with over the counter products. Contact your doctor if you have diarrhea that lasts more than 2 days or if it is severe and watery.  Do not take this medicine just before going to bed. It may not dissolve properly when you lay down and can cause pain in your throat. Drink  plenty of fluids while taking this medicine to also help reduce irritation in your throat.  This medicine can make you more sensitive to the sun. Keep out of the sun. If you cannot avoid being in the sun, wear protective clothing and use sunscreen. Do not use sun lamps or tanning beds/booths.  Birth control pills may not work properly while you are taking this medicine. Talk to your doctor about using an extra method of birth control.  If you are being treated for a sexually transmitted infection, avoid sexual contact until you have finished your treatment. Your sexual partner may also need treatment.  Avoid antacids, aluminum, calcium, magnesium, and iron products for 4 hours before and 2 hours after taking a dose of this medicine.  If you are using this medicine to prevent malaria, you should still protect yourself from contact with mosquitos. Stay in screened-in areas, use mosquito nets, keep your body covered, and use an insect repellent.  What side effects may I notice from receiving this medicine?  Side effects that you should report to your doctor or health care professional as soon as possible:  · allergic reactions like skin rash, itching or hives, swelling of the face, lips, or tongue  · difficulty breathing  · fever  · itching in the rectal or genital area  · pain on swallowing  · rash, fever, and swollen lymph nodes  · redness, blistering, peeling or loosening of the skin, including inside the mouth  · severe stomach pain or cramps  · unusual bleeding or bruising  · unusually weak or tired  · yellowing of the eyes or skin  Side effects that usually do not require medical attention (report to your doctor or health care professional if they continue or are bothersome):  · diarrhea  · loss of appetite  · nausea, vomiting  This list may not describe all possible side effects. Call your doctor for medical advice about side effects. You may report side effects to FDA at 1-489-FDA-3189.  Where should I keep my  medicine?  Keep out of the reach of children.  Store at room temperature, below 30 degrees C (86 degrees F). Protect from light. Keep container tightly closed. Throw away any unused medicine after the expiration date. Taking this medicine after the expiration date can make you seriously ill.  NOTE: This sheet is a summary. It may not cover all possible information. If you have questions about this medicine, talk to your doctor, pharmacist, or health care provider.  © 2021 [employer] (2020-03-19 13:44:53)  Ipratropium; Albuterol Inhalation Solution  What is this medicine?  IPRATROPIUM: ALBUTEROL (i pra TROE pee um; al BYOO ter ole) is a combination of 2 drugs to treat COPD. Ipratropium is a bronchodilator that helps keep airways open. Albuterol decreases inflammation in the lungs. Do not use this drug combination for acute asthma attacks or bronchospasm.  This medicine may be used for other purposes; ask your health care provider or pharmacist if you have questions.  COMMON BRAND NAME(S): Baljit  What should I tell my health care provider before I take this medicine?  They need to know if you have any of the following conditions:  · heart disease  · high blood pressure  · irregular heartbeat or rhythm  · an unusual or allergic reaction to ipratropium, albuterol, atropine, other medicines, foods, dyes, or preservatives  · pregnant or trying to get pregnant  · breast-feeding  How should I use this medicine?  This medicine is used in a nebulizer. Nebulizers make a liquid into an aerosol that you breathe in through your mouth or your mouth and nose into your lungs. You will be taught how to use your nebulizer. Follow the directions on your prescription label. Take your medicine at regular intervals. Do not use more often than directed.  Talk to your pediatrician regarding the use of this medicine in children. Special care may be needed.  Overdosage: If you think you have taken too much of this medicine  contact a poison control center or emergency room at once.  NOTE: This medicine is only for you. Do not share this medicine with others.  What if I miss a dose?  If you miss a dose, take it as soon as you can. If it is almost time for your next dose, take only that dose. Do not take double or extra doses.  What may interact with this medicine?  · atropine  · beta-blockers like metoprolol and propranolol  · certain medicines for allergy, cough, and cold  · certain medicines for bladder problems like oxybutynin, tolterodine  · certain medicines for depression, anxiety, or psychotic disturbances  · certain medicines for stomach problems like dicyclomine, hyoscyamine  · certain medicines for travel sickness like scopolamine  · certain medicines for Parkinson's disease like benztropine, trihexyphenidyl  · diuretics  · MAOIs like Carbex, Eldepryl, Marplan, Nardil, and Parnate  · some medicines for irregular heartbeat  · stimulant medicines for attention disorders, weight loss, or staying awake  This list may not describe all possible interactions. Give your health care provider a list of all the medicines, herbs, non-prescription drugs, or dietary supplements you use. Also tell them if you smoke, drink alcohol, or use illegal drugs. Some items may interact with your medicine.  What should I watch for while using this medicine?  Tell your doctor or healthcare professional if your symptoms do not start to get better or if they get worse. If your breathing gets worse while you are using this medicine, call your doctor right away. Do not stop using your medicine unless your doctor tells you to.  Your mouth may get dry. Chewing sugarless gum or sucking hard candy, and drinking plenty of water may help. Contact your doctor if the problem does not go away or is severe.  You may get dizzy or have blurred vision. Do not drive, use machinery, or do anything that needs mental alertness until you know how this medicine affects you.  Do not stand or sit up quickly, especially if you are an older patient. This reduces the risk of dizzy or fainting spells.  What side effects may I notice from receiving this medicine?  Side effects that you should report to your doctor or health care professional as soon as possible:  · allergic reactions like skin rash, itching or hives, swelling of the face, lips, or tongue  · breathing problems  · changes in vision  · heartbeat rhythm changes (trouble breathing; chest pain; dizziness; fast, irregular heartbeat; feeling faint or lightheaded, falls)  · high blood pressure  · restlessness  · signs and symptoms of low potassium like muscle cramps; muscle pain; chest pain; dizziness; feeling faint or lightheaded, falls; palpitations; breathing problems; or fast, irregular heartbeat  · trouble passing urine or change in the amount of urine  · trouble sleeping  · vomiting  Side effects that usually do not require medical attention (report to your doctor or health care professional if they continue or are bothersome):  · changes in taste  · cough  · dry mouth  · headache  · nasal congestion (like runny or stuffy nose)  · sore throat  · tremors  This list may not describe all possible side effects. Call your doctor for medical advice about side effects. You may report side effects to FDA at 6-977-FDA-0097.  Where should I keep my medicine?  Keep out of the reach of children.  Store at a room temperature 2 and 30 degrees C (36 to 86 degrees F). Protect from light. Store this medicine in the protective pouch until ready to use. Throw away any unused medicine after the expiration date.  NOTE: This sheet is a summary. It may not cover all possible information. If you have questions about this medicine, talk to your doctor, pharmacist, or health care provider.  © 2021 [employer] (2020-11-04 21:05:48)  Methylprednisolone Solution for Injection  What is this medicine?  METHYLPREDNISOLONE (meth ill pred NISS oh lone)  is a corticosteroid. It is commonly used to treat inflammation of the skin, joints, lungs, and other organs. Common conditions treated include asthma, allergies, and arthritis. It is also used for other conditions, such as blood disorders and diseases of the adrenal glands.  This medicine may be used for other purposes; ask your health care provider or pharmacist if you have questions.  COMMON BRAND NAME(S): A-Methapred, Solu-Medrol  What should I tell my health care provider before I take this medicine?  They need to know if you have any of these conditions:  · Cushing's syndrome  · eye disease, vision problems  · diabetes  · glaucoma  · heart disease  · high blood pressure  · infection (especially a virus infection such as chickenpox, cold sores, or herpes)  · liver disease  · mental illness  · myasthenia gravis  · osteoporosis  · recently received or scheduled to receive a vaccine  · seizures  · stomach or intestine problems  · thyroid disease  · an unusual or allergic reaction to lactose, methylprednisolone, other medicines, foods, dyes, or preservatives  · pregnant or trying to get pregnant  · breast-feeding  How should I use this medicine?  This medicine is for injection or infusion into a vein. It is also for injection into a muscle. It is given by a health care professional in a hospital or clinic setting.  Talk to your pediatrician regarding the use of this medicine in children. While this drug may be prescribed for selected conditions, precautions do apply.  Overdosage: If you think you have taken too much of this medicine contact a poison control center or emergency room at once.  NOTE: This medicine is only for you. Do not share this medicine with others.  What if I miss a dose?  This does not apply.  What may interact with this medicine?  Do not take this medicine with any of the following medications:  · alefacept  · echinacea  · iopamidol  · live virus vaccines  · metyrapone  · mifepristone  This  medicine may also interact with the following medications:  · amphotericin B  · aspirin and aspirin-like medicines  · certain antibiotics like erythromycin, clarithromycin, troleandomycin  · certain medicines for diabetes  · certain medicines for fungal infection like ketoconazole  · certain medicines for seizures like carbamazepine, phenobarbital, phenytoin  · certain medicines that treat or prevent blood clots like warfarin  · cyclosporine  · digoxin  · diuretics  · female hormones, like estrogens and birth control pills  · isoniazid  · NSAIDS, medicines for pain and inflammation, like ibuprofen or naproxen  · other medicines for myasthenia gravis  · rifampin  · vaccines  This list may not describe all possible interactions. Give your health care provider a list of all the medicines, herbs, non-prescription drugs, or dietary supplements you use. Also tell them if you smoke, drink alcohol, or use illegal drugs. Some items may interact with your medicine.  What should I watch for while using this medicine?  Tell your doctor or healthcare professional if your symptoms do not start to get better or if they get worse. Do not stop taking except on your doctor's advice. You may develop a severe reaction. Your doctor will tell you how much medicine to take.  Your condition will be monitored carefully while you are receiving this medicine.  This medicine may increase your risk of getting an infection. Tell your doctor or health care professional if you are around anyone with measles or chickenpox, or if you develop sores or blisters that do not heal properly.  This medicine may increase blood sugar. Ask your healthcare provider if changes in diet or medicines are needed if you have diabetes.  Tell your doctor or health care professional right away if you have any change in your eyesight.  Using this medicine for a long time may increase your risk of low bone mass. Talk to your doctor about bone health.  What side effects  may I notice from receiving this medicine?  Side effects that you should report to your doctor or health care professional as soon as possible:  · allergic reactions like skin rash, itching or hives, swelling of the face, lips, or tongue  · bloody or tarry stools  · hallucination, loss of contact with reality  · muscle cramps  · muscle pain  · palpitations  · signs and symptoms of high blood sugar such as being more thirsty or hungry or having to urinate more than normal. You may also feel very tired or have blurry vision.  · signs and symptoms of infection like fever or chills; cough; sore throat; pain or trouble passing urine  · trouble passing urine  Side effects that usually do not require medical attention (report to your doctor or health care professional if they continue or are bothersome):  · changes in emotions or mood  · constipation  · diarrhea  · excessive hair growth on the face or body  · headache  · nausea, vomiting  · pain, redness, or irritation at site where injected  · trouble sleeping  · weight gain  This list may not describe all possible side effects. Call your doctor for medical advice about side effects. You may report side effects to FDA at 0-384-FDA-1754.  Where should I keep my medicine?  This drug is given in a hospital or clinic and will not be stored at home.  NOTE: This sheet is a summary. It may not cover all possible information. If you have questions about this medicine, talk to your doctor, pharmacist, or health care provider.  © 2021 [employer] (2019-09-19 09:12:19)  How to Quarantine at Home  Information for Patients and Families    These instructions are for people with confirmed or suspected COVID-19 who do not need to be hospitalized and those with confirmed COVID-19 who were hospitalized and discharged to care for themselves at home.    If you were tested through the Health Department  The Health Department will monitor your wellbeing.  If it is determined that  you do not need to be hospitalized and can be isolated at home, you will be monitored by staff from your local or state health department.     If you were tested through a [facility]  You will need to monitor yourself and report changes in your symptoms to your doctor.  See the section below called Monitor Your Symptoms.    Follow these steps until a healthcare provider or local or state health department says you can return to your normal activities.    Stay home except to get medical care  • Restrict activities outside your home, except for getting medical care.   • Do not go to work, school, or public areas.   • Avoid using public transportation, ride-sharing, or taxis.    Separate yourself from other people and animals in your home  People  As much as possible, you should stay in a specific room and away from other people in your home. Also, you should use a separate bathroom, if available.    Animals  You should restrict contact with pets and other animals while you are sick with COVID-19, just like you would around other people. When possible, have another member of your household care for your animals while you are sick. If you are sick with COVID-19, avoid contact with your pet, including petting, snuggling, being kissed or licked, and sharing food. If you must care for your pet or be around animals while you are sick, wash your hands before and after you interact with pets and wear a facemask. See COVID-19 and Animals for more information.    Call ahead before visiting your doctor  If you have a medical appointment, call the healthcare provider and tell them that you have or may have COVID-19. This information will help the healthcare provider’s office take steps to keep other people from getting infected or exposed.    Wear a facemask  You should wear a facemask when you are around other people (e.g., sharing a room or vehicle) or pets and before you enter a healthcare provider’s office.     If you are  not able to wear a facemask (for example, because it causes trouble breathing), then people who live with you should not stay in the same room with you, or they should wear a facemask if they enter your room.    Cover your coughs and sneezes  • Cover your mouth and nose with a tissue when you cough or sneeze.   • Throw used tissues in a lined trash can.   • Immediately wash your hands with soap and water for at least 20 seconds or, if soap and water are not available, clean your hands with an alcohol-based hand  that contains at least 60% alcohol.    Clean your hands often  • Wash your hands often with soap and water for at least 20 seconds, especially after blowing your nose, coughing, or sneezing; going to the bathroom; and before eating or preparing food.     • If soap and water are not readily available, use an alcohol-based hand  with at least 60% alcohol, covering all surfaces of your hands and rubbing them together until they feel dry.    • Soap and water are the best option if hands are visibly dirty. Avoid touching your eyes, nose, and mouth with unwashed hands.    Avoid sharing personal household items  • You should not share dishes, drinking glasses, cups, eating utensils, towels, or bedding with other people or pets in your home.   • After using these items, they should be washed thoroughly with soap and water.    Clean all “high-touch” surfaces everyday  • High touch surfaces include counters, tabletops, doorknobs, bathroom fixtures, toilets, phones, keyboards, tablets, and bedside tables.   • Also, clean any surfaces that may have blood, stool, or body fluids on them.   • Use a household cleaning spray or wipe, according to the label instructions. Labels contain instructions for safe and effective use of the cleaning product, including precautions you should take when applying the product, such as wearing gloves and making sure you have good ventilation during use of the  product.    Monitor your symptoms  • Seek prompt medical attention if your illness is worsening (e.g., difficulty breathing).   • Before seeking care, call your healthcare provider and tell them that you have, or are being evaluated for, COVID-19.   • Put on a facemask before you enter the facility.     • These steps will help the healthcare provider’s office to keep other people in the office or waiting room from getting infected or exposed.   • Persons who are placed under active monitoring or facilitated self-monitoring should follow instructions provided by their local health department or occupational health professionals, as appropriate.  • If you have a medical emergency and need to call 911, notify the dispatch personnel that you have, or are being evaluated for COVID-19. If possible, put on a facemask before emergency medical services arrive.    Discontinuing home isolation  Patients with confirmed COVID-19 should remain under home isolation precautions until the risk of secondary transmission to others is thought to be low. The decision to discontinue home isolation precautions should be made on a case-by-case basis, in consultation with healthcare providers and state and local health departments.    The below content are for household members, intimate partners, and caregivers of a patient with symptomatic laboratory-confirmed COVID-19 or a patient under investigation:    Household members, intimate partners, and caregivers may have close contact with a person with symptomatic, laboratory-confirmed COVID-19 or a person under investigation.     Close contacts should monitor their health; they should call their healthcare provider right away if they develop symptoms suggestive of COVID-19 (e.g., fever, cough, shortness of breath)     Close contacts should also follow these recommendations:  • Make sure that you understand and can help the patient follow their healthcare provider’s instructions for  medication(s) and care. You should help the patient with basic needs in the home and provide support for getting groceries, prescriptions, and other personal needs.  • Monitor the patient’s symptoms. If the patient is getting sicker, call his or her healthcare provider and tell them that the patient has laboratory-confirmed COVID-19. This will help the healthcare provider’s office take steps to keep other people in the office or waiting room from getting infected. Ask the healthcare provider to call the local or [facility] health department for additional guidance. If the patient has a medical emergency and you need to call 911, notify the dispatch personnel that the patient has, or is being evaluated for COVID-19.  • Household members should stay in another room or be  from the patient as much as possible. Household members should use a separate bedroom and bathroom, if available.  • Prohibit visitors who do not have an essential need to be in the home.  • Household members should care for any pets in the home. Do not handle pets or other animals while sick.  For more information, see COVID-19 and Animals.  • Make sure that shared spaces in the home have good air flow, such as by an air conditioner or an opened window, weather permitting.  • Perform hand hygiene frequently. Wash your hands often with soap and water for at least 20 seconds or use an alcohol-based hand  that contains 60 to 95% alcohol, covering all surfaces of your hands and rubbing them together until they feel dry. Soap and water should be used preferentially if hands are visibly dirty.  • Avoid touching your eyes, nose, and mouth with unwashed hands.  • The patient should wear a facemask when you are around other people. If the patient is not able to wear a facemask (for example, because it causes trouble breathing), you, as the caregiver, should wear a mask when you are in the same room as the patient.  • Wear a disposable facemask  and gloves when you touch or have contact with the patient’s blood, stool, or body fluids, such as saliva, sputum, nasal mucus, vomit, or urine.   o Throw out disposable facemasks and gloves after using them. Do not reuse.  o When removing personal protective equipment, first remove and dispose of gloves. Then, immediately clean your hands with soap and water or alcohol-based hand . Next, remove and dispose of facemask, and immediately clean your hands again with soap and water or alcohol-based hand .  • Avoid sharing household items with the patient. You should not share dishes, drinking glasses, cups, eating utensils, towels, bedding, or other items. After the patient uses these items, you should wash them thoroughly (see below “Wash laundry thoroughly”).  • Clean all “high-touch” surfaces, such as counters, tabletops, doorknobs, bathroom fixtures, toilets, phones, keyboards, tablets, and bedside tables, every day. Also, clean any surfaces that may have blood, stool, or body fluids on them.   o Use a household cleaning spray or wipe, according to the label instructions. Labels contain instructions for safe and effective use of the cleaning product including precautions you should take when applying the product, such as wearing gloves and making sure you have good ventilation during use of the product.  • Wash laundry thoroughly.   o Immediately remove and wash clothes or bedding that have blood, stool, or body fluids on them.  o Wear disposable gloves while handling soiled items and keep soiled items away from your body. Clean your hands (with soap and water or an alcohol-based hand ) immediately after removing your gloves.  o Read and follow directions on labels of laundry or clothing items and detergent. In general, using a normal laundry detergent according to washing machine instructions and dry thoroughly using the warmest temperatures recommended on the clothing label.  • Place all  used disposable gloves, facemasks, and other contaminated items in a lined container before disposing of them with other household waste. Clean your hands (with soap and water or an alcohol-based hand ) immediately after handling these items. Soap and water should be used preferentially if hands are visibly dirty.  • Discuss any additional questions with your state or local health department or healthcare provider.    Adapted from information provided by the Centers for Disease Control and Prevention.  For more information, visit https://www.cdc.gov/coronavirus/2019-ncov/hcp/guidance-prevent-spread.html3 Key Steps to Take While Waiting for Your COVID-19 Test Result  To help stop the spread of COVID-19, take these 3 key steps NOW while waiting for your test results:  1. Stay home and monitor your health.  Stay home and monitor your health to help protect your friends, family, and others from possibly getting COVID-19 from you.  Stay home and away from others:  · If possible, stay away from others, especially people who are at higher risk for getting very sick from COVID-19, such as older adults and people with other medical conditions.  · If you have been in contact with someone with COVID-19, stay home and away from others for 14 days after your last contact with that person. Follow the recommendations of your local public health department if you need to quarantine.  · If you have a fever, cough or other symptoms of COVID-19, stay home and away from others (except to get medical care).  Monitor your health:  · Watch for fever, cough, shortness of breath, or other symptoms of COVID-19. Remember, symptoms may appear 2-14 days after exposure to COVID-19 and can include:  ? Fever or chills  ? Cough  ? Shortness of breath or difficulty breathing  ? Tiredness  ? Muscle or body aches  ? Headache  ? New loss of taste or smell  ? Sore throat  ? Congestion or runny nose  ? Nausea or vomiting  ? Diarrhea  2. Think  about the people you have recently been around.  If you are diagnosed with COVID-19, a public health worker may call you to check on your health, discuss who you have been around, and ask where you spent time while you may have been able to spread COVID-19 to others. While you wait for your COVID-19 test result, think about everyone you have been around recently. This will be important information to give health workers if your test is positive.   Complete the information on the back of this page to help you remember everyone you have been around.   3. Answer the phone call from the health department.  If a public health worker calls you, answer the call to help slow the spread of COVID-19 in your community.  · Discussions with health department staff are confidential. This means that your personal and medical information will be kept private and only shared with those who may need to know, like your health care provider.  · Your name will not be shared with those you came in contact with. The health department will only notify people you were in close contact with (within 6 feet for more than 15 minutes) that they might have been exposed to COVID-19.  Think about the people you have recently been around  If you test positive and are diagnosed with COVID-19, someone from the health department may call to check-in on your health, discuss who you have been around, and ask where you spent time while you may have been able to spread COVID-19 to others. This form can help you think about people you have recently been around so you will be ready if a public health worker calls you.  Things to think about. Have you:  · Gone to work or school?  · Gotten together with others (eaten out at a restaurant, gone out for drinks, exercised with others or gone to a gym, had friends or family over to your house, volunteered, gone to a party, pool, or park)?  · Gone to a store in person (e.g., grocery store, mall)?  · Gone to  in-person appointments (e.g., salon, borrero, doctor's or dentist's office)?  · Ridden in a car with others (e.g., rideshare) or taken public transportation?  · Been inside a Protestant, Hol[employer], Buddhist or other places of Am[employer]?  Who lives with you?  · ______________________________________________________________________  · ______________________________________________________________________  · ______________________________________________________________________  · ______________________________________________________________________  Who have you been around (less than 6 feet for a total of 15 minutes or more) in the last 10 days? (You may have more people to list than the space provided. If so, write on the front of this sheet or a separate piece of paper.)  Name ______________________________________________  · Phone number ____________________________________  · Date you last saw them _____________________________  · Where you last saw them ________________________________________________  Name ______________________________________________  · Phone number ____________________________________  · Date you last saw them _____________________________  · Where you last saw them ________________________________________________  Name ______________________________________________  · Phone number ____________________________________  · Date you last saw them _____________________________  · Where you last saw them ________________________________________________  Name ______________________________________________  · Phone number ____________________________________  · Date you last saw them _____________________________  · Where you last saw them ________________________________________________  Name ______________________________________________  · Phone number ____________________________________  · Date you last saw them _____________________________  · Where you last saw them  ________________________________________________  What have you done in the last 10 days with other people?  Activity _____________________________________________  · Location _________________________________________  · Date ____________________________________________  Activity _____________________________________________  · Location _________________________________________  · Date ____________________________________________  Activity _____________________________________________  · Location _________________________________________  · Date ____________________________________________  Activity _____________________________________________  · Location _________________________________________  · Date ____________________________________________  Activity _____________________________________________  · Location _________________________________________  · Date ____________________________________________  Centers for Disease Control and Prevention  cdc.gov/coronavirus  07/09/2021  This information is not intended to replace advice given to you by your health care provider. Make sure you discuss any questions you have with your health care provider.  Document Revised: 07/14/2021 Document Reviewed: 07/14/2021  [employer] Patient Education © 2021 [employer]

## 2021-11-19 NOTE — ASSESSMENT & PLAN NOTE
COPD is worsening. Breath sounds concerning for pneumonia.  Warning signs of respiratory distress were reviewed with the patient.   Medication changes per orders.  Discussed medication dosage, use, side effects, and goals of treatment in detail.    Discussed technique for using MDIs and/or nebulizer.   Diagnostic imaging per orders. Further recommendation after xray evaluation.  Laboratory studies per orders.  Nebulizer provided to patient through [facility].  Quarantine at home per CDC guidelines pending Covid-19 testing results.

## 2021-11-23 ENCOUNTER — APPOINTMENT (OUTPATIENT)
Dept: GENERAL RADIOLOGY | Facility: HOSPITAL | Age: 76
End: 2021-11-23

## 2021-12-30 PROBLEM — E11.319 DIABETIC RETINOPATHY (HCC): Status: ACTIVE | Noted: 2021-12-30

## 2022-01-11 DIAGNOSIS — I10 ESSENTIAL HYPERTENSION: ICD-10-CM

## 2022-01-11 DIAGNOSIS — Z76.0 MEDICATION REFILL: ICD-10-CM

## 2022-01-11 DIAGNOSIS — I51.89 DIASTOLIC DYSFUNCTION: Chronic | ICD-10-CM

## 2022-01-11 RX ORDER — VALSARTAN 160 MG/1
160 TABLET ORAL DAILY
Qty: 90 TABLET | Refills: 3 | Status: ON HOLD | OUTPATIENT
Start: 2022-01-11 | End: 2022-06-20 | Stop reason: SDUPTHER

## 2022-01-11 RX ORDER — CARVEDILOL 3.12 MG/1
3.12 TABLET ORAL 2 TIMES DAILY WITH MEALS
Qty: 60 TABLET | Refills: 2 | Status: SHIPPED | OUTPATIENT
Start: 2022-01-11 | End: 2022-03-31

## 2022-01-11 NOTE — TELEPHONE ENCOUNTER
Caller: Omaira Patel    Relationship: Emergency Contact    Best call back number:[phone]  Requested Prescriptions:   Requested Prescriptions     Pending Prescriptions Disp Refills   • valsartan (Diovan) 160 MG tablet 90 tablet 3     Sig: Take 1 tablet by mouth Daily.   • carvedilol (COREG) 3.125 MG tablet 60 tablet 2     Sig: Take 1 tablet by mouth 2 (Two) Times a Day With Meals.      REQUESTING 90 DAY SUPPLY ON ALL MEDICATIONS    Pharmacy where request should be sent:      [facility] [address] AT [facility] [address] - [phone]  - [phone] FX        Additional details provided by patient: NEEDS VALSARTAN ASAP    Does the patient have less than a 3 day supply:  [x] Yes  [] No    Jozef Trevizo Rep   01/11/22 11:03 EST

## 2022-03-31 DIAGNOSIS — Z76.0 MEDICATION REFILL: ICD-10-CM

## 2022-03-31 DIAGNOSIS — I51.89 DIASTOLIC DYSFUNCTION: Chronic | ICD-10-CM

## 2022-03-31 DIAGNOSIS — I10 ESSENTIAL HYPERTENSION: ICD-10-CM

## 2022-03-31 RX ORDER — CARVEDILOL 3.12 MG/1
TABLET ORAL
Qty: 180 TABLET | Refills: 0 | Status: SHIPPED | OUTPATIENT
Start: 2022-03-31 | End: 2022-06-20 | Stop reason: HOSPADM

## 2022-03-31 RX ORDER — CLOPIDOGREL BISULFATE 75 MG/1
TABLET ORAL
Qty: 90 TABLET | Refills: 1 | Status: SHIPPED | OUTPATIENT
Start: 2022-03-31 | End: 2022-10-07 | Stop reason: SDUPTHER

## 2022-04-25 ENCOUNTER — OFFICE VISIT (OUTPATIENT)
Dept: CARDIOLOGY | Facility: CLINIC | Age: 77
End: 2022-04-25

## 2022-04-25 VITALS
HEIGHT: 61 IN | BODY MASS INDEX: 47.77 KG/M2 | HEART RATE: 73 BPM | DIASTOLIC BLOOD PRESSURE: 74 MMHG | OXYGEN SATURATION: 90 % | WEIGHT: 253 LBS | SYSTOLIC BLOOD PRESSURE: 136 MMHG

## 2022-04-25 DIAGNOSIS — I25.10 CORONARY ARTERY DISEASE DUE TO LIPID RICH PLAQUE: Chronic | ICD-10-CM

## 2022-04-25 DIAGNOSIS — I25.83 CORONARY ARTERY DISEASE DUE TO LIPID RICH PLAQUE: Chronic | ICD-10-CM

## 2022-04-25 DIAGNOSIS — E78.2 MIXED HYPERLIPIDEMIA: Chronic | ICD-10-CM

## 2022-04-25 DIAGNOSIS — I51.89 DIASTOLIC DYSFUNCTION: Chronic | ICD-10-CM

## 2022-04-25 DIAGNOSIS — I65.22 LEFT CAROTID ARTERY STENOSIS: Chronic | ICD-10-CM

## 2022-04-25 DIAGNOSIS — I35.1 NONRHEUMATIC AORTIC VALVE INSUFFICIENCY: Primary | Chronic | ICD-10-CM

## 2022-04-25 DIAGNOSIS — I10 ESSENTIAL HYPERTENSION: Chronic | ICD-10-CM

## 2022-04-25 PROCEDURE — 99214 OFFICE O/P EST MOD 30 MIN: CPT | Performed by: INTERNAL MEDICINE

## 2022-04-25 NOTE — PROGRESS NOTES
[facility]  Office visit  Georgia Velázquez  1945  [phone]  There is no work phone number on file.    VISIT DATE:  4/25/2022    PCP: Georgina Ahumada APRN  [address] [facility] [address] [place] [address]    CC:  Chief Complaint   Patient presents with   • Coronary Artery Disease       Previous cardiac studies and procedures:  2000: PCI with stenting in the setting of MI, dated deficient     April 2015   echo: EF 40%, mild LVH, diastolic dysfunction  Shyanne scan myocardial perfusion imaging: EF 41%, large fixed anterior apical defect.     May 2015 cardiac catheterization: 70% distal LAD stenosis.  RCA with diffuse irregularities, 60% mid RCA stenosis.  Left circumflex with diffuse irregularities.     June 2020  Carotid duplex  · Left mid ICA near 70%, however EDV less than 100 and ratio less than 4.  · Right internal carotid artery stenosis of 0-49%.  · Bilateral antegrade vertebral flow.  Echo  · Estimated EF appears to be in the range of 51 - 55%.  · Left ventricular systolic function is normal.  · Left ventricular diastolic dysfunction (grade II) consistent with pseudonormalization.  · The following left ventricular wall segments are hypokinetic: apical anterior, apical lateral, apical inferior, apical septal and apex hypokinetic.  · Mild aortic valve regurgitation is present.     8/2020 30 day monitor  · A relatively benign monitor study.  · Occasional premature atrial contractions. Rare PVCs which are intermittently symptomatic.    ASSESSMENT:   Diagnosis Plan   1. Nonrheumatic aortic valve insufficiency     2. Coronary artery disease due to lipid rich plaque     3. Diastolic dysfunction     4. Essential hypertension     5. Mixed hyperlipidemia     6. Left carotid artery stenosis         PLAN:  Coronary artery disease: Currently stable and asymptomatic.  Continue antiplatelet therapy, high intensity statin therapy and afterload reduction.     Hypertension: Goal less than  "130/80 mmHg.  Currently well controlled.  Continue current medical therapy.     Hyperlipidemia: Goal LDL less than 70, currently well controlled.  Continue atorvastatin 40 mg p.o. daily.     Retinal embolic event, right: Continue current medical therapy and ophthalmology evaluation.  Evaluation negative for atrial arrhythmias.     Left internal carotid artery stenosis: 50 to 69%.  Currently asymptomatic.  Continue afterload reduction, high intensity statin therapy.    Continue Plavix.  Annual carotid duplex.    Subjective  Interval assessment: Using a wheeled walker for ambulation.  Blood pressures running less than 135/80 mmHg.  No change in baseline functional capacity.  Stable shortness of breath in a class II pattern.  Denies chest discomfort.  No palpitations.     Initial eval: 75-year-old female with a history of coronary disease, peripheral vascular disease and chronic congestive systolic heart failure.  Recently had right eye pain and was diagnosed with a right retinal embolic event.  Ophthalmology evaluation has been requested, unclear whether this was a venous or arterial thrombosis.  Reviewed recent echo and carotid duplex.  No significant palpitations.  She has stable dyspnea on exertion and a class II-III pattern.  Uses a wheeled walker for ambulation.  She is compliant with medical therapy and was on aspirin prior to this event.  She reports that she is currently on a statin but does not know the name or dosage, she will call us back with this information later.  Blood pressures run less than 130/80 mmHg.  She is scheduled for an ophthalmologic procedure next week.    PHYSICAL EXAMINATION:  Vitals:    04/25/22 1355   BP: 136/74   BP Location: Right arm   Patient Position: Sitting   Pulse: 73   SpO2: 90%   Weight: 115 kg (253 lb)   Height: 154.9 cm (61\")     General Appearance:    Alert, cooperative, no distress, appears stated age   Head:    Normocephalic, without obvious abnormality, atraumatic "   Eyes:    conjunctiva/corneas clear   Nose:   Nares normal, septum midline, mucosa normal, no drainage   Throat:   Lips, teeth and gums normal   Neck:   Supple, symmetrical, trachea midline, no carotid    bruit or JVD   Lungs:     Clear to auscultation bilaterally, respirations unlabored   Chest Wall:    No tenderness or deformity    Heart:    Regular rate and rhythm, S1 and S2 normal, 2/6 early peaking systolic murmur right upper sternal border, rub   or gallop, normal carotid impulse bilaterally.  Left carotid bruit   Abdomen:     Soft, non-tender   Extremities:   Extremities normal, atraumatic, no cyanosis.  Trivial edema   Pulses:   2+ and symmetric all extremities   Skin:   Skin color, texture, turgor normal, no rashes or lesions       Diagnostic Data:  Procedures  Lab Results   Component Value Date    TRIG 103 10/12/2020    HDL 46 10/12/2020     Lab Results   Component Value Date    GLUCOSE 177 (H) 11/18/2021    BUN 28 (H) 11/18/2021    CREATININE 1.45 (H) 11/18/2021     11/18/2021    K 3.9 11/18/2021     11/18/2021    CO2 19.7 (L) 11/18/2021     Lab Results   Component Value Date    HGBA1C 5.90 (H) 10/12/2020     Lab Results   Component Value Date    WBC 10.61 11/18/2021    HGB 14.2 11/18/2021    HCT 41.4 11/18/2021     11/18/2021       Allergies  No Known Allergies    Current Medications    Current Outpatient Medications:   •  albuterol sulfate  (90 Base) MCG/ACT inhaler, Inhale 2 puffs Every 4 (Four) Hours As Needed for Wheezing or Shortness of Air., Disp: 18 g, Rfl: 3  •  atorvastatin (LIPITOR) 40 MG tablet, Take 1 tablet by mouth Every Night., Disp: 90 tablet, Rfl: 3  •  Blood Glucose Monitoring Suppl (OneTouch Verio Flex System) w/Device kit, , Disp: , Rfl:   •  carvedilol (COREG) 3.125 MG tablet, TAKE ONE TABLET BY MOUTH TWICE A DAY WITH MEALS, Disp: 180 tablet, Rfl: 0  •  Cholecalciferol (Vitamin D3) 50 MCG (2000 UT) capsule, Take 2,000 Units by mouth Daily. 2000 units, Disp:  , Rfl:   •  clopidogrel (PLAVIX) 75 MG tablet, TAKE ONE TABLET BY MOUTH DAILY, Disp: 90 tablet, Rfl: 1  •  Continuous Blood Gluc Sensor (Dexcom G5 Mob/G4 Plat Sensor) misc, 1 each Every 7 (Seven) Days., Disp: 4 each, Rfl: 11  •  glucose blood test strip, Use as instructed, Disp: 100 each, Rfl: 12  •  glucose monitor monitoring kit, 1 each As Needed (glucose monitoring). Please provide patient with glucose monitor and strips covered by her insurance., Disp: 1 each, Rfl: 0  •  ipratropium-albuterol (DUO-NEB) 0.5-2.5 mg/3 ml nebulizer, Take 3 ml by nebulization every TID-QID prn wheezing or shortness of breath, Disp: 360 mL, Rfl: 0  •  nitroglycerin (NITROSTAT) 0.4 MG SL tablet, Place 1 tablet under the tongue Every 5 (Five) Minutes As Needed for Chest Pain. Take no more than 3 doses in 15 minutes., Disp: 100 tablet, Rfl: 0  •  OneTouch Delica Lancets 33G misc, 1 Device 3 (Three) Times a Day., Disp: 100 each, Rfl: 12  •  valsartan (Diovan) 160 MG tablet, Take 1 tablet by mouth Daily., Disp: 90 tablet, Rfl: 3          ROS  ROS      SOCIAL HX  Social History     Socioeconomic History   • Marital status:    Tobacco Use   • Smoking status: Former Smoker     Types: Cigarettes     Quit date:      Years since quittin.3   • Smokeless tobacco: Never Used   Vaping Use   • Vaping Use: Never used   Substance and Sexual Activity   • Alcohol use: No   • Drug use: No   • Sexual activity: Never       FAMILY HX  Family History   Problem Relation Age of Onset   • Diabetes Mother    • Heart failure Mother    • Heart failure Father    • No Known Problems Sister    • No Known Problems Brother    • No Known Problems Son    • No Known Problems Daughter              Armaan Samuels III, MD, FACC

## 2022-05-06 NOTE — PLAN OF CARE
Problem: Adult Inpatient Plan of Care  Goal: Plan of Care Review  3/19/2021 1058 by Ericka Armstrong RN  Outcome: Met  3/19/2021 1057 by Ericka Armstrong RN  Outcome: Ongoing, Progressing  Flowsheets (Taken 3/19/2021 1057)  Progress: improving  Plan of Care Reviewed With: patient  Outcome Summary:   VSS on room air   no complaints of pain   no complaints of dizziness   pt got cleaned up and up in chair   anticipating discharge  Goal: Patient-Specific Goal (Individualized)  3/19/2021 1058 by Ericka Armstrong RN  Outcome: Met  3/19/2021 1057 by Ericka Armstrong RN  Outcome: Ongoing, Progressing  Goal: Absence of Hospital-Acquired Illness or Injury  3/19/2021 1058 by Ericka Armstrong RN  Outcome: Met  3/19/2021 1057 by Ericka Armstrong RN  Outcome: Ongoing, Progressing  Goal: Optimal Comfort and Wellbeing  3/19/2021 1058 by Ericka Armstrong RN  Outcome: Met  3/19/2021 1057 by Ericka Armstrong RN  Outcome: Ongoing, Progressing  Goal: Readiness for Transition of Care  3/19/2021 1058 by Ericka Armstrong RN  Outcome: Met  3/19/2021 1057 by Ericka Armstrong RN  Outcome: Ongoing, Progressing     Problem: Fall Injury Risk  Goal: Absence of Fall and Fall-Related Injury  3/19/2021 1058 by Ericka Armstrong RN  Outcome: Met  3/19/2021 1057 by Ericka Armstrong RN  Outcome: Ongoing, Progressing     Problem: Syncope  Goal: Absence of Syncopal Symptoms  3/19/2021 1058 by Ericka Armstrong RN  Outcome: Met  3/19/2021 1057 by Ericka Armstrong RN  Outcome: Ongoing, Progressing     Problem: Electrolyte Imbalance (Acute Kidney Injury/Impairment)  Goal: Serum Electrolyte Balance  3/19/2021 1058 by Ericka Armstrong RN  Outcome: Met  3/19/2021 1057 by Ericka Armstrong RN  Outcome: Ongoing, Progressing     Problem: Fluid Imbalance (Acute Kidney Injury/Impairment)  Goal: Optimal Fluid Balance  3/19/2021 1058 by Ericka Armstrong RN  Outcome: Met  3/19/2021 1057 by Ericka Armstrong, RN  Outcome: Ongoing,  ----- Message from Mj Quintanilla MD sent at 5/6/2022 12:37 PM CDT -----  Strep test COVID test normal.  To treat his upper respiratory symptoms he may use Tylenol ibuprofen and Delsym.   Progressing     Problem: Hematologic Alteration (Acute Kidney Injury/Impairment)  Goal: Hemoglobin, Hematocrit and Platelets Within Normal Range  3/19/2021 1058 by Ericka Armstrong RN  Outcome: Met  3/19/2021 1057 by Ericka Armstrong RN  Outcome: Ongoing, Progressing     Problem: Oral Intake Inadequate (Acute Kidney Injury/Impairment)  Goal: Optimal Nutrition Intake  3/19/2021 1058 by Ericka Armstrong RN  Outcome: Met  3/19/2021 1057 by Ericka Armstrong RN  Outcome: Ongoing, Progressing     Problem: Renal Function Impairment (Acute Kidney Injury/Impairment)  Goal: Effective Renal Function  3/19/2021 1058 by Ericka Armstrong RN  Outcome: Met  3/19/2021 1057 by Ericka Armstrong RN  Outcome: Ongoing, Progressing   Goal Outcome Evaluation:  Plan of Care Reviewed With: patient  Progress: improving  Outcome Summary: VSS on room air; no complaints of pain; no complaints of dizziness; pt got cleaned up and up in chair; anticipating discharge

## 2022-05-16 ENCOUNTER — OFFICE VISIT (OUTPATIENT)
Dept: FAMILY MEDICINE CLINIC | Facility: CLINIC | Age: 77
End: 2022-05-16

## 2022-05-16 VITALS
HEIGHT: 61 IN | HEART RATE: 63 BPM | BODY MASS INDEX: 48.15 KG/M2 | RESPIRATION RATE: 22 BRPM | DIASTOLIC BLOOD PRESSURE: 82 MMHG | WEIGHT: 255 LBS | OXYGEN SATURATION: 99 % | TEMPERATURE: 97 F | SYSTOLIC BLOOD PRESSURE: 128 MMHG

## 2022-05-16 DIAGNOSIS — N18.32 TYPE 2 DIABETES MELLITUS WITH STAGE 3B CHRONIC KIDNEY DISEASE, WITHOUT LONG-TERM CURRENT USE OF INSULIN: Primary | ICD-10-CM

## 2022-05-16 DIAGNOSIS — R26.89 IMPAIRED GAIT AND MOBILITY: ICD-10-CM

## 2022-05-16 DIAGNOSIS — E11.22 TYPE 2 DIABETES MELLITUS WITH STAGE 3B CHRONIC KIDNEY DISEASE, WITHOUT LONG-TERM CURRENT USE OF INSULIN: Primary | ICD-10-CM

## 2022-05-16 LAB
EXPIRATION DATE: NORMAL
HBA1C MFR BLD: 5.6 %
Lab: NORMAL

## 2022-05-16 PROCEDURE — 3044F HG A1C LEVEL LT 7.0%: CPT | Performed by: NURSE PRACTITIONER

## 2022-05-16 PROCEDURE — 83036 HEMOGLOBIN GLYCOSYLATED A1C: CPT | Performed by: NURSE PRACTITIONER

## 2022-05-16 PROCEDURE — 99213 OFFICE O/P EST LOW 20 MIN: CPT | Performed by: NURSE PRACTITIONER

## 2022-05-17 PROBLEM — N18.32 TYPE 2 DIABETES MELLITUS WITH STAGE 3B CHRONIC KIDNEY DISEASE, WITHOUT LONG-TERM CURRENT USE OF INSULIN (HCC): Chronic | Status: ACTIVE | Noted: 2022-05-17

## 2022-05-17 PROBLEM — E11.22 TYPE 2 DIABETES MELLITUS WITH STAGE 3B CHRONIC KIDNEY DISEASE, WITHOUT LONG-TERM CURRENT USE OF INSULIN (HCC): Chronic | Status: ACTIVE | Noted: 2022-05-17

## 2022-05-17 PROBLEM — E11.22 TYPE 2 DIABETES MELLITUS WITH STAGE 3B CHRONIC KIDNEY DISEASE, WITHOUT LONG-TERM CURRENT USE OF INSULIN (HCC): Status: ACTIVE | Noted: 2022-05-17

## 2022-05-17 PROBLEM — N18.32 TYPE 2 DIABETES MELLITUS WITH STAGE 3B CHRONIC KIDNEY DISEASE, WITHOUT LONG-TERM CURRENT USE OF INSULIN (HCC): Status: ACTIVE | Noted: 2022-05-17

## 2022-05-17 NOTE — PROGRESS NOTES
Follow Up Office Note     Patient Name: Georgia Velázquez  : 1945   MRN: [id number]     Chief Complaint:    Chief Complaint   Patient presents with   • Follow-up       History of Present Illness: Georgia Velázquez is a 77 y.o. female who presents today for reevaluation and management of type 2 diabetes mellitus.  Patient's diabetes has been stable and controlled with lifestyle management.  Patient was previously on metformin and at one time insulin, but A1c has been well controlled in the past year without medication.    Patient is also requesting a prescription for a wheelchair today due to decreased mobility as well as a handicap parking permit.      Subjective      Review of Systems:   Review of Systems   Constitutional: Negative for activity change, appetite change, chills, diaphoresis, fatigue and fever.   Respiratory: Negative.    Cardiovascular: Negative.    Gastrointestinal: Negative.    Musculoskeletal: Positive for gait problem.        Past Medical History:   Past Medical History:   Diagnosis Date   • Acute kidney injury ([facility]) 2017   • Aortic regurgitation    • Arthritis of shoulder 2016   • Body mass index (BMI) of 45.0-49.9 in adult ([facility]) 2019   • CHF (congestive heart failure) ([facility])    • Closed compression fracture of L4 lumbar vertebra 2017    Added automatically from request for surgery [id number]   • Constipation 2017   • Coronary artery disease 2016   • Diabetes mellitus type 2 in obese ([facility]) 2018   • Elevated alkaline phosphatase level 2018   • Elevated creatine kinase    • Essential hypertension 2016   • GERD (gastroesophageal reflux disease) 2016   • Glaucoma 2020   • History of kyphoplasty 2018   • Lumbar facet arthropathy 2018   • Lumbar stenosis with neurogenic claudication 2018   • Morbid obesity due to excess calories ([facility]) 2018   • Myocardial infarction ([facility]) 2016   • Osteoporosis 2016   •  Physical deconditioning 1/30/2018   • Retinal emboli, right 5/21/2020   • Sepsis (HCC)     uro   • Stroke (HCC)    • Urinary incontinence without sensory awareness 2/26/2018         Medications:     Current Outpatient Medications:   •  albuterol sulfate  (90 Base) MCG/ACT inhaler, Inhale 2 puffs Every 4 (Four) Hours As Needed for Wheezing or Shortness of Air., Disp: 18 g, Rfl: 3  •  atorvastatin (LIPITOR) 40 MG tablet, Take 1 tablet by mouth Every Night., Disp: 90 tablet, Rfl: 3  •  Blood Glucose Monitoring Suppl (OneTouch Verio Flex System) w/Device kit, , Disp: , Rfl:   •  carvedilol (COREG) 3.125 MG tablet, TAKE ONE TABLET BY MOUTH TWICE A DAY WITH MEALS, Disp: 180 tablet, Rfl: 0  •  Cholecalciferol (Vitamin D3) 50 MCG (2000 UT) capsule, Take 2,000 Units by mouth Daily. 2000 units, Disp: , Rfl:   •  clopidogrel (PLAVIX) 75 MG tablet, TAKE ONE TABLET BY MOUTH DAILY, Disp: 90 tablet, Rfl: 1  •  Continuous Blood Gluc Sensor (Dexcom G5 Mob/G4 Plat Sensor) misc, 1 each Every 7 (Seven) Days., Disp: 4 each, Rfl: 11  •  glucose blood test strip, Use as instructed, Disp: 100 each, Rfl: 12  •  glucose monitor monitoring kit, 1 each As Needed (glucose monitoring). Please provide patient with glucose monitor and strips covered by her insurance., Disp: 1 each, Rfl: 0  •  ipratropium-albuterol (DUO-NEB) 0.5-2.5 mg/3 ml nebulizer, Take 3 ml by nebulization every TID-QID prn wheezing or shortness of breath, Disp: 360 mL, Rfl: 0  •  nitroglycerin (NITROSTAT) 0.4 MG SL tablet, Place 1 tablet under the tongue Every 5 (Five) Minutes As Needed for Chest Pain. Take no more than 3 doses in 15 minutes., Disp: 100 tablet, Rfl: 0  •  OneTouch Delica Lancets 33G misc, 1 Device 3 (Three) Times a Day., Disp: 100 each, Rfl: 12  •  valsartan (Diovan) 160 MG tablet, Take 1 tablet by mouth Daily., Disp: 90 tablet, Rfl: 3    Allergies:   No Known Allergies      Objective     Physical Exam:  Vital Signs:   Vitals:    05/16/22 1349   BP:  "128/82   Pulse: 63   Resp: 22   Temp: 97 °F (36.1 °C)   SpO2: 99%   Weight: 116 kg (255 lb)   Height: 154.9 cm (61\")   PainSc: 0-No pain     Body mass index is 48.18 kg/m².     Physical Exam  Vitals and nursing note reviewed.   Constitutional:       General: She is not in acute distress.     Appearance: Normal appearance. She is well-developed. She is not ill-appearing, toxic-appearing or diaphoretic.   HENT:      Head: Normocephalic and atraumatic.   Cardiovascular:      Rate and Rhythm: Normal rate and regular rhythm.      Heart sounds: Normal heart sounds.   Pulmonary:      Effort: Pulmonary effort is normal. No respiratory distress.      Breath sounds: Normal breath sounds. No stridor. No wheezing.   Skin:     General: Skin is warm and dry.   Neurological:      General: No focal deficit present.      Mental Status: She is alert and oriented to person, place, and time.   Psychiatric:         Mood and Affect: Mood normal.         Behavior: Behavior normal. Behavior is cooperative.         Thought Content: Thought content normal.         Judgment: Judgment normal.         Assessment / Plan      Assessment/Plan:   Diagnoses and all orders for this visit:    1. Type 2 diabetes mellitus with stage 3b chronic kidney disease, without long-term current use of insulin (HCC) (Primary)  Assessment & Plan:  Diabetes stable and controlled.  Continue ADA diet.    Orders:  -     POC Glycosylated Hemoglobin (Hb A1C)    2. Impaired gait and mobility         Lab 05/16/22  1359   HEMOGLOBIN A1C 5.6     Prescription for wheelchair handwritten and provided to patient.  Also handicap parking permit signed and provided to patient.  Continue current medications.    Follow Up:   PRN and at next scheduled appointment(s) with PCP.    Discussed the nature of the medical condition(s) risks, complications, implications, management, safe and proper use of medications. Encouraged medication compliance, and keeping scheduled follow up " appointments with me and any other providers.      RTC if symptoms fail to improve, to ER if symptoms worsen.      DON Veronica  [facility]

## 2022-06-12 ENCOUNTER — APPOINTMENT (OUTPATIENT)
Dept: GENERAL RADIOLOGY | Facility: HOSPITAL | Age: 77
End: 2022-06-12

## 2022-06-12 ENCOUNTER — HOSPITAL ENCOUNTER (INPATIENT)
Facility: HOSPITAL | Age: 77
LOS: 7 days | Discharge: NURSING, REHAB OR LONG-TERM CARE | End: 2022-06-20
Attending: EMERGENCY MEDICINE | Admitting: INTERNAL MEDICINE

## 2022-06-12 DIAGNOSIS — Z86.79 HISTORY OF CHF (CONGESTIVE HEART FAILURE): ICD-10-CM

## 2022-06-12 DIAGNOSIS — J44.1 COPD WITH ACUTE EXACERBATION: Primary | ICD-10-CM

## 2022-06-12 DIAGNOSIS — J96.01 ACUTE RESPIRATORY FAILURE WITH HYPOXIA: ICD-10-CM

## 2022-06-12 DIAGNOSIS — I10 ESSENTIAL HYPERTENSION: ICD-10-CM

## 2022-06-12 PROCEDURE — 83880 ASSAY OF NATRIURETIC PEPTIDE: CPT | Performed by: EMERGENCY MEDICINE

## 2022-06-12 PROCEDURE — 99285 EMERGENCY DEPT VISIT HI MDM: CPT

## 2022-06-12 PROCEDURE — 80053 COMPREHEN METABOLIC PANEL: CPT | Performed by: EMERGENCY MEDICINE

## 2022-06-12 PROCEDURE — 94799 UNLISTED PULMONARY SVC/PX: CPT

## 2022-06-12 PROCEDURE — 84484 ASSAY OF TROPONIN QUANT: CPT | Performed by: EMERGENCY MEDICINE

## 2022-06-12 PROCEDURE — 93005 ELECTROCARDIOGRAM TRACING: CPT | Performed by: EMERGENCY MEDICINE

## 2022-06-12 PROCEDURE — 94640 AIRWAY INHALATION TREATMENT: CPT

## 2022-06-12 RX ORDER — IPRATROPIUM BROMIDE AND ALBUTEROL SULFATE 2.5; .5 MG/3ML; MG/3ML
3 SOLUTION RESPIRATORY (INHALATION) ONCE
Status: COMPLETED | OUTPATIENT
Start: 2022-06-12 | End: 2022-06-12

## 2022-06-12 RX ORDER — SODIUM CHLORIDE 0.9 % (FLUSH) 0.9 %
10 SYRINGE (ML) INJECTION AS NEEDED
Status: DISCONTINUED | OUTPATIENT
Start: 2022-06-12 | End: 2022-06-20 | Stop reason: HOSPADM

## 2022-06-12 RX ORDER — ALBUTEROL SULFATE 2.5 MG/3ML
2.5 SOLUTION RESPIRATORY (INHALATION) ONCE
Status: DISCONTINUED | OUTPATIENT
Start: 2022-06-12 | End: 2022-06-14

## 2022-06-12 RX ADMIN — IPRATROPIUM BROMIDE AND ALBUTEROL SULFATE 3 ML: .5; 3 SOLUTION RESPIRATORY (INHALATION) at 23:52

## 2022-06-13 ENCOUNTER — APPOINTMENT (OUTPATIENT)
Dept: GENERAL RADIOLOGY | Facility: HOSPITAL | Age: 77
End: 2022-06-13

## 2022-06-13 PROBLEM — J44.1 COPD WITH ACUTE EXACERBATION (HCC): Status: ACTIVE | Noted: 2022-06-13

## 2022-06-13 PROBLEM — E66.01 OBESITY, MORBID, BMI 50 OR HIGHER (HCC): Status: ACTIVE | Noted: 2022-06-13

## 2022-06-13 PROBLEM — R09.02 HYPOXIA: Status: ACTIVE | Noted: 2022-06-13

## 2022-06-13 LAB
ALBUMIN SERPL-MCNC: 3.5 G/DL (ref 3.5–5.2)
ALBUMIN/GLOB SERPL: 0.9 G/DL
ALP SERPL-CCNC: 100 U/L (ref 39–117)
ALT SERPL W P-5'-P-CCNC: 17 U/L (ref 1–33)
ANION GAP SERPL CALCULATED.3IONS-SCNC: 12 MMOL/L (ref 5–15)
ANION GAP SERPL CALCULATED.3IONS-SCNC: 14 MMOL/L (ref 5–15)
AST SERPL-CCNC: 39 U/L (ref 1–32)
B PARAPERT DNA SPEC QL NAA+PROBE: NOT DETECTED
B PERT DNA SPEC QL NAA+PROBE: NOT DETECTED
BASOPHILS # BLD AUTO: 0.01 10*3/MM3 (ref 0–0.2)
BASOPHILS NFR BLD AUTO: 0.2 % (ref 0–1.5)
BILIRUB SERPL-MCNC: 0.5 MG/DL (ref 0–1.2)
BUN SERPL-MCNC: 22 MG/DL (ref 8–23)
BUN SERPL-MCNC: 24 MG/DL (ref 8–23)
BUN/CREAT SERPL: 16.1 (ref 7–25)
BUN/CREAT SERPL: 19.7 (ref 7–25)
C PNEUM DNA NPH QL NAA+NON-PROBE: NOT DETECTED
CALCIUM SPEC-SCNC: 8.5 MG/DL (ref 8.6–10.5)
CALCIUM SPEC-SCNC: 8.5 MG/DL (ref 8.6–10.5)
CHLORIDE SERPL-SCNC: 100 MMOL/L (ref 98–107)
CHLORIDE SERPL-SCNC: 101 MMOL/L (ref 98–107)
CLUMPED PLATELETS: PRESENT
CO2 SERPL-SCNC: 20 MMOL/L (ref 22–29)
CO2 SERPL-SCNC: 22 MMOL/L (ref 22–29)
CREAT SERPL-MCNC: 1.22 MG/DL (ref 0.57–1)
CREAT SERPL-MCNC: 1.37 MG/DL (ref 0.57–1)
DEPRECATED RDW RBC AUTO: 46.2 FL (ref 37–54)
DEPRECATED RDW RBC AUTO: 46.4 FL (ref 37–54)
EGFRCR SERPLBLD CKD-EPI 2021: 39.4 ML/MIN/1.73
EGFRCR SERPLBLD CKD-EPI 2021: 45.2 ML/MIN/1.73
EOSINOPHIL # BLD AUTO: 0.01 10*3/MM3 (ref 0–0.4)
EOSINOPHIL NFR BLD AUTO: 0.2 % (ref 0.3–6.2)
ERYTHROCYTE [DISTWIDTH] IN BLOOD BY AUTOMATED COUNT: 13.3 % (ref 12.3–15.4)
ERYTHROCYTE [DISTWIDTH] IN BLOOD BY AUTOMATED COUNT: 13.6 % (ref 12.3–15.4)
FLUAV RNA RESP QL NAA+PROBE: NOT DETECTED
FLUAV SUBTYP SPEC NAA+PROBE: NOT DETECTED
FLUBV RNA ISLT QL NAA+PROBE: NOT DETECTED
FLUBV RNA RESP QL NAA+PROBE: NOT DETECTED
GLOBULIN UR ELPH-MCNC: 3.7 GM/DL
GLUCOSE BLDC GLUCOMTR-MCNC: 170 MG/DL (ref 70–130)
GLUCOSE BLDC GLUCOMTR-MCNC: 191 MG/DL (ref 70–130)
GLUCOSE SERPL-MCNC: 115 MG/DL (ref 65–99)
GLUCOSE SERPL-MCNC: 190 MG/DL (ref 65–99)
HADV DNA SPEC NAA+PROBE: NOT DETECTED
HCOV 229E RNA SPEC QL NAA+PROBE: NOT DETECTED
HCOV HKU1 RNA SPEC QL NAA+PROBE: NOT DETECTED
HCOV NL63 RNA SPEC QL NAA+PROBE: NOT DETECTED
HCOV OC43 RNA SPEC QL NAA+PROBE: NOT DETECTED
HCT VFR BLD AUTO: 35.7 % (ref 34–46.6)
HCT VFR BLD AUTO: 40.4 % (ref 34–46.6)
HGB BLD-MCNC: 11.9 G/DL (ref 12–15.9)
HGB BLD-MCNC: 13.4 G/DL (ref 12–15.9)
HMPV RNA NPH QL NAA+NON-PROBE: NOT DETECTED
HOLD SPECIMEN: NORMAL
HPIV1 RNA ISLT QL NAA+PROBE: NOT DETECTED
HPIV2 RNA SPEC QL NAA+PROBE: NOT DETECTED
HPIV3 RNA NPH QL NAA+PROBE: DETECTED
HPIV4 P GENE NPH QL NAA+PROBE: NOT DETECTED
IMM GRANULOCYTES # BLD AUTO: 0.02 10*3/MM3 (ref 0–0.05)
IMM GRANULOCYTES NFR BLD AUTO: 0.4 % (ref 0–0.5)
LYMPHOCYTES # BLD AUTO: 1.21 10*3/MM3 (ref 0.7–3.1)
LYMPHOCYTES NFR BLD AUTO: 23.6 % (ref 19.6–45.3)
M PNEUMO IGG SER IA-ACNC: NOT DETECTED
MAGNESIUM SERPL-MCNC: 2.4 MG/DL (ref 1.6–2.4)
MCH RBC QN AUTO: 30.7 PG (ref 26.6–33)
MCH RBC QN AUTO: 30.8 PG (ref 26.6–33)
MCHC RBC AUTO-ENTMCNC: 33.2 G/DL (ref 31.5–35.7)
MCHC RBC AUTO-ENTMCNC: 33.3 G/DL (ref 31.5–35.7)
MCV RBC AUTO: 92 FL (ref 79–97)
MCV RBC AUTO: 92.9 FL (ref 79–97)
MONOCYTES # BLD AUTO: 0.26 10*3/MM3 (ref 0.1–0.9)
MONOCYTES NFR BLD AUTO: 5.1 % (ref 5–12)
NEUTROPHILS NFR BLD AUTO: 3.62 10*3/MM3 (ref 1.7–7)
NEUTROPHILS NFR BLD AUTO: 70.5 % (ref 42.7–76)
NRBC BLD AUTO-RTO: 0 /100 WBC (ref 0–0.2)
NT-PROBNP SERPL-MCNC: 3497 PG/ML (ref 0–1800)
PLATELET # BLD AUTO: 117 10*3/MM3 (ref 140–450)
PLATELET # BLD AUTO: 128 10*3/MM3 (ref 140–450)
PMV BLD AUTO: 10.7 FL (ref 6–12)
PMV BLD AUTO: 11.2 FL (ref 6–12)
POTASSIUM SERPL-SCNC: 3.9 MMOL/L (ref 3.5–5.2)
POTASSIUM SERPL-SCNC: 4.2 MMOL/L (ref 3.5–5.2)
PROT SERPL-MCNC: 7.2 G/DL (ref 6–8.5)
QT INTERVAL: 394 MS
QTC INTERVAL: 434 MS
RBC # BLD AUTO: 3.88 10*6/MM3 (ref 3.77–5.28)
RBC # BLD AUTO: 4.35 10*6/MM3 (ref 3.77–5.28)
RBC MORPH BLD: NORMAL
RHINOVIRUS RNA SPEC NAA+PROBE: NOT DETECTED
RSV RNA NPH QL NAA+NON-PROBE: NOT DETECTED
SARS-COV-2 RNA NPH QL NAA+NON-PROBE: NOT DETECTED
SARS-COV-2 RNA RESP QL NAA+PROBE: NOT DETECTED
SODIUM SERPL-SCNC: 134 MMOL/L (ref 136–145)
SODIUM SERPL-SCNC: 135 MMOL/L (ref 136–145)
TROPONIN T SERPL-MCNC: 0.02 NG/ML (ref 0–0.03)
WBC MORPH BLD: NORMAL
WBC NRBC COR # BLD: 3.65 10*3/MM3 (ref 3.4–10.8)
WBC NRBC COR # BLD: 5.13 10*3/MM3 (ref 3.4–10.8)
WHOLE BLOOD HOLD COAG: NORMAL
WHOLE BLOOD HOLD SPECIMEN: NORMAL

## 2022-06-13 PROCEDURE — 63710000001 INSULIN LISPRO (HUMAN) PER 5 UNITS: Performed by: INTERNAL MEDICINE

## 2022-06-13 PROCEDURE — 25010000002 METHYLPREDNISOLONE PER 125 MG: Performed by: INTERNAL MEDICINE

## 2022-06-13 PROCEDURE — 85025 COMPLETE CBC W/AUTO DIFF WBC: CPT | Performed by: EMERGENCY MEDICINE

## 2022-06-13 PROCEDURE — 25010000002 ENOXAPARIN PER 10 MG: Performed by: INTERNAL MEDICINE

## 2022-06-13 PROCEDURE — 82962 GLUCOSE BLOOD TEST: CPT

## 2022-06-13 PROCEDURE — 80048 BASIC METABOLIC PNL TOTAL CA: CPT | Performed by: INTERNAL MEDICINE

## 2022-06-13 PROCEDURE — 71045 X-RAY EXAM CHEST 1 VIEW: CPT

## 2022-06-13 PROCEDURE — 85007 BL SMEAR W/DIFF WBC COUNT: CPT | Performed by: EMERGENCY MEDICINE

## 2022-06-13 PROCEDURE — 85027 COMPLETE CBC AUTOMATED: CPT | Performed by: INTERNAL MEDICINE

## 2022-06-13 PROCEDURE — 83735 ASSAY OF MAGNESIUM: CPT | Performed by: INTERNAL MEDICINE

## 2022-06-13 PROCEDURE — 94664 DEMO&/EVAL PT USE INHALER: CPT

## 2022-06-13 PROCEDURE — 87636 SARSCOV2 & INF A&B AMP PRB: CPT | Performed by: EMERGENCY MEDICINE

## 2022-06-13 PROCEDURE — 0202U NFCT DS 22 TRGT SARS-COV-2: CPT | Performed by: INTERNAL MEDICINE

## 2022-06-13 PROCEDURE — 99223 1ST HOSP IP/OBS HIGH 75: CPT | Performed by: INTERNAL MEDICINE

## 2022-06-13 PROCEDURE — 94799 UNLISTED PULMONARY SVC/PX: CPT

## 2022-06-13 RX ORDER — VALSARTAN 80 MG/1
160 TABLET ORAL DAILY
Status: DISCONTINUED | OUTPATIENT
Start: 2022-06-13 | End: 2022-06-19

## 2022-06-13 RX ORDER — SODIUM CHLORIDE 0.9 % (FLUSH) 0.9 %
10 SYRINGE (ML) INJECTION EVERY 12 HOURS SCHEDULED
Status: DISCONTINUED | OUTPATIENT
Start: 2022-06-13 | End: 2022-06-20 | Stop reason: HOSPADM

## 2022-06-13 RX ORDER — AZITHROMYCIN 250 MG/1
500 TABLET, FILM COATED ORAL
Status: COMPLETED | OUTPATIENT
Start: 2022-06-13 | End: 2022-06-13

## 2022-06-13 RX ORDER — ENOXAPARIN SODIUM 100 MG/ML
40 INJECTION SUBCUTANEOUS EVERY 24 HOURS
Status: DISCONTINUED | OUTPATIENT
Start: 2022-06-13 | End: 2022-06-14

## 2022-06-13 RX ORDER — INSULIN LISPRO 100 [IU]/ML
0-7 INJECTION, SOLUTION INTRAVENOUS; SUBCUTANEOUS
Status: DISCONTINUED | OUTPATIENT
Start: 2022-06-13 | End: 2022-06-20 | Stop reason: HOSPADM

## 2022-06-13 RX ORDER — ATORVASTATIN CALCIUM 40 MG/1
40 TABLET, FILM COATED ORAL NIGHTLY
Status: DISCONTINUED | OUTPATIENT
Start: 2022-06-13 | End: 2022-06-20 | Stop reason: HOSPADM

## 2022-06-13 RX ORDER — ONDANSETRON 4 MG/1
4 TABLET, FILM COATED ORAL EVERY 6 HOURS PRN
Status: DISCONTINUED | OUTPATIENT
Start: 2022-06-13 | End: 2022-06-20 | Stop reason: HOSPADM

## 2022-06-13 RX ORDER — IPRATROPIUM BROMIDE AND ALBUTEROL SULFATE 2.5; .5 MG/3ML; MG/3ML
3 SOLUTION RESPIRATORY (INHALATION) EVERY 4 HOURS PRN
Status: DISCONTINUED | OUTPATIENT
Start: 2022-06-13 | End: 2022-06-20 | Stop reason: HOSPADM

## 2022-06-13 RX ORDER — MAGNESIUM SULFATE HEPTAHYDRATE 40 MG/ML
4 INJECTION, SOLUTION INTRAVENOUS AS NEEDED
Status: DISCONTINUED | OUTPATIENT
Start: 2022-06-13 | End: 2022-06-20 | Stop reason: HOSPADM

## 2022-06-13 RX ORDER — NYSTATIN 100000 [USP'U]/G
POWDER TOPICAL EVERY 12 HOURS SCHEDULED
Status: DISCONTINUED | OUTPATIENT
Start: 2022-06-13 | End: 2022-06-20 | Stop reason: HOSPADM

## 2022-06-13 RX ORDER — METHYLPREDNISOLONE SODIUM SUCCINATE 125 MG/2ML
60 INJECTION, POWDER, LYOPHILIZED, FOR SOLUTION INTRAMUSCULAR; INTRAVENOUS EVERY 8 HOURS
Status: DISCONTINUED | OUTPATIENT
Start: 2022-06-13 | End: 2022-06-14

## 2022-06-13 RX ORDER — ACETAMINOPHEN 650 MG/1
650 SUPPOSITORY RECTAL EVERY 4 HOURS PRN
Status: DISCONTINUED | OUTPATIENT
Start: 2022-06-13 | End: 2022-06-20 | Stop reason: HOSPADM

## 2022-06-13 RX ORDER — MAGNESIUM SULFATE HEPTAHYDRATE 40 MG/ML
2 INJECTION, SOLUTION INTRAVENOUS AS NEEDED
Status: DISCONTINUED | OUTPATIENT
Start: 2022-06-13 | End: 2022-06-20 | Stop reason: HOSPADM

## 2022-06-13 RX ORDER — CLOPIDOGREL BISULFATE 75 MG/1
75 TABLET ORAL DAILY
Status: DISCONTINUED | OUTPATIENT
Start: 2022-06-13 | End: 2022-06-20 | Stop reason: HOSPADM

## 2022-06-13 RX ORDER — POTASSIUM CHLORIDE 7.45 MG/ML
10 INJECTION INTRAVENOUS
Status: DISCONTINUED | OUTPATIENT
Start: 2022-06-13 | End: 2022-06-20 | Stop reason: HOSPADM

## 2022-06-13 RX ORDER — NICOTINE POLACRILEX 4 MG
15 LOZENGE BUCCAL
Status: DISCONTINUED | OUTPATIENT
Start: 2022-06-13 | End: 2022-06-20 | Stop reason: HOSPADM

## 2022-06-13 RX ORDER — ONDANSETRON 2 MG/ML
4 INJECTION INTRAMUSCULAR; INTRAVENOUS EVERY 6 HOURS PRN
Status: DISCONTINUED | OUTPATIENT
Start: 2022-06-13 | End: 2022-06-20 | Stop reason: HOSPADM

## 2022-06-13 RX ORDER — ACETAMINOPHEN 160 MG/5ML
650 SOLUTION ORAL EVERY 4 HOURS PRN
Status: DISCONTINUED | OUTPATIENT
Start: 2022-06-13 | End: 2022-06-20 | Stop reason: HOSPADM

## 2022-06-13 RX ORDER — POTASSIUM CHLORIDE 750 MG/1
40 CAPSULE, EXTENDED RELEASE ORAL AS NEEDED
Status: DISCONTINUED | OUTPATIENT
Start: 2022-06-13 | End: 2022-06-20 | Stop reason: HOSPADM

## 2022-06-13 RX ORDER — AZITHROMYCIN 250 MG/1
250 TABLET, FILM COATED ORAL
Status: COMPLETED | OUTPATIENT
Start: 2022-06-14 | End: 2022-06-17

## 2022-06-13 RX ORDER — SODIUM CHLORIDE 0.9 % (FLUSH) 0.9 %
10 SYRINGE (ML) INJECTION AS NEEDED
Status: DISCONTINUED | OUTPATIENT
Start: 2022-06-13 | End: 2022-06-20 | Stop reason: HOSPADM

## 2022-06-13 RX ORDER — CARVEDILOL 3.12 MG/1
3.12 TABLET ORAL 2 TIMES DAILY WITH MEALS
Status: DISCONTINUED | OUTPATIENT
Start: 2022-06-13 | End: 2022-06-18

## 2022-06-13 RX ORDER — GUAIFENESIN 600 MG/1
1200 TABLET, EXTENDED RELEASE ORAL EVERY 12 HOURS SCHEDULED
Status: DISCONTINUED | OUTPATIENT
Start: 2022-06-13 | End: 2022-06-20 | Stop reason: HOSPADM

## 2022-06-13 RX ORDER — ACETAMINOPHEN 325 MG/1
650 TABLET ORAL EVERY 4 HOURS PRN
Status: DISCONTINUED | OUTPATIENT
Start: 2022-06-13 | End: 2022-06-20 | Stop reason: HOSPADM

## 2022-06-13 RX ORDER — IPRATROPIUM BROMIDE AND ALBUTEROL SULFATE 2.5; .5 MG/3ML; MG/3ML
3 SOLUTION RESPIRATORY (INHALATION)
Status: DISCONTINUED | OUTPATIENT
Start: 2022-06-13 | End: 2022-06-17

## 2022-06-13 RX ORDER — DEXTROSE MONOHYDRATE 25 G/50ML
25 INJECTION, SOLUTION INTRAVENOUS
Status: DISCONTINUED | OUTPATIENT
Start: 2022-06-13 | End: 2022-06-20 | Stop reason: HOSPADM

## 2022-06-13 RX ORDER — POTASSIUM CHLORIDE 1.5 G/1.77G
40 POWDER, FOR SOLUTION ORAL AS NEEDED
Status: DISCONTINUED | OUTPATIENT
Start: 2022-06-13 | End: 2022-06-20 | Stop reason: HOSPADM

## 2022-06-13 RX ADMIN — IPRATROPIUM BROMIDE AND ALBUTEROL SULFATE 3 ML: .5; 3 SOLUTION RESPIRATORY (INHALATION) at 15:43

## 2022-06-13 RX ADMIN — IPRATROPIUM BROMIDE AND ALBUTEROL SULFATE 3 ML: .5; 3 SOLUTION RESPIRATORY (INHALATION) at 12:29

## 2022-06-13 RX ADMIN — CLOPIDOGREL BISULFATE 75 MG: 75 TABLET ORAL at 11:55

## 2022-06-13 RX ADMIN — ATORVASTATIN CALCIUM 40 MG: 40 TABLET, FILM COATED ORAL at 20:59

## 2022-06-13 RX ADMIN — IPRATROPIUM BROMIDE AND ALBUTEROL SULFATE 3 ML: .5; 3 SOLUTION RESPIRATORY (INHALATION) at 07:53

## 2022-06-13 RX ADMIN — METHYLPREDNISOLONE SODIUM SUCCINATE 60 MG: 125 INJECTION, POWDER, FOR SOLUTION INTRAMUSCULAR; INTRAVENOUS at 20:59

## 2022-06-13 RX ADMIN — INSULIN LISPRO 2 UNITS: 100 INJECTION, SOLUTION INTRAVENOUS; SUBCUTANEOUS at 11:55

## 2022-06-13 RX ADMIN — INSULIN LISPRO 2 UNITS: 100 INJECTION, SOLUTION INTRAVENOUS; SUBCUTANEOUS at 17:28

## 2022-06-13 RX ADMIN — Medication 10 ML: at 21:00

## 2022-06-13 RX ADMIN — ENOXAPARIN SODIUM 40 MG: 40 INJECTION SUBCUTANEOUS at 20:59

## 2022-06-13 RX ADMIN — GUAIFENESIN 1200 MG: 600 TABLET, EXTENDED RELEASE ORAL at 09:53

## 2022-06-13 RX ADMIN — CARVEDILOL 3.12 MG: 3.12 TABLET, FILM COATED ORAL at 11:55

## 2022-06-13 RX ADMIN — GUAIFENESIN 1200 MG: 600 TABLET, EXTENDED RELEASE ORAL at 20:59

## 2022-06-13 RX ADMIN — ACETAMINOPHEN 650 MG: 325 TABLET ORAL at 17:28

## 2022-06-13 RX ADMIN — METHYLPREDNISOLONE SODIUM SUCCINATE 60 MG: 125 INJECTION, POWDER, FOR SOLUTION INTRAMUSCULAR; INTRAVENOUS at 11:54

## 2022-06-13 RX ADMIN — AZITHROMYCIN MONOHYDRATE 500 MG: 250 TABLET ORAL at 09:52

## 2022-06-13 RX ADMIN — NYSTATIN 1 APPLICATION: 100000 POWDER TOPICAL at 11:55

## 2022-06-13 RX ADMIN — Medication 10 ML: at 11:57

## 2022-06-13 RX ADMIN — CARVEDILOL 3.12 MG: 3.12 TABLET, FILM COATED ORAL at 17:28

## 2022-06-13 RX ADMIN — METHYLPREDNISOLONE SODIUM SUCCINATE 60 MG: 125 INJECTION, POWDER, FOR SOLUTION INTRAMUSCULAR; INTRAVENOUS at 03:21

## 2022-06-13 RX ADMIN — NYSTATIN: 100000 POWDER TOPICAL at 21:03

## 2022-06-13 RX ADMIN — VALSARTAN 160 MG: 80 TABLET, FILM COATED ORAL at 11:55

## 2022-06-13 NOTE — NURSING NOTE
Woc consulted for: Left gluteal    Wound/ Skin Assessment: Patient presents with MASD a vertical rough patch of red skin surrounded by pink blanching skin that is moist running up along the medial right gluteal and across the way in the middle of the medial luteal is a 0.4 x 1 cm the removed blister at the epithelial skin is kind of yellowish-brown and the area does not delmy.  We will stage this is a stage II present on admission.        Recommendations/ Intervention performed: Recommend cleansing with pH no rinse foam and blue wipes and covering both gluteals and sacrococcygeal area with Z guard twice daily and as needed.  Offload every 2.    Head to toe Ax performed.    Additional skin issues: Patient is high risk for breakdown especially on the heels that she has been immobile.  She states she is walking but the family member in the room says she has not.  Please place and heels.    Skin interventions in place.    Pressure Injury Protocol (initiate for Rl Score of 18 or less):   *Maintain good skin care, keep dry, turn q 2 hr, keep heels elevated and offloaded with heel boots.    *Apply z-guard to sacrococcygeal area/ perineal area BID or daily and PRN per incontinent episodes.  *Follow C.A.R.E protocol if medical devices (Bipap, haro, Ng tube, etc) are being used.     Specialty bed: CASEY bed ordered    Woc will sign off.    Please contact with questions or concerns.

## 2022-06-13 NOTE — ED PROVIDER NOTES
[facility]    EMERGENCY DEPARTMENT ENCOUNTER      Pt Name: Georgia Velázquez  MRN: [id number]  YOB: 1945  Date of evaluation: 6/12/2022  Provider: Clifford Cotton MD    CHIEF COMPLAINT       Chief Complaint   Patient presents with   • Shortness of Breath         HISTORY OF PRESENT ILLNESS  (Location/Symptom, Timing/Onset, Context/Setting, Quality, Duration, Modifying Factors, Severity.)   Georgia Velázquez is a 77 y.o. female who presents to the emergency department with several days of progressively worsening moderate to severe shortness of breath is worse with exertion with associated dry cough but no chest pain, fever, or chills in the setting of a history of COPD and CHF.  Patient does not typically wear oxygen at home.  Was apparently hypoxic on arrival by EMS and was placed on nonrebreather mask and given Decadron, magnesium, and nebulizer therapy in route with some improvement.      Nursing notes were reviewed.    REVIEW OF SYSTEMS    (2-9 systems for level 4, 10 or more for level 5)   ROS:  General:  No fevers, no chills, no weakness  Cardiovascular:  No chest pain, no palpitations  Respiratory: + Shortness of breath, cough  Gastrointestinal:  No pain, no nausea, no vomiting, no diarrhea  Musculoskeletal:  No muscle pain, no joint pain  Skin:  No rash  Neurologic:  No speech problems, no headache, no extremity numbness, no extremity tingling, no extremity weakness  Psychiatric:  No anxiety  Genitourinary:  No dysuria, no hematuria    Except as noted above the remainder of the review of systems was reviewed and negative.       PAST MEDICAL HISTORY     Past Medical History:   Diagnosis Date   • Acute kidney injury ([facility]) 12/5/2017   • Aortic regurgitation    • 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/2016   • Body mass index (BMI) of 45.0-49.9 in adult (Formerly [facility]) 6/14/2019   • CHF (congestive heart failure) (Formerly [facility])    • Closed compression fracture of L4 lumbar vertebra 12/4/2017    Added automatically from request  for surgery [id number]   • Constipation 12/5/2017   • Coronary artery disease 5/18/2016   • Diabetes mellitus type 2 in obese ([facility]) 1/29/2018   • Elevated alkaline phosphatase level 2/26/2018   • Elevated creatine kinase    • Essential hypertension 5/18/2016   • GERD (gastroesophageal reflux disease) 5/18/2016   • Glaucoma 7/21/2020   • History of kyphoplasty 1/30/2018   • Lumbar facet arthropathy 1/29/2018   • Lumbar stenosis with neurogenic claudication 1/29/2018   • Morbid obesity due to excess calories ([facility]) 1/29/2018   • Myocardial infarction ([facility]) 5/18/2016   • Osteoporosis 5/18/2016   • Physical deconditioning 1/30/2018   • Retinal emboli, right 5/21/2020   • Sepsis ([facility])     uro   • Stroke ([facility])    • Urinary incontinence without sensory awareness 2/26/2018         SURGICAL HISTORY       Past Surgical History:   Procedure Laterality Date   • APPENDECTOMY     • BACK SURGERY     • CHOLECYSTECTOMY     • EYE SURGERY     • KYPHOPLASTY N/A 12/7/2017    Procedure: KYPHOPLASTY L4;  Surgeon: Marc Pham MD;  Location: [facility];  Service:          CURRENT MEDICATIONS       Current Facility-Administered Medications:   •  albuterol (PROVENTIL) nebulizer solution 0.083% 2.5 mg/3mL, 2.5 mg, Nebulization, Once, Clifford Cotton MD  •  sodium chloride 0.9 % flush 10 mL, 10 mL, Intravenous, PRN, Clifford Cotton MD    Current Outpatient Medications:   •  albuterol sulfate  (90 Base) MCG/ACT inhaler, Inhale 2 puffs Every 4 (Four) Hours As Needed for Wheezing or Shortness of Air., Disp: 18 g, Rfl: 3  •  atorvastatin (LIPITOR) 40 MG tablet, Take 1 tablet by mouth Every Night., Disp: 90 tablet, Rfl: 3  •  Blood Glucose Monitoring Suppl (OneTouch Verio Flex System) w/Device kit, , Disp: , Rfl:   •  carvedilol (COREG) 3.125 MG tablet, TAKE ONE TABLET BY MOUTH TWICE A DAY WITH MEALS, Disp: 180 tablet, Rfl: 0  •  Cholecalciferol (Vitamin D3) 50 MCG (2000 UT) capsule, Take 2,000 Units by mouth Daily. 2000 units, Disp: ,  Rfl:   •  clopidogrel (PLAVIX) 75 MG tablet, TAKE ONE TABLET BY MOUTH DAILY, Disp: 90 tablet, Rfl: 1  •  Continuous Blood Gluc Sensor (Dexcom G5 Mob/G4 Plat Sensor) misc, 1 each Every 7 (Seven) Days., Disp: 4 each, Rfl: 11  •  glucose blood test strip, Use as instructed, Disp: 100 each, Rfl: 12  •  glucose monitor monitoring kit, 1 each As Needed (glucose monitoring). Please provide patient with glucose monitor and strips covered by her insurance., Disp: 1 each, Rfl: 0  •  ipratropium-albuterol (DUO-NEB) 0.5-2.5 mg/3 ml nebulizer, Take 3 ml by nebulization every TID-QID prn wheezing or shortness of breath, Disp: 360 mL, Rfl: 0  •  nitroglycerin (NITROSTAT) 0.4 MG SL tablet, Place 1 tablet under the tongue Every 5 (Five) Minutes As Needed for Chest Pain. Take no more than 3 doses in 15 minutes., Disp: 100 tablet, Rfl: 0  •  OneTouch Delica Lancets 33G misc, 1 Device 3 (Three) Times a Day., Disp: 100 each, Rfl: 12  •  valsartan (Diovan) 160 MG tablet, Take 1 tablet by mouth Daily., Disp: 90 tablet, Rfl: 3    ALLERGIES     Patient has no known allergies.    FAMILY HISTORY       Family History   Problem Relation Age of Onset   • Diabetes Mother    • Heart failure Mother    • Heart failure Father    • No Known Problems Sister    • No Known Problems Brother    • No Known Problems Son    • No Known Problems Daughter           SOCIAL HISTORY       Social History     Socioeconomic History   • Marital status:    Tobacco Use   • Smoking status: Former Smoker     Types: Cigarettes     Quit date:      Years since quittin.4   • Smokeless tobacco: Never Used   Vaping Use   • Vaping Use: Never used   Substance and Sexual Activity   • Alcohol use: No   • Drug use: No   • Sexual activity: Never         PHYSICAL EXAM    (up to 7 for level 4, 8 or more for level 5)     Vitals:    22 0030 22 0100 22 0130 22 0200   BP: 169/76 (!) 187/80 158/92 174/84   Pulse:       Resp:       Temp:       TempSrc:        SpO2: 100% 100% 98% 99%   Weight:       Height:           Physical Exam  General: Awake, alert, mild distress  HEENT: Conjunctivae normal.  Neck: Trachea midline.  Cardiac: Heart regular rate, rhythm, no murmurs, rubs, or gallops  Lungs: Tachypnea, moderate diffuse expiratory wheezes  Chest wall: There is no tenderness to palpation over the chest wall or over ribs  Abdomen: Abdomen is soft, nontender, nondistended. There are no firm or pulsatile masses, no rebound rigidity or guarding.   Musculoskeletal: No deformity.  Neuro: Alert and oriented x 4.  Dermatology: Skin is warm and dry  Psych: Mentation is grossly normal, cognition is grossly normal. Affect is appropriate.        DIAGNOSTIC RESULTS     EKG: All EKGs are interpreted by the Emergency Department Physician who either signs or Co-signs this chart in the absence of a cardiologist.    ECG 12 Lead   Final Result   Test Reason : SOA   Blood Pressure :   */*   mmHG   Vent. Rate :  73 BPM     Atrial Rate :  73 BPM      P-R Int : 204 ms          QRS Dur :  78 ms       QT Int : 394 ms       P-R-T Axes :  54  30  59 degrees      QTc Int : 434 ms      Normal sinus rhythm   Low voltage QRS   Cannot rule out Anteroseptal infarct (cited on or before 21-JUL-2020)   Abnormal ECG   When compared with ECG of 18-MAR-2021 08:43,   Non-specific change in ST segment in Anterior leads   Confirmed by MATHIEU HALL ([id number]) on 6/13/2022 2:51:12 AM      Referred By:            Confirmed By: MATHIEU HALL          RADIOLOGY:   Non-plain film images such as CT, Ultrasound and MRI are read by the radiologist. Plain radiographic images are visualized and preliminarily interpreted by the emergency physician with the below findings:      [x] Radiologist's Report Reviewed:  XR Chest 1 View   Final Result      Mild cardiomegaly with no acute findings otherwise seen.      Electronically signed by:  Brandon Sanderson D.O.     6/12/2022 11:41 PM Mountain Time            ED BEDSIDE  ULTRASOUND:   Performed by ED Physician - none    LABS:    I have reviewed and interpreted all of the currently available lab results from this visit (if applicable):  Results for orders placed or performed during the hospital encounter of 06/12/22   COVID-19 and FLU A/B PCR - Swab, Nasopharynx    Specimen: Nasopharynx; Swab   Result Value Ref Range    COVID19 Not Detected Not Detected - Ref. Range    Influenza A PCR Not Detected Not Detected    Influenza B PCR Not Detected Not Detected   Comprehensive Metabolic Panel    Specimen: Blood   Result Value Ref Range    Glucose 115 (H) 65 - 99 mg/dL    BUN 22 8 - 23 mg/dL    Creatinine 1.37 (H) 0.57 - 1.00 mg/dL    Sodium 135 (L) 136 - 145 mmol/L    Potassium 4.2 3.5 - 5.2 mmol/L    Chloride 101 98 - 107 mmol/L    CO2 22.0 22.0 - 29.0 mmol/L    Calcium 8.5 (L) 8.6 - 10.5 mg/dL    Total Protein 7.2 6.0 - 8.5 g/dL    Albumin 3.50 3.50 - 5.20 g/dL    ALT (SGPT) 17 1 - 33 U/L    AST (SGOT) 39 (H) 1 - 32 U/L    Alkaline Phosphatase 100 39 - 117 U/L    Total Bilirubin 0.5 0.0 - 1.2 mg/dL    Globulin 3.7 gm/dL    A/G Ratio 0.9 g/dL    BUN/Creatinine Ratio 16.1 7.0 - 25.0    Anion Gap 12.0 5.0 - 15.0 mmol/L    eGFR 39.4 (L) >60.0 mL/min/1.73   BNP    Specimen: Blood   Result Value Ref Range    proBNP 3,497.0 (H) 0.0 - 1,800.0 pg/mL   Troponin    Specimen: Blood   Result Value Ref Range    Troponin T 0.017 0.000 - 0.030 ng/mL   CBC Auto Differential    Specimen: Blood   Result Value Ref Range    WBC 5.13 3.40 - 10.80 10*3/mm3    RBC 3.88 3.77 - 5.28 10*6/mm3    Hemoglobin 11.9 (L) 12.0 - 15.9 g/dL    Hematocrit 35.7 34.0 - 46.6 %    MCV 92.0 79.0 - 97.0 fL    MCH 30.7 26.6 - 33.0 pg    MCHC 33.3 31.5 - 35.7 g/dL    RDW 13.6 12.3 - 15.4 %    RDW-SD 46.4 37.0 - 54.0 fl    MPV 11.2 6.0 - 12.0 fL    Platelets 117 (L) 140 - 450 10*3/mm3    Neutrophil % 70.5 42.7 - 76.0 %    Lymphocyte % 23.6 19.6 - 45.3 %    Monocyte % 5.1 5.0 - 12.0 %    Eosinophil % 0.2 (L) 0.3 - 6.2 %    Basophil %  0.2 0.0 - 1.5 %    Immature Grans % 0.4 0.0 - 0.5 %    Neutrophils, Absolute 3.62 1.70 - 7.00 10*3/mm3    Lymphocytes, Absolute 1.21 0.70 - 3.10 10*3/mm3    Monocytes, Absolute 0.26 0.10 - 0.90 10*3/mm3    Eosinophils, Absolute 0.01 0.00 - 0.40 10*3/mm3    Basophils, Absolute 0.01 0.00 - 0.20 10*3/mm3    Immature Grans, Absolute 0.02 0.00 - 0.05 10*3/mm3    nRBC 0.0 0.0 - 0.2 /100 WBC   Scan Slide    Specimen: Blood   Result Value Ref Range    RBC Morphology Normal Normal    WBC Morphology Normal Normal    Clumped Platelets Present None Seen   ECG 12 Lead   Result Value Ref Range    QT Interval 394 ms    QTC Interval 434 ms   Green Top (Gel)   Result Value Ref Range    Extra Tube Hold for add-ons.    Lavender Top   Result Value Ref Range    Extra Tube hold for add-on    Gold Top - SST   Result Value Ref Range    Extra Tube Hold for add-ons.    Light Blue Top   Result Value Ref Range    Extra Tube Hold for add-ons.         All other labs were within normal range or not returned as of this dictation.      EMERGENCY DEPARTMENT COURSE and DIFFERENTIAL DIAGNOSIS/MDM:   Vitals:    Vitals:    06/13/22 0030 06/13/22 0100 06/13/22 0130 06/13/22 0200   BP: 169/76 (!) 187/80 158/92 174/84   Pulse:       Resp:       Temp:       TempSrc:       SpO2: 100% 100% 98% 99%   Weight:       Height:           ED Course as of 06/13/22 0252   Mon Jun 13, 2022   0228 Spoke w/ Dr. Crews who accepts the pt for admission [NS]   0248 Patient with improvement following nebulizer treatment.  Presentation and work-up consistent with acute exacerbation of COPD without any evidence of significant volume overload.  Given significant improvement with nebulizer therapy, lower clinical suspicion for PE.  Chest x-ray demonstrates no evidence of pneumonia, pneumothorax, or other acute intrathoracic process.  Labs are relatively unremarkable along with ECG without any evidence of dysrhythmia, ischemia, myocardial injury, significant anemia, or  electrolyte derangement. [NS]      ED Course User Index  [NS] Clifford Cotton MD         MEDICATIONS ADMINISTERED IN ED:  Medications   sodium chloride 0.9 % flush 10 mL (has no administration in time range)   albuterol (PROVENTIL) nebulizer solution 0.083% 2.5 mg/3mL (2.5 mg Nebulization Not Given 6/12/22 2343)   ipratropium-albuterol (DUO-NEB) nebulizer solution 3 mL (3 mL Nebulization Given 6/12/22 1022)         CRITICAL CARE TIME    Approximately 35 minutes of discontinuous critical care time was provided to this patient by myself absent of any time spent performing procedures.  Patient presents critically ill with acute exacerbation of COPD with associated hypoxic respiratory failure placed in the cardiovascular, neurologic, respiratory systems at risk requiring the following interventions: Administration of supplemental oxygen, nebulizer therapy, interpretation of lab/ECG/imaging, frequent reassessment, coronation of admission with the following response: Resolution of hypoxia.  Patient at high risk of deterioration and possibly death without these interventions.        FINAL IMPRESSION      1. COPD with acute exacerbation (HCC)    2. Acute respiratory failure with hypoxia (HCC)    3. History of CHF (congestive heart failure)          DISPOSITION/PLAN     ED Disposition     ED Disposition   Decision to Admit    Condition   --    Comment   Level of Care: Telemetry [5]   Diagnosis: COPD with acute exacerbation (HCC) [[id number]]   Certification: I Certify That Inpatient Hospital Services Are Medically Necessary For Greater Than 2 Midnights                 Clifford Cotton MD  Attending Emergency Physician               Clifford Cotton MD  06/13/22 0252

## 2022-06-13 NOTE — CONSULTS
"  Referring Provider: MD Mando  Reason for Consultation: AECOPD    Subjective .   Education: NN spoke with pt at BS.  Pt alert and able to answer questions appropriately.  Pt O2 sat 96% on 2  L currently, no home O2 use.  Pt reports the ability to ambulate \"not very far\" at baseline before experiencing SOB.  Pt states use of rescue inhaler  21  times weekly, relief of SOB within ~2 mins.  Patient is up to date on COVID, flu and PNA vaccines.  Former smoker, quit date 2000.  Pt reports no issues at this time with medications or transportation for appointments.  Pt reports no previous hx of formal COPD teaching, no understanding of action plan, or DC.  Stop light report, NN contact information, instructions for accessing [facility] and list of educational videos given to pt.  \"A Patient's Guide to COPD\" booklet left at BS. 1800QUITNOW reference sheet discussed and given to patient at BS.  COPD education began in the form of explanation, handouts, and videos.  No new concerns or questions voiced at this time.  NN will continue to follow as needed.     Age: 77 y.o.  Sex: female  Smoker Status: former, pack years unclear  Pulmonologist: LORENA  FEV1 (PFT): 81% (1/19/2017)  Home O2: RA    Objective     SpO2 SpO2: 97 % (06/13/22 1229)  Device Device (Oxygen Therapy): nasal cannula (06/13/22 1229)  Flow Flow (L/min): 2 (06/13/22 1229)  Incentive Spirometer    IS Predicted Level (mL)     Number of Repetitions     Level Incentive Spirometer (mL)    Patient Tolerance     Inhaler Treatment Status    Treatment Route        Home Medications:  Medications Prior to Admission   Medication Sig Dispense Refill Last Dose   • albuterol sulfate  (90 Base) MCG/ACT inhaler Inhale 2 puffs Every 4 (Four) Hours As Needed for Wheezing or Shortness of Air. 18 g 3    • atorvastatin (LIPITOR) 40 MG tablet Take 1 tablet by mouth Every Night. 90 tablet 3    • carvedilol (COREG) 3.125 MG tablet TAKE ONE TABLET BY MOUTH TWICE A DAY WITH MEALS 180 " tablet 0    • Cholecalciferol (Vitamin D3) 50 MCG (2000 UT) capsule Take 2,000 Units by mouth Daily. 2000 units      • clopidogrel (PLAVIX) 75 MG tablet TAKE ONE TABLET BY MOUTH DAILY 90 tablet 1    • ipratropium-albuterol (DUO-NEB) 0.5-2.5 mg/3 ml nebulizer Take 3 ml by nebulization every TID-QID prn wheezing or shortness of breath 360 mL 0    • nitroglycerin (NITROSTAT) 0.4 MG SL tablet Place 1 tablet under the tongue Every 5 (Five) Minutes As Needed for Chest Pain. Take no more than 3 doses in 15 minutes. 100 tablet 0    • valsartan (Diovan) 160 MG tablet Take 1 tablet by mouth Daily. 90 tablet 3        Discussion: Per current GOLD Standards, please consider: No LAMA/LABA/ICS in place, Outpatient PFT, Rehab as appropriate, Palliative Care consult     Due to insurance constraints, patient must seek referral through PCP office to be referred to specialty offices.       Liane Landin RN

## 2022-06-13 NOTE — PROGRESS NOTES
[facility]  ADMISSION FOLLOW-UP NOTE          Patient admitted after midnight, H&P by my partner performed earlier on today's date reviewed.  Interim findings, labs, and charting also reviewed.        [facility] Hospital Problem List has been managed and updated to include any new diagnoses:  Active Hospital Problems    Diagnosis  POA   • **COPD with acute exacerbation (HCC) [J44.1]  Yes   • Hypoxia [R09.02]  Yes   • Obesity, morbid, BMI 50 or higher (HCC) [E66.01]  Unknown   • Type 2 diabetes mellitus with stage 3b chronic kidney disease, without long-term current use of insulin (HCC) [E11.22, N18.32]  Yes   • Diastolic dysfunction [I51.89]  Yes   • Essential hypertension [I10]  Yes   • Hyperlipidemia [E78.5]  Yes   • Coronary artery disease [I25.10]  Yes   • GERD (gastroesophageal reflux disease) [K21.9]  Yes      Resolved Hospital Problems   No resolved problems to display.     Ms. Velázquez is a 76 yo WF with DM2, diastolic CHF, morbid obesity, COPD who does not use oxygen at baseline who presented with several days of worsening respiratory status, wheezing, cough with clear chest x-ray. Found to have parainfluenza virus resulting in COPD exacerbation     Assessment/plan     Acute exacerbation of COPD  Acute Parainfluenza infection  Acute hypoxic respiratory insufficiency  - Recently started on DuoNebs outpatient for impaired exercise tolerance related to respiratory limitations  - DuoNebs scheduled and prn  -Mucinex  - Scheduled IV Solu-Medrol 60 mg q8h, taper as respiratory status improves  - Azithromycin  -O2 support as needed  - Recommended dose of Lasix due to history of diastolic CHF and noted edema; patient declining at this time  -Check full viral respiratory panel- + for parainfluenza     DM type 2, A1c 8.6%, without long-term use of insulin  -Accu-Cheks with SSI     Coronary artery disease  Hypertension  Hyperlipidemia  - Home statin, carvedilol, Plavix,  ARB     CKD stage 3b  -Baseline creatinine 1.2-1.5; EGFR 30s  -At approximate baseline     Obesity, BMI 51.0 to KG/M2  -Complicates all aspects of care         Jennie Gilmore MD  06/13/22

## 2022-06-13 NOTE — PLAN OF CARE
Problem: Fall Injury Risk  Goal: Absence of Fall and Fall-Related Injury  Intervention: Identify and Manage Contributors  Recent Flowsheet Documentation  Taken 6/13/2022 1000 by Carmine Cotton RN  Medication Review/Management: medications reviewed  Intervention: Promote Injury-Free Environment  Recent Flowsheet Documentation  Taken 6/13/2022 1600 by Carmine Cotton RN  Safety Promotion/Fall Prevention:   assistive device/personal items within reach   activity supervised   clutter free environment maintained   fall prevention program maintained   gait belt   lighting adjusted   nonskid shoes/slippers when out of bed   room organization consistent   safety round/check completed   toileting scheduled  Taken 6/13/2022 1400 by Carmine Cotton RN  Safety Promotion/Fall Prevention:   activity supervised   assistive device/personal items within reach   clutter free environment maintained   fall prevention program maintained   gait belt   lighting adjusted   nonskid shoes/slippers when out of bed   room organization consistent   toileting scheduled   safety round/check completed  Taken 6/13/2022 1230 by Carmine Cotton RN  Safety Promotion/Fall Prevention:   assistive device/personal items within reach   activity supervised   clutter free environment maintained   fall prevention program maintained   gait belt   lighting adjusted   nonskid shoes/slippers when out of bed   room organization consistent   safety round/check completed   toileting scheduled  Taken 6/13/2022 1000 by Carmine Cotton RN  Safety Promotion/Fall Prevention:   activity supervised   assistive device/personal items within reach   clutter free environment maintained   fall prevention program maintained   gait belt   lighting adjusted   nonskid shoes/slippers when out of bed   room organization consistent   safety round/check completed   toileting scheduled  Taken 6/13/2022 0800 by Carmine Cotton RN  Safety Promotion/Fall Prevention:    activity supervised   clutter free environment maintained   assistive device/personal items within reach   fall prevention program maintained   gait belt   lighting adjusted   nonskid shoes/slippers when out of bed   room organization consistent   safety round/check completed   toileting scheduled     Problem: Adult Inpatient Plan of Care  Goal: Plan of Care Review  Flowsheets (Taken 6/13/2022 1659)  Progress: no change  Plan of Care Reviewed With: patient  Outcome Evaluation: Pt alert, oriented. VSS. 2L NC. Denied any pain, discomfort. WOCN consulted for skin assessment. Orders placed for skin care ad interventions in place. Turned q2. Skin care provided to documented areas of concern. Encouraged frequent pulmonary toileting. Plan of care discussed with pt. Verbalized understanding.  Goal: Absence of Hospital-Acquired Illness or Injury  Intervention: Identify and Manage Fall Risk  Recent Flowsheet Documentation  Taken 6/13/2022 1600 by Carmine Cotton, RN  Safety Promotion/Fall Prevention:   assistive device/personal items within reach   activity supervised   clutter free environment maintained   fall prevention program maintained   gait belt   lighting adjusted   nonskid shoes/slippers when out of bed   room organization consistent   safety round/check completed   toileting scheduled  Taken 6/13/2022 1400 by Carmine Cotton, RN  Safety Promotion/Fall Prevention:   activity supervised   assistive device/personal items within reach   clutter free environment maintained   fall prevention program maintained   gait belt   lighting adjusted   nonskid shoes/slippers when out of bed   room organization consistent   toileting scheduled   safety round/check completed  Taken 6/13/2022 1230 by Carmine Cotton, RN  Safety Promotion/Fall Prevention:   assistive device/personal items within reach   activity supervised   clutter free environment maintained   fall prevention program maintained   gait belt   lighting  adjusted   nonskid shoes/slippers when out of bed   room organization consistent   safety round/check completed   toileting scheduled  Taken 6/13/2022 1000 by Carmine Cotton RN  Safety Promotion/Fall Prevention:   activity supervised   assistive device/personal items within reach   clutter free environment maintained   fall prevention program maintained   gait belt   lighting adjusted   nonskid shoes/slippers when out of bed   room organization consistent   safety round/check completed   toileting scheduled  Taken 6/13/2022 0800 by Carmine Cotton RN  Safety Promotion/Fall Prevention:   activity supervised   clutter free environment maintained   assistive device/personal items within reach   fall prevention program maintained   gait belt   lighting adjusted   nonskid shoes/slippers when out of bed   room organization consistent   safety round/check completed   toileting scheduled  Intervention: Prevent Skin Injury  Recent Flowsheet Documentation  Taken 6/13/2022 1600 by Carmine Cotton RN  Body Position:   left   tilted  Skin Protection:   adhesive use limited   incontinence pads utilized   tubing/devices free from skin contact  Taken 6/13/2022 1400 by Carmine Cotton RN  Body Position:   left   side-lying  Skin Protection:   adhesive use limited   incontinence pads utilized   tubing/devices free from skin contact  Taken 6/13/2022 1230 by Carmine Cotton RN  Body Position:   weight shifting   neutral body alignment  Skin Protection:   adhesive use limited   incontinence pads utilized   tubing/devices free from skin contact   skin sealant/moisture barrier applied  Taken 6/13/2022 1000 by Carmine Cotton RN  Body Position:   right   tilted  Skin Protection:   adhesive use limited   incontinence pads utilized   skin sealant/moisture barrier applied   tubing/devices free from skin contact  Taken 6/13/2022 0800 by Carmine Cotton RN  Body Position:   left   side-lying  Skin Protection:   adhesive  use limited   incontinence pads utilized   tubing/devices free from skin contact  Intervention: Prevent and Manage VTE (Venous Thromboembolism) Risk  Recent Flowsheet Documentation  Taken 6/13/2022 1600 by Carmine Cotton RN  Activity Management: activity adjusted per tolerance  Taken 6/13/2022 1400 by Carmine Cotton RN  Activity Management: activity adjusted per tolerance  Taken 6/13/2022 1230 by Carmine Cotton RN  Activity Management: activity adjusted per tolerance  Taken 6/13/2022 1000 by Carmine Cotton RN  Activity Management: activity adjusted per tolerance  VTE Prevention/Management:   bilateral   dorsiflexion/plantar flexion performed  Taken 6/13/2022 0800 by Carmine Cotton RN  Activity Management: activity adjusted per tolerance  Intervention: Prevent Infection  Recent Flowsheet Documentation  Taken 6/13/2022 1600 by Carmine Cotton RN  Infection Prevention:   environmental surveillance performed   rest/sleep promoted   single patient room provided  Taken 6/13/2022 1400 by Carmine Cotton RN  Infection Prevention:   environmental surveillance performed   rest/sleep promoted   single patient room provided  Taken 6/13/2022 1230 by Carmine Cotton RN  Infection Prevention:   environmental surveillance performed   rest/sleep promoted   single patient room provided  Taken 6/13/2022 1000 by Carmine Cotton RN  Infection Prevention:   environmental surveillance performed   rest/sleep promoted   single patient room provided  Taken 6/13/2022 0800 by Carmine Cotton RN  Infection Prevention:   environmental surveillance performed   single patient room provided   rest/sleep promoted  Goal: Optimal Comfort and Wellbeing  Intervention: Provide Person-Centered Care  Recent Flowsheet Documentation  Taken 6/13/2022 1000 by Carmine Cotton RN  Trust Relationship/Rapport:   care explained   choices provided   questions answered   questions encouraged     Problem: Gas Exchange  Impaired  Goal: Optimal Gas Exchange  Intervention: Optimize Oxygenation and Ventilation  Recent Flowsheet Documentation  Taken 6/13/2022 1600 by Carmine Cotton RN  Head of Bed (HOB) Positioning: HOB at 30 degrees  Taken 6/13/2022 1400 by Carmine Cotton RN  Head of Bed (HOB) Positioning: HOB at 20-30 degrees  Taken 6/13/2022 1230 by Carmine Cotton RN  Head of Bed (HOB) Positioning: HOB at 30-45 degrees  Taken 6/13/2022 1000 by Carmine Cotton RN  Head of Bed (HOB) Positioning: HOB at 30 degrees  Taken 6/13/2022 0800 by Carmine Cotton RN  Head of Bed (HOB) Positioning: HOB at 20-30 degrees     Problem: Skin Injury Risk Increased  Goal: Skin Health and Integrity  Intervention: Optimize Skin Protection  Recent Flowsheet Documentation  Taken 6/13/2022 1600 by Carmine Cotton RN  Pressure Reduction Techniques:   frequent weight shift encouraged   pressure points protected   rest period provided between sit times  Head of Bed (HOB) Positioning: HOB at 30 degrees  Pressure Reduction Devices:   pressure-redistributing mattress utilized   positioning supports utilized  Skin Protection:   adhesive use limited   incontinence pads utilized   tubing/devices free from skin contact  Taken 6/13/2022 1400 by Carmine Cotton RN  Pressure Reduction Techniques:   frequent weight shift encouraged   pressure points protected   rest period provided between sit times  Head of Bed (HOB) Positioning: HOB at 20-30 degrees  Pressure Reduction Devices:   pressure-redistributing mattress utilized   positioning supports utilized  Skin Protection:   adhesive use limited   incontinence pads utilized   tubing/devices free from skin contact  Taken 6/13/2022 1230 by Carmine Cotton RN  Pressure Reduction Techniques:   frequent weight shift encouraged   pressure points protected   rest period provided between sit times   weight shift assistance provided  Head of Bed (HOB) Positioning: HOB at 30-45 degrees  Pressure Reduction  Devices:   pressure-redistributing mattress utilized   positioning supports utilized  Skin Protection:   adhesive use limited   incontinence pads utilized   tubing/devices free from skin contact   skin sealant/moisture barrier applied  Taken 6/13/2022 1000 by Carmine Cotton RN  Pressure Reduction Techniques:   frequent weight shift encouraged   pressure points protected   rest period provided between sit times   weight shift assistance provided  Head of Bed (HOB) Positioning: HOB at 30 degrees  Pressure Reduction Devices:   pressure-redistributing mattress utilized   positioning supports utilized  Skin Protection:   adhesive use limited   incontinence pads utilized   skin sealant/moisture barrier applied   tubing/devices free from skin contact  Taken 6/13/2022 0800 by Carmine Cotton RN  Pressure Reduction Techniques:   frequent weight shift encouraged   pressure points protected   rest period provided between sit times  Head of Bed (HOB) Positioning: HOB at 20-30 degrees  Pressure Reduction Devices:   pressure-redistributing mattress utilized   positioning supports utilized  Skin Protection:   adhesive use limited   incontinence pads utilized   tubing/devices free from skin contact     Problem: COPD (Chronic Obstructive Pulmonary Disease) Comorbidity  Goal: Maintenance of COPD Symptom Control  Intervention: Maintain COPD-Symptom Control  Recent Flowsheet Documentation  Taken 6/13/2022 1000 by Carmine Cotton RN  Supportive Measures:   active listening utilized   decision-making supported   positive reinforcement provided   relaxation techniques promoted  Medication Review/Management: medications reviewed     Problem: Adjustment to Illness COPD (Chronic Obstructive Pulmonary Disease)  Goal: Optimal Chronic Illness Coping  Intervention: Support and Optimize Psychosocial Response  Recent Flowsheet Documentation  Taken 6/13/2022 1000 by Carmine Cotton RN  Supportive Measures:   active listening utilized    decision-making supported   positive reinforcement provided   relaxation techniques promoted     Problem: Functional Ability Impaired COPD (Chronic Obstructive Pulmonary Disease)  Goal: Optimal Level of Functional [place]  Intervention: Optimize Functional Ability  Recent Flowsheet Documentation  Taken 6/13/2022 1600 by Carmine Cotton RN  Activity Management: activity adjusted per tolerance  Taken 6/13/2022 1400 by Carmine Cotton RN  Activity Management: activity adjusted per tolerance  Taken 6/13/2022 1230 by Carmine Cotton RN  Activity Management: activity adjusted per tolerance  Taken 6/13/2022 1000 by Carmine Cotton RN  Activity Management: activity adjusted per tolerance  Environmental Support: calm environment promoted  Taken 6/13/2022 0800 by Carmine Cotton RN  Activity Management: activity adjusted per tolerance     Problem: Respiratory Compromise COPD (Chronic Obstructive Pulmonary Disease)  Goal: Effective Oxygenation and Ventilation  Intervention: Promote Airway Secretion Clearance  Recent Flowsheet Documentation  Taken 6/13/2022 1600 by Carmine Cotton RN  Activity Management: activity adjusted per tolerance  Taken 6/13/2022 1400 by Carmine Cotton RN  Activity Management: activity adjusted per tolerance  Taken 6/13/2022 1230 by Carmine Cotton RN  Activity Management: activity adjusted per tolerance  Taken 6/13/2022 1000 by Carmine Cotton RN  Activity Management: activity adjusted per tolerance  Cough And Deep Breathing: done independently per patient  Taken 6/13/2022 0800 by Carmine Cotton RN  Activity Management: activity adjusted per tolerance  Intervention: Optimize Oxygenation and Ventilation  Recent Flowsheet Documentation  Taken 6/13/2022 1600 by Carmine Cotton RN  Head of Bed (HOB) Positioning: HOB at 30 degrees  Taken 6/13/2022 1400 by Carmine Cotton RN  Head of Bed (HOB) Positioning: HOB at 20-30 degrees  Taken 6/13/2022 1230 by Carmine Cotton  SHIKHA RN  Head of Bed (HOB) Positioning: HOB at 30-45 degrees  Taken 6/13/2022 1000 by Carmine Cotton RN  Head of Bed (HOB) Positioning: HOB at 30 degrees  Taken 6/13/2022 0800 by Carmine Cotton RN  Head of Bed (HOB) Positioning: HOB at 20-30 degrees   Goal Outcome Evaluation:  Plan of Care Reviewed With: patient        Progress: no change  Outcome Evaluation: Pt alert, oriented. VSS. 2L NC. Denied any pain, discomfort. WOCN consulted for skin assessment. Orders placed for skin care ad interventions in place. Turned q2. Skin care provided to documented areas of concern. Encouraged frequent pulmonary toileting. Plan of care discussed with pt. Verbalized understanding.

## 2022-06-13 NOTE — PAYOR COMM NOTE
"Georgia Palumbo (77 y.o. Female)             Date of Birth   1945    Social Security Number       Address   [address] [place] [id number]    Home Phone   [phone]    MRN   [id number]       Confucianist   Sikhism    Marital Status                               Admission Date   6/12/22    Admission Type   Emergency    Admitting Provider   Jennie Gilmore MD    Attending Provider   Jennie Gilmore MD    Department, Room/Bed   [facility] 3F, S319/1       Discharge Date       Discharge Disposition       Discharge Destination                               Attending Provider: Jennie Gilmore MD    Allergies: No Known Allergies    Isolation: Contact Drop   Infection: Other (06/13/22)   Code Status: No CPR   Advance Care Planning Activity    Ht: 154.9 cm (61\")   Wt: 122 kg (270 lb)    Admission Cmt: None   Principal Problem: COPD with acute exacerbation (HCC) [J44.1]                 Active Insurance as of 6/12/2022     Primary Coverage     Payor Plan Insurance Group Employer/Plan Group    [facility] MEDICARE REPLACEMENT [facility] [place] [facility] [place] Address Payor Plan Phone Number Payor Plan Fax Number Effective Dates    [address] [address] [phone]  11/28/2019 - None Entered    [facility] [phone]       Subscriber Name Subscriber Birth Date Member ID       GEORGIA PALUMBO 1945 [id number]           Secondary Coverage     Payor Plan Insurance Group Employer/Plan Group    [place]      Pay[place] Plan Address Payor Plan Phone Number Payor Plan Fax Number Effective Dates    [address]   1/1/2014 - None Entered    [facility] [phone]       Subscriber Name Subscriber Birth Date Member ID       GEORGIA PALUMBO 1945 [id number]                 Emergency Contacts      (Rel.) Home Phone Work Phone Mobile Phone    Omaira Patel (Daughter) [phone] -- --    " Flakito Velázquez (Son) [phone] -- --            [place]: [facility] [id number] Tax ID [id number]  Insurance Information                [employer] [facility] MEDICARE REPLACEMENT/[employer] MEDICARE REPLACEMENT Phone: [phone]    Subscriber: Georgia Velázquez Subscriber#: [id number]    Group#: [id number] Precert#: --        [employer] Phone: --    Subscriber: Georgia Velázquez Subscriber#: [id number]    Group#: -- Precert#: --             History & Physical      Carlton Crews DO at 22 0301              [facility] Medicine Services  HISTORY AND PHYSICAL    Patient Name: Georgia Velázquez  : 1945  MRN: [id number]  Primary Care Physician: Georgina Ahumada APRN  Date of admission: 2022      Subjective   Subjective     Chief Complaint:  Shortness of breath, wheezing    HPI:  Georgia Velázquez is a 77 y.o. female with DM2, diastolic CHF, morbid obesity, COPD without oxygen use at baseline who was brought to the hospital via EMS for shortness of breath/wheezing.  She reports several days of worsening dyspnea with exertion, feeling of chest tightness, wheezing, cough with associated sensation of needing to clear sputum but unable to get it up.  Today family checked her O2 at home and reported that it was in the 80s prompting call to EMS.  Per ED provider she received a dose of dexamethasone and a breathing treatment in route, arrived on nonrebreather mask but has weaned down to nasal cannula while in the ED.  She denies fever/chills/upper respiratory symptoms.      Review of Systems   Gen- No fevers, chills  CV- No chest pain, palpitations  Resp-yes cough, dyspnea  GI- No N/V/D, abd pain  All other systems reviewed and are negative.     Personal History     Past Medical History:   Diagnosis Date   • Acute kidney injury (HCC) 2017   • Aortic regurgitation    • Arthritis of shoulder 2016   • Body mass index (BMI) of 45.0-49.9 in  adult ([facility]) 6/14/2019   • CHF (congestive heart failure) ([facility])    • Closed compression fracture of L4 lumbar vertebra 12/4/2017    Added automatically from request for surgery [id number]   • Constipation 12/5/2017   • Coronary artery disease 5/18/2016   • Diabetes mellitus type 2 in obese ([facility]) 1/29/2018   • Elevated alkaline phosphatase level 2/26/2018   • Elevated creatine kinase    • Essential hypertension 5/18/2016   • GERD (gastroesophageal reflux disease) 5/18/2016   • Glaucoma 7/21/2020   • History of kyphoplasty 1/30/2018   • Lumbar facet arthropathy 1/29/2018   • Lumbar stenosis with neurogenic claudication 1/29/2018   • Morbid obesity due to excess calories ([facility]) 1/29/2018   • Myocardial infarction ([facility]) 5/18/2016   • Osteoporosis 5/18/2016   • Physical deconditioning 1/30/2018   • Retinal emboli, right 5/21/2020   • Sepsis ([facility])     uro   • Stroke ([facility])    • Urinary incontinence without sensory awareness 2/26/2018             Past Surgical History:   Procedure Laterality Date   • APPENDECTOMY     • BACK SURGERY     • CHOLECYSTECTOMY     • EYE SURGERY     • KYPHOPLASTY N/A 12/7/2017    Procedure: KYPHOPLASTY L4;  Surgeon: Marc Pham MD;  Location[facility];  Service:        Family History:  family history includes Diabetes in her mother; Heart failure in her father and mother; No Known Problems in her brother, daughter, sister, and son. Otherwise pertinent FHx was reviewed and unremarkable.     Social History:  reports that she quit smoking about 22 years ago. Her smoking use included cigarettes. She has never used smokeless tobacco. She reports that she does not drink alcohol and does not use drugs.  Social History     Social History Narrative    Living w daughter. Daughter works at [employer], in process of getting medical POA.        Medications:  Available home medication information reviewed.  (Not in a hospital admission)      No Known Allergies    Objective   Objective     Vital Signs:   Temp:   [99.7 °F (37.6 °C)] 99.7 °F (37.6 °C)  Heart Rate:  [73-74] 73  Resp:  [24-28] 24  BP: (129-187)/(76-94) 174/84       Physical Exam   Constitutional: Awake, alert, mildly ill-appearing female laying in ED stretcher  Eyes: PERRL, sclerae anicteric, no conjunctival injection  HENT: NCAT, mucous membranes moist  Neck: Supple, trachea midline  Respiratory: Audible wheezes without use of stethoscope, diffuse wheezing with minimal air movement on auscultation, prolonged expiratory phase with accessory muscle use, not in respiratory distress  Cardiovascular: RRR, no murmurs, rubs, or gallops, palpable radial pulses bilaterally  Gastrointestinal: Positive bowel sounds, soft, nontender, nondistended  Musculoskeletal: Moderate bilateral ankle edema (reports chronic), no clubbing or cyanosis to extremities  Psychiatric: Appropriate affect, cooperative  Neurologic: Speech clear, moving all extremity spontaneously    Result Review:  I have personally reviewed the results from the time of this admission to 6/13/2022 03:01 EDT and agree with these findings:  []  Laboratory list / accordion  []  Microbiology  [x]  Radiology  []  EKG/Telemetry   []  Cardiology/Vascular   []  Pathology  []  Old records  []  Other:  Most notable findings include: CXR relatively clear        LAB RESULTS:      Lab 06/13/22  0036   WBC 5.13   HEMOGLOBIN 11.9*   HEMATOCRIT 35.7   PLATELETS 117*   NEUTROS ABS 3.62   IMMATURE GRANS (ABS) 0.02   LYMPHS ABS 1.21   MONOS ABS 0.26   EOS ABS 0.01   MCV 92.0         Lab 06/12/22 2330   SODIUM 135*   POTASSIUM 4.2   CHLORIDE 101   CO2 22.0   ANION GAP 12.0   BUN 22   CREATININE 1.37*   EGFR 39.4*   GLUCOSE 115*   CALCIUM 8.5*         Lab 06/12/22 2330   TOTAL PROTEIN 7.2   ALBUMIN 3.50   GLOBULIN 3.7   ALT (SGPT) 17   AST (SGOT) 39*   BILIRUBIN 0.5   ALK PHOS 100         Lab 06/12/22 2330   PROBNP 3,497.0*   TROPONIN T 0.017                 UA    Urinalysis 10/15/21   Ketones, UA Negative   Leukocytes, UA Small  (1+) (A)   (A) Abnormal value              Microbiology Results (last 10 days)     Procedure Component Value - Date/Time    COVID PRE-OP / PRE-PROCEDURE SCREENING ORDER (NO ISOLATION) - Swab, Nasopharynx [[id number]]  (Normal) Collected: 06/13/22 0036    Lab Status: Final result Specimen: Swab from Nasopharynx Updated: 06/13/22 0108    Narrative:      The following orders were created for panel order COVID PRE-OP / PRE-PROCEDURE SCREENING ORDER (NO ISOLATION) - Swab, Nasopharynx.  Procedure                               Abnormality         Status                     ---------                               -----------         ------                     COVID-19 and FLU A/B PCR...[[id number]]  Normal              Final result                 Please view results for these tests on the individual orders.    COVID-19 and FLU A/B PCR - Swab, Nasopharynx [[id number]]  (Normal) Collected: 06/13/22 0036    Lab Status: Final result Specimen: Swab from Nasopharynx Updated: 06/13/22 0108     COVID19 Not Detected     Influenza A PCR Not Detected     Influenza B PCR Not Detected    Narrative:      Fact sheet for providers: https://www.fda.gov/media/593868/download    Fact sheet for patients: https://www.fda.gov/media/382938/download    Test performed by PCR.          XR Chest 1 View    Result Date: 6/13/2022  EXAMINATION: Chest one view. DATE: 6/13/2022. COMPARISON: 3/17/2021. CLINICAL HISTORY: Shortness of breath. FINDINGS: The lungs and pleural spaces are clear. The cardiomediastinal silhouette his mild enlarged and the pulmonary vessels are within normal limits.     Impression: Mild cardiomegaly with no acute findings otherwise seen. Electronically signed by:  Brandon Sanderson D.O.  6/12/2022 11:41 PM Mountain Time      Results for orders placed during the hospital encounter of 03/17/21    Adult Transthoracic Echo Complete With Contrast if Necessary Per Protocol    Interpretation Summary  · Calculated left ventricular EF = 55%  Estimated left ventricular EF was in agreement with the calculated left ventricular EF. Left ventricular systolic function is normal.  · Left ventricular diastolic function is consistent with (grade II w/high LAP) pseudonormalization.  · Estimated right ventricular systolic pressure from tricuspid regurgitation is normal (<35 mmHg). Calculated right ventricular systolic pressure from tricuspid regurgitation is 32 mmHg.  · Endocardial definition of the apical segments is limited, unable to definitively assess apical regional wall motion abnormalities.      Assessment & Plan   Assessment & Plan     Active Hospital Problems    Diagnosis  POA   • **COPD with acute exacerbation ([facility]) [J44.1]  Yes   • Hypoxia [R09.02]  Yes   • Obesity, morbid, BMI 50 or higher ([facility]) [E66.01]  Unknown   • Type 2 diabetes mellitus with stage 3b chronic kidney disease, without long-term current use of insulin ([facility]) [E11.22, N18.32]  Yes   • Diastolic dysfunction [I51.89]  Yes   • Essential hypertension [I10]  Yes   • Hyperlipidemia [E78.5]  Yes   • Coronary artery disease [I25.10]  Yes   • GERD (gastroesophageal reflux disease) [K21.9]  Yes     Summary: This is a 77-year-old female with DM2, diastolic CHF, morbid obesity, COPD who does not use oxygen at baseline who presents with several days of worsening respiratory status, wheezing, cough with clear chest x-ray    Assessment/plan    Acute exacerbation of COPD  Acute hypoxic respiratory insufficiency  - Recently started on DuoNebs outpatient for impaired exercise tolerance 2/2 respiratory limitations  - DuoNebs scheduled and prn  -Mucinex  - Scheduled IV Solu-Medrol 60 mg q8h, taper as respiratory status improves  - Azithromycin  -O2 support as needed  - Recommended dose of Lasix due to history of diastolic CHF and noted edema; patient declining at this time  -Check full viral respiratory panel    DM type 2, A1c 8.6%, without long-term use of insulin  -Accu-Cheks with SSI    Coronary artery  disease  Hypertension  Hyperlipidemia  - Home statin, carvedilol, Plavix, ARB    CKD stage 3b  -Baseline creatinine 1.2-1.5; EGFR 30s  -At approximate baseline    Obesity, BMI 51.0 to KG/M2  -Complicates all aspects of care    DVT prophylaxis: Lovenox      CODE STATUS: Per patient she wishes to be DNR/DNI  Code Status and Medical Interventions:   Ordered at: 06/13/22 0258     Medical Intervention Limits:    NO intubation (DNI)     Level Of Support Discussed With:    Patient     Code Status (Patient has no pulse and is not breathing):    No CPR (Do Not Attempt to Resuscitate)     Medical Interventions (Patient has pulse or is breathing):    Limited Support         Carlton Crews DO  06/13/22      Electronically signed by Carlton Crews DO at 06/13/22 0315          Emergency Department Notes      Clifford Cotton MD at 06/12/22 2318            [place]    EMERGENCY DEPARTMENT ENCOUNTER      Pt Name: Georgia Velázquez  MRN: [id number]  YOB: 1945  Date of evaluation: 6/12/2022  Provider: Clifford Cotton MD    CHIEF COMPLAINT       Chief Complaint   Patient presents with   • Shortness of Breath         HISTORY OF PRESENT ILLNESS  (Location/Symptom, Timing/Onset, Context/Setting, Quality, Duration, Modifying Factors, Severity.)   Georgia Velázquez is a 77 y.o. female who presents to the emergency department with several days of progressively worsening moderate to severe shortness of breath is worse with exertion with associated dry cough but no chest pain, fever, or chills in the setting of a history of COPD and CHF.  Patient does not typically wear oxygen at home.  Was apparently hypoxic on arrival by EMS and was placed on nonrebreather mask and given Decadron, magnesium, and nebulizer therapy in route with some improvement.      Nursing notes were reviewed.    REVIEW OF SYSTEMS    (2-9 systems for level 4, 10 or more for level 5)   ROS:  General:  No fevers, no chills, no  weakness  Cardiovascular:  No chest pain, no palpitations  Respiratory: + Shortness of breath, cough  Gastrointestinal:  No pain, no nausea, no vomiting, no diarrhea  Musculoskeletal:  No muscle pain, no joint pain  Skin:  No rash  Neurologic:  No speech problems, no headache, no extremity numbness, no extremity tingling, no extremity weakness  Psychiatric:  No anxiety  Genitourinary:  No dysuria, no hematuria    Except as noted above the remainder of the review of systems was reviewed and negative.       PAST MEDICAL HISTORY     Past Medical History:   Diagnosis Date   • Acute kidney injury ([facility]) 12/5/2017   • Aortic regurgitation    • 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/2016   • Body mass index (BMI) of 45.0-49.9 in adult ([facility]) 6/14/2019   • CHF (congestive heart failure) ([facility])    • Closed compression fracture of L4 lumbar vertebra 12/4/2017    Added automatically from request for surgery [id number]   • Constipation 12/5/2017   • Coronary artery disease 5/18/2016   • Diabetes mellitus type 2 in obese ([facility]) 1/29/2018   • Elevated alkaline phosphatase level 2/26/2018   • Elevated creatine kinase    • Essential hypertension 5/18/2016   • GERD (gastroesophageal reflux disease) 5/18/2016   • Glaucoma 7/21/2020   • History of kyphoplasty 1/30/2018   • Lumbar facet arthropathy 1/29/2018   • Lumbar stenosis with neurogenic claudication 1/29/2018   • Morbid obesity due to excess calories ([facility]) 1/29/2018   • Myocardial infarction ([facility]) 5/18/2016   • Osteoporosis 5/18/2016   • Physical deconditioning 1/30/2018   • Retinal emboli, right 5/21/2020   • Sepsis ([facility])     uro   • Stroke ([facility])    • Urinary incontinence without sensory awareness 2/26/2018         SURGICAL HISTORY       Past Surgical History:   Procedure Laterality Date   • APPENDECTOMY     • BACK SURGERY     • CHOLECYSTECTOMY     • EYE SURGERY     • KYPHOPLASTY N/A 12/7/2017    Procedure: KYPHOPLASTY L4;  Surgeon: Marc Pham MD;  Location: [facility];  Service:           CURRENT MEDICATIONS       Current Facility-Administered Medications:   •  albuterol (PROVENTIL) nebulizer solution 0.083% 2.5 mg/3mL, 2.5 mg, Nebulization, Once, Clifford Cotton MD  •  sodium chloride 0.9 % flush 10 mL, 10 mL, Intravenous, PRN, Clifford Cotton MD    Current Outpatient Medications:   •  albuterol sulfate  (90 Base) MCG/ACT inhaler, Inhale 2 puffs Every 4 (Four) Hours As Needed for Wheezing or Shortness of Air., Disp: 18 g, Rfl: 3  •  atorvastatin (LIPITOR) 40 MG tablet, Take 1 tablet by mouth Every Night., Disp: 90 tablet, Rfl: 3  •  Blood Glucose Monitoring Suppl (OneTouch Verio Flex System) w/Device kit, , Disp: , Rfl:   •  carvedilol (COREG) 3.125 MG tablet, TAKE ONE TABLET BY MOUTH TWICE A DAY WITH MEALS, Disp: 180 tablet, Rfl: 0  •  Cholecalciferol (Vitamin D3) 50 MCG (2000 UT) capsule, Take 2,000 Units by mouth Daily. 2000 units, Disp: , Rfl:   •  clopidogrel (PLAVIX) 75 MG tablet, TAKE ONE TABLET BY MOUTH DAILY, Disp: 90 tablet, Rfl: 1  •  Continuous Blood Gluc Sensor (Dexcom G5 Mob/G4 Plat Sensor) misc, 1 each Every 7 (Seven) Days., Disp: 4 each, Rfl: 11  •  glucose blood test strip, Use as instructed, Disp: 100 each, Rfl: 12  •  glucose monitor monitoring kit, 1 each As Needed (glucose monitoring). Please provide patient with glucose monitor and strips covered by her insurance., Disp: 1 each, Rfl: 0  •  ipratropium-albuterol (DUO-NEB) 0.5-2.5 mg/3 ml nebulizer, Take 3 ml by nebulization every TID-QID prn wheezing or shortness of breath, Disp: 360 mL, Rfl: 0  •  nitroglycerin (NITROSTAT) 0.4 MG SL tablet, Place 1 tablet under the tongue Every 5 (Five) Minutes As Needed for Chest Pain. Take no more than 3 doses in 15 minutes., Disp: 100 tablet, Rfl: 0  •  OneTouch Delica Lancets 33G misc, 1 Device 3 (Three) Times a Day., Disp: 100 each, Rfl: 12  •  valsartan (Diovan) 160 MG tablet, Take 1 tablet by mouth Daily., Disp: 90 tablet, Rfl: 3    ALLERGIES     Patient has no known  allergies.    FAMILY HISTORY       Family History   Problem Relation Age of Onset   • Diabetes Mother    • Heart failure Mother    • Heart failure Father    • No Known Problems Sister    • No Known Problems Brother    • No Known Problems Son    • No Known Problems Daughter           SOCIAL HISTORY       Social History     Socioeconomic History   • Marital status:    Tobacco Use   • Smoking status: Former Smoker     Types: Cigarettes     Quit date:      Years since quittin.4   • Smokeless tobacco: Never Used   Vaping Use   • Vaping Use: Never used   Substance and Sexual Activity   • Alcohol use: No   • Drug use: No   • Sexual activity: Never         PHYSICAL EXAM    (up to 7 for level 4, 8 or more for level 5)     Vitals:    22 0030 22 0100 22 0130 22 0200   BP: 169/76 (!) 187/80 158/92 174/84   Pulse:       Resp:       Temp:       TempSrc:       SpO2: 100% 100% 98% 99%   Weight:       Height:           Physical Exam  General: Awake, alert, mild distress  HEENT: Conjunctivae normal.  Neck: Trachea midline.  Cardiac: Heart regular rate, rhythm, no murmurs, rubs, or gallops  Lungs: Tachypnea, moderate diffuse expiratory wheezes  Chest wall: There is no tenderness to palpation over the chest wall or over ribs  Abdomen: Abdomen is soft, nontender, nondistended. There are no firm or pulsatile masses, no rebound rigidity or guarding.   Musculoskeletal: No deformity.  Neuro: Alert and oriented x 4.  Dermatology: Skin is warm and dry  Psych: Mentation is grossly normal, cognition is grossly normal. Affect is appropriate.        DIAGNOSTIC RESULTS     EKG: All EKGs are interpreted by the Emergency Department Physician who either signs or Co-signs this chart in the absence of a cardiologist.    ECG 12 Lead   Final Result   Test Reason : SOA   Blood Pressure :   */*   mmHG   Vent. Rate :  73 BPM     Atrial Rate :  73 BPM      P-R Int : 204 ms          QRS Dur :  78 ms       QT Int : 394  ms       P-R-T Axes :  54  30  59 degrees      QTc Int : 434 ms      Normal sinus rhythm   Low voltage QRS   Cannot rule out Anteroseptal infarct (cited on or before 21-JUL-2020)   Abnormal ECG   When compared with ECG of 18-MAR-2021 08:43,   Non-specific change in ST segment in Anterior leads   Confirmed by MATHIEU HALL ([id number]) on 6/13/2022 2:51:12 AM      Referred By:            Confirmed By: MATHIEU HALL          RADIOLOGY:   Non-plain film images such as CT, Ultrasound and MRI are read by the radiologist. Plain radiographic images are visualized and preliminarily interpreted by the emergency physician with the below findings:      [x] Radiologist's Report Reviewed:  XR Chest 1 View   Final Result      Mild cardiomegaly with no acute findings otherwise seen.      Electronically signed by:  Brandon Sanderson D.O.     6/12/2022 11:41 PM Mountain Time            ED BEDSIDE ULTRASOUND:   Performed by ED Physician - none    LABS:    I have reviewed and interpreted all of the currently available lab results from this visit (if applicable):  Results for orders placed or performed during the hospital encounter of 06/12/22   COVID-19 and FLU A/B PCR - Swab, Nasopharynx    Specimen: Nasopharynx; Swab   Result Value Ref Range    COVID19 Not Detected Not Detected - Ref. Range    Influenza A PCR Not Detected Not Detected    Influenza B PCR Not Detected Not Detected   Comprehensive Metabolic Panel    Specimen: Blood   Result Value Ref Range    Glucose 115 (H) 65 - 99 mg/dL    BUN 22 8 - 23 mg/dL    Creatinine 1.37 (H) 0.57 - 1.00 mg/dL    Sodium 135 (L) 136 - 145 mmol/L    Potassium 4.2 3.5 - 5.2 mmol/L    Chloride 101 98 - 107 mmol/L    CO2 22.0 22.0 - 29.0 mmol/L    Calcium 8.5 (L) 8.6 - 10.5 mg/dL    Total Protein 7.2 6.0 - 8.5 g/dL    Albumin 3.50 3.50 - 5.20 g/dL    ALT (SGPT) 17 1 - 33 U/L    AST (SGOT) 39 (H) 1 - 32 U/L    Alkaline Phosphatase 100 39 - 117 U/L    Total Bilirubin 0.5 0.0 - 1.2 mg/dL    Globulin 3.7  gm/dL    A/G Ratio 0.9 g/dL    BUN/Creatinine Ratio 16.1 7.0 - 25.0    Anion Gap 12.0 5.0 - 15.0 mmol/L    eGFR 39.4 (L) >60.0 mL/min/1.73   BNP    Specimen: Blood   Result Value Ref Range    proBNP 3,497.0 (H) 0.0 - 1,800.0 pg/mL   Troponin    Specimen: Blood   Result Value Ref Range    Troponin T 0.017 0.000 - 0.030 ng/mL   CBC Auto Differential    Specimen: Blood   Result Value Ref Range    WBC 5.13 3.40 - 10.80 10*3/mm3    RBC 3.88 3.77 - 5.28 10*6/mm3    Hemoglobin 11.9 (L) 12.0 - 15.9 g/dL    Hematocrit 35.7 34.0 - 46.6 %    MCV 92.0 79.0 - 97.0 fL    MCH 30.7 26.6 - 33.0 pg    MCHC 33.3 31.5 - 35.7 g/dL    RDW 13.6 12.3 - 15.4 %    RDW-SD 46.4 37.0 - 54.0 fl    MPV 11.2 6.0 - 12.0 fL    Platelets 117 (L) 140 - 450 10*3/mm3    Neutrophil % 70.5 42.7 - 76.0 %    Lymphocyte % 23.6 19.6 - 45.3 %    Monocyte % 5.1 5.0 - 12.0 %    Eosinophil % 0.2 (L) 0.3 - 6.2 %    Basophil % 0.2 0.0 - 1.5 %    Immature Grans % 0.4 0.0 - 0.5 %    Neutrophils, Absolute 3.62 1.70 - 7.00 10*3/mm3    Lymphocytes, Absolute 1.21 0.70 - 3.10 10*3/mm3    Monocytes, Absolute 0.26 0.10 - 0.90 10*3/mm3    Eosinophils, Absolute 0.01 0.00 - 0.40 10*3/mm3    Basophils, Absolute 0.01 0.00 - 0.20 10*3/mm3    Immature Grans, Absolute 0.02 0.00 - 0.05 10*3/mm3    nRBC 0.0 0.0 - 0.2 /100 WBC   Scan Slide    Specimen: Blood   Result Value Ref Range    RBC Morphology Normal Normal    WBC Morphology Normal Normal    Clumped Platelets Present None Seen   ECG 12 Lead   Result Value Ref Range    QT Interval 394 ms    QTC Interval 434 ms   Green Top (Gel)   Result Value Ref Range    Extra Tube Hold for add-ons.    Lavender Top   Result Value Ref Range    Extra Tube hold for add-on    Gold Top - SST   Result Value Ref Range    Extra Tube Hold for add-ons.    Light Blue Top   Result Value Ref Range    Extra Tube Hold for add-ons.         All other labs were within normal range or not returned as of this dictation.      EMERGENCY DEPARTMENT COURSE and  DIFFERENTIAL DIAGNOSIS/MDM:   Vitals:    Vitals:    06/13/22 0030 06/13/22 0100 06/13/22 0130 06/13/22 0200   BP: 169/76 (!) 187/80 158/92 174/84   Pulse:       Resp:       Temp:       TempSrc:       SpO2: 100% 100% 98% 99%   Weight:       Height:           ED Course as of 06/13/22 0252   Mon Jun 13, 2022   0228 Spoke w/ Dr. Crews who accepts the pt for admission [NS]   0248 Patient with improvement following nebulizer treatment.  Presentation and work-up consistent with acute exacerbation of COPD without any evidence of significant volume overload.  Given significant improvement with nebulizer therapy, lower clinical suspicion for PE.  Chest x-ray demonstrates no evidence of pneumonia, pneumothorax, or other acute intrathoracic process.  Labs are relatively unremarkable along with ECG without any evidence of dysrhythmia, ischemia, myocardial injury, significant anemia, or electrolyte derangement. [NS]      ED Course User Index  [NS] Clifford Cotton MD         MEDICATIONS ADMINISTERED IN ED:  Medications   sodium chloride 0.9 % flush 10 mL (has no administration in time range)   albuterol (PROVENTIL) nebulizer solution 0.083% 2.5 mg/3mL (2.5 mg Nebulization Not Given 6/12/22 2903)   ipratropium-albuterol (DUO-NEB) nebulizer solution 3 mL (3 mL Nebulization Given 6/12/22 7032)         CRITICAL CARE TIME    Approximately 35 minutes of discontinuous critical care time was provided to this patient by myself absent of any time spent performing procedures.  Patient presents critically ill with acute exacerbation of COPD with associated hypoxic respiratory failure placed in the cardiovascular, neurologic, respiratory systems at risk requiring the following interventions: Administration of supplemental oxygen, nebulizer therapy, interpretation of lab/ECG/imaging, frequent reassessment, coronation of admission with the following response: Resolution of hypoxia.  Patient at high risk of deterioration and possibly death  without these interventions.        FINAL IMPRESSION      1. COPD with acute exacerbation (HCC)    2. Acute respiratory failure with hypoxia (HCC)    3. History of CHF (congestive heart failure)          DISPOSITION/PLAN     ED Disposition     ED Disposition   Decision to Admit    Condition   --    Comment   Level of Care: Telemetry [5]   Diagnosis: COPD with acute exacerbation (HCC) [619123]   Certification: I Certify That Inpatient Hospital Services Are Medically Necessary For Greater Than 2 Midnights                 Clifford Cotton MD  Attending Emergency Physician               Clifford Cotton MD  06/13/22 0252      Electronically signed by Clifford Cotton MD at 06/13/22 0252       Vital Signs (last day)     Date/Time Temp Temp src Pulse Resp BP Patient Position SpO2    06/13/22 1020 98 (36.7) Oral 73 20 162/89 Lying --    06/13/22 0753 -- -- 72 20 -- -- 97    06/13/22 0647 97.9 (36.6) Axillary 72 22 133/63 Lying --    06/13/22 0557 99.5 (37.5) Oral 74 22 173/83 Lying 98    06/13/22 0400 -- -- -- -- 133/74 -- 94    06/13/22 0200 -- -- -- -- 174/84 -- 99    06/13/22 0130 -- -- -- -- 158/92 -- 98    06/13/22 0100 -- -- -- -- 187/80 -- 100    06/13/22 0030 -- -- -- -- 169/76 -- 100    06/13/22 0000 -- -- -- -- 181/86 -- 99    06/12/22 2352 -- -- 73 24 -- -- 99    06/12/22 2334 99.7 (37.6) Oral -- -- -- -- --    06/12/22 2313 -- -- -- -- 129/94 -- 99    06/12/22 2309 -- -- 74 28 -- -- 100            Current Facility-Administered Medications   Medication Dose Route Frequency Provider Last Rate Last Admin   • acetaminophen (TYLENOL) tablet 650 mg  650 mg Oral Q4H PRN Carlton Crews,         Or   • acetaminophen (TYLENOL) 160 MG/5ML solution 650 mg  650 mg Oral Q4H PRN Carlton Crews,         Or   • acetaminophen (TYLENOL) suppository 650 mg  650 mg Rectal Q4H PRN Carlton Crews,        • albuterol (PROVENTIL) nebulizer solution 0.083% 2.5 mg/3mL  2.5 mg Nebulization Once Clifford Cotton  MD GIULIANA       • atorvastatin (LIPITOR) tablet 40 mg  40 mg Oral Nightly Carlton Crews DO       • [START ON 6/14/2022] azithromycin (ZITHROMAX) tablet 250 mg  250 mg Oral Q24H Carlton Crews DO       • carvedilol (COREG) tablet 3.125 mg  3.125 mg Oral BID With Meals Carlton Crews DO       • clopidogrel (PLAVIX) tablet 75 mg  75 mg Oral Daily Carlton Crews DO       • dextrose (D50W) (25 g/50 mL) IV injection 25 g  25 g Intravenous Q15 Min PRN Carlton Crews DO       • dextrose (GLUTOSE) oral gel 15 g  15 g Oral Q15 Min PRN Carlton Crews DO       • Enoxaparin Sodium (LOVENOX) syringe 40 mg  40 mg Subcutaneous Q24H Carlton Crews DO       • glucagon (human recombinant) (GLUCAGEN DIAGNOSTIC) injection 1 mg  1 mg Intramuscular Q15 Min PRN Carlton Crews DO       • guaiFENesin (MUCINEX) 12 hr tablet 1,200 mg  1,200 mg Oral Q12H Carlton Crews DO   1,200 mg at 06/13/22 0953   • Insulin Lispro (humaLOG) injection 0-7 Units  0-7 Units Subcutaneous TID AC Carlton Crews DO       • ipratropium-albuterol (DUO-NEB) nebulizer solution 3 mL  3 mL Nebulization Q4H PRN Carlton Crews DO       • ipratropium-albuterol (DUO-NEB) nebulizer solution 3 mL  3 mL Nebulization 4x Daily - RT Carlton Crews DO   3 mL at 06/13/22 0753   • Magnesium Sulfate 2 gram Bolus, followed by 8 gram infusion (total Mg dose 10 grams)- Mg less than or equal to 1mg/dL  2 g Intravenous PRN Carlton Crews DO        Or   • Magnesium Sulfate 2 gram / 50mL Infusion (GIVE X 3 BAGS TO EQUAL 6GM TOTAL DOSE) - Mg 1.1 - 1.5 mg/dl  2 g Intravenous PRN Carlton Crews DO        Or   • Magnesium Sulfate 4 gram infusion- Mg 1.6-1.9 mg/dL  4 g Intravenous PRN Carlton Crews DO       • methylPREDNISolone sodium succinate (SOLU-Medrol) injection 60 mg  60 mg Intravenous Q8H Carlton Crews DO   60 mg at 06/13/22 0321   • ondansetron (ZOFRAN) tablet 4 mg  4 mg Oral  Q6H PRN Carlton Crews DO        Or   • ondansetron (ZOFRAN) injection 4 mg  4 mg Intravenous Q6H PRN Carlton Crews DO       • Pharmacy Consult - MTM   Does not apply Daily Karo Zuluaga PharmD       • potassium chloride (MICRO-K) CR capsule 40 mEq  40 mEq Oral PRN Carlton Crews DO        Or   • potassium chloride (KLOR-CON) packet 40 mEq  40 mEq Oral PRN Carlton Crews DO        Or   • potassium chloride 10 mEq in 100 mL IVPB  10 mEq Intravenous Q1H PRN Carlton Crews DO       • sodium chloride 0.9 % flush 10 mL  10 mL Intravenous PRN Clifford Cotton MD       • sodium chloride 0.9 % flush 10 mL  10 mL Intravenous Q12H Carlton Crews DO       • sodium chloride 0.9 % flush 10 mL  10 mL Intravenous PRN Carlton Crews DO       • valsartan (DIOVAN) tablet 160 mg  160 mg Oral Daily Carlton Crews DO           Lab Results (last 24 hours)     Procedure Component Value Units Date/Time    Basic Metabolic Panel [[id number]]  (Abnormal) Collected: 06/13/22 0737    Specimen: Blood Updated: 06/13/22 0824     Glucose 190 mg/dL      BUN 24 mg/dL      Creatinine 1.22 mg/dL      Sodium 134 mmol/L      Potassium 3.9 mmol/L      Comment: Slight hemolysis detected by analyzer. Results may be affected.        Chloride 100 mmol/L      CO2 20.0 mmol/L      Calcium 8.5 mg/dL      BUN/Creatinine Ratio 19.7     Anion Gap 14.0 mmol/L      eGFR 45.2 mL/min/1.73      Comment: National Kidney Foundation and American Society of Nephrology (ASN) Task Force recommended calculation based on the Chronic Kidney Disease Epidemiology Collaboration (CKD-EPI) equation refit without adjustment for race.       Narrative:      GFR Normal >60  Chronic Kidney Disease <60  Kidney Failure <15      Magnesium [[id number]]  (Normal) Collected: 06/13/22 0737    Specimen: Blood Updated: 06/13/22 0824     Magnesium 2.4 mg/dL     CBC (No Diff) [[id number]]  (Abnormal) Collected: 06/13/22 0737    Specimen:  Blood Updated: 06/13/22 0806     WBC 3.65 10*3/mm3      RBC 4.35 10*6/mm3      Hemoglobin 13.4 g/dL      Hematocrit 40.4 %      MCV 92.9 fL      MCH 30.8 pg      MCHC 33.2 g/dL      RDW 13.3 %      RDW-SD 46.2 fl      MPV 10.7 fL      Platelets 128 10*3/mm3     [place] Draw [[id number]] Collected: 06/12/22 2330    Specimen: Blood Updated: 06/13/22 0347    Narrative:      The following orders were created for panel order [place] Draw.  Procedure                               Abnormality         Status                     ---------                               -----------         ------                     Green Top (Gel)[[id number]]                                  Final result               Lavender Top[[id number]]                                     Final result               Gold Top - SST[[id number]]                                   Final result               Jain Top[[id number]]                                         Final result               Light Blue Top[[id number]]                                   Final result                 Please view results for these tests on the individual orders.    Gray Top [[id number]] Collected: 06/12/22 2330    Specimen: Blood Updated: 06/13/22 0347     Extra Tube Hold for add-ons.     Comment: Auto resulted.       Respiratory Panel PCR w/COVID-19(SARS-CoV-2) KAYE/HEIKE/LEILANI/PAD/COR/MAD/LEIA In-House, NP Swab in UTM/VTM, 3-4 HR TAT - Swab, Nasopharynx [[id number]]  (Abnormal) Collected: 06/13/22 0036    Specimen: Swab from Nasopharynx Updated: 06/13/22 0341     ADENOVIRUS, PCR Not Detected     Coronavirus 229E Not Detected     Coronavirus HKU1 Not Detected     Coronavirus NL63 Not Detected     Coronavirus OC43 Not Detected     COVID19 Not Detected     Human Metapneumovirus Not Detected     Human Rhinovirus/Enterovirus Not Detected     Influenza A PCR Not Detected     Influenza B PCR Not Detected     Parainfluenza Virus 1 Not Detected     Parainfluenza Virus 2 Not Detected      Parainfluenza Virus 3 Detected     Parainfluenza Virus 4 Not Detected     RSV, PCR Not Detected     Bordetella pertussis pcr Not Detected     Bordetella parapertussis PCR Not Detected     Chlamydophila pneumoniae PCR Not Detected     Mycoplasma pneumo by PCR Not Detected    Narrative:      In the setting of a positive respiratory panel with a viral infection PLUS a negative procalcitonin without other underlying concern for bacterial infection, consider observing off antibiotics or discontinuation of antibiotics and continue supportive care. If the respiratory panel is positive for atypical bacterial infection (Bordetella pertussis, Chlamydophila pneumoniae, or Mycoplasma pneumoniae), consider antibiotic de-escalation to target atypical bacterial infection.    CBC & Differential [[id number]]  (Abnormal) Collected: 06/13/22 0036    Specimen: Blood Updated: 06/13/22 0147    Narrative:      The following orders were created for panel order CBC & Differential.  Procedure                               Abnormality         Status                     ---------                               -----------         ------                     CBC Auto Differential[[id number]]        Abnormal            Final result               Scan Slide[[id number]]                                       Final result                 Please view results for these tests on the individual orders.    Scan Slide [[id number]] Collected: 06/13/22 0036    Specimen: Blood Updated: 06/13/22 0147     RBC Morphology Normal     WBC Morphology Normal     Clumped Platelets Present     Comment: Automated count may be inaccurate due to presence of platelet clumps. Platelets appear adequate upon review of peripheral smear.        CBC Auto Differential [[id number]]  (Abnormal) Collected: 06/13/22 0036    Specimen: Blood Updated: 06/13/22 0147     WBC 5.13 10*3/mm3      RBC 3.88 10*6/mm3      Hemoglobin 11.9 g/dL      Hematocrit 35.7 %      MCV 92.0 fL      MCH 30.7 pg       MCHC 33.3 g/dL      RDW 13.6 %      RDW-SD 46.4 fl      MPV 11.2 fL      Platelets 117 10*3/mm3      Neutrophil % 70.5 %      Lymphocyte % 23.6 %      Monocyte % 5.1 %      Eosinophil % 0.2 %      Basophil % 0.2 %      Immature Grans % 0.4 %      Neutrophils, Absolute 3.62 10*3/mm3      Lymphocytes, Absolute 1.21 10*3/mm3      Monocytes, Absolute 0.26 10*3/mm3      Eosinophils, Absolute 0.01 10*3/mm3      Basophils, Absolute 0.01 10*3/mm3      Immature Grans, Absolute 0.02 10*3/mm3      nRBC 0.0 /100 WBC     COVID PRE-OP / PRE-PROCEDURE SCREENING ORDER (NO ISOLATION) - Swab, Nasopharynx [[id number]]  (Normal) Collected: 06/13/22 0036    Specimen: Swab from Nasopharynx Updated: 06/13/22 0108    Narrative:      The following orders were created for panel order COVID PRE-OP / PRE-PROCEDURE SCREENING ORDER (NO ISOLATION) - Swab, Nasopharynx.  Procedure                               Abnormality         Status                     ---------                               -----------         ------                     COVID-19 and FLU A/B PCR...[[id number]]  Normal              Final result                 Please view results for these tests on the individual orders.    COVID-19 and FLU A/B PCR - Swab, Nasopharynx [[id number]]  (Normal) Collected: 06/13/22 0036    Specimen: Swab from Nasopharynx Updated: 06/13/22 0108     COVID19 Not Detected     Influenza A PCR Not Detected     Influenza B PCR Not Detected    Narrative:      Fact sheet for providers: https://www.fda.gov/media/333957/download    Fact sheet for patients: https://www.fda.gov/media/243679/download    Test performed by PCR.    Light Blue Top [[id number]] Collected: 06/12/22 2330    Specimen: Blood Updated: 06/13/22 0047     Extra Tube Hold for add-ons.     Comment: Auto resulted       Lavender Top [[id number]] Collected: 06/12/22 2330    Specimen: Blood Updated: 06/13/22 0047     Extra Tube hold for add-on     Comment: Auto resulted       Green Top (Gel)  [[id number]] Collected: 06/12/22 2330    Specimen: Blood Updated: 06/13/22 0047     Extra Tube Hold for add-ons.     Comment: Auto resulted.       Gold Top - SST [[id number]] Collected: 06/12/22 2330    Specimen: Blood Updated: 06/13/22 0047     Extra Tube Hold for add-ons.     Comment: Auto resulted.       Comprehensive Metabolic Panel [[id number]]  (Abnormal) Collected: 06/12/22 2330    Specimen: Blood Updated: 06/13/22 0014     Glucose 115 mg/dL      BUN 22 mg/dL      Creatinine 1.37 mg/dL      Sodium 135 mmol/L      Potassium 4.2 mmol/L      Comment: Specimen hemolyzed.  Results may be affected.        Chloride 101 mmol/L      CO2 22.0 mmol/L      Calcium 8.5 mg/dL      Total Protein 7.2 g/dL      Albumin 3.50 g/dL      ALT (SGPT) 17 U/L      Comment: Specimen hemolyzed.  Results may be affected.        AST (SGOT) 39 U/L      Alkaline Phosphatase 100 U/L      Total Bilirubin 0.5 mg/dL      Globulin 3.7 gm/dL      Comment: Calculated Result        A/G Ratio 0.9 g/dL      BUN/Creatinine Ratio 16.1     Anion Gap 12.0 mmol/L      eGFR 39.4 mL/min/1.73      Comment: National Kidney Foundation and American Society of Nephrology (ASN) Task Force recommended calculation based on the Chronic Kidney Disease Epidemiology Collaboration (CKD-EPI) equation refit without adjustment for race.       Narrative:      GFR Normal >60  Chronic Kidney Disease <60  Kidney Failure <15      Troponin [[id number]]  (Normal) Collected: 06/12/22 2330    Specimen: Blood Updated: 06/13/22 0013     Troponin T 0.017 ng/mL      Comment: Specimen hemolyzed.  Results may be affected.       Narrative:      Troponin T Reference Range:  <= 0.03 ng/mL-   Negative for AMI  >0.03 ng/mL-     Abnormal for myocardial necrosis.  Clinicians would have to utilize clinical acumen, EKG, Troponin and serial changes to determine if it is an Acute Myocardial Infarction or myocardial injury due to an underlying chronic condition.       Results may be falsely decreased  if patient taking Biotin.      BNP [[id number]]  (Abnormal) Collected: 06/12/22 2330    Specimen: Blood Updated: 06/13/22 0009     proBNP 3,497.0 pg/mL     Narrative:      Among patients with dyspnea, NT-proBNP is highly sensitive for the detection of acute congestive heart failure. In addition NT-proBNP of <300 pg/ml effectively rules out acute congestive heart failure with 99% negative predictive value.    Results may be falsely decreased if patient taking Biotin.          Imaging Results (Last 24 Hours)     Procedure Component Value Units Date/Time    XR Chest 1 View [[id number]] Collected: 06/13/22 0141     Updated: 06/13/22 0142    Narrative:      EXAMINATION: Chest one view.    DATE: 6/13/2022.    COMPARISON: 3/17/2021.    CLINICAL HISTORY: Shortness of breath.    FINDINGS:    The lungs and pleural spaces are clear.    The cardiomediastinal silhouette his mild enlarged and the pulmonary vessels are within normal limits.      Impression:        Mild cardiomegaly with no acute findings otherwise seen.    Electronically signed by:  Brandon Sanderson D.O.    6/12/2022 11:41 PM Mountain Time        Orders (last 24 hrs)      Start     Ordered    06/14/22 0900  azithromycin (ZITHROMAX) tablet 250 mg  Every 24 Hours Scheduled         06/13/22 0307    06/13/22 2100  atorvastatin (LIPITOR) tablet 40 mg  Nightly         06/13/22 1019    06/13/22 2100  Enoxaparin Sodium (LOVENOX) syringe 40 mg  Every 24 Hours         06/13/22 1019    06/13/22 1800  Oral Care  2 Times Daily       06/13/22 1019    06/13/22 1200  Vital Signs  Every 4 Hours       06/13/22 1019    06/13/22 1115  carvedilol (COREG) tablet 3.125 mg  2 Times Daily With Meals         06/13/22 1019    06/13/22 1115  clopidogrel (PLAVIX) tablet 75 mg  Daily         06/13/22 1019    06/13/22 1115  valsartan (DIOVAN) tablet 160 mg  Daily         06/13/22 1019    06/13/22 1115  sodium chloride 0.9 % flush 10 mL  Every 12 Hours Scheduled         06/13/22 1019    06/13/22  "1020  Intake & Output  Every Shift       06/13/22 1019    06/13/22 1020  Weigh Patient  Once         06/13/22 1019    06/13/22 1020  Oxygen Therapy- Nasal Cannula; Titrate for SPO2: 88% - 92%  Continuous         06/13/22 1019    06/13/22 1020  Insert Peripheral IV  Once         06/13/22 1019    06/13/22 1020  Saline Lock & Maintain IV Access  Continuous         06/13/22 1019    06/13/22 1020  Diet Regular; Consistent Carbohydrate  Diet Effective Now         06/13/22 1019    06/13/22 1020  Notify Provider (With Default Parameters)  Until Discontinued         06/13/22 1019    06/13/22 1019  sodium chloride 0.9 % flush 10 mL  As Needed         06/13/22 1019    06/13/22 1019  acetaminophen (TYLENOL) tablet 650 mg  Every 4 Hours PRN        \"Or\" Linked Group Details    06/13/22 1019    06/13/22 1019  acetaminophen (TYLENOL) 160 MG/5ML solution 650 mg  Every 4 Hours PRN        \"Or\" Linked Group Details    06/13/22 1019    06/13/22 1019  acetaminophen (TYLENOL) suppository 650 mg  Every 4 Hours PRN        \"Or\" Linked Group Details    06/13/22 1019    06/13/22 1019  ondansetron (ZOFRAN) tablet 4 mg  Every 6 Hours PRN        \"Or\" Linked Group Details    06/13/22 1019    06/13/22 1019  ondansetron (ZOFRAN) injection 4 mg  Every 6 Hours PRN        \"Or\" Linked Group Details    06/13/22 1019    06/13/22 0946  PT Consult: Eval & Treat Functional Mobility Below Baseline  Once         06/13/22 0945    06/13/22 0946  OT Consult: Eval & Treat  Once         06/13/22 0945    06/13/22 0946  Inpatient COPD Nurse Navigator Consult  Once        Provider:  (Not yet assigned)    06/13/22 0945    06/13/22 0900  guaiFENesin (MUCINEX) 12 hr tablet 1,200 mg  Every 12 Hours Scheduled         06/13/22 0259    06/13/22 0900  azithromycin (ZITHROMAX) tablet 500 mg  Every 24 Hours Scheduled         06/13/22 0302    06/13/22 0900  Pharmacy Consult - [place]  Daily         06/13/22 0744    06/13/22 0830  ipratropium-albuterol (DUO-NEB) nebulizer solution 3 " mL  4 Times Daily - RT         06/13/22 0258    06/13/22 0730  Insulin Lispro (humaLOG) injection 0-7 Units  3 Times Daily Before Meals         06/13/22 0307    06/13/22 0700  POC Glucose TID AC  3 Times Daily Before Meals       06/13/22 0307    06/13/22 0700  Consult to Wound / Ostomy Care  Once         06/13/22 0700    06/13/22 0659  Inpatient Case Management  Consult  Once        Provider:  (Not yet assigned)    06/13/22 0700    06/13/22 0600  Incentive Spirometry  Every 4 Hours While Awake       06/13/22 0259    06/13/22 0600  Basic Metabolic Panel  Morning Draw         06/13/22 0316    06/13/22 0600  Magnesium  Morning Draw         06/13/22 0316    06/13/22 0600  CBC (No Diff)  Morning Draw         06/13/22 0316    06/13/22 0400  methylPREDNISolone sodium succinate (SOLU-Medrol) injection 60 mg  Every 8 Hours         06/13/22 0259    06/13/22 0308  Do NOT Hold Basal or Correction Scale Insulin When Patient is NPO, Hold Scheduled Mealtime (Bolus) Insulin if NPO  Continuous         06/13/22 0307    06/13/22 0308  Follow [facility] Hypoglycemia Standing Orders For Blood Glucose Less Than 70 mg/dL  Until Discontinued        Comments: ALERT PATIENT - NOT NPO & CAN SAFELY SWALLOW  Administer 4 oz Fruit Juice OR 4 oz Regular Soda OR 8 oz Milk OR 15-30 grams (1 tube) of Glucose Gel.  Recheck Blood Glucose Approximately 15 Minutes After Ingestion, Repeat Treatment & Continue to Recheck Blood Sugar Approximately Every 15 Minutes Until Blood Glucose is 70 or Higher.  Once Blood Glucose is 70 or Higher & if It Will Be More Than 60 Minutes Until Next Meal, Provide Appropriate Snack (Including Carbohydrate Food) Based on Meal Plan Orde morgan. Give Meal Tray As Soon As Possible.    PATIENT HAS IV ACCESS - UNRESPONSIVE, NPO OR UNABLE TO SAFELY SWALLOW  Administer 25g (50ml) D50W IV Push.  Recheck Blood Glucose Approximately 15 Minutes After Administration, if Blood Glucose Remains Less Than 70, Repeat Treatment   Recheck  "Blood Glucose Approximately 15 Minutes After 2nd Administration, if Blood Glucose Remains Less Than 70 After 2nd Dose of D50W, Contact Provider for Further Treatment Orders & Consider Adding IVF With D5W for Mainte tenance    PATIENT WITHOUT IV ACCESS - UNRESPONSIVE, NPO OR UNABLE TO SAFELY SWALLOW  Administer 1mg Glucagon SQ & Establish IV Access.  Turn Patient on Side - Nausea / Vomiting May Occur.  Recheck Blood Glucose Approximately 15 Minutes After Administration.  If Blood Glucose Remains Less Than 70, Administer 25g D50W IV Push (50ml).  Recheck Blood Glucose Approximately 15 Minutes After Administration of D50W, if Blood Glucose Remains Less Than 70, Contact Provider for Further Treatment Orders & C  Consider Adding IVF With D5 for Maintenance    Document Event & Patient Response to Interventions in EMR, Document Medications on MAR  Notify Provider if Hypoglycemia Treatment Needed    06/13/22 0307 06/13/22 0307  dextrose (GLUTOSE) oral gel 15 g  Every 15 Minutes PRN         06/13/22 0307    06/13/22 0307  dextrose (D50W) (25 g/50 mL) IV injection 25 g  Every 15 Minutes PRN         06/13/22 0307    06/13/22 0307  glucagon (human recombinant) (GLUCAGEN DIAGNOSTIC) injection 1 mg  Every 15 Minutes PRN         06/13/22 0307    06/13/22 0301  Initiate Electrolyte Replacement Protocol  Until Discontinued         06/13/22 0300 06/13/22 0301  Patient Currently On Electrolyte Replacement Protocol - Please Refer to MAR for Protocol Details  Misc Nursing Order (Specify)  Daily      Comments: Patient Currently On Electrolyte Replacement Protocol - Please Refer to MAR for Protocol Details    06/13/22 0300 06/13/22 0300  Magnesium Sulfate 2 gram Bolus, followed by 8 gram infusion (total Mg dose 10 grams)- Mg less than or equal to 1mg/dL  As Needed        \"Or\" Linked Group Details    06/13/22 0300    06/13/22 0300  Magnesium Sulfate 2 gram / 50mL Infusion (GIVE X 3 BAGS TO EQUAL 6GM TOTAL DOSE) - Mg 1.1 - 1.5 " "mg/dl  As Needed        \"Or\" Linked Group Details    06/13/22 0300    06/13/22 0300  Magnesium Sulfate 4 gram infusion- Mg 1.6-1.9 mg/dL  As Needed        \"Or\" Linked Group Details    06/13/22 0300    06/13/22 0300  potassium chloride (MICRO-K) CR capsule 40 mEq  As Needed        \"Or\" Linked Group Details    06/13/22 0300    06/13/22 0300  potassium chloride (KLOR-CON) packet 40 mEq  As Needed        \"Or\" Linked Group Details    06/13/22 0300    06/13/22 0300  potassium chloride 10 mEq in 100 mL IVPB  Every 1 Hour PRN        \"Or\" Linked Group Details    06/13/22 0300    06/13/22 0258  ipratropium-albuterol (DUO-NEB) nebulizer solution 3 mL  Every 4 Hours PRN         06/13/22 0258    06/13/22 0257  Code Status and Medical Interventions:  Continuous         06/13/22 0258    06/13/22 0229  Respiratory Panel PCR w/COVID-19(SARS-CoV-2) KAYE/HEIKE/LEILANI/PAD/COR/MAD/LEIA In-House, NP Swab in UTM/VTM, 3-4 HR TAT - Swab, Nasopharynx  Once         06/13/22 0228    06/13/22 0229  ED Bed Request  Once         06/13/22 0229    06/13/22 0229  Inpatient Admission  Once         06/13/22 0229    06/13/22 0055  Scan Slide  Once         06/13/22 0054    06/13/22 0006  CBC Auto Differential  PROCEDURE ONCE         06/12/22 2307    06/12/22 2330  COVID PRE-OP / PRE-PROCEDURE SCREENING ORDER (NO ISOLATION) - Swab, Nasopharynx  Once         06/12/22 2329    06/12/22 2330  COVID-19 and FLU A/B PCR - Swab, Nasopharynx  PROCEDURE ONCE         06/12/22 2329    06/12/22 2321  ipratropium-albuterol (DUO-NEB) nebulizer solution 3 mL  Once         06/12/22 2319    06/12/22 2321  albuterol (PROVENTIL) nebulizer solution 0.083% 2.5 mg/3mL  Once         06/12/22 2319 06/12/22 2308  NPO Diet NPO Type: Strict NPO  Diet Effective Now,   Status:  Canceled         06/12/22 2307 06/12/22 2308  Undress and Gown  Once         06/12/22 2307 06/12/22 2308  Cardiac monitoring  Continuous         06/12/22 2307 06/12/22 2308  Continuous Pulse Oximetry  " Continuous         06/12/22 2307    06/12/22 2308  Vital Signs  Per Hospital Policy         06/12/22 2307    06/12/22 2308  ECG 12 Lead  Once         06/12/22 2307    06/12/22 2308  XR Chest 1 View  1 Time Imaging         06/12/22 2307    06/12/22 2308  Insert peripheral IV  Once         06/12/22 2307    06/12/22 2308  [place] Draw  Once         06/12/22 2307    06/12/22 2308  CBC & Differential  Once         06/12/22 2307    06/12/22 2308  Comprehensive Metabolic Panel  Once         06/12/22 2307    06/12/22 2308  BNP  Once         06/12/22 2307    06/12/22 2308  Troponin  Once         06/12/22 2307 06/12/22 2308  Green Top (Gel)  PROCEDURE ONCE         06/12/22 2307    06/12/22 2308  Lavender Top  PROCEDURE ONCE         06/12/22 2307    06/12/22 2308  Gold Top - SST  PROCEDURE ONCE         06/12/22 2307    06/12/22 2308  Gray Top  PROCEDURE ONCE         06/12/22 2307    06/12/22 2308  Light Blue Top  PROCEDURE ONCE         06/12/22 2307    06/12/22 2307  Oxygen Therapy- Nasal Cannula; 2 LPM; Titrate for SPO2: equal to or greater than, 92%  Continuous PRN,   Status:  Canceled       06/12/22 2307 06/12/22 2307  sodium chloride 0.9 % flush 10 mL  As Needed         06/12/22 2307    Unscheduled  Up in Chair  As Needed       06/13/22 1019    Unscheduled  Magnesium  As Needed       06/13/22 0300    Unscheduled  Potassium  As Needed       06/13/22 0300                Physician Progress Notes (last 24 hours)  Notes from 06/12/22 1029 through 06/13/22 1029   No notes of this type exist for this encounter.         Consult Notes (last 24 hours)  Notes from 06/12/22 1029 through 06/13/22 1029   No notes of this type exist for this encounter.

## 2022-06-13 NOTE — H&P
[facility]  HISTORY AND PHYSICAL    Patient Name: Georgia Velázquez  : 1945  MRN: [id number]  Primary Care Physician: Georgina Ahumada APRN  Date of admission: 2022      Subjective   Subjective     Chief Complaint:  Shortness of breath, wheezing    HPI:  Georgia Velázquez is a 77 y.o. female with DM2, diastolic CHF, morbid obesity, COPD without oxygen use at baseline who was brought to the hospital via EMS for shortness of breath/wheezing.  She reports several days of worsening dyspnea with exertion, feeling of chest tightness, wheezing, cough with associated sensation of needing to clear sputum but unable to get it up.  Today family checked her O2 at home and reported that it was in the 80s prompting call to EMS.  Per ED provider she received a dose of dexamethasone and a breathing treatment in route, arrived on nonrebreather mask but has weaned down to nasal cannula while in the ED.  She denies fever/chills/upper respiratory symptoms.      Review of Systems   Gen- No fevers, chills  CV- No chest pain, palpitations  Resp-yes cough, dyspnea  GI- No N/V/D, abd pain  All other systems reviewed and are negative.     Personal History     Past Medical History:   Diagnosis Date   • Acute kidney injury ([facility]) 2017   • Aortic regurgitation    • Arthritis of shoulder 2016   • Body mass index (BMI) of 45.0-49.9 in adult ([facility]) 2019   • CHF (congestive heart failure) ([facility])    • Closed compression fracture of L4 lumbar vertebra 2017    Added automatically from request for surgery [id number]   • Constipation 2017   • Coronary artery disease 2016   • Diabetes mellitus type 2 in obese ([facility]) 2018   • Elevated alkaline phosphatase level 2018   • Elevated creatine kinase    • Essential hypertension 2016   • GERD (gastroesophageal reflux disease) 2016   • Glaucoma 2020   • History of kyphoplasty 2018   • Lumbar facet  arthropathy 1/29/2018   • Lumbar stenosis with neurogenic claudication 1/29/2018   • Morbid obesity due to excess calories (HCC) 1/29/2018   • Myocardial infarction (HCC) 5/18/2016   • Osteoporosis 5/18/2016   • Physical deconditioning 1/30/2018   • Retinal emboli, right 5/21/2020   • Sepsis ([facility])     uro   • Stroke ([facility])    • Urinary incontinence without sensory awareness 2/26/2018             Past Surgical History:   Procedure Laterality Date   • APPENDECTOMY     • BACK SURGERY     • CHOLECYSTECTOMY     • EYE SURGERY     • KYPHOPLASTY N/A 12/7/2017    Procedure: KYPHOPLASTY L4;  Surgeon: Marc Pham MD;  Location: [facility];  Service:        Family History:  family history includes Diabetes in her mother; Heart failure in her father and mother; No Known Problems in her brother, daughter, sister, and son. Otherwise pertinent FHx was reviewed and unremarkable.     Social History:  reports that she quit smoking about 22 years ago. Her smoking use included cigarettes. She has never used smokeless tobacco. She reports that she does not drink alcohol and does not use drugs.  Social History     Social History Narrative    Living w daughter. Daughter works at [employer], in process of getting medical POA.        Medications:  Available home medication information reviewed.  (Not in a hospital admission)      No Known Allergies    Objective   Objective     Vital Signs:   Temp:  [99.7 °F (37.6 °C)] 99.7 °F (37.6 °C)  Heart Rate:  [73-74] 73  Resp:  [24-28] 24  BP: (129-187)/(76-94) 174/84       Physical Exam   Constitutional: Awake, alert, mildly ill-appearing female laying in ED stretcher  Eyes: PERRL, sclerae anicteric, no conjunctival injection  HENT: NCAT, mucous membranes moist  Neck: Supple, trachea midline  Respiratory: Audible wheezes without use of stethoscope, diffuse wheezing with minimal air movement on auscultation, prolonged expiratory phase with accessory muscle use, not in respiratory  distress  Cardiovascular: RRR, no murmurs, rubs, or gallops, palpable radial pulses bilaterally  Gastrointestinal: Positive bowel sounds, soft, nontender, nondistended  Musculoskeletal: Moderate bilateral ankle edema (reports chronic), no clubbing or cyanosis to extremities  Psychiatric: Appropriate affect, cooperative  Neurologic: Speech clear, moving all extremity spontaneously    Result Review:  I have personally reviewed the results from the time of this admission to 6/13/2022 03:01 EDT and agree with these findings:  []  Laboratory list / accordion  []  Microbiology  [x]  Radiology  []  EKG/Telemetry   []  Cardiology/Vascular   []  Pathology  []  Old records  []  Other:  Most notable findings include: CXR relatively clear        LAB RESULTS:      Lab 06/13/22 0036   WBC 5.13   HEMOGLOBIN 11.9*   HEMATOCRIT 35.7   PLATELETS 117*   NEUTROS ABS 3.62   IMMATURE GRANS (ABS) 0.02   LYMPHS ABS 1.21   MONOS ABS 0.26   EOS ABS 0.01   MCV 92.0         Lab 06/12/22  2330   SODIUM 135*   POTASSIUM 4.2   CHLORIDE 101   CO2 22.0   ANION GAP 12.0   BUN 22   CREATININE 1.37*   EGFR 39.4*   GLUCOSE 115*   CALCIUM 8.5*         Lab 06/12/22  2330   TOTAL PROTEIN 7.2   ALBUMIN 3.50   GLOBULIN 3.7   ALT (SGPT) 17   AST (SGOT) 39*   BILIRUBIN 0.5   ALK PHOS 100         Lab 06/12/22  2330   PROBNP 3,497.0*   TROPONIN T 0.017                 UA    Urinalysis 10/15/21   Ketones, UA Negative   Leukocytes, UA Small (1+) (A)   (A) Abnormal value              Microbiology Results (last 10 days)     Procedure Component Value - Date/Time    COVID PRE-OP / PRE-PROCEDURE SCREENING ORDER (NO ISOLATION) - Swab, Nasopharynx [[id number]]  (Normal) Collected: 06/13/22 0036    Lab Status: Final result Specimen: Swab from Nasopharynx Updated: 06/13/22 0108    Narrative:      The following orders were created for panel order COVID PRE-OP / PRE-PROCEDURE SCREENING ORDER (NO ISOLATION) - Swab, Nasopharynx.  Procedure                                Abnormality         Status                     ---------                               -----------         ------                     COVID-19 and FLU A/B PCR...[[id number]]  Normal              Final result                 Please view results for these tests on the individual orders.    COVID-19 and FLU A/B PCR - Swab, Nasopharynx [[id number]]  (Normal) Collected: 06/13/22 0036    Lab Status: Final result Specimen: Swab from Nasopharynx Updated: 06/13/22 0108     COVID19 Not Detected     Influenza A PCR Not Detected     Influenza B PCR Not Detected    Narrative:      Fact sheet for providers: https://www.fda.gov/media/155353/download    Fact sheet for patients: https://www.fda.gov/media/570916/download    Test performed by PCR.          XR Chest 1 View    Result Date: 6/13/2022  EXAMINATION: Chest one view. DATE: 6/13/2022. COMPARISON: 3/17/2021. CLINICAL HISTORY: Shortness of breath. FINDINGS: The lungs and pleural spaces are clear. The cardiomediastinal silhouette his mild enlarged and the pulmonary vessels are within normal limits.     Impression: Mild cardiomegaly with no acute findings otherwise seen. Electronically signed by:  Brandon Sanderson D.O.  6/12/2022 11:41 PM Mountain Time      Results for orders placed during the hospital encounter of 03/17/21    Adult Transthoracic Echo Complete With Contrast if Necessary Per Protocol    Interpretation Summary  · Calculated left ventricular EF = 55% Estimated left ventricular EF was in agreement with the calculated left ventricular EF. Left ventricular systolic function is normal.  · Left ventricular diastolic function is consistent with (grade II w/high LAP) pseudonormalization.  · Estimated right ventricular systolic pressure from tricuspid regurgitation is normal (<35 mmHg). Calculated right ventricular systolic pressure from tricuspid regurgitation is 32 mmHg.  · Endocardial definition of the apical segments is limited, unable to definitively assess apical  regional wall motion abnormalities.      Assessment & Plan   Assessment & Plan     Active Hospital Problems    Diagnosis  POA   • **COPD with acute exacerbation ([facility]) [J44.1]  Yes   • Hypoxia [R09.02]  Yes   • Obesity, morbid, BMI 50 or higher ([facility]) [E66.01]  Unknown   • Type 2 diabetes mellitus with stage 3b chronic kidney disease, without long-term current use of insulin (HCC) [E11.22, N18.32]  Yes   • Diastolic dysfunction [I51.89]  Yes   • Essential hypertension [I10]  Yes   • Hyperlipidemia [E78.5]  Yes   • Coronary artery disease [I25.10]  Yes   • GERD (gastroesophageal reflux disease) [K21.9]  Yes     Summary: This is a 77-year-old female with DM2, diastolic CHF, morbid obesity, COPD who does not use oxygen at baseline who presents with several days of worsening respiratory status, wheezing, cough with clear chest x-ray    Assessment/plan    Acute exacerbation of COPD  Acute hypoxic respiratory insufficiency  - Recently started on DuoNebs outpatient for impaired exercise tolerance 2/2 respiratory limitations  - DuoNebs scheduled and prn  -Mucinex  - Scheduled IV Solu-Medrol 60 mg q8h, taper as respiratory status improves  - Azithromycin  -O2 support as needed  - Recommended dose of Lasix due to history of diastolic CHF and noted edema; patient declining at this time  -Check full viral respiratory panel    DM type 2, A1c 8.6%, without long-term use of insulin  -Accu-Cheks with SSI    Coronary artery disease  Hypertension  Hyperlipidemia  - Home statin, carvedilol, Plavix, ARB    CKD stage 3b  -Baseline creatinine 1.2-1.5; EGFR 30s  -At approximate baseline    Obesity, BMI 51.0 to KG/M2  -Complicates all aspects of care    DVT prophylaxis: Lovenox      CODE STATUS: Per patient she wishes to be DNR/DNI  Code Status and Medical Interventions:   Ordered at: 06/13/22 0258     Medical Intervention Limits:    NO intubation (DNI)     Level Of Support Discussed With:    Patient     Code Status (Patient has no pulse and  is not breathing):    No CPR (Do Not Attempt to Resuscitate)     Medical Interventions (Patient has pulse or is breathing):    Limited Support         Carlton Crews, DO  06/13/22

## 2022-06-14 LAB
ANION GAP SERPL CALCULATED.3IONS-SCNC: 9 MMOL/L (ref 5–15)
BASOPHILS # BLD AUTO: 0.02 10*3/MM3 (ref 0–0.2)
BASOPHILS NFR BLD AUTO: 0.2 % (ref 0–1.5)
BUN SERPL-MCNC: 41 MG/DL (ref 8–23)
BUN/CREAT SERPL: 30.4 (ref 7–25)
CALCIUM SPEC-SCNC: 9.2 MG/DL (ref 8.6–10.5)
CHLORIDE SERPL-SCNC: 103 MMOL/L (ref 98–107)
CO2 SERPL-SCNC: 24 MMOL/L (ref 22–29)
CREAT SERPL-MCNC: 1.35 MG/DL (ref 0.57–1)
DEPRECATED RDW RBC AUTO: 45.6 FL (ref 37–54)
EGFRCR SERPLBLD CKD-EPI 2021: 40.1 ML/MIN/1.73
EOSINOPHIL # BLD AUTO: 0 10*3/MM3 (ref 0–0.4)
EOSINOPHIL NFR BLD AUTO: 0 % (ref 0.3–6.2)
ERYTHROCYTE [DISTWIDTH] IN BLOOD BY AUTOMATED COUNT: 13.4 % (ref 12.3–15.4)
GLUCOSE BLDC GLUCOMTR-MCNC: 167 MG/DL (ref 70–130)
GLUCOSE BLDC GLUCOMTR-MCNC: 182 MG/DL (ref 70–130)
GLUCOSE BLDC GLUCOMTR-MCNC: 265 MG/DL (ref 70–130)
GLUCOSE SERPL-MCNC: 194 MG/DL (ref 65–99)
HCT VFR BLD AUTO: 40.3 % (ref 34–46.6)
HGB BLD-MCNC: 13.2 G/DL (ref 12–15.9)
IMM GRANULOCYTES # BLD AUTO: 0.15 10*3/MM3 (ref 0–0.05)
IMM GRANULOCYTES NFR BLD AUTO: 1.4 % (ref 0–0.5)
LYMPHOCYTES # BLD AUTO: 1.64 10*3/MM3 (ref 0.7–3.1)
LYMPHOCYTES NFR BLD AUTO: 15.2 % (ref 19.6–45.3)
MCH RBC QN AUTO: 30.1 PG (ref 26.6–33)
MCHC RBC AUTO-ENTMCNC: 32.8 G/DL (ref 31.5–35.7)
MCV RBC AUTO: 92 FL (ref 79–97)
MONOCYTES # BLD AUTO: 0.53 10*3/MM3 (ref 0.1–0.9)
MONOCYTES NFR BLD AUTO: 4.9 % (ref 5–12)
NEUTROPHILS NFR BLD AUTO: 78.3 % (ref 42.7–76)
NEUTROPHILS NFR BLD AUTO: 8.45 10*3/MM3 (ref 1.7–7)
NRBC BLD AUTO-RTO: 0 /100 WBC (ref 0–0.2)
PLATELET # BLD AUTO: 139 10*3/MM3 (ref 140–450)
PMV BLD AUTO: 11.2 FL (ref 6–12)
POTASSIUM SERPL-SCNC: 4.4 MMOL/L (ref 3.5–5.2)
RBC # BLD AUTO: 4.38 10*6/MM3 (ref 3.77–5.28)
SODIUM SERPL-SCNC: 136 MMOL/L (ref 136–145)
WBC NRBC COR # BLD: 10.79 10*3/MM3 (ref 3.4–10.8)

## 2022-06-14 PROCEDURE — 97116 GAIT TRAINING THERAPY: CPT

## 2022-06-14 PROCEDURE — 94664 DEMO&/EVAL PT USE INHALER: CPT

## 2022-06-14 PROCEDURE — 82962 GLUCOSE BLOOD TEST: CPT

## 2022-06-14 PROCEDURE — 97162 PT EVAL MOD COMPLEX 30 MIN: CPT

## 2022-06-14 PROCEDURE — 25010000002 METHYLPREDNISOLONE PER 125 MG: Performed by: INTERNAL MEDICINE

## 2022-06-14 PROCEDURE — 80048 BASIC METABOLIC PNL TOTAL CA: CPT | Performed by: INTERNAL MEDICINE

## 2022-06-14 PROCEDURE — 25010000002 ENOXAPARIN PER 10 MG

## 2022-06-14 PROCEDURE — 63710000001 INSULIN LISPRO (HUMAN) PER 5 UNITS: Performed by: INTERNAL MEDICINE

## 2022-06-14 PROCEDURE — 97166 OT EVAL MOD COMPLEX 45 MIN: CPT

## 2022-06-14 PROCEDURE — 99232 SBSQ HOSP IP/OBS MODERATE 35: CPT | Performed by: INTERNAL MEDICINE

## 2022-06-14 PROCEDURE — 94799 UNLISTED PULMONARY SVC/PX: CPT

## 2022-06-14 PROCEDURE — 85025 COMPLETE CBC W/AUTO DIFF WBC: CPT | Performed by: INTERNAL MEDICINE

## 2022-06-14 RX ORDER — ENOXAPARIN SODIUM 100 MG/ML
40 INJECTION SUBCUTANEOUS EVERY 12 HOURS SCHEDULED
Status: DISCONTINUED | OUTPATIENT
Start: 2022-06-14 | End: 2022-06-20 | Stop reason: HOSPADM

## 2022-06-14 RX ORDER — OXYCODONE HYDROCHLORIDE 5 MG/1
5 TABLET ORAL EVERY 4 HOURS PRN
Status: DISCONTINUED | OUTPATIENT
Start: 2022-06-14 | End: 2022-06-20 | Stop reason: HOSPADM

## 2022-06-14 RX ORDER — PREDNISONE 20 MG/1
40 TABLET ORAL
Status: DISCONTINUED | OUTPATIENT
Start: 2022-06-15 | End: 2022-06-20 | Stop reason: HOSPADM

## 2022-06-14 RX ADMIN — IPRATROPIUM BROMIDE AND ALBUTEROL SULFATE 3 ML: .5; 3 SOLUTION RESPIRATORY (INHALATION) at 14:14

## 2022-06-14 RX ADMIN — IPRATROPIUM BROMIDE AND ALBUTEROL SULFATE 3 ML: .5; 3 SOLUTION RESPIRATORY (INHALATION) at 18:44

## 2022-06-14 RX ADMIN — INSULIN LISPRO 2 UNITS: 100 INJECTION, SOLUTION INTRAVENOUS; SUBCUTANEOUS at 08:47

## 2022-06-14 RX ADMIN — ACETAMINOPHEN 650 MG: 325 TABLET ORAL at 15:17

## 2022-06-14 RX ADMIN — CARVEDILOL 3.12 MG: 3.12 TABLET, FILM COATED ORAL at 08:47

## 2022-06-14 RX ADMIN — IPRATROPIUM BROMIDE AND ALBUTEROL SULFATE 3 ML: .5; 3 SOLUTION RESPIRATORY (INHALATION) at 07:07

## 2022-06-14 RX ADMIN — AZITHROMYCIN MONOHYDRATE 250 MG: 250 TABLET ORAL at 08:47

## 2022-06-14 RX ADMIN — CLOPIDOGREL BISULFATE 75 MG: 75 TABLET ORAL at 08:47

## 2022-06-14 RX ADMIN — GUAIFENESIN 1200 MG: 600 TABLET, EXTENDED RELEASE ORAL at 21:55

## 2022-06-14 RX ADMIN — OXYCODONE 5 MG: 5 TABLET ORAL at 17:26

## 2022-06-14 RX ADMIN — ENOXAPARIN SODIUM 40 MG: 40 INJECTION SUBCUTANEOUS at 08:47

## 2022-06-14 RX ADMIN — METHYLPREDNISOLONE SODIUM SUCCINATE 60 MG: 125 INJECTION, POWDER, FOR SOLUTION INTRAMUSCULAR; INTRAVENOUS at 05:03

## 2022-06-14 RX ADMIN — ATORVASTATIN CALCIUM 40 MG: 40 TABLET, FILM COATED ORAL at 21:55

## 2022-06-14 RX ADMIN — IPRATROPIUM BROMIDE AND ALBUTEROL SULFATE 3 ML: .5; 3 SOLUTION RESPIRATORY (INHALATION) at 10:41

## 2022-06-14 RX ADMIN — INSULIN LISPRO 2 UNITS: 100 INJECTION, SOLUTION INTRAVENOUS; SUBCUTANEOUS at 17:26

## 2022-06-14 RX ADMIN — CARVEDILOL 3.12 MG: 3.12 TABLET, FILM COATED ORAL at 17:26

## 2022-06-14 RX ADMIN — NYSTATIN: 100000 POWDER TOPICAL at 21:55

## 2022-06-14 RX ADMIN — INSULIN LISPRO 4 UNITS: 100 INJECTION, SOLUTION INTRAVENOUS; SUBCUTANEOUS at 11:38

## 2022-06-14 RX ADMIN — NYSTATIN: 100000 POWDER TOPICAL at 08:48

## 2022-06-14 RX ADMIN — Medication 10 ML: at 21:56

## 2022-06-14 RX ADMIN — ENOXAPARIN SODIUM 40 MG: 40 INJECTION SUBCUTANEOUS at 21:55

## 2022-06-14 RX ADMIN — GUAIFENESIN 1200 MG: 600 TABLET, EXTENDED RELEASE ORAL at 08:47

## 2022-06-14 RX ADMIN — VALSARTAN 160 MG: 80 TABLET, FILM COATED ORAL at 08:47

## 2022-06-14 NOTE — PLAN OF CARE
Goal Outcome Evaluation:  Plan of Care Reviewed With: patient        Progress: no change (Initial Eval)  Outcome Evaluation: OT eval completed. Pt presenting w/ generalized weakness, decreased activity tolerance/endurance, dyspnea w/ activity and increased need for assistance w/ self-care tasks. CGA for bed mobility w/ extended time and v/c for sequencing. CGA for STS and transfer from bed to chair w/ RW. MaxA for LB dressing, Fredy for UB. De-sat noted w/ mobility from bed to chair to 86% on RA. RN present and reporting pt has been on room air, only 4L at night. 4L NC placed back on pt w/ quick recovery to 92%. Pt educated on PLB technique. IP OT warranted, POC initiated. Recommendation upon d/c for IPR, then progressing to OP pulmonary rehab.

## 2022-06-14 NOTE — PROGRESS NOTES
NN spoke with pt at BS.  Pt alert and able to answer questions appropriately. Pt O2 sat  99% on  4L currently, no home O2 use. COPD action plan reviewed. Deep breathing exercises encouraged. No new concerns or questions voiced at this time.  NN will continue to follow as needed.       Per current [employer] Standards, please consider: No LAMA/LABA/ICS in place, Outpatient PFT, Rehab as appropriate, Palliative Care consult      Due to insurance constraints, patient must seek referral through PCP office to be referred to specialty offices.

## 2022-06-14 NOTE — CASE MANAGEMENT/SOCIAL WORK
Continued Stay Note  [facility]     Patient Name: Georgia Velázquez  MRN: [id number]  Today's Date: 6/14/2022    Admit Date: 6/12/2022     Discharge Plan     Row Name 06/14/22 1110       Plan    Plan SNF    Patient/Family in Agreement with Plan yes    Plan Comments Spoke to patient at bedside. Plan is SNF for rehab. Patient has no preference. Called Stefanie with [facility] and Alina with [facility] and made referrals today. CM will continue to follow.    Final Discharge Disposition Code 03 - skilled nursing facility (SNF)               Discharge Codes    No documentation.                     Eitan Carbajal RN

## 2022-06-14 NOTE — CASE MANAGEMENT/SOCIAL WORK
Continued Stay Note  [facility]     Patient Name: Georgia Velázquez  MRN: [id number]  Today's Date: 6/14/2022    Admit Date: 6/12/2022     Discharge Plan     Row Name 06/14/22 1129       Plan    Plan [facility] or [facility]    Patient/Family in Agreement with Plan yes    Plan Comments Spoke with Stefanie at [facility] and they do not accept patient's insurance. Spoke with Belem at [facility] and made the referral. CM will continue to follow.    Final Discharge Disposition Code 03 - skilled nursing facility (SNF)    Row Name 06/14/22 1110       Plan    Plan SNF    Patient/Family in Agreement with Plan yes    Plan Comments Spoke to patient at bedside. Plan is SNF for rehab. Patient has no preference. Called Stefanie with [facility] and Alina with [facility] and made referrals today. CM will continue to follow.    Final Discharge Disposition Code 03 - skilled nursing facility (SNF)               Discharge Codes    No documentation.                     Eitan Carbajal RN

## 2022-06-14 NOTE — PROGRESS NOTES
[facility] Medicine Services  PROGRESS NOTE    Patient Name: Georgia Velázquez  : 1945  MRN: [id number]    Date of Admission: 2022  Primary Care Physician: Georgina Ahumada APRN    Subjective   Subjective     CC:  SOA    HPI:  Feeling better today, states that wheezing had been pretty bad, however, after she got a breathing treatment, she felt better.     ROS:  Gen- No fevers, chills  CV- No chest pain, palpitations  Resp- No cough, dyspnea  GI- No N/V/D, abd pain        Objective   Objective     Vital Signs:   Temp:  [96.5 °F (35.8 °C)-98.1 °F (36.7 °C)] 96.5 °F (35.8 °C)  Heart Rate:  [53-73] 57  Resp:  [18-22] 21  BP: (152-168)/(55-89) 159/62  Flow (L/min):  [2-4] 4     Physical Exam:  Constitutional: No acute distress, awake, alert  HENT: NCAT, mucous membranes moist  Respiratory: mild wheezing bilaterally, improved from yesterday,  respiratory effort normal   Cardiovascular: RRR, no murmurs, rubs, or gallops  Gastrointestinal: Positive bowel sounds, soft, nontender, nondistended  Musculoskeletal: No bilateral ankle edema  Psychiatric: Appropriate affect, cooperative  Neurologic: Oriented x 3, strength symmetric in all extremities, Cranial Nerves grossly intact to confrontation, speech clear  Skin: No rashes    Results Reviewed:  LAB RESULTS:      Lab 22  0535 22  0737 22  0036   WBC 10.79 3.65 5.13   HEMOGLOBIN 13.2 13.4 11.9*   HEMATOCRIT 40.3 40.4 35.7   PLATELETS 139* 128* 117*   NEUTROS ABS 8.45*  --  3.62   IMMATURE GRANS (ABS) 0.15*  --  0.02   LYMPHS ABS 1.64  --  1.21   MONOS ABS 0.53  --  0.26   EOS ABS 0.00  --  0.01   MCV 92.0 92.9 92.0         Lab 22  0535 22  0737 22  2330   SODIUM 136 134* 135*   POTASSIUM 4.4 3.9 4.2   CHLORIDE 103 100 101   CO2 24.0 20.0* 22.0   ANION GAP 9.0 14.0 12.0   BUN 41* 24* 22   CREATININE 1.35* 1.22* 1.37*   EGFR 40.1* 45.2* 39.4*   GLUCOSE 194* 190* 115*   CALCIUM 9.2 8.5* 8.5*   MAGNESIUM   --  2.4  --          Lab 06/12/22  2330   TOTAL PROTEIN 7.2   ALBUMIN 3.50   GLOBULIN 3.7   ALT (SGPT) 17   AST (SGOT) 39*   BILIRUBIN 0.5   ALK PHOS 100         Lab 06/12/22  2330   PROBNP 3,497.0*   TROPONIN T 0.017                 Brief Urine Lab Results  (Last result in the past 365 days)      Color   Clarity   Blood   Leuk Est   Nitrite   Protein   CREAT   Urine HCG        10/15/21 1356             100 mg/dL               Microbiology Results Abnormal     Procedure Component Value - Date/Time    COVID PRE-OP / PRE-PROCEDURE SCREENING ORDER (NO ISOLATION) - Swab, Nasopharynx [[id number]]  (Normal) Collected: 06/13/22 0036    Lab Status: Final result Specimen: Swab from Nasopharynx Updated: 06/13/22 0108    Narrative:      The following orders were created for panel order COVID PRE-OP / PRE-PROCEDURE SCREENING ORDER (NO ISOLATION) - Swab, Nasopharynx.  Procedure                               Abnormality         Status                     ---------                               -----------         ------                     COVID-19 and FLU A/B PCR...[[id number]]  Normal              Final result                 Please view results for these tests on the individual orders.    COVID-19 and FLU A/B PCR - Swab, Nasopharynx [[id number]]  (Normal) Collected: 06/13/22 0036    Lab Status: Final result Specimen: Swab from Nasopharynx Updated: 06/13/22 0108     COVID19 Not Detected     Influenza A PCR Not Detected     Influenza B PCR Not Detected    Narrative:      Fact sheet for providers: https://www.fda.gov/media/196657/download    Fact sheet for patients: https://www.fda.gov/media/202455/download    Test performed by PCR.          XR Chest 1 View    Result Date: 6/13/2022  EXAMINATION: Chest one view. DATE: 6/13/2022. COMPARISON: 3/17/2021. CLINICAL HISTORY: Shortness of breath. FINDINGS: The lungs and pleural spaces are clear. The cardiomediastinal silhouette his mild enlarged and the pulmonary vessels are within normal  limits.     Impression: Mild cardiomegaly with no acute findings otherwise seen. Electronically signed by:  Brandon Sanderson D.O.  6/12/2022 11:41 PM Mountain Time      Results for orders placed during the hospital encounter of 03/17/21    Adult Transthoracic Echo Complete With Contrast if Necessary Per Protocol    Interpretation Summary  · Calculated left ventricular EF = 55% Estimated left ventricular EF was in agreement with the calculated left ventricular EF. Left ventricular systolic function is normal.  · Left ventricular diastolic function is consistent with (grade II w/high LAP) pseudonormalization.  · Estimated right ventricular systolic pressure from tricuspid regurgitation is normal (<35 mmHg). Calculated right ventricular systolic pressure from tricuspid regurgitation is 32 mmHg.  · Endocardial definition of the apical segments is limited, unable to definitively assess apical regional wall motion abnormalities.      I have reviewed the medications:  Scheduled Meds:atorvastatin, 40 mg, Oral, Nightly  azithromycin, 250 mg, Oral, Q24H  carvedilol, 3.125 mg, Oral, BID With Meals  clopidogrel, 75 mg, Oral, Daily  enoxaparin, 40 mg, Subcutaneous, Q12H  guaiFENesin, 1,200 mg, Oral, Q12H  insulin lispro, 0-7 Units, Subcutaneous, TID AC  ipratropium-albuterol, 3 mL, Nebulization, 4x Daily - RT  methylPREDNISolone sodium succinate, 60 mg, Intravenous, Q8H  nystatin, , Topical, Q12H  pharmacy consult - MTM, , Does not apply, Daily  sodium chloride, 10 mL, Intravenous, Q12H  valsartan, 160 mg, Oral, Daily      Continuous Infusions:   PRN Meds:.•  acetaminophen **OR** acetaminophen **OR** acetaminophen  •  dextrose  •  dextrose  •  glucagon (human recombinant)  •  ipratropium-albuterol  •  magnesium sulfate **OR** magnesium sulfate **OR** magnesium sulfate  •  ondansetron **OR** ondansetron  •  potassium chloride **OR** potassium chloride **OR** potassium chloride  •  sodium chloride  •  sodium chloride    Assessment  & Plan   Assessment & Plan     Active Hospital Problems    Diagnosis  POA   • **COPD with acute exacerbation (HCC) [J44.1]  Yes   • Hypoxia [R09.02]  Yes   • Obesity, morbid, BMI 50 or higher (HCC) [E66.01]  Unknown   • Type 2 diabetes mellitus with stage 3b chronic kidney disease, without long-term current use of insulin (HCC) [E11.22, N18.32]  Yes   • Diastolic dysfunction [I51.89]  Yes   • Essential hypertension [I10]  Yes   • Hyperlipidemia [E78.5]  Yes   • Coronary artery disease [I25.10]  Yes   • GERD (gastroesophageal reflux disease) [K21.9]  Yes      Resolved Hospital Problems   No resolved problems to display.        Brief Hospital Course to date:  Georgia Velázquez is a 77 y.o. female with DM2, diastolic CHF, morbid obesity, COPD who does not use oxygen at baseline who presented with several days of worsening respiratory status, wheezing, cough with clear chest x-ray. Found to have parainfluenza virus resulting in COPD exacerbation     Assessment/plan     Acute exacerbation of COPD  Acute Parainfluenza infection  Acute hypoxic respiratory insufficiency  - Recently started on DuoNebs outpatient for impaired exercise tolerance related to respiratory limitations  - DuoNebs scheduled and prn  -Mucinex  - transition to PO steroids today  - Azithromycin  -O2 support as needed  - Recommended dose of Lasix due to history of diastolic CHF and noted edema; patient declining at this time  -Check full viral respiratory panel- + for parainfluenza     DM type 2, A1c 8.6%, without long-term use of insulin  -Accu-Cheks with SSI     Coronary artery disease  Hypertension  Hyperlipidemia  - Home statin, carvedilol, Plavix, ARB     CKD stage 3b  -Baseline creatinine 1.2-1.5; EGFR 30s  -At approximate baseline     Obesity, BMI 51.0 to KG/M2  -Complicates all aspects of care    DVT prophylaxis:  Medical DVT prophylaxis orders are present.            Disposition: I expect the patient to be discharged home tomorrow    CODE  STATUS:   Code Status and Medical Interventions:   Ordered at: 06/13/22 0258     Medical Intervention Limits:    NO intubation (DNI)     Level Of Support Discussed With:    Patient     Code Status (Patient has no pulse and is not breathing):    No CPR (Do Not Attempt to Resuscitate)     Medical Interventions (Patient has pulse or is breathing):    Limited Support       Jennie Gilmore MD  06/14/22

## 2022-06-14 NOTE — PLAN OF CARE
Goal Outcome Evaluation:  Plan of Care Reviewed With: patient           Outcome Evaluation: PT PRESENTS WITH EVOLVING SYMPTOMS TO INCLUDE IMPAIRED BALANCE, DECREASED ENDURANCE, AUDIBLE WHEEZING, TOLEDO AND DECLINE IN FUNCTIONAL MOBILITY. PT IS A&O AND FOLLOWS ALL COMMANDS. COMPLETES TRANSFERS WITH CGA AND CUES FOR SEQUENCING. AMBULATED 80 FEET WITH R WALKER. SATS 95-87% WITH ACTIVITY ON 4L O2. RECOMMEND IRF AT D/C AS PT IS HOME ALONE DURING THE DAY. WOULD LIKELY BENEFIT FROM TRANSITION TO OP PULMONARY REHAB AFTER INPT REHAB STAY.

## 2022-06-14 NOTE — THERAPY EVALUATION
Patient Name: Georgia Velázquez  : 1945    MRN: [id number]                              Today's Date: 2022       Admit Date: 2022    Visit Dx:     ICD-10-CM ICD-9-CM   1. COPD with acute exacerbation ([facility])  J44.1 491.21   2. Acute respiratory failure with hypoxia ([facility])  J96.01 518.81   3. History of CHF (congestive heart failure)  Z86.79 V12.59     Patient Active Problem List   Diagnosis   • Essential hypertension   • Hyperlipidemia   • Coronary artery disease   • GERD (gastroesophageal reflux disease)   • Stage 3 chronic kidney disease (CMS/[facility]ly [facility])   • Chronic obstructive pulmonary disease ([facility])   • Retinal emboli, right   • Diastolic dysfunction   • Colitis   • Glaucoma   • Left carotid artery stenosis   • Syncope   • Aortic regurgitation   • COPD with exacerbation ([facility])   • Diabetic retinopathy ([facility])   • Type 2 diabetes mellitus with stage 3b chronic kidney disease, without long-term current use of insulin ([facility])   • COPD with acute exacerbation ([facility])   • Hypoxia   • Obesity, morbid, BMI 50 or higher ([facility])     Past Medical History:   Diagnosis Date   • Acute kidney injury ([facility]) 2017   • Aortic regurgitation    • Arthritis of shoulder 2016   • Body mass index (BMI) of 45.0-49.9 in adult ([facility]ly [facility]) 2019   • CHF (congestive heart failure) ([facility])    • Closed compression fracture of L4 lumbar vertebra 2017    Added automatically from request for surgery [id number]   • Constipation 2017   • Coronary artery disease 2016   • Diabetes mellitus type 2 in obese ([facility]) 2018   • Elevated alkaline phosphatase level 2018   • Elevated creatine kinase    • Essential hypertension 2016   • GERD (gastroesophageal reflux disease) 2016   • Glaucoma 2020   • History of kyphoplasty 2018   • Lumbar facet arthropathy 2018   • Lumbar stenosis with neurogenic claudication 2018   • Morbid obesity due to excess calories ([facility]) 2018   • Myocardial infarction ([facility])  5/18/2016   • Osteoporosis 5/18/2016   • Physical deconditioning 1/30/2018   • Retinal emboli, right 5/21/2020   • Sepsis (HCC)     uro   • Stroke (HCC)    • Urinary incontinence without sensory awareness 2/26/2018     Past Surgical History:   Procedure Laterality Date   • APPENDECTOMY     • BACK SURGERY     • CHOLECYSTECTOMY     • EYE SURGERY     • KYPHOPLASTY N/A 12/7/2017    Procedure: KYPHOPLASTY L4;  Surgeon: Marc Pham MD;  Location: [facility] OR;  Service:       General Information     Row Name 06/14/22 1033          Physical Therapy Time and Intention    Document Type evaluation  -CD     Mode of Treatment physical therapy  -CD     Row Name 06/14/22 1033          General Information    Patient Profile Reviewed yes  -CD     Prior Level of Function independent:;all household mobility;gait;transfer;bed mobility;ADL's  USES A ROLLATOR AND STAIR LIFT AT HOME. PLANS TO GET A W/C FOR COMMUNITY DISTANCES.  -CD     Existing Precautions/Restrictions fall;oxygen therapy device and L/min  DROPLET/CONTACT PRECAUTIONS- PARAFLU, COPD EXACERBATION.  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  -CD     Row Name 06/14/22 1033          Stairs Within Home, Primary    Stairs, Within Home, Primary USES A STAIRLIFT  -CD     Row Name 06/14/22 1033          Cognition    Orientation Status (Cognition) oriented x 4  -CD     Row Name 06/14/22 1033          Safety Issues, Functional Mobility    Safety Issues Affecting Function (Mobility) insight into deficits/self-awareness;positioning of assistive device;safety precautions follow-through/compliance;safety precaution awareness  -CD     Impairments Affecting Function (Mobility) balance;endurance/activity tolerance;shortness of breath  -CD     Comment, Safety Issues/Impairments (Mobility) CUES FOR PLB, REST  BREAK  WITH SOA.  -CD           User Key  (r) = Recorded By, (t) = Taken By, (c) = Cosigned By    Initials Name Provider Type    Minerva Echeverria PT Physical Therapist               Mobility     Row Name 06/14/22 1038          Bed Mobility    Comment, (Bed Mobility) PT [facility].  -CD     Row Name 06/14/22 1038          Transfers    Comment, (Transfers) CUES FOR HAND PLACEMENT.  -CD     Row Name 06/14/22 1038          Sit-Stand Transfer    Sit-Stand Hawkins (Transfers) contact guard;verbal cues  -CD     Assistive Device (Sit-Stand Transfers) walker, front-wheeled  -CD     Row Name 06/14/22 1038          Gait/Stairs (Locomotion)    Hawkins Level (Gait) contact guard;verbal cues  -CD     Assistive Device (Gait) walker, front-wheeled  -CD     Deviations/Abnormal Patterns (Gait) stride length decreased;weight shifting decreased  -CD     Bilateral Gait Deviations forward flexed posture;heel strike decreased  -CD     Left Sided Gait Deviations lateral trunk flexion  -CD     Right Sided Gait Deviations lateral trunk flexion  -CD     Comment, (Gait/Stairs) NO OVERT LOB BUT GAIT UNSTEADY WITH FORWARD FLEXED POSTURE AND SHORT STEPS. NEEDS CUES FOR PROPER WALKER PLACEMENT AND UPRIGHT POSTURE.  -CD           User Key  (r) = Recorded By, (t) = Taken By, (c) = Cosigned By    Initials Name Provider Type    Minerva Echeverria PT Physical Therapist               Obj/Interventions     Row Name 06/14/22 1041          Range of Motion Comprehensive    General Range of Motion bilateral lower extremity ROM WFL  -CD     Row Name 06/14/22 1041          Strength Comprehensive (MMT)    Comment, General Manual Muscle Testing (MMT) Assessment B LE GROSSLY 4+ TO 5/5.  -CD     Row Name 06/14/22 1041          Balance    Comment, Balance SEE GAIT NOTE. PT USES ROLLATOR AT BASELINE.  -CD           User Key  (r) = Recorded By, (t) = Taken By, (c) = Cosigned By    Initials Name Provider Type    Minerva Echeverria PT Physical  Therapist               Goals/Plan     Row Name 06/14/22 1050          Bed Mobility Goal 1 (PT)    Activity/Assistive Device (Bed Mobility Goal 1, PT) sit to supine/supine to sit  -CD     [place] Level/Cues Needed (Bed Mobility Goal 1, PT) independent  -CD     Time Frame (Bed Mobility Goal 1, PT) long term goal (LTG);2 weeks  -CD     Row Name 06/14/22 1050          Transfer Goal 1 (PT)    Activity/Assistive Device (Transfer Goal 1, PT) sit-to-stand/stand-to-sit;bed-to-chair/chair-to-bed;walker, rolling  -CD     [place] Level/Cues Needed (Transfer Goal 1, PT) independent  -CD     Time Frame (Transfer Goal 1, PT) long term goal (LTG);2 weeks  -CD     Row Name 06/14/22 1050          Gait Training Goal 1 (PT)    Activity/Assistive Device (Gait Training Goal 1, PT) gait (walking locomotion);walker, rolling  -CD     [place] Level (Gait Training Goal 1, PT) independent  -CD     Distance (Gait Training Goal 1, PT) 400 FEET  -CD     Time Frame (Gait Training Goal 1, PT) long term goal (LTG);2 weeks  -CD     Row Name 06/14/22 1050          Therapy Assessment/Plan (PT)    Planned Therapy Interventions (PT) balance training;home exercise program;transfer training;bed mobility training;strengthening  -CD           User Key  (r) = Recorded By, (t) = Taken By, (c) = Cosigned By    Initials Name Provider Type    CD Minerva Posey, PT Physical Therapist               Clinical Impression     Row Name 06/14/22 1042          Pain    Pretreatment Pain Rating 0/10 - no pain  -CD     Posttreatment Pain Rating 0/10 - no pain  -CD     Row Name 06/14/22 1042          Plan of Care Review    Plan of Care Reviewed With patient  -CD     Outcome Evaluation PT PRESENTS WITH EVOLVING SYMPTOMS TO INCLUDE IMPAIRED BALANCE, DECREASED ENDURANCE, AUDIBLE WHEEZING, TOLEDO AND DECLINE IN FUNCTIONAL MOBILITY. PT IS A&O AND FOLLOWS ALL COMMANDS. COMPLETES TRANSFERS WITH CGA AND CUES FOR SEQUENCING. AMBULATED 80 FEET WITH R WALKER. SATS 95-87%  WITH ACTIVITY ON 4L O2. RECOMMEND IRF AT D/C AS PT IS HOME ALONE DURING THE DAY. WOULD LIKELY BENEFIT FROM TRANSITION TO OP PULMONARY REHAB AFTER INPT REHAB STAY.  -CD     Row Name 06/14/22 1042          Therapy Assessment/Plan (PT)    Patient/Family Therapy Goals Statement (PT) PT IS AGREEABLE TO IRF AT D/C.  -CD     Rehab Potential (PT) good, to achieve stated therapy goals  -CD     Criteria for Skilled Interventions Met (PT) yes  -CD     Therapy Frequency (PT) daily  -CD     Row Name 06/14/22 1042          Vital Signs    Pre Systolic BP Rehab 159  -CD     Pre Treatment Diastolic BP 62  -CD     Post Systolic BP Rehab 137  -CD     Post Treatment Diastolic BP 48  -CD     Pretreatment Heart Rate (beats/min) 57  -CD     Posttreatment Heart Rate (beats/min) 64  -CD     Pre SpO2 (%) 95  -CD     O2 Delivery Pre Treatment supplemental O2  -CD     Intra SpO2 (%) 87  -CD     O2 Delivery Intra Treatment supplemental O2  -CD     Post SpO2 (%) 93  -CD     O2 Delivery Post Treatment supplemental O2  -CD     Pre Patient Position Sitting  -CD     Intra Patient Position Standing  -CD     Post Patient Position Sitting  -CD     Row Name 06/14/22 1042          Positioning and Restraints    Pre-Treatment Position sitting in chair/recliner  -CD     Post Treatment Position chair  -CD     In Chair reclined;call light within reach;encouraged to call for assist;exit alarm on;legs elevated;notified nsg;heels elevated  -CD           User Key  (r) = Recorded By, (t) = Taken By, (c) = Cosigned By    Initials Name Provider Type    CD Minerva Posey, PT Physical Therapist               Outcome Measures     Row Name 06/14/22 1054 06/14/22 0800       How much help from another person do you currently need...    Turning from your back to your side while in flat bed without using bedrails? 3  -CD 4  -TA    Moving from lying on back to sitting on the side of a flat bed without bedrails? 3  -CD 4  -TA    Moving to and from a bed to a chair (including  a wheelchair)? 3  -CD 3  -TA    Standing up from a chair using your arms (e.g., wheelchair, bedside chair)? 3  -CD 3  -TA    Climbing 3-5 steps with a railing? 2  -CD 3  -TA    To walk in hospital room? 3  -CD 3  -TA    AM-PAC 6 Clicks Score (PT) 17  -CD 20  -TA    Highest level of mobility 5 --> Static standing  -CD 6 --> Walked 10 steps or more  -TA          User Key  (r) = Recorded By, (t) = Taken By, (c) = Cosigned By    Initials Name Provider Type    CD Minerva Posey, PT Physical Therapist    Sarah Lam, RN Registered Nurse                             Physical Therapy Education                 Title: PT OT SLP Therapies (Done)     Topic: Physical Therapy (Done)     Point: Mobility training (Done)     Learning Progress Summary           Patient Acceptance, E, VU,NR by CD at 6/14/2022 1055    Comment: BENEFITS OF OOB ACTIVITY, SAFETY WITH MOBILITY, PROGRESSION OF POC, D/C PLANNING,                   Point: Home exercise program (Done)     Learning Progress Summary           Patient Acceptance, E, VU,NR by CD at 6/14/2022 1055    Comment: BENEFITS OF OOB ACTIVITY, SAFETY WITH MOBILITY, PROGRESSION OF POC, D/C PLANNING,                   Point: Body mechanics (Done)     Learning Progress Summary           Patient Acceptance, E, VU,NR by CD at 6/14/2022 1055    Comment: BENEFITS OF OOB ACTIVITY, SAFETY WITH MOBILITY, PROGRESSION OF POC, D/C PLANNING,                   Point: Precautions (Done)     Learning Progress Summary           Patient Acceptance, E, VU,NR by CD at 6/14/2022 1055    Comment: BENEFITS OF OOB ACTIVITY, SAFETY WITH MOBILITY, PROGRESSION OF POC, D/C PLANNING,                               User Key     Initials Effective Dates Name Provider Type Discipline    CD 06/16/21 -  Minerva Posey, PT Physical Therapist PT              PT Recommendation and Plan  Planned Therapy Interventions (PT): balance training, home exercise program, transfer training, bed mobility training, strengthening  Plan  of Care Reviewed With: patient  Outcome Evaluation: PT PRESENTS WITH EVOLVING SYMPTOMS TO INCLUDE IMPAIRED BALANCE, DECREASED ENDURANCE, AUDIBLE WHEEZING, TOLEDO AND DECLINE IN FUNCTIONAL MOBILITY. PT IS A&O AND FOLLOWS ALL COMMANDS. COMPLETES TRANSFERS WITH CGA AND CUES FOR SEQUENCING. AMBULATED 80 FEET WITH R WALKER. SATS 95-87% WITH ACTIVITY ON 4L O2. RECOMMEND IRF AT D/C AS PT IS HOME ALONE DURING THE DAY. WOULD LIKELY BENEFIT FROM TRANSITION TO OP PULMONARY REHAB AFTER INPT REHAB STAY.     Time Calculation:    PT Charges     Row Name 06/14/22 1056             Time Calculation    Start Time 0957  -CD      PT Received On 06/14/22  -CD      PT Goal Re-Cert Due Date 06/24/22  -CD              Time Calculation- PT    Total Timed Code Minutes- PT 10 minute(s)  -CD              Timed Charges    30227 - Gait Training Minutes  10  -CD              Untimed Charges    PT Eval/Re-eval Minutes 57  -CD              Total Minutes    Timed Charges Total Minutes 10  -CD      Untimed Charges Total Minutes 57  -CD       Total Minutes 67  -CD            User Key  (r) = Recorded By, (t) = Taken By, (c) = Cosigned By    Initials Name Provider Type    CD Minerva Posey, PT Physical Therapist              Therapy Charges for Today     Code Description Service Date Service Provider Modifiers Qty    89802534842 HC GAIT TRAINING EA 15 MIN 6/14/2022 Minerva Posey, PT GP 1    65572822164 HC PT EVAL MOD COMPLEXITY 4 6/14/2022 Minerva Posey PT GP 1          PT G-Codes  AM-PAC 6 Clicks Score (PT): 17    Minerva Posey PT  6/14/2022

## 2022-06-14 NOTE — THERAPY EVALUATION
Patient Name: Georgia Velázquez  : 1945    MRN: [id number]                              Today's Date: 2022       Admit Date: 2022    Visit Dx:     ICD-10-CM ICD-9-CM   1. COPD with acute exacerbation ([facility])  J44.1 491.21   2. Acute respiratory failure with hypoxia ([facility])  J96.01 518.81   3. History of CHF (congestive heart failure)  Z86.79 V12.59     Patient Active Problem List   Diagnosis   • Essential hypertension   • Hyperlipidemia   • Coronary artery disease   • GERD (gastroesophageal reflux disease)   • Stage 3 chronic kidney disease (CMS/[facility])   • Chronic obstructive pulmonary disease ([facility])   • Retinal emboli, right   • Diastolic dysfunction   • Colitis   • Glaucoma   • Left carotid artery stenosis   • Syncope   • Aortic regurgitation   • COPD with exacerbation ([facility])   • Diabetic retinopathy ([facility])   • Type 2 diabetes mellitus with stage 3b chronic kidney disease, without long-term current use of insulin ([facility])   • COPD with acute exacerbation ([facility])   • Hypoxia   • Obesity, morbid, BMI 50 or higher ([facility])     Past Medical History:   Diagnosis Date   • Acute kidney injury ([facility]) 2017   • Aortic regurgitation    • Arthritis of shoulder 2016   • Body mass index (BMI) of 45.0-49.9 in adult ([facility]) 2019   • CHF (congestive heart failure) ([facility])    • Closed compression fracture of L4 lumbar vertebra 2017    Added automatically from request for surgery [id number]   • Constipation 2017   • Coronary artery disease 2016   • Diabetes mellitus type 2 in obese ([facility]) 2018   • Elevated alkaline phosphatase level 2018   • Elevated creatine kinase    • Essential hypertension 2016   • GERD (gastroesophageal reflux disease) 2016   • Glaucoma 2020   • History of kyphoplasty 2018   • Lumbar facet arthropathy 2018   • Lumbar stenosis with neurogenic claudication 2018   • Morbid obesity due to excess calories ([facility]) 2018   • Myocardial infarction ([facility])  5/18/2016   • Osteoporosis 5/18/2016   • Physical deconditioning 1/30/2018   • Retinal emboli, right 5/21/2020   • Sepsis (HCC)     uro   • Stroke (HCC)    • Urinary incontinence without sensory awareness 2/26/2018     Past Surgical History:   Procedure Laterality Date   • APPENDECTOMY     • BACK SURGERY     • CHOLECYSTECTOMY     • EYE SURGERY     • KYPHOPLASTY N/A 12/7/2017    Procedure: KYPHOPLASTY L4;  Surgeon: Marc Pham MD;  Location: [facility];  Service:       General Information     Row Name 06/14/22 1057          OT Time and Intention    Document Type evaluation  -MR     Mode of Treatment individual therapy;occupational therapy  -MR     Row Name 06/14/22 1057          General Information    Patient Profile Reviewed yes  -MR     Prior Level of Function independent:;all household mobility;community mobility;gait;transfer;bed mobility;ADL's  PTA pt reporting she was I w/ ADLs, denied use of AD w/in the home or the comm., pt reporting utilizing rollator occas. denies having any steps to get in the home & reprts having a stair lift w/in the home. Pt sleeps in reg. bed, denies any recent falls.  -MR     Existing Precautions/Restrictions fall;oxygen therapy device and L/min  Contact - Paraflu; COPD  -MR     Barriers to Rehab medically complex;previous functional deficit  -MR     Row Name 06/14/22 1057          Living Environment    People in Home child(chandler), adult;other (see comments)  Lives w/ daughter who works during the day.  -MR     Row Name 06/14/22 1057          Home Main Entrance    Number of Stairs, Main Entrance none  -MR     Row Name 06/14/22 1057          Stairs Within Home, Primary    Stairs, Within Home, Primary Utilizes a stairlift  -MR     Row Name 06/14/22 1057          Cognition    Orientation Status (Cognition) oriented x 4  -MR     Row Name 06/14/22 1057          Safety Issues, Functional Mobility    Safety Issues Affecting Function (Mobility) insight into  deficits/self-awareness;positioning of assistive device;safety precaution awareness;safety precautions follow-through/compliance  -MR     Impairments Affecting Function (Mobility) balance;endurance/activity tolerance;shortness of breath  -MR           User Key  (r) = Recorded By, (t) = Taken By, (c) = Cosigned By    Initials Name Provider Type    MR Promise Melendrez, OT Occupational Therapist                 Mobility/ADL's     Row Name 06/14/22 1105          Bed Mobility    Bed Mobility supine-sit  -MR     Supine-Sit [place] (Bed Mobility) contact guard;nonverbal cues (demo/gesture);verbal cues  -MR     Assistive Device (Bed Mobility) head of bed elevated;bed rails  -MR     Row Name 06/14/22 1105          Transfers    Transfers sit-stand transfer;bed-chair transfer  -MR     Comment, (Transfers) v/c for hand placement, sequencing and safety.  -MR     Bed-Chair [place] (Transfers) contact guard;nonverbal cues (demo/gesture);verbal cues  -MR     Assistive Device (Bed-Chair Transfers) walker, front-wheeled  -MR     Sit-Stand [place] (Transfers) contact guard;verbal cues  -MR     Row Name 06/14/22 1105          Sit-Stand Transfer    Assistive Device (Sit-Stand Transfers) walker, front-wheeled  -MR     Row Name 06/14/22 1105          Activities of Daily Living    BADL Assessment/Intervention lower body dressing;upper body dressing;feeding  -MR     Row Name 06/14/22 1105          Lower Body Dressing Assessment/Training    [place] Level (Lower Body Dressing) don;socks;dependent (less than 25% patient effort)  -MR     Position (Lower Body Dressing) supine  -MR     Row Name 06/14/22 1105          Upper Body Dressing Assessment/Training    [place] Level (Upper Body Dressing) don;front opening garment;other (see comments);minimum assist (75% patient effort)  hospital gown  -MR     Position (Upper Body Dressing) edge of bed sitting  -MR     Row Name 06/14/22 1105          Self-Feeding Assessment/Training     [place] Level (Feeding) feeding skills;set up  -MR     Position (Self-Feeding) supported sitting  -MR           User Key  (r) = Recorded By, (t) = Taken By, (c) = Cosigned By    Initials Name Provider Type     Promise Melendrez OT Occupational Therapist               Obj/Interventions     Row Name 06/14/22 1106          Sensory Assessment (Somatosensory)    Sensory Assessment (Somatosensory) UE sensation intact  -MR     Row Name 06/14/22 1106          Vision Assessment/Intervention    Visual Impairment/Limitations WFL  -MR     Row Name 06/14/22 1106          Range of Motion Comprehensive    General Range of Motion bilateral upper extremity ROM WFL  -MR     Row Name 06/14/22 1106          Strength Comprehensive (MMT)    Comment, General Manual Muscle Testing (MMT) Assessment BUE grossly 4-/5 in all functional planes  -MR     Row Name 06/14/22 1106          Balance    Balance Assessment sitting static balance;sitting dynamic balance;standing static balance;standing dynamic balance  -MR     Static Sitting Balance supervision  -MR     Dynamic Sitting Balance contact guard  -MR     Position, Sitting Balance unsupported;sitting edge of bed  -MR     Static Standing Balance contact guard;verbal cues;non-verbal cues (demo/gesture)  -MR     Dynamic Standing Balance contact guard;verbal cues;non-verbal cues (demo/gesture)  -MR     Position/Device Used, Standing Balance supported;walker, rolling  -MR     Comment, Balance No LOB noted w/ transfers, self-care tasks or mobility  -MR           User Key  (r) = Recorded By, (t) = Taken By, (c) = Cosigned By    Initials Name Provider Type    MR Melendrez DEON Virgen Occupational Therapist               Goals/Plan     Row Name 06/14/22 1111          Transfer Goal 1 (OT)    Activity/Assistive Device (Transfer Goal 1, OT) sit-to-stand/stand-to-sit;toilet  -MR     [place] Level/Cues Needed (Transfer Goal 1, OT) supervision required  -MR     Time Frame (Transfer Goal 1, OT) long  term goal (LTG);by discharge  -MR     Row Name 06/14/22 1111          Dressing Goal 1 (OT)    Activity/Device (Dressing Goal 1, OT) lower body dressing  -MR     [place]/Cues Needed (Dressing Goal 1, OT) minimum assist (75% or more patient effort)  -MR     Time Frame (Dressing Goal 1, OT) long term goal (LTG);by discharge  -MR     Row Name 06/14/22 1111          Toileting Goal 1 (OT)    Activity/Device (Toileting Goal 1, OT) perform perineal hygiene;adjust/manage clothing  -MR     [place] Level/Cues Needed (Toileting Goal 1, OT) contact guard required  -MR     Time Frame (Toileting Goal 1, OT) long term goal (LTG);by discharge  -MR     Row Name 06/14/22 1111          Grooming Goal 1 (OT)    Activity/Device (Grooming Goal 1, OT) grooming skills, all;other (see comments)  in unsupported sitting position or in standing for increased functional challenge  -MR     [place] (Grooming Goal 1, OT) contact guard required  -MR     Time Frame (Grooming Goal 1, OT) long term goal (LTG);by discharge  -MR     Row Name 06/14/22 1111          Therapy Assessment/Plan (OT)    Planned Therapy Interventions (OT) activity tolerance training;adaptive equipment training;BADL retraining;functional balance retraining;IADL retraining;occupation/activity based interventions;patient/caregiver education/training;transfer/mobility retraining;strengthening exercise;ROM/therapeutic exercise  -MR           User Key  (r) = Recorded By, (t) = Taken By, (c) = Cosigned By    Initials Name Provider Type    MR Promise Melendrez, OT Occupational Therapist               Clinical Impression     Row Name 06/14/22 1107          Pain Assessment    Pretreatment Pain Rating 0/10 - no pain  -MR     Posttreatment Pain Rating 0/10 - no pain  -MR     Pre/Posttreatment Pain Comment Denied pain pre and post session  -MR     Pain Intervention(s) Ambulation/increased activity;Repositioned  -MR     Row Name 06/14/22 1107          Plan of Care Review    Plan of  Care Reviewed With patient  -MR     Progress no change  Initial Eval  -MR     Outcome Evaluation OT eval completed. Pt presenting w/ generalized weakness, decreased activity tolerance/endurance, dyspnea w/ activity and increased need for assistance w/ self-care tasks. CGA for bed mobility w/ extended time and v/c for sequencing. CGA for STS and transfer from bed to chair w/ RW. MaxA for LB dressing, Fredy for UB. De-sat noted w/ mobility from bed to chair to 86% on RA. RN present and reporting pt has been on room air, only 4L at night. 4L NC placed back on pt w/ quick recovery to 92%. Pt educated on PLB technique. IP OT warranted, POC initiated. Recommendation upon d/c for IPR, then progressing to OP pulmonary rehab.  -MR     Row Name 06/14/22 1107          Therapy Assessment/Plan (OT)    Patient/Family Therapy Goal Statement (OT) Return to PLOF  -MR     Rehab Potential (OT) good, to achieve stated therapy goals  -MR     Criteria for Skilled Therapeutic Interventions Met (OT) yes;meets criteria;skilled treatment is necessary  -MR     Therapy Frequency (OT) daily  -MR     Row Name 06/14/22 1107          Therapy Plan Review/Discharge Plan (OT)    Anticipated Discharge Disposition (OT) inpatient rehabilitation facility  -MR     Row Name 06/14/22 1107          Vital Signs    Pre Systolic BP Rehab 159  -MR     Pre Treatment Diastolic BP 62  -MR     Pretreatment Heart Rate (beats/min) 56  -MR     Posttreatment Heart Rate (beats/min) 65  -MR     Pre SpO2 (%) 99  -MR     O2 Delivery Pre Treatment nasal cannula  -MR     Intra SpO2 (%) 86  -MR     O2 Delivery Intra Treatment room air  -MR     Post SpO2 (%) 92  -MR     O2 Delivery Post Treatment nasal cannula  -MR     Pre Patient Position Supine  -MR     Intra Patient Position Standing  -MR     Post Patient Position Sitting  -MR     Row Name 06/14/22 1107          Positioning and Restraints    Pre-Treatment Position in bed  -MR     Post Treatment Position chair  -MR     In  Chair notified nsg;reclined;sitting;call light within reach;encouraged to call for assist;exit alarm on;legs elevated;on mechanical lift sling;waffle cushion  -MR           User Key  (r) = Recorded By, (t) = Taken By, (c) = Cosigned By    Initials Name Provider Type    Promise Blanco, OT Occupational Therapist               Outcome Measures     Row Name 06/14/22 1112          How much help from another is currently needed...    Putting on and taking off regular lower body clothing? 1  -MR     Bathing (including washing, rinsing, and drying) 2  -MR     Toileting (which includes using toilet bed pan or urinal) 2  -MR     Putting on and taking off regular upper body clothing 3  -MR     Taking care of personal grooming (such as brushing teeth) 3  -MR     Eating meals 3  -MR     AM-PAC 6 Clicks Score (OT) 14  -MR     Row Name 06/14/22 1054 06/14/22 0800       How much help from another person do you currently need...    Turning from your back to your side while in flat bed without using bedrails? 3  -CD 4  -TA    Moving from lying on back to sitting on the side of a flat bed without bedrails? 3  -CD 4  -TA    Moving to and from a bed to a chair (including a wheelchair)? 3  -CD 3  -TA    Standing up from a chair using your arms (e.g., wheelchair, bedside chair)? 3  -CD 3  -TA    Climbing 3-5 steps with a railing? 2  -CD 3  -TA    To walk in hospital room? 3  -CD 3  -TA    AM-PAC 6 Clicks Score (PT) 17  -CD 20  -TA    Highest level of mobility 5 --> Static standing  -CD 6 --> Walked 10 steps or more  -TA    Row Name 06/14/22 1112          Functional Assessment    Outcome Measure Options AM-PAC 6 Clicks Daily Activity (OT)  -MR           User Key  (r) = Recorded By, (t) = Taken By, (c) = Cosigned By    Initials Name Provider Type    Minerva Echeverria, PT Physical Therapist    TA Sarah Blackwell, RN Registered Nurse    Promise Blanco, OT Occupational Therapist                Occupational Therapy Education                  Title: PT OT SLP Therapies (Done)     Topic: Occupational Therapy (Done)     Point: ADL training (Done)     Description:   Instruct learner(s) on proper safety adaptation and remediation techniques during self care or transfers.   Instruct in proper use of assistive devices.              Learning Progress Summary           Patient Acceptance, E, VU,NR by MR at 6/14/2022 1113                   Point: Home exercise program (Done)     Description:   Instruct learner(s) on appropriate technique for monitoring, assisting and/or progressing therapeutic exercises/activities.              Learning Progress Summary           Patient Acceptance, E, VU,NR by MR at 6/14/2022 1113                   Point: Precautions (Done)     Description:   Instruct learner(s) on prescribed precautions during self-care and functional transfers.              Learning Progress Summary           Patient Acceptance, E, VU,NR by MR at 6/14/2022 1113                   Point: Body mechanics (Done)     Description:   Instruct learner(s) on proper positioning and spine alignment during self-care, functional mobility activities and/or exercises.              Learning Progress Summary           Patient Acceptance, E, VU,NR by MR at 6/14/2022 1113                               User Key     Initials Effective Dates Name Provider Type Discipline     10/06/21 -  Promise Melendrez OT Occupational Therapist OT              OT Recommendation and Plan  Planned Therapy Interventions (OT): activity tolerance training, adaptive equipment training, BADL retraining, functional balance retraining, IADL retraining, occupation/activity based interventions, patient/caregiver education/training, transfer/mobility retraining, strengthening exercise, ROM/therapeutic exercise  Therapy Frequency (OT): daily  Plan of Care Review  Plan of Care Reviewed With: patient  Progress: no change (Initial Eval)  Outcome Evaluation: OT eval completed. Pt presenting w/ generalized  weakness, decreased activity tolerance/endurance, dyspnea w/ activity and increased need for assistance w/ self-care tasks. CGA for bed mobility w/ extended time and v/c for sequencing. CGA for STS and transfer from bed to chair w/ RW. MaxA for LB dressing, Fredy for UB. De-sat noted w/ mobility from bed to chair to 86% on RA. RN present and reporting pt has been on room air, only 4L at night. 4L NC placed back on pt w/ quick recovery to 92%. Pt educated on PLB technique. IP OT warranted, POC initiated. Recommendation upon d/c for IPR, then progressing to OP pulmonary rehab.     Time Calculation:    Time Calculation- OT     Row Name 06/14/22 1113 06/14/22 1056          Time Calculation- OT    OT Start Time 0815 -MR --     OT Received On 06/14/22  -MR --     OT Goal Re-Cert Due Date 06/24/22  -MR --            Timed Charges    79550 - Gait Training Minutes  -- 10  -CD            Untimed Charges    OT Eval/Re-eval Minutes 46  -MR --            Total Minutes    Timed Charges Total Minutes -- 10  -CD     Untimed Charges Total Minutes 46  -MR --      Total Minutes 46  -MR 10  -CD           User Key  (r) = Recorded By, (t) = Taken By, (c) = Cosigned By    Initials Name Provider Type    CD Minerva Posey PT Physical Therapist    MR Gio Melendrez OT Occupational Therapist              Therapy Charges for Today     Code Description Service Date Service Provider Modifiers Qty    [id number] HC OT EVAL MOD COMPLEXITY 4 6/14/2022 Gio Melendrez OT GO 1               GIO MELENDREZ OT  6/14/2022

## 2022-06-14 NOTE — PLAN OF CARE
Goal Outcome Evaluation:           Progress: no change  Outcome Evaluation: VSS, complaints of left pleuric pain, tylenol given. AxO, 4LNC, SB-SR with activity. Worked well with therapy, sat in chair. Still wheezing, respiratory seeing. Continue to monitor

## 2022-06-15 LAB
ANION GAP SERPL CALCULATED.3IONS-SCNC: 8 MMOL/L (ref 5–15)
BASOPHILS # BLD AUTO: 0.01 10*3/MM3 (ref 0–0.2)
BASOPHILS NFR BLD AUTO: 0.1 % (ref 0–1.5)
BUN SERPL-MCNC: 55 MG/DL (ref 8–23)
BUN/CREAT SERPL: 36.7 (ref 7–25)
CALCIUM SPEC-SCNC: 9.2 MG/DL (ref 8.6–10.5)
CHLORIDE SERPL-SCNC: 102 MMOL/L (ref 98–107)
CO2 SERPL-SCNC: 26 MMOL/L (ref 22–29)
CREAT SERPL-MCNC: 1.5 MG/DL (ref 0.57–1)
DEPRECATED RDW RBC AUTO: 46.5 FL (ref 37–54)
EGFRCR SERPLBLD CKD-EPI 2021: 35.3 ML/MIN/1.73
EOSINOPHIL # BLD AUTO: 0 10*3/MM3 (ref 0–0.4)
EOSINOPHIL NFR BLD AUTO: 0 % (ref 0.3–6.2)
ERYTHROCYTE [DISTWIDTH] IN BLOOD BY AUTOMATED COUNT: 13.6 % (ref 12.3–15.4)
GLUCOSE BLDC GLUCOMTR-MCNC: 139 MG/DL (ref 70–130)
GLUCOSE BLDC GLUCOMTR-MCNC: 148 MG/DL (ref 70–130)
GLUCOSE BLDC GLUCOMTR-MCNC: 153 MG/DL (ref 70–130)
GLUCOSE SERPL-MCNC: 183 MG/DL (ref 65–99)
HCT VFR BLD AUTO: 40.5 % (ref 34–46.6)
HGB BLD-MCNC: 13.2 G/DL (ref 12–15.9)
IMM GRANULOCYTES # BLD AUTO: 0.06 10*3/MM3 (ref 0–0.05)
IMM GRANULOCYTES NFR BLD AUTO: 0.4 % (ref 0–0.5)
LYMPHOCYTES # BLD AUTO: 1.92 10*3/MM3 (ref 0.7–3.1)
LYMPHOCYTES NFR BLD AUTO: 13.2 % (ref 19.6–45.3)
MCH RBC QN AUTO: 30.6 PG (ref 26.6–33)
MCHC RBC AUTO-ENTMCNC: 32.6 G/DL (ref 31.5–35.7)
MCV RBC AUTO: 93.8 FL (ref 79–97)
MONOCYTES # BLD AUTO: 0.54 10*3/MM3 (ref 0.1–0.9)
MONOCYTES NFR BLD AUTO: 3.7 % (ref 5–12)
NEUTROPHILS NFR BLD AUTO: 12.05 10*3/MM3 (ref 1.7–7)
NEUTROPHILS NFR BLD AUTO: 82.6 % (ref 42.7–76)
NRBC BLD AUTO-RTO: 0 /100 WBC (ref 0–0.2)
PLATELET # BLD AUTO: 161 10*3/MM3 (ref 140–450)
PMV BLD AUTO: 11.5 FL (ref 6–12)
POTASSIUM SERPL-SCNC: 4.5 MMOL/L (ref 3.5–5.2)
RBC # BLD AUTO: 4.32 10*6/MM3 (ref 3.77–5.28)
SODIUM SERPL-SCNC: 136 MMOL/L (ref 136–145)
WBC NRBC COR # BLD: 14.58 10*3/MM3 (ref 3.4–10.8)

## 2022-06-15 PROCEDURE — 85025 COMPLETE CBC W/AUTO DIFF WBC: CPT | Performed by: INTERNAL MEDICINE

## 2022-06-15 PROCEDURE — 25010000002 ENOXAPARIN PER 10 MG

## 2022-06-15 PROCEDURE — 82962 GLUCOSE BLOOD TEST: CPT

## 2022-06-15 PROCEDURE — 94761 N-INVAS EAR/PLS OXIMETRY MLT: CPT

## 2022-06-15 PROCEDURE — 99232 SBSQ HOSP IP/OBS MODERATE 35: CPT | Performed by: INTERNAL MEDICINE

## 2022-06-15 PROCEDURE — 94799 UNLISTED PULMONARY SVC/PX: CPT

## 2022-06-15 PROCEDURE — 80048 BASIC METABOLIC PNL TOTAL CA: CPT | Performed by: INTERNAL MEDICINE

## 2022-06-15 PROCEDURE — 94664 DEMO&/EVAL PT USE INHALER: CPT

## 2022-06-15 PROCEDURE — 63710000001 INSULIN LISPRO (HUMAN) PER 5 UNITS: Performed by: INTERNAL MEDICINE

## 2022-06-15 PROCEDURE — 63710000001 PREDNISONE PER 1 MG: Performed by: INTERNAL MEDICINE

## 2022-06-15 RX ADMIN — ENOXAPARIN SODIUM 40 MG: 40 INJECTION SUBCUTANEOUS at 08:57

## 2022-06-15 RX ADMIN — Medication 10 ML: at 20:20

## 2022-06-15 RX ADMIN — NYSTATIN: 100000 POWDER TOPICAL at 08:57

## 2022-06-15 RX ADMIN — GUAIFENESIN 1200 MG: 600 TABLET, EXTENDED RELEASE ORAL at 08:57

## 2022-06-15 RX ADMIN — CARVEDILOL 3.12 MG: 3.12 TABLET, FILM COATED ORAL at 08:57

## 2022-06-15 RX ADMIN — GUAIFENESIN 1200 MG: 600 TABLET, EXTENDED RELEASE ORAL at 20:16

## 2022-06-15 RX ADMIN — ACETAMINOPHEN 650 MG: 325 TABLET ORAL at 15:44

## 2022-06-15 RX ADMIN — CARVEDILOL 3.12 MG: 3.12 TABLET, FILM COATED ORAL at 17:50

## 2022-06-15 RX ADMIN — NYSTATIN 1 APPLICATION: 100000 POWDER TOPICAL at 20:20

## 2022-06-15 RX ADMIN — AZITHROMYCIN MONOHYDRATE 250 MG: 250 TABLET ORAL at 08:57

## 2022-06-15 RX ADMIN — IPRATROPIUM BROMIDE AND ALBUTEROL SULFATE 3 ML: .5; 3 SOLUTION RESPIRATORY (INHALATION) at 18:42

## 2022-06-15 RX ADMIN — INSULIN LISPRO 2 UNITS: 100 INJECTION, SOLUTION INTRAVENOUS; SUBCUTANEOUS at 11:49

## 2022-06-15 RX ADMIN — IPRATROPIUM BROMIDE AND ALBUTEROL SULFATE 3 ML: .5; 3 SOLUTION RESPIRATORY (INHALATION) at 07:32

## 2022-06-15 RX ADMIN — IPRATROPIUM BROMIDE AND ALBUTEROL SULFATE 3 ML: .5; 3 SOLUTION RESPIRATORY (INHALATION) at 11:33

## 2022-06-15 RX ADMIN — PREDNISONE 40 MG: 20 TABLET ORAL at 08:57

## 2022-06-15 RX ADMIN — CLOPIDOGREL BISULFATE 75 MG: 75 TABLET ORAL at 08:57

## 2022-06-15 RX ADMIN — ATORVASTATIN CALCIUM 40 MG: 40 TABLET, FILM COATED ORAL at 20:16

## 2022-06-15 RX ADMIN — ENOXAPARIN SODIUM 40 MG: 40 INJECTION SUBCUTANEOUS at 20:17

## 2022-06-15 RX ADMIN — IPRATROPIUM BROMIDE AND ALBUTEROL SULFATE 3 ML: .5; 3 SOLUTION RESPIRATORY (INHALATION) at 16:44

## 2022-06-15 RX ADMIN — VALSARTAN 160 MG: 80 TABLET, FILM COATED ORAL at 08:56

## 2022-06-15 NOTE — PROGRESS NOTES
[facility]  PROGRESS NOTE    Patient Name: Georgia Velázquez  : 1945  MRN: [id number]    Date of Admission: 2022  Primary Care Physician: Georgina Ahumada APRN    Subjective   Subjective     CC:  SOA    HPI:  Still SOA. Having pain in LLQ when she coughs- better with Tylenol and PRN Percocet which was added yesterday    ROS:  Gen- No fevers, chills  CV- No chest pain, palpitations  Resp- + cough, + dyspnea  GI- No N/V/D        Objective   Objective     Vital Signs:   Temp:  [97.4 °F (36.3 °C)-97.9 °F (36.6 °C)] 97.9 °F (36.6 °C)  Heart Rate:  [50-72] 53  Resp:  [16-24] 16  BP: (129-146)/(48-77) 146/66  Flow (L/min):  [4] 4     Physical Exam:  Constitutional: No acute distress, awake, alert  HENT: NCAT, mucous membranes moist  Respiratory: mild wheezing bilaterally,  respiratory effort normal   Cardiovascular: RRR, no murmurs, rubs, or gallops  Gastrointestinal: Positive bowel sounds, soft, nontender, nondistended  Musculoskeletal: No bilateral ankle edema  Psychiatric: Appropriate affect, cooperative  Neurologic: Oriented x 3, strength symmetric in all extremities, Cranial Nerves grossly intact to confrontation, speech clear  Skin: No rashes    Results Reviewed:  LAB RESULTS:      Lab 22  0535 22  0737 22  0036   WBC 10.79 3.65 5.13   HEMOGLOBIN 13.2 13.4 11.9*   HEMATOCRIT 40.3 40.4 35.7   PLATELETS 139* 128* 117*   NEUTROS ABS 8.45*  --  3.62   IMMATURE GRANS (ABS) 0.15*  --  0.02   LYMPHS ABS 1.64  --  1.21   MONOS ABS 0.53  --  0.26   EOS ABS 0.00  --  0.01   MCV 92.0 92.9 92.0         Lab 22  0535 22  0737 22  2330   SODIUM 136 134* 135*   POTASSIUM 4.4 3.9 4.2   CHLORIDE 103 100 101   CO2 24.0 20.0* 22.0   ANION GAP 9.0 14.0 12.0   BUN 41* 24* 22   CREATININE 1.35* 1.22* 1.37*   EGFR 40.1* 45.2* 39.4*   GLUCOSE 194* 190* 115*   CALCIUM 9.2 8.5* 8.5*   MAGNESIUM  --  2.4  --          Lab 22  2330   TOTAL  PROTEIN 7.2   ALBUMIN 3.50   GLOBULIN 3.7   ALT (SGPT) 17   AST (SGOT) 39*   BILIRUBIN 0.5   ALK PHOS 100         Lab 06/12/22  2330   PROBNP 3,497.0*   TROPONIN T 0.017                 Brief Urine Lab Results  (Last result in the past 365 days)      Color   Clarity   Blood   Leuk Est   Nitrite   Protein   CREAT   Urine HCG        10/15/21 1356             100 mg/dL               Microbiology Results Abnormal     Procedure Component Value - Date/Time    COVID PRE-OP / PRE-PROCEDURE SCREENING ORDER (NO ISOLATION) - Swab, Nasopharynx [[id number]]  (Normal) Collected: 06/13/22 0036    Lab Status: Final result Specimen: Swab from Nasopharynx Updated: 06/13/22 0108    Narrative:      The following orders were created for panel order COVID PRE-OP / PRE-PROCEDURE SCREENING ORDER (NO ISOLATION) - Swab, Nasopharynx.  Procedure                               Abnormality         Status                     ---------                               -----------         ------                     COVID-19 and FLU A/B PCR...[[id number]]  Normal              Final result                 Please view results for these tests on the individual orders.    COVID-19 and FLU A/B PCR - Swab, Nasopharynx [[id number]]  (Normal) Collected: 06/13/22 0036    Lab Status: Final result Specimen: Swab from Nasopharynx Updated: 06/13/22 0108     COVID19 Not Detected     Influenza A PCR Not Detected     Influenza B PCR Not Detected    Narrative:      Fact sheet for providers: https://www.fda.gov/media/457749/download    Fact sheet for patients: https://www.fda.gov/media/910876/download    Test performed by PCR.          No radiology results from the last 24 hrs    Results for orders placed during the hospital encounter of 03/17/21    Adult Transthoracic Echo Complete With Contrast if Necessary Per Protocol    Interpretation Summary  · Calculated left ventricular EF = 55% Estimated left ventricular EF was in agreement with the calculated left ventricular EF.  Left ventricular systolic function is normal.  · Left ventricular diastolic function is consistent with (grade II w/high LAP) pseudonormalization.  · Estimated right ventricular systolic pressure from tricuspid regurgitation is normal (<35 mmHg). Calculated right ventricular systolic pressure from tricuspid regurgitation is 32 mmHg.  · Endocardial definition of the apical segments is limited, unable to definitively assess apical regional wall motion abnormalities.      I have reviewed the medications:  Scheduled Meds:atorvastatin, 40 mg, Oral, Nightly  azithromycin, 250 mg, Oral, Q24H  carvedilol, 3.125 mg, Oral, BID With Meals  clopidogrel, 75 mg, Oral, Daily  enoxaparin, 40 mg, Subcutaneous, Q12H  guaiFENesin, 1,200 mg, Oral, Q12H  insulin lispro, 0-7 Units, Subcutaneous, TID AC  ipratropium-albuterol, 3 mL, Nebulization, 4x Daily - RT  nystatin, , Topical, Q12H  pharmacy consult - MTM, , Does not apply, Daily  predniSONE, 40 mg, Oral, Daily With Breakfast  sodium chloride, 10 mL, Intravenous, Q12H  valsartan, 160 mg, Oral, Daily      Continuous Infusions:   PRN Meds:.•  acetaminophen **OR** acetaminophen **OR** acetaminophen  •  dextrose  •  dextrose  •  glucagon (human recombinant)  •  ipratropium-albuterol  •  magnesium sulfate **OR** magnesium sulfate **OR** magnesium sulfate  •  ondansetron **OR** ondansetron  •  oxyCODONE  •  potassium chloride **OR** potassium chloride **OR** potassium chloride  •  sodium chloride  •  sodium chloride    Assessment & Plan   Assessment & Plan     Active Hospital Problems    Diagnosis  POA   • **COPD with acute exacerbation (HCC) [J44.1]  Yes   • Hypoxia [R09.02]  Yes   • Obesity, morbid, BMI 50 or higher ([facility]) [E66.01]  Unknown   • Type 2 diabetes mellitus with stage 3b chronic kidney disease, without long-term current use of insulin ([facility]) [E11.22, N18.32]  Yes   • Diastolic dysfunction [I51.89]  Yes   • Essential hypertension [I10]  Yes   • Hyperlipidemia [E78.5]  Yes   •  Coronary artery disease [I25.10]  Yes   • GERD (gastroesophageal reflux disease) [K21.9]  Yes      Resolved Hospital Problems   No resolved problems to display.        Brief Hospital Course to date:  Georgia Velázquez is a 77 y.o. female with DM2, diastolic CHF, morbid obesity, COPD who does not use oxygen at baseline who presented with several days of worsening respiratory status, wheezing, cough with clear chest x-ray. Found to have parainfluenza virus resulting in COPD exacerbation     Assessment/plan     Acute exacerbation of COPD  Acute Parainfluenza infection  Acute hypoxic respiratory insufficiency  - Recently started on DuoNebs outpatient for impaired exercise tolerance related to respiratory limitations  - DuoNebs scheduled and prn  -Mucinex  - transitioned to PO steroids   - Azithromycin for anti-inflammatory effects x 5 days  -O2 support as needed  - Recommended dose of Lasix due to history of diastolic CHF and noted edema; patient declining at this time  -Checked full viral respiratory panel- + for parainfluenza  -- added Percocet (in addition to already ordered Tylenol) for LLQ a/w coughing. Suspect musculoskeletal      DM type 2, A1c 8.6%, without long-term use of insulin  -Accu-Cheks with SSI     Coronary artery disease  Hypertension  Hyperlipidemia  - Home statin, carvedilol, Plavix, ARB     CKD stage 3b  -Baseline creatinine 1.2-1.5; EGFR 30s  -At approximate baseline     Obesity, BMI 51.0 to KG/M2  -Complicates all aspects of care    DVT prophylaxis:  Medical DVT prophylaxis orders are present.       AM-PAC 6 Clicks Score (PT): 17 (06/14/22 2030)    Disposition: I expect the patient to be discharged to inpatient rehab when bed available    CODE STATUS:   Code Status and Medical Interventions:   Ordered at: 06/13/22 0258     Medical Intervention Limits:    NO intubation (DNI)     Level Of Support Discussed With:    Patient     Code Status (Patient has no pulse and is not breathing):    No CPR (Do  Not Attempt to Resuscitate)     Medical Interventions (Patient has pulse or is breathing):    Limited Support       Jennie Gilmore MD  06/15/22

## 2022-06-15 NOTE — CASE MANAGEMENT/SOCIAL WORK
Continued Stay Note  [facility]     Patient Name: Georgia Velázquez  MRN: [id number]  Today's Date: 6/15/2022    Admit Date: 6/12/2022     Discharge Plan     Row Name 06/15/22 1021       Plan    Plan [facility]    Patient/Family in Agreement with Plan yes    Plan Comments Spoke to patient at bedside. [facility] is [facility] pending acceptance to the facility. CM will continue to follow.    Final Discharge Disposition Code 03 - skilled nursing facility (SNF)               Discharge Codes    No documentation.                     Eitan Carbajal RN

## 2022-06-15 NOTE — PROGRESS NOTES
NN spoke with pt at BS.  Pt alert and able to answer questions appropriately. Pt O2 sat  98% on   4L currently, no home O2 use. COPD action plan reviewed. Deep breathing exercises encouraged. No new concerns or questions voiced at this time.  NN will continue to follow as needed.       Per current GOLD Standards, please consider: No LAMA/LABA/ICS in place, Outpatient PFT, Rehab as appropriate, Palliative Care consult      Due to insurance constraints, patient must seek referral through PCP office to be referred to specialty offices.

## 2022-06-16 LAB
ANION GAP SERPL CALCULATED.3IONS-SCNC: 9 MMOL/L (ref 5–15)
BASOPHILS # BLD AUTO: 0.04 10*3/MM3 (ref 0–0.2)
BASOPHILS NFR BLD AUTO: 0.3 % (ref 0–1.5)
BUN SERPL-MCNC: 49 MG/DL (ref 8–23)
BUN/CREAT SERPL: 34.5 (ref 7–25)
CALCIUM SPEC-SCNC: 9 MG/DL (ref 8.6–10.5)
CHLORIDE SERPL-SCNC: 103 MMOL/L (ref 98–107)
CO2 SERPL-SCNC: 23 MMOL/L (ref 22–29)
CREAT SERPL-MCNC: 1.42 MG/DL (ref 0.57–1)
DEPRECATED RDW RBC AUTO: 43.4 FL (ref 37–54)
EGFRCR SERPLBLD CKD-EPI 2021: 37.7 ML/MIN/1.73
EOSINOPHIL # BLD AUTO: 0 10*3/MM3 (ref 0–0.4)
EOSINOPHIL NFR BLD AUTO: 0 % (ref 0.3–6.2)
ERYTHROCYTE [DISTWIDTH] IN BLOOD BY AUTOMATED COUNT: 13.3 % (ref 12.3–15.4)
GLUCOSE BLDC GLUCOMTR-MCNC: 108 MG/DL (ref 70–130)
GLUCOSE BLDC GLUCOMTR-MCNC: 128 MG/DL (ref 70–130)
GLUCOSE BLDC GLUCOMTR-MCNC: 222 MG/DL (ref 70–130)
GLUCOSE SERPL-MCNC: 140 MG/DL (ref 65–99)
HCT VFR BLD AUTO: 39.4 % (ref 34–46.6)
HGB BLD-MCNC: 13.5 G/DL (ref 12–15.9)
IMM GRANULOCYTES # BLD AUTO: 0.08 10*3/MM3 (ref 0–0.05)
IMM GRANULOCYTES NFR BLD AUTO: 0.6 % (ref 0–0.5)
LYMPHOCYTES # BLD AUTO: 2.39 10*3/MM3 (ref 0.7–3.1)
LYMPHOCYTES NFR BLD AUTO: 17.8 % (ref 19.6–45.3)
MCH RBC QN AUTO: 30.8 PG (ref 26.6–33)
MCHC RBC AUTO-ENTMCNC: 34.3 G/DL (ref 31.5–35.7)
MCV RBC AUTO: 89.7 FL (ref 79–97)
MONOCYTES # BLD AUTO: 0.82 10*3/MM3 (ref 0.1–0.9)
MONOCYTES NFR BLD AUTO: 6.1 % (ref 5–12)
NEUTROPHILS NFR BLD AUTO: 10.09 10*3/MM3 (ref 1.7–7)
NEUTROPHILS NFR BLD AUTO: 75.2 % (ref 42.7–76)
NRBC BLD AUTO-RTO: 0 /100 WBC (ref 0–0.2)
PLATELET # BLD AUTO: 170 10*3/MM3 (ref 140–450)
PMV BLD AUTO: 11.3 FL (ref 6–12)
POTASSIUM SERPL-SCNC: 4.8 MMOL/L (ref 3.5–5.2)
POTASSIUM SERPL-SCNC: 5.8 MMOL/L (ref 3.5–5.2)
PROCALCITONIN SERPL-MCNC: 0.07 NG/ML (ref 0–0.25)
RBC # BLD AUTO: 4.39 10*6/MM3 (ref 3.77–5.28)
SODIUM SERPL-SCNC: 135 MMOL/L (ref 136–145)
WBC NRBC COR # BLD: 13.42 10*3/MM3 (ref 3.4–10.8)

## 2022-06-16 PROCEDURE — 25010000002 METHYLPREDNISOLONE PER 125 MG: Performed by: INTERNAL MEDICINE

## 2022-06-16 PROCEDURE — 25010000002 ENOXAPARIN PER 10 MG

## 2022-06-16 PROCEDURE — 84132 ASSAY OF SERUM POTASSIUM: CPT | Performed by: INTERNAL MEDICINE

## 2022-06-16 PROCEDURE — 94799 UNLISTED PULMONARY SVC/PX: CPT

## 2022-06-16 PROCEDURE — 82962 GLUCOSE BLOOD TEST: CPT

## 2022-06-16 PROCEDURE — 80048 BASIC METABOLIC PNL TOTAL CA: CPT | Performed by: INTERNAL MEDICINE

## 2022-06-16 PROCEDURE — 99232 SBSQ HOSP IP/OBS MODERATE 35: CPT | Performed by: INTERNAL MEDICINE

## 2022-06-16 PROCEDURE — 63710000001 INSULIN LISPRO (HUMAN) PER 5 UNITS: Performed by: INTERNAL MEDICINE

## 2022-06-16 PROCEDURE — 63710000001 PREDNISONE PER 1 MG: Performed by: INTERNAL MEDICINE

## 2022-06-16 PROCEDURE — 85025 COMPLETE CBC W/AUTO DIFF WBC: CPT | Performed by: INTERNAL MEDICINE

## 2022-06-16 PROCEDURE — 84145 PROCALCITONIN (PCT): CPT | Performed by: INTERNAL MEDICINE

## 2022-06-16 RX ORDER — METHYLPREDNISOLONE SODIUM SUCCINATE 125 MG/2ML
60 INJECTION, POWDER, LYOPHILIZED, FOR SOLUTION INTRAMUSCULAR; INTRAVENOUS ONCE
Status: COMPLETED | OUTPATIENT
Start: 2022-06-16 | End: 2022-06-16

## 2022-06-16 RX ADMIN — METHYLPREDNISOLONE SODIUM SUCCINATE 60 MG: 125 INJECTION, POWDER, FOR SOLUTION INTRAMUSCULAR; INTRAVENOUS at 12:48

## 2022-06-16 RX ADMIN — ENOXAPARIN SODIUM 40 MG: 40 INJECTION SUBCUTANEOUS at 08:46

## 2022-06-16 RX ADMIN — CARVEDILOL 3.12 MG: 3.12 TABLET, FILM COATED ORAL at 18:32

## 2022-06-16 RX ADMIN — ENOXAPARIN SODIUM 40 MG: 40 INJECTION SUBCUTANEOUS at 21:57

## 2022-06-16 RX ADMIN — IPRATROPIUM BROMIDE AND ALBUTEROL SULFATE 3 ML: .5; 3 SOLUTION RESPIRATORY (INHALATION) at 12:47

## 2022-06-16 RX ADMIN — NYSTATIN: 100000 POWDER TOPICAL at 08:46

## 2022-06-16 RX ADMIN — GUAIFENESIN 1200 MG: 600 TABLET, EXTENDED RELEASE ORAL at 08:45

## 2022-06-16 RX ADMIN — PREDNISONE 40 MG: 20 TABLET ORAL at 08:45

## 2022-06-16 RX ADMIN — ATORVASTATIN CALCIUM 40 MG: 40 TABLET, FILM COATED ORAL at 21:57

## 2022-06-16 RX ADMIN — GUAIFENESIN 1200 MG: 600 TABLET, EXTENDED RELEASE ORAL at 21:57

## 2022-06-16 RX ADMIN — CARVEDILOL 3.12 MG: 3.12 TABLET, FILM COATED ORAL at 08:45

## 2022-06-16 RX ADMIN — VALSARTAN 160 MG: 80 TABLET, FILM COATED ORAL at 08:45

## 2022-06-16 RX ADMIN — INSULIN LISPRO 3 UNITS: 100 INJECTION, SOLUTION INTRAVENOUS; SUBCUTANEOUS at 18:32

## 2022-06-16 RX ADMIN — Medication 10 ML: at 21:57

## 2022-06-16 RX ADMIN — CLOPIDOGREL BISULFATE 75 MG: 75 TABLET ORAL at 08:45

## 2022-06-16 RX ADMIN — OXYCODONE 5 MG: 5 TABLET ORAL at 21:57

## 2022-06-16 RX ADMIN — IPRATROPIUM BROMIDE AND ALBUTEROL SULFATE 3 ML: .5; 3 SOLUTION RESPIRATORY (INHALATION) at 07:35

## 2022-06-16 RX ADMIN — IPRATROPIUM BROMIDE AND ALBUTEROL SULFATE 3 ML: .5; 3 SOLUTION RESPIRATORY (INHALATION) at 15:09

## 2022-06-16 RX ADMIN — AZITHROMYCIN MONOHYDRATE 250 MG: 250 TABLET ORAL at 08:45

## 2022-06-16 RX ADMIN — NYSTATIN: 100000 POWDER TOPICAL at 21:58

## 2022-06-16 NOTE — PLAN OF CARE
Problem: Fall Injury Risk  Goal: Absence of Fall and Fall-Related Injury  Outcome: Ongoing, Progressing  Intervention: Promote Injury-Free Environment  Recent Flowsheet Documentation  Taken 6/15/2022 2000 by Omaira Grossman RN  Safety Promotion/Fall Prevention:   activity supervised   assistive device/personal items within reach   clutter free environment maintained   elopement precautions   fall prevention program maintained   gait belt   safety round/check completed   Goal Outcome Evaluation:

## 2022-06-16 NOTE — PLAN OF CARE
"Goal Outcome Evaluation:  Plan of Care Reviewed With: patient        Progress: no change    Patient A&Ox4, VSS, 4LNC. COPD NN at bedside and provided IS and instruction on how and when to use. Patient got to [id number]. Son at bedside for a visit today. Patient stated she \"didn't feel well today\" and declined to get up in the chair. Gluteal wound cleansed and zguard applied, per orders. D/C plans are pending insurance approval and acceptance to rehab. New U/S IV placed today per this RN. No C/O pain or additional needs assessed at this time.      "

## 2022-06-16 NOTE — PROGRESS NOTES
NN spoke with pt at BS.  Pt alert and able to answer questions appropriately. Pt O2 sat  93% on  4 L currently, no home O2 use. COPD action plan reviewed. Deep breathing exercises encouraged. No new concerns or questions voiced at this time.  NN will continue to follow as needed.       Per current GOLD Standards, please consider: No LAMA/LABA/ICS in place, Outpatient PFT, Rehab as appropriate, Palliative Care consult      Due to insurance constraints, patient must seek referral through PCP office to be referred to specialty offices.

## 2022-06-16 NOTE — CASE MANAGEMENT/SOCIAL WORK
Continued Stay Note  [facility]     Patient Name: Georgia Velázquez  MRN: [id number]  Today's Date: 6/16/2022    Admit Date: 6/12/2022     Discharge Plan     Row Name 06/16/22 0847       Plan    Plan [facility]    Patient/Family in Agreement with Plan yes    Plan Comments Spoke with Racheal at [facility] and they have no skilled beds available at this time. Plan is [facility]. [facility]NA will continue to follow.    Final Discharge Disposition Code 03 - skilled nursing facility (SNF)               Discharge Codes    No documentation.                     Eitan Carbajal RN

## 2022-06-16 NOTE — DISCHARGE PLACEMENT REQUEST
"Georgia Palumbo (77 y.o. Female)   Josh Carbajal, RN Case Manager  [phone]  Looking for short term rehab            Date of Birth   1945    Social Security Number       Address   [address] [place] [id number]    Home Phone   [phone]    MRN   [id number]       Evangelical   Hoah[employer]ism    Marital Status                               Admission Date   6/12/22    Admission Type   Emergency    Admitting Provider   Jennie Gilmore MD    Attending Provider   Jennie Gilmore MD    Department, Room/Bed   [facility] 3F, S319/1       Discharge Date       Discharge Disposition       Discharge Destination                               Attending Provider: Jennie Gilmore MD    Allergies: No Known Allergies    Isolation: Contact Drop   Infection: Other (06/13/22)   Code Status: No CPR   Advance Care Planning Activity    Ht: 154.9 cm (61\")   Wt: 122 kg (270 lb)    Admission Cmt: None   Principal Problem: COPD with acute exacerbation (HCC) [J44.1]                 Active Insurance as of 6/12/2022     Primary Coverage     Payor Plan Insurance Group Employer/Plan Group    [facility]IC[facility]PLAC[facility]     Payor Plan Address Payor Plan Phone Number Payor Plan Fax Number Effective Dates    [address] [address] [phone]  11/28/2019 - None Entered    [facility] [id number]       Subscriber Name Subscriber Birth Date Member ID       DEEPALIGEORGIA GIULIANA 1945 [id number]           Secondary Coverage     Payor Plan Insurance Group Employer/Plan Group    [place]      Payor Plan Address Payor Plan Phone Number Payor Plan Fax Number Effective Dates    [address]   1/1/2014 - None Entered    [facility] [phone]       Subscriber Name Subscriber Birth Date Member ID       GEORGIA PALUMBO 1945 [id number]                 Emergency Contacts      (Rel.) Home Phone Work " Phone Mobile Phone    Omaira Patel (Daughter) [phone] -- --    Flakito Velázquez (Son) [phone] -- --               History & Physical      Carlton Crews DO at 22 0301              [facility]  HISTORY AND PHYSICAL    Patient Name: Georgia Velázquez  : 1945  MRN: [id number]  Primary Care Physician: Georgina Ahumada APRN  Date of admission: 2022      Subjective   Subjective     Chief Complaint:  Shortness of breath, wheezing    HPI:  Georgia Velázquez is a 77 y.o. female with DM2, diastolic CHF, morbid obesity, COPD without oxygen use at baseline who was brought to the hospital via EMS for shortness of breath/wheezing.  She reports several days of worsening dyspnea with exertion, feeling of chest tightness, wheezing, cough with associated sensation of needing to clear sputum but unable to get it up.  Today family checked her O2 at home and reported that it was in the 80s prompting call to EMS.  Per ED provider she received a dose of dexamethasone and a breathing treatment in route, arrived on nonrebreather mask but has weaned down to nasal cannula while in the ED.  She denies fever/chills/upper respiratory symptoms.      Review of Systems   Gen- No fevers, chills  CV- No chest pain, palpitations  Resp-yes cough, dyspnea  GI- No N/V/D, abd pain  All other systems reviewed and are negative.     Personal History     Past Medical History:   Diagnosis Date   • Acute kidney injury (HCC) 2017   • Aortic regurgitation    • Arthritis of shoulder 2016   • Body mass index (BMI) of 45.0-49.9 in adult ([facility]) 2019   • CHF (congestive heart failure) ([facility])    • Closed compression fracture of L4 lumbar vertebra 2017    Added automatically from request for surgery [id number]   • Constipation 2017   • Coronary artery disease 2016   • Diabetes mellitus type 2 in obese ([facility]) 2018   • Elevated alkaline phosphatase level 2018   •  Elevated creatine kinase    • Essential hypertension 5/18/2016   • GERD (gastroesophageal reflux disease) 5/18/2016   • Glaucoma 7/21/2020   • History of kyphoplasty 1/30/2018   • Lumbar facet arthropathy 1/29/2018   • Lumbar stenosis with neurogenic claudication 1/29/2018   • Morbid obesity due to excess calories (HCC) 1/29/2018   • Myocardial infarction (HCC) 5/18/2016   • Osteoporosis 5/18/2016   • Physical deconditioning 1/30/2018   • Retinal emboli, right 5/21/2020   • Sepsis (HCC)     uro   • Stroke ([facility])    • Urinary incontinence without sensory awareness 2/26/2018             Past Surgical History:   Procedure Laterality Date   • APPENDECTOMY     • BACK SURGERY     • CHOLECYSTECTOMY     • EYE SURGERY     • KYPHOPLASTY N/A 12/7/2017    Procedure: KYPHOPLASTY L4;  Surgeon: Marc Pham MD;  Location: [facility];  Service:        Family History:  family history includes Diabetes in her mother; Heart failure in her father and mother; No Known Problems in her brother, daughter, sister, and son. Otherwise pertinent FHx was reviewed and unremarkable.     Social History:  reports that she quit smoking about 22 years ago. Her smoking use included cigarettes. She has never used smokeless tobacco. She reports that she does not drink alcohol and does not use drugs.  Social History     Social History Narrative    Living w daughter. Daughter works at [employer], in process of getting medical POA.        Medications:  Available home medication information reviewed.  (Not in a hospital admission)      No Known Allergies    Objective   Objective     Vital Signs:   Temp:  [99.7 °F (37.6 °C)] 99.7 °F (37.6 °C)  Heart Rate:  [73-74] 73  Resp:  [24-28] 24  BP: (129-187)/(76-94) 174/84       Physical Exam   Constitutional: Awake, alert, mildly ill-appearing female laying in ED stretcher  Eyes: PERRL, sclerae anicteric, no conjunctival injection  HENT: NCAT, mucous membranes moist  Neck: Supple, trachea midline  Respiratory:  Audible wheezes without use of stethoscope, diffuse wheezing with minimal air movement on auscultation, prolonged expiratory phase with accessory muscle use, not in respiratory distress  Cardiovascular: RRR, no murmurs, rubs, or gallops, palpable radial pulses bilaterally  Gastrointestinal: Positive bowel sounds, soft, nontender, nondistended  Musculoskeletal: Moderate bilateral ankle edema (reports chronic), no clubbing or cyanosis to extremities  Psychiatric: Appropriate affect, cooperative  Neurologic: Speech clear, moving all extremity spontaneously    Result Review:  I have personally reviewed the results from the time of this admission to 6/13/2022 03:01 EDT and agree with these findings:  []  Laboratory list / accordion  []  Microbiology  [x]  Radiology  []  EKG/Telemetry   []  Cardiology/Vascular   []  Pathology  []  Old records  []  Other:  Most notable findings include: CXR relatively clear        LAB RESULTS:      Lab 06/13/22 0036   WBC 5.13   HEMOGLOBIN 11.9*   HEMATOCRIT 35.7   PLATELETS 117*   NEUTROS ABS 3.62   IMMATURE GRANS (ABS) 0.02   LYMPHS ABS 1.21   MONOS ABS 0.26   EOS ABS 0.01   MCV 92.0         Lab 06/12/22 2330   SODIUM 135*   POTASSIUM 4.2   CHLORIDE 101   CO2 22.0   ANION GAP 12.0   BUN 22   CREATININE 1.37*   EGFR 39.4*   GLUCOSE 115*   CALCIUM 8.5*         Lab 06/12/22 2330   TOTAL PROTEIN 7.2   ALBUMIN 3.50   GLOBULIN 3.7   ALT (SGPT) 17   AST (SGOT) 39*   BILIRUBIN 0.5   ALK PHOS 100         Lab 06/12/22 2330   PROBNP 3,497.0*   TROPONIN T 0.017                 UA    Urinalysis 10/15/21   Ketones, UA Negative   Leukocytes, UA Small (1+) (A)   (A) Abnormal value              Microbiology Results (last 10 days)     Procedure Component Value - Date/Time    COVID PRE-OP / PRE-PROCEDURE SCREENING ORDER (NO ISOLATION) - Swab, Nasopharynx [[id number]]  (Normal) Collected: 06/13/22 0036    Lab Status: Final result Specimen: Swab from Nasopharynx Updated: 06/13/22 0108    Narrative:       The following orders were created for panel order COVID PRE-OP / PRE-PROCEDURE SCREENING ORDER (NO ISOLATION) - Swab, Nasopharynx.  Procedure                               Abnormality         Status                     ---------                               -----------         ------                     COVID-19 and FLU A/B PCR...[[id number]]  Normal              Final result                 Please view results for these tests on the individual orders.    COVID-19 and FLU A/B PCR - Swab, Nasopharynx [[id number]]  (Normal) Collected: 06/13/22 0036    Lab Status: Final result Specimen: Swab from Nasopharynx Updated: 06/13/22 0108     COVID19 Not Detected     Influenza A PCR Not Detected     Influenza B PCR Not Detected    Narrative:      Fact sheet for providers: https://www.fda.gov/media/687533/download    Fact sheet for patients: https://www.fda.gov/media/891562/download    Test performed by PCR.          XR Chest 1 View    Result Date: 6/13/2022  EXAMINATION: Chest one view. DATE: 6/13/2022. COMPARISON: 3/17/2021. CLINICAL HISTORY: Shortness of breath. FINDINGS: The lungs and pleural spaces are clear. The cardiomediastinal silhouette his mild enlarged and the pulmonary vessels are within normal limits.     Impression: Mild cardiomegaly with no acute findings otherwise seen. Electronically signed by:  Brandon Sanderson D.O.  6/12/2022 11:41 PM Mountain Time      Results for orders placed during the hospital encounter of 03/17/21    Adult Transthoracic Echo Complete With Contrast if Necessary Per Protocol    Interpretation Summary  · Calculated left ventricular EF = 55% Estimated left ventricular EF was in agreement with the calculated left ventricular EF. Left ventricular systolic function is normal.  · Left ventricular diastolic function is consistent with (grade II w/high LAP) pseudonormalization.  · Estimated right ventricular systolic pressure from tricuspid regurgitation is normal (<35 mmHg). Calculated right  ventricular systolic pressure from tricuspid regurgitation is 32 mmHg.  · Endocardial definition of the apical segments is limited, unable to definitively assess apical regional wall motion abnormalities.      Assessment & Plan   Assessment & Plan     Active Hospital Problems    Diagnosis  POA   • **COPD with acute exacerbation ([facility]) [J44.1]  Yes   • Hypoxia [R09.02]  Yes   • Obesity, morbid, BMI 50 or higher ([facility]) [E66.01]  Unknown   • Type 2 diabetes mellitus with stage 3b chronic kidney disease, without long-term current use of insulin ([facility]) [E11.22, N18.32]  Yes   • Diastolic dysfunction [I51.89]  Yes   • Essential hypertension [I10]  Yes   • Hyperlipidemia [E78.5]  Yes   • Coronary artery disease [I25.10]  Yes   • GERD (gastroesophageal reflux disease) [K21.9]  Yes     Summary: This is a 77-year-old female with DM2, diastolic CHF, morbid obesity, COPD who does not use oxygen at baseline who presents with several days of worsening respiratory status, wheezing, cough with clear chest x-ray    Assessment/plan    Acute exacerbation of COPD  Acute hypoxic respiratory insufficiency  - Recently started on DuoNebs outpatient for impaired exercise tolerance 2/2 respiratory limitations  - DuoNebs scheduled and prn  -Mucinex  - Scheduled IV Solu-Medrol 60 mg q8h, taper as respiratory status improves  - Azithromycin  -O2 support as needed  - Recommended dose of Lasix due to history of diastolic CHF and noted edema; patient declining at this time  -Check full viral respiratory panel    DM type 2, A1c 8.6%, without long-term use of insulin  -Accu-Cheks with SSI    Coronary artery disease  Hypertension  Hyperlipidemia  - Home statin, carvedilol, Plavix, ARB    CKD stage 3b  -Baseline creatinine 1.2-1.5; EGFR 30s  -At approximate baseline    Obesity, BMI 51.0 to KG/M2  -Complicates all aspects of care    DVT prophylaxis: Lovenox      CODE STATUS: Per patient she wishes to be DNR/DNI  Code Status and Medical Interventions:    Ordered at: 06/13/22 0258     Medical Intervention Limits:    NO intubation (DNI)     Level Of Support Discussed With:    Patient     Code Status (Patient has no pulse and is not breathing):    No CPR (Do Not Attempt to Resuscitate)     Medical Interventions (Patient has pulse or is breathing):    Limited Support         Carlton Crews DO  06/13/22      Electronically signed by Carlton Crews DO at 06/13/22 0315       Vital Signs (last day)     Date/Time Temp Temp src Pulse Resp BP Patient Position SpO2    06/16/22 0741 -- -- 51 -- -- -- 100    06/16/22 0735 -- -- -- 24 -- -- --    06/16/22 0715 98.3 (36.8) Oral 51 22 174/74 Lying 97    06/16/22 0500 -- -- -- -- 161/82 -- --    06/16/22 0254 -- -- 52 20 172/71 Lying 95    06/16/22 0159 -- -- -- -- 149/73 -- --    06/15/22 2300 98.1 (36.7) Oral 56 22 175/74 Lying 99    06/15/22 1924 98.1 (36.7) Oral 76 24 163/60 Lying 99    06/15/22 1843 -- -- 77 24 -- -- --    06/15/22 1659 97.9 (36.6) Oral 64 22 153/80 Lying 98    06/15/22 1644 -- -- 52 -- -- -- 98    06/15/22 1213 97.6 (36.4) Oral 71 21 130/87 Sitting 97    06/15/22 1133 -- -- 65 20 -- -- 96    06/15/22 0811 97.9 (36.6) Oral 53 16 146/66 Lying --    06/15/22 0732 -- -- 56 18 -- -- 96    06/15/22 0321 97.6 (36.4) Oral 50 20 144/64 Lying 93            Current Facility-Administered Medications   Medication Dose Route Frequency Provider Last Rate Last Admin   • acetaminophen (TYLENOL) tablet 650 mg  650 mg Oral Q4H PRN Carlton Crews DO   650 mg at 06/15/22 1544    Or   • acetaminophen (TYLENOL) 160 MG/5ML solution 650 mg  650 mg Oral Q4H PRN Carlton Crews DO        Or   • acetaminophen (TYLENOL) suppository 650 mg  650 mg Rectal Q4H PRN Carlton Crews DO       • atorvastatin (LIPITOR) tablet 40 mg  40 mg Oral Nightly Carlton Crews DO   40 mg at 06/15/22 2016   • azithromycin (ZITHROMAX) tablet 250 mg  250 mg Oral Q24H Carlton Crews DO   250 mg at 06/16/22 0845   •  carvedilol (COREG) tablet 3.125 mg  3.125 mg Oral BID With Meals Carlton Crews DO   3.125 mg at 06/16/22 0845   • clopidogrel (PLAVIX) tablet 75 mg  75 mg Oral Daily Carlton Crews DO   75 mg at 06/16/22 0845   • dextrose (D50W) (25 g/50 mL) IV injection 25 g  25 g Intravenous Q15 Min PRN Carlton Crews DO       • dextrose (GLUTOSE) oral gel 15 g  15 g Oral Q15 Min PRN Carlton Crews DO       • Enoxaparin Sodium (LOVENOX) syringe 40 mg  40 mg Subcutaneous Q12H Karo Zuluaga PharmD   40 mg at 06/16/22 0846   • glucagon (human recombinant) (GLUCAGEN DIAGNOSTIC) injection 1 mg  1 mg Intramuscular Q15 Min PRN Carlton Crews DO       • guaiFENesin (MUCINEX) 12 hr tablet 1,200 mg  1,200 mg Oral Q12H Carlton Crews DO   1,200 mg at 06/16/22 0845   • Insulin Lispro (humaLOG) injection 0-7 Units  0-7 Units Subcutaneous TID AC Carlton Crews DO   2 Units at 06/15/22 1149   • ipratropium-albuterol (DUO-NEB) nebulizer solution 3 mL  3 mL Nebulization Q4H PRN Carlton Crews DO       • ipratropium-albuterol (DUO-NEB) nebulizer solution 3 mL  3 mL Nebulization 4x Daily - RT Jennie Gilmore MD   3 mL at 06/16/22 1247   • Magnesium Sulfate 2 gram Bolus, followed by 8 gram infusion (total Mg dose 10 grams)- Mg less than or equal to 1mg/dL  2 g Intravenous PRN Carlton Crews DO        Or   • Magnesium Sulfate 2 gram / 50mL Infusion (GIVE X 3 BAGS TO EQUAL 6GM TOTAL DOSE) - Mg 1.1 - 1.5 mg/dl  2 g Intravenous PRN Carlton Crews DO        Or   • Magnesium Sulfate 4 gram infusion- Mg 1.6-1.9 mg/dL  4 g Intravenous PRN Crews, Carlton Tello, DO       • nystatin (MYCOSTATIN) powder   Topical Q12H Jennie Gilmore MD   Given at 06/16/22 0846   • ondansetron (ZOFRAN) tablet 4 mg  4 mg Oral Q6H PRN Carlton Crews,         Or   • ondansetron (ZOFRAN) injection 4 mg  4 mg Intravenous Q6H PRN Carlton Crews,        • oxyCODONE (ROXICODONE)  immediate release tablet 5 mg  5 mg Oral Q4H PRN Jennie Gilmore MD   5 mg at 22 1726   • Pharmacy Consult - MTM   Does not apply Daily Karo Zuluaga, PharmD       • potassium chloride (MICRO-K) CR capsule 40 mEq  40 mEq Oral PRN Carlton Crews DO        Or   • potassium chloride (KLOR-CON) packet 40 mEq  40 mEq Oral PRN Carlton Crews DO        Or   • potassium chloride 10 mEq in 100 mL IVPB  10 mEq Intravenous Q1H PRN Carlton Crews DO       • predniSONE (DELTASONE) tablet 40 mg  40 mg Oral Daily With Breakfast Jennie Gilmore MD   40 mg at 22 0845   • sodium chloride 0.9 % flush 10 mL  10 mL Intravenous PRN Clifford Cotton MD       • sodium chloride 0.9 % flush 10 mL  10 mL Intravenous Q12H Carlton Crews DO   10 mL at 06/15/22 2020   • sodium chloride 0.9 % flush 10 mL  10 mL Intravenous PRN Carlton Crews DO       • valsartan (DIOVAN) tablet 160 mg  160 mg Oral Daily Carlton Crews DO   160 mg at 22 0845        Physician Progress Notes (last 24 hours)      Jennie Gilmore MD at 22 0756              [facility]  PROGRESS NOTE    Patient Name: Georgia Velázquez  : 1945  MRN: [id number]    Date of Admission: 2022  Primary Care Physician: Georgina Ahumada APRN    Subjective   Subjective     CC:  SOA    HPI:  Pt with significant wheezing this am, still on 4L O2 BNC.     ROS:  Gen- No fevers, chills  CV- No chest pain, palpitations  Resp- + cough, + dyspnea  GI- No N/V/D        Objective   Objective     Vital Signs:   Temp:  [97.6 °F (36.4 °C)-98.3 °F (36.8 °C)] 98.3 °F (36.8 °C)  Heart Rate:  [51-77] 51  Resp:  [16-24] 24  BP: (130-175)/(60-87) 174/74  Flow (L/min):  [4] 4     Physical Exam:  Constitutional: No acute distress, awake, alert  HENT: NCAT, mucous membranes moist  Respiratory: wheezing bilaterally diffusely, worse than yesterday, remains on 4L  [facility]  Cardiovascular: RRR, no murmurs, rubs, or gallops  Gastrointestinal: Positive bowel sounds, soft, nontender, nondistended  Musculoskeletal: No bilateral ankle edema  Psychiatric: Appropriate affect, cooperative  Neurologic: Oriented x 3, strength symmetric in all extremities, Cranial Nerves grossly intact to confrontation, speech clear  Skin: No rashes    Results Reviewed:  LAB RESULTS:      Lab 06/16/22  0619 06/16/22  0440 06/15/22  0936 06/14/22  0535 06/13/22  0737 06/13/22  0036   WBC 13.42*  --  14.58* 10.79 3.65 5.13   HEMOGLOBIN 13.5  --  13.2 13.2 13.4 11.9*   HEMATOCRIT 39.4  --  40.5 40.3 40.4 35.7   PLATELETS 170  --  161 139* 128* 117*   NEUTROS ABS 10.09*  --  12.05* 8.45*  --  3.62   IMMATURE GRANS (ABS) 0.08*  --  0.06* 0.15*  --  0.02   LYMPHS ABS 2.39  --  1.92 1.64  --  1.21   MONOS ABS 0.82  --  0.54 0.53  --  0.26   EOS ABS 0.00  --  0.00 0.00  --  0.01   MCV 89.7  --  93.8 92.0 92.9 92.0   PROCALCITONIN  --  0.07  --   --   --   --          Lab 06/16/22  0440 06/15/22  0936 06/14/22  0535 06/13/22  0737 06/12/22  2330   SODIUM 135* 136 136 134* 135*   POTASSIUM 5.8* 4.5 4.4 3.9 4.2   CHLORIDE 103 102 103 100 101   CO2 23.0 26.0 24.0 20.0* 22.0   ANION GAP 9.0 8.0 9.0 14.0 12.0   BUN 49* 55* 41* 24* 22   CREATININE 1.42* 1.50* 1.35* 1.22* 1.37*   EGFR 37.7* 35.3* 40.1* 45.2* 39.4*   GLUCOSE 140* 183* 194* 190* 115*   CALCIUM 9.0 9.2 9.2 8.5* 8.5*   MAGNESIUM  --   --   --  2.4  --          Lab 06/12/22  2330   TOTAL PROTEIN 7.2   ALBUMIN 3.50   GLOBULIN 3.7   ALT (SGPT) 17   AST (SGOT) 39*   BILIRUBIN 0.5   ALK PHOS 100         Lab 06/12/22  2330   PROBNP 3,497.0*   TROPONIN T 0.017                 Brief Urine Lab Results  (Last result in the past 365 days)      Color   Clarity   Blood   Leuk Est   Nitrite   Protein   CREAT   Urine HCG        10/15/21 1356             100 mg/dL               Microbiology Results Abnormal     Procedure Component Value - Date/Time    COVID PRE-OP /  PRE-PROCEDURE SCREENING ORDER (NO ISOLATION) - Swab, Nasopharynx [[id number]]  (Normal) Collected: 06/13/22 0036    Lab Status: Final result Specimen: Swab from Nasopharynx Updated: 06/13/22 0108    Narrative:      The following orders were created for panel order COVID PRE-OP / PRE-PROCEDURE SCREENING ORDER (NO ISOLATION) - Swab, Nasopharynx.  Procedure                               Abnormality         Status                     ---------                               -----------         ------                     COVID-19 and FLU A/B PCR...[[id number]]  Normal              Final result                 Please view results for these tests on the individual orders.    COVID-19 and FLU A/B PCR - Swab, Nasopharynx [[id number]]  (Normal) Collected: 06/13/22 0036    Lab Status: Final result Specimen: Swab from Nasopharynx Updated: 06/13/22 0108     COVID19 Not Detected     Influenza A PCR Not Detected     Influenza B PCR Not Detected    Narrative:      Fact sheet for providers: https://www.fda.gov/media/093808/download    Fact sheet for patients: https://www.fda.gov/media/416275/download    Test performed by PCR.          No radiology results from the last 24 hrs    Results for orders placed during the hospital encounter of 03/17/21    Adult Transthoracic Echo Complete With Contrast if Necessary Per Protocol    Interpretation Summary  · Calculated left ventricular EF = 55% Estimated left ventricular EF was in agreement with the calculated left ventricular EF. Left ventricular systolic function is normal.  · Left ventricular diastolic function is consistent with (grade II w/high LAP) pseudonormalization.  · Estimated right ventricular systolic pressure from tricuspid regurgitation is normal (<35 mmHg). Calculated right ventricular systolic pressure from tricuspid regurgitation is 32 mmHg.  · Endocardial definition of the apical segments is limited, unable to definitively assess apical regional wall motion  abnormalities.      I have reviewed the medications:  Scheduled Meds:atorvastatin, 40 mg, Oral, Nightly  azithromycin, 250 mg, Oral, Q24H  carvedilol, 3.125 mg, Oral, BID With Meals  clopidogrel, 75 mg, Oral, Daily  enoxaparin, 40 mg, Subcutaneous, Q12H  guaiFENesin, 1,200 mg, Oral, Q12H  insulin lispro, 0-7 Units, Subcutaneous, TID AC  ipratropium-albuterol, 3 mL, Nebulization, 4x Daily - RT  nystatin, , Topical, Q12H  pharmacy consult - MTM, , Does not apply, Daily  predniSONE, 40 mg, Oral, Daily With Breakfast  sodium chloride, 10 mL, Intravenous, Q12H  valsartan, 160 mg, Oral, Daily      Continuous Infusions:   PRN Meds:.•  acetaminophen **OR** acetaminophen **OR** acetaminophen  •  dextrose  •  dextrose  •  glucagon (human recombinant)  •  ipratropium-albuterol  •  magnesium sulfate **OR** magnesium sulfate **OR** magnesium sulfate  •  ondansetron **OR** ondansetron  •  oxyCODONE  •  potassium chloride **OR** potassium chloride **OR** potassium chloride  •  sodium chloride  •  sodium chloride    Assessment & Plan   Assessment & Plan     Active Hospital Problems    Diagnosis  POA   • **COPD with acute exacerbation ([facility]) [J44.1]  Yes   • Hypoxia [R09.02]  Yes   • Obesity, morbid, BMI 50 or higher ([facility]) [E66.01]  Unknown   • Type 2 diabetes mellitus with stage 3b chronic kidney disease, without long-term current use of insulin ([facility]) [E11.22, N18.32]  Yes   • Diastolic dysfunction [I51.89]  Yes   • Essential hypertension [I10]  Yes   • Hyperlipidemia [E78.5]  Yes   • Coronary artery disease [I25.10]  Yes   • GERD (gastroesophageal reflux disease) [K21.9]  Yes      Resolved Hospital Problems   No resolved problems to display.        Brief Hospital Course to date:  Georgia Velázquez is a 77 y.o. female with DM2, diastolic CHF, morbid obesity, COPD who does not use oxygen at baseline who presented with several days of worsening respiratory status, wheezing, cough with clear chest x-ray. Found to have parainfluenza  virus resulting in COPD exacerbation     Assessment/plan     Acute exacerbation of COPD  Acute Parainfluenza infection  Acute hypoxic respiratory insufficiency  - Recently started on DuoNebs outpatient for impaired exercise tolerance related to respiratory limitations  - DuoNebs scheduled and prn  -Mucinex  - transitioned to PO steroids- worsening wheezing today, will given one time dose IV steroids since she is here awaiting rehab  - Azithromycin for anti-inflammatory effects x 5 days  -O2 support as needed  - Recommended dose of Lasix due to history of diastolic CHF and noted edema; patient declining at this time  -Checked full viral respiratory panel- + for parainfluenza  -- added Percocet (in addition to already ordered Tylenol) for LLQ a/w coughing. Suspect musculoskeletal   -- consult Pulm so patient can have outpatient follow up (apparently, due to insurance issues, this would be easier done inpatient than having outpatient referral sent).  Appreciate Pulm's time.      DM type 2, A1c 8.6%, without long-term use of insulin  -Accu-Cheks with SSI     Coronary artery disease  Hypertension  Hyperlipidemia  - Home statin, carvedilol, Plavix, ARB     CKD stage 3b  -Baseline creatinine 1.2-1.5; EGFR 30s  -At approximate baseline     Obesity, BMI 51.0 to KG/M2  -Complicates all aspects of care    Hyperkalemia  -- likely due to hemolyzed specimen, repeat labs show K+ wnl    DVT prophylaxis:  Medical DVT prophylaxis orders are present.       AM-PAC 6 Clicks Score (PT): 17 (06/15/22 0800)    Disposition: I expect the patient to be discharged to inpatient rehab when bed available    CODE STATUS:   Code Status and Medical Interventions:   Ordered at: 06/13/22 0258     Medical Intervention Limits:    NO intubation (DNI)     Level Of Support Discussed With:    Patient     Code Status (Patient has no pulse and is not breathing):    No CPR (Do Not Attempt to Resuscitate)     Medical Interventions (Patient has pulse or is  breathing):    Limited Support       Jennie Gilmore MD  22                Electronically signed by Jennie Gilmore MD at [phone]          Physical Therapy Notes (most recent note)      Minerva Posey PT at 22 0957  Version 1 of 1         Patient Name: Georgia Velázquez  : 1945    MRN: [id number]                              Today's Date: 2022       Admit Date: 2022    Visit Dx:     ICD-10-CM ICD-9-CM   1. COPD with acute exacerbation ([facility])  J44.1 491.21   2. Acute respiratory failure with hypoxia ([facility])  J96.01 518.81   3. History of CHF (congestive heart failure)  Z86.79 V12.59     Patient Active Problem List   Diagnosis   • Essential hypertension   • Hyperlipidemia   • Coronary artery disease   • GERD (gastroesophageal reflux disease)   • Stage 3 chronic kidney disease (CMS/[facility])   • Chronic obstructive pulmonary disease ([facility])   • Retinal emboli, right   • Diastolic dysfunction   • Colitis   • Glaucoma   • Left carotid artery stenosis   • Syncope   • Aortic regurgitation   • COPD with exacerbation ([facility])   • Diabetic retinopathy ([facility])   • Type 2 diabetes mellitus with stage 3b chronic kidney disease, without long-term current use of insulin ([facility])   • COPD with acute exacerbation ([facility])   • Hypoxia   • Obesity, morbid, BMI 50 or higher ([facility])     Past Medical History:   Diagnosis Date   • Acute kidney injury ([facility]) 2017   • Aortic regurgitation    • Arthritis of shoulder 2016   • Body mass index (BMI) of 45.0-49.9 in adult ([facility]) 2019   • CHF (congestive heart failure) ([facility])    • Closed compression fracture of L4 lumbar vertebra 2017    Added automatically from request for surgery [id number]   • Constipation 2017   • Coronary artery disease 2016   • Diabetes mellitus type 2 in obese ([facility]) 2018   • Elevated alkaline phosphatase level 2018   • Elevated creatine kinase    • Essential hypertension 2016   • GERD (gastroesophageal  reflux disease) 5/18/2016   • Glaucoma 7/21/2020   • History of kyphoplasty 1/30/2018   • Lumbar facet arthropathy 1/29/2018   • Lumbar stenosis with neurogenic claudication 1/29/2018   • Morbid obesity due to excess calories (HCC) 1/29/2018   • Myocardial infarction (HCC) 5/18/2016   • Osteoporosis 5/18/2016   • Physical deconditioning 1/30/2018   • Retinal emboli, right 5/21/2020   • Sepsis ([facility])     uro   • Stroke ([facility])    • Urinary incontinence without sensory awareness 2/26/2018     Past Surgical History:   Procedure Laterality Date   • APPENDECTOMY     • BACK SURGERY     • CHOLECYSTECTOMY     • EYE SURGERY     • KYPHOPLASTY N/A 12/7/2017    Procedure: KYPHOPLASTY L4;  Surgeon: Marc Pham MD;  Location: [facility] OR;  Service:       General Information     Row Name 06/14/22 1033          Physical Therapy Time and Intention    Document Type evaluation  -CD     Mode of Treatment physical therapy  -CD     Row Name 06/14/22 1033          General Information    Patient Profile Reviewed yes  -CD     Prior Level of Function independent:;all household mobility;gait;transfer;bed mobility;ADL's  USES A ROLLATOR AND STAIR LIFT AT HOME. PLANS TO GET A W/C FOR COMMUNITY DISTANCES.  -CD     Existing Precautions/Restrictions fall;oxygen therapy device and L/min  DROPLET/CONTACT PRECAUTIONS- PARAFLU, COPD EXACERBATION.  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  -CD     Row Name 06/14/22 1033          Stairs Within Home, Primary    Stairs, Within Home, Primary USES A STAIRLIFT  -CD     Row Name 06/14/22 1033          Cognition    Orientation Status (Cognition) oriented x 4  -CD     Row Name 06/14/22 1033          Safety Issues, Functional Mobility    Safety Issues Affecting Function (Mobility) insight into deficits/self-awareness;positioning of  assistive device;safety precautions follow-through/compliance;safety precaution awareness  -CD     Impairments Affecting Function (Mobility) balance;endurance/activity tolerance;shortness of breath  -CD     Comment, Safety Issues/Impairments (Mobility) CUES FOR PLB, REST BREAK  WITH SOA.  -CD           User Key  (r) = Recorded By, (t) = Taken By, (c) = Cosigned By    Initials Name Provider Type    CD Minerva Posey, PT Physical Therapist               Mobility     Row Name 06/14/22 1038          Bed Mobility    Comment, (Bed Mobility) PT [facility] UPON ARRIVAL.  -CD     Row Name 06/14/22 1038          Transfers    Comment, (Transfers) CUES FOR HAND PLACEMENT.  -CD     Row Name 06/14/22 1038          Sit-Stand Transfer    Sit-Stand [place] (Transfers) contact guard;verbal cues  -CD     Assistive Device (Sit-Stand Transfers) walker, front-wheeled  -CD     Row Name 06/14/22 1038          Gait/Stairs (Locomotion)    [place] Level (Gait) contact guard;verbal cues  -CD     Assistive Device (Gait) walker, front-wheeled  -CD     Deviations/Abnormal Patterns (Gait) stride length decreased;weight shifting decreased  -CD     Bilateral Gait Deviations forward flexed posture;heel strike decreased  -CD     Left Sided Gait Deviations lateral trunk flexion  -CD     Right Sided Gait Deviations lateral trunk flexion  -CD     Comment, (Gait/Stairs) NO OVERT LOB BUT GAIT UNSTEADY WITH FORWARD FLEXED POSTURE AND SHORT STEPS. NEEDS CUES FOR PROPER WALKER PLACEMENT AND UPRIGHT POSTURE.  -CD           User Key  (r) = Recorded By, (t) = Taken By, (c) = Cosigned By    Initials Name Provider Type     Mnierva Posey PT Physical Therapist               Obj/Interventions     Row Name 06/14/22 1041          Range of Motion Comprehensive    General Range of Motion bilateral lower extremity ROM WFL  -CD     Row Name 06/14/22 1041          Strength Comprehensive (MMT)    Comment, General Manual Muscle Testing (MMT) Assessment B LE GROSSLY 4+  TO 5/5.  -CD     Row Name 06/14/22 1041          Balance    Comment, Balance SEE GAIT NOTE. PT USES ROLLATOR AT BASELINE.  -CD           User Key  (r) = Recorded By, (t) = Taken By, (c) = Cosigned By    Initials Name Provider Type    CD Minerva Posey, PT Physical Therapist               Goals/Plan     Row Name 06/14/22 1050          Bed Mobility Goal 1 (PT)    Activity/Assistive Device (Bed Mobility Goal 1, PT) sit to supine/supine to sit  -CD     [place] Level/Cues Needed (Bed Mobility Goal 1, PT) independent  -CD     Time Frame (Bed Mobility Goal 1, PT) long term goal (LTG);2 weeks  -CD     Row Name 06/14/22 1050          Transfer Goal 1 (PT)    Activity/Assistive Device (Transfer Goal 1, PT) sit-to-stand/stand-to-sit;bed-to-chair/chair-to-bed;walker, rolling  -CD     [place] Level/Cues Needed (Transfer Goal 1, PT) independent  -CD     Time Frame (Transfer Goal 1, PT) long term goal (LTG);2 weeks  -CD     Row Name 06/14/22 1050          Gait Training Goal 1 (PT)    Activity/Assistive Device (Gait Training Goal 1, PT) gait (walking locomotion);walker, rolling  -CD     [place] Level (Gait Training Goal 1, PT) independent  -CD     Distance (Gait Training Goal 1, PT) 400 FEET  -CD     Time Frame (Gait Training Goal 1, PT) long term goal (LTG);2 weeks  -CD     Row Name 06/14/22 1050          Therapy Assessment/Plan (PT)    Planned Therapy Interventions (PT) balance training;home exercise program;transfer training;bed mobility training;strengthening  -CD           User Key  (r) = Recorded By, (t) = Taken By, (c) = Cosigned By    Initials Name Provider Type    Minerva Echeverria PT Physical Therapist               Clinical Impression     Row Name 06/14/22 1042          Pain    Pretreatment Pain Rating 0/10 - no pain  -CD     Posttreatment Pain Rating 0/10 - no pain  -CD     Row Name 06/14/22 1042          Plan of Care Review    Plan of Care Reviewed With patient  -CD     Outcome Evaluation PT PRESENTS  WITH EVOLVING SYMPTOMS TO INCLUDE IMPAIRED BALANCE, DECREASED ENDURANCE, AUDIBLE WHEEZING, [place] AND DECLINE IN FUNCTIONAL MOBILITY. PT IS A&O AND FOLLOWS ALL COMMANDS. COMPLETES TRANSFERS WITH CGA AND CUES FOR SEQUENCING. AMBULATED 80 FEET WITH R WALKER. SATS 95-87% WITH ACTIVITY ON 4L O2. RECOMMEND IRF AT D/C AS PT IS HOME ALONE DURING THE DAY. WOULD LIKELY BENEFIT FROM TRANSITION TO OP PULMONARY REHAB AFTER INPT REHAB STAY.  -CD     Row Name 06/14/22 1042          Therapy Assessment/Plan (PT)    Patient/Family Therapy Goals Statement (PT) PT IS AGREEABLE TO IRF AT D/C.  -CD     Rehab Potential (PT) good, to achieve stated therapy goals  -CD     Criteria for Skilled Interventions Met (PT) yes  -CD     Therapy Frequency (PT) daily  -CD     Row Name 06/14/22 1042          Vital Signs    Pre Systolic BP Rehab 159  -CD     Pre Treatment Diastolic BP 62  -CD     Post Systolic BP Rehab 137  -CD     Post Treatment Diastolic BP 48  -CD     Pretreatment Heart Rate (beats/min) 57  -CD     Posttreatment Heart Rate (beats/min) 64  -CD     Pre SpO2 (%) 95  -CD     O2 Delivery Pre Treatment supplemental O2  -CD     Intra SpO2 (%) 87  -CD     O2 Delivery Intra Treatment supplemental O2  -CD     Post SpO2 (%) 93  -CD     O2 Delivery Post Treatment supplemental O2  -CD     Pre Patient Position Sitting  -CD     Intra Patient Position Standing  -CD     Post Patient Position Sitting  -CD     Row Name 06/14/22 1042          Positioning and Restraints    Pre-Treatment Position sitting in chair/recliner  -CD     Post Treatment Position chair  -CD     In Chair reclined;call light within reach;encouraged to call for assist;exit alarm on;legs elevated;notified nsg;heels elevated  -CD           User Key  (r) = Recorded By, (t) = Taken By, (c) = Cosigned By    Initials Name Provider Type    CD Minerva Posey, PT Physical Therapist               Outcome Measures     Row Name 06/14/22 1054 06/14/22 0800       How much help from another person do  you currently need...    Turning from your back to your side while in flat bed without using bedrails? 3  -CD 4  -TA    Moving from lying on back to sitting on the side of a flat bed without bedrails? 3  -CD 4  -TA    Moving to and from a bed to a chair (including a wheelchair)? 3  -CD 3  -TA    Standing up from a chair using your arms (e.g., wheelchair, bedside chair)? 3  -CD 3  -TA    Climbing 3-5 steps with a railing? 2  -CD 3  -TA    To walk in hospital room? 3  -CD 3  -TA    AM-PAC 6 Clicks Score (PT) 17  -CD 20  -TA    Highest level of mobility 5 --> Static standing  -CD 6 --> Walked 10 steps or more  -TA          User Key  (r) = Recorded By, (t) = Taken By, (c) = Cosigned By    Initials Name Provider Type    CD Minerva Posey, PT Physical Therapist    Sarah Lam, RN Registered Nurse                             Physical Therapy Education                 Title: PT OT SLP Therapies (Done)     Topic: Physical Therapy (Done)     Point: Mobility training (Done)     Learning Progress Summary           Patient Acceptance, E, VU,NR by CD at 6/14/2022 1055    Comment: BENEFITS OF OOB ACTIVITY, SAFETY WITH MOBILITY, PROGRESSION OF POC, D/C PLANNING,                   Point: Home exercise program (Done)     Learning Progress Summary           Patient Acceptance, E, VU,NR by CD at 6/14/2022 1055    Comment: BENEFITS OF OOB ACTIVITY, SAFETY WITH MOBILITY, PROGRESSION OF POC, D/C PLANNING,                   Point: Body mechanics (Done)     Learning Progress Summary           Patient Acceptance, E, VU,NR by CD at 6/14/2022 1055    Comment: BENEFITS OF OOB ACTIVITY, SAFETY WITH MOBILITY, PROGRESSION OF POC, D/C PLANNING,                   Point: Precautions (Done)     Learning Progress Summary           Patient Acceptance, E, VU,NR by CD at 6/14/2022 1055    Comment: BENEFITS OF OOB ACTIVITY, SAFETY WITH MOBILITY, PROGRESSION OF POC, D/C PLANNING,                               User Key     Initials Effective Dates Name  Provider Type Discipline    CD 06/16/21 -  Minerva Posey PT Physical Therapist PT              PT Recommendation and Plan  Planned Therapy Interventions (PT): balance training, home exercise program, transfer training, bed mobility training, strengthening  Plan of Care Reviewed With: patient  Outcome Evaluation: PT PRESENTS WITH EVOLVING SYMPTOMS TO INCLUDE IMPAIRED BALANCE, DECREASED ENDURANCE, AUDIBLE WHEEZING, [place] AND DECLINE IN FUNCTIONAL MOBILITY. PT IS A&O AND FOLLOWS ALL COMMANDS. COMPLETES TRANSFERS WITH CGA AND CUES FOR SEQUENCING. AMBULATED 80 FEET WITH R WALKER. SATS 95-87% WITH ACTIVITY ON 4L O2. RECOMMEND IRF AT D/C AS PT IS HOME ALONE DURING THE DAY. WOULD LIKELY BENEFIT FROM TRANSITION TO OP PULMONARY REHAB AFTER INPT REHAB STAY.     Time Calculation:    PT Charges     Row Name 06/14/22 1056             Time Calculation    Start Time 0957  -CD      PT Received On 06/14/22  -CD      PT Goal Re-Cert Due Date 06/24/22  -CD              Time Calculation- PT    Total Timed Code Minutes- PT 10 minute(s)  -CD              Timed Charges    64083 - Gait Training Minutes  10  -CD              Untimed Charges    PT Eval/Re-eval Minutes 57  -CD              Total Minutes    Timed Charges Total Minutes 10  -CD      Untimed Charges Total Minutes 57  -CD       Total Minutes 67  -CD            User Key  (r) = Recorded By, (t) = Taken By, (c) = Cosigned By    Initials Name Provider Type    CD Minerva Poesy PT Physical Therapist              Therapy Charges for Today     Code Description Service Date Service Provider Modifiers Qty    36443369610 HC GAIT TRAINING EA 15 MIN 6/14/2022 Minerva Posey, PT GP 1    44790666896 HC PT EVAL MOD COMPLEXITY 4 6/14/2022 Minerva Posey PT GP 1          PT G-Codes  AM-PAC 6 Clicks Score (PT): 17    Minerva Posey PT  6/14/2022      Electronically signed by Minerva Posey PT at 06/14/22 1100          Occupational Therapy Notes (most recent note)      Promise Melendrez, OT at  22 0815          Patient Name: Georgia Velázquez  : 1945    MRN: [id number]                              Today's Date: 2022       Admit Date: 2022    Visit Dx:     ICD-10-CM ICD-9-CM   1. COPD with acute exacerbation ([facility])  J44.1 491.21   2. Acute respiratory failure with hypoxia ([facility])  J96.01 518.81   3. History of CHF (congestive heart failure)  Z86.79 V12.59     Patient Active Problem List   Diagnosis   • Essential hypertension   • Hyperlipidemia   • Coronary artery disease   • GERD (gastroesophageal reflux disease)   • Stage 3 chronic kidney disease (CMS/[facility])   • Chronic obstructive pulmonary disease ([facility])   • Retinal emboli, right   • Diastolic dysfunction   • Colitis   • Glaucoma   • Left carotid artery stenosis   • Syncope   • Aortic regurgitation   • COPD with exacerbation ([facility])   • Diabetic retinopathy ([facility])   • Type 2 diabetes mellitus with stage 3b chronic kidney disease, without long-term current use of insulin ([facility])   • COPD with acute exacerbation ([facility])   • Hypoxia   • Obesity, morbid, BMI 50 or higher ([facility])     Past Medical History:   Diagnosis Date   • Acute kidney injury ([facility]) 2017   • Aortic regurgitation    • Arthritis of shoulder 2016   • Body mass index (BMI) of 45.0-49.9 in adult ([facility]) 2019   • CHF (congestive heart failure) ([facility])    • Closed compression fracture of L4 lumbar vertebra 2017    Added automatically from request for surgery [id number]   • Constipation 2017   • Coronary artery disease 2016   • Diabetes mellitus type 2 in obese ([facility]) 2018   • Elevated alkaline phosphatase level 2018   • Elevated creatine kinase    • Essential hypertension 2016   • GERD (gastroesophageal reflux disease) 2016   • Glaucoma 2020   • History of kyphoplasty 2018   • Lumbar facet arthropathy 2018   • Lumbar stenosis with neurogenic claudication 2018   • Morbid obesity due to excess calories ([facility]) 2018   •  Myocardial infarction (HCC) 5/18/2016   • Osteoporosis 5/18/2016   • Physical deconditioning 1/30/2018   • Retinal emboli, right 5/21/2020   • Sepsis (HCC)     uro   • Stroke (HCC)    • Urinary incontinence without sensory awareness 2/26/2018     Past Surgical History:   Procedure Laterality Date   • APPENDECTOMY     • BACK SURGERY     • CHOLECYSTECTOMY     • EYE SURGERY     • KYPHOPLASTY N/A 12/7/2017    Procedure: KYPHOPLASTY L4;  Surgeon: Marc Pham MD;  Location: [facility] OR;  Service:       General Information     Row Name 06/14/22 1057          OT Time and Intention    Document Type evaluation  -MR     Mode of Treatment individual therapy;occupational therapy  -MR     Row Name 06/14/22 1057          General Information    Patient Profile Reviewed yes  -MR     Prior Level of Function independent:;all household mobility;community mobility;gait;transfer;bed mobility;ADL's  PTA pt reporting she was I w/ ADLs, denied use of AD w/in the home or the comm., pt reporting utilizing rollator occas. denies having any steps to get in the home & reprts having a stair lift w/in the home. Pt sleeps in reg. bed, denies any recent falls.  -MR     Existing Precautions/Restrictions fall;oxygen therapy device and L/min  Contact - Paraflu; COPD  -MR     Barriers to Rehab medically complex;previous functional deficit  -MR     Row Name 06/14/22 1057          Living Environment    People in Home child(chandler), adult;other (see comments)  Lives w/ daughter who works during the day.  -MR     Row Name 06/14/22 1057          Home Main Entrance    Number of Stairs, Main Entrance none  -MR     Row Name 06/14/22 1057          Stairs Within Home, Primary    Stairs, Within Home, Primary Utilizes a stairlift  -MR     Row Name 06/14/22 1057          Cognition    Orientation Status (Cognition) oriented x 4  -MR     Row Name 06/14/22 1057          Safety Issues, Functional Mobility    Safety Issues Affecting Function (Mobility) insight into  deficits/self-awareness;positioning of assistive device;safety precaution awareness;safety precautions follow-through/compliance  -MR     Impairments Affecting Function (Mobility) balance;endurance/activity tolerance;shortness of breath  -MR           User Key  (r) = Recorded By, (t) = Taken By, (c) = Cosigned By    Initials Name Provider Type    MR Promise Melendrez, OT Occupational Therapist                 Mobility/ADL's     Row Name 06/14/22 1105          Bed Mobility    Bed Mobility supine-sit  -MR     Supine-Sit [place] (Bed Mobility) contact guard;nonverbal cues (demo/gesture);verbal cues  -MR     Assistive Device (Bed Mobility) head of bed elevated;bed rails  -MR     Row Name 06/14/22 1105          Transfers    Transfers sit-stand transfer;bed-chair transfer  -MR     Comment, (Transfers) v/c for hand placement, sequencing and safety.  -MR     Bed-Chair [place] (Transfers) contact guard;nonverbal cues (demo/gesture);verbal cues  -MR     Assistive Device (Bed-Chair Transfers) walker, front-wheeled  -MR     Sit-Stand [place] (Transfers) contact guard;verbal cues  -MR     Row Name 06/14/22 1105          Sit-Stand Transfer    Assistive Device (Sit-Stand Transfers) walker, front-wheeled  -MR     Row Name 06/14/22 1105          Activities of Daily Living    BADL Assessment/Intervention lower body dressing;upper body dressing;feeding  -MR     Row Name 06/14/22 1105          Lower Body Dressing Assessment/Training    [place] Level (Lower Body Dressing) don;socks;dependent (less than 25% patient effort)  -MR     Position (Lower Body Dressing) supine  -MR     Row Name 06/14/22 1105          Upper Body Dressing Assessment/Training    [place] Level (Upper Body Dressing) don;front opening garment;other (see comments);minimum assist (75% patient effort)  hospital gown  -MR     Position (Upper Body Dressing) edge of bed sitting  -MR     Row Name 06/14/22 1105          Self-Feeding Assessment/Training     [place] Level (Feeding) feeding skills;set up  -MR     Position (Self-Feeding) supported sitting  -MR           User Key  (r) = Recorded By, (t) = Taken By, (c) = Cosigned By    Initials Name Provider Type     Promise Melendrez OT Occupational Therapist               Obj/Interventions     Row Name 06/14/22 1106          Sensory Assessment (Somatosensory)    Sensory Assessment (Somatosensory) UE sensation intact  -MR     Row Name 06/14/22 1106          Vision Assessment/Intervention    Visual Impairment/Limitations WFL  -MR     Row Name 06/14/22 1106          Range of Motion Comprehensive    General Range of Motion bilateral upper extremity ROM WFL  -MR     Row Name 06/14/22 1106          Strength Comprehensive (MMT)    Comment, General Manual Muscle Testing (MMT) Assessment BUE grossly 4-/5 in all functional planes  -MR     Row Name 06/14/22 1106          Balance    Balance Assessment sitting static balance;sitting dynamic balance;standing static balance;standing dynamic balance  -MR     Static Sitting Balance supervision  -MR     Dynamic Sitting Balance contact guard  -MR     Position, Sitting Balance unsupported;sitting edge of bed  -MR     Static Standing Balance contact guard;verbal cues;non-verbal cues (demo/gesture)  -MR     Dynamic Standing Balance contact guard;verbal cues;non-verbal cues (demo/gesture)  -MR     Position/Device Used, Standing Balance supported;walker, rolling  -MR     Comment, Balance No LOB noted w/ transfers, self-care tasks or mobility  -MR           User Key  (r) = Recorded By, (t) = Taken By, (c) = Cosigned By    Initials Name Provider Type    MR Melendrez DEON Virgen Occupational Therapist               Goals/Plan     Row Name 06/14/22 1111          Transfer Goal 1 (OT)    Activity/Assistive Device (Transfer Goal 1, OT) sit-to-stand/stand-to-sit;toilet  -MR     [place] Level/Cues Needed (Transfer Goal 1, OT) supervision required  -MR     Time Frame (Transfer Goal 1, OT) long  term goal (LTG);by discharge  -MR     Row Name 06/14/22 1111          Dressing Goal 1 (OT)    Activity/Device (Dressing Goal 1, OT) lower body dressing  -MR     [place]/Cues Needed (Dressing Goal 1, OT) minimum assist (75% or more patient effort)  -MR     Time Frame (Dressing Goal 1, OT) long term goal (LTG);by discharge  -MR     Row Name 06/14/22 1111          Toileting Goal 1 (OT)    Activity/Device (Toileting Goal 1, OT) perform perineal hygiene;adjust/manage clothing  -MR     [place] Level/Cues Needed (Toileting Goal 1, OT) contact guard required  -MR     Time Frame (Toileting Goal 1, OT) long term goal (LTG);by discharge  -MR     Row Name 06/14/22 1111          Grooming Goal 1 (OT)    Activity/Device (Grooming Goal 1, OT) grooming skills, all;other (see comments)  in unsupported sitting position or in standing for increased functional challenge  -MR     [place] (Grooming Goal 1, OT) contact guard required  -MR     Time Frame (Grooming Goal 1, OT) long term goal (LTG);by discharge  -MR     Row Name 06/14/22 1111          Therapy Assessment/Plan (OT)    Planned Therapy Interventions (OT) activity tolerance training;adaptive equipment training;BADL retraining;functional balance retraining;IADL retraining;occupation/activity based interventions;patient/caregiver education/training;transfer/mobility retraining;strengthening exercise;ROM/therapeutic exercise  -MR           User Key  (r) = Recorded By, (t) = Taken By, (c) = Cosigned By    Initials Name Provider Type    MR Promise Melendrez, OT Occupational Therapist               Clinical Impression     Row Name 06/14/22 1107          Pain Assessment    Pretreatment Pain Rating 0/10 - no pain  -MR     Posttreatment Pain Rating 0/10 - no pain  -MR     Pre/Posttreatment Pain Comment Denied pain pre and post session  -MR     Pain Intervention(s) Ambulation/increased activity;Repositioned  -MR     Row Name 06/14/22 1107          Plan of Care Review    Plan of  Care Reviewed With patient  -MR     Progress no change  Initial Eval  -MR     Outcome Evaluation OT eval completed. Pt presenting w/ generalized weakness, decreased activity tolerance/endurance, dyspnea w/ activity and increased need for assistance w/ self-care tasks. CGA for bed mobility w/ extended time and v/c for sequencing. CGA for STS and transfer from bed to chair w/ RW. MaxA for LB dressing, Fredy for UB. De-sat noted w/ mobility from bed to chair to 86% on RA. RN present and reporting pt has been on room air, only 4L at night. 4L NC placed back on pt w/ quick recovery to 92%. Pt educated on PLB technique. IP OT warranted, POC initiated. Recommendation upon d/c for IPR, then progressing to OP pulmonary rehab.  -MR     Row Name 06/14/22 1107          Therapy Assessment/Plan (OT)    Patient/Family Therapy Goal Statement (OT) Return to PLOF  -MR     Rehab Potential (OT) good, to achieve stated therapy goals  -MR     Criteria for Skilled Therapeutic Interventions Met (OT) yes;meets criteria;skilled treatment is necessary  -MR     Therapy Frequency (OT) daily  -MR     Row Name 06/14/22 1107          Therapy Plan Review/Discharge Plan (OT)    Anticipated Discharge Disposition (OT) inpatient rehabilitation facility  -MR     Row Name 06/14/22 1107          Vital Signs    Pre Systolic BP Rehab 159  -MR     Pre Treatment Diastolic BP 62  -MR     Pretreatment Heart Rate (beats/min) 56  -MR     Posttreatment Heart Rate (beats/min) 65  -MR     Pre SpO2 (%) 99  -MR     O2 Delivery Pre Treatment nasal cannula  -MR     Intra SpO2 (%) 86  -MR     O2 Delivery Intra Treatment room air  -MR     Post SpO2 (%) 92  -MR     O2 Delivery Post Treatment nasal cannula  -MR     Pre Patient Position Supine  -MR     Intra Patient Position Standing  -MR     Post Patient Position Sitting  -MR     Row Name 06/14/22 1107          Positioning and Restraints    Pre-Treatment Position in bed  -MR     Post Treatment Position chair  -MR     In  Chair notified nsg;reclined;sitting;call light within reach;encouraged to call for assist;exit alarm on;legs elevated;on mechanical lift sling;waffle cushion  -MR           User Key  (r) = Recorded By, (t) = Taken By, (c) = Cosigned By    Initials Name Provider Type    Promise Blanco, OT Occupational Therapist               Outcome Measures     Row Name 06/14/22 1112          How much help from another is currently needed...    Putting on and taking off regular lower body clothing? 1  -MR     Bathing (including washing, rinsing, and drying) 2  -MR     Toileting (which includes using toilet bed pan or urinal) 2  -MR     Putting on and taking off regular upper body clothing 3  -MR     Taking care of personal grooming (such as brushing teeth) 3  -MR     Eating meals 3  -MR     AM-PAC 6 Clicks Score (OT) 14  -MR     Row Name 06/14/22 1054 06/14/22 0800       How much help from another person do you currently need...    Turning from your back to your side while in flat bed without using bedrails? 3  -CD 4  -TA    Moving from lying on back to sitting on the side of a flat bed without bedrails? 3  -CD 4  -TA    Moving to and from a bed to a chair (including a wheelchair)? 3  -CD 3  -TA    Standing up from a chair using your arms (e.g., wheelchair, bedside chair)? 3  -CD 3  -TA    Climbing 3-5 steps with a railing? 2  -CD 3  -TA    To walk in hospital room? 3  -CD 3  -TA    AM-PAC 6 Clicks Score (PT) 17  -CD 20  -TA    Highest level of mobility 5 --> Static standing  -CD 6 --> Walked 10 steps or more  -TA    Row Name 06/14/22 1112          Functional Assessment    Outcome Measure Options AM-PAC 6 Clicks Daily Activity (OT)  -MR           User Key  (r) = Recorded By, (t) = Taken By, (c) = Cosigned By    Initials Name Provider Type    Minerva Echeverria, PT Physical Therapist    TA Sarah Blackwell, RN Registered Nurse    Promise Blanco, OT Occupational Therapist                Occupational Therapy Education                  Title: PT OT SLP Therapies (Done)     Topic: Occupational Therapy (Done)     Point: ADL training (Done)     Description:   Instruct learner(s) on proper safety adaptation and remediation techniques during self care or transfers.   Instruct in proper use of assistive devices.              Learning Progress Summary           Patient Acceptance, E, VU,NR by MR at 6/14/2022 1113                   Point: Home exercise program (Done)     Description:   Instruct learner(s) on appropriate technique for monitoring, assisting and/or progressing therapeutic exercises/activities.              Learning Progress Summary           Patient Acceptance, E, VU,NR by MR at 6/14/2022 1113                   Point: Precautions (Done)     Description:   Instruct learner(s) on prescribed precautions during self-care and functional transfers.              Learning Progress Summary           Patient Acceptance, E, VU,NR by MR at 6/14/2022 1113                   Point: Body mechanics (Done)     Description:   Instruct learner(s) on proper positioning and spine alignment during self-care, functional mobility activities and/or exercises.              Learning Progress Summary           Patient Acceptance, E, VU,NR by MR at 6/14/2022 1113                               User Key     Initials Effective Dates Name Provider Type Discipline     10/06/21 -  Promise Melendrez OT Occupational Therapist OT              OT Recommendation and Plan  Planned Therapy Interventions (OT): activity tolerance training, adaptive equipment training, BADL retraining, functional balance retraining, IADL retraining, occupation/activity based interventions, patient/caregiver education/training, transfer/mobility retraining, strengthening exercise, ROM/therapeutic exercise  Therapy Frequency (OT): daily  Plan of Care Review  Plan of Care Reviewed With: patient  Progress: no change (Initial Eval)  Outcome Evaluation: OT eval completed. Pt presenting w/ generalized  weakness, decreased activity tolerance/endurance, dyspnea w/ activity and increased need for assistance w/ self-care tasks. CGA for bed mobility w/ extended time and v/c for sequencing. CGA for STS and transfer from bed to chair w/ RW. MaxA for LB dressing, Fredy for UB. De-sat noted w/ mobility from bed to chair to 86% on RA. RN present and reporting pt has been on room air, only 4L at night. 4L NC placed back on pt w/ quick recovery to 92%. Pt educated on PLB technique. IP OT warranted, POC initiated. Recommendation upon d/c for IPR, then progressing to OP pulmonary rehab.     Time Calculation:    Time Calculation- OT     Row Name 06/14/22 1113 06/14/22 1056          Time Calculation- OT    OT Start Time 0815 -MR --     OT Received On 06/14/22  -MR --     OT Goal Re-Cert Due Date 06/24/22  -MR --            Timed Charges    62418 - Gait Training Minutes  -- 10  -CD            Untimed Charges    OT Eval/Re-eval Minutes 46  -MR --            Total Minutes    Timed Charges Total Minutes -- 10  -CD     Untimed Charges Total Minutes 46  -MR --      Total Minutes 46  -MR 10  -CD           User Key  (r) = Recorded By, (t) = Taken By, (c) = Cosigned By    Initials Name Provider Type    CD Minerva Posey PT Physical Therapist    MR Gio Melendrez OT Occupational Therapist              Therapy Charges for Today     Code Description Service Date Service Provider Modifiers Qty    [id number] HC OT EVAL MOD COMPLEXITY 4 6/14/2022 Gio Melendrez OT GO 1               GIO MELENDREZ OT  6/14/2022    Electronically signed by Gio Melendrez OT at 06/14/22 1115

## 2022-06-16 NOTE — PROGRESS NOTES
[facility] Medicine Services  PROGRESS NOTE    Patient Name: Georgia Velázquez  : 1945  MRN: [id number]    Date of Admission: 2022  Primary Care Physician: Georgina Ahumada APRN    Subjective   Subjective     CC:  SOA    HPI:  Pt with significant wheezing this am, still on 4L O2 BNC.     ROS:  Gen- No fevers, chills  CV- No chest pain, palpitations  Resp- + cough, + dyspnea  GI- No N/V/D        Objective   Objective     Vital Signs:   Temp:  [97.6 °F (36.4 °C)-98.3 °F (36.8 °C)] 98.3 °F (36.8 °C)  Heart Rate:  [51-77] 51  Resp:  [16-24] 24  BP: (130-175)/(60-87) 174/74  Flow (L/min):  [4] 4     Physical Exam:  Constitutional: No acute distress, awake, alert  HENT: NCAT, mucous membranes moist  Respiratory: wheezing bilaterally diffusely, worse than yesterday, remains on 4L BNC  Cardiovascular: RRR, no murmurs, rubs, or gallops  Gastrointestinal: Positive bowel sounds, soft, nontender, nondistended  Musculoskeletal: No bilateral ankle edema  Psychiatric: Appropriate affect, cooperative  Neurologic: Oriented x 3, strength symmetric in all extremities, Cranial Nerves grossly intact to confrontation, speech clear  Skin: No rashes    Results Reviewed:  LAB RESULTS:      Lab 22  0619 22  0440 06/15/22  0936 22  0535 22  0737 22  0036   WBC 13.42*  --  14.58* 10.79 3.65 5.13   HEMOGLOBIN 13.5  --  13.2 13.2 13.4 11.9*   HEMATOCRIT 39.4  --  40.5 40.3 40.4 35.7   PLATELETS 170  --  161 139* 128* 117*   NEUTROS ABS 10.09*  --  12.05* 8.45*  --  3.62   IMMATURE GRANS (ABS) 0.08*  --  0.06* 0.15*  --  0.02   LYMPHS ABS 2.39  --  1.92 1.64  --  1.21   MONOS ABS 0.82  --  0.54 0.53  --  0.26   EOS ABS 0.00  --  0.00 0.00  --  0.01   MCV 89.7  --  93.8 92.0 92.9 92.0   PROCALCITONIN  --  0.07  --   --   --   --          Lab 22  0440 06/15/22  0936 22  0535 22  0737 22  2330   SODIUM 135* 136 136 134* 135*   POTASSIUM 5.8* 4.5 4.4 3.9  4.2   CHLORIDE 103 102 103 100 101   CO2 23.0 26.0 24.0 20.0* 22.0   ANION GAP 9.0 8.0 9.0 14.0 12.0   BUN 49* 55* 41* 24* 22   CREATININE 1.42* 1.50* 1.35* 1.22* 1.37*   EGFR 37.7* 35.3* 40.1* 45.2* 39.4*   GLUCOSE 140* 183* 194* 190* 115*   CALCIUM 9.0 9.2 9.2 8.5* 8.5*   MAGNESIUM  --   --   --  2.4  --          Lab 06/12/22  2330   TOTAL PROTEIN 7.2   ALBUMIN 3.50   GLOBULIN 3.7   ALT (SGPT) 17   AST (SGOT) 39*   BILIRUBIN 0.5   ALK PHOS 100         Lab 06/12/22  2330   PROBNP 3,497.0*   TROPONIN T 0.017                 Brief Urine Lab Results  (Last result in the past 365 days)      Color   Clarity   Blood   Leuk Est   Nitrite   Protein   CREAT   Urine HCG        10/15/21 1356             100 mg/dL               Microbiology Results Abnormal     Procedure Component Value - Date/Time    COVID PRE-OP / PRE-PROCEDURE SCREENING ORDER (NO ISOLATION) - Swab, Nasopharynx [[id number]]  (Normal) Collected: 06/13/22 0036    Lab Status: Final result Specimen: Swab from Nasopharynx Updated: 06/13/22 0108    Narrative:      The following orders were created for panel order COVID PRE-OP / PRE-PROCEDURE SCREENING ORDER (NO ISOLATION) - Swab, Nasopharynx.  Procedure                               Abnormality         Status                     ---------                               -----------         ------                     COVID-19 and FLU A/B PCR...[[id number]]  Normal              Final result                 Please view results for these tests on the individual orders.    COVID-19 and FLU A/B PCR - Swab, Nasopharynx [[id number]]  (Normal) Collected: 06/13/22 0036    Lab Status: Final result Specimen: Swab from Nasopharynx Updated: 06/13/22 0108     COVID19 Not Detected     Influenza A PCR Not Detected     Influenza B PCR Not Detected    Narrative:      Fact sheet for providers: https://www.fda.gov/media/576796/download    Fact sheet for patients: https://www.fda.gov/media/859216/download    Test performed by PCR.           No radiology results from the last 24 hrs    Results for orders placed during the hospital encounter of 03/17/21    Adult Transthoracic Echo Complete With Contrast if Necessary Per Protocol    Interpretation Summary  · Calculated left ventricular EF = 55% Estimated left ventricular EF was in agreement with the calculated left ventricular EF. Left ventricular systolic function is normal.  · Left ventricular diastolic function is consistent with (grade II w/high LAP) pseudonormalization.  · Estimated right ventricular systolic pressure from tricuspid regurgitation is normal (<35 mmHg). Calculated right ventricular systolic pressure from tricuspid regurgitation is 32 mmHg.  · Endocardial definition of the apical segments is limited, unable to definitively assess apical regional wall motion abnormalities.      I have reviewed the medications:  Scheduled Meds:atorvastatin, 40 mg, Oral, Nightly  azithromycin, 250 mg, Oral, Q24H  carvedilol, 3.125 mg, Oral, BID With Meals  clopidogrel, 75 mg, Oral, Daily  enoxaparin, 40 mg, Subcutaneous, Q12H  guaiFENesin, 1,200 mg, Oral, Q12H  insulin lispro, 0-7 Units, Subcutaneous, TID AC  ipratropium-albuterol, 3 mL, Nebulization, 4x Daily - RT  nystatin, , Topical, Q12H  pharmacy consult - MTM, , Does not apply, Daily  predniSONE, 40 mg, Oral, Daily With Breakfast  sodium chloride, 10 mL, Intravenous, Q12H  valsartan, 160 mg, Oral, Daily      Continuous Infusions:   PRN Meds:.•  acetaminophen **OR** acetaminophen **OR** acetaminophen  •  dextrose  •  dextrose  •  glucagon (human recombinant)  •  ipratropium-albuterol  •  magnesium sulfate **OR** magnesium sulfate **OR** magnesium sulfate  •  ondansetron **OR** ondansetron  •  oxyCODONE  •  potassium chloride **OR** potassium chloride **OR** potassium chloride  •  sodium chloride  •  sodium chloride    Assessment & Plan   Assessment & Plan     Active Hospital Problems    Diagnosis  POA   • **COPD with acute exacerbation (HCC)  [J44.1]  Yes   • Hypoxia [R09.02]  Yes   • Obesity, morbid, BMI 50 or higher ([facility]) [E66.01]  Unknown   • Type 2 diabetes mellitus with stage 3b chronic kidney disease, without long-term current use of insulin ([facility]) [E11.22, N18.32]  Yes   • Diastolic dysfunction [I51.89]  Yes   • Essential hypertension [I10]  Yes   • Hyperlipidemia [E78.5]  Yes   • Coronary artery disease [I25.10]  Yes   • GERD (gastroesophageal reflux disease) [K21.9]  Yes      Resolved Hospital Problems   No resolved problems to display.        Brief Hospital Course to date:  Georgia Velázquez is a 77 y.o. female with DM2, diastolic CHF, morbid obesity, COPD who does not use oxygen at baseline who presented with several days of worsening respiratory status, wheezing, cough with clear chest x-ray. Found to have parainfluenza virus resulting in COPD exacerbation     Assessment/plan     Acute exacerbation of COPD  Acute Parainfluenza infection  Acute hypoxic respiratory insufficiency  - Recently started on DuoNebs outpatient for impaired exercise tolerance related to respiratory limitations  - DuoNebs scheduled and prn  -Mucinex  - transitioned to PO steroids- worsening wheezing today, will given one time dose IV steroids since she is here awaiting rehab  - Azithromycin for anti-inflammatory effects x 5 days  -O2 support as needed  - Recommended dose of Lasix due to history of diastolic CHF and noted edema; patient declining at this time  -Checked full viral respiratory panel- + for parainfluenza  -- added Percocet (in addition to already ordered Tylenol) for LLQ a/w coughing. Suspect musculoskeletal   -- consult Pulm so patient can have outpatient follow up (apparently, due to insurance issues, this would be easier done inpatient than having outpatient referral sent).  Appreciate Pulm's time.      DM type 2, A1c 8.6%, without long-term use of insulin  -Accu-Cheks with SSI     Coronary artery disease  Hypertension  Hyperlipidemia  - Home statin,  carvedilol, Plavix, ARB     CKD stage 3b  -Baseline creatinine 1.2-1.5; EGFR 30s  -At approximate baseline     Obesity, BMI 51.0 to KG/M2  -Complicates all aspects of care    Hyperkalemia  -- likely due to hemolyzed specimen, repeat labs show K+ wnl    DVT prophylaxis:  Medical DVT prophylaxis orders are present.       AM-PAC 6 Clicks Score (PT): 17 (06/15/22 0800)    Disposition: I expect the patient to be discharged to inpatient rehab when bed available    CODE STATUS:   Code Status and Medical Interventions:   Ordered at: 06/13/22 0258     Medical Intervention Limits:    NO intubation (DNI)     Level Of Support Discussed With:    Patient     Code Status (Patient has no pulse and is not breathing):    No CPR (Do Not Attempt to Resuscitate)     Medical Interventions (Patient has pulse or is breathing):    Limited Support       Jennie Gilmore MD  06/16/22

## 2022-06-16 NOTE — CASE MANAGEMENT/SOCIAL WORK
Continued Stay Note  [facility]     Patient Name: Georgia Velázquez  MRN: [id number]  Today's Date: 6/16/2022    Admit Date: 6/12/2022     Discharge Plan     Row Name 06/16/22 1438       Plan    Plan Rehab    Patient/Family in Agreement with Plan yes    Plan Comments Spoke to Alina at [facility] and [facility] do not take patient's insurance. Alina will check with [facility]. Called Rupinder at [facility] and they do not take patient's insurance. CM faxed referrals to [facility] and [email or web address]. CM will continue to follow.    Final Discharge Disposition Code 03 - skilled nursing facility (SNF)               Discharge Codes    No documentation.                     Eitan Carbajal RN

## 2022-06-17 LAB
ANION GAP SERPL CALCULATED.3IONS-SCNC: 8 MMOL/L (ref 5–15)
BASOPHILS # BLD AUTO: 0.03 10*3/MM3 (ref 0–0.2)
BASOPHILS NFR BLD AUTO: 0.2 % (ref 0–1.5)
BUN SERPL-MCNC: 48 MG/DL (ref 8–23)
BUN/CREAT SERPL: 36.1 (ref 7–25)
CALCIUM SPEC-SCNC: 8.9 MG/DL (ref 8.6–10.5)
CHLORIDE SERPL-SCNC: 106 MMOL/L (ref 98–107)
CO2 SERPL-SCNC: 26 MMOL/L (ref 22–29)
CREAT SERPL-MCNC: 1.33 MG/DL (ref 0.57–1)
DEPRECATED RDW RBC AUTO: 47 FL (ref 37–54)
EGFRCR SERPLBLD CKD-EPI 2021: 40.8 ML/MIN/1.73
EOSINOPHIL # BLD AUTO: 0 10*3/MM3 (ref 0–0.4)
EOSINOPHIL NFR BLD AUTO: 0 % (ref 0.3–6.2)
ERYTHROCYTE [DISTWIDTH] IN BLOOD BY AUTOMATED COUNT: 13.4 % (ref 12.3–15.4)
GLUCOSE BLDC GLUCOMTR-MCNC: 157 MG/DL (ref 70–130)
GLUCOSE BLDC GLUCOMTR-MCNC: 171 MG/DL (ref 70–130)
GLUCOSE BLDC GLUCOMTR-MCNC: 233 MG/DL (ref 70–130)
GLUCOSE SERPL-MCNC: 187 MG/DL (ref 65–99)
HCT VFR BLD AUTO: 40 % (ref 34–46.6)
HGB BLD-MCNC: 13.1 G/DL (ref 12–15.9)
IMM GRANULOCYTES # BLD AUTO: 0.17 10*3/MM3 (ref 0–0.05)
IMM GRANULOCYTES NFR BLD AUTO: 1.3 % (ref 0–0.5)
LYMPHOCYTES # BLD AUTO: 1.84 10*3/MM3 (ref 0.7–3.1)
LYMPHOCYTES NFR BLD AUTO: 13.8 % (ref 19.6–45.3)
MCH RBC QN AUTO: 31.1 PG (ref 26.6–33)
MCHC RBC AUTO-ENTMCNC: 32.8 G/DL (ref 31.5–35.7)
MCV RBC AUTO: 95 FL (ref 79–97)
MONOCYTES # BLD AUTO: 0.61 10*3/MM3 (ref 0.1–0.9)
MONOCYTES NFR BLD AUTO: 4.6 % (ref 5–12)
NEUTROPHILS NFR BLD AUTO: 10.67 10*3/MM3 (ref 1.7–7)
NEUTROPHILS NFR BLD AUTO: 80.1 % (ref 42.7–76)
NRBC BLD AUTO-RTO: 0 /100 WBC (ref 0–0.2)
PLATELET # BLD AUTO: 158 10*3/MM3 (ref 140–450)
PMV BLD AUTO: 11.4 FL (ref 6–12)
POTASSIUM SERPL-SCNC: 5.1 MMOL/L (ref 3.5–5.2)
RBC # BLD AUTO: 4.21 10*6/MM3 (ref 3.77–5.28)
SODIUM SERPL-SCNC: 140 MMOL/L (ref 136–145)
WBC NRBC COR # BLD: 13.32 10*3/MM3 (ref 3.4–10.8)

## 2022-06-17 PROCEDURE — 94664 DEMO&/EVAL PT USE INHALER: CPT

## 2022-06-17 PROCEDURE — 97530 THERAPEUTIC ACTIVITIES: CPT

## 2022-06-17 PROCEDURE — 80048 BASIC METABOLIC PNL TOTAL CA: CPT | Performed by: INTERNAL MEDICINE

## 2022-06-17 PROCEDURE — 94799 UNLISTED PULMONARY SVC/PX: CPT

## 2022-06-17 PROCEDURE — 25010000002 ENOXAPARIN PER 10 MG

## 2022-06-17 PROCEDURE — 99232 SBSQ HOSP IP/OBS MODERATE 35: CPT | Performed by: INTERNAL MEDICINE

## 2022-06-17 PROCEDURE — 85025 COMPLETE CBC W/AUTO DIFF WBC: CPT | Performed by: INTERNAL MEDICINE

## 2022-06-17 PROCEDURE — 63710000001 INSULIN LISPRO (HUMAN) PER 5 UNITS: Performed by: INTERNAL MEDICINE

## 2022-06-17 PROCEDURE — 82962 GLUCOSE BLOOD TEST: CPT

## 2022-06-17 PROCEDURE — 25010000002 ONDANSETRON PER 1 MG: Performed by: INTERNAL MEDICINE

## 2022-06-17 PROCEDURE — 99222 1ST HOSP IP/OBS MODERATE 55: CPT | Performed by: INTERNAL MEDICINE

## 2022-06-17 PROCEDURE — 63710000001 PREDNISONE PER 1 MG: Performed by: INTERNAL MEDICINE

## 2022-06-17 PROCEDURE — 97535 SELF CARE MNGMENT TRAINING: CPT

## 2022-06-17 PROCEDURE — 97116 GAIT TRAINING THERAPY: CPT

## 2022-06-17 RX ORDER — BISACODYL 10 MG
10 SUPPOSITORY, RECTAL RECTAL DAILY PRN
Status: DISCONTINUED | OUTPATIENT
Start: 2022-06-17 | End: 2022-06-20 | Stop reason: HOSPADM

## 2022-06-17 RX ORDER — IPRATROPIUM BROMIDE AND ALBUTEROL SULFATE 2.5; .5 MG/3ML; MG/3ML
3 SOLUTION RESPIRATORY (INHALATION)
Status: DISCONTINUED | OUTPATIENT
Start: 2022-06-17 | End: 2022-06-20 | Stop reason: HOSPADM

## 2022-06-17 RX ORDER — POLYETHYLENE GLYCOL 3350 17 G/17G
17 POWDER, FOR SOLUTION ORAL DAILY PRN
Status: DISCONTINUED | OUTPATIENT
Start: 2022-06-17 | End: 2022-06-20 | Stop reason: HOSPADM

## 2022-06-17 RX ORDER — BISACODYL 5 MG/1
5 TABLET, DELAYED RELEASE ORAL DAILY PRN
Status: DISCONTINUED | OUTPATIENT
Start: 2022-06-17 | End: 2022-06-20 | Stop reason: HOSPADM

## 2022-06-17 RX ORDER — AMOXICILLIN 250 MG
2 CAPSULE ORAL 2 TIMES DAILY
Status: DISCONTINUED | OUTPATIENT
Start: 2022-06-17 | End: 2022-06-20 | Stop reason: HOSPADM

## 2022-06-17 RX ADMIN — NYSTATIN: 100000 POWDER TOPICAL at 08:59

## 2022-06-17 RX ADMIN — GUAIFENESIN 1200 MG: 600 TABLET, EXTENDED RELEASE ORAL at 08:58

## 2022-06-17 RX ADMIN — IPRATROPIUM BROMIDE AND ALBUTEROL SULFATE 3 ML: .5; 3 SOLUTION RESPIRATORY (INHALATION) at 12:42

## 2022-06-17 RX ADMIN — PREDNISONE 40 MG: 20 TABLET ORAL at 08:57

## 2022-06-17 RX ADMIN — ONDANSETRON 4 MG: 2 INJECTION INTRAMUSCULAR; INTRAVENOUS at 23:30

## 2022-06-17 RX ADMIN — IPRATROPIUM BROMIDE AND ALBUTEROL SULFATE 3 ML: .5; 3 SOLUTION RESPIRATORY (INHALATION) at 08:25

## 2022-06-17 RX ADMIN — VALSARTAN 160 MG: 80 TABLET, FILM COATED ORAL at 08:57

## 2022-06-17 RX ADMIN — INSULIN LISPRO 3 UNITS: 100 INJECTION, SOLUTION INTRAVENOUS; SUBCUTANEOUS at 12:32

## 2022-06-17 RX ADMIN — GUAIFENESIN 1200 MG: 600 TABLET, EXTENDED RELEASE ORAL at 20:33

## 2022-06-17 RX ADMIN — ENOXAPARIN SODIUM 40 MG: 40 INJECTION SUBCUTANEOUS at 20:33

## 2022-06-17 RX ADMIN — SENNOSIDES AND DOCUSATE SODIUM 2 TABLET: 50; 8.6 TABLET ORAL at 20:33

## 2022-06-17 RX ADMIN — SENNOSIDES AND DOCUSATE SODIUM 2 TABLET: 50; 8.6 TABLET ORAL at 12:32

## 2022-06-17 RX ADMIN — CLOPIDOGREL BISULFATE 75 MG: 75 TABLET ORAL at 08:57

## 2022-06-17 RX ADMIN — NYSTATIN: 100000 POWDER TOPICAL at 20:33

## 2022-06-17 RX ADMIN — Medication 10 ML: at 20:34

## 2022-06-17 RX ADMIN — ENOXAPARIN SODIUM 40 MG: 40 INJECTION SUBCUTANEOUS at 08:56

## 2022-06-17 RX ADMIN — INSULIN LISPRO 2 UNITS: 100 INJECTION, SOLUTION INTRAVENOUS; SUBCUTANEOUS at 08:55

## 2022-06-17 RX ADMIN — INSULIN LISPRO 2 UNITS: 100 INJECTION, SOLUTION INTRAVENOUS; SUBCUTANEOUS at 16:51

## 2022-06-17 RX ADMIN — AZITHROMYCIN MONOHYDRATE 250 MG: 250 TABLET ORAL at 08:58

## 2022-06-17 RX ADMIN — Medication 10 ML: at 08:59

## 2022-06-17 RX ADMIN — ATORVASTATIN CALCIUM 40 MG: 40 TABLET, FILM COATED ORAL at 20:33

## 2022-06-17 RX ADMIN — IPRATROPIUM BROMIDE AND ALBUTEROL SULFATE 3 ML: .5; 3 SOLUTION RESPIRATORY (INHALATION) at 19:34

## 2022-06-17 NOTE — PROGRESS NOTES
[facility] Medicine Services  PROGRESS NOTE    Patient Name: Georgia Velázquez  : 1945  MRN: [id number]    Date of Admission: 2022  Primary Care Physician: Georgina Ahumada APRN    Subjective   Subjective     CC:  SOA    HPI:  Still wheezing.  She was able to walk today.  Overall better today than yesterday.    ROS:  Gen- No fevers, chills  Resp- + cough, improved dyspnea, + wheezing      Objective   Objective     Vital Signs:   Temp:  [97.6 °F (36.4 °C)-98.4 °F (36.9 °C)] 97.6 °F (36.4 °C)  Heart Rate:  [55-80] 55  Resp:  [20-24] 20  BP: (159-199)/(73-88) 169/73  Flow (L/min):  [4] 4     Physical Exam:  Constitutional: No acute distress, sleeping but awakens easily  HENT: NCAT, mucous membranes moist  Respiratory: Wheezing bilaterally diffusely, oxygen saturation 100% on 4L  Cardiovascular: RRR, no murmurs, rubs, or gallops  Gastrointestinal: Positive bowel sounds, soft, nontender, nondistended  Musculoskeletal: No bilateral ankle edema  Psychiatric: Appropriate affect, cooperative  Neurologic: Speech clear and fluent  Skin: No rashes on exposed surfaces    Results Reviewed:  LAB RESULTS:      Lab 22  0721 22  0619 22  0440 06/15/22  0936 22  0535 22  0737 22  0036   WBC 13.32* 13.42*  --  14.58* 10.79 3.65 5.13   HEMOGLOBIN 13.1 13.5  --  13.2 13.2 13.4 11.9*   HEMATOCRIT 40.0 39.4  --  40.5 40.3 40.4 35.7   PLATELETS 158 170  --  161 139* 128* 117*   NEUTROS ABS 10.67* 10.09*  --  12.05* 8.45*  --  3.62   IMMATURE GRANS (ABS) 0.17* 0.08*  --  0.06* 0.15*  --  0.02   LYMPHS ABS 1.84 2.39  --  1.92 1.64  --  1.21   MONOS ABS 0.61 0.82  --  0.54 0.53  --  0.26   EOS ABS 0.00 0.00  --  0.00 0.00  --  0.01   MCV 95.0 89.7  --  93.8 92.0 92.9 92.0   PROCALCITONIN  --   --  0.07  --   --   --   --          Lab 22  0721 22  0817 22  0440 06/15/22  0936 22  0535 22  0737   SODIUM 140  --  135* 136 136 134*    POTASSIUM 5.1 4.8 5.8* 4.5 4.4 3.9   CHLORIDE 106  --  103 102 103 100   CO2 26.0  --  23.0 26.0 24.0 20.0*   ANION GAP 8.0  --  9.0 8.0 9.0 14.0   BUN 48*  --  49* 55* 41* 24*   CREATININE 1.33*  --  1.42* 1.50* 1.35* 1.22*   EGFR 40.8*  --  37.7* 35.3* 40.1* 45.2*   GLUCOSE 187*  --  140* 183* 194* 190*   CALCIUM 8.9  --  9.0 9.2 9.2 8.5*   MAGNESIUM  --   --   --   --   --  2.4         Lab 06/12/22  2330   TOTAL PROTEIN 7.2   ALBUMIN 3.50   GLOBULIN 3.7   ALT (SGPT) 17   AST (SGOT) 39*   BILIRUBIN 0.5   ALK PHOS 100         Lab 06/12/22  2330   PROBNP 3,497.0*   TROPONIN T 0.017                 Brief Urine Lab Results  (Last result in the past 365 days)      Color   Clarity   Blood   Leuk Est   Nitrite   Protein   CREAT   Urine HCG        10/15/21 1356             100 mg/dL               Microbiology Results Abnormal     Procedure Component Value - Date/Time    COVID PRE-OP / PRE-PROCEDURE SCREENING ORDER (NO ISOLATION) - Swab, Nasopharynx [[id number]]  (Normal) Collected: 06/13/22 0036    Lab Status: Final result Specimen: Swab from Nasopharynx Updated: 06/13/22 0108    Narrative:      The following orders were created for panel order COVID PRE-OP / PRE-PROCEDURE SCREENING ORDER (NO ISOLATION) - Swab, Nasopharynx.  Procedure                               Abnormality         Status                     ---------                               -----------         ------                     COVID-19 and FLU A/B PCR...[[id number]]  Normal              Final result                 Please view results for these tests on the individual orders.    COVID-19 and FLU A/B PCR - Swab, Nasopharynx [[id number]]  (Normal) Collected: 06/13/22 0036    Lab Status: Final result Specimen: Swab from Nasopharynx Updated: 06/13/22 0108     COVID19 Not Detected     Influenza A PCR Not Detected     Influenza B PCR Not Detected    Narrative:      Fact sheet for providers: https://www.fda.gov/media/204489/download    Fact sheet for patients:  https://www.fda.gov/media/353951/download    Test performed by PCR.          No radiology results from the last 24 hrs    Results for orders placed during the hospital encounter of 03/17/21    Adult Transthoracic Echo Complete With Contrast if Necessary Per Protocol    Interpretation Summary  · Calculated left ventricular EF = 55% Estimated left ventricular EF was in agreement with the calculated left ventricular EF. Left ventricular systolic function is normal.  · Left ventricular diastolic function is consistent with (grade II w/high LAP) pseudonormalization.  · Estimated right ventricular systolic pressure from tricuspid regurgitation is normal (<35 mmHg). Calculated right ventricular systolic pressure from tricuspid regurgitation is 32 mmHg.  · Endocardial definition of the apical segments is limited, unable to definitively assess apical regional wall motion abnormalities.      I have reviewed the medications:  Scheduled Meds:atorvastatin, 40 mg, Oral, Nightly  carvedilol, 3.125 mg, Oral, BID With Meals  clopidogrel, 75 mg, Oral, Daily  enoxaparin, 40 mg, Subcutaneous, Q12H  guaiFENesin, 1,200 mg, Oral, Q12H  insulin lispro, 0-7 Units, Subcutaneous, TID AC  ipratropium-albuterol, 3 mL, Nebulization, 4x Daily - RT  nystatin, , Topical, Q12H  pharmacy consult - MTM, , Does not apply, Daily  predniSONE, 40 mg, Oral, Daily With Breakfast  sodium chloride, 10 mL, Intravenous, Q12H  valsartan, 160 mg, Oral, Daily      Continuous Infusions:   PRN Meds:.•  acetaminophen **OR** acetaminophen **OR** acetaminophen  •  dextrose  •  dextrose  •  glucagon (human recombinant)  •  ipratropium-albuterol  •  magnesium sulfate **OR** magnesium sulfate **OR** magnesium sulfate  •  ondansetron **OR** ondansetron  •  oxyCODONE  •  potassium chloride **OR** potassium chloride **OR** potassium chloride  •  sodium chloride  •  sodium chloride    Assessment & Plan   Assessment & Plan     Active Hospital Problems    Diagnosis  POA   •  **COPD with acute exacerbation (HCC) [J44.1]  Yes   • Hypoxia [R09.02]  Yes   • Obesity, morbid, BMI 50 or higher ([facility]) [E66.01]  Unknown   • Type 2 diabetes mellitus with stage 3b chronic kidney disease, without long-term current use of insulin (HCC) [E11.22, N18.32]  Yes   • Diastolic dysfunction [I51.89]  Yes   • Essential hypertension [I10]  Yes   • Hyperlipidemia [E78.5]  Yes   • Coronary artery disease [I25.10]  Yes   • GERD (gastroesophageal reflux disease) [K21.9]  Yes      Resolved Hospital Problems   No resolved problems to display.        Brief Hospital Course to date:  Georgia Velázquez is a 77 y.o. female with DM2, diastolic CHF, morbid obesity, COPD who does not use oxygen at baseline who presented with several days of worsening respiratory status, wheezing, cough with clear chest x-ray. Found to have parainfluenza virus resulting in COPD exacerbation     This patient's problems and plans were partially entered by my partner and updated as appropriate by me 06/17/22.     Acute exacerbation of COPD  Acute Parainfluenza infection  Acute hypoxic respiratory insufficiency  - Continue PO prednisone  -- Continue inhaled bronchodilators-schedule some and keep PRN  - S/P Azithromycin x 5 days  -- Inpatient Pulmonology consult so patient can have outpatient follow up (due to insurance issues)   -- Flutter valve  -- Wean O2 as tolerated-turned down to 2L while I was in the room     DM type 2, A1c 8.6%, without long-term use of insulin  - Continue SSI     Coronary artery disease  Hypertension  Hyperlipidemia  - Home statin, carvedilol, Plavix, ARB     CKD stage 3b  - Baseline creatinine 1.2-1.5; EGFR 30s     Obesity, BMI 51.0 to KG/M2  - Complicates all aspects of care    DVT prophylaxis:  Medical DVT prophylaxis orders are present.       AM-PAC 6 Clicks Score (PT): 18 (06/17/22 6670)    Disposition: I expect the patient to be discharged to inpatient rehab when bed available    CODE STATUS:   Code Status and  Medical Interventions:   Ordered at: 06/13/22 0258     Medical Intervention Limits:    NO intubation (DNI)     Level Of Support Discussed With:    Patient     Code Status (Patient has no pulse and is not breathing):    No CPR (Do Not Attempt to Resuscitate)     Medical Interventions (Patient has pulse or is breathing):    Limited Support       Sophia Cordero MD  06/17/22

## 2022-06-17 NOTE — PLAN OF CARE
Problem: COPD (Chronic Obstructive Pulmonary Disease) Comorbidity  Goal: Maintenance of COPD Symptom Control  Outcome: Ongoing, Progressing  Intervention: Maintain COPD-Symptom Control  Flowsheets (Taken 6/16/2022 2030)  Supportive Measures: active listening utilized  Medication Review/Management: medications reviewed     Problem: Adjustment to Illness COPD (Chronic Obstructive Pulmonary Disease)  Goal: Optimal Chronic Illness Coping  Outcome: Ongoing, Progressing  Intervention: Support and Optimize Psychosocial Response  Recent Flowsheet Documentation  Taken 6/17/2022 0615 by Christine Rodney RN  Family/Support System Care: support provided  Taken 6/16/2022 2030 by Christine Rodney RN  Supportive Measures: active listening utilized  Family/Support System Care: support provided     Problem: Functional Ability Impaired COPD (Chronic Obstructive Pulmonary Disease)  Goal: Optimal Level of Functional Glennie  Outcome: Ongoing, Progressing  Intervention: Optimize Functional Ability  Flowsheets  Taken 6/17/2022 0700 by Radhika Cedeno  Activity Management: activity adjusted per tolerance  Taken 6/16/2022 2030 by Christine Rodney RN  Activity Management:   activity adjusted per tolerance   dorsiflexion/plantar flexion performed   bedrest  Environmental Support: calm environment promoted  Taken 6/16/2022 1645 by Alison Maldonado RN  Self-Care Promotion:   independence encouraged   BADL personal objects within reach     Problem: Infection COPD (Chronic Obstructive Pulmonary Disease)  Goal: Absence of Infection Signs and Symptoms  Outcome: Ongoing, Progressing  Intervention: Prevent or Manage Infection  Recent Flowsheet Documentation  Taken 6/16/2022 2030 by Christine Rodney RN  Isolation Precautions:   contact   droplet     Problem: Oral Intake Inadequate COPD (Chronic Obstructive Pulmonary Disease)  Goal: Improved Nutrition Intake  Outcome: Ongoing, Progressing  Intervention: Promote and Optimize  Nutrition  Flowsheets (Taken 6/17/2022 0845)  Oral Nutrition Promotion:   social interaction promoted   medicated     Problem: Fall Injury Risk  Goal: Absence of Fall and Fall-Related Injury  Intervention: Identify and Manage Contributors  Recent Flowsheet Documentation  Taken 6/16/2022 2030 by Christine Rodney RN  Medication Review/Management: medications reviewed  Intervention: Promote Injury-Free Environment  Recent Flowsheet Documentation  Taken 6/16/2022 2030 by Christine Rodney RN  Safety Promotion/Fall Prevention:   activity supervised   assistive device/personal items within reach   nonskid shoes/slippers when out of bed   safety round/check completed     Problem: Adult Inpatient Plan of Care  Goal: Absence of Hospital-Acquired Illness or Injury  Intervention: Identify and Manage Fall Risk  Recent Flowsheet Documentation  Taken 6/16/2022 2030 by Christine Rodney RN  Safety Promotion/Fall Prevention:   activity supervised   assistive device/personal items within reach   nonskid shoes/slippers when out of bed   safety round/check completed  Intervention: Prevent Skin Injury  Recent Flowsheet Documentation  Taken 6/16/2022 2030 by Christine Rodney RN  Body Position: position changed independently  Intervention: Prevent and Manage VTE (Venous Thromboembolism) Risk  Recent Flowsheet Documentation  Taken 6/16/2022 2030 by Chrsitine Rodney RN  Activity Management:   activity adjusted per tolerance   dorsiflexion/plantar flexion performed   bedrest  Intervention: Prevent Infection  Recent Flowsheet Documentation  Taken 6/16/2022 2030 by Christine Rodney RN  Infection Prevention:   rest/sleep promoted   hand hygiene promoted  Goal: Optimal Comfort and Wellbeing  Intervention: Monitor Pain and Promote Comfort  Recent Flowsheet Documentation  Taken 6/16/2022 2030 by Christine Rodney RN  Pain Management Interventions:   care clustered   pillow support provided   see MAR  Intervention: Provide Person-Centered  Care  Recent Flowsheet Documentation  Taken 6/16/2022 2030 by Christine Rodney RN  Trust Relationship/Rapport:   care explained   choices provided   empathic listening provided   reassurance provided   questions answered   questions encouraged   thoughts/feelings acknowledged   emotional support provided     Problem: Gas Exchange Impaired  Goal: Optimal Gas Exchange  Intervention: Optimize Oxygenation and Ventilation  Recent Flowsheet Documentation  Taken 6/16/2022 2030 by Christine Rodney RN  Head of Bed ([facility]) Positioning: HOB at 20-30 degrees     Problem: Skin Injury Risk Increased  Goal: Skin Health and Integrity  Intervention: Promote and Optimize Oral Intake  Recent Flowsheet Documentation  Taken 6/17/2022 0845 by Christine Rodney RN  Oral Nutrition Promotion:   social interaction promoted   medicated  Intervention: Optimize Skin Protection  Recent Flowsheet Documentation  Taken 6/16/2022 2030 by Christine Rodney RN  Pressure Reduction Techniques:   frequent weight shift encouraged   rest period provided between sit times   weight shift assistance provided  Head of Bed (HOB) Positioning: HOB at 20-30 degrees  Pressure Reduction Devices:   foam padding utilized   heel offloading device utilized   positioning supports utilized   specialty bed utilized     Problem: Respiratory Compromise COPD (Chronic Obstructive Pulmonary Disease)  Goal: Effective Oxygenation and Ventilation  Intervention: Promote Airway Secretion Clearance  Recent Flowsheet Documentation  Taken 6/17/2022 0615 by Christine Rodney RN  Cough And Deep Breathing: done independently per patient  Taken 6/16/2022 2030 by Christine Rodney RN  Activity Management:   activity adjusted per tolerance   dorsiflexion/plantar flexion performed   bedrest  Cough And Deep Breathing: done independently per patient  Intervention: Optimize Oxygenation and Ventilation  Recent Flowsheet Documentation  Taken 6/16/2022 2030 by Christine Rodney RN  Head of Bed ([facility])  Positioning: HOB at 20-30 degrees   Goal Outcome Evaluation:

## 2022-06-17 NOTE — PLAN OF CARE
Problem: Adult Inpatient Plan of Care  Goal: Plan of Care Review  Recent Flowsheet Documentation  Taken 6/17/2022 1401 by Earnest Schafer, OT  Progress: improving  Plan of Care Reviewed With: patient  Outcome Evaluation: Pt pleasant and agreeable to therapy. SBA/CGA for bed mobility and transfers w/ RW, LESLIE for seated grooming tasks. Pt educated on pulmonary TE paired w/ BUE AROM, demo'd understanding w/ observed increased in O2 sats, encouraged to perform outside of therapy. Will cont IPOT per POC as tolerated.

## 2022-06-17 NOTE — PLAN OF CARE
Goal Outcome Evaluation:  Plan of Care Reviewed With: patient        Progress: improving  Outcome Evaluation: CONTINUES WITH WHEEZING BUT O2 SATS STABLE 4L WITH ACTIVITY. INCREASED DISTANCE AMBULATED  FEET WITH R WALKER AND CGA. GIVES GOOD EFFORT AND IS GOOD REHAB CANDIDATE. WORKED ON PLB AND POSTURAL AWARENESS WITH ACTIVITY.

## 2022-06-17 NOTE — THERAPY TREATMENT NOTE
Patient Name: Georgia Velázquez  : 1945    MRN: [id number]                              Today's Date: 2022       Admit Date: 2022    Visit Dx:     ICD-10-CM ICD-9-CM   1. COPD with acute exacerbation (Formerly [facility])  J44.1 491.21   2. Acute respiratory failure with hypoxia (Formerly [facility])  J96.01 518.81   3. History of CHF (congestive heart failure)  Z86.79 V12.59     Patient Active Problem List   Diagnosis   • Essential hypertension   • Hyperlipidemia   • Coronary artery disease   • GERD (gastroesophageal reflux disease)   • Stage 3 chronic kidney disease (CMS/Formerly [facility])   • Chronic obstructive pulmonary disease (Formerly [facility])   • Retinal emboli, right   • Diastolic dysfunction   • Colitis   • Glaucoma   • Left carotid artery stenosis   • Syncope   • Aortic regurgitation   • COPD with exacerbation (Formerly [facility])   • Diabetic retinopathy (Formerly [facility])   • Type 2 diabetes mellitus with stage 3b chronic kidney disease, without long-term current use of insulin (Formerly [facility])   • COPD with acute exacerbation (Formerly [facility])   • Hypoxia   • Obesity, morbid, BMI 50 or higher (Formerly [facility])     Past Medical History:   Diagnosis Date   • Acute kidney injury (Formerly [facility]) 2017   • Aortic regurgitation    • Arthritis of shoulder 2016   • Body mass index (BMI) of 45.0-49.9 in adult (Formerly [facility]) 2019   • CHF (congestive heart failure) (Formerly [facility])    • Closed compression fracture of L4 lumbar vertebra 2017    Added automatically from request for surgery [id number]   • Constipation 2017   • Coronary artery disease 2016   • Diabetes mellitus type 2 in obese (Formerly [facility]) 2018   • Elevated alkaline phosphatase level 2018   • Elevated creatine kinase    • Essential hypertension 2016   • GERD (gastroesophageal reflux disease) 2016   • Glaucoma 2020   • History of kyphoplasty 2018   • Lumbar facet arthropathy 2018   • Lumbar stenosis with neurogenic claudication 2018   • Morbid obesity due to excess calories (Formerly [facility]) 2018   • Myocardial infarction (Formerly [facility])  5/18/2016   • Osteoporosis 5/18/2016   • Physical deconditioning 1/30/2018   • Retinal emboli, right 5/21/2020   • Sepsis (HCC)     uro   • Stroke (HCC)    • Urinary incontinence without sensory awareness 2/26/2018     Past Surgical History:   Procedure Laterality Date   • APPENDECTOMY     • BACK SURGERY     • CHOLECYSTECTOMY     • EYE SURGERY     • KYPHOPLASTY N/A 12/7/2017    Procedure: KYPHOPLASTY L4;  Surgeon: Marc Pham MD;  Location: [facility];  Service:       General Information     Row Name 06/17/22 1357          Physical Therapy Time and Intention    Document Type therapy note (daily note)  -CD     Mode of Treatment physical therapy  -CD     Row Name 06/17/22 1353          General Information    Patient Profile Reviewed yes  -CD     Existing Precautions/Restrictions fall;oxygen therapy device and L/min  CONTACT AND DROPLET PRECAUTIONS.  -CD     Barriers to Rehab medically complex;previous functional deficit  -CD     Row Name 06/17/22 1353          Cognition    Orientation Status (Cognition) oriented x 4  -CD     Row Name 06/17/22 1359          Safety Issues, Functional Mobility    Impairments Affecting Function (Mobility) balance;endurance/activity tolerance;shortness of breath;strength  -CD     Comment, Safety Issues/Impairments (Mobility) --  -CD           User Key  (r) = Recorded By, (t) = Taken By, (c) = Cosigned By    Initials Name Provider Type    CD Minerva Posey PT Physical Therapist               Mobility     Row Name 06/17/22 7866          Bed Mobility    Bed Mobility sit-supine  -CD     Sit-Supine [place] (Bed Mobility) contact guard;verbal cues  -CD     Comment, (Bed Mobility) PT UIC UPON ARRIVAL ON 4L O2.  -CD     Row Name 06/17/22 8290          Transfers    Comment, (Transfers) CUES FOR HAND PLACEMENT.  -CD     Row Name 06/17/22 1357          Sit-Stand Transfer    Sit-Stand Reynolds (Transfers) contact guard;verbal cues  -CD     Assistive Device (Sit-Stand Transfers) walker,  front-wheeled  -CD     Row Name 06/17/22 1359          Gait/Stairs (Locomotion)    [place] Level (Gait) contact guard;verbal cues  -CD     Assistive Device (Gait) walker, front-wheeled  -CD     Distance in Feet (Gait) 150 FEET  -CD     Deviations/Abnormal Patterns (Gait) stride length decreased;weight shifting decreased  -CD     Bilateral Gait Deviations forward flexed posture;heel strike decreased  -CD     Comment, (Gait/Stairs) MAX CUES FOR WALKER PLACMENT. PT TENDS TO PUSH WALKER TOO FAR AHEAD AND REMAIN FLEXED AT HIPS. CUES FOR PLB.  -CD           User Key  (r) = Recorded By, (t) = Taken By, (c) = Cosigned By    Initials Name Provider Type    CD Minerva Posey PT Physical Therapist               Obj/Interventions     Row Name 06/17/22 1403          Motor Skills    Therapeutic Exercise --  STANDING SIDESTEPPING R/L AT EOB VIA B UE SUPPORT.  -CD     Row Name 06/17/22 1403          Balance    Static Sitting Balance independent  -CD     Dynamic Sitting Balance standby assist  -CD     Position, Sitting Balance supported;sitting edge of bed  -CD     Static Standing Balance standby assist  -CD     Dynamic Standing Balance contact guard;verbal cues  -CD     Position/Device Used, Standing Balance walker, front-wheeled  -CD     Balance Interventions sitting;sit to stand;supported;static;dynamic  -CD     Comment, Balance SIDESTEPPING R/L WITH MIN ASSIST VIA BUE SUPPORT. GAIT IN [place] WITH R WALKER AND CGA. NEEDS CUES FOR SAFETY BUT NO OVERT LOB.  -CD           User Key  (r) = Recorded By, (t) = Taken By, (c) = Cosigned By    Initials Name Provider Type    CD Minerva Posey, PT Physical Therapist               Goals/Plan    No documentation.                Clinical Impression     Row Name 06/17/22 1407          Pain    Pretreatment Pain Rating 0/10 - no pain  -CD     Posttreatment Pain Rating 0/10 - no pain  -CD     Row Name 06/17/22 1407          Plan of Care Review    Plan of Care Reviewed With patient  -CD      Progress improving  -CD     Outcome Evaluation CONTINUES WITH WHEEZING BUT O2 SATS STABLE 4L WITH ACTIVITY. INCREASED DISTANCE AMBULATED  FEET WITH R WALKER AND CGA. GIVES GOOD EFFORT AND IS GOOD REHAB CANDIDATE. WORKED ON PLB AND POSTURAL AWARENESS WITH ACTIVITY.  -CD     Row Name 06/17/22 1407          Therapy Assessment/Plan (PT)    Patient/Family Therapy Goals Statement (PT) HOPEFUL TO GET TO REHAB SOON.  -CD     Rehab Potential (PT) good, to achieve stated therapy goals  -CD     Criteria for Skilled Interventions Met (PT) yes  -CD     Therapy Frequency (PT) daily  -CD     Row Name 06/17/22 1407          Vital Signs    Pre SpO2 (%) 95  -CD     O2 Delivery Pre Treatment supplemental O2  -CD     O2 Delivery Intra Treatment supplemental O2  -CD     Post SpO2 (%) 91  -CD     O2 Delivery Post Treatment supplemental O2  -CD     Pre Patient Position Sitting  -CD     Intra Patient Position Standing  -CD     Post Patient Position Supine  -CD     Row Name 06/17/22 1407          Positioning and Restraints    Pre-Treatment Position sitting in chair/recliner  -CD     Post Treatment Position bed  -CD     In Bed call light within reach;fowlers;encouraged to call for assist;exit alarm on;notified nsg  WITH .  -CD           User Key  (r) = Recorded By, (t) = Taken By, (c) = Cosigned By    Initials Name Provider Type    CD Minerva Posey, PT Physical Therapist               Outcome Measures     Row Name 06/17/22 1410 06/17/22 0852       How much help from another person do you currently need...    Turning from your back to your side while in flat bed without using bedrails? 4  -CD 4  -EO    Moving from lying on back to sitting on the side of a flat bed without bedrails? 3  -CD 3  -EO    Moving to and from a bed to a chair (including a wheelchair)? 3  -CD 3  -EO    Standing up from a chair using your arms (e.g., wheelchair, bedside chair)? 3  -CD 3  -EO    Climbing 3-5 steps with a railing? 2  -CD 2  -EO    To  walk in hospital room? 3  -CD 3  -EO    AM-PAC 6 Clicks Score (PT) 18  -CD 18  -EO    Highest level of mobility 6 --> Walked 10 steps or more  -CD 6 --> Walked 10 steps or more  -EO    Row Name 06/17/22 1410 06/17/22 1404       Functional Assessment    Outcome Measure Options AM-PAC 6 Clicks Basic Mobility (PT)  -CD AM-PAC 6 Clicks Daily Activity (OT)  -CS          User Key  (r) = Recorded By, (t) = Taken By, (c) = Cosigned By    Initials Name Provider Type    CD Minerva Posey, PT Physical Therapist    EO Angella Triana RN Registered Nurse    CS Earnest Schafer, OT Occupational Therapist                             Physical Therapy Education                 Title: PT OT SLP Therapies (Done)     Topic: Physical Therapy (Done)     Point: Mobility training (Done)     Learning Progress Summary           Patient Acceptance, E, VU,NR by CD at 6/17/2022 1411    Comment: SEE FLOWSHEET    Acceptance, E, VU,NR by CD at 6/14/2022 1055    Comment: BENEFITS OF OOB ACTIVITY, SAFETY WITH MOBILITY, PROGRESSION OF POC, D/C PLANNING,                   Point: Home exercise program (Done)     Learning Progress Summary           Patient Acceptance, E, VU,NR by CD at 6/17/2022 1411    Comment: SEE FLOWSHEET    Acceptance, E, VU,NR by CD at 6/14/2022 1055    Comment: BENEFITS OF OOB ACTIVITY, SAFETY WITH MOBILITY, PROGRESSION OF POC, D/C PLANNING,                   Point: Body mechanics (Done)     Learning Progress Summary           Patient Acceptance, E, VU,NR by CD at 6/17/2022 1411    Comment: SEE FLOWSHEET    Acceptance, E, VU,NR by CD at 6/14/2022 1055    Comment: BENEFITS OF OOB ACTIVITY, SAFETY WITH MOBILITY, PROGRESSION OF POC, D/C PLANNING,                   Point: Precautions (Done)     Learning Progress Summary           Patient Acceptance, E, VU,NR by CD at 6/17/2022 1411    Comment: SEE FLOWSHEET    Acceptance, E, VU,NR by CD at 6/14/2022 1055    Comment: BENEFITS OF OOB ACTIVITY, SAFETY WITH MOBILITY, PROGRESSION OF POC,  D/C PLANNING,                               User Key     Initials Effective Dates Name Provider Type Discipline    CD 06/16/21 -  Minerva Posey PT Physical Therapist PT              PT Recommendation and Plan  Planned Therapy Interventions (PT): balance training, home exercise program, transfer training, bed mobility training, strengthening  Plan of Care Reviewed With: patient  Progress: improving  Outcome Evaluation: CONTINUES WITH WHEEZING BUT O2 SATS STABLE 4L WITH ACTIVITY. INCREASED DISTANCE AMBULATED  FEET WITH R WALKER AND CGA. GIVES GOOD EFFORT AND IS GOOD REHAB CANDIDATE. WORKED ON PLB AND POSTURAL AWARENESS WITH ACTIVITY.     Time Calculation:    PT Charges     Row Name 06/17/22 1412             Time Calculation    Start Time 1315  -CD      PT Received On 06/17/22  -CD      PT Goal Re-Cert Due Date 06/24/22  -CD              Time Calculation- PT    Total Timed Code Minutes- PT 32 minute(s)  -CD              Timed Charges    78357 - PT Therapeutic Exercise Minutes 5  -CD      38407 - Gait Training Minutes  20  -CD      38998 - PT Therapeutic Activity Minutes 7  -CD              Total Minutes    Timed Charges Total Minutes 32  -CD       Total Minutes 32  -CD            User Key  (r) = Recorded By, (t) = Taken By, (c) = Cosigned By    Initials Name Provider Type    CD Minerva Posey PT Physical Therapist              Therapy Charges for Today     Code Description Service Date Service Provider Modifiers Qty    32377127980 HC GAIT TRAINING EA 15 MIN 6/17/2022 Minerva Posey, PT GP 1    35273278842 HC PT THERAPEUTIC ACT EA 15 MIN 6/17/2022 Minerva Posey PT GP 1          PT G-Codes  Outcome Measure Options: AM-PAC 6 Clicks Basic Mobility (PT)  AM-PAC 6 Clicks Score (PT): 18  AM-PAC 6 Clicks Score (OT): 16    Minerva Posey, LARISA  6/17/2022

## 2022-06-17 NOTE — THERAPY TREATMENT NOTE
Patient Name: Georgia Velázquez  : 1945    MRN: [id number]                              Today's Date: 2022       Admit Date: 2022    Visit Dx:     ICD-10-CM ICD-9-CM   1. COPD with acute exacerbation ([facility])  J44.1 491.21   2. Acute respiratory failure with hypoxia ([facility])  J96.01 518.81   3. History of CHF (congestive heart failure)  Z86.79 V12.59     Patient Active Problem List   Diagnosis   • Essential hypertension   • Hyperlipidemia   • Coronary artery disease   • GERD (gastroesophageal reflux disease)   • Stage 3 chronic kidney disease (CMS/[facility])   • Chronic obstructive pulmonary disease ([facility])   • Retinal emboli, right   • Diastolic dysfunction   • Colitis   • Glaucoma   • Left carotid artery stenosis   • Syncope   • Aortic regurgitation   • COPD with exacerbation ([facility])   • Diabetic retinopathy ([facility])   • Type 2 diabetes mellitus with stage 3b chronic kidney disease, without long-term current use of insulin ([facility])   • COPD with acute exacerbation ([facility])   • Hypoxia   • Obesity, morbid, BMI 50 or higher ([facility])     Past Medical History:   Diagnosis Date   • Acute kidney injury ([facility]) 2017   • Aortic regurgitation    • Arthritis of shoulder 2016   • Body mass index (BMI) of 45.0-49.9 in adult ([facility]) 2019   • CHF (congestive heart failure) ([facility])    • Closed compression fracture of L4 lumbar vertebra 2017    Added automatically from request for surgery [id number]   • Constipation 2017   • Coronary artery disease 2016   • Diabetes mellitus type 2 in obese ([facility]) 2018   • Elevated alkaline phosphatase level 2018   • Elevated creatine kinase    • Essential hypertension 2016   • GERD (gastroesophageal reflux disease) 2016   • Glaucoma 2020   • History of kyphoplasty 2018   • Lumbar facet arthropathy 2018   • Lumbar stenosis with neurogenic claudication 2018   • Morbid obesity due to excess calories ([facility]) 2018   • Myocardial infarction ([facility])  5/18/2016   • Osteoporosis 5/18/2016   • Physical deconditioning 1/30/2018   • Retinal emboli, right 5/21/2020   • Sepsis (HCC)     uro   • Stroke (HCC)    • Urinary incontinence without sensory awareness 2/26/2018     Past Surgical History:   Procedure Laterality Date   • APPENDECTOMY     • BACK SURGERY     • CHOLECYSTECTOMY     • EYE SURGERY     • KYPHOPLASTY N/A 12/7/2017    Procedure: KYPHOPLASTY L4;  Surgeon: Marc Pham MD;  Location: [facility], 1928–1983;  Service:       General Information     Row Name 06/17/22 1353          OT Time and Intention    Document Type therapy note (daily note)  -CS     Mode of Treatment occupational therapy  -CS     Row Name 06/17/22 1353          General Information    Patient Profile Reviewed yes  -CS     Existing Precautions/Restrictions fall;oxygen therapy device and L/min  Contact - Paraflu; COPD  -CS     Barriers to Rehab medically complex;previous functional deficit  -CS     Row Name 06/17/22 1357          Cognition    Orientation Status (Cognition) oriented x 4  -CS     Row Name 06/17/22 1355          Safety Issues, Functional Mobility    Safety Issues Affecting Function (Mobility) awareness of need for assistance;insight into deficits/self-awareness;safety precaution awareness;positioning of assistive device;safety precautions follow-through/compliance  -CS     Impairments Affecting Function (Mobility) balance;endurance/activity tolerance;shortness of breath;strength  -CS           User Key  (r) = Recorded By, (t) = Taken By, (c) = Cosigned By    Initials Name Provider Type    CS Earnest Schafer OT Occupational Therapist                 Mobility/ADL's     Row Name 06/17/22 2870          Bed Mobility    Bed Mobility supine-sit;scooting/bridging  -CS     Scooting/Bridging [place] (Bed Mobility) contact guard  -CS     Supine-Sit [place] (Bed Mobility) standby assist  -CS     Assistive Device (Bed Mobility) head of bed elevated;bed rails  -CS     Comment, (Bed  Mobility) cues for optimal sequencing, SOA upon reaching EOB w/ stable O2 sats on 4Lnc  -     Row Name 06/17/22 1354          Transfers    Transfers bed-chair transfer;stand-sit transfer;sit-stand transfer  -     Bed-Chair [place] (Transfers) contact guard;nonverbal cues (demo/gesture);verbal cues  -     Assistive Device (Bed-Chair Transfers) other (see comments)  -     Sit-Stand [place] (Transfers) contact guard;verbal cues  -     Stand-Sit [place] (Transfers) standby assist;verbal cues  -[facility] Name 06/17/22 135          Bed-Chair Transfer    Comment, (Bed-Chair Transfer) Pt impulsively initiated stand-step transfer to recliner w/ no AD and despite OT cues, forward flexed posture w/ reaching to recliner arm rests  -[facility] Name 06/17/22 [facility]          Sit-Stand Transfer    Assistive Device (Sit-Stand Transfers) walker, front-wheeled  -     Comment, (Sit-Stand Transfer) cues for sequencing safe hand placement w/ RW use  -[facility] Name 06/17/22 [facility]          Stand-Sit Transfer    Assistive Device (Stand-Sit Transfers) walker, front-wheeled  -CS     Comment, (Stand-Sit Transfer) cues for UE reachback and controlled eccentric lowering  -[facility] Name 06/17/22 1354          Activities of Daily Living    BADL Assessment/Intervention grooming  -CS     Row Name 06/17/22 [facility]          Upper Body Dressing Assessment/Training    [place] Level (Upper Body Dressing) don;pajama/robe;standby assist  -     Position (Upper Body Dressing) edge of bed sitting  -[facility] Name 06/17/22 1354          Self-Feeding Assessment/Training    [place] Level (Feeding) feeding skills;liquids to mouth;set up  -CS     Position (Self-Feeding) supported sitting  -CS     Row Name 06/17/22 1354          Grooming Assessment/Training    [place] Level (Grooming) grooming skills;hair care, combing/brushing;wash face, hands;set up  -CS     Position (Grooming) supported sitting  -           User Key   (r) = Recorded By, (t) = Taken By, (c) = Cosigned By    Initials Name Provider Type     Earnest Schafer OT Occupational Therapist               Obj/Interventions     Row Name 06/17/22 1358          Shoulder (Therapeutic Exercise)    Shoulder (Therapeutic Exercise) AROM (active range of motion)  -     Shoulder AROM (Therapeutic Exercise) bilateral;flexion;extension;horizontal aBduction/aDduction;2 sets;5 repetitions  -     Row Name 06/17/22 1358          Elbow/Forearm (Therapeutic Exercise)    Elbow/Forearm (Therapeutic Exercise) AROM (active range of motion)  -     Elbow/Forearm AROM (Therapeutic Exercise) bilateral;flexion;extension;2 sets;5 repetitions  -     Row Name 06/17/22 1358          Motor Skills    Motor Skills functional endurance  -     Functional Endurance educated on pulmonary TE, demo'd understanding  -     Therapeutic Exercise shoulder;elbow/forearm;other (see comments)  BUE AROM paired w/ PLB  -     Row Name 06/17/22 1358          Balance    Balance Assessment sitting static balance;sitting dynamic balance;standing static balance;standing dynamic balance  -     Static Sitting Balance supervision  -     Dynamic Sitting Balance standby assist  -     Position, Sitting Balance unsupported;sitting edge of bed  -     Static Standing Balance standby assist;non-verbal cues (demo/gesture);verbal cues  -     Dynamic Standing Balance contact guard;verbal cues;non-verbal cues (demo/gesture)  -     Position/Device Used, Standing Balance walker, front-wheeled  -     Balance Interventions sitting;sit to stand;standing;occupation based/functional task;UE activity with balance activity  -     Comment, Balance grooming tasks and pulmonary TE  -           User Key  (r) = Recorded By, (t) = Taken By, (c) = Cosigned By    Initials Name Provider Type     Earnest Schafer OT Occupational Therapist               Goals/Plan    No documentation.                Clinical Impression      Row Name 06/17/22 1401          Pain Assessment    Pretreatment Pain Rating 0/10 - no pain  -CS     Posttreatment Pain Rating 0/10 - no pain  -CS     Pre/Posttreatment Pain Comment tolerated, SOA throughout  -CS     Row Name 06/17/22 1401          Plan of Care Review    Plan of Care Reviewed With patient  -CS     Progress improving  -CS     Outcome Evaluation Pt pleasant and agreeable to therapy. SBA/CGA for bed mobility and transfers w/ RW, LESLIE for seated grooming tasks. Pt educated on pulmonary TE paired w/ BUE AROM, demo'd understanding w/ observed increased in O2 sats, encouraged to perform outside of therapy. Will cont IPOT per POC as tolerated.  -CS     Row Name 06/17/22 1401          Therapy Plan Review/Discharge Plan (OT)    Anticipated Discharge Disposition (OT) inpatient rehabilitation facility  -     Row Name 06/17/22 1401          Vital Signs    Pre Systolic BP Rehab --  RN cleared for tx, VSS stable on 4Lnc  -CS     Pre SpO2 (%) 97  -CS     O2 Delivery Pre Treatment nasal cannula  -CS     Intra SpO2 (%) 93  -CS     O2 Delivery Intra Treatment nasal cannula  -CS     Post SpO2 (%) 98  -CS     O2 Delivery Post Treatment nasal cannula  -CS     Pre Patient Position Supine  -CS     Intra Patient Position Standing  -CS     Post Patient Position Sitting  -CS     Row Name 06/17/22 1401          Positioning and Restraints    Pre-Treatment Position in bed  -CS     Post Treatment Position chair  -CS     In Chair notified nsg;reclined;sitting;call light within reach;encouraged to call for assist;exit alarm on;waffle cushion;legs elevated;heels elevated  -CS           User Key  (r) = Recorded By, (t) = Taken By, (c) = Cosigned By    Initials Name Provider Type    CS Earnest Schafer, OT Occupational Therapist               Outcome Measures     Row Name 06/17/22 1405          How much help from another is currently needed...    Putting on and taking off regular lower body clothing? 2  -CS     Bathing (including  washing, rinsing, and drying) 2  -CS     Toileting (which includes using toilet bed pan or urinal) 2  -CS     Putting on and taking off regular upper body clothing 3  -CS     Taking care of personal grooming (such as brushing teeth) 3  -CS     Eating meals 4  -CS     AM-PAC 6 Clicks Score (OT) 16  -CS     Row Name 06/17/22 0852          How much help from another person do you currently need...    Turning from your back to your side while in flat bed without using bedrails? 4  -EO     Moving from lying on back to sitting on the side of a flat bed without bedrails? 3  -EO     Moving to and from a bed to a chair (including a wheelchair)? 3  -EO     Standing up from a chair using your arms (e.g., wheelchair, bedside chair)? 3  -EO     Climbing 3-5 steps with a railing? 2  -EO     To walk in hospital room? 3  -EO     AM-PAC 6 Clicks Score (PT) 18  -EO     Highest level of mobility 6 --> Walked 10 steps or more  -EO     Row Name 06/17/22 1404          Functional Assessment    Outcome Measure Options AM-PAC 6 Clicks Daily Activity (OT)  -CS           User Key  (r) = Recorded By, (t) = Taken By, (c) = Cosigned By    Initials Name Provider Type    Angella Maier RN Registered Nurse    Earnest Ochoa OT Occupational Therapist                Occupational Therapy Education                 Title: PT OT SLP Therapies (Done)     Topic: Occupational Therapy (Done)     Point: ADL training (Done)     Description:   Instruct learner(s) on proper safety adaptation and remediation techniques during self care or transfers.   Instruct in proper use of assistive devices.              Learning Progress Summary           Patient Acceptance, E,D, DU,NR by CS at 6/17/2022 1405    Acceptance, E, VU,NR by MR at 6/14/2022 1113                   Point: Home exercise program (Done)     Description:   Instruct learner(s) on appropriate technique for monitoring, assisting and/or progressing therapeutic exercises/activities.               Learning Progress Summary           Patient Acceptance, E,D, DU,NR by CS at 6/17/2022 1405    Acceptance, E, VU,NR by MR at 6/14/2022 1113                   Point: Precautions (Done)     Description:   Instruct learner(s) on prescribed precautions during self-care and functional transfers.              Learning Progress Summary           Patient Acceptance, E,D, DU,NR by CS at 6/17/2022 1405    Acceptance, E, VU,NR by MR at 6/14/2022 1113                   Point: Body mechanics (Done)     Description:   Instruct learner(s) on proper positioning and spine alignment during self-care, functional mobility activities and/or exercises.              Learning Progress Summary           Patient Acceptance, E,D, DU,NR by CS at 6/17/2022 1405    Acceptance, E, VU,NR by MR at 6/14/2022 1113                               User Key     Initials Effective Dates Name Provider Type Discipline     06/16/21 -  Earnest Schafer OT Occupational Therapist OT    MR 10/06/21 -  Promise Melendrez OT Occupational Therapist OT              OT Recommendation and Plan     Plan of Care Review  Plan of Care Reviewed With: patient  Progress: improving  Outcome Evaluation: Pt pleasant and agreeable to therapy. SBA/CGA for bed mobility and transfers w/ RW, LESLIE for seated grooming tasks. Pt educated on pulmonary TE paired w/ BUE AROM, demo'd understanding w/ observed increased in O2 sats, encouraged to perform outside of therapy. Will cont IPOT per POC as tolerated.     Time Calculation:    Time Calculation- OT     Row Name 06/17/22 1405             Time Calculation- OT    OT Start Time 1121  -CS      OT Received On 06/17/22  -      OT Goal Re-Cert Due Date 06/24/22  -CS              Timed Charges    22336 - OT Therapeutic Activity Minutes 15  -CS      70703 - OT Self Care/Mgmt Minutes 8  -CS              Total Minutes    Timed Charges Total Minutes 23  -CS       Total Minutes 23  -CS            User Key  (r) = Recorded By, (t) = Taken By, (c) =  Cosigned By    Initials Name Provider Type    CS Earnest Schafer, OT Occupational Therapist              Therapy Charges for Today     Code Description Service Date Service Provider Modifiers Qty    04637287114 HC OT SELF CARE/MGMT/TRAIN EA 15 MIN 6/17/2022 Earnest Schafer OT GO 1    79985362916 HC OT THERAPEUTIC ACT EA 15 MIN 6/17/2022 Earnest Schafer OT GO 1               Earnest Schafer OT  6/17/2022

## 2022-06-17 NOTE — CASE MANAGEMENT/SOCIAL WORK
Continued Stay Note  [facility]     Patient Name: Georgia Velázquez  MRN: [id number]  Today's Date: 6/17/2022    Admit Date: 6/12/2022     Discharge Plan     Row Name 06/17/22 0945       Plan    Plan SNF    Patient/Family in Agreement with Plan yes    Plan Comments Spoke with patient at bedside. Plan is SNF in [place]. Alina tellez [facility] is following. Referrals faxed to [facility], [place] [facility] and [facility] yesterday. CM will continue to follow.    Final Discharge Disposition Code 03 - skilled nursing facility (SNF)               Discharge Codes    No documentation.                     Eitan Carbajal RN

## 2022-06-17 NOTE — CASE MANAGEMENT/SOCIAL WORK
Continued Stay Note  [facility]     Patient Name: Georgia Velázquez  MRN: [id number]  Today's Date: 6/17/2022    Admit Date: 6/12/2022     Discharge Plan     Row Name 06/17/22 1347       Plan    Plan [facility]    Patient/Family in Agreement with Plan yes    Plan Comments Spoke with Alina at [facility] and [facility] can offer the patient a bed. Patient is agreeable to go to [facility]. Spoke with Alina at [facility] and she will start insurance precert today.  will continue to follow.    Final Discharge Disposition Code 03 - skilled nursing facility (SNF)               Discharge Codes    No documentation.                     Eitan Carbajal RN

## 2022-06-17 NOTE — CONSULTS
Pulmonary Medicine Consultation     Patient Care Team:  Georgina Ahumada APRN as PCP - General (Family Medicine)  Carmine Pérez MD as Consulting Physician (Interventional Cardiology)  Marc Pham MD as Consulting Physician (Neurosurgery)  Prieto Flores MD as Consulting Physician (Pain Medicine)  Rajeev Sharif MD as Consulting Physician (Pulmonary Disease)  Armaan Samuels III, MD as Cardiologist (Cardiology)    Reason for Consultation: Shortness of Breath      Subjective   History of Present Illness:  This is a very nice 77-year-old patient whom I previously saw as an outpatient in 2017 that I believe was actually lost to follow-up.  At that time I was seeing her for evaluation of possible COPD and shortness of breath.  I was able to review the pulmonary function testing performed in 2017 which at that time showed a nonspecific flow reduction without absolute criteria for COPD.  The patient was admitted to the hospital on 6/12/2022 with increased wheezing, shortness of breath, difficulty clearing secretions, and home pulse oximetry showing saturations in the 80s.  Imaging was reviewed and showed no sign of infiltrate.  She has received steroids as well as azithromycin.  She also has received inhaler therapy.  She previously has been on a rescue inhaler with albuterol at home as well as a nebulizer with DuoNeb's only.  She states that for the most part she does not have much in the way of trouble with her breathing although does note that she has trouble with physical activity for several reasons including shortness of breath.  Shortness of breath however did become much worse in the last week or so.  She has denied chest pain.  She has denied fevers or chills.  She states that she is feeling much better during her stay with current therapy.  She is currently requiring 2 L nasal cannula oxygen to maintain a saturation above 90% at rest.  Respiratory panel was positive for parainfluenza  virus.    She has not had any flareups of her breathing in the past year prior to this.    Review of Systems:  Pertinent items are noted in HPI, all other systems reviewed and negative    Past Medical History:  Past Medical History:   Diagnosis Date   • Acute kidney injury ([facility]) 12/5/2017   • Aortic regurgitation    • 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/2016   • Body mass index (BMI) of 45.0-49.9 in adult ([facility]) 6/14/2019   • CHF (congestive heart failure) ([facility])    • Closed compression fracture of L4 lumbar vertebra 12/4/2017    Added automatically from request for surgery [id number]   • Constipation 12/5/2017   • Coronary artery disease 5/18/2016   • Diabetes mellitus type 2 in obese ([facility]) 1/29/2018   • Elevated alkaline phosphatase level 2/26/2018   • Elevated creatine kinase    • Essential hypertension 5/18/2016   • GERD (gastroesophageal reflux disease) 5/18/2016   • Glaucoma 7/21/2020   • History of kyphoplasty 1/30/2018   • Lumbar facet arthropathy 1/29/2018   • Lumbar stenosis with neurogenic claudication 1/29/2018   • Morbid obesity due to excess calories ([facility]) 1/29/2018   • Myocardial infarction ([facility]) 5/18/2016   • Osteoporosis 5/18/2016   • Physical deconditioning 1/30/2018   • Retinal emboli, right 5/21/2020   • Sepsis ([facility])     uro   • Stroke ([facility])    • Urinary incontinence without sensory awareness 2/26/2018     Past Surgical History:   Procedure Laterality Date   • APPENDECTOMY     • BACK SURGERY     • CHOLECYSTECTOMY     • EYE SURGERY     • KYPHOPLASTY N/A 12/7/2017    Procedure: KYPHOPLASTY L4;  Surgeon: Marc Pham MD;  Location: On license of [facility];  Service:        Allergies:  No Known Allergies    Medications:  No current facility-administered medications on file prior to encounter.     Current Outpatient Medications on File Prior to Encounter   Medication Sig Dispense Refill   • albuterol sulfate  (90 Base) MCG/ACT inhaler Inhale 2 puffs Every 4 (Four) Hours As Needed for Wheezing or Shortness of Air. 18 g  3   • atorvastatin (LIPITOR) 40 MG tablet Take 1 tablet by mouth Every Night. 90 tablet 3   • carvedilol (COREG) 3.125 MG tablet TAKE ONE TABLET BY MOUTH TWICE A DAY WITH MEALS 180 tablet 0   • Cholecalciferol (Vitamin D3) 50 MCG ( UT) capsule Take 2,000 Units by mouth Daily. 2000 units     • clopidogrel (PLAVIX) 75 MG tablet TAKE ONE TABLET BY MOUTH DAILY 90 tablet 1   • ipratropium-albuterol (DUO-NEB) 0.5-2.5 mg/3 ml nebulizer Take 3 ml by nebulization every TID-QID prn wheezing or shortness of breath 360 mL 0   • nitroglycerin (NITROSTAT) 0.4 MG SL tablet Place 1 tablet under the tongue Every 5 (Five) Minutes As Needed for Chest Pain. Take no more than 3 doses in 15 minutes. 100 tablet 0   • valsartan (Diovan) 160 MG tablet Take 1 tablet by mouth Daily. 90 tablet 3        atorvastatin, 40 mg, Oral, Nightly  carvedilol, 3.125 mg, Oral, BID With Meals  clopidogrel, 75 mg, Oral, Daily  enoxaparin, 40 mg, Subcutaneous, Q12H  guaiFENesin, 1,200 mg, Oral, Q12H  insulin lispro, 0-7 Units, Subcutaneous, TID AC  ipratropium-albuterol, 3 mL, Nebulization, Q6H While Awake - RT  nystatin, , Topical, Q12H  pharmacy consult - MTM, , Does not apply, Daily  predniSONE, 40 mg, Oral, Daily With Breakfast  senna-docusate sodium, 2 tablet, Oral, BID  sodium chloride, 10 mL, Intravenous, Q12H  valsartan, 160 mg, Oral, Daily        Social History:  Social History     Socioeconomic History   • Marital status:    Tobacco Use   • Smoking status: Former Smoker     Types: Cigarettes     Quit date:      Years since quittin.4   • Smokeless tobacco: Never Used   Vaping Use   • Vaping Use: Never used   Substance and Sexual Activity   • Alcohol use: No   • Drug use: No   • Sexual activity: Never       Family History:  Family History   Problem Relation Age of Onset   • Diabetes Mother    • Heart failure Mother    • Heart failure Father    • No Known Problems Sister    • No Known Problems Brother    • No Known Problems Son     • No Known Problems Daughter      Family history is reviewed and is not contributory to the case.    Objective   Objective     Physical Exam:  Vitals:    06/17/22 1447   BP: 157/57   Pulse: 58   Resp: 19   Temp: 97.8 °F (36.6 °C)   SpO2: 98%     Physical Exam  Constitutional:       General: She is not in acute distress.     Appearance: She is obese. She is not ill-appearing.   HENT:      Head: Normocephalic and atraumatic.      Nose: Nose normal. No congestion.      Mouth/Throat:      Mouth: Mucous membranes are moist.   Cardiovascular:      Rate and Rhythm: Bradycardia present.      Pulses: Normal pulses.      Heart sounds: No murmur heard.  Pulmonary:      Effort: Pulmonary effort is normal.      Comments: Poor air movement bilaterally.  Very mild wheezes in the bases.  Otherwise clear.  Abdominal:      General: Abdomen is flat. Bowel sounds are normal. There is no distension.   Musculoskeletal:         General: No swelling. Normal range of motion.      Cervical back: Normal range of motion and neck supple.      Right lower leg: No edema.      Left lower leg: No edema.   Skin:     General: Skin is warm.      Capillary Refill: Capillary refill takes less than 2 seconds.   Neurological:      General: No focal deficit present.      Mental Status: She is alert.   Psychiatric:         Mood and Affect: Mood normal.         Lab Results/Imaging/Diagnostics:  Results from last 7 days   Lab Units 06/17/22  0721 06/16/22  0619 06/15/22  0936   WBC 10*3/mm3 13.32* 13.42* 14.58*   HEMOGLOBIN g/dL 13.1 13.5 13.2   PLATELETS 10*3/mm3 158 170 161     Results from last 7 days   Lab Units 06/17/22  0721 06/16/22  0817 06/16/22  0440 06/15/22  0936 06/14/22  0535 06/13/22  0737   SODIUM mmol/L 140  --  135* 136   < > 134*   POTASSIUM mmol/L 5.1 4.8 5.8* 4.5   < > 3.9   CO2 mmol/L 26.0  --  23.0 26.0   < > 20.0*   BUN mg/dL 48*  --  49* 55*   < > 24*   CREATININE mg/dL 1.33*  --  1.42* 1.50*   < > 1.22*   MAGNESIUM mg/dL  --   --    --   --   --  2.4   GLUCOSE mg/dL 187*  --  140* 183*   < > 190*    < > = values in this interval not displayed.     Estimated Creatinine Clearance: 42 mL/min (A) (by C-G formula based on SCr of 1.33 mg/dL (H)).              Assessment & Plan   Impression & Plan     Impression:    COPD with acute exacerbation (HCC)    Essential hypertension    Hyperlipidemia    Coronary artery disease    GERD (gastroesophageal reflux disease)    Diastolic dysfunction    Type 2 diabetes mellitus with stage 3b chronic kidney disease, without long-term current use of insulin (HCC)    Hypoxia    Obesity, morbid, BMI 50 or higher (HCC)      Plan:    The patient likely does have a mixed picture however I do not doubt that she does have COPD and this is likely an exacerbation secondary to the probable parainfluenza virus infection.  This was not seen on previous PFTs however they were somewhat borderline at that time.  I would agree with bronchodilator therapy on an as-needed basis for now though I would not place her on long-acting bronchodilators for now.  I also agree with therapy with a steroid taper as well as azithromycin.  The Zithromax can be continued for 7 to 10 days for anti-inflammatory effect.      The patient is planning to go to rehabilitation for strengthening.  Certainly we would like to see her back in the clinic for further evaluation within the next several months.  At that time we could repeat pulmonary function testing and see if she is requiring any further modification of her bronchodilator therapy.    Thank you for asking me to help in care of this very nice patient.  We will see as needed.    I discussed these findings and my recommendations with patient       Rajeev Sharif MD, [facility]  Pulmonary and Critical Care Medicine  06/17/22 16:20 EDT

## 2022-06-18 ENCOUNTER — APPOINTMENT (OUTPATIENT)
Dept: GENERAL RADIOLOGY | Facility: HOSPITAL | Age: 77
End: 2022-06-18

## 2022-06-18 LAB
GLUCOSE BLDC GLUCOMTR-MCNC: 127 MG/DL (ref 70–130)
GLUCOSE BLDC GLUCOMTR-MCNC: 148 MG/DL (ref 70–130)
GLUCOSE BLDC GLUCOMTR-MCNC: 94 MG/DL (ref 70–130)
QT INTERVAL: 480 MS
QTC INTERVAL: 391 MS

## 2022-06-18 PROCEDURE — 99232 SBSQ HOSP IP/OBS MODERATE 35: CPT | Performed by: INTERNAL MEDICINE

## 2022-06-18 PROCEDURE — 94799 UNLISTED PULMONARY SVC/PX: CPT

## 2022-06-18 PROCEDURE — 63710000001 PREDNISONE PER 1 MG: Performed by: INTERNAL MEDICINE

## 2022-06-18 PROCEDURE — 25010000002 ENOXAPARIN PER 10 MG

## 2022-06-18 PROCEDURE — 25010000002 HYDRALAZINE PER 20 MG: Performed by: NURSE PRACTITIONER

## 2022-06-18 PROCEDURE — 93005 ELECTROCARDIOGRAM TRACING: CPT | Performed by: INTERNAL MEDICINE

## 2022-06-18 PROCEDURE — 94664 DEMO&/EVAL PT USE INHALER: CPT

## 2022-06-18 PROCEDURE — 82962 GLUCOSE BLOOD TEST: CPT

## 2022-06-18 PROCEDURE — 93010 ELECTROCARDIOGRAM REPORT: CPT | Performed by: INTERNAL MEDICINE

## 2022-06-18 PROCEDURE — 74018 RADEX ABDOMEN 1 VIEW: CPT

## 2022-06-18 PROCEDURE — 63710000001 ONDANSETRON PER 8 MG: Performed by: INTERNAL MEDICINE

## 2022-06-18 PROCEDURE — 63710000001 PROMETHAZINE PER 12.5 MG: Performed by: INTERNAL MEDICINE

## 2022-06-18 RX ORDER — PROMETHAZINE HYDROCHLORIDE 12.5 MG/1
12.5 SUPPOSITORY RECTAL EVERY 6 HOURS PRN
Status: DISCONTINUED | OUTPATIENT
Start: 2022-06-18 | End: 2022-06-20 | Stop reason: HOSPADM

## 2022-06-18 RX ORDER — PROMETHAZINE HYDROCHLORIDE 12.5 MG/1
12.5 TABLET ORAL EVERY 6 HOURS PRN
Status: DISCONTINUED | OUTPATIENT
Start: 2022-06-18 | End: 2022-06-20 | Stop reason: HOSPADM

## 2022-06-18 RX ORDER — HYDRALAZINE HYDROCHLORIDE 20 MG/ML
10 INJECTION INTRAMUSCULAR; INTRAVENOUS ONCE
Status: COMPLETED | OUTPATIENT
Start: 2022-06-18 | End: 2022-06-18

## 2022-06-18 RX ADMIN — ONDANSETRON HYDROCHLORIDE 4 MG: 4 TABLET, FILM COATED ORAL at 09:03

## 2022-06-18 RX ADMIN — ENOXAPARIN SODIUM 40 MG: 40 INJECTION SUBCUTANEOUS at 22:09

## 2022-06-18 RX ADMIN — VALSARTAN 160 MG: 80 TABLET, FILM COATED ORAL at 16:42

## 2022-06-18 RX ADMIN — Medication 10 ML: at 22:10

## 2022-06-18 RX ADMIN — ENOXAPARIN SODIUM 40 MG: 40 INJECTION SUBCUTANEOUS at 16:41

## 2022-06-18 RX ADMIN — NYSTATIN: 100000 POWDER TOPICAL at 22:10

## 2022-06-18 RX ADMIN — ATORVASTATIN CALCIUM 40 MG: 40 TABLET, FILM COATED ORAL at 22:09

## 2022-06-18 RX ADMIN — SENNOSIDES AND DOCUSATE SODIUM 2 TABLET: 50; 8.6 TABLET ORAL at 16:41

## 2022-06-18 RX ADMIN — Medication 10 ML: at 16:42

## 2022-06-18 RX ADMIN — IPRATROPIUM BROMIDE AND ALBUTEROL SULFATE 3 ML: .5; 3 SOLUTION RESPIRATORY (INHALATION) at 07:17

## 2022-06-18 RX ADMIN — CLOPIDOGREL BISULFATE 75 MG: 75 TABLET ORAL at 16:42

## 2022-06-18 RX ADMIN — SENNOSIDES AND DOCUSATE SODIUM 2 TABLET: 50; 8.6 TABLET ORAL at 22:09

## 2022-06-18 RX ADMIN — PREDNISONE 40 MG: 20 TABLET ORAL at 16:42

## 2022-06-18 RX ADMIN — GUAIFENESIN 1200 MG: 600 TABLET, EXTENDED RELEASE ORAL at 16:42

## 2022-06-18 RX ADMIN — NYSTATIN: 100000 POWDER TOPICAL at 16:43

## 2022-06-18 RX ADMIN — IPRATROPIUM BROMIDE AND ALBUTEROL SULFATE 3 ML: .5; 3 SOLUTION RESPIRATORY (INHALATION) at 13:12

## 2022-06-18 RX ADMIN — GUAIFENESIN 1200 MG: 600 TABLET, EXTENDED RELEASE ORAL at 22:09

## 2022-06-18 RX ADMIN — PROMETHAZINE HYDROCHLORIDE 12.5 MG: 12.5 TABLET ORAL at 11:19

## 2022-06-18 RX ADMIN — OXYCODONE 5 MG: 5 TABLET ORAL at 05:34

## 2022-06-18 RX ADMIN — HYDRALAZINE HYDROCHLORIDE 10 MG: 20 INJECTION INTRAMUSCULAR; INTRAVENOUS at 05:34

## 2022-06-18 NOTE — PLAN OF CARE
"Goal Outcome Evaluation:  Plan of Care Reviewed With: patient, daughter        Progress: improving  Outcome Evaluation: A&Ox4, 2LNC. hypotensive and bradycardic this morning, see flowsheet. Patient was pale and diaphoretic. Dr. Cordero notified, see orders. IV fluids initiated after new IV access established. PRN zofran and phenergan administered for nausea/vomitting. EKG obtained. AM meds held until this afternoon when patient felt better. Daughter at bedside to visit. Patient states she \"feels better\" this afternoon. D/C plans to [facility] when insurance precert obtained and transportation arranged. No further needs assessed at this time.  "

## 2022-06-18 NOTE — PROGRESS NOTES
[facility] Medicine Services  PROGRESS NOTE    Patient Name: Georgia Velázquez  : 1945  MRN: [id number]    Date of Admission: 2022  Primary Care Physician: Georgina Ahumada APRN    Subjective   Subjective     CC:  SOA    HPI:  Wheezing is better but she is having nausea today-says it started yesterday.  She says she has not had a good bowel movement recently.  Became hypotensive after IV hydralazine given overnight.      ROS:  Resp- Improved dyspnea and wheezing  GI- As above      Objective   Objective     Vital Signs:   Temp:  [97.7 °F (36.5 °C)-98.4 °F (36.9 °C)] 97.7 °F (36.5 °C)  Heart Rate:  [48-60] 51  Resp:  [16-22] 21  BP: ()/(53-99) 123/56  Flow (L/min):  [2-4] 2     Physical Exam:  Constitutional: No acute distress, sleeping but awakens easily  HENT: NCAT, mucous membranes moist  Respiratory: CTAB, oxygen saturation 95% on 2L  Cardiovascular: Mildly bradycardic, no murmurs, rubs, or gallops  Gastrointestinal: Positive bowel sounds, soft, nontender, nondistended  Musculoskeletal: No bilateral ankle edema  Psychiatric: Appropriate affect, cooperative  Neurologic: Speech clear and fluent, drowsy  Skin: No rashes on exposed surfaces    Results Reviewed:  LAB RESULTS:      Lab 22  0721 22  0619 22  0440 06/15/22  0936 22  0535 22  0737 22  0036   WBC 13.32* 13.42*  --  14.58* 10.79 3.65 5.13   HEMOGLOBIN 13.1 13.5  --  13.2 13.2 13.4 11.9*   HEMATOCRIT 40.0 39.4  --  40.5 40.3 40.4 35.7   PLATELETS 158 170  --  161 139* 128* 117*   NEUTROS ABS 10.67* 10.09*  --  12.05* 8.45*  --  3.62   IMMATURE GRANS (ABS) 0.17* 0.08*  --  0.06* 0.15*  --  0.02   LYMPHS ABS 1.84 2.39  --  1.92 1.64  --  1.21   MONOS ABS 0.61 0.82  --  0.54 0.53  --  0.26   EOS ABS 0.00 0.00  --  0.00 0.00  --  0.01   MCV 95.0 89.7  --  93.8 92.0 92.9 92.0   PROCALCITONIN  --   --  0.07  --   --   --   --          Lab 22  0721 22  0817  06/16/22  0440 06/15/22  0936 06/14/22  0535 06/13/22  0737   SODIUM 140  --  135* 136 136 134*   POTASSIUM 5.1 4.8 5.8* 4.5 4.4 3.9   CHLORIDE 106  --  103 102 103 100   CO2 26.0  --  23.0 26.0 24.0 20.0*   ANION GAP 8.0  --  9.0 8.0 9.0 14.0   BUN 48*  --  49* 55* 41* 24*   CREATININE 1.33*  --  1.42* 1.50* 1.35* 1.22*   EGFR 40.8*  --  37.7* 35.3* 40.1* 45.2*   GLUCOSE 187*  --  140* 183* 194* 190*   CALCIUM 8.9  --  9.0 9.2 9.2 8.5*   MAGNESIUM  --   --   --   --   --  2.4         Lab 06/12/22  2330   TOTAL PROTEIN 7.2   ALBUMIN 3.50   GLOBULIN 3.7   ALT (SGPT) 17   AST (SGOT) 39*   BILIRUBIN 0.5   ALK PHOS 100         Lab 06/12/22  2330   PROBNP 3,497.0*   TROPONIN T 0.017                 Brief Urine Lab Results  (Last result in the past 365 days)      Color   Clarity   Blood   Leuk Est   Nitrite   Protein   CREAT   Urine HCG        10/15/21 1356             100 mg/dL               Microbiology Results Abnormal     Procedure Component Value - Date/Time    COVID PRE-OP / PRE-PROCEDURE SCREENING ORDER (NO ISOLATION) - Swab, Nasopharynx [[id number]]  (Normal) Collected: 06/13/22 0036    Lab Status: Final result Specimen: Swab from Nasopharynx Updated: 06/13/22 0108    Narrative:      The following orders were created for panel order COVID PRE-OP / PRE-PROCEDURE SCREENING ORDER (NO ISOLATION) - Swab, Nasopharynx.  Procedure                               Abnormality         Status                     ---------                               -----------         ------                     COVID-19 and FLU A/B PCR...[[id number]]  Normal              Final result                 Please view results for these tests on the individual orders.    COVID-19 and FLU A/B PCR - Swab, Nasopharynx [[id number]]  (Normal) Collected: 06/13/22 0036    Lab Status: Final result Specimen: Swab from Nasopharynx Updated: 06/13/22 0108     COVID19 Not Detected     Influenza A PCR Not Detected     Influenza B PCR Not Detected    Narrative:       Fact sheet for providers: https://www.fda.gov/media/857121/download    Fact sheet for patients: https://www.fda.gov/media/262027/download    Test performed by PCR.          No radiology results from the last 24 hrs    Results for orders placed during the hospital encounter of 03/17/21    Adult Transthoracic Echo Complete With Contrast if Necessary Per Protocol    Interpretation Summary  · Calculated left ventricular EF = 55% Estimated left ventricular EF was in agreement with the calculated left ventricular EF. Left ventricular systolic function is normal.  · Left ventricular diastolic function is consistent with (grade II w/high LAP) pseudonormalization.  · Estimated right ventricular systolic pressure from tricuspid regurgitation is normal (<35 mmHg). Calculated right ventricular systolic pressure from tricuspid regurgitation is 32 mmHg.  · Endocardial definition of the apical segments is limited, unable to definitively assess apical regional wall motion abnormalities.      I have reviewed the medications:  Scheduled Meds:atorvastatin, 40 mg, Oral, Nightly  clopidogrel, 75 mg, Oral, Daily  enoxaparin, 40 mg, Subcutaneous, Q12H  guaiFENesin, 1,200 mg, Oral, Q12H  insulin lispro, 0-7 Units, Subcutaneous, TID AC  ipratropium-albuterol, 3 mL, Nebulization, Q6H While Awake - RT  nystatin, , Topical, Q12H  pharmacy consult - MTM, , Does not apply, Daily  predniSONE, 40 mg, Oral, Daily With Breakfast  senna-docusate sodium, 2 tablet, Oral, BID  sodium chloride, 10 mL, Intravenous, Q12H  valsartan, 160 mg, Oral, Daily      Continuous Infusions:   PRN Meds:.•  acetaminophen **OR** acetaminophen **OR** acetaminophen  •  senna-docusate sodium **AND** polyethylene glycol **AND** bisacodyl **AND** bisacodyl  •  dextrose  •  dextrose  •  glucagon (human recombinant)  •  ipratropium-albuterol  •  magnesium sulfate **OR** magnesium sulfate **OR** magnesium sulfate  •  ondansetron **OR** ondansetron  •  oxyCODONE  •  potassium  chloride **OR** potassium chloride **OR** potassium chloride  •  promethazine **OR** promethazine  •  sodium chloride  •  sodium chloride    Assessment & Plan   Assessment & Plan     Active Hospital Problems    Diagnosis  POA   • **COPD with acute exacerbation (HCC) [J44.1]  Yes   • Hypoxia [R09.02]  Yes   • Obesity, morbid, BMI 50 or higher ([facility]) [E66.01]  Unknown   • Type 2 diabetes mellitus with stage 3b chronic kidney disease, without long-term current use of insulin (HCC) [E11.22, N18.32]  Yes   • Diastolic dysfunction [I51.89]  Yes   • Essential hypertension [I10]  Yes   • Hyperlipidemia [E78.5]  Yes   • Coronary artery disease [I25.10]  Yes   • GERD (gastroesophageal reflux disease) [K21.9]  Yes      Resolved Hospital Problems   No resolved problems to display.        Brief Hospital Course to date:  Georgia Velázquez is a 77 y.o. female with DM2, diastolic CHF, morbid obesity, COPD who does not use oxygen at baseline who presented with several days of worsening respiratory status, wheezing, cough with clear chest x-ray. Found to have parainfluenza virus resulting in COPD exacerbation     This patient's problems and plans were partially entered by my partner and updated as appropriate by me 06/18/22.     Acute exacerbation of COPD  Acute Parainfluenza infection  Acute hypoxic respiratory insufficiency  - Continue PO prednisone  -- Continue inhaled bronchodilators-schedule some and keep PRN  - S/P Azithromycin x 5 days  -- Flutter valve  -- Wean O2 as tolerated     DM type 2, A1c 8.6%, without long-term use of insulin  - Continue SSI     Coronary artery disease  Hypertension  Hyperlipidemia  - Continue valsartan  -- D/C carvedilol due to bradycardia with 1st degree AV block  -- If BP remains elevated, will add PO amlodipine but would not give IV PRNs unless severely hypertensive >>200 Systolic or symptomatic     CKD stage 3b  - Baseline creatinine 1.2-1.5; EGFR 30s     Obesity, BMI 51.0 to KG/M2  -  Complicates all aspects of care    DVT prophylaxis:  Medical DVT prophylaxis orders are present.       AM-PAC 6 Clicks Score (PT): 19 (06/17/22 2040)    Disposition: I expect the patient to be discharged to inpatient rehab when bed available    CODE STATUS:   Code Status and Medical Interventions:   Ordered at: 06/13/22 0258     Medical Intervention Limits:    NO intubation (DNI)     Level Of Support Discussed With:    Patient     Code Status (Patient has no pulse and is not breathing):    No CPR (Do Not Attempt to Resuscitate)     Medical Interventions (Patient has pulse or is breathing):    Limited Support       Sophia Cordero MD  06/18/22

## 2022-06-19 LAB
GLUCOSE BLDC GLUCOMTR-MCNC: 105 MG/DL (ref 70–130)
GLUCOSE BLDC GLUCOMTR-MCNC: 147 MG/DL (ref 70–130)
GLUCOSE BLDC GLUCOMTR-MCNC: 182 MG/DL (ref 70–130)

## 2022-06-19 PROCEDURE — 25010000002 ENOXAPARIN PER 10 MG

## 2022-06-19 PROCEDURE — 99232 SBSQ HOSP IP/OBS MODERATE 35: CPT | Performed by: INTERNAL MEDICINE

## 2022-06-19 PROCEDURE — 94664 DEMO&/EVAL PT USE INHALER: CPT

## 2022-06-19 PROCEDURE — 63710000001 PREDNISONE PER 1 MG: Performed by: INTERNAL MEDICINE

## 2022-06-19 PROCEDURE — 94799 UNLISTED PULMONARY SVC/PX: CPT

## 2022-06-19 PROCEDURE — 25010000002 ONDANSETRON PER 1 MG: Performed by: INTERNAL MEDICINE

## 2022-06-19 PROCEDURE — 63710000001 INSULIN LISPRO (HUMAN) PER 5 UNITS: Performed by: INTERNAL MEDICINE

## 2022-06-19 PROCEDURE — 82962 GLUCOSE BLOOD TEST: CPT

## 2022-06-19 RX ORDER — VALSARTAN 80 MG/1
80 TABLET ORAL ONCE
Status: COMPLETED | OUTPATIENT
Start: 2022-06-19 | End: 2022-06-19

## 2022-06-19 RX ORDER — VALSARTAN 80 MG/1
240 TABLET ORAL DAILY
Status: DISCONTINUED | OUTPATIENT
Start: 2022-06-20 | End: 2022-06-20 | Stop reason: HOSPADM

## 2022-06-19 RX ADMIN — SENNOSIDES AND DOCUSATE SODIUM 2 TABLET: 50; 8.6 TABLET ORAL at 09:52

## 2022-06-19 RX ADMIN — VALSARTAN 160 MG: 80 TABLET, FILM COATED ORAL at 10:00

## 2022-06-19 RX ADMIN — ENOXAPARIN SODIUM 40 MG: 40 INJECTION SUBCUTANEOUS at 20:46

## 2022-06-19 RX ADMIN — INSULIN LISPRO 2 UNITS: 100 INJECTION, SOLUTION INTRAVENOUS; SUBCUTANEOUS at 17:57

## 2022-06-19 RX ADMIN — ONDANSETRON 4 MG: 2 INJECTION INTRAMUSCULAR; INTRAVENOUS at 09:50

## 2022-06-19 RX ADMIN — VALSARTAN 80 MG: 80 TABLET, FILM COATED ORAL at 14:59

## 2022-06-19 RX ADMIN — Medication 10 ML: at 20:47

## 2022-06-19 RX ADMIN — SENNOSIDES AND DOCUSATE SODIUM 2 TABLET: 50; 8.6 TABLET ORAL at 20:46

## 2022-06-19 RX ADMIN — NYSTATIN: 100000 POWDER TOPICAL at 20:49

## 2022-06-19 RX ADMIN — ATORVASTATIN CALCIUM 40 MG: 40 TABLET, FILM COATED ORAL at 20:46

## 2022-06-19 RX ADMIN — NYSTATIN: 100000 POWDER TOPICAL at 09:51

## 2022-06-19 RX ADMIN — PREDNISONE 40 MG: 20 TABLET ORAL at 09:51

## 2022-06-19 RX ADMIN — IPRATROPIUM BROMIDE AND ALBUTEROL SULFATE 3 ML: .5; 3 SOLUTION RESPIRATORY (INHALATION) at 12:56

## 2022-06-19 RX ADMIN — CLOPIDOGREL BISULFATE 75 MG: 75 TABLET ORAL at 09:52

## 2022-06-19 RX ADMIN — GUAIFENESIN 1200 MG: 600 TABLET, EXTENDED RELEASE ORAL at 20:46

## 2022-06-19 RX ADMIN — Medication 10 ML: at 09:53

## 2022-06-19 RX ADMIN — IPRATROPIUM BROMIDE AND ALBUTEROL SULFATE 3 ML: .5; 3 SOLUTION RESPIRATORY (INHALATION) at 08:10

## 2022-06-19 RX ADMIN — IPRATROPIUM BROMIDE AND ALBUTEROL SULFATE 3 ML: .5; 3 SOLUTION RESPIRATORY (INHALATION) at 19:31

## 2022-06-19 RX ADMIN — GUAIFENESIN 1200 MG: 600 TABLET, EXTENDED RELEASE ORAL at 09:52

## 2022-06-19 RX ADMIN — ENOXAPARIN SODIUM 40 MG: 40 INJECTION SUBCUTANEOUS at 09:53

## 2022-06-19 RX ADMIN — ACETAMINOPHEN 650 MG: 325 TABLET ORAL at 20:53

## 2022-06-19 NOTE — PROGRESS NOTES
[facility] Medicine Services  PROGRESS NOTE    Patient Name: Georgia Velázquez  : 1945  MRN: [id number]    Date of Admission: 2022  Primary Care Physician: Georgina Ahumada APRN    Subjective   Subjective     CC:  SOA    HPI:  She is feeling much better today.  Nausea is resolved.  Had a large bowel movement a little while ago.    ROS:  Resp- No dsypnea  GI- As above      Objective   Objective     Vital Signs:   Temp:  [97.5 °F (36.4 °C)-98.5 °F (36.9 °C)] 97.5 °F (36.4 °C)  Heart Rate:  [50-68] 54  Resp:  [20-22] 20  BP: (127-187)/(58-89) 127/58  Flow (L/min):  [2] 2     Physical Exam:  Constitutional: No acute distress, awake and alert, laying in bed  HENT: NCAT, mucous membranes moist  Respiratory: Occasional wheeze, nonlabored on 2L  Cardiovascular: RRR, no murmurs, rubs, or gallops  Gastrointestinal: Positive bowel sounds, soft, nontender, nondistended  Musculoskeletal: No bilateral ankle edema  Psychiatric: Appropriate affect, cooperative  Neurologic: Speech clear and fluent, hearing impaired  Skin: No rashes on exposed surfaces    Results Reviewed:  LAB RESULTS:      Lab 22  0721 22  0619 22  0440 06/15/22  0936 22  0535 22  0737 22  0036   WBC 13.32* 13.42*  --  14.58* 10.79 3.65 5.13   HEMOGLOBIN 13.1 13.5  --  13.2 13.2 13.4 11.9*   HEMATOCRIT 40.0 39.4  --  40.5 40.3 40.4 35.7   PLATELETS 158 170  --  161 139* 128* 117*   NEUTROS ABS 10.67* 10.09*  --  12.05* 8.45*  --  3.62   IMMATURE GRANS (ABS) 0.17* 0.08*  --  0.06* 0.15*  --  0.02   LYMPHS ABS 1.84 2.39  --  1.92 1.64  --  1.21   MONOS ABS 0.61 0.82  --  0.54 0.53  --  0.26   EOS ABS 0.00 0.00  --  0.00 0.00  --  0.01   MCV 95.0 89.7  --  93.8 92.0 92.9 92.0   PROCALCITONIN  --   --  0.07  --   --   --   --          Lab 22  0721 22  0817 22  0440 06/15/22  0936 22  0535 22  0737   SODIUM 140  --  135* 136 136 134*   POTASSIUM 5.1 4.8 5.8* 4.5  4.4 3.9   CHLORIDE 106  --  103 102 103 100   CO2 26.0  --  23.0 26.0 24.0 20.0*   ANION GAP 8.0  --  9.0 8.0 9.0 14.0   BUN 48*  --  49* 55* 41* 24*   CREATININE 1.33*  --  1.42* 1.50* 1.35* 1.22*   EGFR 40.8*  --  37.7* 35.3* 40.1* 45.2*   GLUCOSE 187*  --  140* 183* 194* 190*   CALCIUM 8.9  --  9.0 9.2 9.2 8.5*   MAGNESIUM  --   --   --   --   --  2.4         Lab 06/12/22  2330   TOTAL PROTEIN 7.2   ALBUMIN 3.50   GLOBULIN 3.7   ALT (SGPT) 17   AST (SGOT) 39*   BILIRUBIN 0.5   ALK PHOS 100         Lab 06/12/22  2330   PROBNP 3,497.0*   TROPONIN T 0.017                 Brief Urine Lab Results  (Last result in the past 365 days)      Color   Clarity   Blood   Leuk Est   Nitrite   Protein   CREAT   Urine HCG        10/15/21 1356             100 mg/dL               Microbiology Results Abnormal     Procedure Component Value - Date/Time    COVID PRE-OP / PRE-PROCEDURE SCREENING ORDER (NO ISOLATION) - Swab, Nasopharynx [[id number]]  (Normal) Collected: 06/13/22 0036    Lab Status: Final result Specimen: Swab from Nasopharynx Updated: 06/13/22 0108    Narrative:      The following orders were created for panel order COVID PRE-OP / PRE-PROCEDURE SCREENING ORDER (NO ISOLATION) - Swab, Nasopharynx.  Procedure                               Abnormality         Status                     ---------                               -----------         ------                     COVID-19 and FLU A/B PCR...[[id number]]  Normal              Final result                 Please view results for these tests on the individual orders.    COVID-19 and FLU A/B PCR - Swab, Nasopharynx [[id number]]  (Normal) Collected: 06/13/22 0036    Lab Status: Final result Specimen: Swab from Nasopharynx Updated: 06/13/22 0108     COVID19 Not Detected     Influenza A PCR Not Detected     Influenza B PCR Not Detected    Narrative:      Fact sheet for providers: https://www.fda.gov/media/512716/download    Fact sheet for patients:  https://www.fda.gov/media/312869/download    Test performed by PCR.          XR Abdomen KUB    Result Date: 6/18/2022  DATE OF EXAM: 6/18/2022 12:46 PM  PROCEDURE: XR ABDOMEN KUB-  INDICATIONS: Constipation, nausea; J44.1-Chronic obstructive pulmonary disease with (acute) exacerbation; J96.01-Acute respiratory failure with hypoxia; Z86.79-Personal history of other diseases of the circulatory system  COMPARISON: 12/5/2017  TECHNIQUE: Single radiographic view of the abdomen was obtained.  FINDINGS: Bowel gas pattern is nonobstructive, with gas is seen scattered throughout large and small bowel. There appears to be formed stool in the right colon and proximal transverse colon, and relatively little stool elsewhere. Incidental note is made of previous cholecystectomy and lower lumbar kyphoplasty.      Impression: Nonobstructive bowel gas pattern, with mild, if any fecal stasis.  This report was finalized on 6/18/2022 1:31 PM by Dr. Roc Jarrett MD.        Results for orders placed during the hospital encounter of 03/17/21    Adult Transthoracic Echo Complete With Contrast if Necessary Per Protocol    Interpretation Summary  · Calculated left ventricular EF = 55% Estimated left ventricular EF was in agreement with the calculated left ventricular EF. Left ventricular systolic function is normal.  · Left ventricular diastolic function is consistent with (grade II w/high LAP) pseudonormalization.  · Estimated right ventricular systolic pressure from tricuspid regurgitation is normal (<35 mmHg). Calculated right ventricular systolic pressure from tricuspid regurgitation is 32 mmHg.  · Endocardial definition of the apical segments is limited, unable to definitively assess apical regional wall motion abnormalities.      I have reviewed the medications:  Scheduled Meds:atorvastatin, 40 mg, Oral, Nightly  clopidogrel, 75 mg, Oral, Daily  enoxaparin, 40 mg, Subcutaneous, Q12H  guaiFENesin, 1,200 mg, Oral, Q12H  insulin lispro, 0-7  Units, Subcutaneous, TID AC  ipratropium-albuterol, 3 mL, Nebulization, Q6H While Awake - RT  nystatin, , Topical, Q12H  pharmacy consult - MTM, , Does not apply, Daily  predniSONE, 40 mg, Oral, Daily With Breakfast  senna-docusate sodium, 2 tablet, Oral, BID  sodium chloride, 10 mL, Intravenous, Q12H  valsartan, 160 mg, Oral, Daily      Continuous Infusions:   PRN Meds:.•  acetaminophen **OR** acetaminophen **OR** acetaminophen  •  senna-docusate sodium **AND** polyethylene glycol **AND** bisacodyl **AND** bisacodyl  •  dextrose  •  dextrose  •  glucagon (human recombinant)  •  ipratropium-albuterol  •  magnesium sulfate **OR** magnesium sulfate **OR** magnesium sulfate  •  ondansetron **OR** ondansetron  •  oxyCODONE  •  potassium chloride **OR** potassium chloride **OR** potassium chloride  •  promethazine **OR** promethazine  •  sodium chloride  •  sodium chloride    Assessment & Plan   Assessment & Plan     Active Hospital Problems    Diagnosis  POA   • **COPD with acute exacerbation (formerly [facility]) [J44.1]  Yes   • Hypoxia [R09.02]  Yes   • Obesity, morbid, BMI 50 or higher (formerly [facility]) [E66.01]  Unknown   • Type 2 diabetes mellitus with stage 3b chronic kidney disease, without long-term current use of insulin (formerly [facility]) [E11.22, N18.32]  Yes   • Diastolic dysfunction [I51.89]  Yes   • Essential hypertension [I10]  Yes   • Hyperlipidemia [E78.5]  Yes   • Coronary artery disease [I25.10]  Yes   • GERD (gastroesophageal reflux disease) [K21.9]  Yes      Resolved Hospital Problems   No resolved problems to display.        Brief Hospital Course to date:  Georgia Velázquez is a 77 y.o. female with DM2, diastolic CHF, morbid obesity, COPD who does not use oxygen at baseline who presented with several days of worsening respiratory status, wheezing, cough with clear chest x-ray. Found to have parainfluenza virus resulting in COPD exacerbation     This patient's problems and plans were partially entered by my partner and updated as  appropriate by me 06/19/22.     Acute exacerbation of COPD  Acute Parainfluenza infection  Acute hypoxic respiratory insufficiency  - Continue PO prednisone with a taper at discharge  -- Continue inhaled bronchodilators-schedule some and keep PRN  - S/P Azithromycin x 5 days  -- Flutter valve  -- Wean O2 as tolerated     DM type 2, A1c 8.6%, without long-term use of insulin  - Continue SSI     Coronary artery disease  Hypertension  Hyperlipidemia  - Continue valsartan-increase dose  -- D/C carvedilol due to bradycardia with 1st degree AV block  --Would not give IV PRNs unless severely hypertensive >>200 Systolic or symptomatic.  She tolerated IV hydralazine very poorly with significant symptomatic hypotension.     CKD stage 3b  - Baseline creatinine 1.2-1.5; EGFR 30s     Obesity, BMI 51.0 to KG/M2  - Complicates all aspects of care    DVT prophylaxis:  Medical DVT prophylaxis orders are present.       AM-PAC 6 Clicks Score (PT): 19 (06/18/22 2005)    Disposition: I expect the patient to be discharged to inpatient rehab when bed available    CODE STATUS:   Code Status and Medical Interventions:   Ordered at: 06/13/22 0258     Medical Intervention Limits:    NO intubation (DNI)     Level Of Support Discussed With:    Patient     Code Status (Patient has no pulse and is not breathing):    No CPR (Do Not Attempt to Resuscitate)     Medical Interventions (Patient has pulse or is breathing):    Limited Support       Sophia Cordero MD  06/19/22

## 2022-06-19 NOTE — PLAN OF CARE
Goal Outcome Evaluation:Pt has been stable all night but did not want to eat, deny any abnormal feeling, she slept soundly all night. Will monitor.

## 2022-06-20 VITALS
SYSTOLIC BLOOD PRESSURE: 159 MMHG | WEIGHT: 270 LBS | OXYGEN SATURATION: 93 % | RESPIRATION RATE: 18 BRPM | TEMPERATURE: 98.3 F | DIASTOLIC BLOOD PRESSURE: 60 MMHG | HEIGHT: 61 IN | HEART RATE: 68 BPM | BODY MASS INDEX: 50.98 KG/M2

## 2022-06-20 PROBLEM — B34.8 PARAINFLUENZA INFECTION: Status: ACTIVE | Noted: 2022-06-20

## 2022-06-20 LAB
ANION GAP SERPL CALCULATED.3IONS-SCNC: 10 MMOL/L (ref 5–15)
BUN SERPL-MCNC: 34 MG/DL (ref 8–23)
BUN/CREAT SERPL: 31.2 (ref 7–25)
CALCIUM SPEC-SCNC: 8.8 MG/DL (ref 8.6–10.5)
CHLORIDE SERPL-SCNC: 103 MMOL/L (ref 98–107)
CO2 SERPL-SCNC: 23 MMOL/L (ref 22–29)
CREAT SERPL-MCNC: 1.09 MG/DL (ref 0.57–1)
EGFRCR SERPLBLD CKD-EPI 2021: 51.8 ML/MIN/1.73
GLUCOSE BLDC GLUCOMTR-MCNC: 119 MG/DL (ref 70–130)
GLUCOSE BLDC GLUCOMTR-MCNC: 236 MG/DL (ref 70–130)
GLUCOSE SERPL-MCNC: 132 MG/DL (ref 65–99)
POTASSIUM SERPL-SCNC: 5.2 MMOL/L (ref 3.5–5.2)
SODIUM SERPL-SCNC: 136 MMOL/L (ref 136–145)

## 2022-06-20 PROCEDURE — 99238 HOSP IP/OBS DSCHRG MGMT 30/<: CPT | Performed by: INTERNAL MEDICINE

## 2022-06-20 PROCEDURE — 94761 N-INVAS EAR/PLS OXIMETRY MLT: CPT

## 2022-06-20 PROCEDURE — 94799 UNLISTED PULMONARY SVC/PX: CPT

## 2022-06-20 PROCEDURE — 80048 BASIC METABOLIC PNL TOTAL CA: CPT | Performed by: INTERNAL MEDICINE

## 2022-06-20 PROCEDURE — 63710000001 INSULIN LISPRO (HUMAN) PER 5 UNITS: Performed by: INTERNAL MEDICINE

## 2022-06-20 PROCEDURE — 25010000002 ENOXAPARIN PER 10 MG

## 2022-06-20 PROCEDURE — 82962 GLUCOSE BLOOD TEST: CPT

## 2022-06-20 PROCEDURE — 63710000001 PREDNISONE PER 1 MG: Performed by: INTERNAL MEDICINE

## 2022-06-20 RX ORDER — GUAIFENESIN 600 MG/1
1200 TABLET, EXTENDED RELEASE ORAL EVERY 12 HOURS SCHEDULED
Start: 2022-06-20

## 2022-06-20 RX ORDER — VALSARTAN 160 MG/1
240 TABLET ORAL DAILY
Qty: 90 TABLET | Refills: 3
Start: 2022-06-20 | End: 2022-07-08 | Stop reason: ALTCHOICE

## 2022-06-20 RX ORDER — BISACODYL 10 MG
10 SUPPOSITORY, RECTAL RECTAL DAILY PRN
Start: 2022-06-20 | End: 2022-07-08 | Stop reason: ALTCHOICE

## 2022-06-20 RX ORDER — BISACODYL 5 MG/1
5 TABLET, DELAYED RELEASE ORAL DAILY PRN
Start: 2022-06-20

## 2022-06-20 RX ORDER — ONDANSETRON 4 MG/1
4 TABLET, FILM COATED ORAL EVERY 6 HOURS PRN
Start: 2022-06-20

## 2022-06-20 RX ORDER — PREDNISONE 10 MG/1
TABLET ORAL
Qty: 18 TABLET | Refills: 0
Start: 2022-06-21 | End: 2022-06-30

## 2022-06-20 RX ORDER — POLYETHYLENE GLYCOL 3350 17 G/17G
17 POWDER, FOR SOLUTION ORAL DAILY PRN
Start: 2022-06-20

## 2022-06-20 RX ORDER — AMOXICILLIN 250 MG
2 CAPSULE ORAL 2 TIMES DAILY
Start: 2022-06-20 | End: 2023-02-14

## 2022-06-20 RX ORDER — NYSTATIN 100000 [USP'U]/G
POWDER TOPICAL EVERY 12 HOURS SCHEDULED
Start: 2022-06-20 | End: 2022-10-07 | Stop reason: SDUPTHER

## 2022-06-20 RX ADMIN — NYSTATIN: 100000 POWDER TOPICAL at 08:31

## 2022-06-20 RX ADMIN — CLOPIDOGREL BISULFATE 75 MG: 75 TABLET ORAL at 08:31

## 2022-06-20 RX ADMIN — PREDNISONE 40 MG: 20 TABLET ORAL at 08:31

## 2022-06-20 RX ADMIN — IPRATROPIUM BROMIDE AND ALBUTEROL SULFATE 3 ML: .5; 3 SOLUTION RESPIRATORY (INHALATION) at 11:57

## 2022-06-20 RX ADMIN — GUAIFENESIN 1200 MG: 600 TABLET, EXTENDED RELEASE ORAL at 08:30

## 2022-06-20 RX ADMIN — INSULIN LISPRO 3 UNITS: 100 INJECTION, SOLUTION INTRAVENOUS; SUBCUTANEOUS at 11:52

## 2022-06-20 RX ADMIN — ENOXAPARIN SODIUM 40 MG: 40 INJECTION SUBCUTANEOUS at 08:30

## 2022-06-20 RX ADMIN — VALSARTAN 240 MG: 80 TABLET, FILM COATED ORAL at 08:31

## 2022-06-20 RX ADMIN — Medication 10 ML: at 08:30

## 2022-06-20 NOTE — DISCHARGE SUMMARY
[facility] Medicine Services  DISCHARGE SUMMARY    Patient Name: Georgia Velázquez  : 1945  MRN: [id number]    Date of Admission: 2022 11:03 PM  Date of Discharge:  2022  Primary Care Physician: Georgina Ahumada APRN    Consults     Date and Time Order Name Status Description    2022 11:21 AM Inpatient Pulmonology Consult Completed           Hospital Course     Presenting Problem:   COPD with acute exacerbation (HCC) [J44.1]    Active Hospital Problems    Diagnosis  POA   • **COPD with acute exacerbation (HCC) [J44.1]  Yes   • Parainfluenza infection [B34.8]  Yes   • Hypoxia [R09.02]  Yes   • Obesity, morbid, BMI 50 or higher (HCC) [E66.01]  Yes   • Type 2 diabetes mellitus with stage 3b chronic kidney disease, without long-term current use of insulin (HCC) [E11.22, N18.32]  Yes   • Diastolic dysfunction [I51.89]  Yes   • Essential hypertension [I10]  Yes   • Hyperlipidemia [E78.5]  Yes   • Coronary artery disease [I25.10]  Yes   • GERD (gastroesophageal reflux disease) [K21.9]  Yes      Resolved Hospital Problems   No resolved problems to display.          Hospital Course:  Georgia Velázquez is a 77 y.o. female with DM2, diastolic CHF, morbid obesity, COPD who does not use oxygen at baseline who presented with several days of worsening respiratory status, wheezing, cough with clear chest x-ray. Found to have parainfluenza virus resulting in COPD exacerbation     This patient's problems and plans were partially entered by my partner and updated as appropriate by me 22.     Acute exacerbation of COPD  Acute Parainfluenza infection  Acute hypoxic respiratory insufficiency  - Continue PO prednisone with a taper at discharge  -- Continue inhaled bronchodilators  - S/P Azithromycin x 5 days  -- Flutter valve  -- F/U with pulmonology in about 1 month     DM type 2, A1c 5.6%, without long-term use of insulin  - She is diet controlled    Coronary artery  disease  Hypertension  Hyperlipidemia  - Continue valsartan-increased dose to 240mg daily  -- D/C'd carvedilol due to bradycardia with 1st degree AV block      Discharge Follow Up Recommendations for outpatient labs/diagnostics:  F/U with PCP within 1 week of rehab completion  F/U with [facility]monology in about 1 month    Day of Discharge     HPI:   She is feeling well today.  Excited to go to rehab.    Review of Systems  Gen- No fevers, chills  Resp- Improved dyspnea      Vital Signs:   Temp:  [98 °F (36.7 °C)-98.7 °F (37.1 °C)] 98.3 °F (36.8 °C)  Heart Rate:  [51-74] 68  Resp:  [18-20] 18  BP: (140-173)/(60-84) 159/60  Flow (L/min):  [2] 2      Physical Exam:  Constitutional: No acute distress, awake, alert, laying in bed  HENT: NCAT, mucous membranes moist  Respiratory: Occasional rhonchi bilaterally, respiratory effort normal   Cardiovascular: RRR, no murmurs, rubs, or gallops  Gastrointestinal: Soft, nontender, nondistended  Musculoskeletal: No bilateral ankle edema  Psychiatric: Appropriate affect, cooperative  Neurologic: Speech clear and fluent  Skin: No rashes on exposed surfaces    Pertinent  and/or Most Recent Results     LAB RESULTS:      Lab 06/17/22  0721 06/16/22  0619 06/16/22  0440 06/15/22  0936 06/14/22  0535   WBC 13.32* 13.42*  --  14.58* 10.79   HEMOGLOBIN 13.1 13.5  --  13.2 13.2   HEMATOCRIT 40.0 39.4  --  40.5 40.3   PLATELETS 158 170  --  161 139*   NEUTROS ABS 10.67* 10.09*  --  12.05* 8.45*   IMMATURE GRANS (ABS) 0.17* 0.08*  --  0.06* 0.15*   LYMPHS ABS 1.84 2.39  --  1.92 1.64   MONOS ABS 0.61 0.82  --  0.54 0.53   EOS ABS 0.00 0.00  --  0.00 0.00   MCV 95.0 89.7  --  93.8 92.0   PROCALCITONIN  --   --  0.07  --   --          Lab 06/20/22  0529 06/17/22  0721 06/16/22  0817 06/16/22  0440 06/15/22  0936 06/14/22  0535   SODIUM 136 140  --  135* 136 136   POTASSIUM 5.2 5.1 4.8 5.8* 4.5 4.4   CHLORIDE 103 106  --  103 102 103   CO2 23.0 26.0  --  23.0 26.0 24.0   ANION  GAP 10.0 8.0  --  9.0 8.0 9.0   BUN 34* 48*  --  49* 55* 41*   CREATININE 1.09* 1.33*  --  1.42* 1.50* 1.35*   EGFR 51.8* 40.8*  --  37.7* 35.3* 40.1*   GLUCOSE 132* 187*  --  140* 183* 194*   CALCIUM 8.8 8.9  --  9.0 9.2 9.2                         Brief Urine Lab Results  (Last result in the past 365 days)      Color   Clarity   Blood   Leuk Est   Nitrite   Protein   CREAT   Urine HCG        10/15/21 1356             100 mg/dL             Microbiology Results (last 10 days)     Procedure Component Value - Date/Time    COVID PRE-OP / PRE-PROCEDURE SCREENING ORDER (NO ISOLATION) - Swab, Nasopharynx [[id number]]  (Normal) Collected: 06/13/22 0036    Lab Status: Final result Specimen: Swab from Nasopharynx Updated: 06/13/22 0108    Narrative:      The following orders were created for panel order COVID PRE-OP / PRE-PROCEDURE SCREENING ORDER (NO ISOLATION) - Swab, Nasopharynx.  Procedure                               Abnormality         Status                     ---------                               -----------         ------                     COVID-19 and FLU A/B PCR...[[id number]]  Normal              Final result                 Please view results for these tests on the individual orders.    COVID-19 and FLU A/B PCR - Swab, Nasopharynx [[id number]]  (Normal) Collected: 06/13/22 0036    Lab Status: Final result Specimen: Swab from Nasopharynx Updated: 06/13/22 0108     COVID19 Not Detected     Influenza A PCR Not Detected     Influenza B PCR Not Detected    Narrative:      Fact sheet for providers: https://www.fda.gov/media/107144/download    Fact sheet for patients: https://www.fda.gov/media/518445/download    Test performed by PCR.    Respiratory Panel PCR w/COVID-19(SARS-CoV-2) [facility] In-House, NP Swab in UTM/VTM, 3-4 HR TAT - Swab, Nasopharynx [[id number]]  (Abnormal) Collected: 06/13/22 0036    Lab Status: Final result Specimen: Swab from Nasopharynx Updated: 06/13/22 0341     ADENOVIRUS,  PCR Not Detected     Coronavirus 229E Not Detected     Coronavirus HKU1 Not Detected     Coronavirus NL63 Not Detected     Coronavirus OC43 Not Detected     COVID19 Not Detected     Human Metapneumovirus Not Detected     Human Rhinovirus/Enterovirus Not Detected     Influenza A PCR Not Detected     Influenza B PCR Not Detected     Parainfluenza Virus 1 Not Detected     Parainfluenza Virus 2 Not Detected     Parainfluenza Virus 3 Detected     Parainfluenza Virus 4 Not Detected     RSV, PCR Not Detected     Bordetella pertussis pcr Not Detected     Bordetella parapertussis PCR Not Detected     Chlamydophila pneumoniae PCR Not Detected     Mycoplasma pneumo by PCR Not Detected    Narrative:      In the setting of a positive respiratory panel with a viral infection PLUS a negative procalcitonin without other underlying concern for bacterial infection, consider observing off antibiotics or discontinuation of antibiotics and continue supportive care. If the respiratory panel is positive for atypical bacterial infection (Bordetella pertussis, Chlamydophila pneumoniae, or Mycoplasma pneumoniae), consider antibiotic de-escalation to target atypical bacterial infection.          XR Chest 1 View    Result Date: 6/13/2022  EXAMINATION: Chest one view. DATE: 6/13/2022. COMPARISON: 3/17/2021. CLINICAL HISTORY: Shortness of breath. FINDINGS: The lungs and pleural spaces are clear. The cardiomediastinal silhouette his mild enlarged and the pulmonary vessels are within normal limits.     Mild cardiomegaly with no acute findings otherwise seen. Electronically signed by:  Brandon Sanderson D.O.  6/12/2022 11:41 PM Mountain Time    XR Abdomen KUB    Result Date: 6/18/2022  DATE OF EXAM: 6/18/2022 12:46 PM  PROCEDURE: XR ABDOMEN KUB-  INDICATIONS: Constipation, nausea; J44.1-Chronic obstructive pulmonary disease with (acute) exacerbation; J96.01-Acute respiratory failure with hypoxia; Z86.79-Personal history of other diseases of the  circulatory system  COMPARISON: 12/5/2017  TECHNIQUE: Single radiographic view of the abdomen was obtained.  FINDINGS: Bowel gas pattern is nonobstructive, with gas is seen scattered throughout large and small bowel. There appears to be formed stool in the right colon and proximal transverse colon, and relatively little stool elsewhere. Incidental note is made of previous cholecystectomy and lower lumbar kyphoplasty.      Nonobstructive bowel gas pattern, with mild, if any fecal stasis.  This report was finalized on 6/18/2022 1:31 PM by Dr. Roc Jarrett MD.        Results for orders placed during the hospital encounter of 10/20/21    Duplex Carotid Ultrasound CAR    Interpretation Summary  · Right internal carotid artery stenosis of 50-69%.  · Left internal carotid artery stenosis of 50-69%.      Results for orders placed during the hospital encounter of 10/20/21    Duplex Carotid Ultrasound CAR    Interpretation Summary  · Right internal carotid artery stenosis of 50-69%.  · Left internal carotid artery stenosis of 50-69%.      Results for orders placed during the hospital encounter of 03/17/21    Adult Transthoracic Echo Complete With Contrast if Necessary Per Protocol    Interpretation Summary  · Calculated left ventricular EF = 55% Estimated left ventricular EF was in agreement with the calculated left ventricular EF. Left ventricular systolic function is normal.  · Left ventricular diastolic function is consistent with (grade II w/high LAP) pseudonormalization.  · Estimated right ventricular systolic pressure from tricuspid regurgitation is normal (<35 mmHg). Calculated right ventricular systolic pressure from tricuspid regurgitation is 32 mmHg.  · Endocardial definition of the apical segments is limited, unable to definitively assess apical regional wall motion abnormalities.      Plan for Follow-up of Pending Labs/Results: None pending    Discharge Details        Discharge Medications      New Medications       Instructions Start Date   bisacodyl 5 MG EC tablet  Commonly known as: DULCOLAX   5 mg, Oral, Daily PRN      bisacodyl 10 MG suppository  Commonly known as: DULCOLAX   10 mg, Rectal, Daily PRN      guaiFENesin 600 MG 12 hr tablet  Commonly known as: MUCINEX   1,200 mg, Oral, Every 12 Hours Scheduled      nystatin 192386 UNIT/GM powder  Commonly known as: MYCOSTATIN   Topical, Every 12 Hours Scheduled      ondansetron 4 MG tablet  Commonly known as: ZOFRAN   4 mg, Oral, Every 6 Hours PRN      polyethylene glycol 17 g packet  Commonly known as: MIRALAX   17 g, Oral, Daily PRN      predniSONE 10 MG tablet  Commonly known as: DELTASONE   Take 3 tablets by mouth Daily With Breakfast for 3 days, THEN 2 tablets Daily With Breakfast for 3 days, THEN 1 tablet Daily With Breakfast for 3 days.   Start Date: June 21, 2022     sennosides-docusate 8.6-50 MG per tablet  Commonly known as: PERICOLACE   2 tablets, Oral, 2 Times Daily         Changes to Medications      Instructions Start Date   valsartan 160 MG tablet  Commonly known as: Diovan  What changed: how much to take   240 mg, Oral, Daily         Continue These Medications      Instructions Start Date   albuterol sulfate  (90 Base) MCG/ACT inhaler  Commonly known as: PROVENTIL HFA;VENTOLIN HFA;PROAIR HFA   2 puffs, Inhalation, Every 4 Hours PRN      atorvastatin 40 MG tablet  Commonly known as: LIPITOR   40 mg, Oral, Nightly      clopidogrel 75 MG tablet  Commonly known as: PLAVIX   TAKE ONE TABLET BY MOUTH DAILY      ipratropium-albuterol 0.5-2.5 mg/3 ml nebulizer  Commonly known as: DUO-NEB   Take 3 ml by nebulization every TID-QID prn wheezing or shortness of breath      nitroglycerin 0.4 MG SL tablet  Commonly known as: NITROSTAT   0.4 mg, Sublingual, Every 5 Minutes PRN, Take no more than 3 doses in 15 minutes.      Vitamin D3 50 MCG (2000 UT) capsule   2,000 Units, Oral, Daily, 2000 units          Stop These Medications    carvedilol 3.125 MG tablet  Commonly  known as: COREG            No Known Allergies      Discharge Disposition:  Rehab Facility or Unit (DC - External)    Diet:  Hospital:  Diet Order   Procedures   • Diet Regular; Consistent Carbohydrate          CODE STATUS:    Code Status and Medical Interventions:   Ordered at: 06/13/22 0258     Medical Intervention Limits:    NO intubation (DNI)     Level Of Support Discussed With:    Patient     Code Status (Patient has no pulse and is not breathing):    No CPR (Do Not Attempt to Resuscitate)     Medical Interventions (Patient has pulse or is breathing):    Limited Support       Future Appointments   Date Time Provider Department Center   10/17/2022  2:00 PM Georgina Ahumada APRN [facility]   11/7/2022  2:00 PM [facility]  [facility]   11/7/2022  3:15 PM Armaan Samules III, MD [facility]       Additional Instructions for the Follow-ups that You Need to Schedule     Discharge Follow-up with PCP   As directed       Currently Documented PCP:    Georgina Ahumada APRN    PCP Phone Number:    [phone]     Follow Up Details: Within 1 week of rehab discharge         Discharge Follow-up with Specified Provider: [facility]; 1 Month   As directed      To: [facility]    Follow Up: 1 Month                     Sophia Cordero MD  06/20/22      Time Spent on Discharge:  I spent  26 minutes on this discharge activity which included: face-to-face encounter with the patient, reviewing the data in the system, coordination of the care with the nursing staff as well as consultants, documentation, and entering orders.

## 2022-06-20 NOTE — CASE MANAGEMENT/SOCIAL WORK
Case Management Discharge Note      Final Note: Insurance approval has gone through for [facility].  Nursing to call report to [phone]. I will discuss with patient and her daughter to see if she will be able to transport. No covid test needed.    Provided Post Acute Provider List?: Yes  Post Acute Provider List: Inpatient Rehab, Nursing Home  Provided Post Acute Provider Quality & Resource List?: Yes  Post Acute Provider Quality and Resource List: Inpatient Rehab  Delivered To: Support Person  Support Person: Omaira Nair (daughter) [phone]  Method of Delivery: Telephone    Selected Continued Care - Admitted Since 6/12/2022     Destination Coordination complete.    Service Provider Selected Services Address Phone Fax Patient Preferred    [facility] - [facility]  Skilled Nursing [address] [address] [phone] --          Durable Medical Equipment    No services have been selected for the patient.              Dialysis/Infusion    No services have been selected for the patient.              Home Medical Care    No services have been selected for the patient.              Therapy    No services have been selected for the patient.              Community Resources    No services have been selected for the patient.              Community & DME    No services have been selected for the patient.                       Final Discharge Disposition Code: 03 - skilled nursing facility (SNF)

## 2022-06-20 NOTE — PAYOR COMM NOTE
"Palumbo Georgia GIORDANO (77 y.o. Female)             Date of Birth   1945    Social Security Number       Address   [address] [place], [place] [address]    Home Phone   [phone]    MRN   [id number]       Baptism   Anabaptism    Marital Status                               Admission Date   6/12/22    Admission Type   Emergency    Admitting Provider   Sophia Cordero MD    Attending Provider   Sophia Cordero MD    Department, Room/Bed   [facility] 3F, S319/1       Discharge Date       Discharge Disposition       Discharge Destination                               Attending Provider: Sophia Cordero MD    Allergies: No Known Allergies    Isolation: Contact Drop   Infection: Other (06/13/22)   Code Status: No CPR   Advance Care Planning Activity    Ht: 154.9 cm (61\")   Wt: 122 kg (270 lb)    Admission Cmt: None   Principal Problem: COPD with acute exacerbation (HCC) [J44.1]                 Active Insurance as of 6/12/2022     Primary Coverage     Payor Plan Insurance Group Employer/Plan Group    [facility] MEDICARE REPLACEMENT [facility]AC[facility]     Payor Plan Address Payor Plan Phone Number Payor Plan Fax Number Effective Dates    [address] [address] [phone]  11/28/2019 - None Entered    [facility] [address]       Subscriber Name Subscriber Birth Date Member ID       GEORGIA PALUMBO GIULIANA 1945 [id number]           Secondary Coverage     Payor Plan Insurance Group Employer/Plan Group    [place]      Payor Plan Address Payor Plan Phone Number Payor Plan Fax Number Effective Dates    [address]   1/1/2014 - None Entered    [facility] [address]       Subscriber Name Subscriber Birth Date Member ID       GEORGIA PALUMBO GIULIANA 1945 [id number]                 Emergency Contacts      (Rel.) Home Phone Work Phone Mobile Phone    Patel,Omaira (Daughter) [phone] -- --    EberFlkaito (Son) " [phone] -- --            Del: TRACI [id number] Tax ID [id number]  Insurance Information                [facility] MEDICARE REPLACEMENT Phone: [phone]    Subscriber: Georgia Velázquez Subscriber#: [id number]    Group#: [id number] Precert#: --        [place] Phone: --    Subscriber: Georgia Velázquez Subscriber#: [id number]    Group#: -- Precert#: --          Vital Signs (last day)     Date/Time Temp Temp src Pulse Resp BP Patient Position SpO2    06/20/22 1157 -- -- -- -- -- -- 96    06/20/22 1107 98.3 (36.8) Oral 60 18 159/60 Lying --    06/20/22 0831 -- -- 61 -- -- -- --    06/20/22 0701 98 (36.7) Oral 53 18 162/84 Lying --    06/20/22 0352 -- -- 51 18 173/75 Lying 99    06/19/22 2342 98.2 (36.8) Oral 55 18 156/69 Lying 98    06/19/22 1931 -- -- 73 18 -- Lying 95    06/19/22 1857 98.5 (36.9) Oral 74 18 162/71 Lying 93    06/19/22 1500 98.7 (37.1) Oral 61 20 140/64 Lying 93    06/19/22 1459 -- -- 62 -- 140/64 -- --    06/19/22 1256 -- -- 58 16 -- -- 96    06/19/22 1115 97.5 (36.4) Oral 54 20 127/58 Lying 93    06/19/22 1000 -- -- 63 -- 153/71 -- 95    06/19/22 0810 -- -- 53 20 -- -- 99    06/19/22 0706 97.7 (36.5) Oral 50 20 141/75 Lying 99    06/19/22 0427 97.9 (36.6) Oral 68 22 187/77 Lying 95            Current Facility-Administered Medications   Medication Dose Route Frequency Provider Last Rate Last Admin   • acetaminophen (TYLENOL) tablet 650 mg  650 mg Oral Q4H PRN Carlton Crews DO   650 mg at 06/19/22 2053    Or   • acetaminophen (TYLENOL) 160 MG/5ML solution 650 mg  650 mg Oral Q4H PRN Carlton Crews DO        Or   • acetaminophen (TYLENOL) suppository 650 mg  650 mg Rectal Q4H PRN Carlton Crews DO       • atorvastatin (LIPITOR) tablet 40 mg  40 mg Oral Nightly Carlton Crews DO   40 mg at 06/19/22 2046   • sennosides-docusate (PERICOLACE) 8.6-50 MG per tablet 2 tablet  2 tablet Oral BID  Sophia Cordero MD   2 tablet at 06/19/22 2046    And   • polyethylene glycol (MIRALAX) packet 17 g  17 g Oral Daily PRN Sophia Cordero MD        And   • bisacodyl (DULCOLAX) EC tablet 5 mg  5 mg Oral Daily PRN Sophia Cordero MD        And   • bisacodyl (DULCOLAX) suppository 10 mg  10 mg Rectal Daily PRN Sophia Cordero MD       • clopidogrel (PLAVIX) tablet 75 mg  75 mg Oral Daily Carlton Crews DO   75 mg at 06/20/22 0831   • dextrose (D50W) (25 g/50 mL) IV injection 25 g  25 g Intravenous Q15 Min PRN Carlton Crews DO       • dextrose (GLUTOSE) oral gel 15 g  15 g Oral Q15 Min PRN Carlton Crews DO       • Enoxaparin Sodium (LOVENOX) syringe 40 mg  40 mg Subcutaneous Q12H Karo Zuluaga PharmD   40 mg at 06/20/22 0830   • glucagon (human recombinant) (GLUCAGEN DIAGNOSTIC) injection 1 mg  1 mg Intramuscular Q15 Min PRN Carlton Crews DO       • guaiFENesin (MUCINEX) 12 hr tablet 1,200 mg  1,200 mg Oral Q12H Carlton Crews DO   1,200 mg at 06/20/22 0830   • Insulin Lispro (humaLOG) injection 0-7 Units  0-7 Units Subcutaneous TID AC Carlton Crews DO   3 Units at 06/20/22 1152   • ipratropium-albuterol (DUO-NEB) nebulizer solution 3 mL  3 mL Nebulization Q4H PRN Carlton Crews DO       • ipratropium-albuterol (DUO-NEB) nebulizer solution 3 mL  3 mL Nebulization Q6H While Awake - RT Sophia Cordero MD   3 mL at 06/20/22 1157   • Magnesium Sulfate 2 gram Bolus, followed by 8 gram infusion (total Mg dose 10 grams)- Mg less than or equal to 1mg/dL  2 g Intravenous PRN Carlton Crews DO        Or   • Magnesium Sulfate 2 gram / 50mL Infusion (GIVE X 3 BAGS TO EQUAL 6GM TOTAL DOSE) - Mg 1.1 - 1.5 mg/dl  2 g Intravenous PRN Carlton Crews DO        Or   • Magnesium Sulfate 4 gram infusion- Mg 1.6-1.9 mg/dL  4 g Intravenous PRN Carlton Crews DO       • nystatin (MYCOSTATIN) powder   Topical Q12H Jennie Gilmore MD   Given at 06/20/22 0831   •  ondansetron (ZOFRAN) tablet 4 mg  4 mg Oral Q6H PRN Carlton Crews DO   4 mg at 06/18/22 0903    Or   • ondansetron (ZOFRAN) injection 4 mg  4 mg Intravenous Q6H PRN Carlton Crews DO   4 mg at 06/19/22 0950   • oxyCODONE (ROXICODONE) immediate release tablet 5 mg  5 mg Oral Q4H PRN Jennie Gilmore MD   5 mg at 06/18/22 0534   • Pharmacy Consult - MTM   Does not apply Daily Karo Zuluaga, PharmD       • potassium chloride (MICRO-K) CR capsule 40 mEq  40 mEq Oral PRN Carlton Crews DO        Or   • potassium chloride (KLOR-CON) packet 40 mEq  40 mEq Oral PRN Carlton Crews DO        Or   • potassium chloride 10 mEq in 100 mL IVPB  10 mEq Intravenous Q1H PRN Carlton Crews DO       • predniSONE (DELTASONE) tablet 40 mg  40 mg Oral Daily With Breakfast Jennie Gilmore MD   40 mg at 06/20/22 0831   • promethazine (PHENERGAN) tablet 12.5 mg  12.5 mg Oral Q6H PRN Sophia Cordero MD   12.5 mg at 06/18/22 1119    Or   • promethazine (PHENERGAN) suppository 12.5 mg  12.5 mg Rectal Q6H PRN Sophia Cordero MD       • sodium chloride 0.9 % flush 10 mL  10 mL Intravenous PRN Clifford Cotton MD       • sodium chloride 0.9 % flush 10 mL  10 mL Intravenous Q12H Carlton Crews DO   10 mL at 06/20/22 0830   • sodium chloride 0.9 % flush 10 mL  10 mL Intravenous PRN Carlton Crews DO       • valsartan (DIOVAN) tablet 240 mg  240 mg Oral Daily Sophia Cordero MD   240 mg at 06/20/22 0831       Lab Results (last 24 hours)     Procedure Component Value Units Date/Time    POC Glucose Once [[id number]]  (Abnormal) Collected: 06/20/22 1108    Specimen: Blood Updated: 06/20/22 1111     Glucose 236 mg/dL      Comment: Meter: [id number] : [id number] Donavon Garcia       Basic Metabolic Panel [[id number]]  (Abnormal) Collected: 06/20/22 0529    Specimen: Blood Updated: 06/20/22 0750     Glucose 132 mg/dL      BUN 34 mg/dL      Creatinine 1.09 mg/dL      Sodium 136 mmol/L       "Potassium 5.2 mmol/L      Comment: Slight hemolysis detected by analyzer. Results may be affected.        Chloride 103 mmol/L      CO2 23.0 mmol/L      Calcium 8.8 mg/dL      BUN/Creatinine Ratio 31.2     Anion Gap 10.0 mmol/L      eGFR 51.8 mL/min/1.73      Comment: National Kidney Foundation and American Society of Nephrology (ASN) Task Force recommended calculation based on the Chronic Kidney Disease Epidemiology Collaboration (CKD-EPI) equation refit without adjustment for race.       Narrative:      GFR Normal >60  Chronic Kidney Disease <60  Kidney Failure <15      POC Glucose Once [[id number]]  (Normal) Collected: 06/20/22 0703    Specimen: Blood Updated: 06/20/22 0713     Glucose 119 mg/dL      Comment: Meter: [id number] : [id number] Donavon Garcia       POC Glucose Once [[id number]]  (Abnormal) Collected: 06/19/22 1625    Specimen: Blood Updated: 06/19/22 1627     Glucose 182 mg/dL      Comment: Meter: [id number] : [id number] Jacob Nicole           Imaging Results (Last 24 Hours)     ** No results found for the last 24 hours. **        ECG/EMG Results (last 24 hours)     ** No results found for the last 24 hours. **        Orders (last 24 hrs)      Start     Ordered    06/20/22 1112  POC Glucose Once  PROCEDURE ONCE         06/20/22 1108    06/20/22 0900  valsartan (DIOVAN) tablet 240 mg  Daily         06/19/22 1213    06/20/22 0714  POC Glucose Once  PROCEDURE ONCE         06/20/22 0703    06/20/22 0600  Basic Metabolic Panel  Morning Draw         06/19/22 1216    06/19/22 1628  POC Glucose Once  PROCEDURE ONCE         06/19/22 1625    06/19/22 1315  valsartan (DIOVAN) tablet 80 mg  Once         06/19/22 1216    06/18/22 0927  promethazine (PHENERGAN) tablet 12.5 mg  Every 6 Hours PRN        \"Or\" Linked Group Details    06/18/22 0927    06/18/22 0927  promethazine (PHENERGAN) suppository 12.5 mg  Every 6 Hours PRN        \"Or\" Linked Group Details    06/18/22 0927    06/17/22 1900  " "ipratropium-albuterol (DUO-NEB) nebulizer solution 3 mL  Every 6 Hours While Awake - RT         06/17/22 1515    06/17/22 1145  sennosides-docusate (PERICOLACE) 8.6-50 MG per tablet 2 tablet  2 Times Daily        \"And\" Linked Group Details    06/17/22 1052    06/17/22 1051  polyethylene glycol (MIRALAX) packet 17 g  Daily PRN        \"And\" Linked Group Details    06/17/22 1052    06/17/22 1051  bisacodyl (DULCOLAX) EC tablet 5 mg  Daily PRN        \"And\" Linked Group Details    06/17/22 1052    06/17/22 1051  bisacodyl (DULCOLAX) suppository 10 mg  Daily PRN        \"And\" Linked Group Details    06/17/22 1052    06/16/22 1000  Incentive Spirometry  Every 4 Hours While Awake       06/16/22 0917    06/15/22 0800  predniSONE (DELTASONE) tablet 40 mg  Daily With Breakfast         06/14/22 0851    06/14/22 1659  oxyCODONE (ROXICODONE) immediate release tablet 5 mg  Every 4 Hours PRN         06/14/22 1659    06/14/22 0900  Enoxaparin Sodium (LOVENOX) syringe 40 mg  Every 12 Hours Scheduled         06/14/22 0817    06/13/22 2100  atorvastatin (LIPITOR) tablet 40 mg  Nightly         06/13/22 1019    06/13/22 1800  Oral Care  2 Times Daily       06/13/22 1019    06/13/22 1800  Apply Moisture Barrier After Any Incontinence  2 Times Daily       06/13/22 1615    06/13/22 1200  Vital Signs  Every 4 Hours       06/13/22 1019    06/13/22 1145  nystatin (MYCOSTATIN) powder  Every 12 Hours Scheduled         06/13/22 1046    06/13/22 1115  clopidogrel (PLAVIX) tablet 75 mg  Daily         06/13/22 1019    06/13/22 1115  sodium chloride 0.9 % flush 10 mL  Every 12 Hours Scheduled         06/13/22 1019    06/13/22 1020  Intake & Output  Every Shift       06/13/22 1019    06/13/22 1019  sodium chloride 0.9 % flush 10 mL  As Needed         06/13/22 1019    06/13/22 1019  acetaminophen (TYLENOL) tablet 650 mg  Every 4 Hours PRN        \"Or\" Linked Group Details    06/13/22 1019    06/13/22 1019  acetaminophen (TYLENOL) 160 MG/5ML solution 650 " "mg  Every 4 Hours PRN        \"Or\" Linked Group Details    06/13/22 1019    06/13/22 1019  acetaminophen (TYLENOL) suppository 650 mg  Every 4 Hours PRN        \"Or\" Linked Group Details    06/13/22 1019    06/13/22 1019  ondansetron (ZOFRAN) tablet 4 mg  Every 6 Hours PRN        \"Or\" Linked Group Details    06/13/22 1019    06/13/22 1019  ondansetron (ZOFRAN) injection 4 mg  Every 6 Hours PRN        \"Or\" Linked Group Details    06/13/22 1019    06/13/22 0900  guaiFENesin (MUCINEX) 12 hr tablet 1,200 mg  Every 12 Hours Scheduled         06/13/22 0259    06/13/22 0900  Pharmacy Consult - MT  Daily         06/13/22 0744    06/13/22 0730  Insulin Lispro (humaLOG) injection 0-7 Units  3 Times Daily Before Meals         06/13/22 0307    06/13/22 0700  POC Glucose TID AC  3 Times Daily Before Meals       06/13/22 0307    06/13/22 0600  Incentive Spirometry  Every 4 Hours While Awake       06/13/22 0259    06/13/22 0307  dextrose (GLUTOSE) oral gel 15 g  Every 15 Minutes PRN         06/13/22 0307    06/13/22 0307  dextrose (D50W) (25 g/50 mL) IV injection 25 g  Every 15 Minutes PRN         06/13/22 0307    06/13/22 0307  glucagon (human recombinant) (GLUCAGEN DIAGNOSTIC) injection 1 mg  Every 15 Minutes PRN         06/13/22 0307    06/13/22 0301  Patient Currently On Electrolyte Replacement Protocol - Please Refer to MAR for Protocol Details  Misc Nursing Order (Specify)  Daily      Comments: Patient Currently On Electrolyte Replacement Protocol - Please Refer to MAR for Protocol Details    06/13/22 0300    06/13/22 0300  Magnesium Sulfate 2 gram Bolus, followed by 8 gram infusion (total Mg dose 10 grams)- Mg less than or equal to 1mg/dL  As Needed        \"Or\" Linked Group Details    06/13/22 0300    06/13/22 0300  Magnesium Sulfate 2 gram / 50mL Infusion (GIVE X 3 BAGS TO EQUAL 6GM TOTAL DOSE) - Mg 1.1 - 1.5 mg/dl  As Needed        \"Or\" Linked Group Details    06/13/22 0300 06/13/22 0300  Magnesium Sulfate 4 gram " "infusion- Mg 1.6-1.9 mg/dL  As Needed        \"Or\" Linked Group Details    06/13/22 0300    06/13/22 0300  potassium chloride (MICRO-K) CR capsule 40 mEq  As Needed        \"Or\" Linked Group Details    06/13/22 0300    06/13/22 0300  potassium chloride (KLOR-CON) packet 40 mEq  As Needed        \"Or\" Linked Group Details    06/13/22 0300    06/13/22 0300  potassium chloride 10 mEq in 100 mL IVPB  Every 1 Hour PRN        \"Or\" Linked Group Details    06/13/22 0300    06/13/22 0258  ipratropium-albuterol (DUO-NEB) nebulizer solution 3 mL  Every 4 Hours PRN         06/13/22 0258    06/12/22 2307  sodium chloride 0.9 % flush 10 mL  As Needed         06/12/22 2307    Unscheduled  Up in Chair  As Needed       06/13/22 1019    Unscheduled  Magnesium  As Needed       06/13/22 0300    Unscheduled  Potassium  As Needed       06/13/22 0300    Unscheduled  Wound Care  As Needed       06/13/22 1615                Physician Progress Notes (last 24 hours)  Notes from 06/19/22 1300 through 06/20/22 1300   No notes of this type exist for this encounter.         Consult Notes (last 24 hours)  Notes from 06/19/22 1300 through 06/20/22 1300   No notes of this type exist for this encounter.         "

## 2022-06-20 NOTE — DISCHARGE PLACEMENT REQUEST
"Renuka Palumbo (77 y.o. Female)             Date of Birth   1945    Social Security Number       Address   [address] [place] [address]    Home Phone   [phone]    MRN   [id number]       Samaritan   Religious    Marital Status                               Admission Date   6/12/22    Admission Type   Emergency    Admitting Provider   Sophia Cordero MD    Attending Provider   Sophia Cordero MD    Department, Room/Bed   [facility] 3F, S319/1       Discharge Date       Discharge Disposition   Rehab Facility or Unit (DC - External)    Discharge Destination                               Attending Provider: Sophia Cordero MD    Allergies: No Known Allergies    Isolation: Contact Drop   Infection: Other (06/13/22)   Code Status: No CPR   Advance Care Planning Activity    Ht: 154.9 cm (61\")   Wt: 122 kg (270 lb)    Admission Cmt: None   Principal Problem: COPD with acute exacerbation (HCC) [J44.1]                 Active Insurance as of 6/12/2022     Primary Coverage     Payor Plan Insurance Group Employer/Plan Group    [facility] MEDICARE REPLACEMENT [facility] MEDICARE REPLACEMENT [facility]     Payor Plan Address Payor Plan Phone Number Payor Plan Fax Number Effective Dates    [address] [address] [phone]  11/28/2019 - None Entered    [facility] [id number]       Subscriber Name Subscriber Birth Date Member ID       RENUKA PALUMBO 1945 [id number]           Secondary Coverage     Payor Plan Insurance Group Employer/Plan Group    [place]      Payor Plan Address Payor Plan Phone Number Payor Plan Fax Number Effective Dates    [address]   1/1/2014 - None Entered    [facility] [phone]       Subscriber Name Subscriber Birth Date Member ID       RENUKA PALUMBO 1945 [id number]                 Emergency Contacts      (Rel.) Home Phone Work Phone Mobile Phone    Omaira Patel (Daughter) [phone] " -- --    Flakito Velázquez (Son) [phone] -- --            Insurance Information                [employer] [facility] MEDICARE REPLACEMENT/[employer] MEDICARE REPLACEMENT Phone: [phone]    Subscriber: Georgia Velázquez Subscriber#: [id number]    Group#: [id number] Precert#: --        [employer] Phone: --    Subscriber: Georgia Velázquez Subscriber#: [id number]    Group#: -- Precert#: --               Discharge Summary      Sophia Cordero MD at 22 1335              [facility] Medicine Services  DISCHARGE SUMMARY    Patient Name: Georgia Velázquez  : 1945  MRN: [id number]    Date of Admission: 2022 11:03 PM  Date of Discharge:  2022  Primary Care Physician: Georgina Ahumada APRN    Consults     Date and Time Order Name Status Description    2022 11:21 AM Inpatient Pulmonology Consult Completed           Hospital Course     Presenting Problem:   COPD with acute exacerbation (HCC) [J44.1]    Active Hospital Problems    Diagnosis  POA   • **COPD with acute exacerbation (HCC) [J44.1]  Yes   • Parainfluenza infection [B34.8]  Yes   • Hypoxia [R09.02]  Yes   • Obesity, morbid, BMI 50 or higher (HCC) [E66.01]  Yes   • Type 2 diabetes mellitus with stage 3b chronic kidney disease, without long-term current use of insulin (HCC) [E11.22, N18.32]  Yes   • Diastolic dysfunction [I51.89]  Yes   • Essential hypertension [I10]  Yes   • Hyperlipidemia [E78.5]  Yes   • Coronary artery disease [I25.10]  Yes   • GERD (gastroesophageal reflux disease) [K21.9]  Yes      Resolved Hospital Problems   No resolved problems to display.          Hospital Course:  Georgia Velázquez is a 77 y.o. female with DM2, diastolic CHF, morbid obesity, COPD who does not use oxygen at baseline who presented with several days of worsening respiratory status, wheezing, cough with clear chest x-ray. Found to have parainfluenza virus resulting in COPD  exacerbation     This patient's problems and plans were partially entered by my partner and updated as appropriate by me 06/20/22.     Acute exacerbation of COPD  Acute Parainfluenza infection  Acute hypoxic respiratory insufficiency  - Continue PO prednisone with a taper at discharge  -- Continue inhaled bronchodilators  - S/P Azithromycin x 5 days  -- Flutter valve  -- F/U with pulmonology in about 1 month     DM type 2, A1c 5.6%, without long-term use of insulin  - She is diet controlled    Coronary artery disease  Hypertension  Hyperlipidemia  - Continue valsartan-increased dose to 240mg daily  -- D/C'd carvedilol due to bradycardia with 1st degree AV block      Discharge Follow Up Recommendations for outpatient labs/diagnostics:  F/U with PCP within 1 week of rehab completion  F/U with [facility] in about 1 month    Day of Discharge     HPI:   She is feeling well today.  Excited to go to rehab.    Review of Systems  Gen- No fevers, chills  Resp- Improved dyspnea      Vital Signs:   Temp:  [98 °F (36.7 °C)-98.7 °F (37.1 °C)] 98.3 °F (36.8 °C)  Heart Rate:  [51-74] 68  Resp:  [18-20] 18  BP: (140-173)/(60-84) 159/60  Flow (L/min):  [2] 2      Physical Exam:  Constitutional: No acute distress, awake, alert, laying in bed  HENT: NCAT, mucous membranes moist  Respiratory: Occasional rhonchi bilaterally, respiratory effort normal   Cardiovascular: RRR, no murmurs, rubs, or gallops  Gastrointestinal: Soft, nontender, nondistended  Musculoskeletal: No bilateral ankle edema  Psychiatric: Appropriate affect, cooperative  Neurologic: Speech clear and fluent  Skin: No rashes on exposed surfaces    Pertinent  and/or Most Recent Results     LAB RESULTS:      Lab 06/17/22  0721 06/16/22  0619 06/16/22  0440 06/15/22  0936 06/14/22  0535   WBC 13.32* 13.42*  --  14.58* 10.79   HEMOGLOBIN 13.1 13.5  --  13.2 13.2   HEMATOCRIT 40.0 39.4  --  40.5 40.3   PLATELETS 158 170  --  161 139*   NEUTROS ABS  10.67* 10.09*  --  12.05* 8.45*   IMMATURE GRANS (ABS) 0.17* 0.08*  --  0.06* 0.15*   LYMPHS ABS 1.84 2.39  --  1.92 1.64   MONOS ABS 0.61 0.82  --  0.54 0.53   EOS ABS 0.00 0.00  --  0.00 0.00   MCV 95.0 89.7  --  93.8 92.0   PROCALCITONIN  --   --  0.07  --   --          Lab 06/20/22  0529 06/17/22  0721 06/16/22  0817 06/16/22  0440 06/15/22  0936 06/14/22  0535   SODIUM 136 140  --  135* 136 136   POTASSIUM 5.2 5.1 4.8 5.8* 4.5 4.4   CHLORIDE 103 106  --  103 102 103   CO2 23.0 26.0  --  23.0 26.0 24.0   ANION GAP 10.0 8.0  --  9.0 8.0 9.0   BUN 34* 48*  --  49* 55* 41*   CREATININE 1.09* 1.33*  --  1.42* 1.50* 1.35*   EGFR 51.8* 40.8*  --  37.7* 35.3* 40.1*   GLUCOSE 132* 187*  --  140* 183* 194*   CALCIUM 8.8 8.9  --  9.0 9.2 9.2                         Brief Urine Lab Results  (Last result in the past 365 days)      Color   Clarity   Blood   Leuk Est   Nitrite   Protein   CREAT   Urine HCG        10/15/21 1356             100 mg/dL             Microbiology Results (last 10 days)     Procedure Component Value - Date/Time    COVID PRE-OP / PRE-PROCEDURE SCREENING ORDER (NO ISOLATION) - Swab, Nasopharynx [[id number]]  (Normal) Collected: 06/13/22 0036    Lab Status: Final result Specimen: Swab from Nasopharynx Updated: 06/13/22 0108    Narrative:      The following orders were created for panel order COVID PRE-OP / PRE-PROCEDURE SCREENING ORDER (NO ISOLATION) - Swab, Nasopharynx.  Procedure                               Abnormality         Status                     ---------                               -----------         ------                     COVID-19 and FLU A/B PCR...[[id number]]  Normal              Final result                 Please view results for these tests on the individual orders.    COVID-19 and FLU A/B PCR - Swab, Nasopharynx [[id number]]  (Normal) Collected: 06/13/22 0036    Lab Status: Final result Specimen: Swab from Nasopharynx Updated: 06/13/22 0108     COVID19 Not Detected     Influenza  A PCR Not Detected     Influenza B PCR Not Detected    Narrative:      Fact sheet for providers: https://www.fda.gov/media/521369/download    Fact sheet for patients: https://www.fda.gov/media/851851/download    Test performed by PCR.    Respiratory Panel PCR w/COVID-19(SARS-CoV-2) [facility] In-House, NP Swab in UTM/VTM, 3-4 HR TAT - Swab, Nasopharynx [[id number]]  (Abnormal) Collected: 06/13/22 0036    Lab Status: Final result Specimen: Swab from Nasopharynx Updated: 06/13/22 0341     ADENOVIRUS, PCR Not Detected     Coronavirus 229E Not Detected     Coronavirus HKU1 Not Detected     Coronavirus NL63 Not Detected     Coronavirus OC43 Not Detected     COVID19 Not Detected     Human Metapneumovirus Not Detected     Human Rhinovirus/Enterovirus Not Detected     Influenza A PCR Not Detected     Influenza B PCR Not Detected     Parainfluenza Virus 1 Not Detected     Parainfluenza Virus 2 Not Detected     Parainfluenza Virus 3 Detected     Parainfluenza Virus 4 Not Detected     RSV, PCR Not Detected     Bordetella pertussis pcr Not Detected     Bordetella parapertussis PCR Not Detected     Chlamydophila pneumoniae PCR Not Detected     Mycoplasma pneumo by PCR Not Detected    Narrative:      In the setting of a positive respiratory panel with a viral infection PLUS a negative procalcitonin without other underlying concern for bacterial infection, consider observing off antibiotics or discontinuation of antibiotics and continue supportive care. If the respiratory panel is positive for atypical bacterial infection (Bordetella pertussis, Chlamydophila pneumoniae, or Mycoplasma pneumoniae), consider antibiotic de-escalation to target atypical bacterial infection.          XR Chest 1 View    Result Date: 6/13/2022  EXAMINATION: Chest one view. DATE: 6/13/2022. COMPARISON: 3/17/2021. CLINICAL HISTORY: Shortness of breath. FINDINGS: The lungs and pleural spaces are clear. The cardiomediastinal silhouette his  mild enlarged and the pulmonary vessels are within normal limits.     Mild cardiomegaly with no acute findings otherwise seen. Electronically signed by:  Brandon Sanderson D.O.  6/12/2022 11:41 PM Mountain Time    XR Abdomen KUB    Result Date: 6/18/2022  DATE OF EXAM: 6/18/2022 12:46 PM  PROCEDURE: XR ABDOMEN KUB-  INDICATIONS: Constipation, nausea; J44.1-Chronic obstructive pulmonary disease with (acute) exacerbation; J96.01-Acute respiratory failure with hypoxia; Z86.79-Personal history of other diseases of the circulatory system  COMPARISON: 12/5/2017  TECHNIQUE: Single radiographic view of the abdomen was obtained.  FINDINGS: Bowel gas pattern is nonobstructive, with gas is seen scattered throughout large and small bowel. There appears to be formed stool in the right colon and proximal transverse colon, and relatively little stool elsewhere. Incidental note is made of previous cholecystectomy and lower lumbar kyphoplasty.      Nonobstructive bowel gas pattern, with mild, if any fecal stasis.  This report was finalized on 6/18/2022 1:31 PM by Dr. Roc Jarrett MD.        Results for orders placed during the hospital encounter of 10/20/21    Duplex Carotid Ultrasound CAR    Interpretation Summary  · Right internal carotid artery stenosis of 50-69%.  · Left internal carotid artery stenosis of 50-69%.      Results for orders placed during the hospital encounter of 10/20/21    Duplex Carotid Ultrasound CAR    Interpretation Summary  · Right internal carotid artery stenosis of 50-69%.  · Left internal carotid artery stenosis of 50-69%.      Results for orders placed during the hospital encounter of 03/17/21    Adult Transthoracic Echo Complete With Contrast if Necessary Per Protocol    Interpretation Summary  · Calculated left ventricular EF = 55% Estimated left ventricular EF was in agreement with the calculated left ventricular EF. Left ventricular systolic function is normal.  · Left ventricular diastolic function is  consistent with (grade II w/high LAP) pseudonormalization.  · Estimated right ventricular systolic pressure from tricuspid regurgitation is normal (<35 mmHg). Calculated right ventricular systolic pressure from tricuspid regurgitation is 32 mmHg.  · Endocardial definition of the apical segments is limited, unable to definitively assess apical regional wall motion abnormalities.      Plan for Follow-up of Pending Labs/Results: None pending    Discharge Details        Discharge Medications      New Medications      Instructions Start Date   bisacodyl 5 MG EC tablet  Commonly known as: DULCOLAX   5 mg, Oral, Daily PRN      bisacodyl 10 MG suppository  Commonly known as: DULCOLAX   10 mg, Rectal, Daily PRN      guaiFENesin 600 MG 12 hr tablet  Commonly known as: MUCINEX   1,200 mg, Oral, Every 12 Hours Scheduled      nystatin 209054 UNIT/GM powder  Commonly known as: MYCOSTATIN   Topical, Every 12 Hours Scheduled      ondansetron 4 MG tablet  Commonly known as: ZOFRAN   4 mg, Oral, Every 6 Hours PRN      polyethylene glycol 17 g packet  Commonly known as: MIRALAX   17 g, Oral, Daily PRN      predniSONE 10 MG tablet  Commonly known as: DELTASONE   Take 3 tablets by mouth Daily With Breakfast for 3 days, THEN 2 tablets Daily With Breakfast for 3 days, THEN 1 tablet Daily With Breakfast for 3 days.   Start Date: June 21, 2022     sennosides-docusate 8.6-50 MG per tablet  Commonly known as: PERICOLACE   2 tablets, Oral, 2 Times Daily         Changes to Medications      Instructions Start Date   valsartan 160 MG tablet  Commonly known as: Diovan  What changed: how much to take   240 mg, Oral, Daily         Continue These Medications      Instructions Start Date   albuterol sulfate  (90 Base) MCG/ACT inhaler  Commonly known as: PROVENTIL HFA;VENTOLIN HFA;PROAIR HFA   2 puffs, Inhalation, Every 4 Hours PRN      atorvastatin 40 MG tablet  Commonly known as: LIPITOR   40 mg, Oral, Nightly      clopidogrel 75 MG  tablet  Commonly known as: PLAVIX   TAKE ONE TABLET BY MOUTH DAILY      ipratropium-albuterol 0.5-2.5 mg/3 ml nebulizer  Commonly known as: DUO-NEB   Take 3 ml by nebulization every TID-QID prn wheezing or shortness of breath      nitroglycerin 0.4 MG SL tablet  Commonly known as: NITROSTAT   0.4 mg, Sublingual, Every 5 Minutes PRN, Take no more than 3 doses in 15 minutes.      Vitamin D3 50 MCG (2000 UT) capsule   2,000 Units, Oral, Daily, 2000 units          Stop These Medications    carvedilol 3.125 MG tablet  Commonly known as: COREG            No Known Allergies      Discharge Disposition:  Rehab Facility or Unit (DC - External)    Diet:  Hospital:  Diet Order   Procedures   • Diet Regular; Consistent Carbohydrate          CODE STATUS:    Code Status and Medical Interventions:   Ordered at: 06/13/22 0258     Medical Intervention Limits:    NO intubation (DNI)     Level Of Support Discussed With:    Patient     Code Status (Patient has no pulse and is not breathing):    No CPR (Do Not Attempt to Resuscitate)     Medical Interventions (Patient has pulse or is breathing):    Limited Support       Future Appointments   Date Time Provider Department Center   10/17/2022  2:00 PM Georgina Ahumada APRN [facility]   11/7/2022  2:00 PM [facility] VASC CART RM 2  [facility]  [facility]   11/7/2022  3:15 PM Armaan Samuels III, MD [facility]       Additional Instructions for the Follow-ups that You Need to Schedule     Discharge Follow-up with PCP   As directed       Currently Documented PCP:    Georgina Ahumada APRN    PCP Phone Number:    [phone]     Follow Up Details: Within 1 week of rehab discharge         Discharge Follow-up with Specified Provider: [facility]; 1 Month   As directed      To: [facility]    Follow Up: 1 Month                     Sophia Cordero MD  06/20/22      Time Spent on Discharge:  I spent  26 minutes on this discharge activity which included: face-to-face encounter  with the patient, reviewing the data in the system, coordination of the care with the nursing staff as well as consultants, documentation, and entering orders.          Electronically signed by Sophia Cordero MD at 06/20/22 1541

## 2022-06-27 ENCOUNTER — TELEPHONE (OUTPATIENT)
Dept: OTHER | Facility: HOSPITAL | Age: 77
End: 2022-06-27

## 2022-06-27 NOTE — TELEPHONE ENCOUNTER
Call made to patient home number to schedule pulmonary follow up.  Daughter answered phone and verified mother's .  Daughter reported that she preferred to schedule follow up once mother was discharged from rehab.  Number to pulmonary office given with instructions to schedule.  No there questions or concerns at this time.  NN continues to follow.

## 2022-06-29 RX ORDER — CARVEDILOL 3.12 MG/1
TABLET ORAL
Qty: 180 TABLET | OUTPATIENT
Start: 2022-06-29

## 2022-07-08 ENCOUNTER — OFFICE VISIT (OUTPATIENT)
Dept: FAMILY MEDICINE CLINIC | Facility: CLINIC | Age: 77
End: 2022-07-08

## 2022-07-08 VITALS
OXYGEN SATURATION: 98 % | TEMPERATURE: 97.8 F | HEIGHT: 61 IN | SYSTOLIC BLOOD PRESSURE: 130 MMHG | HEART RATE: 70 BPM | DIASTOLIC BLOOD PRESSURE: 68 MMHG | RESPIRATION RATE: 18 BRPM | BODY MASS INDEX: 46.67 KG/M2 | WEIGHT: 247.2 LBS

## 2022-07-08 DIAGNOSIS — J44.1 COPD WITH ACUTE EXACERBATION: ICD-10-CM

## 2022-07-08 DIAGNOSIS — B34.8 PARAINFLUENZA INFECTION: ICD-10-CM

## 2022-07-08 DIAGNOSIS — Z09 HOSPITAL DISCHARGE FOLLOW-UP: Primary | ICD-10-CM

## 2022-07-08 DIAGNOSIS — I10 ESSENTIAL HYPERTENSION: ICD-10-CM

## 2022-07-08 PROCEDURE — 99214 OFFICE O/P EST MOD 30 MIN: CPT | Performed by: NURSE PRACTITIONER

## 2022-07-08 PROCEDURE — 1111F DSCHRG MED/CURRENT MED MERGE: CPT | Performed by: NURSE PRACTITIONER

## 2022-07-09 PROBLEM — B34.8 PARAINFLUENZA INFECTION: Status: RESOLVED | Noted: 2022-06-20 | Resolved: 2022-07-09

## 2022-07-09 PROBLEM — Z09 HOSPITAL DISCHARGE FOLLOW-UP: Status: ACTIVE | Noted: 2022-07-09

## 2022-07-09 PROBLEM — J44.1 COPD WITH ACUTE EXACERBATION (HCC): Status: RESOLVED | Noted: 2022-06-13 | Resolved: 2022-07-09

## 2022-07-09 PROBLEM — J44.1 COPD WITH EXACERBATION (HCC): Status: RESOLVED | Noted: 2021-11-19 | Resolved: 2022-07-09

## 2022-07-09 NOTE — PROGRESS NOTES
Transitional Care Follow Up Visit  Subjective     Georgiajocelyn Velázquez is a 77 y.o. female who presents for a transitional care management visit.     Within 48 business hours after discharge our office contacted her via telephone to coordinate her care and needs.      I reviewed and discussed the details of that call along with the discharge summary, hospital problems, inpatient lab results, inpatient diagnostic studies, and consultation reports with Georgia.     Current outpatient and discharge medications have been reconciled for the patient.  Reviewed by: DON Veronica      Risk for Readmission (LACE) No data recorded    Patient presents today for TCM visit. She states that she is feeling much better since her discharge home and is no longer having any breathing difficulties. She is accompanied by her daughter today whom she now lives with and they have some questions about her blood pressure medication. She is unsure about what dosage of Valsartan she should be taking and why the Coreg was discontinued. Patient's daughter is a significant contributor of information for HPI and ROS.     ED to Hosp-Admission (Discharged) with Sophia Cordero MD; Clifford Cotton MD; Carlton Crews DO (06/12/2022)  High Risk Care Management Enrollment with Tatiana Albarado RN (06/21/2022)    Course During Hospital Stay: Patient admitted to [facility] on 6/12/2022 for COPD exacerbation and acute hypoxic respiratory insufficiency secondary to parainfluenza infection.  Patient responded to prednisone, inhaled bronchodilators and antibiotic therapy.  During hospitalization patient's carvedilol was discontinued due to bradycardia with first-degree AV block.  Her valsartan dosage was increased to 240 mg/day.  Patient was discharged from [facility] on 6/20/2022 to skilled nursing facility ([facility]). Patient discharged from SNF to home on 7/4/22 with [facility].     The following portions of  the patient's history were reviewed and updated as appropriate: allergies, current medications, past family history, past medical history, past social history, past surgical history and problem list.    Review of Systems   Constitutional: Positive for fatigue. Negative for activity change, appetite change, chills, diaphoresis and fever.   HENT: Negative.    Respiratory: Negative for cough, chest tightness, shortness of breath, wheezing and stridor.    Cardiovascular: Negative.    Gastrointestinal: Negative.    Neurological: Negative.        Objective   Physical Exam  Vitals and nursing note reviewed.   Constitutional:       General: She is not in acute distress.     Appearance: Normal appearance. She is well-developed. She is not ill-appearing, toxic-appearing or diaphoretic.   HENT:      Head: Normocephalic and atraumatic.   Cardiovascular:      Rate and Rhythm: Normal rate and regular rhythm.      Heart sounds: Normal heart sounds.   Pulmonary:      Effort: Pulmonary effort is normal. No tachypnea or respiratory distress.      Breath sounds: Normal breath sounds. No stridor. No decreased breath sounds, wheezing, rhonchi or rales.   Skin:     General: Skin is warm and dry.   Neurological:      General: No focal deficit present.      Mental Status: She is alert and oriented to person, place, and time.   Psychiatric:         Mood and Affect: Mood normal.         Behavior: Behavior normal. Behavior is cooperative.         Thought Content: Thought content normal.         Judgment: Judgment normal.         Assessment & Plan   Diagnoses and all orders for this visit:    1. Hospital discharge follow-up (Primary)  Assessment & Plan:  Reviewed and discussed current medications, medication changes and treatment plan.      2. Essential hypertension  Assessment & Plan:  Hypertension is Stable and controlled.  Continue current treatment regimen.  Ambulatory blood pressure monitoring.  Blood pressure will be reassessed at the  next regular appointment.  Continue current dosage of valsartan (240 mg/day).  Do not restart Coreg.      3. Parainfluenza infection  Assessment & Plan:  Infection resolved. Patient's breathing returned to baseline.      4. COPD with acute exacerbation (HCC)  Assessment & Plan:  COPD is improving with treatment.  Continue current medications.        Continue current medications.  Continue with pulmonary rehabilitation.    Discussed the nature of the medical condition(s) risks, complications, implications, management, safe and proper use of medications. Encouraged medication compliance, and keeping scheduled follow up appointments with me and any other providers.     I spent 30 minutes caring for Georgia on this date of service. This time includes time spent by me in the following activities:preparing for the visit, reviewing tests, obtaining and/or reviewing a separately obtained history, performing a medically appropriate examination and/or evaluation , counseling and educating the patient/family/caregiver and documenting information in the medical record.    Call office or RTC if symptoms fail to improve. To ER if symptoms worsen.

## 2022-07-09 NOTE — ASSESSMENT & PLAN NOTE
Hypertension is Stable and controlled.  Continue current treatment regimen.  Ambulatory blood pressure monitoring.  Blood pressure will be reassessed at the next regular appointment.  Continue current dosage of valsartan (240 mg/day).  Do not restart Coreg.

## 2022-07-12 ENCOUNTER — TELEPHONE (OUTPATIENT)
Dept: FAMILY MEDICINE CLINIC | Facility: CLINIC | Age: 77
End: 2022-07-12

## 2022-07-12 NOTE — TELEPHONE ENCOUNTER
Caller: [place]    Relationship: [facility]     Best call back number:  [phone]    Who are you requesting to speak with (clinical staff, provider,  specific staff member):   CLINICAL     What was the call regarding:  REPORTING THE PATIENT HAD A FALL ON 7-11-22  AND NO INJURIES     Do you require a callback:

## 2022-07-14 ENCOUNTER — PATIENT OUTREACH (OUTPATIENT)
Dept: CASE MANAGEMENT | Facility: OTHER | Age: 77
End: 2022-07-14

## 2022-07-14 NOTE — OUTREACH NOTE
AMBULATORY CASE MANAGEMENT NOTE    Name and Relationship of Patient/Support Person: Omaira Patel - Emergency Contact    Patient Outreach    Called patient's home regarding recent d/c from Rehab.  Spoke with daughter, Omaira, who lives with patient.  Explained role of Ambulatory .  Confirmed that [facility], PT and OT are coming to see patient since being home from Rehab.  Daughter said her mother is breathing better now; her BP has been good.  Dtr. Stated they have been to PCP appt and gone over medications; they have everything they need; patient is compliant to take medications daily; the daughter fills weekly pill container.  No needs or concerns for RN-ACM to address.        CHANDRA MORALEZ  Ambulatory Case Management    7/14/2022, 16:27 EDT

## 2022-08-01 RX ORDER — CARVEDILOL 3.12 MG/1
TABLET ORAL
Qty: 180 TABLET | OUTPATIENT
Start: 2022-08-01

## 2022-08-01 NOTE — TELEPHONE ENCOUNTER
Rx Refill Note  Requested Prescriptions     Pending Prescriptions Disp Refills   • carvedilol (COREG) 3.125 MG tablet [Pharmacy Med Name: CARVEDILOL 3.125 MG TABLET] 180 tablet      Sig: TAKE ONE TABLET BY MOUTH TWICE A DAY WITH MEALS      Last office visit with prescribing clinician: 7/8/2022      Next office visit with prescribing clinician: 10/7/2022            Nia Pearl  08/01/22, 15:09 EDT

## 2022-08-09 ENCOUNTER — TELEPHONE (OUTPATIENT)
Dept: FAMILY MEDICINE CLINIC | Facility: CLINIC | Age: 77
End: 2022-08-09

## 2022-08-09 NOTE — TELEPHONE ENCOUNTER
Caller: Omaira Patel    Relationship to patient: Emergency Contact    Best call back number: [phone]    Patient is needing: TO SCHEDULED 2ND COVID BOOSTER/ PFIZER

## 2022-08-10 ENCOUNTER — CLINICAL SUPPORT (OUTPATIENT)
Dept: FAMILY MEDICINE CLINIC | Facility: CLINIC | Age: 77
End: 2022-08-10

## 2022-08-10 DIAGNOSIS — Z23 ENCOUNTER FOR IMMUNIZATION: Primary | ICD-10-CM

## 2022-08-10 PROCEDURE — 91305 COVID-19 (PFIZER) 12+ YRS: CPT | Performed by: NURSE PRACTITIONER

## 2022-08-10 PROCEDURE — 0051A COVID-19 (PFIZER) 12+ YRS: CPT | Performed by: NURSE PRACTITIONER

## 2022-10-07 ENCOUNTER — OFFICE VISIT (OUTPATIENT)
Dept: FAMILY MEDICINE CLINIC | Facility: CLINIC | Age: 77
End: 2022-10-07

## 2022-10-07 VITALS
HEIGHT: 61 IN | HEART RATE: 76 BPM | OXYGEN SATURATION: 95 % | TEMPERATURE: 97.3 F | DIASTOLIC BLOOD PRESSURE: 84 MMHG | SYSTOLIC BLOOD PRESSURE: 122 MMHG | WEIGHT: 251.2 LBS | BODY MASS INDEX: 47.43 KG/M2

## 2022-10-07 DIAGNOSIS — E11.22 TYPE 2 DIABETES MELLITUS WITH STAGE 3B CHRONIC KIDNEY DISEASE, WITHOUT LONG-TERM CURRENT USE OF INSULIN: Primary | ICD-10-CM

## 2022-10-07 DIAGNOSIS — L30.4 INTERTRIGO: ICD-10-CM

## 2022-10-07 DIAGNOSIS — Z76.0 MEDICATION REFILL: ICD-10-CM

## 2022-10-07 DIAGNOSIS — E78.2 MIXED HYPERLIPIDEMIA: ICD-10-CM

## 2022-10-07 DIAGNOSIS — I25.83 CORONARY ARTERY DISEASE DUE TO LIPID RICH PLAQUE: ICD-10-CM

## 2022-10-07 DIAGNOSIS — N18.32 TYPE 2 DIABETES MELLITUS WITH STAGE 3B CHRONIC KIDNEY DISEASE, WITHOUT LONG-TERM CURRENT USE OF INSULIN: Primary | ICD-10-CM

## 2022-10-07 DIAGNOSIS — I10 ESSENTIAL HYPERTENSION: ICD-10-CM

## 2022-10-07 DIAGNOSIS — I25.10 CORONARY ARTERY DISEASE DUE TO LIPID RICH PLAQUE: ICD-10-CM

## 2022-10-07 LAB
EXPIRATION DATE: NORMAL
HBA1C MFR BLD: 5.5 %
Lab: NORMAL

## 2022-10-07 PROCEDURE — 83036 HEMOGLOBIN GLYCOSYLATED A1C: CPT | Performed by: NURSE PRACTITIONER

## 2022-10-07 PROCEDURE — 3044F HG A1C LEVEL LT 7.0%: CPT | Performed by: NURSE PRACTITIONER

## 2022-10-07 PROCEDURE — 99214 OFFICE O/P EST MOD 30 MIN: CPT | Performed by: NURSE PRACTITIONER

## 2022-10-07 RX ORDER — NYSTATIN 100000 [USP'U]/G
POWDER TOPICAL EVERY 12 HOURS SCHEDULED
Qty: 60 G | Refills: 3 | Status: SHIPPED | OUTPATIENT
Start: 2022-10-07

## 2022-10-07 RX ORDER — ATORVASTATIN CALCIUM 40 MG/1
40 TABLET, FILM COATED ORAL NIGHTLY
Qty: 90 TABLET | Refills: 3 | Status: SHIPPED | OUTPATIENT
Start: 2022-10-07

## 2022-10-07 RX ORDER — CLOPIDOGREL BISULFATE 75 MG/1
75 TABLET ORAL DAILY
Qty: 90 TABLET | Refills: 3 | Status: SHIPPED | OUTPATIENT
Start: 2022-10-07

## 2022-10-07 RX ORDER — VALSARTAN 160 MG/1
240 TABLET ORAL DAILY
Qty: 135 TABLET | Refills: 3 | Status: SHIPPED | OUTPATIENT
Start: 2022-10-07

## 2022-10-08 NOTE — ASSESSMENT & PLAN NOTE
Hypertension is stable and controlled.  Continue current treatment regimen.  Dietary sodium restriction.  Weight loss.  Regular aerobic exercise.  Continue current medications.  Ambulatory blood pressure monitoring.  Blood pressure will be reassessed at the next regular appointment.  Keep routine cardiology appointments.

## 2022-10-08 NOTE — PROGRESS NOTES
Follow Up Office Note     Patient Name: Georgia Velázquez  : 1945   MRN: [id number]     Chief Complaint:    Chief Complaint   Patient presents with   • Follow-up     3 month follow up    • Diabetes   • Hypertension   • Med Refill       History of Present Illness: Georgia Velázquez is a 77 y.o. female who presents today for re-evaluation/management of chronic DM2 and HTN.    Patient's DM2 has been stable and controlled with dietary modification.    Patient's HTN has been stable and controlled on current medication. Patient has upcoming appointment with her cardiologist Dr. Samuels next month.    Patient requesting refills on routine medications today.      Subjective      Review of Systems:   Review of Systems   Constitutional: Negative.    Respiratory: Negative.    Cardiovascular: Negative for chest pain and palpitations.   Skin:        Intertrigo. Stable and controlled with Nystatin.   Neurological: Negative.         Past Medical History:   Past Medical History:   Diagnosis Date   • Acute kidney injury ([facility]) 2017   • Aortic regurgitation    • Arthritis of shoulder 2016   • Body mass index (BMI) of 45.0-49.9 in adult ([facility]) 2019   • CHF (congestive heart failure) ([facility])    • Closed compression fracture of L4 lumbar vertebra 2017    Added automatically from request for surgery [id number]   • Constipation 2017   • COPD (chronic obstructive pulmonary disease) ([facility])    • Coronary artery disease 2016   • Diabetes mellitus type 2 in obese ([facility]) 2018   • Elevated alkaline phosphatase level 2018   • Elevated creatine kinase    • Essential hypertension 2016   • GERD (gastroesophageal reflux disease) 2016   • Glaucoma 2020   • History of kyphoplasty 2018   • Hyperlipidemia    • Lumbar facet arthropathy 2018   • Lumbar stenosis with neurogenic claudication 2018   • Morbid obesity due to excess calories ([facility]) 2018   • Myocardial  infarction (HCC) 05/18/2016   • Osteoporosis 05/18/2016   • Physical deconditioning 01/30/2018   • Retinal emboli, 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   • Sepsis ([facility])     uro   • Stroke ([facility])    • Urinary incontinence without sensory awareness 02/26/2018         Medications:     Current Outpatient Medications:   •  albuterol sulfate  (90 Base) MCG/ACT inhaler, Inhale 2 puffs Every 4 (Four) Hours As Needed for Wheezing or Shortness of Air., Disp: 18 g, Rfl: 3  •  atorvastatin (LIPITOR) 40 MG tablet, Take 1 tablet by mouth Every Night., Disp: 90 tablet, Rfl: 3  •  bisacodyl (DULCOLAX) 5 MG EC tablet, Take 1 tablet by mouth Daily As Needed for Constipation (Use if polyethylene glycol is ineffective)., Disp: , Rfl:   •  Cholecalciferol (Vitamin D3) 50 MCG (2000 UT) capsule, Take 2,000 Units by mouth Daily. 2000 units, Disp: , Rfl:   •  clopidogrel (PLAVIX) 75 MG tablet, Take 1 tablet by mouth Daily., Disp: 90 tablet, Rfl: 3  •  guaiFENesin (MUCINEX) 600 MG 12 hr tablet, Take 2 tablets by mouth Every 12 (Twelve) Hours., Disp: , Rfl:   •  ipratropium-albuterol (DUO-NEB) 0.5-2.5 mg/3 ml nebulizer, Take 3 ml by nebulization every TID-QID prn wheezing or shortness of breath, Disp: 360 mL, Rfl: 0  •  nitroglycerin (NITROSTAT) 0.4 MG SL tablet, Place 1 tablet under the tongue Every 5 (Five) Minutes As Needed for Chest Pain. Take no more than 3 doses in 15 minutes., Disp: 100 tablet, Rfl: 0  •  nystatin (MYCOSTATIN) 426214 UNIT/GM powder, Apply  topically to the appropriate area as directed Every 12 (Twelve) Hours., Disp: 60 g, Rfl: 3  •  ondansetron (ZOFRAN) 4 MG tablet, Take 1 tablet by mouth Every 6 (Six) Hours As Needed for Nausea or Vomiting., Disp: , Rfl:   •  polyethylene glycol (MIRALAX) 17 g packet, Take 17 g by mouth Daily As Needed (Use if senna-docusate is ineffective)., Disp: , Rfl:   •  sennosides-docusate (PERICOLACE) 8.6-50 MG per tablet, Take 2 tablets by mouth 2 (Two) Times a Day., Disp: , Rfl:   •  valsartan (Diovan)  "160 MG tablet, Take 1.5 tablets by mouth Daily., Disp: 135 tablet, Rfl: 3    Allergies:   No Known Allergies      Objective     Physical Exam:  Vital Signs:   Vitals:    10/07/22 0956   BP: 122/84   BP Location: Right arm   Patient Position: Sitting   Cuff Size: Small Adult   Pulse: 76   Temp: 97.3 °F (36.3 °C)   TempSrc: Infrared   SpO2: 95%   Weight: 114 kg (251 lb 3.2 oz)   Height: 154.9 cm (60.98\")   PainSc: 0-No pain     Body mass index is 47.49 kg/m².     Physical Exam  Vitals and nursing note reviewed.   Constitutional:       General: She is not in acute distress.     Appearance: Normal appearance. She is well-developed. She is not ill-appearing, toxic-appearing or diaphoretic.   HENT:      Head: Normocephalic and atraumatic.   Cardiovascular:      Rate and Rhythm: Normal rate and regular rhythm.      Heart sounds: Normal heart sounds, S1 normal and S2 normal.   Pulmonary:      Effort: Pulmonary effort is normal. No respiratory distress.      Breath sounds: Normal breath sounds. No stridor. No wheezing.   Skin:     General: Skin is warm and dry.   Neurological:      General: No focal deficit present.      Mental Status: She is alert and oriented to person, place, and time.   Psychiatric:         Mood and Affect: Mood normal.         Behavior: Behavior normal. Behavior is cooperative.         Thought Content: Thought content normal.         Judgment: Judgment normal.         Assessment / Plan      Assessment/Plan:   Diagnoses and all orders for this visit:    1. Type 2 diabetes mellitus with stage 3b chronic kidney disease, without long-term current use of insulin (HCC) (Primary)  Assessment & Plan:  Diabetes is stable and controlled. Continue current treatment plan.    Orders:  -     POC Glycosylated Hemoglobin (Hb A1C)        Lab 10/07/22  1006   HEMOGLOBIN A1C 5.5     2. Essential hypertension  Assessment & Plan:  Hypertension is stable and controlled.  Continue current treatment regimen.  Dietary sodium " restriction.  Weight loss.  Regular aerobic exercise.  Continue current medications.  Ambulatory blood pressure monitoring.  Blood pressure will be reassessed at the next regular appointment.  Keep routine cardiology appointments.    Orders:  -     valsartan (Diovan) 160 MG tablet; Take 1.5 tablets by mouth Daily.  Dispense: 135 tablet; Refill: 3    3. Mixed hyperlipidemia  -     atorvastatin (LIPITOR) 40 MG tablet; Take 1 tablet by mouth Every Night.  Dispense: 90 tablet; Refill: 3    4. Coronary artery disease due to lipid rich plaque  -     clopidogrel (PLAVIX) 75 MG tablet; Take 1 tablet by mouth Daily.  Dispense: 90 tablet; Refill: 3    5. Intertrigo  -     nystatin (MYCOSTATIN) 615161 UNIT/GM powder; Apply  topically to the appropriate area as directed Every 12 (Twelve) Hours.  Dispense: 60 g; Refill: 3    6. Medication refill  -     atorvastatin (LIPITOR) 40 MG tablet; Take 1 tablet by mouth Every Night.  Dispense: 90 tablet; Refill: 3  -     clopidogrel (PLAVIX) 75 MG tablet; Take 1 tablet by mouth Daily.  Dispense: 90 tablet; Refill: 3  -     valsartan (Diovan) 160 MG tablet; Take 1.5 tablets by mouth Daily.  Dispense: 135 tablet; Refill: 3  -     nystatin (MYCOSTATIN) 355851 UNIT/GM powder; Apply  topically to the appropriate area as directed Every 12 (Twelve) Hours.  Dispense: 60 g; Refill: 3         Follow Up:   PRN and at next scheduled appointment(s) with PCP.    Discussed the nature of the medical condition(s) risks, complications, implications, management, safe and proper use of medications. Encouraged medication compliance, and keeping scheduled follow up appointments with me and any other providers.      RTC if symptoms fail to improve, to ER if symptoms worsen.        *Dictated Utilizing Dragon Dictation   Please note that portions of this note were completed with a voice recognition program.   Part of this note may be an electronic transcription/translation of spoken language to printed text  using the Dragon Dictation System.          DON Veronica  [facility] [place]

## 2022-10-14 ENCOUNTER — TELEPHONE (OUTPATIENT)
Dept: FAMILY MEDICINE CLINIC | Facility: CLINIC | Age: 77
End: 2022-10-14

## 2022-10-14 NOTE — TELEPHONE ENCOUNTER
Caller: Jarvis Patel    Relationship: Emergency Contact    Best call back number:  [phone]    Who are you requesting to speak with (clinical staff, provider,  specific staff member): CLINICAL     What was the call regarding:  JARVIS THE PATIENT'S DAUGHTER SAID SHE GOT A REMINDER ON HER PHONE ABOUT AN APPOINTMENT FOR HER MOM COMING UP ON 10-17-22 FOR A MEDICARE WELLNESS VISIT   SHE SAID THEY JUST HAD AN APPOINTMENT WITH HER PCP ON 10-7-22 AND WANTS TO SEE IF THEY NEED TO KEEP THIS APPOINTMENT      Do you require a callback: PLEASE CALL AND ADVISE

## 2022-11-07 ENCOUNTER — OFFICE VISIT (OUTPATIENT)
Dept: CARDIOLOGY | Facility: CLINIC | Age: 77
End: 2022-11-07

## 2022-11-07 ENCOUNTER — HOSPITAL ENCOUNTER (OUTPATIENT)
Dept: CARDIOLOGY | Facility: HOSPITAL | Age: 77
Discharge: HOME | End: 2022-11-07
Admitting: INTERNAL MEDICINE

## 2022-11-07 VITALS
OXYGEN SATURATION: 96 % | SYSTOLIC BLOOD PRESSURE: 164 MMHG | HEIGHT: 61 IN | DIASTOLIC BLOOD PRESSURE: 70 MMHG | BODY MASS INDEX: 47.39 KG/M2 | WEIGHT: 251 LBS | HEART RATE: 87 BPM

## 2022-11-07 VITALS — HEIGHT: 61 IN | BODY MASS INDEX: 47.39 KG/M2 | WEIGHT: 251 LBS

## 2022-11-07 DIAGNOSIS — I65.22 LEFT CAROTID ARTERY STENOSIS: Chronic | ICD-10-CM

## 2022-11-07 DIAGNOSIS — I25.83 CORONARY ARTERY DISEASE DUE TO LIPID RICH PLAQUE: Primary | Chronic | ICD-10-CM

## 2022-11-07 DIAGNOSIS — I35.1 NONRHEUMATIC AORTIC VALVE INSUFFICIENCY: Chronic | ICD-10-CM

## 2022-11-07 DIAGNOSIS — I10 ESSENTIAL HYPERTENSION: Chronic | ICD-10-CM

## 2022-11-07 DIAGNOSIS — I25.10 CORONARY ARTERY DISEASE DUE TO LIPID RICH PLAQUE: Primary | Chronic | ICD-10-CM

## 2022-11-07 DIAGNOSIS — I51.89 DIASTOLIC DYSFUNCTION: Chronic | ICD-10-CM

## 2022-11-07 LAB
BH CV XLRA MEAS LEFT DIST CCA EDV: 13 CM/SEC
BH CV XLRA MEAS LEFT DIST CCA PSV: 83.2 CM/SEC
BH CV XLRA MEAS LEFT DIST ICA EDV: 25.2 CM/SEC
BH CV XLRA MEAS LEFT DIST ICA PSV: 109 CM/SEC
BH CV XLRA MEAS LEFT ICA/CCA RATIO: 2.8
BH CV XLRA MEAS LEFT MID CCA EDV: 13 CM/SEC
BH CV XLRA MEAS LEFT MID CCA PSV: 93.2 CM/SEC
BH CV XLRA MEAS LEFT MID ICA EDV: 25.2 CM/SEC
BH CV XLRA MEAS LEFT MID ICA PSV: 155 CM/SEC
BH CV XLRA MEAS LEFT PROX CCA EDV: 11.9 CM/SEC
BH CV XLRA MEAS LEFT PROX CCA PSV: 98.8 CM/SEC
BH CV XLRA MEAS LEFT PROX ECA PSV: 156 CM/SEC
BH CV XLRA MEAS LEFT PROX ICA EDV: 37.3 CM/SEC
BH CV XLRA MEAS LEFT PROX ICA PSV: 258 CM/SEC
BH CV XLRA MEAS LEFT PROX SCLA PSV: 157 CM/SEC
BH CV XLRA MEAS LEFT VERTEBRAL A EDV: 14.9 CM/SEC
BH CV XLRA MEAS LEFT VERTEBRAL A PSV: 93.2 CM/SEC
BH CV XLRA MEAS RIGHT DIST CCA EDV: 11.2 CM/SEC
BH CV XLRA MEAS RIGHT DIST CCA PSV: 100 CM/SEC
BH CV XLRA MEAS RIGHT DIST ICA EDV: 13.9 CM/SEC
BH CV XLRA MEAS RIGHT DIST ICA PSV: 97.9 CM/SEC
BH CV XLRA MEAS RIGHT ICA/CCA RATIO: 1.3
BH CV XLRA MEAS RIGHT MID CCA EDV: 12.1 CM/SEC
BH CV XLRA MEAS RIGHT MID CCA PSV: 128 CM/SEC
BH CV XLRA MEAS RIGHT MID ICA EDV: 18.2 CM/SEC
BH CV XLRA MEAS RIGHT MID ICA PSV: 124 CM/SEC
BH CV XLRA MEAS RIGHT PROX CCA EDV: 14.7 CM/SEC
BH CV XLRA MEAS RIGHT PROX CCA PSV: 137 CM/SEC
BH CV XLRA MEAS RIGHT PROX ECA EDV: 11.8 CM/SEC
BH CV XLRA MEAS RIGHT PROX ECA PSV: 147 CM/SEC
BH CV XLRA MEAS RIGHT PROX ICA EDV: 28.1 CM/SEC
BH CV XLRA MEAS RIGHT PROX ICA PSV: 167 CM/SEC
BH CV XLRA MEAS RIGHT PROX SCLA PSV: 310 CM/SEC
BH CV XLRA MEAS RIGHT VERTEBRAL A EDV: 7.4 CM/SEC
BH CV XLRA MEAS RIGHT VERTEBRAL A PSV: 42.3 CM/SEC
LEFT ARM BP: NORMAL MMHG
MAXIMAL PREDICTED HEART RATE: 141 BPM
RIGHT ARM BP: NORMAL MMHG
STRESS TARGET HR: 120 BPM

## 2022-11-07 PROCEDURE — 93880 EXTRACRANIAL BILAT STUDY: CPT

## 2022-11-07 PROCEDURE — 93880 EXTRACRANIAL BILAT STUDY: CPT | Performed by: INTERNAL MEDICINE

## 2022-11-07 PROCEDURE — 99214 OFFICE O/P EST MOD 30 MIN: CPT | Performed by: INTERNAL MEDICINE

## 2022-11-07 NOTE — PROGRESS NOTES
[facility] at [facility]  Office visit  Georgia Velázquez  1945  [phone]  There is no work phone number on file.    VISIT DATE:  11/7/2022    PCP: Georgina Ahumada APRN  [address] [facility] [address] [place] [address]    CC:  No chief complaint on file.      Previous cardiac studies and procedures:  2000: PCI with stenting in the setting of MI, dated deficient     April 2015   echo: EF 40%, mild LVH, diastolic dysfunction  Shyanne scan myocardial perfusion imaging: EF 41%, large fixed anterior apical defect.     May 2015 cardiac catheterization: 70% distal LAD stenosis.  RCA with diffuse irregularities, 60% mid RCA stenosis.  Left circumflex with diffuse irregularities.     June 2020  Carotid duplex  · Left mid ICA near 70%, however EDV less than 100 and ratio less than 4.  · Right internal carotid artery stenosis of 0-49%.  · Bilateral antegrade vertebral flow.  Echo  · Estimated EF appears to be in the range of 51 - 55%.  · Left ventricular systolic function is normal.  · Left ventricular diastolic dysfunction (grade II) consistent with pseudonormalization.  · The following left ventricular wall segments are hypokinetic: apical anterior, apical lateral, apical inferior, apical septal and apex hypokinetic.  · Mild aortic valve regurgitation is present.     8/2020 30 day monitor  · A relatively benign monitor study.  · Occasional premature atrial contractions. Rare PVCs which are intermittently symptomatic.    ASSESSMENT:   Diagnosis Plan   1. Coronary artery disease due to lipid rich plaque        2. Diastolic dysfunction        3. Nonrheumatic aortic valve insufficiency        4. Essential hypertension            PLAN:  Coronary artery disease: Currently stable and asymptomatic.  Continue antiplatelet therapy, high intensity statin therapy and afterload reduction.     Hypertension: Goal less than 130/80 mmHg.  Currently well controlled.  Continue current medical  "therapy.     Hyperlipidemia: Goal LDL less than 70, currently well controlled.  Continue atorvastatin 40 mg p.o. daily.     Retinal embolic event, right: Continue current medical therapy and ophthalmology evaluation.  Evaluation negative for atrial arrhythmias.     Bilateral internal carotid artery stenosis: 50 to 69%.  Currently asymptomatic.  Continue afterload reduction, high intensity statin therapy.    Continue Plavix.  Annual carotid duplex.    Subjective  Interval assessment: Using a wheeled walker for ambulation.  Blood pressures running less than 135/80 mmHg.  No change in baseline functional capacity.  Stable shortness of breath in a class II pattern.  Denies chest discomfort.  No palpitations.  Reviewed carotid duplex imaging with patient today.    Initial eval: 75-year-old female with a history of coronary disease, peripheral vascular disease and chronic congestive systolic heart failure.  Recently had right eye pain and was diagnosed with a right retinal embolic event.  Ophthalmology evaluation has been requested, unclear whether this was a venous or arterial thrombosis.  Reviewed recent echo and carotid duplex.  No significant palpitations.  She has stable dyspnea on exertion and a class II-III pattern.  Uses a wheeled walker for ambulation.  She is compliant with medical therapy and was on aspirin prior to this event.  She reports that she is currently on a statin but does not know the name or dosage, she will call us back with this information later.  Blood pressures run less than 130/80 mmHg.  She is scheduled for an ophthalmologic procedure next week.    PHYSICAL EXAMINATION:  Vitals:    11/07/22 1547   BP: 164/70   BP Location: Left arm   Patient Position: Sitting   Pulse: 87   SpO2: 96%   Weight: 114 kg (251 lb)   Height: 154.9 cm (60.98\")     General Appearance:    Alert, cooperative, no distress, appears stated age   Head:    Normocephalic, without obvious abnormality, atraumatic   Eyes:    " conjunctiva/corneas clear   Nose:   Nares normal, septum midline, mucosa normal, no drainage   Throat:   Lips, teeth and gums normal   Neck:   Supple, symmetrical, trachea midline, no carotid    bruit or JVD   Lungs:     Clear to auscultation bilaterally, respirations unlabored   Chest Wall:    No tenderness or deformity    Heart:    Regular rate and rhythm, S1 and S2 normal, 2/6 early peaking systolic murmur right upper sternal border, rub   or gallop, normal carotid impulse bilaterally.  Left carotid bruit   Abdomen:     Soft, non-tender   Extremities:   Extremities normal, atraumatic, no cyanosis.  Trivial edema   Pulses:   2+ and symmetric all extremities   Skin:   Skin color, texture, turgor normal, no rashes or lesions       Diagnostic Data:  Procedures  Lab Results   Component Value Date    TRIG 103 10/12/2020    HDL 46 10/12/2020     Lab Results   Component Value Date    GLUCOSE 132 (H) 06/20/2022    BUN 34 (H) 06/20/2022    CREATININE 1.09 (H) 06/20/2022     06/20/2022    K 5.2 06/20/2022     06/20/2022    CO2 23.0 06/20/2022     Lab Results   Component Value Date    HGBA1C 5.5 10/07/2022     Lab Results   Component Value Date    WBC 13.32 (H) 06/17/2022    HGB 13.1 06/17/2022    HCT 40.0 06/17/2022     06/17/2022       Allergies  No Known Allergies    Current Medications    Current Outpatient Medications:   •  albuterol sulfate  (90 Base) MCG/ACT inhaler, Inhale 2 puffs Every 4 (Four) Hours As Needed for Wheezing or Shortness of Air., Disp: 18 g, Rfl: 3  •  atorvastatin (LIPITOR) 40 MG tablet, Take 1 tablet by mouth Every Night., Disp: 90 tablet, Rfl: 3  •  bisacodyl (DULCOLAX) 5 MG EC tablet, Take 1 tablet by mouth Daily As Needed for Constipation (Use if polyethylene glycol is ineffective)., Disp: , Rfl:   •  Cholecalciferol (Vitamin D3) 50 MCG (2000 UT) capsule, Take 2,000 Units by mouth Daily. 2000 units, Disp: , Rfl:   •  clopidogrel (PLAVIX) 75 MG tablet, Take 1 tablet by  mouth Daily., Disp: 90 tablet, Rfl: 3  •  guaiFENesin (MUCINEX) 600 MG 12 hr tablet, Take 2 tablets by mouth Every 12 (Twelve) Hours., Disp: , Rfl:   •  ipratropium-albuterol (DUO-NEB) 0.5-2.5 mg/3 ml nebulizer, Take 3 ml by nebulization every TID-QID prn wheezing or shortness of breath, Disp: 360 mL, Rfl: 0  •  nitroglycerin (NITROSTAT) 0.4 MG SL tablet, Place 1 tablet under the tongue Every 5 (Five) Minutes As Needed for Chest Pain. Take no more than 3 doses in 15 minutes., Disp: 100 tablet, Rfl: 0  •  nystatin (MYCOSTATIN) 056423 UNIT/GM powder, Apply  topically to the appropriate area as directed Every 12 (Twelve) Hours., Disp: 60 g, Rfl: 3  •  ondansetron (ZOFRAN) 4 MG tablet, Take 1 tablet by mouth Every 6 (Six) Hours As Needed for Nausea or Vomiting., Disp: , Rfl:   •  polyethylene glycol (MIRALAX) 17 g packet, Take 17 g by mouth Daily As Needed (Use if senna-docusate is ineffective)., Disp: , Rfl:   •  sennosides-docusate (PERICOLACE) 8.6-50 MG per tablet, Take 2 tablets by mouth 2 (Two) Times a Day., Disp: , Rfl:   •  valsartan (Diovan) 160 MG tablet, Take 1.5 tablets by mouth Daily., Disp: 135 tablet, Rfl: 3          ROS  ROS      SOCIAL HX  Social History     Socioeconomic History   • Marital status:    Tobacco Use   • Smoking status: Former     Packs/day: 1.00     Years: 15.00     Pack years: 15.00     Types: Cigarettes     Quit date: 2000     Years since quittin.8   • Smokeless tobacco: Never   Vaping Use   • Vaping Use: Never used   Substance and Sexual Activity   • Alcohol use: No   • Drug use: No   • Sexual activity: Never       FAMILY HX  Family History   Problem Relation Age of Onset   • Diabetes Mother    • Heart failure Mother    • Heart failure Father    • No Known Problems Sister    • No Known Problems Brother    • No Known Problems Son    • No Known Problems Daughter              Armaan Samuels III, MD, [facility]

## 2022-11-14 ENCOUNTER — LAB (OUTPATIENT)
Dept: LAB | Facility: HOSPITAL | Age: 77
End: 2022-11-14

## 2022-11-14 ENCOUNTER — OFFICE VISIT (OUTPATIENT)
Dept: FAMILY MEDICINE CLINIC | Facility: CLINIC | Age: 77
End: 2022-11-14

## 2022-11-14 VITALS
OXYGEN SATURATION: 94 % | BODY MASS INDEX: 48.82 KG/M2 | HEART RATE: 77 BPM | SYSTOLIC BLOOD PRESSURE: 128 MMHG | WEIGHT: 258.6 LBS | TEMPERATURE: 97.5 F | HEIGHT: 61 IN | DIASTOLIC BLOOD PRESSURE: 70 MMHG

## 2022-11-14 DIAGNOSIS — E11.22 TYPE 2 DIABETES MELLITUS WITH STAGE 3B CHRONIC KIDNEY DISEASE, WITHOUT LONG-TERM CURRENT USE OF INSULIN: ICD-10-CM

## 2022-11-14 DIAGNOSIS — N18.32 TYPE 2 DIABETES MELLITUS WITH STAGE 3B CHRONIC KIDNEY DISEASE, WITHOUT LONG-TERM CURRENT USE OF INSULIN: ICD-10-CM

## 2022-11-14 DIAGNOSIS — Z00.00 MEDICARE ANNUAL WELLNESS VISIT, SUBSEQUENT: Primary | ICD-10-CM

## 2022-11-14 DIAGNOSIS — I10 ESSENTIAL HYPERTENSION: ICD-10-CM

## 2022-11-14 DIAGNOSIS — E78.2 MIXED HYPERLIPIDEMIA: ICD-10-CM

## 2022-11-14 LAB
ALBUMIN SERPL-MCNC: 4.1 G/DL (ref 3.5–5.2)
ALBUMIN/GLOB SERPL: 1.4 G/DL
ALP SERPL-CCNC: 105 U/L (ref 39–117)
ALT SERPL W P-5'-P-CCNC: 10 U/L (ref 1–33)
ANION GAP SERPL CALCULATED.3IONS-SCNC: 13 MMOL/L (ref 5–15)
AST SERPL-CCNC: 22 U/L (ref 1–32)
BILIRUB SERPL-MCNC: 0.6 MG/DL (ref 0–1.2)
BUN SERPL-MCNC: 21 MG/DL (ref 8–23)
BUN/CREAT SERPL: 15.1 (ref 7–25)
CALCIUM SPEC-SCNC: 9.5 MG/DL (ref 8.6–10.5)
CHLORIDE SERPL-SCNC: 101 MMOL/L (ref 98–107)
CHOLEST SERPL-MCNC: 144 MG/DL (ref 0–200)
CO2 SERPL-SCNC: 23 MMOL/L (ref 22–29)
CREAT SERPL-MCNC: 1.39 MG/DL (ref 0.57–1)
DEPRECATED RDW RBC AUTO: 42.3 FL (ref 37–54)
EGFRCR SERPLBLD CKD-EPI 2021: 38.7 ML/MIN/1.73
ERYTHROCYTE [DISTWIDTH] IN BLOOD BY AUTOMATED COUNT: 12.8 % (ref 12.3–15.4)
GLOBULIN UR ELPH-MCNC: 2.9 GM/DL
GLUCOSE SERPL-MCNC: 108 MG/DL (ref 65–99)
HBA1C MFR BLD: 6.3 % (ref 4.8–5.6)
HCT VFR BLD AUTO: 40.7 % (ref 34–46.6)
HDLC SERPL-MCNC: 57 MG/DL (ref 40–60)
HGB BLD-MCNC: 13.4 G/DL (ref 12–15.9)
LDLC SERPL CALC-MCNC: 71 MG/DL (ref 0–100)
LDLC/HDLC SERPL: 1.24 {RATIO}
MCH RBC QN AUTO: 30.3 PG (ref 26.6–33)
MCHC RBC AUTO-ENTMCNC: 32.9 G/DL (ref 31.5–35.7)
MCV RBC AUTO: 92.1 FL (ref 79–97)
PLATELET # BLD AUTO: 195 10*3/MM3 (ref 140–450)
PMV BLD AUTO: 10.8 FL (ref 6–12)
POTASSIUM SERPL-SCNC: 4.3 MMOL/L (ref 3.5–5.2)
PROT SERPL-MCNC: 7 G/DL (ref 6–8.5)
RBC # BLD AUTO: 4.42 10*6/MM3 (ref 3.77–5.28)
SODIUM SERPL-SCNC: 137 MMOL/L (ref 136–145)
TRIGL SERPL-MCNC: 82 MG/DL (ref 0–150)
VLDLC SERPL-MCNC: 16 MG/DL (ref 5–40)
WBC NRBC COR # BLD: 8.47 10*3/MM3 (ref 3.4–10.8)

## 2022-11-14 PROCEDURE — 1170F FXNL STATUS ASSESSED: CPT | Performed by: NURSE PRACTITIONER

## 2022-11-14 PROCEDURE — G0439 PPPS, SUBSEQ VISIT: HCPCS | Performed by: NURSE PRACTITIONER

## 2022-11-14 PROCEDURE — 85027 COMPLETE CBC AUTOMATED: CPT

## 2022-11-14 PROCEDURE — 1126F AMNT PAIN NOTED NONE PRSNT: CPT | Performed by: NURSE PRACTITIONER

## 2022-11-14 PROCEDURE — 1160F RVW MEDS BY RX/DR IN RCRD: CPT | Performed by: NURSE PRACTITIONER

## 2022-11-14 PROCEDURE — 83036 HEMOGLOBIN GLYCOSYLATED A1C: CPT

## 2022-11-14 PROCEDURE — 80061 LIPID PANEL: CPT

## 2022-11-14 PROCEDURE — 80053 COMPREHEN METABOLIC PANEL: CPT

## 2022-11-14 RX ORDER — ASPIRIN 81 MG/1
81 TABLET ORAL DAILY
COMMUNITY
End: 2023-02-16

## 2022-11-14 NOTE — PATIENT INSTRUCTIONS
Medicare Wellness  Personal Prevention Plan of Service     Date of Office Visit:    Encounter Provider:  DON Veronica  Place of Service:  [facility]  Patient Name: Georgia Velázquez  :  1945    As part of the Medicare Wellness portion of your visit today, we are providing you with this personalized preventive plan of services (PPPS). This plan is based upon recommendations of the [place] Preventive Services Task Force (USPSTF) and the Advisory Committee on Immunization Practices (ACIP).    This lists the preventive care services that should be considered, and provides dates of when you are due. Items listed as completed are up-to-date and do not require any further intervention.    Health Maintenance   Topic Date Due    ZOSTER VACCINE (1 of 2) Never done    LIPID PANEL  10/12/2021    INFLUENZA VACCINE  2022    COVID-19 Vaccine (3 - Booster) 10/05/2022    ANNUAL WELLNESS VISIT  10/15/2022    URINE MICROALBUMIN  10/15/2022    HEMOGLOBIN A1C  2023    DIABETIC EYE EXAM  2023    COLORECTAL CANCER SCREENING  2026    TDAP/TD VACCINES (2 - Td or Tdap) 2027    HEPATITIS C SCREENING  Completed    Pneumococcal Vaccine 65+  Completed    MAMMOGRAM  Discontinued    DXA SCAN  Discontinued       No orders of the defined types were placed in this encounter.      Return in about 3 months (around 2023) for Next scheduled follow up.

## 2022-11-14 NOTE — PROGRESS NOTES
The ABCs of the Annual Wellness Visit  Subsequent Medicare Wellness Visit    Chief Complaint   Patient presents with   • Medicare Wellness-subsequent     Pt has no concerns      Subjective    History of Present Illness:  Georgia Velázquez is a 77 y.o. female who presents for a Subsequent Medicare Wellness Visit.    The following portions of the patient's history were reviewed and   updated as appropriate: allergies, current medications, past family history, past medical history, past social history, past surgical history and problem list.    Compared to one year ago, the patient feels her physical   health is better.    Compared to one year ago, the patient feels her mental   health is the same.    Recent Hospitalizations:  This patient has had a [facility] [place] admission record on file within the last 365 days.    Current Medical Providers:  Patient Care Team:  Georgina Ahumada APRN as PCP - General (Family Medicine)  Carmine Pérez MD as Consulting Physician (Interventional Cardiology)  Marc Pham MD as Consulting Physician (Neurosurgery)  Prieto Flores MD as Consulting Physician (Pain Medicine)  Rajeev Sharif MD as Consulting Physician (Pulmonary Disease)  Armaan Samuels III, MD as Cardiologist (Cardiology)    Outpatient Medications Prior to Visit   Medication Sig Dispense Refill   • aspirin 81 MG EC tablet Take 1 tablet by mouth Daily.     • atorvastatin (LIPITOR) 40 MG tablet Take 1 tablet by mouth Every Night. 90 tablet 3   • bisacodyl (DULCOLAX) 5 MG EC tablet Take 1 tablet by mouth Daily As Needed for Constipation (Use if polyethylene glycol is ineffective).     • Cholecalciferol (Vitamin D3) 50 MCG (2000 UT) capsule Take 2,000 Units by mouth Daily. 2000 units     • clopidogrel (PLAVIX) 75 MG tablet Take 1 tablet by mouth Daily. 90 tablet 3   • nystatin (MYCOSTATIN) 489349 UNIT/GM powder Apply  topically to the appropriate area as directed Every 12 (Twelve) Hours. 60 g 3   •  valsartan (Diovan) 160 MG tablet Take 1.5 tablets by mouth Daily. 135 tablet 3   • albuterol sulfate  (90 Base) MCG/ACT inhaler Inhale 2 puffs Every 4 (Four) Hours As Needed for Wheezing or Shortness of Air. 18 g 3   • guaiFENesin (MUCINEX) 600 MG 12 hr tablet Take 2 tablets by mouth Every 12 (Twelve) Hours.     • ipratropium-albuterol (DUO-NEB) 0.5-2.5 mg/3 ml nebulizer Take 3 ml by nebulization every TID-QID prn wheezing or shortness of breath 360 mL 0   • nitroglycerin (NITROSTAT) 0.4 MG SL tablet Place 1 tablet under the tongue Every 5 (Five) Minutes As Needed for Chest Pain. Take no more than 3 doses in 15 minutes. 100 tablet 0   • ondansetron (ZOFRAN) 4 MG tablet Take 1 tablet by mouth Every 6 (Six) Hours As Needed for Nausea or Vomiting.     • polyethylene glycol (MIRALAX) 17 g packet Take 17 g by mouth Daily As Needed (Use if senna-docusate is ineffective).     • sennosides-docusate (PERICOLACE) 8.6-50 MG per tablet Take 2 tablets by mouth 2 (Two) Times a Day.       No facility-administered medications prior to visit.       No opioid medication identified on active medication list. I have reviewed chart for other potential  high risk medication/s and harmful drug interactions in the elderly.          Aspirin is not on active medication list.  Aspirin use is indicated and patient is taking aspirin..    Patient Active Problem List   Diagnosis   • Essential hypertension   • Hyperlipidemia   • Coronary artery disease   • GERD (gastroesophageal reflux disease)   • Stage 3 chronic kidney disease (CMS/HCC)   • Chronic obstructive pulmonary disease (HCC)   • Retinal emboli, right   • Diastolic dysfunction   • Colitis   • Glaucoma   • Left carotid artery stenosis   • Syncope   • Aortic regurgitation   • Diabetic retinopathy ([facility])   • Type 2 diabetes mellitus with stage 3b chronic kidney disease, without long-term current use of insulin ([facility])   • Hypoxia   • Obesity, morbid, BMI 50 or higher ([facility])   • Hospital  "discharge follow-up     Advance Care Planning  Advance Directive is not on file.  ACP discussion was held with the patient during this visit. Patient does not have an advance directive, information provided.    Review of Systems   Constitutional: Negative.    HENT: Negative.    Respiratory: Negative.    Cardiovascular: Negative.    Gastrointestinal: Negative.    Genitourinary: Negative.    Neurological: Negative.         Objective    Vitals:    11/14/22 1000   BP: 128/70   Pulse: 77   Temp: 97.5 °F (36.4 °C)   TempSrc: Infrared   SpO2: 94%   Weight: 117 kg (258 lb 9.6 oz)   Height: 154.9 cm (60.98\")   PainSc: 0-No pain     Estimated body mass index is 48.89 kg/m² as calculated from the following:    Height as of this encounter: 154.9 cm (60.98\").    Weight as of this encounter: 117 kg (258 lb 9.6 oz).    Class 3 Severe Obesity (BMI >=40). Obesity-related health conditions include the following: hypertension, coronary heart disease, diabetes mellitus and dyslipidemias. Obesity is worsening. BMI is is above average; BMI management plan is completed. We discussed low calorie, low carb based diet program, portion control and increasing exercise.      Does the patient have evidence of cognitive impairment? No    Physical Exam  Vitals and nursing note reviewed. Exam conducted with a chaperone present.   Constitutional:       General: She is not in acute distress.     Appearance: Normal appearance. She is well-developed and well-groomed. She is not ill-appearing, toxic-appearing or diaphoretic.   HENT:      Head: Normocephalic and atraumatic.      Right Ear: Tympanic membrane, ear canal and external ear normal.      Left Ear: Tympanic membrane, ear canal and external ear normal.      Nose: Nose normal.      Mouth/Throat:      Lips: Pink.      Mouth: Mucous membranes are moist.      Pharynx: Oropharynx is clear. Uvula midline. No posterior oropharyngeal erythema.   Neck:      Thyroid: No thyroid mass, thyromegaly or thyroid " tenderness.   Cardiovascular:      Rate and Rhythm: Normal rate and regular rhythm.      Pulses: Normal pulses.      Heart sounds: Normal heart sounds, S1 normal and S2 normal. No murmur heard.  Pulmonary:      Effort: Pulmonary effort is normal. No respiratory distress.      Breath sounds: Normal breath sounds.   Abdominal:      General: Bowel sounds are normal. There is no distension.      Palpations: Abdomen is soft.      Tenderness: There is no abdominal tenderness.   Musculoskeletal:         General: Normal range of motion.      Cervical back: Normal range of motion and neck supple.      Right lower leg: No edema.      Left lower leg: No edema.   Lymphadenopathy:      Cervical: No cervical adenopathy.   Skin:     General: Skin is warm and dry.      Capillary Refill: Capillary refill takes less than 2 seconds.      Findings: No rash.   Neurological:      General: No focal deficit present.      Mental Status: She is alert and oriented to person, place, and time.   Psychiatric:         Attention and Perception: Attention and perception normal.         Mood and Affect: Mood and affect normal.         Speech: Speech normal.         Behavior: Behavior normal. Behavior is cooperative.         Thought Content: Thought content normal.         Cognition and Memory: Cognition and memory normal.         Judgment: Judgment normal.       Lab Results   Component Value Date    HGBA1C 5.5 10/07/2022            HEALTH RISK ASSESSMENT    Smoking Status:  Social History     Tobacco Use   Smoking Status Former   • Packs/day: 1.00   • Years: 15.00   • Pack years: 15.00   • Types: Cigarettes   • Quit date: 2000   • Years since quittin.8   Smokeless Tobacco Never     Alcohol Consumption:  Social History     Substance and Sexual Activity   Alcohol Use No     Fall Risk Screen:    STEADI Fall Risk Assessment was completed, and patient is at HIGH risk for falls. Assessment completed on:2022    Depression  Screening:  PHQ-2/PHQ-9 Depression Screening 11/14/2022   Retired PHQ-9 Total Score -   Retired Total Score -   Little Interest or Pleasure in Doing Things 0-->not at all   Feeling Down, Depressed or Hopeless 0-->not at all   PHQ-9: Brief Depression Severity Measure Score 0       Health Habits and Functional and Cognitive Screening:  Functional & Cognitive Status 11/14/2022   Do you have difficulty preparing food and eating? Yes   Do you have difficulty bathing yourself, getting dressed or grooming yourself? No   Do you have difficulty using the toilet? Yes   Do you have difficulty moving around from place to place? Yes   Do you have trouble with steps or getting out of a bed or a chair? Yes   Current Diet Well Balanced Diet   Dental Exam Up to date   Eye Exam Up to date   Exercise (times per week) 0 times per week   Current Exercises Include No Regular Exercise   Current Exercise Activities Include -   Do you need help using the phone?  No   Are you deaf or do you have serious difficulty hearing?  No   Do you need help with transportation? No   Do you need help shopping? No   Do you need help preparing meals?  No   Do you need help with housework?  No   Do you need help with laundry? No   Do you need help taking your medications? No   Do you need help managing money? No   Do you ever drive or ride in a car without wearing a seat belt? Yes   Have you felt unusual stress, anger or loneliness in the last month? No   Who do you live with? Child   If you need help, do you have trouble finding someone available to you? Yes   Have you been bothered in the last four weeks by sexual problems? No   Do you have difficulty concentrating, remembering or making decisions? No       Age-appropriate Screening Schedule:  Refer to the list below for future screening recommendations based on patient's age, sex and/or medical conditions. Orders for these recommended tests are listed in the plan section. The patient has been provided  with a written plan.    Health Maintenance   Topic Date Due   • ZOSTER VACCINE (1 of 2) Never done   • LIPID PANEL  10/12/2021   • INFLUENZA VACCINE  08/01/2022   • URINE MICROALBUMIN  10/15/2022   • HEMOGLOBIN A1C  04/07/2023   • DIABETIC EYE EXAM  08/24/2023   • TDAP/TD VACCINES (2 - Td or Tdap) 11/07/2027   • MAMMOGRAM  Discontinued   • DXA SCAN  Discontinued              Assessment & Plan   CMS Preventative Services Quick Reference  Risk Factors Identified During Encounter  Cardiovascular Disease  Chronic Pain   Fall Risk-High or Moderate  Glaucoma or Family History of Glaucoma  Immunizations Discussed/Encouraged (specific Immunizations; Influenza, Shingrix and COVID19  Inactivity/Sedentary  Obesity/Overweight   The above risks/problems have been discussed with the patient.  Follow up actions/plans if indicated are seen below in the Assessment/Plan Section.  Pertinent information has been shared with the patient in the After Visit Summary.    Diagnoses and all orders for this visit:    1. Medicare annual wellness visit, subsequent (Primary)    2. Type 2 diabetes mellitus with stage 3b chronic kidney disease, without long-term current use of insulin (HCC)  -     MicroAlbumin, Urine, Random - Urine, Clean Catch; Future  -     Hemoglobin A1c; Future    3. Essential hypertension  -     Comprehensive Metabolic Panel; Future  -     CBC (No Diff); Future    4. Mixed hyperlipidemia  -     Lipid Panel; Future        Follow Up:   Return in about 3 months (around 2/14/2023) for Next scheduled follow up, labs today.     An After Visit Summary and PPPS were made available to the patient.      Discussed the nature of the medical condition(s) risks, complications, implications, management, safe and proper use of medications. Encouraged medication compliance, and keeping scheduled follow up appointments with me and any other providers.

## 2022-12-05 ENCOUNTER — OFFICE VISIT (OUTPATIENT)
Dept: FAMILY MEDICINE CLINIC | Facility: CLINIC | Age: 77
End: 2022-12-05

## 2022-12-05 VITALS
HEIGHT: 61 IN | DIASTOLIC BLOOD PRESSURE: 60 MMHG | OXYGEN SATURATION: 96 % | TEMPERATURE: 97.8 F | BODY MASS INDEX: 46.14 KG/M2 | WEIGHT: 244.4 LBS | HEART RATE: 77 BPM | RESPIRATION RATE: 20 BRPM | SYSTOLIC BLOOD PRESSURE: 138 MMHG

## 2022-12-05 DIAGNOSIS — J44.1 COPD WITH EXACERBATION: Primary | ICD-10-CM

## 2022-12-05 DIAGNOSIS — R05.9 COUGH, UNSPECIFIED TYPE: ICD-10-CM

## 2022-12-05 LAB
EXPIRATION DATE: NORMAL
FLUAV AG NPH QL: NEGATIVE
FLUBV AG NPH QL: NEGATIVE
INTERNAL CONTROL: NORMAL
Lab: NORMAL

## 2022-12-05 PROCEDURE — 87804 INFLUENZA ASSAY W/OPTIC: CPT | Performed by: NURSE PRACTITIONER

## 2022-12-05 PROCEDURE — U0004 COV-19 TEST NON-CDC HGH THRU: HCPCS | Performed by: NURSE PRACTITIONER

## 2022-12-05 PROCEDURE — 99213 OFFICE O/P EST LOW 20 MIN: CPT | Performed by: NURSE PRACTITIONER

## 2022-12-05 RX ORDER — AZITHROMYCIN 250 MG/1
TABLET, FILM COATED ORAL
Qty: 6 TABLET | Refills: 0 | Status: SHIPPED | OUTPATIENT
Start: 2022-12-05 | End: 2023-02-14

## 2022-12-05 RX ORDER — BENZONATATE 100 MG/1
100 CAPSULE ORAL 3 TIMES DAILY PRN
Qty: 30 CAPSULE | Refills: 0 | Status: SHIPPED | OUTPATIENT
Start: 2022-12-05 | End: 2023-02-14

## 2022-12-05 RX ORDER — IPRATROPIUM BROMIDE AND ALBUTEROL SULFATE 2.5; .5 MG/3ML; MG/3ML
SOLUTION RESPIRATORY (INHALATION)
Qty: 360 ML | Refills: 0 | Status: SHIPPED | OUTPATIENT
Start: 2022-12-05

## 2022-12-05 RX ORDER — BUDESONIDE, GLYCOPYRROLATE, AND FORMOTEROL FUMARATE 160; 9; 4.8 UG/1; UG/1; UG/1
2 AEROSOL, METERED RESPIRATORY (INHALATION) 2 TIMES DAILY
Qty: 1 EACH | Refills: 11 | Status: SHIPPED | OUTPATIENT
Start: 2022-12-05

## 2022-12-05 RX ORDER — METHYLPREDNISOLONE 4 MG/1
TABLET ORAL
Qty: 21 TABLET | Refills: 0 | Status: SHIPPED | OUTPATIENT
Start: 2022-12-05 | End: 2022-12-11

## 2022-12-05 RX ORDER — ALBUTEROL SULFATE 90 UG/1
2 AEROSOL, METERED RESPIRATORY (INHALATION) EVERY 4 HOURS PRN
Qty: 18 G | Refills: 3 | Status: SHIPPED | OUTPATIENT
Start: 2022-12-05

## 2022-12-05 NOTE — PROGRESS NOTES
"Chief Complaint  URI (Pt has had wheezing and coughing for going on 5 days and ear pain in the right ear that started yesterday. )    Subjective          Georgia Velázquez presents to [facility]  History of Present Illness  Patient is a 77-year-old female.  She is here with her daughter.  She is here for complaint of wheezing, coughing, right ear pain.  States the ear pain started yesterday but the cough and wheezing started 5 days ago.    URI   This is a new problem. The current episode started in the past 7 days. Associated symptoms include congestion, coughing and headaches.       The following portions of the patient's history were reviewed and updated as appropriate: allergies, current medications, past family history, past medical history, past social history, past surgical history and problem list.    Review of Systems   Constitutional: Positive for activity change.   HENT: Positive for congestion and sinus pressure.    Eyes: Negative.    Respiratory: Positive for cough and chest tightness.    Cardiovascular: Negative.    Gastrointestinal: Negative.    Musculoskeletal: Positive for arthralgias and myalgias.   Neurological: Positive for headaches.         Objective   Vital Signs:   /60   Pulse 77   Temp 97.8 °F (36.6 °C) (Temporal)   Resp 20   Ht 154.9 cm (60.98\")   Wt 111 kg (244 lb 6.4 oz)   SpO2 96%   BMI 46.20 kg/m²          Physical Exam  Vitals reviewed.   Constitutional:       Appearance: She is obese.   HENT:      Head: Normocephalic.      Right Ear: Tympanic membrane, ear canal and external ear normal. Tympanic membrane is not erythematous.      Left Ear: Tympanic membrane, ear canal and external ear normal. Tympanic membrane is not erythematous.      Nose: Congestion present.      Mouth/Throat:      Mouth: Mucous membranes are moist.   Cardiovascular:      Rate and Rhythm: Normal rate and regular rhythm.      Pulses: Normal pulses.   Pulmonary:      " Effort: Pulmonary effort is normal.      Breath sounds: Examination of the right-upper field reveals wheezing. Examination of the left-upper field reveals wheezing. Examination of the left-middle field reveals wheezing. Examination of the left-lower field reveals wheezing. Wheezing present.   Abdominal:      Palpations: Abdomen is soft.   Musculoskeletal:         General: Normal range of motion.   Skin:     General: Skin is warm and dry.      Capillary Refill: Capillary refill takes less than 2 seconds.   Neurological:      Mental Status: She is alert and oriented to person, place, and time.   Psychiatric:         Behavior: Behavior is cooperative.        Result Review :                 Assessment and Plan    Diagnoses and all orders for this visit:    1. COPD with exacerbation (HCC) (Primary)  -     albuterol sulfate  (90 Base) MCG/ACT inhaler; Inhale 2 puffs Every 4 (Four) Hours As Needed for Wheezing or Shortness of Air.  Dispense: 18 g; Refill: 3  -     ipratropium-albuterol (DUO-NEB) 0.5-2.5 mg/3 ml nebulizer; Take 3 ml by nebulization every TID-QID prn wheezing or shortness of breath  Dispense: 360 mL; Refill: 0  -     Budeson-Glycopyrrol-Formoterol (Breztri Aerosphere) 160-9-4.8 MCG/ACT aerosol inhaler; Inhale 2 puffs 2 (Two) Times a Day.  Dispense: 1 each; Refill: 11  -     azithromycin (Zithromax Z-Qasim) 250 MG tablet; Take 2 tablets by mouth on day 1, then 1 tablet daily on days 2-5  Dispense: 6 tablet; Refill: 0  -     benzonatate (Tessalon Perles) 100 MG capsule; Take 1 capsule by mouth 3 (Three) Times a Day As Needed for Cough.  Dispense: 30 capsule; Refill: 0  -     methylPREDNISolone (MEDROL) 4 MG dose pack; Take as directed on package instructions.  Dispense: 21 tablet; Refill: 0    2. Cough, unspecified type  -     COVID-19 PCR, [facility], NP SWAB IN [place] VIRAL TRANSPORT MEDIA/ORAL SWISH 24-30 HR TAT - Swab, Nasopharynx; Future  -     POCT Influenza A/B  -     COVID-19 PCR, [facility], NP  SWAB IN [facility] VIRAL TRANSPORT MEDIA/ORAL SWISH 24-30 HR TAT - Swab, Nasopharynx    POCT influenza A/B: negative   COVID -19 PCR send out   Follow up if you do not improve.   Go to ER for severe shortness of breath or sudden chest pain.   Refilling her breztri - she had an inhaler in her purse. It was a sample.   Will order.   Follow Up   Return if symptoms worsen or fail to improve.  Patient was given instructions and counseling regarding her condition or for health maintenance advice. Please see specific information pulled into the AVS if appropriate.

## 2022-12-06 LAB — SARS-COV-2 RNA NOSE QL NAA+PROBE: NOT DETECTED

## 2023-02-14 ENCOUNTER — OFFICE VISIT (OUTPATIENT)
Dept: FAMILY MEDICINE CLINIC | Facility: CLINIC | Age: 78
End: 2023-02-14
Payer: MEDICARE

## 2023-02-14 VITALS
SYSTOLIC BLOOD PRESSURE: 134 MMHG | HEIGHT: 61 IN | BODY MASS INDEX: 46.71 KG/M2 | TEMPERATURE: 97.3 F | WEIGHT: 247.4 LBS | DIASTOLIC BLOOD PRESSURE: 80 MMHG | HEART RATE: 71 BPM | OXYGEN SATURATION: 98 %

## 2023-02-14 DIAGNOSIS — R41.89 COGNITIVE DECLINE: Primary | ICD-10-CM

## 2023-02-14 DIAGNOSIS — N39.0 URINARY TRACT INFECTION WITHOUT HEMATURIA, SITE UNSPECIFIED: ICD-10-CM

## 2023-02-14 DIAGNOSIS — H91.93 DECREASED HEARING OF BOTH EARS: ICD-10-CM

## 2023-02-14 LAB
BILIRUB BLD-MCNC: NEGATIVE MG/DL
CLARITY, POC: CLEAR
COLOR UR: YELLOW
EXPIRATION DATE: ABNORMAL
GLUCOSE UR STRIP-MCNC: NEGATIVE MG/DL
KETONES UR QL: NEGATIVE
LEUKOCYTE EST, POC: ABNORMAL
Lab: ABNORMAL
NITRITE UR-MCNC: POSITIVE MG/ML
PH UR: 6 [PH] (ref 5–8)
PROT UR STRIP-MCNC: NEGATIVE MG/DL
RBC # UR STRIP: NEGATIVE /UL
SP GR UR: 1.01 (ref 1–1.03)
UROBILINOGEN UR QL: NORMAL

## 2023-02-14 PROCEDURE — 87186 SC STD MICRODIL/AGAR DIL: CPT | Performed by: NURSE PRACTITIONER

## 2023-02-14 PROCEDURE — 87086 URINE CULTURE/COLONY COUNT: CPT | Performed by: NURSE PRACTITIONER

## 2023-02-14 PROCEDURE — 99215 OFFICE O/P EST HI 40 MIN: CPT | Performed by: NURSE PRACTITIONER

## 2023-02-14 PROCEDURE — 81003 URINALYSIS AUTO W/O SCOPE: CPT | Performed by: NURSE PRACTITIONER

## 2023-02-14 PROCEDURE — 87088 URINE BACTERIA CULTURE: CPT | Performed by: NURSE PRACTITIONER

## 2023-02-14 RX ORDER — CEFDINIR 300 MG/1
300 CAPSULE ORAL 2 TIMES DAILY
Qty: 14 CAPSULE | Refills: 0 | Status: SHIPPED | OUTPATIENT
Start: 2023-02-14 | End: 2023-02-21

## 2023-02-14 NOTE — PROGRESS NOTES
Follow Up Office Note     Patient Name: Georgia Velázquez  : 1945   MRN: [id number]     Chief Complaint:    Chief Complaint   Patient presents with   • decreased hearing     Per patient's daughter her hearing is getting worse and would also like to to discuss placement in an assisted living facility or nursing home.    • Memory Loss       History of Present Illness: Georgia Velázquez is a 77 y.o. female who presents today accompanied by her daughter who is concerned about patient's hearing as well as her mental status. Daughter feels that patient has been having a cognitive decline in recent months. She reports that patient has seemed to have worsening memory and is having difficulty with ADL's. She reports that patient watches TV all day and often does not get up to go to the bathroom and she will find patient's clothing saturated with urine. Daughter also states that patient is severely hard of hearing.      Subjective      I have reviewed and the following portions of the patient's history were updated as appropriate: past family history, past medical history, past social history, past surgical history and problem list.    Review of Systems:   Review of Systems   Constitutional: Positive for activity change, appetite change and fatigue. Negative for chills, diaphoresis and fever.   HENT: Positive for hearing loss.    Respiratory: Negative.    Cardiovascular: Negative for chest pain and palpitations.   Gastrointestinal: Negative for abdominal pain, diarrhea, nausea and vomiting.   Genitourinary: Negative for dysuria, flank pain, frequency, hematuria, pelvic pain and urgency.   Neurological: Negative for dizziness, tremors, seizures, syncope, facial asymmetry, speech difficulty, weakness, light-headedness, numbness and headaches.        Past Medical History:   Past Medical History:   Diagnosis Date   • Acute kidney injury (HCC) 2017   • Aortic regurgitation    • Arthritis of shoulder  05/18/2016   • Body mass index (BMI) of 45.0-49.9 in adult ([facility]) 06/14/2019   • CHF (congestive heart failure) ([facility])    • Closed compression fracture of L4 lumbar vertebra 12/04/2017    Added automatically from request for surgery [id number]   • Constipation 12/05/2017   • COPD (chronic obstructive pulmonary disease) ([facility])    • Coronary artery disease 05/18/2016   • Diabetes mellitus type 2 in obese ([facility]) 01/29/2018   • Elevated alkaline phosphatase level 02/26/2018   • Elevated creatine kinase    • Essential hypertension 05/18/2016   • GERD (gastroesophageal reflux disease) 05/18/2016   • Glaucoma 07/21/2020   • History of kyphoplasty 01/30/2018   • Hyperlipidemia    • Lumbar facet arthropathy 01/29/2018   • Lumbar stenosis with neurogenic claudication 01/29/2018   • Morbid obesity due to excess calories ([facility]) 01/29/2018   • Myocardial infarction ([facility]) 05/18/2016   • Osteoporosis 05/18/2016   • Physical deconditioning 01/30/2018   • Retinal emboli, 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   • Sepsis ([facility])     uro   • Stroke ([facility])    • Urinary incontinence without sensory awareness 02/26/2018         Medications:     Current Outpatient Medications:   •  albuterol sulfate  (90 Base) MCG/ACT inhaler, Inhale 2 puffs Every 4 (Four) Hours As Needed for Wheezing or Shortness of Air., Disp: 18 g, Rfl: 3  •  atorvastatin (LIPITOR) 40 MG tablet, Take 1 tablet by mouth Every Night., Disp: 90 tablet, Rfl: 3  •  bisacodyl (DULCOLAX) 5 MG EC tablet, Take 1 tablet by mouth Daily As Needed for Constipation (Use if polyethylene glycol is ineffective)., Disp: , Rfl:   •  Budeson-Glycopyrrol-Formoterol (Breztri Aerosphere) 160-9-4.8 MCG/ACT aerosol inhaler, Inhale 2 puffs 2 (Two) Times a Day., Disp: 1 each, Rfl: 11  •  Cholecalciferol (Vitamin D3) 50 MCG (2000 UT) capsule, Take 2,000 Units by mouth Daily. 2000 units, Disp: , Rfl:   •  clopidogrel (PLAVIX) 75 MG tablet, Take 1 tablet by mouth Daily., Disp: 90 tablet, Rfl: 3  •  guaiFENesin (MUCINEX) 600  "MG 12 hr tablet, Take 2 tablets by mouth Every 12 (Twelve) Hours., Disp: , Rfl:   •  ipratropium-albuterol (DUO-NEB) 0.5-2.5 mg/3 ml nebulizer, Take 3 ml by nebulization every TID-QID prn wheezing or shortness of breath, Disp: 360 mL, Rfl: 0  •  nitroglycerin (NITROSTAT) 0.4 MG SL tablet, Place 1 tablet under the tongue Every 5 (Five) Minutes As Needed for Chest Pain. Take no more than 3 doses in 15 minutes., Disp: 100 tablet, Rfl: 0  •  nystatin (MYCOSTATIN) 414851 UNIT/GM powder, Apply  topically to the appropriate area as directed Every 12 (Twelve) Hours., Disp: 60 g, Rfl: 3  •  ondansetron (ZOFRAN) 4 MG tablet, Take 1 tablet by mouth Every 6 (Six) Hours As Needed for Nausea or Vomiting., Disp: , Rfl:   •  polyethylene glycol (MIRALAX) 17 g packet, Take 17 g by mouth Daily As Needed (Use if senna-docusate is ineffective)., Disp: , Rfl:   •  valsartan (Diovan) 160 MG tablet, Take 1.5 tablets by mouth Daily., Disp: 135 tablet, Rfl: 3  •  cefdinir (OMNICEF) 300 MG capsule, Take 1 capsule by mouth 2 (Two) Times a Day for 7 days., Disp: 14 capsule, Rfl: 0    Allergies:   No Known Allergies      Objective     Physical Exam:  Vital Signs:   Vitals:    02/14/23 1514   BP: 134/80   Pulse: 71   Temp: 97.3 °F (36.3 °C)   TempSrc: Infrared   SpO2: 98%   Weight: 112 kg (247 lb 6.4 oz)   Height: 154.9 cm (60.98\")   PainSc: 0-No pain     Body mass index is 46.77 kg/m².     Physical Exam  Vitals and nursing note reviewed.   Constitutional:       General: She is not in acute distress.     Appearance: Normal appearance. She is well-developed. She is not ill-appearing, toxic-appearing or diaphoretic.   HENT:      Head: Normocephalic and atraumatic.      Right Ear: Tympanic membrane normal. Decreased hearing noted.      Left Ear: Decreased hearing noted.      Ears:      Comments: Cerumen left ear canal obscuring visualization of TM  Cardiovascular:      Rate and Rhythm: Normal rate and regular rhythm.      Heart sounds: Normal heart " sounds.   Pulmonary:      Effort: Pulmonary effort is normal. No respiratory distress.      Breath sounds: Normal breath sounds. No stridor. No wheezing.   Skin:     General: Skin is warm and dry.   Neurological:      General: No focal deficit present.      Mental Status: She is alert and oriented to person, place, and time.   Psychiatric:         Attention and Perception: Attention normal.         Mood and Affect: Mood normal.         Behavior: Behavior normal. Behavior is cooperative.         Thought Content: Thought content normal.         Cognition and Memory: She exhibits impaired recent memory.         Judgment: Judgment normal.         Assessment / Plan      Assessment/Plan:   Diagnoses and all orders for this visit:    1. Cognitive decline (Primary)  Assessment & Plan:  MMSE score 18.  Discussed results with daughter and offered referral to neurology for patient. Daughter works at [employer] and declined referral at this time. Daughter provided with booklet of resources for dementia patients. She is considering assisted living/memory care for patient.   Also discussed that with daughter that infections in elderly patients such as UTI's can cause mental status changes which may improve with antibiotic treatment.  F/U 2 weeks.    Orders:  -     POC Urinalysis Dipstick, Automated    Brief Urine Lab Results  (Last result in the past 365 days)      Color   Clarity   Blood   Leuk Est   Nitrite   Protein   CREAT   Urine HCG        02/14/23 1609 Yellow   Clear   Negative   Large (3+)   Positive   Negative               2. Urinary tract infection without hematuria, site unspecified  -     Urine Culture - Urine, Urine, Clean Catch  -     cefdinir (OMNICEF) 300 MG capsule; Take 1 capsule by mouth 2 (Two) Times a Day for 7 days.  Dispense: 14 capsule; Refill: 0    3. Decreased hearing of both ears  -     Ambulatory Referral to Audiology    Follow Up:   PRN and at next scheduled appointment(s) with PCP.    Discussed  the nature of the medical condition(s) risks, complications, implications, management, safe and proper use of medications. Encouraged medication compliance, and keeping scheduled follow up appointments with me and any other providers.      I spent 45 minutes caring for Georgia on this date of service. This time includes time spent by me in the following activities:preparing for the visit, reviewing tests, obtaining and/or reviewing a separately obtained history, performing a medically appropriate examination and/or evaluation , counseling and educating the patient/family/caregiver, ordering medications, tests, or procedures, referring and communicating with other health care professionals  and documenting information in the medical record.    RTC if symptoms fail to improve, to ER if symptoms worsen.        *Dictated Utilizing Dragon Dictation   Please note that portions of this note were completed with a voice recognition program.   Part of this note may be an electronic transcription/translation of spoken language to printed text using the Dragon Dictation System. Spelling and/or grammatical errors may exist despite efforts at proofreading.        DON Veronica  [facility] [place]

## 2023-02-16 PROBLEM — R41.82 ALTERED MENTAL STATUS: Status: ACTIVE | Noted: 2023-02-16

## 2023-02-16 PROBLEM — R41.89 COGNITIVE DECLINE: Status: ACTIVE | Noted: 2023-02-16

## 2023-02-16 NOTE — ASSESSMENT & PLAN NOTE
MMSE score 18.  Discussed results with daughter and offered referral to neurology for patient. Daughter works at [employer] and declined referral at this time. Daughter provided with booklet of resources for dementia patients. She is considering assisted living/memory care for patient.   Also discussed that with daughter that infections in elderly patients such as UTI's can cause mental status changes which may improve with antibiotic treatment.  F/U 2 weeks.

## 2023-02-17 LAB — BACTERIA SPEC AEROBE CULT: ABNORMAL

## 2023-02-28 ENCOUNTER — TELEPHONE (OUTPATIENT)
Dept: FAMILY MEDICINE CLINIC | Facility: CLINIC | Age: 78
End: 2023-02-28

## 2023-02-28 NOTE — TELEPHONE ENCOUNTER
Caller: Omaira Patel    Relationship: Emergency Contact    Best call back number: [phone]    What is the medical concern/diagnosis: EAR WAX    What specialty or service is being requested: ENT       Any additional details: THE CALLER STATES THAT THE PATIENT WILL NEED TO HAVE HER EAR SUCTIONED THE CALLER WOULD LIKE A REFERRAL TO EAR NOSE AND THROAT     THE CALLER WOULD ALSO LIKE TO KNOW IF THE DOCTOR WANTS TO SEE THE PATIENT IN PERSON SHE STATES THAT SHE HAD A UTI AND FINISHED THE MEDICATION FOR THE UTI 02/24/2023 THE CALLER STATES THAT THE DOCTOR WANTED TO DO A FOLLOW UP BUT SHE DID NOT KNOW IF THE DOCTOR WANTED TO SEE THE PATIENT IN PERSON OR JUST HAVE THE PATIENT DO ANOTHER URINALYSIS

## 2023-03-01 DIAGNOSIS — H91.93 DECREASED HEARING OF BOTH EARS: Primary | ICD-10-CM

## 2023-03-01 DIAGNOSIS — H61.20 IMPACTED CERUMEN, UNSPECIFIED LATERALITY: ICD-10-CM

## 2023-03-01 DIAGNOSIS — N39.0 RECURRENT UTI: ICD-10-CM

## 2023-03-09 ENCOUNTER — LAB (OUTPATIENT)
Dept: LAB | Facility: HOSPITAL | Age: 78
End: 2023-03-09
Payer: MEDICARE

## 2023-03-09 PROCEDURE — 87086 URINE CULTURE/COLONY COUNT: CPT | Performed by: NURSE PRACTITIONER

## 2023-03-09 PROCEDURE — 81001 URINALYSIS AUTO W/SCOPE: CPT | Performed by: NURSE PRACTITIONER

## 2023-06-01 ENCOUNTER — OFFICE VISIT (OUTPATIENT)
Dept: CARDIOLOGY | Facility: CLINIC | Age: 78
End: 2023-06-01

## 2023-06-01 VITALS
DIASTOLIC BLOOD PRESSURE: 82 MMHG | WEIGHT: 244 LBS | OXYGEN SATURATION: 97 % | BODY MASS INDEX: 46.07 KG/M2 | HEART RATE: 72 BPM | HEIGHT: 61 IN | SYSTOLIC BLOOD PRESSURE: 140 MMHG

## 2023-06-01 DIAGNOSIS — E78.2 MIXED HYPERLIPIDEMIA: Chronic | ICD-10-CM

## 2023-06-01 DIAGNOSIS — I10 ESSENTIAL HYPERTENSION: Chronic | ICD-10-CM

## 2023-06-01 DIAGNOSIS — I25.83 CORONARY ARTERY DISEASE DUE TO LIPID RICH PLAQUE: Primary | Chronic | ICD-10-CM

## 2023-06-01 DIAGNOSIS — I65.22 LEFT CAROTID ARTERY STENOSIS: Chronic | ICD-10-CM

## 2023-06-01 DIAGNOSIS — I25.10 CORONARY ARTERY DISEASE DUE TO LIPID RICH PLAQUE: Primary | Chronic | ICD-10-CM

## 2023-06-01 PROCEDURE — 3077F SYST BP >= 140 MM HG: CPT | Performed by: INTERNAL MEDICINE

## 2023-06-01 PROCEDURE — 3079F DIAST BP 80-89 MM HG: CPT | Performed by: INTERNAL MEDICINE

## 2023-06-01 PROCEDURE — 99214 OFFICE O/P EST MOD 30 MIN: CPT | Performed by: INTERNAL MEDICINE

## 2023-06-01 NOTE — PROGRESS NOTES
[facility]  Office visit  Georgia Velázquez  1945  [phone]  There is no work phone number on file.    VISIT DATE:  6/1/2023    PCP: Georgina Ahumada, DON  [address] [facility] [address] [place] [address]    CC:  No chief complaint on file.      Previous cardiac studies and procedures:  2000: PCI with stenting in the setting of MI, dated deficient     April 2015   echo: EF 40%, mild LVH, diastolic dysfunction  Shyanne scan myocardial perfusion imaging: EF 41%, large fixed anterior apical defect.     May 2015 cardiac catheterization: 70% distal LAD stenosis.  RCA with diffuse irregularities, 60% mid RCA stenosis.  Left circumflex with diffuse irregularities.     June 2020  Carotid duplex  · Left mid ICA near 70%, however EDV less than 100 and ratio less than 4.  · Right internal carotid artery stenosis of 0-49%.  · Bilateral antegrade vertebral flow.  Echo  · Estimated EF appears to be in the range of 51 - 55%.  · Left ventricular systolic function is normal.  · Left ventricular diastolic dysfunction (grade II) consistent with pseudonormalization.  · The following left ventricular wall segments are hypokinetic: apical anterior, apical lateral, apical inferior, apical septal and apex hypokinetic.  · Mild aortic valve regurgitation is present.     8/2020 30 day monitor  · A relatively benign monitor study.  · Occasional premature atrial contractions. Rare PVCs which are intermittently symptomatic.    November 2022 carotid duplex imaging  •  Right internal carotid artery stenosis of 50-69%.(Less than 50% based on new guidelines)  •  Left internal carotid artery stenosis of 50-69%.    ASSESSMENT:   Diagnosis Plan   1. Coronary artery disease due to lipid rich plaque        2. Essential hypertension        3. Mixed hyperlipidemia        4. Left carotid artery stenosis            PLAN:  Coronary artery disease: Currently stable and asymptomatic.  Continue antiplatelet therapy, high  "intensity statin therapy and afterload reduction.     Hypertension: Goal less than 130/80 mmHg.  Currently well controlled.  Continue current medical therapy.     Hyperlipidemia: Goal LDL less than 70, currently well controlled.  Continue atorvastatin 40 mg p.o. daily.     Retinal embolic event, right: Continue current medical therapy and ophthalmology evaluation.  Evaluation negative for atrial arrhythmias.     Bilateral internal carotid artery stenosis: 50 to 69%.  Currently asymptomatic.  Continue afterload reduction, high intensity statin therapy.    Continue Plavix.  Annual carotid duplex.    Subjective  Interval assessment: Using a wheeled walker for ambulation.  Blood pressures running less than 135/80 mmHg.  No change in baseline functional capacity.  Stable shortness of breath in a class II pattern.  Denies chest discomfort.  No palpitations.  Reviewed carotid duplex imaging with patient today.    Initial eval: 75-year-old female with a history of coronary disease, peripheral vascular disease and chronic congestive systolic heart failure.  Recently had right eye pain and was diagnosed with a right retinal embolic event.  Ophthalmology evaluation has been requested, unclear whether this was a venous or arterial thrombosis.  Reviewed recent echo and carotid duplex.  No significant palpitations.  She has stable dyspnea on exertion and a class II-III pattern.  Uses a wheeled walker for ambulation.  She is compliant with medical therapy and was on aspirin prior to this event.  She reports that she is currently on a statin but does not know the name or dosage, she will call us back with this information later.  Blood pressures run less than 130/80 mmHg.  She is scheduled for an ophthalmologic procedure next week.    PHYSICAL EXAMINATION:  Vitals:    06/01/23 1124   BP: 140/82   BP Location: Right arm   Patient Position: Sitting   Pulse: 72   SpO2: 97%   Weight: 111 kg (244 lb)   Height: 154.9 cm (61\")     General " Appearance:    Alert, cooperative, no distress, appears stated age   Head:    Normocephalic, without obvious abnormality, atraumatic   Eyes:    conjunctiva/corneas clear   Nose:   Nares normal, septum midline, mucosa normal, no drainage   Throat:   Lips, teeth and gums normal   Neck:   Supple, symmetrical, trachea midline, no carotid    bruit or JVD   Lungs:     Clear to auscultation bilaterally, respirations unlabored   Chest Wall:    No tenderness or deformity    Heart:    Regular rate and rhythm, S1 and S2 normal, 2/6 early peaking systolic murmur right upper sternal border, rub   or gallop, normal carotid impulse bilaterally.  Left carotid bruit   Abdomen:     Soft, non-tender   Extremities:   Extremities normal, atraumatic, no cyanosis.  Trivial edema   Pulses:   2+ and symmetric all extremities   Skin:   Skin color, texture, turgor normal, no rashes or lesions       Diagnostic Data:  Procedures  Lab Results   Component Value Date    TRIG 82 11/14/2022    HDL 57 11/14/2022     Lab Results   Component Value Date    GLUCOSE 108 (H) 11/14/2022    BUN 21 11/14/2022    CREATININE 1.39 (H) 11/14/2022     11/14/2022    K 4.3 11/14/2022     11/14/2022    CO2 23.0 11/14/2022     Lab Results   Component Value Date    HGBA1C 6.30 (H) 11/14/2022     Lab Results   Component Value Date    WBC 8.47 11/14/2022    HGB 13.4 11/14/2022    HCT 40.7 11/14/2022     11/14/2022       Allergies  No Known Allergies    Current Medications    Current Outpatient Medications:   •  albuterol sulfate  (90 Base) MCG/ACT inhaler, Inhale 2 puffs Every 4 (Four) Hours As Needed for Wheezing or Shortness of Air., Disp: 18 g, Rfl: 3  •  atorvastatin (LIPITOR) 40 MG tablet, Take 1 tablet by mouth Every Night., Disp: 90 tablet, Rfl: 3  •  bisacodyl (DULCOLAX) 5 MG EC tablet, Take 1 tablet by mouth Daily As Needed for Constipation (Use if polyethylene glycol is ineffective)., Disp: , Rfl:   •  Budeson-Glycopyrrol-Formoterol  ([facility] System[facility]) 160-9-4.8 MCG/ACT aerosol inhaler, Inhale 2 puffs 2 (Two) Times a Day., Disp: 1 each, Rfl: 11  •  Cholecalciferol (Vitamin D3) 50 MCG ( UT) capsule, Take 1 capsule by mouth Daily. 2000 units, Disp: , Rfl:   •  clopidogrel (PLAVIX) 75 MG tablet, Take 1 tablet by mouth Daily., Disp: 90 tablet, Rfl: 3  •  guaiFENesin (MUCINEX) 600 MG 12 hr tablet, Take 2 tablets by mouth Every 12 (Twelve) Hours. (Patient taking differently: Take 2 tablets by mouth As Needed.), Disp: , Rfl:   •  ipratropium-albuterol (DUO-NEB) 0.5-2.5 mg/3 ml nebulizer, Take 3 ml by nebulization every TID-QID prn wheezing or shortness of breath, Disp: 360 mL, Rfl: 0  •  nitroglycerin (NITROSTAT) 0.4 MG SL tablet, Place 1 tablet under the tongue Every 5 (Five) Minutes As Needed for Chest Pain. Take no more than 3 doses in 15 minutes., Disp: 100 tablet, Rfl: 0  •  nystatin (MYCOSTATIN) 102733 UNIT/GM powder, Apply  topically to the appropriate area as directed Every 12 (Twelve) Hours. (Patient taking differently: Apply  topically to the appropriate area as directed As Needed.), Disp: 60 g, Rfl: 3  •  ondansetron (ZOFRAN) 4 MG tablet, Take 1 tablet by mouth Every 6 (Six) Hours As Needed for Nausea or Vomiting., Disp: , Rfl:   •  polyethylene glycol (MIRALAX) 17 g packet, Take 17 g by mouth Daily As Needed (Use if senna-docusate is ineffective)., Disp: , Rfl:   •  valsartan (Diovan) 160 MG tablet, Take 1.5 tablets by mouth Daily., Disp: 135 tablet, Rfl: 3          ROS  ROS      SOCIAL HX  Social History     Socioeconomic History   • Marital status:    Tobacco Use   • Smoking status: Former     Packs/day: 1.00     Years: 15.00     Pack years: 15.00     Types: Cigarettes     Quit date: 2000     Years since quittin.4   • Smokeless tobacco: Never   Vaping Use   • Vaping Use: Never used   Substance and Sexual Activity   • Alcohol use: No   • Drug use: No   • Sexual activity: Never       FAMILY HX  Family History    Problem Relation Age of Onset   • Diabetes Mother    • Heart failure Mother    • Heart failure Father    • No Known Problems Sister    • No Known Problems Brother    • No Known Problems Son    • No Known Problems Daughter              Armaan Samuels III, MD, [facility]

## 2023-10-01 DIAGNOSIS — I10 ESSENTIAL HYPERTENSION: ICD-10-CM

## 2023-10-01 DIAGNOSIS — Z76.0 MEDICATION REFILL: ICD-10-CM

## 2023-10-02 RX ORDER — VALSARTAN 160 MG/1
TABLET ORAL
Qty: 135 TABLET | Refills: 3 | Status: SHIPPED | OUTPATIENT
Start: 2023-10-02

## 2023-10-14 DIAGNOSIS — Z76.0 MEDICATION REFILL: ICD-10-CM

## 2023-10-14 DIAGNOSIS — E78.2 MIXED HYPERLIPIDEMIA: ICD-10-CM

## 2023-10-16 RX ORDER — ATORVASTATIN CALCIUM 40 MG/1
40 TABLET, FILM COATED ORAL NIGHTLY
Qty: 90 TABLET | Refills: 3 | Status: SHIPPED | OUTPATIENT
Start: 2023-10-16

## 2023-11-29 ENCOUNTER — OFFICE VISIT (OUTPATIENT)
Dept: FAMILY MEDICINE CLINIC | Facility: CLINIC | Age: 78
End: 2023-11-29
Payer: MEDICARE

## 2023-11-29 ENCOUNTER — LAB (OUTPATIENT)
Dept: LAB | Facility: HOSPITAL | Age: 78
End: 2023-11-29
Payer: MEDICARE

## 2023-11-29 VITALS
WEIGHT: 242.6 LBS | TEMPERATURE: 98.4 F | HEART RATE: 64 BPM | SYSTOLIC BLOOD PRESSURE: 138 MMHG | BODY MASS INDEX: 45.8 KG/M2 | HEIGHT: 61 IN | DIASTOLIC BLOOD PRESSURE: 78 MMHG

## 2023-11-29 DIAGNOSIS — Z76.0 MEDICATION REFILL: ICD-10-CM

## 2023-11-29 DIAGNOSIS — E55.9 VITAMIN D DEFICIENCY: ICD-10-CM

## 2023-11-29 DIAGNOSIS — I10 ESSENTIAL HYPERTENSION: Chronic | ICD-10-CM

## 2023-11-29 DIAGNOSIS — N18.32 TYPE 2 DIABETES MELLITUS WITH STAGE 3B CHRONIC KIDNEY DISEASE, WITHOUT LONG-TERM CURRENT USE OF INSULIN: ICD-10-CM

## 2023-11-29 DIAGNOSIS — I25.83 CORONARY ARTERY DISEASE DUE TO LIPID RICH PLAQUE: ICD-10-CM

## 2023-11-29 DIAGNOSIS — E78.2 MIXED HYPERLIPIDEMIA: Chronic | ICD-10-CM

## 2023-11-29 DIAGNOSIS — I25.10 CORONARY ARTERY DISEASE DUE TO LIPID RICH PLAQUE: ICD-10-CM

## 2023-11-29 DIAGNOSIS — Z23 IMMUNIZATION DUE: ICD-10-CM

## 2023-11-29 DIAGNOSIS — E11.22 TYPE 2 DIABETES MELLITUS WITH STAGE 3B CHRONIC KIDNEY DISEASE, WITHOUT LONG-TERM CURRENT USE OF INSULIN: ICD-10-CM

## 2023-11-29 DIAGNOSIS — Z00.00 MEDICARE ANNUAL WELLNESS VISIT, SUBSEQUENT: Primary | ICD-10-CM

## 2023-11-29 DIAGNOSIS — K62.5 BRIGHT RED RECTAL BLEEDING: ICD-10-CM

## 2023-11-29 DIAGNOSIS — K59.09 CHRONIC CONSTIPATION: ICD-10-CM

## 2023-11-29 DIAGNOSIS — N18.31 STAGE 3A CHRONIC KIDNEY DISEASE: Chronic | ICD-10-CM

## 2023-11-29 LAB
ALBUMIN/CREATININE RATIO, URINE: <30
BILIRUB BLD-MCNC: ABNORMAL MG/DL
CLARITY, POC: ABNORMAL
COLOR UR: YELLOW
EXPIRATION DATE: ABNORMAL
EXPIRATION DATE: NORMAL
EXPIRATION DATE: NORMAL
GLUCOSE UR STRIP-MCNC: NEGATIVE MG/DL
HBA1C MFR BLD: 5.6 % (ref 4.5–5.7)
KETONES UR QL: NEGATIVE
LEUKOCYTE EST, POC: ABNORMAL
Lab: ABNORMAL
Lab: NORMAL
Lab: NORMAL
NITRITE UR-MCNC: POSITIVE MG/ML
PH UR: 6 [PH] (ref 5–8)
POC CREATININE URINE: 100
POC MICROALBUMIN URINE: 30
PROT UR STRIP-MCNC: ABNORMAL MG/DL
RBC # UR STRIP: ABNORMAL /UL
SP GR UR: 1.01 (ref 1–1.03)
UROBILINOGEN UR QL: NORMAL

## 2023-11-29 PROCEDURE — 85027 COMPLETE CBC AUTOMATED: CPT

## 2023-11-29 PROCEDURE — 84443 ASSAY THYROID STIM HORMONE: CPT

## 2023-11-29 PROCEDURE — 80061 LIPID PANEL: CPT

## 2023-11-29 PROCEDURE — 80053 COMPREHEN METABOLIC PANEL: CPT

## 2023-11-29 RX ORDER — ACETAMINOPHEN 160 MG
2000 TABLET,DISINTEGRATING ORAL DAILY
Qty: 90 CAPSULE | Refills: 3 | Status: SHIPPED | OUTPATIENT
Start: 2023-11-29

## 2023-11-29 RX ORDER — ATORVASTATIN CALCIUM 40 MG/1
40 TABLET, FILM COATED ORAL NIGHTLY
Qty: 90 TABLET | Refills: 3 | Status: SHIPPED | OUTPATIENT
Start: 2023-11-29

## 2023-11-29 RX ORDER — VALSARTAN 160 MG/1
240 TABLET ORAL DAILY
Qty: 135 TABLET | Refills: 3 | Status: SHIPPED | OUTPATIENT
Start: 2023-11-29

## 2023-11-29 RX ORDER — CLOPIDOGREL BISULFATE 75 MG/1
75 TABLET ORAL DAILY
Qty: 90 TABLET | Refills: 3 | Status: SHIPPED | OUTPATIENT
Start: 2023-11-29

## 2023-11-29 RX ORDER — BISACODYL 5 MG/1
5 TABLET, DELAYED RELEASE ORAL DAILY PRN
Qty: 90 TABLET | Refills: 3 | Status: SHIPPED | OUTPATIENT
Start: 2023-11-29

## 2023-11-29 NOTE — PROGRESS NOTES
Follow Up Office Note     Patient Name: Georgia Velázquez  : 1945   MRN: [id number]     Chief Complaint:    Chief Complaint   Patient presents with    Medicare Wellness-subsequent    Med Refill     Per patient she needs refills on all meds given by our office.     Rectal Bleeding     Per patient she has blood in her stool off and on x a month.       History of Present Illness: Georgia Velázquez is a 78 y.o. female who presents today ***  HPI     Subjective      I have reviewed and the following portions of the patient's history were updated as appropriate: past family history, past medical history, past social history, past surgical history and problem list.    Review of Systems:   Review of Systems     Past Medical History:   Past Medical History:   Diagnosis Date    Acute kidney injury 2017    Aortic regurgitation     Arthritis of shoulder 2016    Body mass index (BMI) of 45.0-49.9 in adult 2019    CHF (congestive heart failure)     Closed compression fracture of L4 lumbar vertebra 2017    Added automatically from request for surgery [id number]    Constipation 2017    COPD (chronic obstructive pulmonary disease)     Coronary artery disease 2016    Diabetes mellitus type 2 in obese 2018    Elevated alkaline phosphatase level 2018    Elevated creatine kinase     Essential hypertension 2016    GERD (gastroesophageal reflux disease) 2016    Glaucoma 2020    History of kyphoplasty 2018    Hyperlipidemia     Lumbar facet arthropathy 2018    Lumbar stenosis with neurogenic claudication 2018    Morbid obesity due to excess calories 2018    Myocardial infarction 2016    Osteoporosis 2016    Physical deconditioning 2018    Retinal emboli, right 2020    Sepsis     uro    Stroke     Urinary incontinence without sensory awareness 2018         Medications:     Current Outpatient Medications:      albuterol sulfate  (90 Base) MCG/ACT inhaler, Inhale 2 puffs Every 4 (Four) Hours As Needed for Wheezing or Shortness of Air., Disp: 18 g, Rfl: 3    atorvastatin (LIPITOR) 40 MG tablet, Take 1 tablet by mouth Every Night., Disp: 90 tablet, Rfl: 3    bisacodyl (DULCOLAX) 5 MG EC tablet, Take 1 tablet by mouth Daily As Needed for Constipation (Use if polyethylene glycol is ineffective)., Disp: 90 tablet, Rfl: 3    Cholecalciferol (Vitamin D3) 50 MCG (2000 UT) capsule, Take 1 capsule by mouth Daily. 2000 units, Disp: 90 capsule, Rfl: 3    clopidogrel (PLAVIX) 75 MG tablet, Take 1 tablet by mouth Daily., Disp: 90 tablet, Rfl: 3    guaiFENesin (MUCINEX) 600 MG 12 hr tablet, Take 2 tablets by mouth Every 12 (Twelve) Hours. (Patient taking differently: Take 2 tablets by mouth As Needed.), Disp: , Rfl:     ipratropium-albuterol (DUO-NEB) 0.5-2.5 mg/3 ml nebulizer, Take 3 ml by nebulization every TID-QID prn wheezing or shortness of breath, Disp: 360 mL, Rfl: 0    nystatin (MYCOSTATIN) 156637 UNIT/GM powder, Apply  topically to the appropriate area as directed Every 12 (Twelve) Hours. (Patient taking differently: Apply  topically to the appropriate area as directed As Needed.), Disp: 60 g, Rfl: 3    ondansetron (ZOFRAN) 4 MG tablet, Take 1 tablet by mouth Every 6 (Six) Hours As Needed for Nausea or Vomiting., Disp: , Rfl:     valsartan (DIOVAN) 160 MG tablet, Take 1.5 tablets by mouth Daily., Disp: 135 tablet, Rfl: 3    Budeson-Glycopyrrol-Formoterol (Breztri Aerosphere) 160-9-4.8 MCG/ACT aerosol inhaler, Inhale 2 puffs 2 (Two) Times a Day. (Patient not taking: Reported on 11/29/2023), Disp: 1 each, Rfl: 11    nitroglycerin (NITROSTAT) 0.4 MG SL tablet, Place 1 tablet under the tongue Every 5 (Five) Minutes As Needed for Chest Pain. Take no more than 3 doses in 15 minutes. (Patient not taking: Reported on 11/29/2023), Disp: 100 tablet, Rfl: 0    polyethylene glycol (MIRALAX) 17 g packet, Take 17 g by mouth Daily As  "Needed (Use if senna-docusate is ineffective). (Patient not taking: Reported on 11/29/2023), Disp: , Rfl:     Allergies:   No Known Allergies      Objective     Physical Exam:  Vital Signs:   Vitals:    11/29/23 1422   BP: 138/78   Pulse: 64   Temp: 98.4 °F (36.9 °C)   TempSrc: Infrared   Weight: 110 kg (242 lb 9.6 oz)   Height: 154.9 cm (60.98\")   PainSc: 0-No pain     Body mass index is 45.86 kg/m².     Physical Exam    Assessment / Plan      Assessment/Plan:   Diagnoses and all orders for this visit:    1. Type 2 diabetes mellitus with stage 3b chronic kidney disease, without long-term current use of insulin (Primary)  -     POC Glycosylated Hemoglobin (Hb A1C)  -     POC Microalbumin  -     POC Urinalysis Dipstick, Automated    2. Immunization due  -     Fluzone High-Dose 65+yrs  -     POC Microalbumin    3. Bright red rectal bleeding  -     Ambulatory Referral to Gastroenterology  -     POC Microalbumin    4. Essential hypertension  -     Comprehensive Metabolic Panel; Future  -     CBC (No Diff); Future  -     TSH; Future  -     valsartan (DIOVAN) 160 MG tablet; Take 1.5 tablets by mouth Daily.  Dispense: 135 tablet; Refill: 3    5. Mixed hyperlipidemia  -     Lipid Panel; Future  -     atorvastatin (LIPITOR) 40 MG tablet; Take 1 tablet by mouth Every Night.  Dispense: 90 tablet; Refill: 3    6. Stage 3a chronic kidney disease    7. Medication refill  -     atorvastatin (LIPITOR) 40 MG tablet; Take 1 tablet by mouth Every Night.  Dispense: 90 tablet; Refill: 3  -     clopidogrel (PLAVIX) 75 MG tablet; Take 1 tablet by mouth Daily.  Dispense: 90 tablet; Refill: 3  -     valsartan (DIOVAN) 160 MG tablet; Take 1.5 tablets by mouth Daily.  Dispense: 135 tablet; Refill: 3    8. Coronary artery disease due to lipid rich plaque  -     clopidogrel (PLAVIX) 75 MG tablet; Take 1 tablet by mouth Daily.  Dispense: 90 tablet; Refill: 3    9. Chronic constipation  -     bisacodyl (DULCOLAX) 5 MG EC tablet; Take 1 tablet by " mouth Daily As Needed for Constipation (Use if polyethylene glycol is ineffective).  Dispense: 90 tablet; Refill: 3    10. Vitamin D deficiency  -     Cholecalciferol (Vitamin D3) 50 MCG (2000 UT) capsule; Take 1 capsule by mouth Daily. 2000 units  Dispense: 90 capsule; Refill: 3           Follow Up:   PRN and at next scheduled appointment(s) with PCP.    Discussed the nature of the medical condition(s) risks, complications, implications, management, safe and proper use of medications. Encouraged medication compliance, and keeping scheduled follow up appointments with me and any other providers.      RTC if symptoms fail to improve, to ER if symptoms worsen.        *Dictated Utilizing Dragon Dictation   Please note that portions of this note were completed with a voice recognition program.   Part of this note may be an electronic transcription/translation of spoken language to printed text using the Dragon Dictation System. Spelling and/or grammatical errors may exist despite efforts at proofreading.      NOTE TO PATIENT: The 21st Century Cures Act makes medical notes like these available to patients in the interest of transparency. However, be advised this is a medical document. It is intended as peer to peer communication. It is written in medical language and may contain abbreviations or verbiage that are unfamiliar. It may appear blunt or direct. Medical documents are intended to carry relevant information, facts as evident, and the clinical opinion of the practitioner.      DON Veronica  [facility] [place] [facility] [place]

## 2023-11-30 LAB
ALBUMIN SERPL-MCNC: 4 G/DL (ref 3.5–5.2)
ALBUMIN/GLOB SERPL: 1.2 G/DL
ALP SERPL-CCNC: 107 U/L (ref 39–117)
ALT SERPL W P-5'-P-CCNC: 13 U/L (ref 1–33)
ANION GAP SERPL CALCULATED.3IONS-SCNC: 16.4 MMOL/L (ref 5–15)
AST SERPL-CCNC: 20 U/L (ref 1–32)
BILIRUB SERPL-MCNC: 0.6 MG/DL (ref 0–1.2)
BUN SERPL-MCNC: 32 MG/DL (ref 8–23)
BUN/CREAT SERPL: 20.1 (ref 7–25)
CALCIUM SPEC-SCNC: 9.7 MG/DL (ref 8.6–10.5)
CHLORIDE SERPL-SCNC: 102 MMOL/L (ref 98–107)
CHOLEST SERPL-MCNC: 124 MG/DL (ref 0–200)
CO2 SERPL-SCNC: 18.6 MMOL/L (ref 22–29)
CREAT SERPL-MCNC: 1.59 MG/DL (ref 0.57–1)
DEPRECATED RDW RBC AUTO: 43.6 FL (ref 37–54)
EGFRCR SERPLBLD CKD-EPI 2021: 32.7 ML/MIN/1.73
ERYTHROCYTE [DISTWIDTH] IN BLOOD BY AUTOMATED COUNT: 13 % (ref 12.3–15.4)
GLOBULIN UR ELPH-MCNC: 3.4 GM/DL
GLUCOSE SERPL-MCNC: 92 MG/DL (ref 65–99)
HCT VFR BLD AUTO: 40.8 % (ref 34–46.6)
HDLC SERPL-MCNC: 52 MG/DL (ref 40–60)
HGB BLD-MCNC: 14.1 G/DL (ref 12–15.9)
LDLC SERPL CALC-MCNC: 56 MG/DL (ref 0–100)
LDLC/HDLC SERPL: 1.08 {RATIO}
MCH RBC QN AUTO: 32.2 PG (ref 26.6–33)
MCHC RBC AUTO-ENTMCNC: 34.6 G/DL (ref 31.5–35.7)
MCV RBC AUTO: 93.2 FL (ref 79–97)
PLATELET # BLD AUTO: 196 10*3/MM3 (ref 140–450)
PMV BLD AUTO: 12 FL (ref 6–12)
POTASSIUM SERPL-SCNC: 4.7 MMOL/L (ref 3.5–5.2)
PROT SERPL-MCNC: 7.4 G/DL (ref 6–8.5)
RBC # BLD AUTO: 4.38 10*6/MM3 (ref 3.77–5.28)
SODIUM SERPL-SCNC: 137 MMOL/L (ref 136–145)
TRIGL SERPL-MCNC: 79 MG/DL (ref 0–150)
TSH SERPL DL<=0.05 MIU/L-ACNC: 1.76 UIU/ML (ref 0.27–4.2)
VLDLC SERPL-MCNC: 16 MG/DL (ref 5–40)
WBC NRBC COR # BLD AUTO: 9.82 10*3/MM3 (ref 3.4–10.8)

## 2023-12-01 DIAGNOSIS — N39.0 URINARY TRACT INFECTION WITHOUT HEMATURIA, SITE UNSPECIFIED: Primary | ICD-10-CM

## 2023-12-01 RX ORDER — CEFDINIR 300 MG/1
300 CAPSULE ORAL 2 TIMES DAILY
Qty: 14 CAPSULE | Refills: 0 | Status: SHIPPED | OUTPATIENT
Start: 2023-12-01 | End: 2023-12-08

## 2023-12-03 PROBLEM — K62.5 BRIGHT RED RECTAL BLEEDING: Status: ACTIVE | Noted: 2023-12-03

## 2023-12-03 NOTE — ASSESSMENT & PLAN NOTE
New problem which requires further workup.  CBC today.  Plan to refer to gastroenterology for further evaluation and management.

## 2023-12-03 NOTE — PROGRESS NOTES
The ABCs of the Annual Wellness Visit  Subsequent Medicare Wellness Visit    Subjective    Georgia Velázquez is a 78 y.o. female who presents for a Subsequent Medicare Wellness Visit. She is accompanied today by her son.    The following portions of the patient's history were reviewed and   updated as appropriate: allergies, current medications, past family history, past medical history, past social history, past surgical history, and problem list.    Compared to one year ago, the patient feels her physical   health is the same.    Compared to one year ago, the patient feels her mental   health is the same.    Recent Hospitalizations:  She was not admitted to the hospital during the last year.       Current Medical Providers:  Patient Care Team:  Georgina Ahumada APRN as PCP - General (Family Medicine)  Carmine Pérez MD as Consulting Physician (Interventional Cardiology)  Marc Pham MD as Consulting Physician (Neurosurgery)  Prieto Flores MD as Consulting Physician (Pain Medicine)  Rajeev Sharif MD as Consulting Physician (Pulmonary Disease)  Armaan Samuels III, MD as Cardiologist (Cardiology)  Kristy Ramirez MD as Consulting Physician (Nephrology)    Outpatient Medications Prior to Visit   Medication Sig Dispense Refill    albuterol sulfate  (90 Base) MCG/ACT inhaler Inhale 2 puffs Every 4 (Four) Hours As Needed for Wheezing or Shortness of Air. 18 g 3    guaiFENesin (MUCINEX) 600 MG 12 hr tablet Take 2 tablets by mouth Every 12 (Twelve) Hours. (Patient taking differently: Take 2 tablets by mouth As Needed.)      ipratropium-albuterol (DUO-NEB) 0.5-2.5 mg/3 ml nebulizer Take 3 ml by nebulization every TID-QID prn wheezing or shortness of breath 360 mL 0    nystatin (MYCOSTATIN) 148606 UNIT/GM powder Apply  topically to the appropriate area as directed Every 12 (Twelve) Hours. (Patient taking differently: Apply  topically to the appropriate area as directed As Needed.) 60 g 3     atorvastatin (LIPITOR) 40 MG tablet TAKE ONE TABLET BY MOUTH ONCE NIGHTLY 90 tablet 3    bisacodyl (DULCOLAX) 5 MG EC tablet Take 1 tablet by mouth Daily As Needed for Constipation (Use if polyethylene glycol is ineffective).      Cholecalciferol (Vitamin D3) 50 MCG (2000 UT) capsule Take 1 capsule by mouth Daily. 2000 units      ondansetron (ZOFRAN) 4 MG tablet Take 1 tablet by mouth Every 6 (Six) Hours As Needed for Nausea or Vomiting.      valsartan (DIOVAN) 160 MG tablet TAKE 1 AND 1/2 TABLET BY MOUTH DAILY 135 tablet 3    Budeson-Glycopyrrol-Formoterol (Breztri Aerosphere) 160-9-4.8 MCG/ACT aerosol inhaler Inhale 2 puffs 2 (Two) Times a Day. (Patient not taking: Reported on 11/29/2023) 1 each 11    clopidogrel (PLAVIX) 75 MG tablet Take 1 tablet by mouth Daily. 90 tablet 3    nitroglycerin (NITROSTAT) 0.4 MG SL tablet Place 1 tablet under the tongue Every 5 (Five) Minutes As Needed for Chest Pain. Take no more than 3 doses in 15 minutes. (Patient not taking: Reported on 11/29/2023) 100 tablet 0    polyethylene glycol (MIRALAX) 17 g packet Take 17 g by mouth Daily As Needed (Use if senna-docusate is ineffective). (Patient not taking: Reported on 11/29/2023)       No facility-administered medications prior to visit.       No opioid medication identified on active medication list. I have reviewed chart for other potential  high risk medication/s and harmful drug interactions in the elderly.        Aspirin is not on active medication list.  Aspirin use is not indicated based on review of current medical condition/s. Risk of harm outweighs potential benefits.  .    Patient Active Problem List   Diagnosis    Essential hypertension    Hyperlipidemia    Coronary artery disease    GERD (gastroesophageal reflux disease)    Stage 3 chronic kidney disease    Chronic obstructive pulmonary disease    Retinal emboli, right    Diastolic dysfunction    Colitis    Glaucoma    Left carotid artery stenosis    Syncope    Aortic  "regurgitation    Diabetic retinopathy    Type 2 diabetes mellitus with stage 3b chronic kidney disease, without long-term current use of insulin    Hypoxia    Obesity, morbid, BMI 50 or higher    Hospital discharge follow-up    Cognitive decline    Bright red rectal bleeding     Advance Care Planning   Advance Care Planning     Advance Directive is not on file.  ACP discussion was held with the patient during this visit. Patient does not have an advance directive, information provided.     Objective    Vitals:    23 1422   BP: 138/78   Pulse: 64   Temp: 98.4 °F (36.9 °C)   TempSrc: Infrared   Weight: 110 kg (242 lb 9.6 oz)   Height: 154.9 cm (60.98\")   PainSc: 0-No pain     Estimated body mass index is 45.86 kg/m² as calculated from the following:    Height as of this encounter: 154.9 cm (60.98\").    Weight as of this encounter: 110 kg (242 lb 9.6 oz).    Class 3 Severe Obesity (BMI >=40). Obesity-related health conditions include the following: hypertension, coronary heart disease, diabetes mellitus, dyslipidemias, and GERD. Obesity is unchanged. BMI is is above average; BMI management plan is completed. Recommend low calorie, low carb based diet program, portion control, and increasing exercise.      Does the patient have evidence of cognitive impairment? No    Lab Results   Component Value Date    TRIG 79 2023    HDL 52 2023    LDL 56 2023    VLDL 16 2023    HGBA1C 5.6 2023        HEALTH RISK ASSESSMENT    Smoking Status:  Social History     Tobacco Use   Smoking Status Former    Packs/day: 1.00    Years: 15.00    Additional pack years: 0.00    Total pack years: 15.00    Types: Cigarettes    Quit date: 2000    Years since quittin.9   Smokeless Tobacco Never     Alcohol Consumption:  Social History     Substance and Sexual Activity   Alcohol Use No     Fall Risk Screen:    ZINA Fall Risk Assessment was completed, and patient is at LOW risk for falls.Assessment " completed on:2023    Depression Screenin/29/2023     2:21 PM   PHQ-2/PHQ-9 Depression Screening   Little Interest or Pleasure in Doing Things 0-->not at all   Feeling Down, Depressed or Hopeless 0-->not at all   PHQ-9: Brief Depression Severity Measure Score 0       Health Habits and Functional and Cognitive Screenin/29/2023     2:18 PM   Functional & Cognitive Status   Do you have difficulty preparing food and eating? No   Do you have difficulty bathing yourself, getting dressed or grooming yourself? No   Do you have difficulty using the toilet? No   Do you have difficulty moving around from place to place? No   Do you have trouble with steps or getting out of a bed or a chair? Yes   Current Diet Well Balanced Diet   Dental Exam Up to date   Eye Exam Up to date   Exercise (times per week) 0 times per week   Current Exercises Include No Regular Exercise   Do you need help using the phone?  No   Are you deaf or do you have serious difficulty hearing?  No   Do you need help to go to places out of walking distance? Yes   Do you need help shopping? No   Do you need help preparing meals?  No   Do you need help with housework?  Yes   Do you need help with laundry? No   Do you need help taking your medications? No   Do you need help managing money? No   Do you ever drive or ride in a car without wearing a seat belt? No   Have you felt unusual stress, anger or loneliness in the last month? No   Who do you live with? Child   If you need help, do you have trouble finding someone available to you? No   Have you been bothered in the last four weeks by sexual problems? No   Do you have difficulty concentrating, remembering or making decisions? No       Age-appropriate Screening Schedule:  Refer to the list below for future screening recommendations based on patient's age, sex and/or medical conditions. Orders for these recommended tests are listed in the plan section. The patient has been provided with  a written plan.    Health Maintenance   Topic Date Due    ZOSTER VACCINE (1 of 2) Never done    COVID-19 Vaccine (3 - 2023-24 season) 09/01/2023    DIABETIC EYE EXAM  04/12/2024    HEMOGLOBIN A1C  05/29/2024    ANNUAL WELLNESS VISIT  11/29/2024    LIPID PANEL  11/29/2024    URINE MICROALBUMIN  11/29/2024    BMI FOLLOWUP  11/29/2024    COLORECTAL CANCER SCREENING  08/22/2026    TDAP/TD VACCINES (2 - Td or Tdap) 11/07/2027    INFLUENZA VACCINE  Completed    Pneumococcal Vaccine 65+  Completed    DXA SCAN  Discontinued                  CMS Preventative Services Quick Reference  Risk Factors Identified During Encounter  Immunizations Discussed/Encouraged: Influenza, Shingrix, and COVID19  The above risks/problems have been discussed with the patient.  Pertinent information has been shared with the patient in the After Visit Summary.  An After Visit Summary and PPPS were made available to the patient.    Follow Up:   Next Medicare Wellness visit to be scheduled in 1 year.       Additional E&M Note during same encounter follows:  Patient has multiple medical problems which are significant and separately identifiable that require additional work above and beyond the Medicare Wellness Visit.      Chief Complaint  Medicare Wellness-subsequent, Med Refill (Per patient she needs refills on all meds given by our office. ), and Rectal Bleeding (Per patient she has blood in her stool off and on x a month.)    Subjective        HPI  Georgia GIULIANA Velázquez is also being seen today for bright red rectal bleeding ( noted on toilet tissue after defecation) x approximately one month. Patient reports sx waxing and waning.    Review of Systems   Constitutional: Negative.    Respiratory: Negative.     Cardiovascular: Negative.    Gastrointestinal:  Positive for anal bleeding and constipation (chronic). Negative for abdominal pain, diarrhea, nausea and vomiting.   Genitourinary:  Negative for dysuria, frequency, hematuria, pelvic pain and  "urgency.       Objective   Vital Signs:  /78   Pulse 64   Temp 98.4 °F (36.9 °C) (Infrared)   Ht 154.9 cm (60.98\")   Wt 110 kg (242 lb 9.6 oz)   BMI 45.86 kg/m²     Physical Exam  Vitals and nursing note reviewed.   Constitutional:       General: She is not in acute distress.     Appearance: Normal appearance. She is well-developed. She is not ill-appearing, toxic-appearing or diaphoretic.   HENT:      Head: Normocephalic and atraumatic.   Cardiovascular:      Rate and Rhythm: Normal rate and regular rhythm.      Heart sounds: Normal heart sounds.   Pulmonary:      Effort: Pulmonary effort is normal. No respiratory distress.      Breath sounds: Normal breath sounds. No stridor. No wheezing.   Abdominal:      General: There is no distension.      Palpations: Abdomen is soft.   Skin:     General: Skin is warm and dry.   Neurological:      General: No focal deficit present.      Mental Status: She is alert and oriented to person, place, and time.   Psychiatric:         Mood and Affect: Mood normal.         Behavior: Behavior normal. Behavior is cooperative.         Thought Content: Thought content normal.         Judgment: Judgment normal.                         Assessment and Plan   Diagnoses and all orders for this visit:    1. Medicare annual wellness visit, subsequent (Primary)  -     Lipid Panel; Future    2. Bright red rectal bleeding  Assessment & Plan:  New problem which requires further workup.  CBC today.  Plan to refer to gastroenterology for further evaluation and management.    Orders:  -     Ambulatory Referral to Gastroenterology  -     CBC (No Diff); Future    3. Type 2 diabetes mellitus with stage 3b chronic kidney disease, without long-term current use of insulin  -     POC Glycosylated Hemoglobin (Hb A1C)  -     POC Microalbumin  -     POC Urinalysis Dipstick, Automated    4. Essential hypertension  -     POC Microalbumin  -     Comprehensive Metabolic Panel; Future  -     CBC (No Diff); " Future  -     TSH; Future  -     valsartan (DIOVAN) 160 MG tablet; Take 1.5 tablets by mouth Daily.  Dispense: 135 tablet; Refill: 3    5. Mixed hyperlipidemia  -     Lipid Panel; Future  -     atorvastatin (LIPITOR) 40 MG tablet; Take 1 tablet by mouth Every Night.  Dispense: 90 tablet; Refill: 3    6. Coronary artery disease due to lipid rich plaque  -     Lipid Panel; Future  -     clopidogrel (PLAVIX) 75 MG tablet; Take 1 tablet by mouth Daily.  Dispense: 90 tablet; Refill: 3    7. Stage 3a chronic kidney disease  -     POC Microalbumin  -     Comprehensive Metabolic Panel; Future    8. Chronic constipation  -     bisacodyl (DULCOLAX) 5 MG EC tablet; Take 1 tablet by mouth Daily As Needed for Constipation (Use if polyethylene glycol is ineffective).  Dispense: 90 tablet; Refill: 3    9. Vitamin D deficiency  -     Cholecalciferol (Vitamin D3) 50 MCG (2000 UT) capsule; Take 1 capsule by mouth Daily. 2000 units  Dispense: 90 capsule; Refill: 3    10. Medication refill  -     atorvastatin (LIPITOR) 40 MG tablet; Take 1 tablet by mouth Every Night.  Dispense: 90 tablet; Refill: 3  -     bisacodyl (DULCOLAX) 5 MG EC tablet; Take 1 tablet by mouth Daily As Needed for Constipation (Use if polyethylene glycol is ineffective).  Dispense: 90 tablet; Refill: 3  -     Cholecalciferol (Vitamin D3) 50 MCG (2000 UT) capsule; Take 1 capsule by mouth Daily. 2000 units  Dispense: 90 capsule; Refill: 3  -     clopidogrel (PLAVIX) 75 MG tablet; Take 1 tablet by mouth Daily.  Dispense: 90 tablet; Refill: 3  -     valsartan (DIOVAN) 160 MG tablet; Take 1.5 tablets by mouth Daily.  Dispense: 135 tablet; Refill: 3    11. Immunization due  -     Fluzone High-Dose 65+yrs             Follow Up   Return in about 3 months (around 2/29/2024).  Patient was given instructions and counseling regarding her condition or for health maintenance advice. Please see specific information pulled into the AVS if appropriate.     Discussed the nature of  the medical condition(s) risks, complications, implications, management, safe and proper use of medications. Encouraged medication compliance, and keeping scheduled follow up appointments with me and any other providers.     Call office or RTC if symptoms fail to improve. To ER if symptoms worsen.

## 2023-12-03 NOTE — PATIENT INSTRUCTIONS
Medicare Wellness  Personal Prevention Plan of Service     Date of Office Visit:    Encounter Provider:  DON Veronica  Place of Service:  [facility]  Patient Name: Georgia Velázquez  :  1945    As part of the Medicare Wellness portion of your visit today, we are providing you with this personalized preventive plan of services (PPPS). This plan is based upon recommendations of the [place] Preventive Services Task Force (USPSTF) and the Advisory Committee on Immunization Practices (ACIP).    This lists the preventive care services that should be considered, and provides dates of when you are due. Items listed as completed are up-to-date and do not require any further intervention.    Health Maintenance   Topic Date Due    ZOSTER VACCINE (1 of 2) Never done    COVID-19 Vaccine (3 - - season) 2023    DIABETIC EYE EXAM  2024    HEMOGLOBIN A1C  2024    ANNUAL WELLNESS VISIT  2024    LIPID PANEL  2024    URINE MICROALBUMIN  2024    BMI FOLLOWUP  2024    COLORECTAL CANCER SCREENING  2026    TDAP/TD VACCINES (2 - Td or Tdap) 2027    INFLUENZA VACCINE  Completed    Pneumococcal Vaccine 65+  Completed    DXA SCAN  Discontinued       Orders Placed This Encounter   Procedures    Fluzone High-Dose 65+yrs    Comprehensive Metabolic Panel     Standing Status:   Future     Number of Occurrences:   1     Standing Expiration Date:   2024     Order Specific Question:   Release to patient     Answer:   Routine Release [[id number]]    CBC (No Diff)     Standing Status:   Future     Number of Occurrences:   1     Standing Expiration Date:   2024     Order Specific Question:   Release to patient     Answer:   Routine Release [[id number]]    Lipid Panel     Standing Status:   Future     Number of Occurrences:   1     Standing Expiration Date:   2024     Order Specific Question:   Release to patient      Answer:   Routine Release [[id number]]    TSH     Standing Status:   Future     Number of Occurrences:   1     Standing Expiration Date:   11/29/2024     Order Specific Question:   Release to patient     Answer:   Routine Release [[id number]]    Ambulatory Referral to Gastroenterology     Referral Priority:   Routine     Referral Type:   Consultation     Referral Reason:   Specialty Services Required     Requested Specialty:   Gastroenterology     Number of Visits Requested:   1    POC Glycosylated Hemoglobin (Hb A1C)     Order Specific Question:   Release to patient     Answer:   Routine Release [[id number]]    POC Microalbumin     Order Specific Question:   Release to patient     Answer:   Routine Release [[id number]]    POC Urinalysis Dipstick, Automated     Order Specific Question:   Release to patient     Answer:   Routine Release [[id number]]       Return in about 3 months (around 2/29/2024), or if symptoms worsen or fail to improve.

## 2024-01-24 ENCOUNTER — TELEPHONE (OUTPATIENT)
Dept: CARDIOLOGY | Facility: CLINIC | Age: 79
End: 2024-01-24

## 2024-01-24 NOTE — TELEPHONE ENCOUNTER
Caller: Georgia Velázquez    Relationship to patient: Self    Best call back number: [phone]    Type of visit: FU    Requested date: ANY     If rescheduling, when is the original appointment: 1.31.24      Additional notes: PATIENTS DAUGHTER CALLING TO RESCHEDULE FU WITH  DUE TO CAROTID US BEING RESCHEDULED TO 2.5.24. NO AVAILABILITY TILL APRIL AND REQUESTING SOONER APPT.

## 2024-02-05 ENCOUNTER — HOSPITAL ENCOUNTER (OUTPATIENT)
Dept: CARDIOLOGY | Facility: HOSPITAL | Age: 79
Discharge: HOME | End: 2024-02-05
Admitting: INTERNAL MEDICINE
Payer: MEDICARE

## 2024-02-05 DIAGNOSIS — I65.22 LEFT CAROTID ARTERY STENOSIS: Chronic | ICD-10-CM

## 2024-02-05 LAB
BH CV XLRA MEAS LEFT DIST CCA EDV: 11.2 CM/SEC
BH CV XLRA MEAS LEFT DIST CCA PSV: 78.5 CM/SEC
BH CV XLRA MEAS LEFT DIST ICA EDV: 25.6 CM/SEC
BH CV XLRA MEAS LEFT DIST ICA PSV: 118 CM/SEC
BH CV XLRA MEAS LEFT ICA/CCA RATIO: 2.35
BH CV XLRA MEAS LEFT MID CCA EDV: 12.1 CM/SEC
BH CV XLRA MEAS LEFT MID CCA PSV: 95.3 CM/SEC
BH CV XLRA MEAS LEFT MID ICA EDV: 25.6 CM/SEC
BH CV XLRA MEAS LEFT MID ICA PSV: 155 CM/SEC
BH CV XLRA MEAS LEFT PROX CCA EDV: 18.2 CM/SEC
BH CV XLRA MEAS LEFT PROX CCA PSV: 115 CM/SEC
BH CV XLRA MEAS LEFT PROX ECA EDV: 20.7 CM/SEC
BH CV XLRA MEAS LEFT PROX ECA PSV: 188 CM/SEC
BH CV XLRA MEAS LEFT PROX ICA EDV: 34.1 CM/SEC
BH CV XLRA MEAS LEFT PROX ICA PSV: 223 CM/SEC
BH CV XLRA MEAS LEFT PROX SCLA PSV: 145 CM/SEC
BH CV XLRA MEAS LEFT VERTEBRAL A EDV: 14.1 CM/SEC
BH CV XLRA MEAS LEFT VERTEBRAL A PSV: 82.3 CM/SEC
BH CV XLRA MEAS RIGHT DIST CCA EDV: 13.7 CM/SEC
BH CV XLRA MEAS RIGHT DIST CCA PSV: 88.2 CM/SEC
BH CV XLRA MEAS RIGHT DIST ICA EDV: 9.8 CM/SEC
BH CV XLRA MEAS RIGHT DIST ICA PSV: 74.2 CM/SEC
BH CV XLRA MEAS RIGHT ICA/CCA RATIO: 1.18
BH CV XLRA MEAS RIGHT MID CCA EDV: 15.5 CM/SEC
BH CV XLRA MEAS RIGHT MID CCA PSV: 135 CM/SEC
BH CV XLRA MEAS RIGHT MID ICA EDV: 24.2 CM/SEC
BH CV XLRA MEAS RIGHT MID ICA PSV: 96.8 CM/SEC
BH CV XLRA MEAS RIGHT PROX CCA EDV: 15.5 CM/SEC
BH CV XLRA MEAS RIGHT PROX CCA PSV: 106 CM/SEC
BH CV XLRA MEAS RIGHT PROX ECA EDV: 11.8 CM/SEC
BH CV XLRA MEAS RIGHT PROX ECA PSV: 116 CM/SEC
BH CV XLRA MEAS RIGHT PROX ICA EDV: 24.7 CM/SEC
BH CV XLRA MEAS RIGHT PROX ICA PSV: 159 CM/SEC
BH CV XLRA MEAS RIGHT PROX SCLA PSV: 165 CM/SEC
BH CV XLRA MEAS RIGHT VERTEBRAL A PSV: 20.2 CM/SEC
LEFT ARM BP: ABNORMAL MMHG
RIGHT ARM BP: ABNORMAL MMHG

## 2024-02-05 PROCEDURE — 93880 EXTRACRANIAL BILAT STUDY: CPT | Performed by: INTERNAL MEDICINE

## 2024-02-05 PROCEDURE — 93880 EXTRACRANIAL BILAT STUDY: CPT

## 2024-02-29 ENCOUNTER — OFFICE VISIT (OUTPATIENT)
Dept: CARDIOLOGY | Facility: CLINIC | Age: 79
End: 2024-02-29
Payer: MEDICARE

## 2024-02-29 VITALS
WEIGHT: 246 LBS | DIASTOLIC BLOOD PRESSURE: 64 MMHG | HEART RATE: 57 BPM | OXYGEN SATURATION: 96 % | BODY MASS INDEX: 46.44 KG/M2 | SYSTOLIC BLOOD PRESSURE: 146 MMHG | HEIGHT: 61 IN

## 2024-02-29 DIAGNOSIS — I10 ESSENTIAL HYPERTENSION: Chronic | ICD-10-CM

## 2024-02-29 DIAGNOSIS — I25.83 CORONARY ARTERY DISEASE DUE TO LIPID RICH PLAQUE: Primary | Chronic | ICD-10-CM

## 2024-02-29 DIAGNOSIS — E78.2 MIXED HYPERLIPIDEMIA: Chronic | ICD-10-CM

## 2024-02-29 DIAGNOSIS — I51.89 DIASTOLIC DYSFUNCTION: Chronic | ICD-10-CM

## 2024-02-29 DIAGNOSIS — I25.10 CORONARY ARTERY DISEASE DUE TO LIPID RICH PLAQUE: Primary | Chronic | ICD-10-CM

## 2024-02-29 NOTE — PROGRESS NOTES
[facility]  Office visit  Georgia Velázquez  1945  [phone]  There is no work phone number on file.    VISIT DATE:  2/29/2024    PCP: Georgina Ahumada APRN  [address] [facility] [address] [place], [place] [id number]    CC:  Chief Complaint   Patient presents with    Coronary Artery Disease    Nonrheumatic aortic valve insufficiency    Shortness of Breath    Dizziness    Leg Swelling       Previous cardiac studies and procedures:  2000: PCI with stenting in the setting of MI, dated deficient     April 2015   echo: EF 40%, mild LVH, diastolic dysfunction  Shyanne scan myocardial perfusion imaging: EF 41%, large fixed anterior apical defect.     May 2015 cardiac catheterization: 70% distal LAD stenosis.  RCA with diffuse irregularities, 60% mid RCA stenosis.  Left circumflex with diffuse irregularities.     June 2020  Carotid duplex  Left mid ICA near 70%, however EDV less than 100 and ratio less than 4.  Right internal carotid artery stenosis of 0-49%.  Bilateral antegrade vertebral flow.  Echo  Estimated EF appears to be in the range of 51 - 55%.  Left ventricular systolic function is normal.  Left ventricular diastolic dysfunction (grade II) consistent with pseudonormalization.  The following left ventricular wall segments are hypokinetic: apical anterior, apical lateral, apical inferior, apical septal and apex hypokinetic.  Mild aortic valve regurgitation is present.     8/2020 30 day monitor  A relatively benign monitor study.  Occasional premature atrial contractions. Rare PVCs which are intermittently symptomatic.    November 2022 carotid duplex imaging    Right internal carotid artery stenosis of 50-69%.(Less than 50% based on new guidelines)    Left internal carotid artery stenosis of 50-69%.    February 2004 carotid duplex    Right internal carotid artery demonstrates a less than 50% stenosis.    Left internal carotid artery demonstrates a 50-69% stenosis.    Right Vertebral:  Bidirectional flow is present.    ASSESSMENT:   Diagnosis Plan   1. Coronary artery disease due to lipid rich plaque        2. Diastolic dysfunction        3. Essential hypertension        4. Mixed hyperlipidemia              PLAN:  Coronary artery disease: Currently stable and asymptomatic.  Continue antiplatelet therapy, high intensity statin therapy and afterload reduction.     Hypertension: Goal less than 130/80 mmHg.  Currently well controlled.  Continue current medical therapy.     Hyperlipidemia: Goal LDL less than 70, currently well controlled.  Continue atorvastatin 40 mg p.o. daily.     Retinal embolic event, right: Continue current medical therapy and ophthalmology evaluation.  Evaluation negative for atrial arrhythmias.     Bilateral internal carotid artery stenosis: 50 to 69%.  Currently asymptomatic.  Continue afterload reduction, high intensity statin therapy.    Continue Plavix.  Duplex surveillance every other year.    Subjective  Interval assessment: Using a wheeled walker for ambulation.  Blood pressures running less than 135/80 mmHg.  No change in baseline functional capacity.  Stable shortness of breath in a class II pattern.  Denies chest discomfort.  No palpitations.  Reviewed carotid duplex imaging with patient today.    Initial eval: 75-year-old female with a history of coronary disease, peripheral vascular disease and chronic congestive systolic heart failure.  Recently had right eye pain and was diagnosed with a right retinal embolic event.  Ophthalmology evaluation has been requested, unclear whether this was a venous or arterial thrombosis.  Reviewed recent echo and carotid duplex.  No significant palpitations.  She has stable dyspnea on exertion and a class II-III pattern.  Uses a wheeled walker for ambulation.  She is compliant with medical therapy and was on aspirin prior to this event.  She reports that she is currently on a statin but does not know the name or dosage, she will call  "us back with this information later.  Blood pressures run less than 130/80 mmHg.  She is scheduled for an ophthalmologic procedure next week.    PHYSICAL EXAMINATION:  Vitals:    02/29/24 1322   BP: 146/64   BP Location: Left arm   Patient Position: Sitting   Pulse: 57   SpO2: 96%   Weight: 112 kg (246 lb)   Height: 154.9 cm (61\")       General Appearance:    Alert, cooperative, no distress, appears stated age   Head:    Normocephalic, without obvious abnormality, atraumatic   Eyes:    conjunctiva/corneas clear   Nose:   Nares normal, septum midline, mucosa normal, no drainage   Throat:   Lips, teeth and gums normal   Neck:   Supple, symmetrical, trachea midline, no carotid    bruit or JVD   Lungs:     Clear to auscultation bilaterally, respirations unlabored   Chest Wall:    No tenderness or deformity    Heart:    Regular rate and rhythm, S1 and S2 normal, 2/6 early peaking systolic murmur right upper sternal border, rub   or gallop, normal carotid impulse bilaterally.  Left carotid bruit   Abdomen:     Soft, non-tender   Extremities:   Extremities normal, atraumatic, no cyanosis.  Trivial edema   Pulses:   2+ and symmetric all extremities   Skin:   Skin color, texture, turgor normal, no rashes or lesions       Diagnostic Data:  Procedures  Lab Results   Component Value Date    TRIG 79 11/29/2023    HDL 52 11/29/2023     Lab Results   Component Value Date    GLUCOSE 92 11/29/2023    BUN 32 (H) 11/29/2023    CREATININE 1.59 (H) 11/29/2023     11/29/2023    K 4.7 11/29/2023     11/29/2023    CO2 18.6 (L) 11/29/2023     Lab Results   Component Value Date    HGBA1C 5.6 11/29/2023     Lab Results   Component Value Date    WBC 9.82 11/29/2023    HGB 14.1 11/29/2023    HCT 40.8 11/29/2023     11/29/2023       Allergies  No Known Allergies    Current Medications    Current Outpatient Medications:     albuterol sulfate  (90 Base) MCG/ACT inhaler, Inhale 2 puffs Every 4 (Four) Hours As Needed for " Wheezing or Shortness of Air., Disp: 18 g, Rfl: 3    atorvastatin (LIPITOR) 40 MG tablet, Take 1 tablet by mouth Every Night., Disp: 90 tablet, Rfl: 3    Cholecalciferol (Vitamin D3) 50 MCG ( UT) capsule, Take 1 capsule by mouth Daily. 2000 units, Disp: 90 capsule, Rfl: 3    clopidogrel (PLAVIX) 75 MG tablet, Take 1 tablet by mouth Daily., Disp: 90 tablet, Rfl: 3    ipratropium-albuterol (DUO-NEB) 0.5-2.5 mg/3 ml nebulizer, Take 3 ml by nebulization every TID-QID prn wheezing or shortness of breath, Disp: 360 mL, Rfl: 0    nystatin (MYCOSTATIN) 970931 UNIT/GM powder, Apply  topically to the appropriate area as directed Every 12 (Twelve) Hours. (Patient taking differently: Apply  topically to the appropriate area as directed As Needed.), Disp: 60 g, Rfl: 3    valsartan (DIOVAN) 160 MG tablet, Take 1.5 tablets by mouth Daily., Disp: 135 tablet, Rfl: 3    bisacodyl (DULCOLAX) 5 MG EC tablet, Take 1 tablet by mouth Daily As Needed for Constipation (Use if polyethylene glycol is ineffective). (Patient not taking: Reported on 2024), Disp: 90 tablet, Rfl: 3    guaiFENesin (MUCINEX) 600 MG 12 hr tablet, Take 2 tablets by mouth Every 12 (Twelve) Hours. (Patient not taking: Reported on 2024), Disp: , Rfl:           ROS  ROS      SOCIAL HX  Social History     Socioeconomic History    Marital status:    Tobacco Use    Smoking status: Former     Packs/day: 1.00     Years: 15.00     Additional pack years: 0.00     Total pack years: 15.00     Types: Cigarettes     Quit date: 2000     Years since quittin.1     Passive exposure: Past    Smokeless tobacco: Never   Vaping Use    Vaping Use: Never used   Substance and Sexual Activity    Alcohol use: No    Drug use: No    Sexual activity: Never       FAMILY HX  Family History   Problem Relation Age of Onset    Diabetes Mother     Heart failure Mother     Heart failure Father     No Known Problems Sister     No Known Problems Brother     No Known Problems  Son     No Known Problems Daughter              Armaan Samuels III, MD, FACC

## 2024-03-29 ENCOUNTER — LAB (OUTPATIENT)
Dept: LAB | Facility: HOSPITAL | Age: 79
End: 2024-03-29
Payer: MEDICARE

## 2024-03-29 ENCOUNTER — OFFICE VISIT (OUTPATIENT)
Dept: FAMILY MEDICINE CLINIC | Facility: CLINIC | Age: 79
End: 2024-03-29
Payer: MEDICARE

## 2024-03-29 VITALS
OXYGEN SATURATION: 97 % | WEIGHT: 245.8 LBS | BODY MASS INDEX: 46.41 KG/M2 | HEART RATE: 69 BPM | DIASTOLIC BLOOD PRESSURE: 70 MMHG | SYSTOLIC BLOOD PRESSURE: 130 MMHG | HEIGHT: 61 IN | TEMPERATURE: 98.7 F

## 2024-03-29 DIAGNOSIS — I10 ESSENTIAL HYPERTENSION: Chronic | ICD-10-CM

## 2024-03-29 DIAGNOSIS — N18.32 TYPE 2 DIABETES MELLITUS WITH STAGE 3B CHRONIC KIDNEY DISEASE, WITHOUT LONG-TERM CURRENT USE OF INSULIN: ICD-10-CM

## 2024-03-29 DIAGNOSIS — K59.09 CHRONIC CONSTIPATION: ICD-10-CM

## 2024-03-29 DIAGNOSIS — N18.31 STAGE 3A CHRONIC KIDNEY DISEASE: ICD-10-CM

## 2024-03-29 DIAGNOSIS — N39.0 RECURRENT UTI: ICD-10-CM

## 2024-03-29 DIAGNOSIS — I10 ESSENTIAL HYPERTENSION: Primary | Chronic | ICD-10-CM

## 2024-03-29 DIAGNOSIS — E11.22 TYPE 2 DIABETES MELLITUS WITH STAGE 3B CHRONIC KIDNEY DISEASE, WITHOUT LONG-TERM CURRENT USE OF INSULIN: ICD-10-CM

## 2024-03-29 DIAGNOSIS — L98.9 SKIN LESION OF FACE: ICD-10-CM

## 2024-03-29 LAB — HOLD SPECIMEN: NORMAL

## 2024-03-29 PROCEDURE — 87186 SC STD MICRODIL/AGAR DIL: CPT | Performed by: NURSE PRACTITIONER

## 2024-03-29 PROCEDURE — 80053 COMPREHEN METABOLIC PANEL: CPT

## 2024-03-29 PROCEDURE — 83036 HEMOGLOBIN GLYCOSYLATED A1C: CPT

## 2024-03-29 PROCEDURE — 85027 COMPLETE CBC AUTOMATED: CPT

## 2024-03-29 PROCEDURE — 87086 URINE CULTURE/COLONY COUNT: CPT | Performed by: NURSE PRACTITIONER

## 2024-03-29 PROCEDURE — 87088 URINE BACTERIA CULTURE: CPT | Performed by: NURSE PRACTITIONER

## 2024-03-29 PROCEDURE — 81001 URINALYSIS AUTO W/SCOPE: CPT | Performed by: NURSE PRACTITIONER

## 2024-03-29 RX ORDER — DOCUSATE SODIUM 100 MG/1
100 CAPSULE, LIQUID FILLED ORAL 2 TIMES DAILY
Qty: 60 CAPSULE | Refills: 1 | Status: SHIPPED | OUTPATIENT
Start: 2024-03-29

## 2024-03-29 NOTE — PROGRESS NOTES
Follow Up Office Note   Patient Name: Georgia Velázquez  : 1945   MRN: [id number]     Chief Complaint:    Chief Complaint   Patient presents with    Hyperlipidemia    Hypertension    Diabetes    Constipation       History of Present Illness:   Georgia Velázquez is a 78 y.o. female who presents today for reevaluation and management of chronic hypertension.  Patient's blood pressure has been stable and controlled on current medications    Patient presents for reevaluation and management of chronic type 2 diabetes mellitus.  Patient's diabetes has been stable and controlled with lifestyle management.    Patient has new complaint of skin lesion on right temple area which has increased in size over the past few months and is itchy and irritated.    Patient complaining of worsening of chronic constipation over the past few weeks    Subjective   I have reviewed and the following portions of the patient's history were updated as appropriate: past family history, past medical history, past social history, past surgical history and problem list.    Review of Systems:   Review of Systems   Constitutional: Negative.    Respiratory: Negative.     Cardiovascular: Negative.    Gastrointestinal:  Positive for constipation.   Endocrine: Positive for polyuria. Negative for polydipsia and polyphagia.   Genitourinary:  Positive for frequency and urgency. Negative for difficulty urinating, dysuria, flank pain, hematuria and pelvic pain.        Past Medical History:   Past Medical History:   Diagnosis Date    Acute kidney injury 2017    Aortic regurgitation     Arthritis of shoulder 2016    Body mass index (BMI) of 45.0-49.9 in adult 2019    CHF (congestive heart failure)     Closed compression fracture of L4 lumbar vertebra 2017    Added automatically from request for surgery [id number]    Constipation 2017    COPD (chronic obstructive pulmonary disease)     Coronary artery disease 2016     Diabetes mellitus type 2 in obese 01/29/2018    Elevated alkaline phosphatase level 02/26/2018    Elevated creatine kinase     Essential hypertension 05/18/2016    GERD (gastroesophageal reflux disease) 05/18/2016    Glaucoma 07/21/2020    History of kyphoplasty 01/30/2018    Hyperlipidemia     Lumbar facet arthropathy 01/29/2018    Lumbar stenosis with neurogenic claudication 01/29/2018    Morbid obesity due to excess calories 01/29/2018    Myocardial infarction 05/18/2016    Osteoporosis 05/18/2016    Physical deconditioning 01/30/2018    Retinal emboli, 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    Sepsis     uro    Stroke     Urinary incontinence without sensory awareness 02/26/2018       Medications:     Current Outpatient Medications:     albuterol sulfate  (90 Base) MCG/ACT inhaler, Inhale 2 puffs Every 4 (Four) Hours As Needed for Wheezing or Shortness of Air., Disp: 18 g, Rfl: 3    atorvastatin (LIPITOR) 40 MG tablet, Take 1 tablet by mouth Every Night., Disp: 90 tablet, Rfl: 3    Cholecalciferol (Vitamin D3) 50 MCG (2000 UT) capsule, Take 1 capsule by mouth Daily. 2000 units, Disp: 90 capsule, Rfl: 3    clopidogrel (PLAVIX) 75 MG tablet, Take 1 tablet by mouth Daily., Disp: 90 tablet, Rfl: 3    ipratropium-albuterol (DUO-NEB) 0.5-2.5 mg/3 ml nebulizer, Take 3 ml by nebulization every TID-QID prn wheezing or shortness of breath, Disp: 360 mL, Rfl: 0    nystatin (MYCOSTATIN) 603408 UNIT/GM powder, Apply  topically to the appropriate area as directed Every 12 (Twelve) Hours. (Patient taking differently: Apply  topically to the appropriate area as directed As Needed.), Disp: 60 g, Rfl: 3    valsartan (DIOVAN) 160 MG tablet, Take 1.5 tablets by mouth Daily., Disp: 135 tablet, Rfl: 3    cefdinir (OMNICEF) 300 MG capsule, Take 1 capsule by mouth 2 (Two) Times a Day for 7 days., Disp: 14 capsule, Rfl: 0    docusate sodium (Colace) 100 MG capsule, Take 1 capsule by mouth 2 (Two) Times a Day., Disp: 60 capsule, Rfl: 1    Allergies:  "  No Known Allergies      Objective   Physical Exam:  Vital Signs:   Vitals:    03/29/24 1425   BP: 130/70   Pulse: 69   Temp: 98.7 °F (37.1 °C)   TempSrc: Temporal   SpO2: 97%   Weight: 111 kg (245 lb 12.8 oz)   Height: 154.9 cm (60.98\")   PainSc: 0-No pain     Body mass index is 46.47 kg/m².     Physical Exam  Vitals and nursing note reviewed.   Constitutional:       General: She is not in acute distress.     Appearance: Normal appearance. She is well-developed. She is not ill-appearing, toxic-appearing or diaphoretic.   HENT:      Head: Normocephalic and atraumatic.     Cardiovascular:      Rate and Rhythm: Normal rate and regular rhythm.      Heart sounds: Normal heart sounds.   Pulmonary:      Effort: Pulmonary effort is normal. No respiratory distress.      Breath sounds: Normal breath sounds. No stridor. No wheezing.   Skin:     General: Skin is warm and dry.      Findings: Lesion present.      Comments: Approximately 1 cm raised, erythematous, irregularly shaped skin lesion on right temple area.  Area appears excoriated.   Neurological:      General: No focal deficit present.      Mental Status: She is alert and oriented to person, place, and time.   Psychiatric:         Mood and Affect: Mood normal.         Behavior: Behavior normal. Behavior is cooperative.         Thought Content: Thought content normal.         Judgment: Judgment normal.         Assessment / Plan    Assessment/Plan:   Diagnoses and all orders for this visit:    1. Essential hypertension (Primary)  Assessment & Plan:  Chronic hypertension stable and controlled on current medications.  Plan to continue valsartan at current dosage.  Continue with routine cardiology follow-up.    Orders:  -     Comprehensive Metabolic Panel; Future  -     CBC (No Diff); Future    2. Type 2 diabetes mellitus with stage 3b chronic kidney disease, without long-term current use of insulin  Assessment & Plan:  Chronic diabetes stable and controlled.  Plan to " continue ADA diet.    Orders:  -     Comprehensive Metabolic Panel; Future  -     Hemoglobin A1c; Future    3. Chronic constipation  Assessment & Plan:  Patient reports chronic constipation worsening recently plan to begin trial of Colace.  Patient may also take OTC MiraLAX as directed.    Orders:  -     docusate sodium (Colace) 100 MG capsule; Take 1 capsule by mouth 2 (Two) Times a Day.  Dispense: 60 capsule; Refill: 1    4. Skin lesion of face  Assessment & Plan:  Newly identified problem.  Plan to refer to dermatology for further evaluation and management.  Suspect lesion will need to be excised and biopsied.    Orders:  -     Ambulatory Referral to Dermatology    5. Recurrent UTI  -     Urinalysis With Culture If Indicated - Urine, Clean Catch; Future    Discussed the nature of the medical condition(s) risks, complications, implications, management, safe and proper use of medications. Encouraged medication compliance, and keeping scheduled follow up appointments with me and any other providers.      RTC if symptoms fail to improve, to ER if symptoms worsen.      *Dictated Utilizing Dragon Dictation   Please note that portions of this note were completed with a voice recognition program.   Part of this note may be an electronic transcription/translation of spoken language to printed text using the Dragon Dictation System. Spelling and/or grammatical errors may exist despite efforts at proofreading.      NOTE TO PATIENT: The 21st Century Cures Act makes medical notes like these available to patients in the interest of transparency. However, be advised this is a medical document. It is intended as peer to peer communication. It is written in medical language and may contain abbreviations or verbiage that are unfamiliar. It may appear blunt or direct. Medical documents are intended to carry relevant information, facts as evident, and the clinical opinion of the practitioner.      DON Veronica  [facility]

## 2024-03-30 LAB
ALBUMIN SERPL-MCNC: 3.8 G/DL (ref 3.5–5.2)
ALBUMIN/GLOB SERPL: 1.1 G/DL
ALP SERPL-CCNC: 100 U/L (ref 39–117)
ALT SERPL W P-5'-P-CCNC: 12 U/L (ref 1–33)
ANION GAP SERPL CALCULATED.3IONS-SCNC: 13 MMOL/L (ref 5–15)
AST SERPL-CCNC: 16 U/L (ref 1–32)
BACTERIA UR QL AUTO: ABNORMAL /HPF
BILIRUB SERPL-MCNC: 0.3 MG/DL (ref 0–1.2)
BILIRUB UR QL STRIP: NEGATIVE
BUN SERPL-MCNC: 27 MG/DL (ref 8–23)
BUN/CREAT SERPL: 17.2 (ref 7–25)
CALCIUM SPEC-SCNC: 9.7 MG/DL (ref 8.6–10.5)
CHLORIDE SERPL-SCNC: 106 MMOL/L (ref 98–107)
CLARITY UR: ABNORMAL
CO2 SERPL-SCNC: 21 MMOL/L (ref 22–29)
COLOR UR: YELLOW
CREAT SERPL-MCNC: 1.57 MG/DL (ref 0.57–1)
DEPRECATED RDW RBC AUTO: 43.4 FL (ref 37–54)
EGFRCR SERPLBLD CKD-EPI 2021: 33.2 ML/MIN/1.73
ERYTHROCYTE [DISTWIDTH] IN BLOOD BY AUTOMATED COUNT: 12.5 % (ref 12.3–15.4)
GLOBULIN UR ELPH-MCNC: 3.5 GM/DL
GLUCOSE SERPL-MCNC: 80 MG/DL (ref 65–99)
GLUCOSE UR STRIP-MCNC: NEGATIVE MG/DL
HBA1C MFR BLD: 6.1 % (ref 4.8–5.6)
HCT VFR BLD AUTO: 40.5 % (ref 34–46.6)
HGB BLD-MCNC: 13.5 G/DL (ref 12–15.9)
HGB UR QL STRIP.AUTO: NEGATIVE
HYALINE CASTS UR QL AUTO: ABNORMAL /LPF
KETONES UR QL STRIP: NEGATIVE
LEUKOCYTE ESTERASE UR QL STRIP.AUTO: ABNORMAL
MCH RBC QN AUTO: 31.5 PG (ref 26.6–33)
MCHC RBC AUTO-ENTMCNC: 33.3 G/DL (ref 31.5–35.7)
MCV RBC AUTO: 94.4 FL (ref 79–97)
NITRITE UR QL STRIP: POSITIVE
PH UR STRIP.AUTO: 6.5 [PH] (ref 5–8)
PLATELET # BLD AUTO: 182 10*3/MM3 (ref 140–450)
PMV BLD AUTO: 11.4 FL (ref 6–12)
POTASSIUM SERPL-SCNC: 4.7 MMOL/L (ref 3.5–5.2)
PROT SERPL-MCNC: 7.3 G/DL (ref 6–8.5)
PROT UR QL STRIP: ABNORMAL
RBC # BLD AUTO: 4.29 10*6/MM3 (ref 3.77–5.28)
RBC # UR STRIP: ABNORMAL /HPF
REF LAB TEST METHOD: ABNORMAL
SODIUM SERPL-SCNC: 140 MMOL/L (ref 136–145)
SP GR UR STRIP: 1.01 (ref 1–1.03)
SQUAMOUS #/AREA URNS HPF: ABNORMAL /HPF
UROBILINOGEN UR QL STRIP: ABNORMAL
WBC # UR STRIP: ABNORMAL /HPF
WBC NRBC COR # BLD AUTO: 8.47 10*3/MM3 (ref 3.4–10.8)

## 2024-04-01 DIAGNOSIS — B96.20 E. COLI UTI: Primary | ICD-10-CM

## 2024-04-01 DIAGNOSIS — N39.0 E. COLI UTI: Primary | ICD-10-CM

## 2024-04-01 LAB — BACTERIA SPEC AEROBE CULT: ABNORMAL

## 2024-04-01 RX ORDER — CEFDINIR 300 MG/1
300 CAPSULE ORAL 2 TIMES DAILY
Qty: 14 CAPSULE | Refills: 0 | Status: SHIPPED | OUTPATIENT
Start: 2024-04-01 | End: 2024-04-08

## 2024-04-02 PROBLEM — L98.9 SKIN LESION OF FACE: Status: ACTIVE | Noted: 2024-04-02

## 2024-04-02 PROBLEM — K59.09 CHRONIC CONSTIPATION: Status: ACTIVE | Noted: 2024-04-02

## 2024-04-02 NOTE — ASSESSMENT & PLAN NOTE
Chronic diabetes stable and controlled.  Plan to continue ADA diet.  
Chronic hypertension stable and controlled on current medications.  Plan to continue valsartan at current dosage.  Continue with routine cardiology follow-up.  
Newly identified problem.  Plan to refer to dermatology for further evaluation and management.  Suspect lesion will need to be excised and biopsied.  
Patient reports chronic constipation worsening recently plan to begin trial of Colace.  Patient may also take OTC MiraLAX as directed.  
with patient

## 2024-10-08 ENCOUNTER — TELEPHONE (OUTPATIENT)
Dept: CARDIOLOGY | Facility: CLINIC | Age: 79
End: 2024-10-08
Payer: MEDICARE

## 2024-10-14 ENCOUNTER — HOSPITAL ENCOUNTER (OUTPATIENT)
Facility: HOSPITAL | Age: 79
Setting detail: OBSERVATION
Discharge: NURSING, REHAB OR LONG-TERM CARE | End: 2024-10-24
Attending: EMERGENCY MEDICINE | Admitting: INTERNAL MEDICINE
Payer: MEDICARE

## 2024-10-14 ENCOUNTER — APPOINTMENT (OUTPATIENT)
Dept: GENERAL RADIOLOGY | Facility: HOSPITAL | Age: 79
End: 2024-10-14
Payer: MEDICARE

## 2024-10-14 ENCOUNTER — APPOINTMENT (OUTPATIENT)
Dept: CT IMAGING | Facility: HOSPITAL | Age: 79
End: 2024-10-14
Payer: MEDICARE

## 2024-10-14 DIAGNOSIS — Z86.79 HISTORY OF CORONARY ARTERY DISEASE: ICD-10-CM

## 2024-10-14 DIAGNOSIS — N30.00 ACUTE CYSTITIS WITHOUT HEMATURIA: ICD-10-CM

## 2024-10-14 DIAGNOSIS — R41.82 ALTERED MENTAL STATUS, UNSPECIFIED ALTERED MENTAL STATUS TYPE: Primary | ICD-10-CM

## 2024-10-14 DIAGNOSIS — Z86.39 HISTORY OF DIABETES MELLITUS: ICD-10-CM

## 2024-10-14 DIAGNOSIS — U07.1 COVID-19: ICD-10-CM

## 2024-10-14 LAB
ALBUMIN SERPL-MCNC: 3.8 G/DL (ref 3.5–5.2)
ALBUMIN/GLOB SERPL: 1.1 G/DL
ALP SERPL-CCNC: 102 U/L (ref 39–117)
ALT SERPL W P-5'-P-CCNC: 11 U/L (ref 1–33)
ANION GAP SERPL CALCULATED.3IONS-SCNC: 12 MMOL/L (ref 5–15)
AST SERPL-CCNC: 19 U/L (ref 1–32)
BACTERIA UR QL AUTO: ABNORMAL /HPF
BASOPHILS # BLD AUTO: 0.02 10*3/MM3 (ref 0–0.2)
BASOPHILS NFR BLD AUTO: 0.3 % (ref 0–1.5)
BILIRUB SERPL-MCNC: 0.6 MG/DL (ref 0–1.2)
BILIRUB UR QL STRIP: NEGATIVE
BUN SERPL-MCNC: 25 MG/DL (ref 8–23)
BUN/CREAT SERPL: 17.2 (ref 7–25)
CALCIUM SPEC-SCNC: 9 MG/DL (ref 8.6–10.5)
CHLORIDE SERPL-SCNC: 102 MMOL/L (ref 98–107)
CLARITY UR: ABNORMAL
CO2 SERPL-SCNC: 23 MMOL/L (ref 22–29)
COLOR UR: YELLOW
CREAT BLDA-MCNC: 1.6 MG/DL (ref 0.6–1.3)
CREAT SERPL-MCNC: 1.45 MG/DL (ref 0.57–1)
DEPRECATED RDW RBC AUTO: 46.9 FL (ref 37–54)
EGFRCR SERPLBLD CKD-EPI 2021: 36.3 ML/MIN/1.73
EOSINOPHIL # BLD AUTO: 0 10*3/MM3 (ref 0–0.4)
EOSINOPHIL NFR BLD AUTO: 0 % (ref 0.3–6.2)
ERYTHROCYTE [DISTWIDTH] IN BLOOD BY AUTOMATED COUNT: 13.4 % (ref 12.3–15.4)
FLUAV SUBTYP SPEC NAA+PROBE: NOT DETECTED
FLUBV RNA ISLT QL NAA+PROBE: NOT DETECTED
GLOBULIN UR ELPH-MCNC: 3.5 GM/DL
GLUCOSE SERPL-MCNC: 132 MG/DL (ref 65–99)
GLUCOSE UR STRIP-MCNC: NEGATIVE MG/DL
HCT VFR BLD AUTO: 39.7 % (ref 34–46.6)
HGB BLD-MCNC: 13.1 G/DL (ref 12–15.9)
HGB UR QL STRIP.AUTO: ABNORMAL
HYALINE CASTS UR QL AUTO: ABNORMAL /LPF
IMM GRANULOCYTES # BLD AUTO: 0.05 10*3/MM3 (ref 0–0.05)
IMM GRANULOCYTES NFR BLD AUTO: 0.7 % (ref 0–0.5)
KETONES UR QL STRIP: NEGATIVE
LEUKOCYTE ESTERASE UR QL STRIP.AUTO: ABNORMAL
LIPASE SERPL-CCNC: 19 U/L (ref 13–60)
LYMPHOCYTES # BLD AUTO: 0.75 10*3/MM3 (ref 0.7–3.1)
LYMPHOCYTES NFR BLD AUTO: 10.4 % (ref 19.6–45.3)
MCH RBC QN AUTO: 31.3 PG (ref 26.6–33)
MCHC RBC AUTO-ENTMCNC: 33 G/DL (ref 31.5–35.7)
MCV RBC AUTO: 94.7 FL (ref 79–97)
MONOCYTES # BLD AUTO: 0.78 10*3/MM3 (ref 0.1–0.9)
MONOCYTES NFR BLD AUTO: 10.9 % (ref 5–12)
NEUTROPHILS NFR BLD AUTO: 5.58 10*3/MM3 (ref 1.7–7)
NEUTROPHILS NFR BLD AUTO: 77.7 % (ref 42.7–76)
NITRITE UR QL STRIP: POSITIVE
NRBC BLD AUTO-RTO: 0 /100 WBC (ref 0–0.2)
PH UR STRIP.AUTO: 5.5 [PH] (ref 5–8)
PLATELET # BLD AUTO: 177 10*3/MM3 (ref 140–450)
PMV BLD AUTO: 10.8 FL (ref 6–12)
POTASSIUM SERPL-SCNC: 4.3 MMOL/L (ref 3.5–5.2)
PROT SERPL-MCNC: 7.3 G/DL (ref 6–8.5)
PROT UR QL STRIP: ABNORMAL
RBC # BLD AUTO: 4.19 10*6/MM3 (ref 3.77–5.28)
RBC # UR STRIP: ABNORMAL /HPF
REF LAB TEST METHOD: ABNORMAL
SARS-COV-2 RNA RESP QL NAA+PROBE: DETECTED
SODIUM SERPL-SCNC: 137 MMOL/L (ref 136–145)
SP GR UR STRIP: 1.02 (ref 1–1.03)
SQUAMOUS #/AREA URNS HPF: ABNORMAL /HPF
TROPONIN T SERPL HS-MCNC: 36 NG/L
UROBILINOGEN UR QL STRIP: ABNORMAL
WBC # UR STRIP: ABNORMAL /HPF
WBC NRBC COR # BLD AUTO: 7.18 10*3/MM3 (ref 3.4–10.8)

## 2024-10-14 PROCEDURE — 86140 C-REACTIVE PROTEIN: CPT | Performed by: NURSE PRACTITIONER

## 2024-10-14 PROCEDURE — 80053 COMPREHEN METABOLIC PANEL: CPT | Performed by: EMERGENCY MEDICINE

## 2024-10-14 PROCEDURE — 82565 ASSAY OF CREATININE: CPT

## 2024-10-14 PROCEDURE — 87186 SC STD MICRODIL/AGAR DIL: CPT | Performed by: EMERGENCY MEDICINE

## 2024-10-14 PROCEDURE — 71045 X-RAY EXAM CHEST 1 VIEW: CPT

## 2024-10-14 PROCEDURE — 99285 EMERGENCY DEPT VISIT HI MDM: CPT

## 2024-10-14 PROCEDURE — 81001 URINALYSIS AUTO W/SCOPE: CPT | Performed by: EMERGENCY MEDICINE

## 2024-10-14 PROCEDURE — 87088 URINE BACTERIA CULTURE: CPT | Performed by: EMERGENCY MEDICINE

## 2024-10-14 PROCEDURE — 82728 ASSAY OF FERRITIN: CPT | Performed by: NURSE PRACTITIONER

## 2024-10-14 PROCEDURE — 87636 SARSCOV2 & INF A&B AMP PRB: CPT | Performed by: EMERGENCY MEDICINE

## 2024-10-14 PROCEDURE — 74177 CT ABD & PELVIS W/CONTRAST: CPT

## 2024-10-14 PROCEDURE — 84145 PROCALCITONIN (PCT): CPT | Performed by: NURSE PRACTITIONER

## 2024-10-14 PROCEDURE — 84484 ASSAY OF TROPONIN QUANT: CPT | Performed by: EMERGENCY MEDICINE

## 2024-10-14 PROCEDURE — 83690 ASSAY OF LIPASE: CPT | Performed by: EMERGENCY MEDICINE

## 2024-10-14 PROCEDURE — G0378 HOSPITAL OBSERVATION PER HR: HCPCS

## 2024-10-14 PROCEDURE — 25510000001 IOPAMIDOL 61 % SOLUTION: Performed by: EMERGENCY MEDICINE

## 2024-10-14 PROCEDURE — 87086 URINE CULTURE/COLONY COUNT: CPT | Performed by: EMERGENCY MEDICINE

## 2024-10-14 PROCEDURE — 93005 ELECTROCARDIOGRAM TRACING: CPT | Performed by: EMERGENCY MEDICINE

## 2024-10-14 PROCEDURE — 83880 ASSAY OF NATRIURETIC PEPTIDE: CPT | Performed by: NURSE PRACTITIONER

## 2024-10-14 PROCEDURE — P9612 CATHETERIZE FOR URINE SPEC: HCPCS

## 2024-10-14 PROCEDURE — 70450 CT HEAD/BRAIN W/O DYE: CPT

## 2024-10-14 PROCEDURE — 83615 LACTATE (LD) (LDH) ENZYME: CPT | Performed by: NURSE PRACTITIONER

## 2024-10-14 PROCEDURE — 85025 COMPLETE CBC W/AUTO DIFF WBC: CPT | Performed by: EMERGENCY MEDICINE

## 2024-10-14 PROCEDURE — 25810000003 SODIUM CHLORIDE 0.9 % SOLUTION: Performed by: EMERGENCY MEDICINE

## 2024-10-14 RX ORDER — IOPAMIDOL 612 MG/ML
80 INJECTION, SOLUTION INTRAVASCULAR
Status: COMPLETED | OUTPATIENT
Start: 2024-10-14 | End: 2024-10-14

## 2024-10-14 RX ADMIN — SODIUM CHLORIDE 1000 ML: 9 INJECTION, SOLUTION INTRAVENOUS at 22:42

## 2024-10-14 RX ADMIN — IOPAMIDOL 80 ML: 612 INJECTION, SOLUTION INTRAVENOUS at 22:28

## 2024-10-15 ENCOUNTER — APPOINTMENT (OUTPATIENT)
Dept: CARDIOLOGY | Facility: HOSPITAL | Age: 79
End: 2024-10-15
Payer: MEDICARE

## 2024-10-15 PROBLEM — E11.9 T2DM (TYPE 2 DIABETES MELLITUS): Status: ACTIVE | Noted: 2024-10-15

## 2024-10-15 PROBLEM — I25.10 CAD (CORONARY ARTERY DISEASE): Status: ACTIVE | Noted: 2024-10-15

## 2024-10-15 PROBLEM — K62.5 BRIGHT RED RECTAL BLEEDING: Status: RESOLVED | Noted: 2023-12-03 | Resolved: 2024-10-15

## 2024-10-15 PROBLEM — N18.30 CKD (CHRONIC KIDNEY DISEASE) STAGE 3, GFR 30-59 ML/MIN: Status: ACTIVE | Noted: 2024-10-15

## 2024-10-15 PROBLEM — K52.9 COLITIS: Chronic | Status: RESOLVED | Noted: 2020-07-21 | Resolved: 2024-10-15

## 2024-10-15 PROBLEM — R55 SYNCOPE: Status: ACTIVE | Noted: 2024-10-15

## 2024-10-15 PROBLEM — F03.90 DEMENTIA: Status: ACTIVE | Noted: 2024-10-15

## 2024-10-15 PROBLEM — N39.0 ACUTE UTI (URINARY TRACT INFECTION): Status: ACTIVE | Noted: 2024-10-15

## 2024-10-15 PROBLEM — U07.1 COVID-19 VIRUS DETECTED: Status: ACTIVE | Noted: 2024-10-15

## 2024-10-15 LAB
APTT PPP: 30.4 SECONDS (ref 22–39)
B PARAPERT DNA SPEC QL NAA+PROBE: NOT DETECTED
B PERT DNA SPEC QL NAA+PROBE: NOT DETECTED
BH CV ECHO MEAS - AO MAX PG: 17.6 MMHG
BH CV ECHO MEAS - AO MEAN PG: 9.6 MMHG
BH CV ECHO MEAS - AO ROOT DIAM: 2.7 CM
BH CV ECHO MEAS - AO V2 MAX: 209.8 CM/SEC
BH CV ECHO MEAS - AO V2 VTI: 44.2 CM
BH CV ECHO MEAS - AVA(I,D): 1.78 CM2
BH CV ECHO MEAS - EDV(CUBED): 85.2 ML
BH CV ECHO MEAS - EDV(MOD-SP2): 73.9 ML
BH CV ECHO MEAS - EDV(MOD-SP4): 107 ML
BH CV ECHO MEAS - EF(MOD-BP): 41.4 %
BH CV ECHO MEAS - EF(MOD-SP2): 46.7 %
BH CV ECHO MEAS - EF(MOD-SP4): 37.2 %
BH CV ECHO MEAS - ESV(CUBED): 17.6 ML
BH CV ECHO MEAS - ESV(MOD-SP2): 39.4 ML
BH CV ECHO MEAS - ESV(MOD-SP4): 67.2 ML
BH CV ECHO MEAS - FS: 40.9 %
BH CV ECHO MEAS - IVS/LVPW: 1 CM
BH CV ECHO MEAS - IVSD: 0.9 CM
BH CV ECHO MEAS - LA DIMENSION: 3.5 CM
BH CV ECHO MEAS - LAT PEAK E' VEL: 5.6 CM/SEC
BH CV ECHO MEAS - LV MASS(C)D: 128 GRAMS
BH CV ECHO MEAS - LV MAX PG: 5.1 MMHG
BH CV ECHO MEAS - LV MEAN PG: 3 MMHG
BH CV ECHO MEAS - LV V1 MAX: 113 CM/SEC
BH CV ECHO MEAS - LV V1 VTI: 25 CM
BH CV ECHO MEAS - LVIDD: 4.4 CM
BH CV ECHO MEAS - LVIDS: 2.6 CM
BH CV ECHO MEAS - LVOT AREA: 3.1 CM2
BH CV ECHO MEAS - LVOT DIAM: 2 CM
BH CV ECHO MEAS - LVPWD: 0.9 CM
BH CV ECHO MEAS - MED PEAK E' VEL: 5.7 CM/SEC
BH CV ECHO MEAS - MV A MAX VEL: 116 CM/SEC
BH CV ECHO MEAS - MV DEC SLOPE: 546 CM/SEC2
BH CV ECHO MEAS - MV DEC TIME: 0.33 SEC
BH CV ECHO MEAS - MV E MAX VEL: 138 CM/SEC
BH CV ECHO MEAS - MV E/A: 1.19
BH CV ECHO MEAS - MV MAX PG: 13 MMHG
BH CV ECHO MEAS - MV MEAN PG: 5 MMHG
BH CV ECHO MEAS - MV P1/2T: 98.7 MSEC
BH CV ECHO MEAS - MV V2 VTI: 48.2 CM
BH CV ECHO MEAS - MVA(P1/2T): 2.23 CM2
BH CV ECHO MEAS - MVA(VTI): 1.63 CM2
BH CV ECHO MEAS - PA ACC TIME: 0.08 SEC
BH CV ECHO MEAS - SV(LVOT): 78.5 ML
BH CV ECHO MEAS - SV(MOD-SP2): 34.5 ML
BH CV ECHO MEAS - SV(MOD-SP4): 39.8 ML
BH CV ECHO MEASUREMENTS AVERAGE E/E' RATIO: 24.42
BH CV XLRA MEAS LEFT DIST CCA EDV: -16.5 CM/SEC
BH CV XLRA MEAS LEFT DIST CCA PSV: -147 CM/SEC
BH CV XLRA MEAS LEFT DIST ICA EDV: -23.3 CM/SEC
BH CV XLRA MEAS LEFT DIST ICA PSV: -195 CM/SEC
BH CV XLRA MEAS LEFT ICA/CCA RATIO: 3.48
BH CV XLRA MEAS LEFT MID CCA EDV: 13.9 CM/SEC
BH CV XLRA MEAS LEFT MID CCA PSV: 85.8 CM/SEC
BH CV XLRA MEAS LEFT MID ICA EDV: -20.6 CM/SEC
BH CV XLRA MEAS LEFT MID ICA PSV: -171 CM/SEC
BH CV XLRA MEAS LEFT PROX CCA EDV: 16.5 CM/SEC
BH CV XLRA MEAS LEFT PROX CCA PSV: 108 CM/SEC
BH CV XLRA MEAS LEFT PROX ECA EDV: -31.6 CM/SEC
BH CV XLRA MEAS LEFT PROX ECA PSV: -203 CM/SEC
BH CV XLRA MEAS LEFT PROX ICA EDV: -38.4 CM/SEC
BH CV XLRA MEAS LEFT PROX ICA PSV: -299.1 CM/SEC
BH CV XLRA MEAS LEFT PROX SCLA PSV: 178 CM/SEC
BH CV XLRA MEAS LEFT VERTEBRAL A EDV: 20.6 CM/SEC
BH CV XLRA MEAS LEFT VERTEBRAL A PSV: 118 CM/SEC
BH CV XLRA MEAS RIGHT DIST CCA EDV: 20.6 CM/SEC
BH CV XLRA MEAS RIGHT DIST CCA PSV: 114 CM/SEC
BH CV XLRA MEAS RIGHT DIST ICA EDV: 16.5 CM/SEC
BH CV XLRA MEAS RIGHT DIST ICA PSV: 82.3 CM/SEC
BH CV XLRA MEAS RIGHT ICA/CCA RATIO: 1.16
BH CV XLRA MEAS RIGHT MID CCA EDV: 17.8 CM/SEC
BH CV XLRA MEAS RIGHT MID CCA PSV: 128 CM/SEC
BH CV XLRA MEAS RIGHT MID ICA EDV: -16.5 CM/SEC
BH CV XLRA MEAS RIGHT MID ICA PSV: -114 CM/SEC
BH CV XLRA MEAS RIGHT PROX CCA EDV: 22 CM/SEC
BH CV XLRA MEAS RIGHT PROX CCA PSV: 137 CM/SEC
BH CV XLRA MEAS RIGHT PROX ECA EDV: 16.5 CM/SEC
BH CV XLRA MEAS RIGHT PROX ECA PSV: 159 CM/SEC
BH CV XLRA MEAS RIGHT PROX ICA EDV: -23.3 CM/SEC
BH CV XLRA MEAS RIGHT PROX ICA PSV: -148 CM/SEC
BH CV XLRA MEAS RIGHT PROX SCLA PSV: 272 CM/SEC
BH CV XLRA MEAS RIGHT VERTEBRAL A EDV: 7.5 CM/SEC
BH CV XLRA MEAS RIGHT VERTEBRAL A PSV: 20.2 CM/SEC
C PNEUM DNA NPH QL NAA+NON-PROBE: NOT DETECTED
CRP SERPL-MCNC: 1.79 MG/DL (ref 0–0.5)
D DIMER PPP FEU-MCNC: 1.76 MCGFEU/ML (ref 0–0.81)
D-LACTATE SERPL-SCNC: 1.2 MMOL/L (ref 0.5–2)
FERRITIN SERPL-MCNC: 301.8 NG/ML (ref 13–150)
FIBRINOGEN PPP-MCNC: 464 MG/DL (ref 203–470)
FLUAV SUBTYP SPEC NAA+PROBE: NOT DETECTED
FLUBV RNA ISLT QL NAA+PROBE: NOT DETECTED
GEN 5 2HR TROPONIN T REFLEX: 34 NG/L
GLUCOSE BLDC GLUCOMTR-MCNC: 82 MG/DL (ref 70–130)
GLUCOSE BLDC GLUCOMTR-MCNC: 87 MG/DL (ref 70–130)
GLUCOSE BLDC GLUCOMTR-MCNC: 90 MG/DL (ref 70–130)
GLUCOSE BLDC GLUCOMTR-MCNC: 91 MG/DL (ref 70–130)
HADV DNA SPEC NAA+PROBE: NOT DETECTED
HBA1C MFR BLD: 5.5 % (ref 4.8–5.6)
HCOV 229E RNA SPEC QL NAA+PROBE: NOT DETECTED
HCOV HKU1 RNA SPEC QL NAA+PROBE: NOT DETECTED
HCOV NL63 RNA SPEC QL NAA+PROBE: NOT DETECTED
HCOV OC43 RNA SPEC QL NAA+PROBE: NOT DETECTED
HMPV RNA NPH QL NAA+NON-PROBE: NOT DETECTED
HPIV1 RNA ISLT QL NAA+PROBE: NOT DETECTED
HPIV2 RNA SPEC QL NAA+PROBE: NOT DETECTED
HPIV3 RNA NPH QL NAA+PROBE: NOT DETECTED
HPIV4 P GENE NPH QL NAA+PROBE: NOT DETECTED
INR PPP: 1.3 (ref 0.89–1.12)
LDH SERPL-CCNC: 152 U/L (ref 135–214)
LEFT ATRIUM VOLUME INDEX: 20.7 ML/M2
M PNEUMO IGG SER IA-ACNC: NOT DETECTED
NT-PROBNP SERPL-MCNC: 3900 PG/ML (ref 0–1800)
PROCALCITONIN SERPL-MCNC: 0.09 NG/ML (ref 0–0.25)
PROTHROMBIN TIME: 16.3 SECONDS (ref 12.2–14.5)
QT INTERVAL: 360 MS
QTC INTERVAL: 374 MS
RHINOVIRUS RNA SPEC NAA+PROBE: NOT DETECTED
RIGHT ARM BP: NORMAL MMHG
RSV RNA NPH QL NAA+NON-PROBE: NOT DETECTED
SARS-COV-2 RNA NPH QL NAA+NON-PROBE: DETECTED
TROPONIN T DELTA: -6 NG/L
TROPONIN T SERPL HS-MCNC: 40 NG/L
UFH PPP CHRO-ACNC: 0.1 IU/ML (ref 0.3–0.7)

## 2024-10-15 PROCEDURE — 87040 BLOOD CULTURE FOR BACTERIA: CPT | Performed by: NURSE PRACTITIONER

## 2024-10-15 PROCEDURE — 25010000002 HEPARIN (PORCINE) PER 1000 UNITS: Performed by: NURSE PRACTITIONER

## 2024-10-15 PROCEDURE — 85384 FIBRINOGEN ACTIVITY: CPT | Performed by: NURSE PRACTITIONER

## 2024-10-15 PROCEDURE — 99223 1ST HOSP IP/OBS HIGH 75: CPT | Performed by: INTERNAL MEDICINE

## 2024-10-15 PROCEDURE — 25010000002 CEFTRIAXONE PER 250 MG: Performed by: EMERGENCY MEDICINE

## 2024-10-15 PROCEDURE — 96372 THER/PROPH/DIAG INJ SC/IM: CPT

## 2024-10-15 PROCEDURE — 85379 FIBRIN DEGRADATION QUANT: CPT | Performed by: NURSE PRACTITIONER

## 2024-10-15 PROCEDURE — 82948 REAGENT STRIP/BLOOD GLUCOSE: CPT

## 2024-10-15 PROCEDURE — G0378 HOSPITAL OBSERVATION PER HR: HCPCS

## 2024-10-15 PROCEDURE — 93005 ELECTROCARDIOGRAM TRACING: CPT | Performed by: NURSE PRACTITIONER

## 2024-10-15 PROCEDURE — 85610 PROTHROMBIN TIME: CPT | Performed by: NURSE PRACTITIONER

## 2024-10-15 PROCEDURE — 93306 TTE W/DOPPLER COMPLETE: CPT

## 2024-10-15 PROCEDURE — 85520 HEPARIN ASSAY: CPT | Performed by: NURSE PRACTITIONER

## 2024-10-15 PROCEDURE — 0202U NFCT DS 22 TRGT SARS-COV-2: CPT | Performed by: NURSE PRACTITIONER

## 2024-10-15 PROCEDURE — 90471 IMMUNIZATION ADMIN: CPT | Performed by: EMERGENCY MEDICINE

## 2024-10-15 PROCEDURE — 25010000002 TETANUS-DIPHTH-ACELL PERTUSSIS 5-2.5-18.5 LF-MCG/0.5 SUSPENSION PREFILLED SYRINGE: Performed by: EMERGENCY MEDICINE

## 2024-10-15 PROCEDURE — 85730 THROMBOPLASTIN TIME PARTIAL: CPT | Performed by: NURSE PRACTITIONER

## 2024-10-15 PROCEDURE — 90715 TDAP VACCINE 7 YRS/> IM: CPT | Performed by: EMERGENCY MEDICINE

## 2024-10-15 PROCEDURE — 94640 AIRWAY INHALATION TREATMENT: CPT

## 2024-10-15 PROCEDURE — 83605 ASSAY OF LACTIC ACID: CPT | Performed by: NURSE PRACTITIONER

## 2024-10-15 PROCEDURE — 90472 IMMUNIZATION ADMIN EACH ADD: CPT | Performed by: EMERGENCY MEDICINE

## 2024-10-15 PROCEDURE — 93880 EXTRACRANIAL BILAT STUDY: CPT | Performed by: INTERNAL MEDICINE

## 2024-10-15 PROCEDURE — 84484 ASSAY OF TROPONIN QUANT: CPT | Performed by: NURSE PRACTITIONER

## 2024-10-15 PROCEDURE — 25010000002 SULFUR HEXAFLUORIDE MICROSPH 60.7-25 MG RECONSTITUTED SUSPENSION: Performed by: NURSE PRACTITIONER

## 2024-10-15 PROCEDURE — 93010 ELECTROCARDIOGRAM REPORT: CPT | Performed by: INTERNAL MEDICINE

## 2024-10-15 PROCEDURE — 93880 EXTRACRANIAL BILAT STUDY: CPT

## 2024-10-15 PROCEDURE — 97162 PT EVAL MOD COMPLEX 30 MIN: CPT

## 2024-10-15 PROCEDURE — 83036 HEMOGLOBIN GLYCOSYLATED A1C: CPT | Performed by: NURSE PRACTITIONER

## 2024-10-15 PROCEDURE — 25010000002 CEFTRIAXONE PER 250 MG: Performed by: NURSE PRACTITIONER

## 2024-10-15 PROCEDURE — 93306 TTE W/DOPPLER COMPLETE: CPT | Performed by: INTERNAL MEDICINE

## 2024-10-15 PROCEDURE — 97165 OT EVAL LOW COMPLEX 30 MIN: CPT

## 2024-10-15 RX ORDER — SODIUM CHLORIDE 0.9 % (FLUSH) 0.9 %
10 SYRINGE (ML) INJECTION AS NEEDED
Status: DISCONTINUED | OUTPATIENT
Start: 2024-10-15 | End: 2024-10-24 | Stop reason: HOSPADM

## 2024-10-15 RX ORDER — ACETAMINOPHEN 325 MG/1
650 TABLET ORAL EVERY 4 HOURS PRN
Status: DISCONTINUED | OUTPATIENT
Start: 2024-10-15 | End: 2024-10-24 | Stop reason: HOSPADM

## 2024-10-15 RX ORDER — BISACODYL 10 MG
10 SUPPOSITORY, RECTAL RECTAL DAILY PRN
Status: DISCONTINUED | OUTPATIENT
Start: 2024-10-15 | End: 2024-10-24 | Stop reason: HOSPADM

## 2024-10-15 RX ORDER — SODIUM CHLORIDE 9 MG/ML
40 INJECTION, SOLUTION INTRAVENOUS AS NEEDED
Status: DISCONTINUED | OUTPATIENT
Start: 2024-10-15 | End: 2024-10-15

## 2024-10-15 RX ORDER — ALBUTEROL SULFATE 90 UG/1
2 INHALANT RESPIRATORY (INHALATION) EVERY 4 HOURS PRN
Status: DISCONTINUED | OUTPATIENT
Start: 2024-10-15 | End: 2024-10-24 | Stop reason: HOSPADM

## 2024-10-15 RX ORDER — ALBUTEROL SULFATE 90 UG/1
2 INHALANT RESPIRATORY (INHALATION) EVERY 6 HOURS PRN
Status: DISCONTINUED | OUTPATIENT
Start: 2024-10-15 | End: 2024-10-15

## 2024-10-15 RX ORDER — INSULIN LISPRO 100 [IU]/ML
2-7 INJECTION, SOLUTION INTRAVENOUS; SUBCUTANEOUS
Status: DISCONTINUED | OUTPATIENT
Start: 2024-10-15 | End: 2024-10-24 | Stop reason: HOSPADM

## 2024-10-15 RX ORDER — AMOXICILLIN 250 MG
2 CAPSULE ORAL 2 TIMES DAILY
Status: DISCONTINUED | OUTPATIENT
Start: 2024-10-15 | End: 2024-10-24 | Stop reason: HOSPADM

## 2024-10-15 RX ORDER — IBUPROFEN 600 MG/1
1 TABLET ORAL
Status: DISCONTINUED | OUTPATIENT
Start: 2024-10-15 | End: 2024-10-24 | Stop reason: HOSPADM

## 2024-10-15 RX ORDER — SODIUM CHLORIDE 0.9 % (FLUSH) 0.9 %
10 SYRINGE (ML) INJECTION EVERY 12 HOURS SCHEDULED
Status: DISCONTINUED | OUTPATIENT
Start: 2024-10-15 | End: 2024-10-17

## 2024-10-15 RX ORDER — NICOTINE POLACRILEX 4 MG
15 LOZENGE BUCCAL
Status: DISCONTINUED | OUTPATIENT
Start: 2024-10-15 | End: 2024-10-24 | Stop reason: HOSPADM

## 2024-10-15 RX ORDER — BENZONATATE 100 MG/1
100 CAPSULE ORAL 3 TIMES DAILY PRN
Status: DISCONTINUED | OUTPATIENT
Start: 2024-10-15 | End: 2024-10-24 | Stop reason: HOSPADM

## 2024-10-15 RX ORDER — CLOPIDOGREL BISULFATE 75 MG/1
75 TABLET ORAL DAILY
Status: DISCONTINUED | OUTPATIENT
Start: 2024-10-15 | End: 2024-10-24 | Stop reason: HOSPADM

## 2024-10-15 RX ORDER — HEPARIN SODIUM 5000 [USP'U]/ML
5000 INJECTION, SOLUTION INTRAVENOUS; SUBCUTANEOUS EVERY 8 HOURS SCHEDULED
Status: DISCONTINUED | OUTPATIENT
Start: 2024-10-15 | End: 2024-10-24 | Stop reason: HOSPADM

## 2024-10-15 RX ORDER — BISACODYL 5 MG/1
5 TABLET, DELAYED RELEASE ORAL DAILY PRN
Status: DISCONTINUED | OUTPATIENT
Start: 2024-10-15 | End: 2024-10-24 | Stop reason: HOSPADM

## 2024-10-15 RX ORDER — ACETAMINOPHEN 650 MG/1
650 SUPPOSITORY RECTAL EVERY 4 HOURS PRN
Status: DISCONTINUED | OUTPATIENT
Start: 2024-10-15 | End: 2024-10-24 | Stop reason: HOSPADM

## 2024-10-15 RX ORDER — DEXTROMETHORPHAN POLISTIREX 30 MG/5ML
60 SUSPENSION ORAL EVERY 12 HOURS PRN
Status: DISCONTINUED | OUTPATIENT
Start: 2024-10-15 | End: 2024-10-24 | Stop reason: HOSPADM

## 2024-10-15 RX ORDER — ACETAMINOPHEN 325 MG/1
650 TABLET ORAL EVERY 4 HOURS PRN
Status: DISCONTINUED | OUTPATIENT
Start: 2024-10-15 | End: 2024-10-15

## 2024-10-15 RX ORDER — POLYETHYLENE GLYCOL 3350 17 G/17G
17 POWDER, FOR SOLUTION ORAL DAILY PRN
Status: DISCONTINUED | OUTPATIENT
Start: 2024-10-15 | End: 2024-10-24 | Stop reason: HOSPADM

## 2024-10-15 RX ORDER — DEXTROSE MONOHYDRATE 25 G/50ML
25 INJECTION, SOLUTION INTRAVENOUS
Status: DISCONTINUED | OUTPATIENT
Start: 2024-10-15 | End: 2024-10-24 | Stop reason: HOSPADM

## 2024-10-15 RX ADMIN — Medication 5 MG: at 20:10

## 2024-10-15 RX ADMIN — ALBUTEROL SULFATE 2 PUFF: 90 AEROSOL, METERED RESPIRATORY (INHALATION) at 22:41

## 2024-10-15 RX ADMIN — NIRMATRELVIR AND RITONAVIR 2 TABLET: KIT at 20:10

## 2024-10-15 RX ADMIN — Medication 10 ML: at 20:11

## 2024-10-15 RX ADMIN — Medication 10 ML: at 08:44

## 2024-10-15 RX ADMIN — HEPARIN SODIUM 5000 UNITS: 5000 INJECTION INTRAVENOUS; SUBCUTANEOUS at 04:47

## 2024-10-15 RX ADMIN — HEPARIN SODIUM 5000 UNITS: 5000 INJECTION INTRAVENOUS; SUBCUTANEOUS at 14:09

## 2024-10-15 RX ADMIN — CLOPIDOGREL BISULFATE 75 MG: 75 TABLET ORAL at 08:40

## 2024-10-15 RX ADMIN — VALSARTAN 240 MG: 80 TABLET, FILM COATED ORAL at 08:40

## 2024-10-15 RX ADMIN — SODIUM CHLORIDE 2000 MG: 900 INJECTION INTRAVENOUS at 20:10

## 2024-10-15 RX ADMIN — TETANUS TOXOID, REDUCED DIPHTHERIA TOXOID AND ACELLULAR PERTUSSIS VACCINE, ADSORBED 0.5 ML: 5; 2.5; 8; 8; 2.5 SUSPENSION INTRAMUSCULAR at 01:01

## 2024-10-15 RX ADMIN — SENNOSIDES AND DOCUSATE SODIUM 2 TABLET: 50; 8.6 TABLET ORAL at 20:10

## 2024-10-15 RX ADMIN — NIRMATRELVIR AND RITONAVIR 2 TABLET: KIT at 08:40

## 2024-10-15 RX ADMIN — HEPARIN SODIUM 5000 UNITS: 5000 INJECTION INTRAVENOUS; SUBCUTANEOUS at 20:10

## 2024-10-15 RX ADMIN — BENZONATATE 100 MG: 100 CAPSULE ORAL at 20:10

## 2024-10-15 RX ADMIN — SULFUR HEXAFLUORIDE 5 ML: KIT at 11:59

## 2024-10-15 RX ADMIN — SENNOSIDES AND DOCUSATE SODIUM 2 TABLET: 50; 8.6 TABLET ORAL at 08:40

## 2024-10-15 RX ADMIN — SODIUM CHLORIDE 2000 MG: 900 INJECTION INTRAVENOUS at 04:47

## 2024-10-15 NOTE — CASE MANAGEMENT/SOCIAL WORK
Discharge Planning Assessment  [facility]     Patient Name: Georgia Velázquez  MRN: [id number]  Today's Date: 10/15/2024    Admit Date: 10/14/2024    Plan: TBD   Discharge Needs Assessment       Row Name 10/15/24 0936       Living Environment    People in Home child(chandler), adult    Current Living Arrangements home    Potentially Unsafe Housing Conditions none    In the past 12 months has the electric, gas, oil, or water company threatened to shut off services in your home? No    Primary Care Provided by child(chandler)    Provides Primary Care For no one, unable/limited ability to care for self    Family Caregiver if Needed child(chandler), adult    Quality of Family Relationships helpful;involved;supportive    Able to Return to Prior Arrangements other (see comments)  TBD       Resource/Environmental Concerns    Resource/Environmental Concerns none    Transportation Concerns none       Transportation Needs    In the past 12 months, has lack of transportation kept you from medical appointments or from getting medications? no    In the past 12 months, has lack of transportation kept you from meetings, work, or from getting things needed for daily living? No       Food Insecurity    Within the past 12 months, you worried that your food would run out before you got the money to buy more. Never true    Within the past 12 months, the food you bought just didn't last and you didn't have money to get more. Never true       Transition Planning    Patient/Family Anticipates Transition to other (see comments)  TBD    Patient/Family Anticipated Services at Transition     Transportation Anticipated health plan transportation;family or friend will provide       Discharge Needs Assessment    Readmission Within the Last 30 Days no previous admission in last 30 days    Equipment Currently Used at Home rollator;bath bench    Concerns to be Addressed discharge planning    Do you want help finding or keeping work or a job? I do  not need or want help    Do you want help with school or training? For example, starting or completing job training or getting a high school diploma, GED or equivalent No    Anticipated Changes Related to Illness none    Equipment Needed After Discharge other (see comments)  TBD    Discharge Facility/Level of Care Needs nursing facility, skilled                   Discharge Plan       Row Name 10/15/24 0941       Plan    Plan TBD    Patient/Family in Agreement with Plan yes    Plan Comments CM spoke to Omaira martinez to initiate discharge planning. Patient lives at her daughter's home with her daughter and son-in-law in [facility]. Prior to admission, she needed assistance with ADL's. She uses a rollator, bath bench, nebulizer PRN and home has a stair lift. She is not current with home health and does not use home oxygen. Her PCP is no longer at the [facility] location, so she will need a new provider at that location. Verified [employer]. Her plan is TBD, but daughter is anticipating SNF. Will await therapy's recommendations. CM will continue to follow.    Final Discharge Disposition Code 03 - skilled nursing facility (SNF)                  Continued Care and Services - Admitted Since 10/14/2024    No active coordination exists for this encounter.       Expected Discharge Date and Time       Expected Discharge Date Expected Discharge Time    Oct 17, 2024            Demographic Summary       Row Name 10/15/24 0303       General Information    Admission Type observation    Arrived From emergency department    Referral Source admission list    Reason for Consult discharge planning    Preferred Language English                   Functional Status       Row Name 10/15/24 0943       Functional Status    Usual Activity Tolerance moderate    Current Activity Tolerance moderate       Functional Status, IADL    Medications assistive person    Meal Preparation assistive person    Housekeeping assistive person     Laundry assistive person    Shopping assistive person                   Psychosocial    No documentation.                  Abuse/Neglect    No documentation.                  Legal    No documentation.                  Substance Abuse    No documentation.                  Patient Forms    No documentation.                     Emily Greenwood RN

## 2024-10-15 NOTE — PLAN OF CARE
Goal Outcome Evaluation:  Plan of Care Reviewed With: patient        Progress: no change  Outcome Evaluation: Pt. presents with some limitations in balance and endurance and appears with mild cognitive deficit.  Noted history of dementia. Pt. reports using a sockaid for donning socks.  Bed rails recommended if pt. does not have at home.  Pt. may need 24/7 supervision/assist at home at discharage.  HH therapy recommended.    Anticipated Discharge Disposition (OT): home with 24/7 care, home with [facility]

## 2024-10-15 NOTE — THERAPY EVALUATION
Patient Name: Georgia Velázquez  : 1945    MRN: [id number]                              Today's Date: 10/15/2024       Admit Date: 10/14/2024    Visit Dx:     ICD-10-CM ICD-9-CM   1. Altered mental status, unspecified altered mental status type  R41.82 780.97   2. Acute cystitis without hematuria  N30.00 595.0   3. COVID-19  U07.1 079.89   4. History of diabetes mellitus  Z86.39 V12.29   5. History of coronary artery disease  Z86.79 V12.59     Patient Active Problem List   Diagnosis    Essential hypertension    Hyperlipidemia    Coronary artery disease    GERD (gastroesophageal reflux disease)    Recurrent UTI    Stage 3 chronic kidney disease    Chronic obstructive pulmonary disease    Retinal emboli, right    Diastolic dysfunction    Glaucoma    Left carotid artery stenosis    Syncope    Aortic regurgitation    Diabetic retinopathy    Type 2 diabetes mellitus with stage 3b chronic kidney disease, without long-term current use of insulin    Hypoxia    Obesity, morbid, BMI 50 or higher    Hospital discharge follow-up    Cognitive decline    Other specified health status    Skin lesion of face    Chronic constipation    COVID-19 virus detected    Acute UTI (urinary tract infection)    CKD (chronic kidney disease) stage 3, GFR 30-59 ml/min    T2DM (type 2 diabetes mellitus)    CAD (coronary artery disease)    Syncope    Dementia     Past Medical History:   Diagnosis Date    Acute kidney injury 2017    Aortic regurgitation     Arthritis of shoulder 2016    Body mass index (BMI) of 45.0-49.9 in adult 2019    Bright red rectal bleeding 2023    CHF (congestive heart failure)     Closed compression fracture of L4 lumbar vertebra 2017    Added automatically from request for surgery [id number]    Colitis 2020    Constipation 2017    COPD (chronic obstructive pulmonary disease)     Coronary artery disease 2016    Diabetes mellitus type 2 in obese 2018    Elevated  alkaline phosphatase level 02/26/2018    Elevated creatine kinase     Essential hypertension 05/18/2016    GERD (gastroesophageal reflux disease) 05/18/2016    Glaucoma 07/21/2020    History of kyphoplasty 01/30/2018    Hyperlipidemia     Lumbar facet arthropathy 01/29/2018    Lumbar stenosis with neurogenic claudication 01/29/2018    Morbid obesity due to excess calories 01/29/2018    Myocardial infarction 05/18/2016    Osteoporosis 05/18/2016    Physical deconditioning 01/30/2018    Retinal emboli, 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    Sepsis     uro    Stroke     Urinary incontinence without sensory awareness 02/26/2018     Past Surgical History:   Procedure Laterality Date    APPENDECTOMY      BACK SURGERY      CARDIAC CATHETERIZATION      CHOLECYSTECTOMY      CORONARY STENT PLACEMENT      EYE SURGERY      KYPHOPLASTY N/A 12/07/2017    Procedure: KYPHOPLASTY L4;  Surgeon: Marc Pham MD;  Location: [facility] OR;  Service:       General Information       Row Name 10/15/24 1038          OT Time and Intention    Subjective Information no complaints  -JOELLE     Document Type evaluation  -JOELLE     Mode of Treatment occupational therapy  -JOELLE     Patient Effort excellent  -JOELLE     Symptoms Noted During/After Treatment none  -JOELLE       Row Name 10/15/24 1038          General Information    Patient Profile Reviewed yes  -JOELLE     Prior Level of Function independent:;all household mobility;gait;transfer;bed mobility;feeding;grooming;dressing;bathing;mod assist:;cooking;dependent:;cleaning;driving;shopping  per pt. she uses a rollator and has a stair lift for stairs in home  -JOELLE     Existing Precautions/Restrictions fall  -JOELLE     Barriers to Rehab previous functional deficit;cognitive status  -JOELLE       Row Name 10/15/24 1038          Occupational Profile    Environmental Supports and Barriers (Occupational Profile) stair lift in home, wx in shower with seat and grab bars, high toilet, rollator use per pt. report  -JOELLE       Row Name 10/15/24 1038           Living Environment    People in Home child(chandler), adult  pt. and daughter live together, daughter works days a [employer]  -JOELLE       Row Name 10/15/24 1038          Home Main Entrance    Number of Stairs, Main Entrance none  -JOELLE       Row Name 10/15/24 1038          Stairs Within Home, Primary    Stairs, Within Home, Primary stair lift use  -JOELLE       Row Name 10/15/24 1038          Cognition    Orientation Status (Cognition) oriented to;person;place;time;verbal cues/prompts needed for orientation  off one year stating 2023  -JOELLE       Row Name 10/15/24 1038          Safety Issues/Impairments Affecting Functional Mobility    Safety Issues Affecting Function (Mobility) insight into deficits/self-awareness;safety precaution awareness;safety precautions follow-through/compliance  -     Impairments Affecting Function (Mobility) cognition;endurance/activity tolerance  -     Cognitive Impairments, Mobility Safety/Performance insight into deficits/self-awareness;safety precaution awareness  -               User Key  (r) = Recorded By, (t) = Taken By, (c) = Cosigned By      Initials Name Provider Type    Karolina Medina, OT Occupational Therapist                     Mobility/ADL's       Row Name 10/15/24 1042          Bed Mobility    Bed Mobility supine-sit  -JOELLE     Supine-Sit [place] (Bed Mobility) minimum assist (75% patient effort);verbal cues  -     Assistive Device (Bed Mobility) bed rails  -JOELLE     Comment, (Bed Mobility) pt. needed significant use of bed railing, per pt. she sleep in standard bed without rails then said had rails so unsure of accuracy  -JOELLE       Row Name 10/15/24 1042          Transfers    Transfers sit-stand transfer;stand-sit transfer  -JOELLE     Comment, (Transfers) cues needed for hand placement  -       Row Name 10/15/24 1042          Sit-Stand Transfer    Sit-Stand [place] (Transfers) contact guard;verbal cues  -     Assistive Device (Sit-Stand Transfers) walker,  front-wheeled  -JOELLE       Row Name 10/15/24 1042          Stand-Sit Transfer    Stand-Sit [place] (Transfers) contact guard;verbal cues  -     Assistive Device (Stand-Sit Transfers) walker, front-wheeled  -JOELLE       Row Name 10/15/24 1042          Functional Mobility    Functional Mobility- Ind. Level contact guard assist;verbal cues required  -     Functional Mobility- Device walker, front-wheeled  -JOELLE     Functional Mobility-Distance (Feet) 20  -JOELLE     Functional Mobility- Comment pt. cued to move to recliner, but then heading to couch and needed re-direction  -       Row Name 10/15/24 1042          Activities of Daily Living    BADL Assessment/Intervention lower body dressing;grooming;upper body dressing  -       Row Name 10/15/24 1042          Lower Body Dressing Assessment/Training    [place] Level (Lower Body Dressing) don;socks;dependent (less than 25% patient effort)  -JOELLE     Position (Lower Body Dressing) edge of bed sitting  -JOELLE     Comment, (Lower Body Dressing) per pt. she has a sockaid at home, pt. says she does not need AE to bath or for other dressing tasks  -       Row Name 10/15/24 1042          Grooming Assessment/Training    [place] Level (Grooming) hair care, combing/brushing;minimum assist (75% patient effort)  -JOELLE     Position (Grooming) edge of bed sitting  -JOELLE       Row Name 10/15/24 1042          Upper Body Dressing Assessment/Training    Comment, (Upper Body Dressing) pt. declined  -               User Key  (r) = Recorded By, (t) = Taken By, (c) = Cosigned By      Initials Name Provider Type    Karolina Medina OT Occupational Therapist                   Obj/Interventions       Row Name 10/15/24 1044          Sensory Assessment (Somatosensory)    Sensory Assessment pt. did not report any numbness or tingling  -       Row Name 10/15/24 1044          Range of Motion Comprehensive    General Range of Motion bilateral upper extremity ROM WFL  -       Row Name  10/15/24 1044          Strength Comprehensive (MMT)    General Manual Muscle Testing (MMT) Assessment no strength deficits identified  -JOELLE     Comment, General Manual Muscle Testing (MMT) Assessment BUE-5/5  -JOELLE       Row Name 10/15/24 1044          Motor Skills    Motor Skills functional endurance  -JOELLE     Functional Endurance fair, per pt. her legs at home get weak quickly and become giggly  -JOELLE       Row Name 10/15/24 1044          Balance    Balance Assessment sitting static balance;sitting dynamic balance;standing static balance;standing dynamic balance  -JOELLE     Static Sitting Balance supervision  -JOELLE     Dynamic Sitting Balance standby assist  -JOELLE     Position, Sitting Balance unsupported;sitting edge of bed  -JOELLE     Static Standing Balance contact guard  -JOELLE     Dynamic Standing Balance contact guard  -JOELLE     Position/Device Used, Standing Balance supported;walker, front-wheeled  -JOELLE               User Key  (r) = Recorded By, (t) = Taken By, (c) = Cosigned By      Initials Name Provider Type    Karolina Medina, OT Occupational Therapist                   Goals/Plan       Row Name 10/15/24 1051          Bed Mobility Goal 1 (OT)    Activity/Assistive Device (Bed Mobility Goal 1, OT) sit to supine;supine to sit;bed rails  -JOELLE     [place] Level/Cues Needed (Bed Mobility Goal 1, OT) standby assist  -JOELLE     Time Frame (Bed Mobility Goal 1, OT) long term goal (LTG);10 days  -JOELLE     Progress/Outcomes (Bed Mobility Goal 1, OT) new goal  -JOELLE       Row Name 10/15/24 1051          Transfer Goal 1 (OT)    Activity/Assistive Device (Transfer Goal 1, OT) toilet;walker, rolling  -JOELLE     [place] Level/Cues Needed (Transfer Goal 1, OT) standby assist  -JOELLE     Time Frame (Transfer Goal 1, OT) short term goal (STG);5 days  -JOELLE     Progress/Outcome (Transfer Goal 1, OT) new goal  -       Row Name 10/15/24 1051          Toileting Goal 1 (OT)    Activity/Device (Toileting Goal 1, OT) adjust/manage clothing;perform  perineal hygiene;commode;grab bar/safety frame  -JOELLE     [place] Level/Cues Needed (Toileting Goal 1, OT) standby assist  -JOELLE     Time Frame (Toileting Goal 1, OT) short term goal (STG);5 days  -JOELLE     Progress/Outcome (Toileting Goal 1, OT) new goal  -JOELLE       Row Name 10/15/24 1051          Therapy Assessment/Plan (OT)    Planned Therapy Interventions (OT) activity tolerance training;BADL retraining;cognitive/visual perception retraining;functional balance retraining;occupation/activity based interventions;patient/caregiver education/training;ROM/therapeutic exercise;transfer/mobility retraining  -               User Key  (r) = Recorded By, (t) = Taken By, (c) = Cosigned By      Initials Name Provider Type    Karolina Medina, DEON Occupational Therapist                   Clinical Impression       Row Name 10/15/24 1043          Pain Assessment    Pretreatment Pain Rating 0/10 - no pain  -JOELLE     Posttreatment Pain Rating 0/10 - no pain  -JOELLE       Row Name 10/15/24 1049          Plan of Care Review    Plan of Care Reviewed With patient  -JOELLE     Progress no change  -     Outcome Evaluation Pt. presents with some limitations in balance and endurance and appears with mild cognitive deficit.  Noted history of dementia. Pt. reports using a sockaid for donning socks.  Bed rails recommended if pt. does not have at home.  Pt. may need 24/7 supervision/assist at home at discharage.   therapy recommended.  -       Row Name 10/15/24 1042          Therapy Assessment/Plan (OT)    Patient/Family Therapy Goal Statement (OT) be able to return home  -JOELLE     Rehab Potential (OT) good  -JOELLE     Criteria for Skilled Therapeutic Interventions Met (OT) yes;meets criteria;skilled treatment is necessary  -JOELLE     Therapy Frequency (OT) daily  -JOELLE     Predicted Duration of Therapy Intervention (OT) 10 days  -JOELLE       Row Name 10/15/24 1047          Therapy Plan Review/Discharge Plan (OT)    Equipment Needs Upon Discharge (OT)  other (see comments)  bed rails  -JOELLE     Anticipated Discharge Disposition (OT) home with 24/7 care;home with home health  -JOELLE       Row Name 10/15/24 1045          Vital Signs    Pre Systolic BP Rehab 148  -JOELLE     Pre Treatment Diastolic BP 60  -JOELLE     Intra Systolic BP Rehab 149  question accuracy with movement  -JOELLE     Intra Treatment Diastolic BP 39  question accuracy with movement  -JOELLE     Post Systolic BP Rehab 128  -JOELLE     Post Treatment Diastolic BP 89  -JOELLE     Pre SpO2 (%) 98  -JOELLE     O2 Delivery Pre Treatment room air  -JOELLE     O2 Delivery Intra Treatment room air  -JOELLE     O2 Delivery Post Treatment room air  -JOELLE     Pre Patient Position Supine  -JOELLE     Intra Patient Position Sitting  -JOELLE     Post Patient Position Sitting  -JOELLE       Row Name 10/15/24 1045          Positioning and Restraints    Pre-Treatment Position in bed  -JOELLE     Post Treatment Position chair  -JOELLE     In Chair notified nsg;reclined;call light within reach;encouraged to call for assist;exit alarm on;waffle cushion;legs elevated;heels elevated  notified nurse and nurse aid pt. up and black cord missing from exit alarm  -JOELLE               User Key  (r) = Recorded By, (t) = Taken By, (c) = Cosigned By      Initials Name Provider Type    Karolina Medina, OT Occupational Therapist                   Outcome Measures       Row Name 10/15/24 1052          How much help from another is currently needed...    Putting on and taking off regular lower body clothing? 2  sockaid at home  -JOELLE     Bathing (including washing, rinsing, and drying) 2  -JOELLE     Toileting (which includes using toilet bed pan or urinal) 2  -JOELLE     Putting on and taking off regular upper body clothing 3  -JOELLE     Taking care of personal grooming (such as brushing teeth) 3  -JOELLE     Eating meals 4  -JOELLE     AM-PAC 6 Clicks Score (OT) 16  -JOELLE       Row Name 10/15/24 0832 10/15/24 2686       How much help from another person do you currently need...    Turning from your back to your  side while in flat bed without using bedrails? 3  -CH 3  -ZW    Moving from lying on back to sitting on the side of a flat bed without bedrails? 3  -CH 3  -ZW    Moving to and from a bed to a chair (including a wheelchair)? 3  -CH 3  -ZW    Standing up from a chair using your arms (e.g., wheelchair, bedside chair)? 3  -CH 3  -ZW    Climbing 3-5 steps with a railing? 2  -CH 2  -ZW    To walk in hospital room? 3  -CH 3  -ZW    AM-PAC 6 Clicks Score (PT) 17  -CH 17  -ZW      Row Name 10/15/24 1052          Functional Assessment    Outcome Measure Options AM-PAC 6 Clicks Daily Activity (OT)  -JOELLE               User Key  (r) = Recorded By, (t) = Taken By, (c) = Cosigned By      Initials Name Provider Type    Karolina Medina OT Occupational Therapist    Haritha Quiroz, RN Registered Nurse    Hao York RN Registered Nurse                    Occupational Therapy Education       Title: PT OT SLP Therapies (In Progress)       Topic: Occupational Therapy (In Progress)       Point: ADL training (Done)       Description:   Instruct learner(s) on proper safety adaptation and remediation techniques during self care or transfers.   Instruct in proper use of assistive devices.                  Learning Progress Summary            Patient Acceptance, E, NR,VU by JOELLE at 10/15/2024 1052    Comment: reason for consult, benefit of grab bars on bed, orientation to year, transfer safety                      Point: Home exercise program (Not Started)       Description:   Instruct learner(s) on appropriate technique for monitoring, assisting and/or progressing therapeutic exercises/activities.                  Learner Progress:  Not documented in this visit.              Point: Precautions (Done)       Description:   Instruct learner(s) on prescribed precautions during self-care and functional transfers.                  Learning Progress Summary            Patient Acceptance, E, NR,VU by JOELLE at 10/15/2024 1052     Comment: reason for consult, benefit of grab bars on bed, orientation to year, transfer safety                      Point: Body mechanics (Not Started)       Description:   Instruct learner(s) on proper positioning and spine alignment during self-care, functional mobility activities and/or exercises.                  Learner Progress:  Not documented in this visit.                              User Key       Initials Effective Dates Name Provider Type Discipline     07/11/23 -  Karolina Peres, OT Occupational Therapist OT                  OT Recommendation and Plan  Planned Therapy Interventions (OT): activity tolerance training, BADL retraining, cognitive/visual perception retraining, functional balance retraining, occupation/activity based interventions, patient/caregiver education/training, ROM/therapeutic exercise, transfer/mobility retraining  Therapy Frequency (OT): daily  Plan of Care Review  Plan of Care Reviewed With: patient  Progress: no change  Outcome Evaluation: Pt. presents with some limitations in balance and endurance and appears with mild cognitive deficit.  Noted history of dementia. Pt. reports using a sockaid for donning socks.  Bed rails recommended if pt. does not have at home.  Pt. may need 24/7 supervision/assist at home at discharage.  HH therapy recommended.     Time Calculation:   Evaluation Complexity (OT)  Review Occupational Profile/Medical/Therapy History Complexity: brief/low complexity  Assessment, Occupational Performance/Identification of Deficit Complexity: 1-3 performance deficits  Clinical Decision Making Complexity (OT): problem focused assessment/low complexity  Overall Complexity of Evaluation (OT): low complexity     Time Calculation- OT       Row Name 10/15/24 1053             Time Calculation- OT    OT Start Time 1001  -JOELLE      OT Received On 10/15/24  -JOELLE      OT Goal Re-Cert Due Date 10/25/24  -JOELLE         Untimed Charges    OT Eval/Re-eval Minutes 54  -JOELLE          Total Minutes    Untimed Charges Total Minutes 54  -JOELLE       Total Minutes 54  -JOELLE                User Key  (r) = Recorded By, (t) = Taken By, (c) = Cosigned By      Initials Name Provider Type    Karolina Medina, DEON Occupational Therapist                  Therapy Charges for Today       Code Description Service Date Service Provider Modifiers Qty    [id number]  OT EVAL LOW COMPLEXITY 4 10/15/2024 Karolina Peres OT GO 1                 Karolina Peres OT  10/15/2024

## 2024-10-15 NOTE — PLAN OF CARE
Problem: Adult Inpatient Plan of Care  Goal: Plan of Care Review  Outcome: Progressing  Goal: Patient-Specific Goal (Individualized)  Outcome: Progressing  Goal: Absence of Hospital-Acquired Illness or Injury  Outcome: Progressing  Intervention: Identify and Manage Fall Risk  Recent Flowsheet Documentation  Taken 10/15/2024 0353 by Hao Clinton RN  Safety Promotion/Fall Prevention:   assistive device/personal items within reach   clutter free environment maintained   fall prevention program maintained   lighting adjusted   nonskid shoes/slippers when out of bed   room organization consistent   safety round/check completed   toileting scheduled  Intervention: Prevent Skin Injury  Recent Flowsheet Documentation  Taken 10/15/2024 0353 by Hao Clinton RN  Body Position:   supine   legs elevated  Skin Protection:   incontinence pads utilized   silicone foam dressing in place  Intervention: Prevent and Manage VTE (Venous Thromboembolism) Risk  Recent Flowsheet Documentation  Taken 10/15/2024 0353 by Hao Clinton RN  VTE Prevention/Management: (SQ Heparin)   bilateral   SCDs (sequential compression devices) off   patient refused intervention   other (see comments)  Intervention: Prevent Infection  Recent Flowsheet Documentation  Taken 10/15/2024 0353 by Hao Clinton RN  Infection Prevention:   environmental surveillance performed   equipment surfaces disinfected   hand hygiene promoted   rest/sleep promoted   single patient room provided  Goal: Optimal Comfort and Wellbeing  Outcome: Progressing  Intervention: Monitor Pain and Promote Comfort  Recent Flowsheet Documentation  Taken 10/15/2024 0353 by Hao Clinton RN  Pain Management Interventions:   care clustered   medication offered but refused   pain management plan reviewed with patient/caregiver   pillow support provided   position adjusted   quiet environment facilitated  Intervention: Provide Person-Centered  Care  Recent Flowsheet Documentation  Taken 10/15/2024 0353 by Hao Clinton RN  Trust Relationship/Rapport:   care explained   choices provided   emotional support provided   empathic listening provided   questions answered   questions encouraged   reassurance provided   thoughts/feelings acknowledged  Goal: Readiness for Transition of Care  Outcome: Progressing     Problem: Skin Injury Risk Increased  Goal: Skin Health and Integrity  Outcome: Progressing  Intervention: Optimize Skin Protection  Recent Flowsheet Documentation  Taken 10/15/2024 0353 by Hao Clinton RN  Activity Management: activity encouraged  Pressure Reduction Techniques:   frequent weight shift encouraged   heels elevated off bed   weight shift assistance provided  Head of Bed (HOB) Positioning: HOB at 30-45 degrees  Pressure Reduction Devices: (coccyx allevyn, bilat heel allevyn)   positioning supports utilized   pressure-redistributing mattress utilized  Skin Protection:   incontinence pads utilized   silicone foam dressing in place     Problem: Fall Injury Risk  Goal: Absence of Fall and Fall-Related Injury  Outcome: Progressing  Intervention: Identify and Manage Contributors  Recent Flowsheet Documentation  Taken 10/15/2024 0353 by Hao Clinton RN  Medication Review/Management: medications reviewed  Intervention: Promote Injury-Free Environment  Recent Flowsheet Documentation  Taken 10/15/2024 0353 by Hao Clinton RN  Safety Promotion/Fall Prevention:   assistive device/personal items within reach   clutter free environment maintained   fall prevention program maintained   lighting adjusted   nonskid shoes/slippers when out of bed   room organization consistent   safety round/check completed   toileting scheduled   Goal Outcome Evaluation:

## 2024-10-15 NOTE — THERAPY EVALUATION
Patient Name: Georgia Velázquez  : 1945    MRN: [id number]                              Today's Date: 10/15/2024       Admit Date: 10/14/2024    Visit Dx:     ICD-10-CM ICD-9-CM   1. Altered mental status, unspecified altered mental status type  R41.82 780.97   2. Acute cystitis without hematuria  N30.00 595.0   3. COVID-19  U07.1 079.89   4. History of diabetes mellitus  Z86.39 V12.29   5. History of coronary artery disease  Z86.79 V12.59     Patient Active Problem List   Diagnosis    Essential hypertension    Hyperlipidemia    Coronary artery disease    GERD (gastroesophageal reflux disease)    Recurrent UTI    Stage 3 chronic kidney disease    Chronic obstructive pulmonary disease    Retinal emboli, right    Diastolic dysfunction    Glaucoma    Left carotid artery stenosis    Syncope    Aortic regurgitation    Diabetic retinopathy    Type 2 diabetes mellitus with stage 3b chronic kidney disease, without long-term current use of insulin    Hypoxia    Obesity, morbid, BMI 50 or higher    Hospital discharge follow-up    Cognitive decline    Other specified health status    Skin lesion of face    Chronic constipation    COVID-19 virus detected    Acute UTI (urinary tract infection)    CKD (chronic kidney disease) stage 3, GFR 30-59 ml/min    T2DM (type 2 diabetes mellitus)    CAD (coronary artery disease)    Syncope    Dementia     Past Medical History:   Diagnosis Date    Acute kidney injury 2017    Aortic regurgitation     Arthritis of shoulder 2016    Body mass index (BMI) of 45.0-49.9 in adult 2019    Bright red rectal bleeding 2023    CHF (congestive heart failure)     Closed compression fracture of L4 lumbar vertebra 2017    Added automatically from request for surgery [id number]    Colitis 2020    Constipation 2017    COPD (chronic obstructive pulmonary disease)     Coronary artery disease 2016    Diabetes mellitus type 2 in obese 2018    Elevated  alkaline phosphatase level 02/26/2018    Elevated creatine kinase     Essential hypertension 05/18/2016    GERD (gastroesophageal reflux disease) 05/18/2016    Glaucoma 07/21/2020    History of kyphoplasty 01/30/2018    Hyperlipidemia     Lumbar facet arthropathy 01/29/2018    Lumbar stenosis with neurogenic claudication 01/29/2018    Morbid obesity due to excess calories 01/29/2018    Myocardial infarction 05/18/2016    Osteoporosis 05/18/2016    Physical deconditioning 01/30/2018    Retinal emboli, 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    Sepsis     uro    Stroke     Urinary incontinence without sensory awareness 02/26/2018     Past Surgical History:   Procedure Laterality Date    APPENDECTOMY      BACK SURGERY      CARDIAC CATHETERIZATION      CHOLECYSTECTOMY      CORONARY STENT PLACEMENT      EYE SURGERY      KYPHOPLASTY N/A 12/07/2017    Procedure: KYPHOPLASTY L4;  Surgeon: Marc Pham MD;  Location: [facility];  Service:       General Information       Row Name 10/15/24 0956          Physical Therapy Time and Intention    Document Type evaluation  -KW     Mode of Treatment physical therapy  -KW       Row Name 10/15/24 0956          General Information    Patient Profile Reviewed yes  -KW     Prior Level of Function independent:;all household mobility;community mobility;gait;transfer;bed mobility  per pt. she uses a rollator and has a stair lift for stairs in home  -KW     Existing Precautions/Restrictions fall  -KW     Barriers to Rehab medically complex  -KW       Row Name 10/15/24 0956          Living Environment    People in Home child(chandler), adult  -KW       Row Name 10/15/24 0956          Home Main Entrance    Number of Stairs, Main Entrance none  -KW       Row Name 10/15/24 0956          Stairs Within Home, Primary    Stairs, Within Home, Primary has a stair lift  -KW       Row Name 10/15/24 0956          Cognition    Orientation Status (Cognition) oriented to;person;disoriented to;place;time  -KW       Row Name 10/15/24  0956          Safety Issues/Impairments Affecting Functional Mobility    Impairments Affecting Function (Mobility) cognition;endurance/activity tolerance;shortness of breath;strength;balance  -KW     Cognitive Impairments, Mobility Safety/Performance attention;awareness, need for assistance;insight into deficits/self-awareness;judgment;safety precaution awareness;problem-solving/reasoning;safety precaution follow-through;sequencing abilities  -KW               User Key  (r) = Recorded By, (t) = Taken By, (c) = Cosigned By      Initials Name Provider Type    KW Miladys Gu PT Physical Therapist                   Mobility       Row Name 10/15/24 0956          Bed Mobility    Bed Mobility supine-sit  -KW     Supine-Sit [place] (Bed Mobility) minimum assist (75% patient effort);verbal cues  -KW     Assistive Device (Bed Mobility) bed rails  -KW       Row Name 10/15/24 0956          Sit-Stand Transfer    Sit-Stand [place] (Transfers) contact guard;verbal cues  -KW     Assistive Device (Sit-Stand Transfers) walker, front-wheeled  -KW       Row Name 10/15/24 0956          Gait/Stairs (Locomotion)    [place] Level (Gait) contact guard;verbal cues  -KW     Assistive Device (Gait) walker, front-wheeled  -KW     Distance in Feet (Gait) 20  -KW     Deviations/Abnormal Patterns (Gait) stride length decreased;gait speed decreased;base of support, narrow;no decreased;festinating/shuffling  -KW     Bilateral Gait Deviations forward flexed posture;heel strike decreased  -KW               User Key  (r) = Recorded By, (t) = Taken By, (c) = Cosigned By      Initials Name Provider Type    Miladys Ruiz PT Physical Therapist                   Obj/Interventions       Row Name 10/15/24 0956          Strength Comprehensive (MMT)    General Manual Muscle Testing (MMT) Assessment no strength deficits identified  -       Row Name 10/15/24 0956          Balance    Balance Assessment sitting static  balance;standing static balance;sitting dynamic balance;standing dynamic balance  -KW     Static Sitting Balance supervision  -KW     Dynamic Sitting Balance supervision  -KW     Position, Sitting Balance sitting edge of bed;unsupported  -KW     Static Standing Balance contact guard  -KW     Dynamic Standing Balance contact guard  -KW     Position/Device Used, Standing Balance supported;walker, front-wheeled  -KW     Balance Interventions sitting;standing;sit to stand;supported  -KW               User Key  (r) = Recorded By, (t) = Taken By, (c) = Cosigned By      Initials Name Provider Type    Miladys Ruiz PT Physical Therapist                   Goals/Plan       Row Name 10/15/24 0956          Bed Mobility Goal 1 (PT)    Activity/Assistive Device (Bed Mobility Goal 1, PT) sit to supine;supine to sit  -KW     [place] Level/Cues Needed (Bed Mobility Goal 1, PT) independent  -KW     Time Frame (Bed Mobility Goal 1, PT) 5 days;short term goal (STG)  -KW       Row Name 10/15/24 0956          Transfer Goal 1 (PT)    Activity/Assistive Device (Transfer Goal 1, PT) sit-to-stand/stand-to-sit;bed-to-chair/chair-to-bed  -KW     [place] Level/Cues Needed (Transfer Goal 1, PT) modified independence  -KW     Time Frame (Transfer Goal 1, PT) 10 days  -KW       Row Name 10/15/24 0956          Gait Training Goal 1 (PT)    Activity/Assistive Device (Gait Training Goal 1, PT) assistive device use;decrease fall risk;gait (walking locomotion);diminish gait deviation;improve balance and speed;increase endurance/gait distance;walker, rolling  -KW     [place] Level (Gait Training Goal 1, PT) modified independence  -KW     Distance (Gait Training Goal 1, PT) 300  -KW     Time Frame (Gait Training Goal 1, PT) 10 days  -KW               User Key  (r) = Recorded By, (t) = Taken By, (c) = Cosigned By      Initials Name Provider Type    Miladys Ruiz PT Physical Therapist                   Clinical  Impression       [facility] Name 10/15/24 0956          Pain    Pretreatment Pain Rating 0/10 - no pain  -KW       Row Name 10/15/24 0956          Plan of Care Review    Progress improving  -KW     Outcome Evaluation Physical therapy treatment complete. The patient continues to require additional time to complete mobility. Patient improved tolerance to mobility and with decreased subjective complaints of pain. Patient increased ambulation distance compared to previous treatment session. The patient continues to present below baseline for functional mobility. The patient would continue to benefit from skilled PT to address strength, balance and activity tolerance deficits. Continue to current PT POC  -KW       [facility] Name 10/15/24 0956          Therapy Assessment/Plan (PT)    Patient/Family Therapy Goals Statement (PT) to return to baseline  -KW     Predicted Duration of Therapy Intervention (PT) 10 days  -KW       [facility] Name 10/15/24 0956          Vital Signs    Pre Systolic BP Rehab 128  -KW     Pre Treatment Diastolic BP 87  -KW     Pretreatment Heart Rate (beats/min) 67  -KW     Pre SpO2 (%) 98  -KW       Row Name 10/15/24 0956          Positioning and Restraints    Pre-Treatment Position in bed  -KW     Post Treatment Position chair  -KW     In Chair reclined;call light within reach;encouraged to call for assist;legs elevated;exit alarm on  -KW               User Key  (r) = Recorded By, (t) = Taken By, (c) = Cosigned By      Initials Name Provider Type    Miladys Ruiz, PT Physical Therapist                   Outcome Measures       Row Name 10/15/24 0956 10/15/24 0832       How much help from another person do you currently need...    Turning from your back to your side while in flat bed without using bedrails? 3  -KW 3  -CH    Moving from lying on back to sitting on the side of a flat bed without bedrails? 3  -KW 3  -CH    Moving to and from a bed to a chair (including a wheelchair)? 3  -KW 3  -CH    Standing up  from a chair using your arms (e.g., wheelchair, bedside chair)? 3  -KW 3  -CH    Climbing 3-5 steps with a railing? 2  -KW 2  -CH    To walk in hospital room? 3  -KW 3  -CH    AM-PAC 6 Clicks Score (PT) 17  -KW 17  -CH    Highest Level of Mobility Goal 5 --> Static standing  -KW 5 --> Static standing  -CH      Row Name 10/15/24 1052 10/15/24 0956       Functional Assessment    Outcome Measure Options AM-PAC 6 Clicks Daily Activity (OT)  -JOELLE AM-PAC 6 Clicks Basic Mobility (PT)  -KW              User Key  (r) = Recorded By, (t) = Taken By, (c) = Cosigned By      Initials Name Provider Type    Karolina Medina, OT Occupational Therapist    KW Miladys Gu, PT Physical Therapist    Haritha Quiroz, RN Registered Nurse                                 Physical Therapy Education        No education to display                  PT Recommendation and Plan     Progress: improving  Outcome Evaluation: Physical therapy treatment complete. The patient continues to require additional time to complete mobility. Patient improved tolerance to mobility and with decreased subjective complaints of pain. Patient increased ambulation distance compared to previous treatment session. The patient continues to present below baseline for functional mobility. The patient would continue to benefit from skilled PT to address strength, balance and activity tolerance deficits. Continue to current PT POC     Time Calculation:   PT Evaluation Complexity  History, PT Evaluation Complexity: 3 or more personal factors and/or comorbidities  Examination of Body Systems (PT Eval Complexity): total of 3 or more elements  Clinical Presentation (PT Evaluation Complexity): evolving  Clinical Decision Making (PT Evaluation Complexity): moderate complexity  Overall Complexity (PT Evaluation Complexity): moderate complexity     PT Charges       Row Name 10/15/24 0956             Time Calculation    Start Time 0956  -KW      PT Received On 10/15/24   -KW      PT Goal Re-Cert Due Date 10/25/24  -KW         Untimed Charges    PT Eval/Re-eval Minutes 57  -KW         Total Minutes    Untimed Charges Total Minutes 57  -KW       Total Minutes 57  -KW                User Key  (r) = Recorded By, (t) = Taken By, (c) = Cosigned By      Initials Name Provider Type    Miladys Ruiz, LARISA Physical Therapist                  Therapy Charges for Today       Code Description Service Date Service Provider Modifiers Qty    [id number] HC PT EVAL MOD COMPLEXITY 4 10/15/2024 Miladys Gu PT GP 1            PT G-Codes  Outcome Measure Options: AM-PAC 6 Clicks Daily Activity (OT)  AM-PAC 6 Clicks Score (PT): 17  AM-PAC 6 Clicks Score (OT): 16  PT Discharge Summary  Anticipated Discharge Disposition (PT): home with 24/7 care, home with home health    Miladys Gu PT  10/15/2024

## 2024-10-15 NOTE — ED PROVIDER NOTES
[place]    EMERGENCY DEPARTMENT ENCOUNTER      Pt Name: Georgia Velázquez  MRN: [id number]  YOB: 1945  Date of evaluation: 10/14/2024  Provider: Clifford Cotton MD    CHIEF COMPLAINT       Chief Complaint   Patient presents with    Altered Mental Status         HISTORY OF PRESENT ILLNESS   Georgia Velázquez is a 79 y.o. female who presents to the emergency department with altered mental status. Patient w possible UTI. Family called EMS after patient slid out of chair. Pt w mild confusion. Some nausea, one episode of vomiting.        Nursing notes were reviewed.    REVIEW OF SYSTEMS     ROS:  A chief complaint appropriate review of systems was completed and is negative except as noted in the HPI.      PAST MEDICAL HISTORY     Past Medical History:   Diagnosis Date    Acute kidney injury 12/05/2017    Aortic regurgitation     Arthritis of shoulder 05/18/2016    Body mass index (BMI) of 45.0-49.9 in adult 06/14/2019    Bright red rectal bleeding 12/03/2023    CHF (congestive heart failure)     Closed compression fracture of L4 lumbar vertebra 12/04/2017    Added automatically from request for surgery [id number]    Colitis 07/21/2020    Constipation 12/05/2017    COPD (chronic obstructive pulmonary disease)     Coronary artery disease 05/18/2016    Diabetes mellitus type 2 in obese 01/29/2018    Elevated alkaline phosphatase level 02/26/2018    Elevated creatine kinase     Essential hypertension 05/18/2016    GERD (gastroesophageal reflux disease) 05/18/2016    Glaucoma 07/21/2020    History of kyphoplasty 01/30/2018    Hyperlipidemia     Lumbar facet arthropathy 01/29/2018    Lumbar stenosis with neurogenic claudication 01/29/2018    Morbid obesity due to excess calories 01/29/2018    Myocardial infarction 05/18/2016    Osteoporosis 05/18/2016    Physical deconditioning 01/30/2018    Retinal emboli, 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    Sepsis     uro    Stroke     Urinary incontinence without sensory awareness  02/26/2018         SURGICAL HISTORY       Past Surgical History:   Procedure Laterality Date    APPENDECTOMY      BACK SURGERY      CARDIAC CATHETERIZATION      CHOLECYSTECTOMY      CORONARY STENT PLACEMENT      EYE SURGERY      KYPHOPLASTY N/A 12/07/2017    Procedure: KYPHOPLASTY L4;  Surgeon: Marc Pham MD;  Location: [facility];  Service:          CURRENT MEDICATIONS       Current Facility-Administered Medications:     acetaminophen (TYLENOL) tablet 650 mg, 650 mg, Oral, Q4H PRN, 650 mg at 10/16/24 1434 **OR** acetaminophen (TYLENOL) suppository 650 mg, 650 mg, Rectal, Q4H PRN, Latonya Laird APRN    albuterol sulfate HFA (PROVENTIL HFA;VENTOLIN HFA;PROAIR HFA) inhaler 2 puff, 2 puff, Inhalation, Q4H PRN, Latonya Laird APRN, 2 puff at 10/15/24 2241    benzonatate (TESSALON) capsule 100 mg, 100 mg, Oral, TID PRN, Latonya Laird APRN, 100 mg at 10/15/24 2010    sennosides-docusate (PERICOLACE) 8.6-50 MG per tablet 2 tablet, 2 tablet, Oral, BID, 2 tablet at 10/15/24 2010 **AND** polyethylene glycol (MIRALAX) packet 17 g, 17 g, Oral, Daily PRN **AND** bisacodyl (DULCOLAX) EC tablet 5 mg, 5 mg, Oral, Daily PRN **AND** bisacodyl (DULCOLAX) suppository 10 mg, 10 mg, Rectal, Daily PRN, Latonya Laird APRN    cefTRIAXone (ROCEPHIN) 2,000 mg in sodium chloride 0.9 % 100 mL MBP, 2,000 mg, Intravenous, Q24H, Latonya Laird APRN, Last Rate: 200 mL/hr at 10/15/24 2010, 2,000 mg at 10/15/24 2010    clopidogrel (PLAVIX) tablet 75 mg, 75 mg, Oral, Daily, Latonya Laird APRN, 75 mg at 10/16/24 1009    dextromethorphan polistirex ER (DELSYM) 30 MG/5ML oral suspension 60 mg, 60 mg, Oral, Q12H PRN, Latonya Laird APRN    dextrose (D50W) (25 g/50 mL) IV injection 25 g, 25 g, Intravenous, Q15 Min PRN, Latonya Laird APRN    dextrose (GLUTOSE) oral gel 15 g, 15 g, Oral, Q15 Min PRN, Latonya Laird APRN    glucagon (GLUCAGEN) injection 1 mg, 1 mg, Intramuscular, Q15 Min PRN, Latonya Laird APRN    heparin (porcine) 5000  UNIT/ML injection 5,000 Units, 5,000 Units, Subcutaneous, Q8H, Latonya Laird APRN, 5,000 Units at 10/16/24 1435    influenza vac split high-dose (FLUZONE HIGH DOSE) injection 0.5 mL, 0.5 mL, Intramuscular, During Hospitalization, Day, Eryn ORANTES MD    Insulin Lispro (humaLOG) injection 2-7 Units, 2-7 Units, Subcutaneous, 4x Daily AC & at Bedtime, Latonya Laird APRN    melatonin tablet 5 mg, 5 mg, Oral, Nightly PRN, Latonya Laird APRN, 5 mg at 10/15/24 2010    nirmatrelvir and ritonavir (150mg/100mg) (PAXLOVID) 2 tablet pack- for renal adjustment, 2 tablet, Oral, BID, Latonya Laird APRN, 2 tablet at 10/16/24 1010    sodium chloride 0.9 % flush 10 mL, 10 mL, Intravenous, Q12H, Latonya Laird APRN, 10 mL at 10/16/24 1010    sodium chloride 0.9 % flush 10 mL, 10 mL, Intravenous, PRN, Latonya Laird APRN    valsartan (DIOVAN) tablet 240 mg, 240 mg, Oral, Daily, Latonya Laird APRN, 240 mg at 10/15/24 0840    ALLERGIES     Patient has no known allergies.    FAMILY HISTORY       Family History   Problem Relation Age of Onset    Diabetes Mother     Heart failure Mother     Heart failure Father     No Known Problems Sister     No Known Problems Brother     No Known Problems Son     No Known Problems Daughter           SOCIAL HISTORY       Social History     Socioeconomic History    Marital status:    Tobacco Use    Smoking status: Former     Current packs/day: 0.00     Average packs/day: 1 pack/day for 15.0 years (15.0 ttl pk-yrs)     Types: Cigarettes     Start date: 1985     Quit date: 2000     Years since quittin.8     Passive exposure: Past    Smokeless tobacco: Never   Vaping Use    Vaping status: Never Used   Substance and Sexual Activity    Alcohol use: No    Drug use: No    Sexual activity: Not Currently     Partners: Male         PHYSICAL EXAM    (up to 7 for level 4, 8 or more for level 5)     Vitals:    10/16/24 0515 10/16/24 1009 10/16/24 1011 10/16/24 1645   BP:  119/49 119/  126/51   BP Location:   Left arm Left arm   Patient Position:   Lying Lying   Pulse: 62 57 56 54   Resp:   20 18   Temp:   98.3 °F (36.8 °C) 98.1 °F (36.7 °C)   TempSrc:   Oral Oral   SpO2: 97%  96% 94%   Weight:       Height:           General: Awake, alert, no acute distress.  HEENT: Conjunctivae normal.  Neck: Trachea midline.  Cardiac: Heart regular rate, rhythm, no murmurs, rubs, or gallops  Lungs: Lungs are clear to auscultation, there is no wheezing, rhonchi, or rales. There is no use of accessory muscles.  Chest wall: There is no tenderness to palpation over the chest wall or over ribs  Abdomen: Abdomen is soft, nontender, nondistended. There are no firm or pulsatile masses, no rebound rigidity or guarding.   Musculoskeletal: No deformity.  Neuro: Alert   Dermatology: 1 cm laceration to palmar surface of middle phalanx of L hand  Psych: Mentation is grossly normal, cognition is grossly normal. Affect is appropriate.        DIAGNOSTIC RESULTS     EKG: All EKGs are interpreted by the Emergency Department Physician who either signs or Co-signs this chart in the absence of a cardiologist.    ECG 12 Lead Syncope   Final Result   Test Reason : Syncope   Blood Pressure :   */*   mmHG   Vent. Rate :  65 BPM     Atrial Rate :  65 BPM      P-R Int : 216 ms          QRS Dur :  74 ms       QT Int : 360 ms       P-R-T Axes :  36  34  71 degrees      QTc Int : 374 ms      Sinus rhythm with marked sinus arrhythmia with 1st degree AV block with    occasional premature ventricular complexes   Low voltage QRS   Cannot rule out Anteroseptal infarct (cited on or before 21-JUL-2020)   Abnormal ECG   When compared with ECG of 14-OCT-2024 22:11, (Unconfirmed)   premature ventricular complexes are now present   premature supraventricular complexes are no longer present   Questionable change in initial forces of Anterior leads   Confirmed by CIELO VASQUES MD ([id number]) on 10/15/2024 4:48:59 PM      Referred By:            Confirmed By:  CIELO VASQUES MD      ECG 12 Lead Altered Mental Status   Preliminary Result   Test Reason : Altered Mental Status   Blood Pressure :   */*   mmHG   Vent. Rate :  75 BPM     Atrial Rate :  75 BPM      P-R Int : 208 ms          QRS Dur :  76 ms       QT Int : 358 ms       P-R-T Axes :  60  23  85 degrees      QTc Int : 399 ms      Sinus rhythm with premature supraventricular complexes   Low voltage QRS   Septal infarct (cited on or before 21-JUL-2020)   Abnormal ECG   When compared with ECG of 18-JUN-2022 08:43,   premature supraventricular complexes are now present   Vent. rate has increased BY  35 BPM   Questionable change in initial forces of Anterolateral leads   Nonspecific T wave abnormality, worse in Lateral leads      Referred By: edmd           Confirmed By:             RADIOLOGY:   [x] Radiologist's Report Reviewed:  CT Head Without Contrast   Final Result   Impression:   *   1. No acute process identified.            Electronically Signed: Albaro Barrera MD     10/14/2024 10:33 PM EDT     Workstation ID: [id number]      CT Abdomen Pelvis With Contrast   Final Result      1. Small right pleural effusion with right lower lobe atelectasis.   2. No clearly acute findings in the abdomen or pelvis.   3. Appendectomy. No bowel obstruction or evidence of enteritis or colitis.   4. Nonobstructed kidneys with renal atrophy on the right.            Electronically Signed: Mat Godwin MD     10/14/2024 10:41 PM EDT     Workstation ID: [id number]      XR Chest 1 View   Final Result   Impression:   Possible small right pleural effusion with adjacent atelectasis. Superimposed infection to be excluded clinically.         Electronically Signed: García Argueta     10/14/2024 9:44 PM EDT     Workstation ID: [id number]          I ordered and independently reviewed the above noted radiographic studies.        LABS:    I have reviewed and interpreted all of the currently available lab results from this visit (if  applicable):  Results for orders placed or performed during the hospital encounter of 10/14/24   Comprehensive Metabolic Panel    Collection Time: 10/14/24  9:41 PM    Specimen: Blood   Result Value Ref Range    Glucose 132 (H) 65 - 99 mg/dL    BUN 25 (H) 8 - 23 mg/dL    Creatinine 1.45 (H) 0.57 - 1.00 mg/dL    Sodium 137 136 - 145 mmol/L    Potassium 4.3 3.5 - 5.2 mmol/L    Chloride 102 98 - 107 mmol/L    CO2 23.0 22.0 - 29.0 mmol/L    Calcium 9.0 8.6 - 10.5 mg/dL    Total Protein 7.3 6.0 - 8.5 g/dL    Albumin 3.8 3.5 - 5.2 g/dL    ALT (SGPT) 11 1 - 33 U/L    AST (SGOT) 19 1 - 32 U/L    Alkaline Phosphatase 102 39 - 117 U/L    Total Bilirubin 0.6 0.0 - 1.2 mg/dL    Globulin 3.5 gm/dL    A/G Ratio 1.1 g/dL    BUN/Creatinine Ratio 17.2 7.0 - 25.0    Anion Gap 12.0 5.0 - 15.0 mmol/L    eGFR 36.3 (L) >60.0 mL/min/1.73   Lipase    Collection Time: 10/14/24  9:41 PM    Specimen: Blood   Result Value Ref Range    Lipase 19 13 - 60 U/L   Single High Sensitivity Troponin T    Collection Time: 10/14/24  9:41 PM    Specimen: Blood   Result Value Ref Range    HS Troponin T 36 (H) <14 ng/L   CBC Auto Differential    Collection Time: 10/14/24  9:41 PM    Specimen: Blood   Result Value Ref Range    WBC 7.18 3.40 - 10.80 10*3/mm3    RBC 4.19 3.77 - 5.28 10*6/mm3    Hemoglobin 13.1 12.0 - 15.9 g/dL    Hematocrit 39.7 34.0 - 46.6 %    MCV 94.7 79.0 - 97.0 fL    MCH 31.3 26.6 - 33.0 pg    MCHC 33.0 31.5 - 35.7 g/dL    RDW 13.4 12.3 - 15.4 %    RDW-SD 46.9 37.0 - 54.0 fl    MPV 10.8 6.0 - 12.0 fL    Platelets 177 140 - 450 10*3/mm3    Neutrophil % 77.7 (H) 42.7 - 76.0 %    Lymphocyte % 10.4 (L) 19.6 - 45.3 %    Monocyte % 10.9 5.0 - 12.0 %    Eosinophil % 0.0 (L) 0.3 - 6.2 %    Basophil % 0.3 0.0 - 1.5 %    Immature Grans % 0.7 (H) 0.0 - 0.5 %    Neutrophils, Absolute 5.58 1.70 - 7.00 10*3/mm3    Lymphocytes, Absolute 0.75 0.70 - 3.10 10*3/mm3    Monocytes, Absolute 0.78 0.10 - 0.90 10*3/mm3    Eosinophils, Absolute 0.00 0.00 - 0.40  10*3/mm3    Basophils, Absolute 0.02 0.00 - 0.20 10*3/mm3    Immature Grans, Absolute 0.05 0.00 - 0.05 10*3/mm3    nRBC 0.0 0.0 - 0.2 /100 WBC   Procalcitonin    Collection Time: 10/14/24  9:41 PM    Specimen: Blood   Result Value Ref Range    Procalcitonin 0.09 0.00 - 0.25 ng/mL   C-reactive Protein    Collection Time: 10/14/24  9:41 PM    Specimen: Blood   Result Value Ref Range    C-Reactive Protein 1.79 (H) 0.00 - 0.50 mg/dL   Lactate Dehydrogenase    Collection Time: 10/14/24  9:41 PM    Specimen: Blood   Result Value Ref Range     135 - 214 U/L   Ferritin    Collection Time: 10/14/24  9:41 PM    Specimen: Blood   Result Value Ref Range    Ferritin 301.80 (H) 13.00 - 150.00 ng/mL   proBNP    Collection Time: 10/14/24  9:41 PM    Specimen: Blood   Result Value Ref Range    proBNP 3,900.0 (H) 0.0 - 1,800.0 pg/mL   POC Creatinine    Collection Time: 10/14/24  9:46 PM    Specimen: Blood   Result Value Ref Range    Creatinine 1.60 (H) 0.60 - 1.30 mg/dL   Urine Culture - Urine, Straight Cath    Collection Time: 10/14/24 10:03 PM    Specimen: Straight Cath; Urine   Result Value Ref Range    Urine Culture >100,000 CFU/mL Escherichia coli (A)    Urinalysis With Culture If Indicated - Straight Cath    Collection Time: 10/14/24 10:03 PM    Specimen: Straight Cath; Urine   Result Value Ref Range    Color, UA Yellow Yellow, Straw    Appearance, UA Cloudy (A) Clear    pH, UA 5.5 5.0 - 8.0    Specific Gravity, UA 1.017 1.001 - 1.030    Glucose, UA Negative Negative    Ketones, UA Negative Negative    Bilirubin, UA Negative Negative    Blood, UA Moderate (2+) (A) Negative    Protein,  mg/dL (2+) (A) Negative    Leuk Esterase, UA Moderate (2+) (A) Negative    Nitrite, UA Positive (A) Negative    Urobilinogen, UA 1.0 E.U./dL 0.2 - 1.0 E.U./dL   Urinalysis, Microscopic Only - Straight Cath    Collection Time: 10/14/24 10:03 PM    Specimen: Straight Cath; Urine   Result Value Ref Range    RBC, UA 3-5 (A) None Seen,  0-2 /HPF    WBC, UA Too Numerous to Count (A) None Seen, 0-2 /HPF    Bacteria, UA 4+ (A) None Seen, Trace /HPF    Squamous Epithelial Cells, UA 0-2 None Seen, 0-2 /HPF    Hyaline Casts, UA 13-20 0 - 6 /LPF    Methodology Automated Microscopy    ECG 12 Lead Altered Mental Status    Collection Time: 10/14/24 10:11 PM   Result Value Ref Range    QT Interval 358 ms    QTC Interval 399 ms   COVID-19 and FLU A/B PCR, 1 HR TAT - Swab, Nasopharynx    Collection Time: 10/14/24 10:52 PM    Specimen: Nasopharynx; Swab   Result Value Ref Range    COVID19 Detected (C) Not Detected - Ref. Range    Influenza A PCR Not Detected Not Detected    Influenza B PCR Not Detected Not Detected   Blood Culture - Blood, Arm, Left    Collection Time: 10/15/24  4:31 AM    Specimen: Arm, Left; Blood   Result Value Ref Range    Blood Culture No growth at 24 hours    Blood Culture - Blood, Hand, Right    Collection Time: 10/15/24  4:31 AM    Specimen: Hand, Right; Blood   Result Value Ref Range    Blood Culture No growth at 24 hours    Lactic Acid, Plasma    Collection Time: 10/15/24  4:31 AM    Specimen: Blood   Result Value Ref Range    Lactate 1.2 0.5 - 2.0 mmol/L   High Sensitivity Troponin T    Collection Time: 10/15/24  4:31 AM    Specimen: Blood   Result Value Ref Range    HS Troponin T 40 (H) <14 ng/L   Heparin Anti-Xa    Collection Time: 10/15/24  4:31 AM    Specimen: Blood   Result Value Ref Range    Heparin Anti-Xa (UFH) 0.10 (L) 0.30 - 0.70 IU/ml   Hemoglobin A1c    Collection Time: 10/15/24  4:31 AM    Specimen: Blood   Result Value Ref Range    Hemoglobin A1C 5.50 4.80 - 5.60 %   Respiratory Panel PCR w/COVID-19(SARS-CoV-2) KAYE/HEIKE/LEILANI/PAD/COR/LEIA In-House, NP Swab in [facility], 2 HR TAT - Swab, Nasopharynx    Collection Time: 10/15/24  4:48 AM    Specimen: Nasopharynx; Swab   Result Value Ref Range    ADENOVIRUS, PCR Not Detected Not Detected    Coronavirus 229E Not Detected Not Detected    Coronavirus HKU1 Not Detected Not Detected     Coronavirus NL63 Not Detected Not Detected    Coronavirus OC43 Not Detected Not Detected    COVID19 Detected (C) Not Detected - Ref. Range    Human Metapneumovirus Not Detected Not Detected    Human Rhinovirus/Enterovirus Not Detected Not Detected    Influenza A PCR Not Detected Not Detected    Influenza B PCR Not Detected Not Detected    Parainfluenza Virus 1 Not Detected Not Detected    Parainfluenza Virus 2 Not Detected Not Detected    Parainfluenza Virus 3 Not Detected Not Detected    Parainfluenza Virus 4 Not Detected Not Detected    RSV, PCR Not Detected Not Detected    Bordetella pertussis pcr Not Detected Not Detected    Bordetella parapertussis PCR Not Detected Not Detected    Chlamydophila pneumoniae PCR Not Detected Not Detected    Mycoplasma pneumo by PCR Not Detected Not Detected   ECG 12 Lead Syncope    Collection Time: 10/15/24  5:35 AM   Result Value Ref Range    QT Interval 360 ms    QTC Interval 374 ms   D-dimer, Quantitative    Collection Time: 10/15/24  6:47 AM    Specimen: Blood   Result Value Ref Range    D-Dimer, Quantitative 1.76 (H) 0.00 - 0.81 MCGFEU/mL   Fibrinogen    Collection Time: 10/15/24  6:47 AM    Specimen: Blood   Result Value Ref Range    Fibrinogen 464 203 - 470 mg/dL   aPTT    Collection Time: 10/15/24  6:47 AM    Specimen: Blood   Result Value Ref Range    PTT 30.4 22.0 - 39.0 seconds   Protime-INR    Collection Time: 10/15/24  6:47 AM    Specimen: Blood   Result Value Ref Range    Protime 16.3 (H) 12.2 - 14.5 Seconds    INR 1.30 (H) 0.89 - 1.12   POC Glucose Once    Collection Time: 10/15/24  7:12 AM    Specimen: Blood   Result Value Ref Range    Glucose 91 70 - 130 mg/dL   Duplex Carotid Ultrasound CAR    Collection Time: 10/15/24  7:31 AM   Result Value Ref Range    Prox CCA .0 cm/sec    Prox CCA EDV 22.0 cm/sec    Right Mid CCA .0 cm/sec    right Mid CCA EDV 17.8 cm/sec    Dist CCA .0 cm/sec    Dist CCA EDV 20.6 cm/sec    Prox ICA PSV -148.0 cm/sec     Prox ICA EDV -23.3 cm/sec    Mid ICA PSV -114.0 cm/sec    Mid ICA EDV -16.5 cm/sec    Dist ICA PSV 82.3 cm/sec    Dist ICA EDV 16.5 cm/sec    Prox ECA .0 cm/sec    Prox ECA EDV 16.5 cm/sec    Vertebral A PSV 20.2 cm/sec    Vertebral A EDV 7.5 cm/sec    Prox SCLA .0 cm/sec    Prox CCA .0 cm/sec    Prox CCA EDV 16.5 cm/sec    left Mid CCA PSV 85.8 cm/sec    left Mid CCA EDV 13.9 cm/sec    Dist CCA PSV -147.0 cm/sec    Dist CCA EDV -16.5 cm/sec    Prox ICA PSV -299.1 cm/sec    Prox ICA EDV -38.4 cm/sec    Mid ICA PSV -171.0 cm/sec    Mid ICA EDV -20.6 cm/sec    Dist ICA PSV -195.0 cm/sec    Dist ICA EDV -23.3 cm/sec    Prox ECA PSV -203.0 cm/sec    Prox ECA EDV -31.6 cm/sec    Vertebral A .0 cm/sec    Vertebral A EDV 20.6 cm/sec    Prox SCLA .0 cm/sec    ICA/CCA ratio 1.16     Right arm /71 mmHg    ICA/CCA ratio 3.48    POC Glucose Once    Collection Time: 10/15/24 11:02 AM    Specimen: Blood   Result Value Ref Range    Glucose 87 70 - 130 mg/dL   Adult Transthoracic Echo Complete W/ Cont if Necessary Per Protocol    Collection Time: 10/15/24 11:58 AM   Result Value Ref Range    EF(MOD-bp) 41.4 %    LVIDd 4.4 cm    LVIDs 2.6 cm    IVSd 0.90 cm    LVPWd 0.90 cm    FS 40.9 %    IVS/LVPW 1.00 cm    ESV(cubed) 17.6 ml    EDV(cubed) 85.2 ml    LV mass(C)d 128.0 grams    LVOT area 3.1 cm2    LVOT diam 2.00 cm    EDV(MOD-sp2) 73.9 ml    EDV(MOD-sp4) 107.0 ml    ESV(MOD-sp2) 39.4 ml    ESV(MOD-sp4) 67.2 ml    SV(MOD-sp2) 34.5 ml    SV(MOD-sp4) 39.8 ml    EF(MOD-sp2) 46.7 %    EF(MOD-sp4) 37.2 %    MV E max tu 138.0 cm/sec    MV A max tu 116.0 cm/sec    MV dec time 0.33 sec    MV E/A 1.19     LA ESV Index (BP) 20.7 ml/m2    Med Peak E' Tu 5.7 cm/sec    Lat Peak E' Tu 5.6 cm/sec    Avg E/e' ratio 24.42     SV(LVOT) 78.5 ml    LA dimension (2D)  3.5 cm    LV V1 max 113.0 cm/sec    LV V1 max PG 5.1 mmHg    LV V1 mean PG 3.0 mmHg    LV V1 VTI 25.0 cm    Ao pk tu 209.8 cm/sec    Ao  max PG 17.6 mmHg    Ao mean PG 9.6 mmHg    Ao V2 VTI 44.2 cm    ELY(I,D) 1.78 cm2    MV max PG 13.00 mmHg    MV mean PG 5.00 mmHg    MV V2 VTI 48.2 cm    MV P1/2t 98.7 msec    MVA(P1/2t) 2.23 cm2    MVA(VTI) 1.63 cm2    MV dec slope 546.0 cm/sec2    PA acc time 0.08 sec    Ao root diam 2.7 cm   POC Glucose Once    Collection Time: 10/15/24  4:00 PM    Specimen: Blood   Result Value Ref Range    Glucose 82 70 - 130 mg/dL   High Sensitivity Troponin T 2Hr    Collection Time: 10/15/24  4:01 PM    Specimen: Blood   Result Value Ref Range    HS Troponin T 34 (H) <14 ng/L    Troponin T Delta -6 (L) >=-4 - <+4 ng/L   POC Glucose Once    Collection Time: 10/15/24  7:34 PM    Specimen: Blood   Result Value Ref Range    Glucose 90 70 - 130 mg/dL   Comprehensive Metabolic Panel    Collection Time: 10/16/24  4:33 AM    Specimen: Blood   Result Value Ref Range    Glucose 78 65 - 99 mg/dL    BUN 26 (H) 8 - 23 mg/dL    Creatinine 1.36 (H) 0.57 - 1.00 mg/dL    Sodium 137 136 - 145 mmol/L    Potassium 4.5 3.5 - 5.2 mmol/L    Chloride 105 98 - 107 mmol/L    CO2 22.0 22.0 - 29.0 mmol/L    Calcium 8.6 8.6 - 10.5 mg/dL    Total Protein 6.3 6.0 - 8.5 g/dL    Albumin 3.4 (L) 3.5 - 5.2 g/dL    ALT (SGPT) 12 1 - 33 U/L    AST (SGOT) 28 1 - 32 U/L    Alkaline Phosphatase 83 39 - 117 U/L    Total Bilirubin 0.3 0.0 - 1.2 mg/dL    Globulin 2.9 gm/dL    A/G Ratio 1.2 g/dL    BUN/Creatinine Ratio 19.1 7.0 - 25.0    Anion Gap 10.0 5.0 - 15.0 mmol/L    eGFR 39.2 (L) >60.0 mL/min/1.73   Magnesium    Collection Time: 10/16/24  4:33 AM    Specimen: Blood   Result Value Ref Range    Magnesium 2.0 1.6 - 2.4 mg/dL   CBC Auto Differential    Collection Time: 10/16/24  4:33 AM    Specimen: Blood   Result Value Ref Range    WBC 6.12 3.40 - 10.80 10*3/mm3    RBC 4.01 3.77 - 5.28 10*6/mm3    Hemoglobin 12.3 12.0 - 15.9 g/dL    Hematocrit 37.5 34.0 - 46.6 %    MCV 93.5 79.0 - 97.0 fL    MCH 30.7 26.6 - 33.0 pg    MCHC 32.8 31.5 - 35.7 g/dL    RDW 13.5 12.3 -  15.4 %    RDW-SD 46.4 37.0 - 54.0 fl    MPV 10.6 6.0 - 12.0 fL    Platelets 154 140 - 450 10*3/mm3    Neutrophil % 58.5 42.7 - 76.0 %    Lymphocyte % 25.7 19.6 - 45.3 %    Monocyte % 13.2 (H) 5.0 - 12.0 %    Eosinophil % 2.1 0.3 - 6.2 %    Basophil % 0.3 0.0 - 1.5 %    Immature Grans % 0.2 0.0 - 0.5 %    Neutrophils, Absolute 3.58 1.70 - 7.00 10*3/mm3    Lymphocytes, Absolute 1.57 0.70 - 3.10 10*3/mm3    Monocytes, Absolute 0.81 0.10 - 0.90 10*3/mm3    Eosinophils, Absolute 0.13 0.00 - 0.40 10*3/mm3    Basophils, Absolute 0.02 0.00 - 0.20 10*3/mm3    Immature Grans, Absolute 0.01 0.00 - 0.05 10*3/mm3    nRBC 0.0 0.0 - 0.2 /100 WBC   POC Glucose Once    Collection Time: 10/16/24  7:35 AM    Specimen: Blood   Result Value Ref Range    Glucose 72 70 - 130 mg/dL   POC Glucose Once    Collection Time: 10/16/24 11:29 AM    Specimen: Blood   Result Value Ref Range    Glucose 98 70 - 130 mg/dL   POC Glucose Once    Collection Time: 10/16/24  4:41 PM    Specimen: Blood   Result Value Ref Range    Glucose 80 70 - 130 mg/dL   POC Glucose Once    Collection Time: 10/16/24  7:28 PM    Specimen: Blood   Result Value Ref Range    Glucose 84 70 - 130 mg/dL        If labs were ordered, I independently reviewed the results and considered them in treating the patient.      EMERGENCY DEPARTMENT COURSE and DIFFERENTIAL DIAGNOSIS/MDM:   Vitals:  AS OF 20:47 EDT    BP - 126/51  HR - 54  TEMP - 98.1 °F (36.7 °C) (Oral)  O2 SATS - 94%        Discussion below represents my analysis of pertinent findings related to patient's condition, differential diagnosis, treatment plan and final disposition.      Differential diagnosis:  The differential diagnosis associated with the patient's presentation includes: uti, pna, dehydartion, electroltye derangement      Independent interpretations (ECG/rhythm strip/X-ray/US/CT scan): I independently inteprreted the pt head CT and CXR - no ICH and there is no pulm infiltrate      Additional  sources:  Discussed/obtained information from independent historians:   [] Spouse:   [] Parent:   [] Friend:   [x] EMS: Report was taken from EMS - [facility] during transport   [] Other:  External (non-ED) record review:   [] Inpatient record:   [] Office record:   [] Outpatient record:   [] Prior Outpatient labs:   [] Prior Outpatient radiology:   [] Primary Care record:   [] Outside ED record:   [] Other:       Patient's care impacted by:   [x] Diabetes   [x] Hypertension   [x] Coronary Artery Disease   [] Cancer   [] Other:     Care significantly affected by Social Determinants of Health (housing and economic circumstances, unemployment)    [] Yes     [x] No   If yes, Patient's care significantly limited by  Social Determinants of Health including:    [] Inadequate housing    [] Low income    [] Alcoholism and drug addiction in family    [] Problems related to primary support group    [] Unemployment    [] Problems related to employment    [] Other Social Determinants of Health:       Consideration of admission/observation vs discharge: Patient presents with acute encephalopathy 2/2 infection and req admission for further management      I considered prescription management with:    [] Pain medication:   [] Antiviral:   [x] Antibiotic: Patient started on rocephin for UTI   [] Other:    ED Course:    ED Course as of 10/16/24 2047   Tue Oct 15, 2024   0014 Patient presents with altered mental status, generalized weakness,, and syncopal episode when she tried to get out of her chair this evening. Symptoms progressive over the course the past 2 days. She does she does have some baseline dementia. Has urinary tract infection and positive for COVID. Have started her on Rocephin. Stable from a respiratory standpoint and is not hypoxic. She sustained a small laceration to her left hand during her fall which I repaired with absorbable sutures. [NS]   0014 I discussed case with hospitalist Dr. Miller.  Discussed history,  presentation, workup.  Accepts patient for admission. [NS]      ED Course User Index  [NS] Clifford Cotton MD           PROCEDURES:  Laceration Repair  Indication: L hand lac  Consent: verbal from patient  Technique: A 1 cm laceration is present on the patient's palmar mid phalanx of the third digit of L hand with the following features: linear, superficial, no FB, no tendon invovlement. There is no evidence of significant vascular, neurologic, or soft tissue compromise. Full functional status is maintained. The laceration was copiously irrigated with saline. The wound was carefully inspected and probed to the base and found to have no deep structure involvement or foreign body. The wound was anesthetized with approximately 2 mL of lido one percent without epi. The wound was repaired using 3 5-0 pain gut sutures in the following technique(s): simple interrupted.  Complications: None           FINAL IMPRESSION      1. Altered mental status, unspecified altered mental status type    2. Acute cystitis without hematuria    3. COVID-19    4. History of diabetes mellitus    5. History of coronary artery disease          DISPOSITION/PLAN     ED Disposition       ED Disposition   Decision to Admit    Condition   --    Comment   Level of Care: Telemetry [5]   Diagnosis: UTI (urinary tract infection) [956934]   Admitting Physician: GINA BARR [[id number]]   Attending Physician: GINA BARR [[id number]]                   Comment: Please note this report has been produced using speech recognition software.      Clifford Cotton MD  Attending Emergency Physician             Clifford Cotton MD  10/16/24 2055       Clifford Cotton MD  10/16/24 2137

## 2024-10-15 NOTE — PLAN OF CARE
Problem: Adult Inpatient Plan of Care  Goal: Plan of Care Review  Outcome: Progressing  Goal: Patient-Specific Goal (Individualized)  Outcome: Progressing  Goal: Absence of Hospital-Acquired Illness or Injury  Outcome: Progressing  Intervention: Identify and Manage Fall Risk  Recent Flowsheet Documentation  Taken 10/15/2024 1200 by Haritha Vicente RN  Safety Promotion/Fall Prevention:   activity supervised   assistive device/personal items within reach   clutter free environment maintained   safety round/check completed   room organization consistent  Taken 10/15/2024 0832 by Haritha Vicente RN  Safety Promotion/Fall Prevention:   activity supervised   assistive device/personal items within reach   clutter free environment maintained  Intervention: Prevent Skin Injury  Recent Flowsheet Documentation  Taken 10/15/2024 1200 by Haritha Vicente RN  Body Position: position maintained  Skin Protection:   drying agents applied   incontinence pads utilized  Taken 10/15/2024 0832 by Haritha Vicente RN  Skin Protection: (interdry placed under breast and belly)   silicone foam dressing in place   incontinence pads utilized  Intervention: Prevent Infection  Recent Flowsheet Documentation  Taken 10/15/2024 1200 by Haritha Vicente RN  Infection Prevention: environmental surveillance performed  Taken 10/15/2024 0832 by Haritha Vicente RN  Infection Prevention: environmental surveillance performed  Goal: Optimal Comfort and Wellbeing  Outcome: Progressing  Goal: Readiness for Transition of Care  Outcome: Progressing   Goal Outcome Evaluation:

## 2024-10-15 NOTE — H&P
"    [facility]  HISTORY AND PHYSICAL    Patient Name: Georgia Velázquez  : 1945  MRN: [id number]  Primary Care Physician: Provider, No Known  Date of admission: 10/14/2024    Subjective   Subjective     Chief Complaint:  Altered mental status     HPI:  Georgia Velázquez is a 79 y.o. female w/ a hx of CAD (s/p stent), diastolic CHF, HTN, HLD, hx of CVA, diet controlled T2DM, CKD Stage III, GERD, COPD, dementia who presented to the ED w/ c/o altered mental status.   Pt lives w/ her family. Pt uses a walker to ambulate. Pt confused at baseline 2/2 dementia.   HPI per pt's daughter. Pt w/ 2-3 days of progressively worsening generalized weakness and fatigue. Pt slept the majority of the day on Monday. This evening pt was sitting on her Rolator when she c/o nausea. Pt then had a syncopal episode witnessed by her daughter and grandson. Pt fell off the Rolator and hit her left hand causing a laceration. Pt had LOC for \"seconds\".   Pt evaluated in the ED. COVID-19 detected. CXR w/ small right pleural effusion. CT abd/pel with no acute findings. CT Head unremarkable. UA c/w UTI. Pt admitted to the hospital medicine service for further evaluation.     Review of Systems   Unable to perform ROS: Mental status change      Personal History     Past Medical History:   Diagnosis Date    Acute kidney injury 2017    Aortic regurgitation     Arthritis of shoulder 2016    Body mass index (BMI) of 45.0-49.9 in adult 2019    Bright red rectal bleeding 2023    CHF (congestive heart failure)     Closed compression fracture of L4 lumbar vertebra 2017    Added automatically from request for surgery [id number]    Colitis 2020    Constipation 2017    COPD (chronic obstructive pulmonary disease)     Coronary artery disease 2016    Diabetes mellitus type 2 in obese 2018    Elevated alkaline phosphatase level 2018    Elevated creatine " kinase     Essential hypertension 05/18/2016    GERD (gastroesophageal reflux disease) 05/18/2016    Glaucoma 07/21/2020    History of kyphoplasty 01/30/2018    Hyperlipidemia     Lumbar facet arthropathy 01/29/2018    Lumbar stenosis with neurogenic claudication 01/29/2018    Morbid obesity due to excess calories 01/29/2018    Myocardial infarction 05/18/2016    Osteoporosis 05/18/2016    Physical deconditioning 01/30/2018    Retinal emboli, 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    Sepsis     uro    Stroke     Urinary incontinence without sensory awareness 02/26/2018     Past Surgical History:   Procedure Laterality Date    APPENDECTOMY      BACK SURGERY      CARDIAC CATHETERIZATION      CHOLECYSTECTOMY      CORONARY STENT PLACEMENT      EYE SURGERY      KYPHOPLASTY N/A 12/07/2017    Procedure: KYPHOPLASTY L4;  Surgeon: Mrac Pham MD;  Location: [facility];  Service:      Family History: family history includes Diabetes in her mother; Heart failure in her father and mother; No Known Problems in her brother, daughter, sister, and son.     Social History:  reports that she quit smoking about 24 years ago. Her smoking use included cigarettes. She started smoking about 39 years ago. She has a 15 pack-year smoking history. She has been exposed to tobacco smoke. She has never used smokeless tobacco. She reports that she does not drink alcohol and does not use drugs.  Social History     Social History Narrative    Living w daughter. Daughter works at [employer], in process of getting medical POA.      Medications:  Vitamin D3, albuterol sulfate HFA, atorvastatin, clopidogrel, docusate sodium, ipratropium-albuterol, nystatin, and valsartan    No Known Allergies    Objective   Objective     Vital Signs:   Temp:  [99 °F (37.2 °C)-101 °F (38.3 °C)] 99.1 °F (37.3 °C)  Heart Rate:  [72-85] 78  Resp:  [16-24] 24  BP: (127-184)/() 139/74    Physical Exam     Constitutional: Awake, alert; chronically ill appearing   Eyes: PERRLA, sclerae  anicteric, no conjunctival injection  HENT: NCAT, mucous membranes moist  Neck: Supple, no thyromegaly, no lymphadenopathy, trachea midline  Respiratory: Decreased bases bilaterally; no rhonchi, nonlabored respirations   Cardiovascular: RRR, no murmurs, rubs, or gallops, no peripheral edema   Gastrointestinal: Positive bowel sounds, soft, nontender, nondistended  Musculoskeletal: Normal ROM bilaterally   Psychiatric: Cooperative  Neurologic: Oriented to self, strength symmetric in all extremities, speech clear  Skin: No rashes, lesions; laceration (sutures intact)- left hand     Result Review:  I have personally reviewed the results from the time of this admission to 10/15/2024 01:37 EDT and agree with these findings:  [x]  Laboratory list / accordion  []  Microbiology  [x]  Radiology  [x]  EKG/Telemetry   []  Cardiology/Vascular   []  Pathology  [x]  Old records    LAB RESULTS:      Lab 10/14/24  2141   WBC 7.18   HEMOGLOBIN 13.1   HEMATOCRIT 39.7   PLATELETS 177   NEUTROS ABS 5.58   IMMATURE GRANS (ABS) 0.05   LYMPHS ABS 0.75   MONOS ABS 0.78   EOS ABS 0.00   MCV 94.7   CRP 1.79*   PROCALCITONIN 0.09         Lab 10/14/24  2146 10/14/24  2141   SODIUM  --  137   POTASSIUM  --  4.3   CHLORIDE  --  102   CO2  --  23.0   ANION GAP  --  12.0   BUN  --  25*   CREATININE 1.60* 1.45*   EGFR  --  36.3*   GLUCOSE  --  132*   CALCIUM  --  9.0         Lab 10/14/24  2141   TOTAL PROTEIN 7.3   ALBUMIN 3.8   GLOBULIN 3.5   ALT (SGPT) 11   AST (SGOT) 19   BILIRUBIN 0.6   ALK PHOS 102   LIPASE 19         Lab 10/14/24  2141   PROBNP 3,900.0*   HSTROP T 36*             Lab 10/14/24  2141   FERRITIN 301.80*         Brief Urine Lab Results  (Last result in the past 365 days)        Color   Clarity   Blood   Leuk Est   Nitrite   Protein   CREAT   Urine HCG        10/14/24 2203 Yellow   Cloudy   Moderate (2+)   Moderate (2+)   Positive   100 mg/dL (2+)                 Microbiology Results (last 10 days)       Procedure Component Value -  Date/Time    COVID PRE-OP / PRE-PROCEDURE SCREENING ORDER (NO ISOLATION) - Swab, Nasopharynx [[id number]]  (Abnormal) Collected: 10/14/24 2252    Lab Status: Final result Specimen: Swab from Nasopharynx Updated: 10/14/24 2324    Narrative:      The following orders were created for panel order COVID PRE-OP / PRE-PROCEDURE SCREENING ORDER (NO ISOLATION) - Swab, Nasopharynx.  Procedure                               Abnormality         Status                     ---------                               -----------         ------                     COVID-19 and FLU A/B PCR...[[id number]]  Abnormal            Final result                 Please view results for these tests on the individual orders.    COVID-19 and FLU A/B PCR, 1 HR TAT - Swab, Nasopharynx [[id number]]  (Abnormal) Collected: 10/14/24 2252    Lab Status: Final result Specimen: Swab from Nasopharynx Updated: 10/14/24 2324     COVID19 Detected     Influenza A PCR Not Detected     Influenza B PCR Not Detected    Narrative:      Fact sheet for providers: https://www.fda.gov/media/556093/download    Fact sheet for patients: https://www.fda.gov/media/913903/download    Test performed by PCR.  Influenza A and Influenza B negative results should be considered presumptive in samples that have a positive SARS-CoV-2 result.    Competitive inhibition studies showed that SARS-CoV-2 virus, when present at concentrations above 3.6E+04 copies/mL, can inhibit the detection and amplification of influenza A and influenza B virus RNA if present at or below 1.8E+02 copies/mL or 4.9E+02 copies/mL, respectively, and may lead to false negative influenza virus results. If co-infection with influenza A or influenza B virus is suspected in samples with a positive SARS-CoV-2 result, the sample should be re-tested with another FDA cleared, approved, or authorized influenza test, if influenza virus detection would change clinical management.          CT Abdomen Pelvis With  Contrast    Result Date: 10/14/2024  CT ABDOMEN PELVIS W CONTRAST Date of Exam: 10/14/2024 10:21 PM EDT Indication: generalized abdominal pain, vomiting. Comparison: 7/21/2020 Technique: Axial CT images were obtained of the abdomen and pelvis following the uneventful intravenous administration of contrast. Reconstructed coronal and sagittal images were also obtained. Automated exposure control and iterative construction methods were used. Findings: There is a small right pleural effusion with some adjacent atelectasis in the right lower lobe. The left lung base is clear. There is extensive atherosclerotic disease, including within the mesenteric arteries. No aortic aneurysm. Gallbladder is surgically absent. No biliary obstruction is seen. There is some atrophy of the pancreas. There are bilateral renal cysts. No follow-up is indicated. There is right renal atrophy with cortical thinning and cortical lobulation. Both kidneys are nonobstructed. Mild left adrenal hyperplasia is unchanged. Solid abdominal organs are otherwise normal. The urinary bladder and solid pelvic organs appear normal. The appendix has been removed. There is a lipoma at the ileocecal valve. No evidence of acute colitis. No significant stool burden. No small bowel obstruction or clear evidence of enteritis. Stomach is normal except for a stable small hiatal hernia. No free fluid or adenopathy is identified. Old compression fracture with kyphoplasty noted at L4.     Impression: 1. Small right pleural effusion with right lower lobe atelectasis. 2. No clearly acute findings in the abdomen or pelvis. 3. Appendectomy. No bowel obstruction or evidence of enteritis or colitis. 4. Nonobstructed kidneys with renal atrophy on the right. Electronically Signed: Mat Godwin MD  10/14/2024 10:41 PM EDT  Workstation ID: [id number]    CT Head Without Contrast    Result Date: 10/14/2024  CT HEAD WO CONTRAST Date of Exam: 10/14/2024 10:21 PM EDT Indication: fall,  ams. Comparison: 3/17/2021 Technique: Axial CT images were obtained of the head without contrast administration.  Automated exposure control and iterative construction methods were used. Findings: There is no evidence of acute territorial infarction. There is no acute intracranial hemorrhage. There are no extra-axial collections. Ventricles and CSF spaces are symmetric. No mass effect nor hydrocephalus. Brain parenchyma appears unchanged with advanced white matter hypoattenuation.  Paranasal sinuses and mastoid air cells are adequately aerated.  Osseous structures and orbits appear intact.     Impression: Impression: * 1. No acute process identified. Electronically Signed: Albaro Barrera MD  10/14/2024 10:33 PM EDT  Workstation ID: [id number]    XR Chest 1 View    Result Date: 10/14/2024  XR CHEST 1 VW Date of Exam: 10/14/2024 9:19 PM EDT Indication: ams Comparison: Chest radiograph 6/13/2022 Findings: Mediastinum: Cardiomediastinal silhouette appears unchanged and enlarged. Lungs: Mild peripheral right lung base hazy opacity. Pleura: Mild blunting of right costophrenic sulcus. No pneumothorax. Bones and soft tissues: No acute osseous abnormality.     Impression: Impression: Possible small right pleural effusion with adjacent atelectasis. Superimposed infection to be excluded clinically. Electronically Signed: García Argueta  10/14/2024 9:44 PM EDT  Workstation ID: [id number]     Results for orders placed during the hospital encounter of 03/17/21    Adult Transthoracic Echo Complete With Contrast if Necessary Per Protocol    Interpretation Summary  · Calculated left ventricular EF = 55% Estimated left ventricular EF was in agreement with the calculated left ventricular EF. Left ventricular systolic function is normal.  · Left ventricular diastolic function is consistent with (grade II w/high LAP) pseudonormalization.  · Estimated right ventricular systolic pressure from tricuspid regurgitation is normal (<35 mmHg).  Calculated right ventricular systolic pressure from tricuspid regurgitation is 32 mmHg.  · Endocardial definition of the apical segments is limited, unable to definitively assess apical regional wall motion abnormalities.    Assessment & Plan   Assessment & Plan       COVID-19 virus detected    Essential hypertension    Hyperlipidemia    GERD (gastroesophageal reflux disease)    Chronic obstructive pulmonary disease    Diastolic dysfunction    Acute UTI (urinary tract infection)    CKD (chronic kidney disease) stage 3, GFR 30-59 ml/min    T2DM (type 2 diabetes mellitus)    CAD (coronary artery disease)    Syncope    Dementia    Georgia Velázquez is a 79 y.o. female w/ a hx of CAD (s/p stent), diastolic CHF, HTN, HLD, hx of CVA, diet controlled T2DM, CKD Stage III, GERD, COPD, dementia who presented to the ED w/ c/o altered mental status.     **Acute UTI   -previous urine culture 3/2024 + E.Coli   -UA c/w UTI; urine culture pending   -blood cx pending   -procal, lactic acid pending (WBC WNL)  -s/p Rocephin given in ED; continue pending cx results   -s/p 1 liter NS bolus given in ED  -hold on further IVF for now   -symptom mgt    **COVID-19  **Hx of COPD   -currently 94% on room air   -CXR: possible small right effusion  -CT abd/pel showed small right effusion   -full respiratory panel pcr pending   -initial COVID labs pending (D.Dimer, ferritin, etc)   -Paxlovid   -hold on Decadron (currently no hypoxic)   -symptom mgt     **Syncope   **likely multifactorial w/ UTI, COVID, ?vasovagal event, ?dehydration  -fall w/ left hand laceration s/p sutures   -CT Head unremarkable   -last ECHO 2021: EF 55%, grade II LVDF   -ECHO pending   -carotid duplex pending (last duplex 2/2024: Right internal carotid artery demonstrates a less than 50% stenosis. Left internal carotid artery demonstrates a 50-69% stenosis. Right Vertebral: Bidirectional flow is present.)   -orthostatic Bps  -s/p 1 liter NS bolus   -fall precautions/bed  alarm     **Dementia   -w/ slight increase in confusion over last few days   -CT Head unremarkable   -treating for UTI/COVID   -symptom mgt  -fall precautions/bed alarm     **CKD Stage III; stable   -previous creatinine 1.57 in March 2024, 1.59 in 11/2023  -current creatinine 1.45 w/ eGFR 36.3  -UA c/w UTI   -s/p 1 liter NS bolus given in ED   -monitor    **Elevated troponin (mild at 36)  **CAD (s/p stent)  **Diastolic dysfunction   **HTN, HLD   **Hx of CVA   -last ECHO 2021: EF 55%, grade II LVDF   -ECHO pending   -troponin 36; repeat pending   -proBNP pending   -EKG stable   -CXR/CT abd/pel shows small right pleural effusion   -continue routine Plavix   -continue routine Diovan w/ hold parameters   -holding statin while on Paxlovid   -daily weights; strict I/Os     **T2DM  -diet controlled   -hem A1c pending   -FSBG q ac/hs w/ LDSS     DVT prophylaxis:  Heparin     CODE STATUS:    Medical Intervention Limits: No intubation (DNI)  Level Of Support Discussed With: Health Care Surrogate  Code Status (Patient has no pulse and is not breathing): No CPR (Do Not Attempt to Resuscitate)  Medical Interventions (Patient has pulse or is breathing): Limited Support  Comments: DNR/DNI    Expected Discharge   Expected discharge date/ time has not been documented.    This note has been completed as part of a split-shared workflow.     Signature: Electronically signed by DON Lee, 10/15/24, 1:37 AM EDT.    Time spent: 55 minutes       Attending   Admission Attestation       I have performed an independent face-to-face diagnostic evaluation including performing an independent physical examination.  I approve of the documented plan of care above that was reviewed and developed with the advanced practice clinician (APC) and take responsibility for that plan along with its associated risks.  I have updated the HPI as appropriate.    Brief HPI     78 y/o female with hx of HTN, HLD, GERD, COPD, DM, CAD, dementia who  presents to ER w/ mental status change, syncope w/ fall per her daughter.  Federico notes pt had been getting up and down a lot today and states she was sitting on rollator and had LOC w/ fall but LOC was only for a few seconds.  Pt w/ left had lac.  No c/o now and federico feels pt already feeling better.     Attending Physical Exam:  Temp:  [99 °F (37.2 °C)-101 °F (38.3 °C)] 99.1 °F (37.3 °C)  Heart Rate:  [72-85] 78  Resp:  [16-24] 24  BP: (127-184)/() 139/74     Above exam performed by me w/ no changes to above    Result Review:  I have personally reviewed the results from the time of this admission to 10/15/2024 01:38 EDT and agree with these findings:  [x]  Laboratory list / accordion  [x]  Microbiology  [x]  Radiology  [x]  EKG/Telemetry   []  Cardiology/Vascular   []  Pathology  []  Old records  []  Other:  Most notable findings include:    Ua w/ uti  Hst 36  Cr 1.45  Covid +  See imaging reports    Assessment and Plan:    See assessment and plan documented by APC above and updated/edited by me as appropriate.    Eryn Miller MD  10/15/24  Electronically signed by Eryn Miller MD, 10/15/24, 1:50 AM EDT.

## 2024-10-15 NOTE — PROGRESS NOTES
[facility] Medicine Services  PROGRESS NOTE    Patient Name: Georgia Velázquez  : 1945  MRN: [id number]    Date of Admission: 10/14/2024  Primary Care Physician: Provider, No Known    Subjective   Subjective     CC:  Encephalopathy    HPI:  Patient seen and examined this morning.  She denies pain, shortness of breath, chest pain.  She remains pleasantly confused only oriented to self.      Objective   Objective     Vital Signs:   Temp:  [98.8 °F (37.1 °C)-101 °F (38.3 °C)] 99.3 °F (37.4 °C)  Heart Rate:  [67-85] 74  Resp:  [16-24] 21  BP: (127-184)/() 128/87  Physical Exam  Constitutional:       General: She is not in acute distress.  Cardiovascular:      Rate and Rhythm: Normal rate and regular rhythm.      Heart sounds: Normal heart sounds.   Pulmonary:      Effort: Pulmonary effort is normal. No respiratory distress.      Breath sounds: Normal breath sounds.   Abdominal:      Palpations: Abdomen is soft.      Tenderness: There is no abdominal tenderness.   Musculoskeletal:      Right lower leg: No edema.      Left lower leg: No edema.   Neurological:      General: No focal deficit present.      Mental Status: She is alert. Mental status is at baseline.   Psychiatric:         Mood and Affect: Mood normal.         Thought Content: Thought content normal.          Results Reviewed:  LAB RESULTS:      Lab 10/15/24  0647 10/15/24  0431 10/14/24  5921   WBC  --   --  7.18   HEMOGLOBIN  --   --  13.1   HEMATOCRIT  --   --  39.7   PLATELETS  --   --  177   NEUTROS ABS  --   --  5.58   IMMATURE GRANS (ABS)  --   --  0.05   LYMPHS ABS  --   --  0.75   MONOS ABS  --   --  0.78   EOS ABS  --   --  0.00   MCV  --   --  94.7   CRP  --   --  1.79*   PROCALCITONIN  --   --  0.09   LACTATE  --  1.2  --    LDH  --   --  152   PROTIME 16.3*  --   --    APTT 30.4  --   --    HEPARIN ANTI-XA  --  0.10*  --    D DIMER QUANT 1.76*  --   --    HSTROP T  --  40* 36*         Lab 10/15/24  690  10/14/24  2146 10/14/24  2141   SODIUM  --   --  137   POTASSIUM  --   --  4.3   CHLORIDE  --   --  102   CO2  --   --  23.0   ANION GAP  --   --  12.0   BUN  --   --  25*   CREATININE  --  1.60* 1.45*   EGFR  --   --  36.3*   GLUCOSE  --   --  132*   CALCIUM  --   --  9.0   HEMOGLOBIN A1C 5.50  --   --          Lab 10/14/24  2141   TOTAL PROTEIN 7.3   ALBUMIN 3.8   GLOBULIN 3.5   ALT (SGPT) 11   AST (SGOT) 19   BILIRUBIN 0.6   ALK PHOS 102   LIPASE 19         Lab 10/15/24  0647 10/15/24  0431 10/14/24  2141   PROBNP  --   --  3,900.0*   HSTROP T  --  40* 36*   PROTIME 16.3*  --   --    INR 1.30*  --   --              Lab 10/14/24  2141   FERRITIN 301.80*         Brief Urine Lab Results  (Last result in the past 365 days)        Color   Clarity   Blood   Leuk Est   Nitrite   Protein   CREAT   Urine HCG        10/14/24 2203 Yellow   Cloudy   Moderate (2+)   Moderate (2+)   Positive   100 mg/dL (2+)                   Microbiology Results Abnormal       Procedure Component Value - Date/Time    Urine Culture - Urine, Straight Cath [[id number]]  (Normal) Collected: 10/14/24 2203    Lab Status: Preliminary result Specimen: Urine from Straight Cath Updated: 10/15/24 1019     Urine Culture Culture in progress            Duplex Carotid Ultrasound CAR    Result Date: 10/15/2024    Right internal carotid artery demonstrates a less than 50% stenosis.   Antegrade right vertebral flow.   Left internal carotid artery demonstrates a 50-69% stenosis, mild increase in velocities from study 2/2024.   Antegrade left vertebral flow.     CT Abdomen Pelvis With Contrast    Result Date: 10/14/2024  CT ABDOMEN PELVIS W CONTRAST Date of Exam: 10/14/2024 10:21 PM EDT Indication: generalized abdominal pain, vomiting. Comparison: 7/21/2020 Technique: Axial CT images were obtained of the abdomen and pelvis following the uneventful intravenous administration of contrast. Reconstructed coronal and sagittal images were also obtained. Automated  exposure control and iterative construction methods were used. Findings: There is a small right pleural effusion with some adjacent atelectasis in the right lower lobe. The left lung base is clear. There is extensive atherosclerotic disease, including within the mesenteric arteries. No aortic aneurysm. Gallbladder is surgically absent. No biliary obstruction is seen. There is some atrophy of the pancreas. There are bilateral renal cysts. No follow-up is indicated. There is right renal atrophy with cortical thinning and cortical lobulation. Both kidneys are nonobstructed. Mild left adrenal hyperplasia is unchanged. Solid abdominal organs are otherwise normal. The urinary bladder and solid pelvic organs appear normal. The appendix has been removed. There is a lipoma at the ileocecal valve. No evidence of acute colitis. No significant stool burden. No small bowel obstruction or clear evidence of enteritis. Stomach is normal except for a stable small hiatal hernia. No free fluid or adenopathy is identified. Old compression fracture with kyphoplasty noted at L4.     Impression: 1. Small right pleural effusion with right lower lobe atelectasis. 2. No clearly acute findings in the abdomen or pelvis. 3. Appendectomy. No bowel obstruction or evidence of enteritis or colitis. 4. Nonobstructed kidneys with renal atrophy on the right. Electronically Signed: Mat Godwin MD  10/14/2024 10:41 PM EDT  Workstation ID: [id number]    CT Head Without Contrast    Result Date: 10/14/2024  CT HEAD WO CONTRAST Date of Exam: 10/14/2024 10:21 PM EDT Indication: fall, ams. Comparison: 3/17/2021 Technique: Axial CT images were obtained of the head without contrast administration.  Automated exposure control and iterative construction methods were used. Findings: There is no evidence of acute territorial infarction. There is no acute intracranial hemorrhage. There are no extra-axial collections. Ventricles and CSF spaces are symmetric. No  mass effect nor hydrocephalus. Brain parenchyma appears unchanged with advanced white matter hypoattenuation.  Paranasal sinuses and mastoid air cells are adequately aerated.  Osseous structures and orbits appear intact.     Impression: Impression: * 1. No acute process identified. Electronically Signed: Albaro Barrera MD  10/14/2024 10:33 PM EDT  Workstation ID: [id number]    XR Chest 1 View    Result Date: 10/14/2024  XR CHEST 1 VW Date of Exam: 10/14/2024 9:19 PM EDT Indication: ams Comparison: Chest radiograph 6/13/2022 Findings: Mediastinum: Cardiomediastinal silhouette appears unchanged and enlarged. Lungs: Mild peripheral right lung base hazy opacity. Pleura: Mild blunting of right costophrenic sulcus. No pneumothorax. Bones and soft tissues: No acute osseous abnormality.     Impression: Impression: Possible small right pleural effusion with adjacent atelectasis. Superimposed infection to be excluded clinically. Electronically Signed: García Argueta  10/14/2024 9:44 PM EDT  Workstation ID: [id number]     Results for orders placed during the hospital encounter of 03/17/21    Adult Transthoracic Echo Complete With Contrast if Necessary Per Protocol    Interpretation Summary  · Calculated left ventricular EF = 55% Estimated left ventricular EF was in agreement with the calculated left ventricular EF. Left ventricular systolic function is normal.  · Left ventricular diastolic function is consistent with (grade II w/high LAP) pseudonormalization.  · Estimated right ventricular systolic pressure from tricuspid regurgitation is normal (<35 mmHg). Calculated right ventricular systolic pressure from tricuspid regurgitation is 32 mmHg.  · Endocardial definition of the apical segments is limited, unable to definitively assess apical regional wall motion abnormalities.      Current medications:  Scheduled Meds:cefTRIAXone, 2,000 mg, Intravenous, Q24H  clopidogrel, 75 mg, Oral, Daily  heparin (porcine), 5,000 Units,  Subcutaneous, Q8H  insulin lispro, 2-7 Units, Subcutaneous, 4x Daily AC & at Bedtime  Nirmatrelvir&Ritonavir 150/100, 2 tablet, Oral, BID  senna-docusate sodium, 2 tablet, Oral, BID  sodium chloride, 10 mL, Intravenous, Q12H  valsartan, 240 mg, Oral, Daily      Continuous Infusions:   PRN Meds:.  acetaminophen **OR** acetaminophen    albuterol sulfate HFA    benzonatate    senna-docusate sodium **AND** polyethylene glycol **AND** bisacodyl **AND** bisacodyl    dextromethorphan polistirex ER    dextrose    dextrose    glucagon (human recombinant)    influenza vaccine    melatonin    sodium chloride    Assessment & Plan   Assessment & Plan     Active Hospital Problems    Diagnosis  POA    **COVID-19 virus detected [U07.1]  Yes    Acute UTI (urinary tract infection) [N39.0]  Yes    CKD (chronic kidney disease) stage 3, GFR 30-59 ml/min [N18.30]  Yes    T2DM (type 2 diabetes mellitus) [E11.9]  Yes    CAD (coronary artery disease) [I25.10]  Yes    Syncope [R55]  Yes    Dementia [F03.90]  Unknown    Diastolic dysfunction [I51.89]  Yes    Chronic obstructive pulmonary disease [J44.9]  Yes    Essential hypertension [I10]  Yes    GERD (gastroesophageal reflux disease) [K21.9]  Yes    Hyperlipidemia [E78.5]  Yes      Resolved Hospital Problems   No resolved problems to display.        Brief Hospital Course to date:  Georgia Velázquez is a 79 y.o. female with CAD status post stent, diastolic CHF, hypertension, hyperlipidemia, history of CVA, type 2 diabetes, CKD 3, GERD, COPD, dementia who presented to the ED with altered mental status and syncope at home.  Patient was found to be COVID-positive and have a UTI.    UTI  - Urine culture still pending  - Continue Rocephin for now  - Blood cultures no growth to date    COVID-19 infection  COPD, compensated  - Patient remains on room air  - CT shows possible right small pleural effusion  - Continue Paxlovid  - Hold steroids as patient is not hypoxic    Syncope  - Likely  multifactorial in the setting of UTI and COVID and dehydration.  - Fall with left hand laceration status post sutures in the ED  - CT head unremarkable  - Echo pending  - Orthostatic vitals negative in the ED  - Carotid duplex shows less than 50% stenosis on the right side, left side shows 50 to 69% stenosis which is a mild increase from the study done in February.  - Will discuss results with daughter    Dementia  - Patient remains pleasantly confused  - CT head on arrival unremarkable  - Patient on 4 precautions    CKD 3  - Stable  - Will continue to monitor    Diastolic heart failure  - Currently compensated  - Echo pending    Type 2 diabetes  -Sliding scale insulin    CAD status post stent  Hyperlipidemia  History of CVA  - Continue Plavix  - Continue Diovan  - Holding statin while on Paxlovid    Expected Discharge Location and Transportation: To be determined  Expected Discharge   Expected Discharge Date: 10/17/2024; Expected Discharge Time:      VTE Prophylaxis:  Pharmacologic VTE prophylaxis orders are present.         AM-PAC 6 Clicks Score (PT): 17 (10/15/24 0832)    CODE STATUS:   Code Status and Medical Interventions: No CPR (Do Not Attempt to Resuscitate); Limited Support; No intubation (DNI); DNR/DNI   Ordered at: 10/15/24 0122     Medical Intervention Limits:    No intubation (DNI)     Level Of Support Discussed With:    Health Care Surrogate     Code Status (Patient has no pulse and is not breathing):    No CPR (Do Not Attempt to Resuscitate)     Medical Interventions (Patient has pulse or is breathing):    Limited Support     Comments:    DNR/DNI       Mariah Schultz MD  10/15/24

## 2024-10-15 NOTE — ED NOTES
Georgia Velázquez    Nursing Report ED to Floor:  Mental status: GCS 14, disoriented to time; family states mental status is more altered than normal  Ambulatory status: Needs assistance; fall risk; recent fall  Oxygen Therapy:  RA  Cardiac Rhythm: NSR with premature supraventricular complexes  Admitted from: Home  Safety Concerns:  Fall risk  Social Issues: None noted  ED Room #:  19    ED Nurse Phone Extension - [phone] or may call [phone].      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    Body mass index (BMI) of 45.0-49.9 in adult 06/14/2019    Bright red rectal bleeding 12/03/2023    CHF (congestive heart failure)     Closed compression fracture of L4 lumbar vertebra 12/04/2017    Added automatically from request for surgery [id number]    Colitis 07/21/2020    Constipation 12/05/2017    COPD (chronic obstructive pulmonary disease)     Coronary artery disease 05/18/2016    Diabetes mellitus type 2 in obese 01/29/2018    Elevated alkaline phosphatase level 02/26/2018    Elevated creatine kinase     Essential hypertension 05/18/2016    GERD (gastroesophageal reflux disease) 05/18/2016    Glaucoma 07/21/2020    History of kyphoplasty 01/30/2018    Hyperlipidemia     Lumbar facet arthropathy 01/29/2018    Lumbar stenosis with neurogenic claudication 01/29/2018    Morbid obesity due to excess calories 01/29/2018    Myocardial infarction 05/18/2016    Osteoporosis 05/18/2016    Physical deconditioning 01/30/2018    Retinal emboli, 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    Sepsis     uro    Stroke     Urinary incontinence without sensory awareness 02/26/2018        Past Surgical History:  Past Surgical History:   Procedure Laterality Date    APPENDECTOMY      BACK SURGERY      CARDIAC CATHETERIZATION      CHOLECYSTECTOMY      CORONARY STENT PLACEMENT       EYE SURGERY      KYPHOPLASTY N/A 12/07/2017    Procedure: KYPHOPLASTY L4;  Surgeon: Marc Pham MD;  Location: [facility] OR;  Service:         Admitting Doctor:   Eryn Miller MD    Consulting Provider(s):  Consults       No orders found for last 30 day(s).             Admitting Diagnosis:   The primary encounter diagnosis was Altered mental status, unspecified altered mental status type. Diagnoses of Acute cystitis without hematuria, COVID-19, History of diabetes mellitus, and History of coronary artery disease were also pertinent to this visit.    Most Recent Vitals:   Vitals:    10/15/24 0000 10/15/24 0030 10/15/24 0100 10/15/24 0101   BP: 144/72 143/76 139/74    BP Location:       Patient Position:       Pulse: 84 75 78    Resp:       Temp:    99.1 °F (37.3 °C)   TempSrc:    Oral   SpO2: 94% 94% 95%    Weight:       Height:           Active LDAs/IV Access:   Lines, Drains & Airways       Active LDAs       Name Placement date Placement time Site Days    Peripheral IV 10/14/24 2122 Left;Posterior Forearm 10/14/24  2122  Forearm  less than 1                    Labs (abnormal labs have a star):   Labs Reviewed   COVID-19 AND FLU A/B, NP SWAB IN TRANSPORT MEDIA 1 HR TAT - Abnormal; Notable for the following components:       Result Value    COVID19 Detected (*)     All other components within normal limits    Narrative:     Fact sheet for providers: https://www.fda.gov/media/619151/download    Fact sheet for patients: https://www.fda.gov/media/575422/download    Test performed by PCR.  Influenza A and Influenza B negative results should be considered presumptive in samples that have a positive SARS-CoV-2 result.    Competitive inhibition studies showed that SARS-CoV-2 virus, when present at concentrations above 3.6E+04 copies/mL, can inhibit the detection and amplification of influenza A and influenza B virus RNA if present at or below 1.8E+02 copies/mL or 4.9E+02 copies/mL, respectively, and may lead to false  negative influenza virus results. If co-infection with influenza A or influenza B virus is suspected in samples with a positive SARS-CoV-2 result, the sample should be re-tested with another FDA cleared, approved, or authorized influenza test, if influenza virus detection would change clinical management.   COMPREHENSIVE METABOLIC PANEL - Abnormal; Notable for the following components:    Glucose 132 (*)     BUN 25 (*)     Creatinine 1.45 (*)     eGFR 36.3 (*)     All other components within normal limits    Narrative:     GFR Normal >60  Chronic Kidney Disease <60  Kidney Failure <15    The GFR formula is only valid for adults with stable renal function between ages 18 and 70.   URINALYSIS W/ CULTURE IF INDICATED - Abnormal; Notable for the following components:    Appearance, UA Cloudy (*)     Blood, UA Moderate (2+) (*)     Protein,  mg/dL (2+) (*)     Leuk Esterase, UA Moderate (2+) (*)     Nitrite, UA Positive (*)     All other components within normal limits    Narrative:     In absence of clinical symptoms, the presence of pyuria, bacteria, and/or nitrites on the urinalysis result does not correlate with infection.   SINGLE HS TROPONIN T - Abnormal; Notable for the following components:    HS Troponin T 36 (*)     All other components within normal limits    Narrative:     High Sensitive Troponin T Reference Range:  <14.0 ng/L- Negative Female for AMI  <22.0 ng/L- Negative Male for AMI  >=14 - Abnormal Female indicating possible myocardial injury.  >=22 - Abnormal Male indicating possible myocardial injury.   Clinicians would have to utilize clinical acumen, EKG, Troponin, and serial changes to determine if it is an Acute Myocardial Infarction or myocardial injury due to an underlying chronic condition.        CBC WITH AUTO DIFFERENTIAL - Abnormal; Notable for the following components:    Neutrophil % 77.7 (*)     Lymphocyte % 10.4 (*)     Eosinophil % 0.0 (*)     Immature Grans % 0.7 (*)     All other  components within normal limits   URINALYSIS, MICROSCOPIC ONLY - Abnormal; Notable for the following components:    RBC, UA 3-5 (*)     WBC, UA Too Numerous to Count (*)     Bacteria, UA 4+ (*)     All other components within normal limits   C-REACTIVE PROTEIN - Abnormal; Notable for the following components:    C-Reactive Protein 1.79 (*)     All other components within normal limits   FERRITIN - Abnormal; Notable for the following components:    Ferritin 301.80 (*)     All other components within normal limits    Narrative:     Results may be falsely decreased if patient taking Biotin.     PROBNP (REFERENCE) - Abnormal; Notable for the following components:    proBNP 3,900.0 (*)     All other components within normal limits    Narrative:     This assay is used as an aid in the diagnosis of individuals suspected of having heart failure. It can be used as an aid in the diagnosis of acute decompensated heart failure (ADHF) in patients presenting with signs and symptoms of ADHF to the emergency department (ED). In addition, NT-proBNP of <300 pg/mL indicates ADHF is not likely.    Age Range Result Interpretation  NT-proBNP Concentration (pg/mL:      <50             Positive            >450                   Gray                 300-450                    Negative             <300    50-75           Positive            >900                  Gray                300-900                  Negative            <300      >75             Positive            >1800                  Gray                300-1800                  Negative            <300   POCT CREATININE - Abnormal; Notable for the following components:    Creatinine 1.60 (*)     All other components within normal limits   LIPASE - Normal   PROCALCITONIN - Normal    Narrative:     As a Marker for Sepsis (Non-Neonates):    1. <0.5 ng/mL represents a low risk of severe sepsis and/or septic shock.  2. >2 ng/mL represents a high risk of severe sepsis and/or septic  "shock.    As a Marker for Lower Respiratory Tract Infections that require antibiotic therapy:    PCT on Admission    Antibiotic Therapy       6-12 Hrs later    >0.5                Strongly Recommended  >0.25 - <0.5        Recommended   0.1 - 0.25          Discouraged              Remeasure/reassess PCT  <0.1                Strongly Discouraged     Remeasure/reassess PCT    As 28 day mortality risk marker: \"Change in Procalcitonin Result\" (>80% or <=80%) if Day 0 (or Day 1) and Day 4 values are available. Refer to http://www.[facility]ply[facility] [place]-pct-calculator.com    Change in PCT <=80%  A decrease of PCT levels below or equal to 80% defines a positive change in PCT test result representing a higher risk for 28-day all-cause mortality of patients diagnosed with severe sepsis for septic shock.    Change in PCT >80%  A decrease of PCT levels of more than 80% defines a negative change in PCT result representing a lower risk for 28-day all-cause mortality of patients diagnosed with severe sepsis or septic shock.      LACTATE DEHYDROGENASE - Normal   COVID PRE-OP / PRE-PROCEDURE SCREENING ORDER (NO ISOLATION)    Narrative:     The following orders were created for panel order COVID PRE-OP / PRE-PROCEDURE SCREENING ORDER (NO ISOLATION) - Swab, Nasopharynx.  Procedure                               Abnormality         Status                     ---------                               -----------         ------                     COVID-19 and FLU A/B PCR...[[id number]]  Abnormal            Final result                 Please view results for these tests on the individual orders.   URINE CULTURE   BLOOD CULTURE   BLOOD CULTURE   RESPIRATORY PANEL PCR W/ COVID-19 (SARS-COV-2), NP SWAB IN UTM/VTP, 2 HR TAT   LACTIC ACID, PLASMA   TROPONIN   D-DIMER, QUANTITATIVE   FIBRINOGEN   HEPARIN ANTI XA   APTT   PROTIME-INR   POCT CREATININE   CBC AND DIFFERENTIAL    Narrative:     The following orders were created for panel order CBC & " Differential.  Procedure                               Abnormality         Status                     ---------                               -----------         ------                     CBC Auto Differential[[id number]]        Abnormal            Final result                 Please view results for these tests on the individual orders.       Meds Given in ED:   Medications   cefTRIAXone (ROCEPHIN) 2,000 mg in sodium chloride 0.9 % 100 mL MBP (has no administration in time range)   sodium chloride 0.9 % bolus 1,000 mL (0 mL Intravenous Stopped 10/15/24 0057)   iopamidol (ISOVUE-300) 61 % injection 80 mL (80 mL Intravenous Given 10/14/24 2228)   Tetanus-Diphth-Acell Pertussis (BOOSTRIX) injection 0.5 mL (0.5 mL Intramuscular Given 10/15/24 0101)           Last NIH score:                                                          Dysphagia screening results:  Patient Factors Component (Dysphagia:Stroke or Rule-out)  Best Eye Response: 4-->(E4) spontaneous (10/14/24 2142)  Best Motor Response: 6-->(M6) obeys commands (10/14/24 2142)  Best Verbal Response: 4-->(V4) confused (10/14/24 2142)  [place] Coma Scale Score: 14 (10/14/24 2142)     [place] Coma Scale:  No data recorded     CIWA:        Restraint Type:            Isolation Status:  No active isolations

## 2024-10-15 NOTE — PLAN OF CARE
Goal Outcome Evaluation:           Progress: improving  Outcome Evaluation: Physical therapy treatment complete. The patient continues to require additional time to complete mobility. Patient improved tolerance to mobility and with decreased subjective complaints of pain. Patient increased ambulation distance compared to previous treatment session. The patient continues to present below baseline for functional mobility. The patient would continue to benefit from skilled PT to address strength, balance and activity tolerance deficits. Continue to current PT POC    Anticipated Discharge Disposition (PT): home with 24/7 care, home with home health

## 2024-10-16 LAB
ALBUMIN SERPL-MCNC: 3.4 G/DL (ref 3.5–5.2)
ALBUMIN/GLOB SERPL: 1.2 G/DL
ALP SERPL-CCNC: 83 U/L (ref 39–117)
ALT SERPL W P-5'-P-CCNC: 12 U/L (ref 1–33)
ANION GAP SERPL CALCULATED.3IONS-SCNC: 10 MMOL/L (ref 5–15)
AST SERPL-CCNC: 28 U/L (ref 1–32)
BASOPHILS # BLD AUTO: 0.02 10*3/MM3 (ref 0–0.2)
BASOPHILS NFR BLD AUTO: 0.3 % (ref 0–1.5)
BILIRUB SERPL-MCNC: 0.3 MG/DL (ref 0–1.2)
BUN SERPL-MCNC: 26 MG/DL (ref 8–23)
BUN/CREAT SERPL: 19.1 (ref 7–25)
CALCIUM SPEC-SCNC: 8.6 MG/DL (ref 8.6–10.5)
CHLORIDE SERPL-SCNC: 105 MMOL/L (ref 98–107)
CO2 SERPL-SCNC: 22 MMOL/L (ref 22–29)
CREAT SERPL-MCNC: 1.36 MG/DL (ref 0.57–1)
DEPRECATED RDW RBC AUTO: 46.4 FL (ref 37–54)
EGFRCR SERPLBLD CKD-EPI 2021: 39.2 ML/MIN/1.73
EOSINOPHIL # BLD AUTO: 0.13 10*3/MM3 (ref 0–0.4)
EOSINOPHIL NFR BLD AUTO: 2.1 % (ref 0.3–6.2)
ERYTHROCYTE [DISTWIDTH] IN BLOOD BY AUTOMATED COUNT: 13.5 % (ref 12.3–15.4)
GLOBULIN UR ELPH-MCNC: 2.9 GM/DL
GLUCOSE BLDC GLUCOMTR-MCNC: 72 MG/DL (ref 70–130)
GLUCOSE BLDC GLUCOMTR-MCNC: 80 MG/DL (ref 70–130)
GLUCOSE BLDC GLUCOMTR-MCNC: 84 MG/DL (ref 70–130)
GLUCOSE BLDC GLUCOMTR-MCNC: 98 MG/DL (ref 70–130)
GLUCOSE SERPL-MCNC: 78 MG/DL (ref 65–99)
HCT VFR BLD AUTO: 37.5 % (ref 34–46.6)
HGB BLD-MCNC: 12.3 G/DL (ref 12–15.9)
IMM GRANULOCYTES # BLD AUTO: 0.01 10*3/MM3 (ref 0–0.05)
IMM GRANULOCYTES NFR BLD AUTO: 0.2 % (ref 0–0.5)
LYMPHOCYTES # BLD AUTO: 1.57 10*3/MM3 (ref 0.7–3.1)
LYMPHOCYTES NFR BLD AUTO: 25.7 % (ref 19.6–45.3)
MAGNESIUM SERPL-MCNC: 2 MG/DL (ref 1.6–2.4)
MCH RBC QN AUTO: 30.7 PG (ref 26.6–33)
MCHC RBC AUTO-ENTMCNC: 32.8 G/DL (ref 31.5–35.7)
MCV RBC AUTO: 93.5 FL (ref 79–97)
MONOCYTES # BLD AUTO: 0.81 10*3/MM3 (ref 0.1–0.9)
MONOCYTES NFR BLD AUTO: 13.2 % (ref 5–12)
NEUTROPHILS NFR BLD AUTO: 3.58 10*3/MM3 (ref 1.7–7)
NEUTROPHILS NFR BLD AUTO: 58.5 % (ref 42.7–76)
NRBC BLD AUTO-RTO: 0 /100 WBC (ref 0–0.2)
PLATELET # BLD AUTO: 154 10*3/MM3 (ref 140–450)
PMV BLD AUTO: 10.6 FL (ref 6–12)
POTASSIUM SERPL-SCNC: 4.5 MMOL/L (ref 3.5–5.2)
PROT SERPL-MCNC: 6.3 G/DL (ref 6–8.5)
RBC # BLD AUTO: 4.01 10*6/MM3 (ref 3.77–5.28)
SODIUM SERPL-SCNC: 137 MMOL/L (ref 136–145)
WBC NRBC COR # BLD AUTO: 6.12 10*3/MM3 (ref 3.4–10.8)

## 2024-10-16 PROCEDURE — 85025 COMPLETE CBC W/AUTO DIFF WBC: CPT | Performed by: NURSE PRACTITIONER

## 2024-10-16 PROCEDURE — 96372 THER/PROPH/DIAG INJ SC/IM: CPT

## 2024-10-16 PROCEDURE — 83735 ASSAY OF MAGNESIUM: CPT | Performed by: NURSE PRACTITIONER

## 2024-10-16 PROCEDURE — 25010000002 CEFTRIAXONE PER 250 MG: Performed by: NURSE PRACTITIONER

## 2024-10-16 PROCEDURE — 99232 SBSQ HOSP IP/OBS MODERATE 35: CPT | Performed by: NURSE PRACTITIONER

## 2024-10-16 PROCEDURE — G0378 HOSPITAL OBSERVATION PER HR: HCPCS

## 2024-10-16 PROCEDURE — 80053 COMPREHEN METABOLIC PANEL: CPT | Performed by: NURSE PRACTITIONER

## 2024-10-16 PROCEDURE — 25010000002 HEPARIN (PORCINE) PER 1000 UNITS: Performed by: NURSE PRACTITIONER

## 2024-10-16 PROCEDURE — 82948 REAGENT STRIP/BLOOD GLUCOSE: CPT

## 2024-10-16 RX ADMIN — SODIUM CHLORIDE 2000 MG: 900 INJECTION INTRAVENOUS at 21:15

## 2024-10-16 RX ADMIN — Medication 10 ML: at 21:21

## 2024-10-16 RX ADMIN — HEPARIN SODIUM 5000 UNITS: 5000 INJECTION INTRAVENOUS; SUBCUTANEOUS at 14:35

## 2024-10-16 RX ADMIN — NIRMATRELVIR AND RITONAVIR 2 TABLET: KIT at 21:15

## 2024-10-16 RX ADMIN — ACETAMINOPHEN 650 MG: 325 TABLET ORAL at 14:34

## 2024-10-16 RX ADMIN — NIRMATRELVIR AND RITONAVIR 2 TABLET: KIT at 10:10

## 2024-10-16 RX ADMIN — Medication 10 ML: at 10:10

## 2024-10-16 RX ADMIN — HEPARIN SODIUM 5000 UNITS: 5000 INJECTION INTRAVENOUS; SUBCUTANEOUS at 05:12

## 2024-10-16 RX ADMIN — HEPARIN SODIUM 5000 UNITS: 5000 INJECTION INTRAVENOUS; SUBCUTANEOUS at 21:15

## 2024-10-16 RX ADMIN — SENNOSIDES AND DOCUSATE SODIUM 2 TABLET: 50; 8.6 TABLET ORAL at 21:15

## 2024-10-16 RX ADMIN — CLOPIDOGREL BISULFATE 75 MG: 75 TABLET ORAL at 10:09

## 2024-10-16 NOTE — CASE MANAGEMENT/SOCIAL WORK
Continued Stay Note  [facility]     Patient Name: Georgia Velázquez  MRN: [id number]  Today's Date: 10/16/2024    Admit Date: 10/14/2024    Plan: Goal is SNF   Discharge Plan       Row Name 10/16/24 1651       Plan    Plan Goal is SNF    Patient/Family in Agreement with Plan yes    Plan Comments CM spoke to patient and daughter at bedside to discuss therapy's recommendation for home with 24/7 assist and home health. Patient ambulated 20 feet with CG & RW. Daughter wants IPR. Patient was agreeable. CM explained that there is a chance insurance will deny. Daughter verbalizes understanding, but wants CM to try. Patient tested positive for Covid on 10/15. CM explained to daughter that the SNF's will likely require (10) days of isolation prior to offering a bed and that she will have to stay at the hospital during that time and could become weaker while she waits. She verbalized understanding and wants to proceed. Declined referrals: [facility] & [facility] are not in network with [employer] & [facility] declined. Pending referrals to [facility], [facility], [facility], [place], [facility], & [facility]. She is not appropriate for a referral to [facility] (will take Covid +). CM will continue to follow.    Final Discharge Disposition Code 03 - skilled nursing facility (SNF)                   Discharge Codes    No documentation.                 Expected Discharge Date and Time       Expected Discharge Date Expected Discharge Time    Oct 18, 2024               Emily Greenwood, JEANMARIE

## 2024-10-16 NOTE — PLAN OF CARE
Problem: Adult Inpatient Plan of Care  Goal: Plan of Care Review  Outcome: Progressing  Goal: Patient-Specific Goal (Individualized)  Outcome: Progressing  Goal: Absence of Hospital-Acquired Illness or Injury  Outcome: Progressing  Intervention: Identify and Manage Fall Risk  Recent Flowsheet Documentation  Taken 10/16/2024 0000 by Hao Clinton RN  Safety Promotion/Fall Prevention:   assistive device/personal items within reach   clutter free environment maintained   fall prevention program maintained   lighting adjusted   nonskid shoes/slippers when out of bed   room organization consistent   safety round/check completed   toileting scheduled  Taken 10/15/2024 2024 by Hao Clinton RN  Safety Promotion/Fall Prevention:   assistive device/personal items within reach   clutter free environment maintained   fall prevention program maintained   lighting adjusted   nonskid shoes/slippers when out of bed   room organization consistent   safety round/check completed   toileting scheduled  Intervention: Prevent Skin Injury  Recent Flowsheet Documentation  Taken 10/16/2024 0000 by Hao Clinton RN  Body Position: position changed independently  Taken 10/15/2024 2024 by Hao Clinton RN  Body Position: position changed independently  Skin Protection: (coccyx allevyn, bilat heel allevyn)   drying agents applied   incontinence pads utilized   silicone foam dressing in place  Intervention: Prevent and Manage VTE (Venous Thromboembolism) Risk  Recent Flowsheet Documentation  Taken 10/15/2024 2024 by Hao Clinton RN  VTE Prevention/Management: (SQ Heparin)   bilateral   SCDs (sequential compression devices) off   patient refused intervention   other (see comments)  Intervention: Prevent Infection  Recent Flowsheet Documentation  Taken 10/16/2024 0000 by Hao Clinton RN  Infection Prevention:   environmental surveillance performed   equipment surfaces disinfected    hand hygiene promoted   rest/sleep promoted   single patient room provided  Taken 10/15/2024 2024 by Hao Clinton, RN  Infection Prevention:   environmental surveillance performed   equipment surfaces disinfected   hand hygiene promoted   rest/sleep promoted   single patient room provided  Goal: Optimal Comfort and Wellbeing  Outcome: Progressing  Intervention: Monitor Pain and Promote Comfort  Recent Flowsheet Documentation  Taken 10/15/2024 2024 by Hao Clinton RN  Pain Management Interventions:   care clustered   medication offered but refused   pain management plan reviewed with patient/caregiver   pillow support provided   position adjusted   quiet environment facilitated  Intervention: Provide Person-Centered Care  Recent Flowsheet Documentation  Taken 10/15/2024 2024 by Hao Clinton RN  Trust Relationship/Rapport:   care explained   choices provided   emotional support provided   empathic listening provided   questions answered   questions encouraged   reassurance provided   thoughts/feelings acknowledged  Goal: Readiness for Transition of Care  Outcome: Progressing     Problem: Skin Injury Risk Increased  Goal: Skin Health and Integrity  Outcome: Progressing  Intervention: Optimize Skin Protection  Recent Flowsheet Documentation  Taken 10/16/2024 0000 by Hao Clinton RN  Activity Management: activity encouraged  Head of Bed (HOB) Positioning: HOB at 30-45 degrees  Taken 10/15/2024 2024 by Hao Clinton RN  Activity Management: activity encouraged  Pressure Reduction Techniques:   frequent weight shift encouraged   heels elevated off bed   weight shift assistance provided  Head of Bed (HOB) Positioning: HOB at 30-45 degrees  Pressure Reduction Devices:   positioning supports utilized   pressure-redistributing mattress utilized  Skin Protection: (coccyx allevyn, bilat heel allevyn)   drying agents applied   incontinence pads utilized   silicone foam dressing  in place     Problem: Fall Injury Risk  Goal: Absence of Fall and Fall-Related Injury  Outcome: Progressing  Intervention: Identify and Manage Contributors  Recent Flowsheet Documentation  Taken 10/16/2024 0000 by Hao Clinton RN  Medication Review/Management: medications reviewed  Taken 10/15/2024 2024 by Hao Clinton RN  Medication Review/Management: medications reviewed  Intervention: Promote Injury-Free Environment  Recent Flowsheet Documentation  Taken 10/16/2024 0000 by Hao Clinton RN  Safety Promotion/Fall Prevention:   assistive device/personal items within reach   clutter free environment maintained   fall prevention program maintained   lighting adjusted   nonskid shoes/slippers when out of bed   room organization consistent   safety round/check completed   toileting scheduled  Taken 10/15/2024 2024 by Hao Clinton RN  Safety Promotion/Fall Prevention:   assistive device/personal items within reach   clutter free environment maintained   fall prevention program maintained   lighting adjusted   nonskid shoes/slippers when out of bed   room organization consistent   safety round/check completed   toileting scheduled     Problem: Comorbidity Management  Goal: Blood Glucose Level Within Target Range  Outcome: Progressing  Intervention: Monitor and Manage Glycemia  Recent Flowsheet Documentation  Taken 10/16/2024 0000 by Hao Clinton RN  Medication Review/Management: medications reviewed  Taken 10/15/2024 2024 by Hao Clinton RN  Medication Review/Management: medications reviewed  Goal: Maintenance of Heart Failure Symptom Control  Outcome: Progressing  Intervention: Maintain Heart Failure Management  Recent Flowsheet Documentation  Taken 10/16/2024 0000 by Hao Clinton RN  Medication Review/Management: medications reviewed  Taken 10/15/2024 2024 by Hao Clinton RN  Medication Review/Management: medications reviewed  Goal:  Blood Pressure in Desired Range  Outcome: Progressing  Intervention: Maintain Blood Pressure Management  Recent Flowsheet Documentation  Taken 10/16/2024 0000 by Hao Clinton, RN  Medication Review/Management: medications reviewed  Taken 10/15/2024 2024 by Hao Clinton, RN  Medication Review/Management: medications reviewed   Goal Outcome Evaluation:

## 2024-10-16 NOTE — PROGRESS NOTES
[facility] Medicine Services  PROGRESS NOTE    Patient Name: Georgia Velázquez  : 1945  MRN: [id number]    Date of Admission: 10/14/2024  Primary Care Physician: Provider, No Known    Subjective   Subjective     CC:  F/u encephalopathy    HPI:  Patient resting in bed.  Daughter is at the bedside.  Patient says she has some cough but it is dry.  Denies shortness of breath, N/V/D.  Patient and daughter are considering rehab.      Objective   Objective     Vital Signs:   Temp:  [98.3 °F (36.8 °C)-98.8 °F (37.1 °C)] 98.3 °F (36.8 °C)  Heart Rate:  [56-80] 56  Resp:  [20] 20  BP: (113-175)/(49-77) 119/49  Flow (L/min) (Oxygen Therapy):  [3] 3     Physical Exam:  Constitutional: No acute distress, awake, alert  HENT: NCAT, mucous membranes moist  Respiratory: Scattered expiratory wheezes bilaterally, respiratory effort normal, room air  Cardiovascular: RRR, no murmurs, rubs, or gallops  Gastrointestinal: Positive bowel sounds, soft, nontender, nondistended  Musculoskeletal: Trace bilateral ankle edema  Psychiatric: Appropriate affect, cooperative  Neurologic: Oriented x 3, moves all extremities, speech clear  Skin: No rashes, erythema BLE       Results Reviewed:  LAB RESULTS:      Lab 10/16/24  0433 10/15/24  1601 10/15/24  0647 10/15/24  0431 10/14/24  2141   WBC 6.12  --   --   --  7.18   HEMOGLOBIN 12.3  --   --   --  13.1   HEMATOCRIT 37.5  --   --   --  39.7   PLATELETS 154  --   --   --  177   NEUTROS ABS 3.58  --   --   --  5.58   IMMATURE GRANS (ABS) 0.01  --   --   --  0.05   LYMPHS ABS 1.57  --   --   --  0.75   MONOS ABS 0.81  --   --   --  0.78   EOS ABS 0.13  --   --   --  0.00   MCV 93.5  --   --   --  94.7   CRP  --   --   --   --  1.79*   PROCALCITONIN  --   --   --   --  0.09   LACTATE  --   --   --  1.2  --    LDH  --   --   --   --  152   PROTIME  --   --  16.3*  --   --    APTT  --   --  30.4  --   --    HEPARIN ANTI-XA  --   --   --  0.10*  --    D DIMER QUANT  --    --  1.76*  --   --    HSTROP T  --  34*  --  40* 36*         Lab 10/16/24  0433 10/15/24  0431 10/14/24  2146 10/14/24  2141   SODIUM 137  --   --  137   POTASSIUM 4.5  --   --  4.3   CHLORIDE 105  --   --  102   CO2 22.0  --   --  23.0   ANION GAP 10.0  --   --  12.0   BUN 26*  --   --  25*   CREATININE 1.36*  --  1.60* 1.45*   EGFR 39.2*  --   --  36.3*   GLUCOSE 78  --   --  132*   CALCIUM 8.6  --   --  9.0   MAGNESIUM 2.0  --   --   --    HEMOGLOBIN A1C  --  5.50  --   --          Lab 10/16/24  0433 10/14/24  2141   TOTAL PROTEIN 6.3 7.3   ALBUMIN 3.4* 3.8   GLOBULIN 2.9 3.5   ALT (SGPT) 12 11   AST (SGOT) 28 19   BILIRUBIN 0.3 0.6   ALK PHOS 83 102   LIPASE  --  19         Lab 10/15/24  1601 10/15/24  0647 10/15/24  0431 10/14/24  2141   PROBNP  --   --   --  3,900.0*   HSTROP T 34*  --  40* 36*   PROTIME  --  16.3*  --   --    INR  --  1.30*  --   --              Lab 10/14/24  2141   FERRITIN 301.80*         Brief Urine Lab Results  (Last result in the past 365 days)        Color   Clarity   Blood   Leuk Est   Nitrite   Protein   CREAT   Urine HCG        10/14/24 2203 Yellow   Cloudy   Moderate (2+)   Moderate (2+)   Positive   100 mg/dL (2+)                   Microbiology Results Abnormal       Procedure Component Value - Date/Time    Blood Culture - Blood, Arm, Left [[id number]]  (Normal) Collected: 10/15/24 0431    Lab Status: Preliminary result Specimen: Blood from Arm, Left Updated: 10/16/24 0615     Blood Culture No growth at 24 hours    Narrative:      Less than seven (7) mL's of blood was collected.  Insufficient quantity may yield false negative results.    Blood Culture - Blood, Hand, Right [[id number]]  (Normal) Collected: 10/15/24 0431    Lab Status: Preliminary result Specimen: Blood from Hand, Right Updated: 10/16/24 0615     Blood Culture No growth at 24 hours    Narrative:      Less than seven (7) mL's of blood was collected.  Insufficient quantity may yield false negative results.             Adult Transthoracic Echo Complete W/ Cont if Necessary Per Protocol    Result Date: 10/15/2024    Left ventricular systolic function is mildly decreased. Calculated left ventricular EF = 41.4% Left ventricular ejection fraction appears to be 41 - 45%.   The following left ventricular wall segments are hypokinetic: apical anterior, apical lateral, apical inferior, apical septal and apex hypokinetic.   Left ventricular diastolic function is consistent with (grade II w/high LAP) pseudonormalization.   Mild aortic valve stenosis is present. Aortic valve area is 1.8 cm2.   Peak velocity of the flow distal to the aortic valve is 209.8 cm/s. Aortic valve maximum pressure gradient is 18 mmHg. Aortic valve mean pressure gradient is 10 mmHg. Aortic valve dimensionless index is 0 .   Mild mitral valve stenosis is present. The mitral valve peak gradient is 13 mmHg. The mitral valve mean gradient is 5 mmHg.   Estimated right ventricular systolic pressure from tricuspid regurgitation is normal (<35 mmHg).     Duplex Carotid Ultrasound CAR    Result Date: 10/15/2024    Right internal carotid artery demonstrates a less than 50% stenosis.   Antegrade right vertebral flow.   Left internal carotid artery demonstrates a 50-69% stenosis, mild increase in velocities from study 2/2024.   Antegrade left vertebral flow.     CT Abdomen Pelvis With Contrast    Result Date: 10/14/2024  CT ABDOMEN PELVIS W CONTRAST Date of Exam: 10/14/2024 10:21 PM EDT Indication: generalized abdominal pain, vomiting. Comparison: 7/21/2020 Technique: Axial CT images were obtained of the abdomen and pelvis following the uneventful intravenous administration of contrast. Reconstructed coronal and sagittal images were also obtained. Automated exposure control and iterative construction methods were used. Findings: There is a small right pleural effusion with some adjacent atelectasis in the right lower lobe. The left lung base is clear. There is extensive  atherosclerotic disease, including within the mesenteric arteries. No aortic aneurysm. Gallbladder is surgically absent. No biliary obstruction is seen. There is some atrophy of the pancreas. There are bilateral renal cysts. No follow-up is indicated. There is right renal atrophy with cortical thinning and cortical lobulation. Both kidneys are nonobstructed. Mild left adrenal hyperplasia is unchanged. Solid abdominal organs are otherwise normal. The urinary bladder and solid pelvic organs appear normal. The appendix has been removed. There is a lipoma at the ileocecal valve. No evidence of acute colitis. No significant stool burden. No small bowel obstruction or clear evidence of enteritis. Stomach is normal except for a stable small hiatal hernia. No free fluid or adenopathy is identified. Old compression fracture with kyphoplasty noted at L4.     Impression: 1. Small right pleural effusion with right lower lobe atelectasis. 2. No clearly acute findings in the abdomen or pelvis. 3. Appendectomy. No bowel obstruction or evidence of enteritis or colitis. 4. Nonobstructed kidneys with renal atrophy on the right. Electronically Signed: Mat Godwin MD  10/14/2024 10:41 PM EDT  Workstation ID: [id number]    CT Head Without Contrast    Result Date: 10/14/2024  CT HEAD WO CONTRAST Date of Exam: 10/14/2024 10:21 PM EDT Indication: fall, ams. Comparison: 3/17/2021 Technique: Axial CT images were obtained of the head without contrast administration.  Automated exposure control and iterative construction methods were used. Findings: There is no evidence of acute territorial infarction. There is no acute intracranial hemorrhage. There are no extra-axial collections. Ventricles and CSF spaces are symmetric. No mass effect nor hydrocephalus. Brain parenchyma appears unchanged with advanced white matter hypoattenuation.  Paranasal sinuses and mastoid air cells are adequately aerated.  Osseous structures and orbits appear intact.      Impression: Impression: * 1. No acute process identified. Electronically Signed: Albaro Barrera MD  10/14/2024 10:33 PM EDT  Workstation ID: [id number]    XR Chest 1 View    Result Date: 10/14/2024  XR CHEST 1 VW Date of Exam: 10/14/2024 9:19 PM EDT Indication: ams Comparison: Chest radiograph 6/13/2022 Findings: Mediastinum: Cardiomediastinal silhouette appears unchanged and enlarged. Lungs: Mild peripheral right lung base hazy opacity. Pleura: Mild blunting of right costophrenic sulcus. No pneumothorax. Bones and soft tissues: No acute osseous abnormality.     Impression: Impression: Possible small right pleural effusion with adjacent atelectasis. Superimposed infection to be excluded clinically. Electronically Signed: García Argueta  10/14/2024 9:44 PM EDT  Workstation ID: [id number]     Results for orders placed during the hospital encounter of 10/14/24    Adult Transthoracic Echo Complete W/ Cont if Necessary Per Protocol    Interpretation Summary    Left ventricular systolic function is mildly decreased. Calculated left ventricular EF = 41.4% Left ventricular ejection fraction appears to be 41 - 45%.    The following left ventricular wall segments are hypokinetic: apical anterior, apical lateral, apical inferior, apical septal and apex hypokinetic.    Left ventricular diastolic function is consistent with (grade II w/high LAP) pseudonormalization.    Mild aortic valve stenosis is present. Aortic valve area is 1.8 cm2.    Peak velocity of the flow distal to the aortic valve is 209.8 cm/s. Aortic valve maximum pressure gradient is 18 mmHg. Aortic valve mean pressure gradient is 10 mmHg. Aortic valve dimensionless index is 0 .    Mild mitral valve stenosis is present. The mitral valve peak gradient is 13 mmHg. The mitral valve mean gradient is 5 mmHg.    Estimated right ventricular systolic pressure from tricuspid regurgitation is normal (<35 mmHg).      Current medications:  Scheduled Meds:cefTRIAXone, 2,000 mg,  Intravenous, Q24H  clopidogrel, 75 mg, Oral, Daily  heparin (porcine), 5,000 Units, Subcutaneous, Q8H  insulin lispro, 2-7 Units, Subcutaneous, 4x Daily AC & at Bedtime  Nirmatrelvir&Ritonavir 150/100, 2 tablet, Oral, BID  senna-docusate sodium, 2 tablet, Oral, BID  sodium chloride, 10 mL, Intravenous, Q12H  valsartan, 240 mg, Oral, Daily      Continuous Infusions:   PRN Meds:.  acetaminophen **OR** acetaminophen    albuterol sulfate HFA    benzonatate    senna-docusate sodium **AND** polyethylene glycol **AND** bisacodyl **AND** bisacodyl    dextromethorphan polistirex ER    dextrose    dextrose    glucagon (human recombinant)    influenza vaccine    melatonin    sodium chloride    Assessment & Plan   Assessment & Plan     Active Hospital Problems    Diagnosis  POA    **COVID-19 virus detected [U07.1]  Yes    Acute UTI (urinary tract infection) [N39.0]  Yes    CKD (chronic kidney disease) stage 3, GFR 30-59 ml/min [N18.30]  Yes    T2DM (type 2 diabetes mellitus) [E11.9]  Yes    CAD (coronary artery disease) [I25.10]  Yes    Syncope [R55]  Yes    Dementia [F03.90]  Unknown    Diastolic dysfunction [I51.89]  Yes    Chronic obstructive pulmonary disease [J44.9]  Yes    Essential hypertension [I10]  Yes    GERD (gastroesophageal reflux disease) [K21.9]  Yes    Hyperlipidemia [E78.5]  Yes      Resolved Hospital Problems   No resolved problems to display.        Brief Hospital Course to date:  Georgia Velázquez is a 79 y.o. female  with CAD status post stent, diastolic CHF, hypertension, hyperlipidemia, history of CVA, type 2 diabetes, CKD 3, GERD, COPD, dementia who presented to the ED with altered mental status and syncope at home.  Patient was found to be COVID-positive and have a UTI.     This patient's problems and plans were partially entered by my partner and updated as appropriate by me 10/16/24.      UTI  - Urine culture preliminarily positive for E. Coli, sensitivities pending  - Continue Rocephin for now to  complete 5-day course  - Blood cultures no growth to date     COVID-19 infection  COPD, compensated  -- Covid + on 10/14  - Patient remains on room air  - CT shows possible right small pleural effusion  - Continue Paxlovid  - Hold steroids as patient is not hypoxic  -- As needed albuterol for wheezing  -- Daily CBC w diff, CMP, mag     Syncope  - Likely multifactorial in the setting of UTI and COVID and dehydration.  - Fall with left hand laceration status post sutures in the ED  - CT head unremarkable  - Echo with EF 41-45%, left wall hypokinesis, grade II diastolic dysfunctionpending  - Orthostatic vitals negative in the ED  - Carotid duplex shows less than 50% stenosis on the right side, left side shows 50 to 69% stenosis which is a mild increase from the study done in February.  --PT and OT following     Dementia  - Patient remains pleasantly confused  - CT head on arrival unremarkable  - Patient on fall precautions     CKD 3  -- Baseline ~1.2-1.5 per chart review  - Improving, within baseline  - Will continue to monitor, 1.36 today     Diastolic heart failure  - Currently compensated  - Echo with EF 41-45%, left wall hypokinesis, grade II diastolic dysfunction  -- Strict I's & O's, daily weights     Type 2 diabetes  --A1c 5.5  -Sliding scale insulin     CAD status post stent  Hyperlipidemia  History of CVA  - Continue Plavix  - Continue Diovan  - Holding statin while on Paxlovid      Expected Discharge Location and Transportation: SNF rehab  Expected Discharge pending bed offer, insurance approval, isolation rules  Expected Discharge Date: 10/18/2024; Expected Discharge Time:      VTE Prophylaxis:  Pharmacologic VTE prophylaxis orders are present.         AM-PAC 6 Clicks Score (PT): 17 (10/15/24 2024)    CODE STATUS:   Code Status and Medical Interventions: No CPR (Do Not Attempt to Resuscitate); Limited Support; No intubation (DNI); DNR/DNI   Ordered at: 10/15/24 0122     Medical Intervention Limits:    No  intubation (DNI)     Level Of Support Discussed With:    Health Care Surrogate     Code Status (Patient has no pulse and is not breathing):    No CPR (Do Not Attempt to Resuscitate)     Medical Interventions (Patient has pulse or is breathing):    Limited Support     Comments:    DNR/DNI       Alisa Sparks, APRN  10/16/24

## 2024-10-16 NOTE — DISCHARGE PLACEMENT REQUEST
"Georgia Palumbo (79 y.o. Female)     Emily Greenwood RN  [phone]  SNF to home   Covid positive on 10/15      Date of Birth   1945    Social Security Number       Address   [address] [place], [place] [address]    Home Phone   [phone]    MRN   [id number]       Faith   [employer]    Marital Status                               Admission Date   10/14/24    Admission Type   Emergency    Admitting Provider   Mariah Schultz MD    Attending Provider   Mariah Schultz MD    Department, Room/Bed   [facility] 5G, S547/1       Discharge Date       Discharge Disposition       Discharge Destination                                 Attending Provider: Mariah Schultz MD    Allergies: No Known Allergies    Isolation: Contact Air   Infection: Other (22), COVID (confirmed) (10/14/24)   Code Status: No CPR    Ht: 154.9 cm (61\")   Wt: 112 kg (246 lb)    Admission Cmt: None   Principal Problem: COVID-19 virus detected [U07.1]                   Active Insurance as of 10/14/2024       Primary Coverage       Payor Plan Insurance Group Employer/Plan Group    ANTHEM MEDICARE REPLACEMENT ANTHEM MEDICARE ADVANTAGE [id number]       Payor Plan Address Payor Plan Phone Number Payor Plan Fax Number Effective Dates    [address] [phone]  2024 - None Entered    [facility] [id number]         Subscriber Name Subscriber Birth Date Member ID       GEORGIA PALUMBO 1945 [id number]                     Emergency Contacts        (Rel.) Home Phone Work Phone Mobile Phone    JARVIS ESCALONA (Daughter) [phone] -- [phone]    PALUMBOABDELRAHMAN (Son) [phone] -- [phone]                 History & Physical        Day, Eryn ORANTES MD at 10/15/24 0031              [facility] Medicine Services  HISTORY AND PHYSICAL    Patient Name: Georgia Palumbo  : 1945  MRN: " "[id number]  Primary Care Physician: Provider, No Known  Date of admission: 10/14/2024    Subjective  Subjective     Chief Complaint:  Altered mental status     HPI:  Georiga Velázquez is a 79 y.o. female w/ a hx of CAD (s/p stent), diastolic CHF, HTN, HLD, hx of CVA, diet controlled T2DM, CKD Stage III, GERD, COPD, dementia who presented to the ED w/ c/o altered mental status.   Pt lives w/ her family. Pt uses a walker to ambulate. Pt confused at baseline 2/2 dementia.   HPI per pt's daughter. Pt w/ 2-3 days of progressively worsening generalized weakness and fatigue. Pt slept the majority of the day on Monday. This evening pt was sitting on her Rolator when she c/o nausea. Pt then had a syncopal episode witnessed by her daughter and grandson. Pt fell off the Rolator and hit her left hand causing a laceration. Pt had LOC for \"seconds\".   Pt evaluated in the ED. COVID-19 detected. CXR w/ small right pleural effusion. CT abd/pel with no acute findings. CT Head unremarkable. UA c/w UTI. Pt admitted to the hospital medicine service for further evaluation.     Review of Systems   Unable to perform ROS: Mental status change      Personal History     Past Medical History:   Diagnosis Date    Acute kidney injury 12/05/2017    Aortic regurgitation     Arthritis of shoulder 05/18/2016    Body mass index (BMI) of 45.0-49.9 in adult 06/14/2019    Bright red rectal bleeding 12/03/2023    CHF (congestive heart failure)     Closed compression fracture of L4 lumbar vertebra 12/04/2017    Added automatically from request for surgery [id number]    Colitis 07/21/2020    Constipation 12/05/2017    COPD (chronic obstructive pulmonary disease)     Coronary artery disease 05/18/2016    Diabetes mellitus type 2 in obese 01/29/2018    Elevated alkaline phosphatase level 02/26/2018    Elevated creatine kinase     Essential hypertension 05/18/2016    GERD (gastroesophageal reflux disease) 05/18/2016    Glaucoma 07/21/2020    History of " kyphoplasty 01/30/2018    Hyperlipidemia     Lumbar facet arthropathy 01/29/2018    Lumbar stenosis with neurogenic claudication 01/29/2018    Morbid obesity due to excess calories 01/29/2018    Myocardial infarction 05/18/2016    Osteoporosis 05/18/2016    Physical deconditioning 01/30/2018    Retinal emboli, 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    Sepsis     uro    Stroke     Urinary incontinence without sensory awareness 02/26/2018     Past Surgical History:   Procedure Laterality Date    APPENDECTOMY      BACK SURGERY      CARDIAC CATHETERIZATION      CHOLECYSTECTOMY      CORONARY STENT PLACEMENT      EYE SURGERY      KYPHOPLASTY N/A 12/07/2017    Procedure: KYPHOPLASTY L4;  Surgeon: Marc Pham MD;  Location: [facility];  Service:      Family History: family history includes Diabetes in her mother; Heart failure in her father and mother; No Known Problems in her brother, daughter, sister, and son.     Social History:  reports that she quit smoking about 24 years ago. Her smoking use included cigarettes. She started smoking about 39 years ago. She has a 15 pack-year smoking history. She has been exposed to tobacco smoke. She has never used smokeless tobacco. She reports that she does not drink alcohol and does not use drugs.  Social History     Social History Narrative    Living w daughter. Daughter works at [employer], in process of getting medical POA.      Medications:  Vitamin D3, albuterol sulfate HFA, atorvastatin, clopidogrel, docusate sodium, ipratropium-albuterol, nystatin, and valsartan    No Known Allergies    Objective  Objective     Vital Signs:   Temp:  [99 °F (37.2 °C)-101 °F (38.3 °C)] 99.1 °F (37.3 °C)  Heart Rate:  [72-85] 78  Resp:  [16-24] 24  BP: (127-184)/() 139/74    Physical Exam     Constitutional: Awake, alert; chronically ill appearing   Eyes: PERRLA, sclerae anicteric, no conjunctival injection  HENT: NCAT, mucous membranes moist  Neck: Supple, no thyromegaly, no lymphadenopathy, trachea  midline  Respiratory: Decreased bases bilaterally; no rhonchi, nonlabored respirations   Cardiovascular: RRR, no murmurs, rubs, or gallops, no peripheral edema   Gastrointestinal: Positive bowel sounds, soft, nontender, nondistended  Musculoskeletal: Normal ROM bilaterally   Psychiatric: Cooperative  Neurologic: Oriented to self, strength symmetric in all extremities, speech clear  Skin: No rashes, lesions; laceration (sutures intact)- left hand     Result Review:  I have personally reviewed the results from the time of this admission to 10/15/2024 01:37 EDT and agree with these findings:  [x]  Laboratory list / accordion  []  Microbiology  [x]  Radiology  [x]  EKG/Telemetry   []  Cardiology/Vascular   []  Pathology  [x]  Old records    LAB RESULTS:      Lab 10/14/24  2141   WBC 7.18   HEMOGLOBIN 13.1   HEMATOCRIT 39.7   PLATELETS 177   NEUTROS ABS 5.58   IMMATURE GRANS (ABS) 0.05   LYMPHS ABS 0.75   MONOS ABS 0.78   EOS ABS 0.00   MCV 94.7   CRP 1.79*   PROCALCITONIN 0.09         Lab 10/14/24  2146 10/14/24  2141   SODIUM  --  137   POTASSIUM  --  4.3   CHLORIDE  --  102   CO2  --  23.0   ANION GAP  --  12.0   BUN  --  25*   CREATININE 1.60* 1.45*   EGFR  --  36.3*   GLUCOSE  --  132*   CALCIUM  --  9.0         Lab 10/14/24  2141   TOTAL PROTEIN 7.3   ALBUMIN 3.8   GLOBULIN 3.5   ALT (SGPT) 11   AST (SGOT) 19   BILIRUBIN 0.6   ALK PHOS 102   LIPASE 19         Lab 10/14/24  2141   PROBNP 3,900.0*   HSTROP T 36*             Lab 10/14/24  2141   FERRITIN 301.80*         Brief Urine Lab Results  (Last result in the past 365 days)        Color   Clarity   Blood   Leuk Est   Nitrite   Protein   CREAT   Urine HCG        10/14/24 2203 Yellow   Cloudy   Moderate (2+)   Moderate (2+)   Positive   100 mg/dL (2+)                 Microbiology Results (last 10 days)       Procedure Component Value - Date/Time    COVID PRE-OP / PRE-PROCEDURE SCREENING ORDER (NO ISOLATION) - Swab, Nasopharynx [[id number]]  (Abnormal) Collected:  10/14/24 2252    Lab Status: Final result Specimen: Swab from Nasopharynx Updated: 10/14/24 2324    Narrative:      The following orders were created for panel order COVID PRE-OP / PRE-PROCEDURE SCREENING ORDER (NO ISOLATION) - Swab, Nasopharynx.  Procedure                               Abnormality         Status                     ---------                               -----------         ------                     COVID-19 and FLU A/B PCR...[[id number]]  Abnormal            Final result                 Please view results for these tests on the individual orders.    COVID-19 and FLU A/B PCR, 1 HR TAT - Swab, Nasopharynx [[id number]]  (Abnormal) Collected: 10/14/24 2252    Lab Status: Final result Specimen: Swab from Nasopharynx Updated: 10/14/24 2324     COVID19 Detected     Influenza A PCR Not Detected     Influenza B PCR Not Detected    Narrative:      Fact sheet for providers: https://www.fda.gov/media/670158/download    Fact sheet for patients: https://www.fda.gov/media/126330/download    Test performed by PCR.  Influenza A and Influenza B negative results should be considered presumptive in samples that have a positive SARS-CoV-2 result.    Competitive inhibition studies showed that SARS-CoV-2 virus, when present at concentrations above 3.6E+04 copies/mL, can inhibit the detection and amplification of influenza A and influenza B virus RNA if present at or below 1.8E+02 copies/mL or 4.9E+02 copies/mL, respectively, and may lead to false negative influenza virus results. If co-infection with influenza A or influenza B virus is suspected in samples with a positive SARS-CoV-2 result, the sample should be re-tested with another FDA cleared, approved, or authorized influenza test, if influenza virus detection would change clinical management.          CT Abdomen Pelvis With Contrast    Result Date: 10/14/2024  CT ABDOMEN PELVIS W CONTRAST Date of Exam: 10/14/2024 10:21 PM EDT Indication: generalized abdominal pain,  vomiting. Comparison: 7/21/2020 Technique: Axial CT images were obtained of the abdomen and pelvis following the uneventful intravenous administration of contrast. Reconstructed coronal and sagittal images were also obtained. Automated exposure control and iterative construction methods were used. Findings: There is a small right pleural effusion with some adjacent atelectasis in the right lower lobe. The left lung base is clear. There is extensive atherosclerotic disease, including within the mesenteric arteries. No aortic aneurysm. Gallbladder is surgically absent. No biliary obstruction is seen. There is some atrophy of the pancreas. There are bilateral renal cysts. No follow-up is indicated. There is right renal atrophy with cortical thinning and cortical lobulation. Both kidneys are nonobstructed. Mild left adrenal hyperplasia is unchanged. Solid abdominal organs are otherwise normal. The urinary bladder and solid pelvic organs appear normal. The appendix has been removed. There is a lipoma at the ileocecal valve. No evidence of acute colitis. No significant stool burden. No small bowel obstruction or clear evidence of enteritis. Stomach is normal except for a stable small hiatal hernia. No free fluid or adenopathy is identified. Old compression fracture with kyphoplasty noted at L4.     Impression: 1. Small right pleural effusion with right lower lobe atelectasis. 2. No clearly acute findings in the abdomen or pelvis. 3. Appendectomy. No bowel obstruction or evidence of enteritis or colitis. 4. Nonobstructed kidneys with renal atrophy on the right. Electronically Signed: Mat Godwin MD  10/14/2024 10:41 PM EDT  Workstation ID: [id number]    CT Head Without Contrast    Result Date: 10/14/2024  CT HEAD WO CONTRAST Date of Exam: 10/14/2024 10:21 PM EDT Indication: fall, ams. Comparison: 3/17/2021 Technique: Axial CT images were obtained of the head without contrast administration.  Automated exposure control  and iterative construction methods were used. Findings: There is no evidence of acute territorial infarction. There is no acute intracranial hemorrhage. There are no extra-axial collections. Ventricles and CSF spaces are symmetric. No mass effect nor hydrocephalus. Brain parenchyma appears unchanged with advanced white matter hypoattenuation.  Paranasal sinuses and mastoid air cells are adequately aerated.  Osseous structures and orbits appear intact.     Impression: Impression: * 1. No acute process identified. Electronically Signed: Albaro Barrera MD  10/14/2024 10:33 PM EDT  Workstation ID: [id number]    XR Chest 1 View    Result Date: 10/14/2024  XR CHEST 1 VW Date of Exam: 10/14/2024 9:19 PM EDT Indication: ams Comparison: Chest radiograph 6/13/2022 Findings: Mediastinum: Cardiomediastinal silhouette appears unchanged and enlarged. Lungs: Mild peripheral right lung base hazy opacity. Pleura: Mild blunting of right costophrenic sulcus. No pneumothorax. Bones and soft tissues: No acute osseous abnormality.     Impression: Impression: Possible small right pleural effusion with adjacent atelectasis. Superimposed infection to be excluded clinically. Electronically Signed: García Argueta  10/14/2024 9:44 PM EDT  Workstation ID: [id number]     Results for orders placed during the hospital encounter of 03/17/21    Adult Transthoracic Echo Complete With Contrast if Necessary Per Protocol    Interpretation Summary  · Calculated left ventricular EF = 55% Estimated left ventricular EF was in agreement with the calculated left ventricular EF. Left ventricular systolic function is normal.  · Left ventricular diastolic function is consistent with (grade II w/high LAP) pseudonormalization.  · Estimated right ventricular systolic pressure from tricuspid regurgitation is normal (<35 mmHg). Calculated right ventricular systolic pressure from tricuspid regurgitation is 32 mmHg.  · Endocardial definition of the apical segments is  limited, unable to definitively assess apical regional wall motion abnormalities.    Assessment & Plan  Assessment & Plan       COVID-19 virus detected    Essential hypertension    Hyperlipidemia    GERD (gastroesophageal reflux disease)    Chronic obstructive pulmonary disease    Diastolic dysfunction    Acute UTI (urinary tract infection)    CKD (chronic kidney disease) stage 3, GFR 30-59 ml/min    T2DM (type 2 diabetes mellitus)    CAD (coronary artery disease)    Syncope    Dementia    Georgia Velázquez is a 79 y.o. female w/ a hx of CAD (s/p stent), diastolic CHF, HTN, HLD, hx of CVA, diet controlled T2DM, CKD Stage III, GERD, COPD, dementia who presented to the ED w/ c/o altered mental status.     **Acute UTI   -previous urine culture 3/2024 + E.Coli   -UA c/w UTI; urine culture pending   -blood cx pending   -procal, lactic acid pending (WBC WNL)  -s/p Rocephin given in ED; continue pending cx results   -s/p 1 liter NS bolus given in ED  -hold on further IVF for now   -symptom mgt    **COVID-19  **Hx of COPD   -currently 94% on room air   -CXR: possible small right effusion  -CT abd/pel showed small right effusion   -full respiratory panel pcr pending   -initial COVID labs pending (D.Dimer, ferritin, etc)   -Paxlovid   -hold on Decadron (currently no hypoxic)   -symptom mgt     **Syncope   **likely multifactorial w/ UTI, COVID, ?vasovagal event, ?dehydration  -fall w/ left hand laceration s/p sutures   -CT Head unremarkable   -last ECHO 2021: EF 55%, grade II LVDF   -ECHO pending   -carotid duplex pending (last duplex 2/2024: Right internal carotid artery demonstrates a less than 50% stenosis. Left internal carotid artery demonstrates a 50-69% stenosis. Right Vertebral: Bidirectional flow is present.)   -orthostatic Bps  -s/p 1 liter NS bolus   -fall precautions/bed alarm     **Dementia   -w/ slight increase in confusion over last few days   -CT Head unremarkable   -treating for UTI/COVID   -symptom  mgt  -fall precautions/bed alarm     **CKD Stage III; stable   -previous creatinine 1.57 in March 2024, 1.59 in 11/2023  -current creatinine 1.45 w/ eGFR 36.3  -UA c/w UTI   -s/p 1 liter NS bolus given in ED   -monitor    **Elevated troponin (mild at 36)  **CAD (s/p stent)  **Diastolic dysfunction   **HTN, HLD   **Hx of CVA   -last ECHO 2021: EF 55%, grade II LVDF   -ECHO pending   -troponin 36; repeat pending   -proBNP pending   -EKG stable   -CXR/CT abd/pel shows small right pleural effusion   -continue routine Plavix   -continue routine Diovan w/ hold parameters   -holding statin while on Paxlovid   -daily weights; strict I/Os     **T2DM  -diet controlled   -hem A1c pending   -FSBG q ac/hs w/ LDSS     DVT prophylaxis:  Heparin     CODE STATUS:    Medical Intervention Limits: No intubation (DNI)  Level Of Support Discussed With: Health Care Surrogate  Code Status (Patient has no pulse and is not breathing): No CPR (Do Not Attempt to Resuscitate)  Medical Interventions (Patient has pulse or is breathing): Limited Support  Comments: DNR/DNI    Expected Discharge   Expected discharge date/ time has not been documented.    This note has been completed as part of a split-shared workflow.     Signature: Electronically signed by DON Lee, 10/15/24, 1:37 AM EDT.    Time spent: 55 minutes       Attending   Admission Attestation       I have performed an independent face-to-face diagnostic evaluation including performing an independent physical examination.  I approve of the documented plan of care above that was reviewed and developed with the advanced practice clinician (APC) and take responsibility for that plan along with its associated risks.  I have updated the HPI as appropriate.    Brief HPI     78 y/o female with hx of HTN, HLD, GERD, COPD, DM, CAD, dementia who presents to ER w/ mental status change, syncope w/ fall per her daughter.  Federico notes pt had been getting up and down a lot today and states  she was sitting on rollator and had LOC w/ fall but LOC was only for a few seconds.  Pt w/ left had lac.  No c/o now and ena feels pt already feeling better.     Attending Physical Exam:  Temp:  [99 °F (37.2 °C)-101 °F (38.3 °C)] 99.1 °F (37.3 °C)  Heart Rate:  [72-85] 78  Resp:  [16-24] 24  BP: (127-184)/() 139/74     Above exam performed by me w/ no changes to above    Result Review:  I have personally reviewed the results from the time of this admission to 10/15/2024 01:38 EDT and agree with these findings:  [x]  Laboratory list / accordion  [x]  Microbiology  [x]  Radiology  [x]  EKG/Telemetry   []  Cardiology/Vascular   []  Pathology  []  Old records  []  Other:  Most notable findings include:    Ua w/ uti  Hst 36  Cr 1.45  Covid +  See imaging reports    Assessment and Plan:    See assessment and plan documented by APC above and updated/edited by me as appropriate.    Eryn Miller MD  10/15/24  Electronically signed by Eryn Miller MD, 10/15/24, 1:50 AM EDT.                        Electronically signed by Eryn Miller MD at 10/15/24 0150       Current Facility-Administered Medications   Medication Dose Route Frequency Provider Last Rate Last Admin    acetaminophen (TYLENOL) tablet 650 mg  650 mg Oral Q4H PRN Latonya Laird APRN   650 mg at 10/16/24 1434    Or    acetaminophen (TYLENOL) suppository 650 mg  650 mg Rectal Q4H PRN Latonya Laird APRN        albuterol sulfate HFA (PROVENTIL HFA;VENTOLIN HFA;PROAIR HFA) inhaler 2 puff  2 puff Inhalation Q4H PRN Latonya Laird APRN   2 puff at 10/15/24 2241    benzonatate (TESSALON) capsule 100 mg  100 mg Oral TID PRN Latonya Laird APRN   100 mg at 10/15/24 2010    sennosides-docusate (PERICOLACE) 8.6-50 MG per tablet 2 tablet  2 tablet Oral BID Latonya Laird APRN   2 tablet at 10/15/24 2010    And    polyethylene glycol (MIRALAX) packet 17 g  17 g Oral Daily PRN Latonya Laird APRN        And    bisacodyl (DULCOLAX) EC tablet 5 mg  5 mg Oral  Daily PRN Latonya Laird APRN        And    bisacodyl (DULCOLAX) suppository 10 mg  10 mg Rectal Daily PRN Latonya Laird APRN        cefTRIAXone (ROCEPHIN) 2,000 mg in sodium chloride 0.9 % 100 mL MBP  2,000 mg Intravenous Q24H Latonya Laird APRN 200 mL/hr at 10/15/24 2010 2,000 mg at 10/15/24 2010    clopidogrel (PLAVIX) tablet 75 mg  75 mg Oral Daily Latonya Laird APRN   75 mg at 10/16/24 1009    dextromethorphan polistirex ER (DELSYM) 30 MG/5ML oral suspension 60 mg  60 mg Oral Q12H PRN Latonya Laird APRN        dextrose (D50W) (25 g/50 mL) IV injection 25 g  25 g Intravenous Q15 Min PRN Latonya Laird APRN        dextrose (GLUTOSE) oral gel 15 g  15 g Oral Q15 Min PRN Latonya Laird APRN        glucagon (GLUCAGEN) injection 1 mg  1 mg Intramuscular Q15 Min PRN Latonya Laird APRN        heparin (porcine) 5000 UNIT/ML injection 5,000 Units  5,000 Units Subcutaneous Q8H Latonya Laird APRN   5,000 Units at 10/16/24 1435    influenza vac split high-dose (FLUZONE HIGH DOSE) injection 0.5 mL  0.5 mL Intramuscular During Hospitalization Day, Eryn ORANTES MD        Insulin Lispro (humaLOG) injection 2-7 Units  2-7 Units Subcutaneous 4x Daily AC & at Bedtime Latonya Laird APRN        melatonin tablet 5 mg  5 mg Oral Nightly PRN Latonya Laird APRN   5 mg at 10/15/24 2010    nirmatrelvir and ritonavir (150mg/100mg) (PAXLOVID) 2 tablet pack- for renal adjustment  2 tablet Oral BID Latonya Laird APRN   2 tablet at 10/16/24 1010    sodium chloride 0.9 % flush 10 mL  10 mL Intravenous Q12H Latonya Laird APRN   10 mL at 10/16/24 1010    sodium chloride 0.9 % flush 10 mL  10 mL Intravenous PRN Latonya Laird APRN        valsartan (DIOVAN) tablet 240 mg  240 mg Oral Daily Latonya Laird APRN   240 mg at 10/15/24 0840        Physician Progress Notes (most recent note)        Alisa Sparks APRN at 10/16/24 0831              [facility] Medicine Services  PROGRESS NOTE    Patient  Name: Georgia Velázquez  : 1945  MRN: [id number]    Date of Admission: 10/14/2024  Primary Care Physician: Provider, No Known    Subjective   Subjective     CC:  F/u encephalopathy    HPI:  Patient resting in bed.  Daughter is at the bedside.  Patient says she has some cough but it is dry.  Denies shortness of breath, N/V/D.  Patient and daughter are considering rehab.      Objective   Objective     Vital Signs:   Temp:  [98.3 °F (36.8 °C)-98.8 °F (37.1 °C)] 98.3 °F (36.8 °C)  Heart Rate:  [56-80] 56  Resp:  [20] 20  BP: (113-175)/(49-77) 119/49  Flow (L/min) (Oxygen Therapy):  [3] 3     Physical Exam:  Constitutional: No acute distress, awake, alert  HENT: NCAT, mucous membranes moist  Respiratory: Scattered expiratory wheezes bilaterally, respiratory effort normal, room air  Cardiovascular: RRR, no murmurs, rubs, or gallops  Gastrointestinal: Positive bowel sounds, soft, nontender, nondistended  Musculoskeletal: Trace bilateral ankle edema  Psychiatric: Appropriate affect, cooperative  Neurologic: Oriented x 3, moves all extremities, speech clear  Skin: No rashes, erythema BLE       Results Reviewed:  LAB RESULTS:      Lab 10/16/24  0433 10/15/24  1601 10/15/24  0647 10/15/24  0431 10/14/24  2141   WBC 6.12  --   --   --  7.18   HEMOGLOBIN 12.3  --   --   --  13.1   HEMATOCRIT 37.5  --   --   --  39.7   PLATELETS 154  --   --   --  177   NEUTROS ABS 3.58  --   --   --  5.58   IMMATURE GRANS (ABS) 0.01  --   --   --  0.05   LYMPHS ABS 1.57  --   --   --  0.75   MONOS ABS 0.81  --   --   --  0.78   EOS ABS 0.13  --   --   --  0.00   MCV 93.5  --   --   --  94.7   CRP  --   --   --   --  1.79*   PROCALCITONIN  --   --   --   --  0.09   LACTATE  --   --   --  1.2  --    LDH  --   --   --   --  152   PROTIME  --   --  16.3*  --   --    APTT  --   --  30.4  --   --    HEPARIN ANTI-XA  --   --   --  0.10*  --    D DIMER QUANT  --   --  1.76*  --   --    HSTROP T  --  34*  --  40* 36*         Lab 10/16/24  0433  10/15/24  0431 10/14/24  2146 10/14/24  2141   SODIUM 137  --   --  137   POTASSIUM 4.5  --   --  4.3   CHLORIDE 105  --   --  102   CO2 22.0  --   --  23.0   ANION GAP 10.0  --   --  12.0   BUN 26*  --   --  25*   CREATININE 1.36*  --  1.60* 1.45*   EGFR 39.2*  --   --  36.3*   GLUCOSE 78  --   --  132*   CALCIUM 8.6  --   --  9.0   MAGNESIUM 2.0  --   --   --    HEMOGLOBIN A1C  --  5.50  --   --          Lab 10/16/24  0433 10/14/24  2141   TOTAL PROTEIN 6.3 7.3   ALBUMIN 3.4* 3.8   GLOBULIN 2.9 3.5   ALT (SGPT) 12 11   AST (SGOT) 28 19   BILIRUBIN 0.3 0.6   ALK PHOS 83 102   LIPASE  --  19         Lab 10/15/24  1601 10/15/24  0647 10/15/24  0431 10/14/24  2141   PROBNP  --   --   --  3,900.0*   HSTROP T 34*  --  40* 36*   PROTIME  --  16.3*  --   --    INR  --  1.30*  --   --              Lab 10/14/24  2141   FERRITIN 301.80*         Brief Urine Lab Results  (Last result in the past 365 days)        Color   Clarity   Blood   Leuk Est   Nitrite   Protein   CREAT   Urine HCG        10/14/24 2203 Yellow   Cloudy   Moderate (2+)   Moderate (2+)   Positive   100 mg/dL (2+)                   Microbiology Results Abnormal       Procedure Component Value - Date/Time    Blood Culture - Blood, Arm, Left [[id number]]  (Normal) Collected: 10/15/24 0431    Lab Status: Preliminary result Specimen: Blood from Arm, Left Updated: 10/16/24 0615     Blood Culture No growth at 24 hours    Narrative:      Less than seven (7) mL's of blood was collected.  Insufficient quantity may yield false negative results.    Blood Culture - Blood, Hand, Right [[id number]]  (Normal) Collected: 10/15/24 0431    Lab Status: Preliminary result Specimen: Blood from Hand, Right Updated: 10/16/24 0615     Blood Culture No growth at 24 hours    Narrative:      Less than seven (7) mL's of blood was collected.  Insufficient quantity may yield false negative results.            Adult Transthoracic Echo Complete W/ Cont if Necessary Per Protocol    Result Date:  10/15/2024    Left ventricular systolic function is mildly decreased. Calculated left ventricular EF = 41.4% Left ventricular ejection fraction appears to be 41 - 45%.   The following left ventricular wall segments are hypokinetic: apical anterior, apical lateral, apical inferior, apical septal and apex hypokinetic.   Left ventricular diastolic function is consistent with (grade II w/high LAP) pseudonormalization.   Mild aortic valve stenosis is present. Aortic valve area is 1.8 cm2.   Peak velocity of the flow distal to the aortic valve is 209.8 cm/s. Aortic valve maximum pressure gradient is 18 mmHg. Aortic valve mean pressure gradient is 10 mmHg. Aortic valve dimensionless index is 0 .   Mild mitral valve stenosis is present. The mitral valve peak gradient is 13 mmHg. The mitral valve mean gradient is 5 mmHg.   Estimated right ventricular systolic pressure from tricuspid regurgitation is normal (<35 mmHg).     Duplex Carotid Ultrasound CAR    Result Date: 10/15/2024    Right internal carotid artery demonstrates a less than 50% stenosis.   Antegrade right vertebral flow.   Left internal carotid artery demonstrates a 50-69% stenosis, mild increase in velocities from study 2/2024.   Antegrade left vertebral flow.     CT Abdomen Pelvis With Contrast    Result Date: 10/14/2024  CT ABDOMEN PELVIS W CONTRAST Date of Exam: 10/14/2024 10:21 PM EDT Indication: generalized abdominal pain, vomiting. Comparison: 7/21/2020 Technique: Axial CT images were obtained of the abdomen and pelvis following the uneventful intravenous administration of contrast. Reconstructed coronal and sagittal images were also obtained. Automated exposure control and iterative construction methods were used. Findings: There is a small right pleural effusion with some adjacent atelectasis in the right lower lobe. The left lung base is clear. There is extensive atherosclerotic disease, including within the mesenteric arteries. No aortic aneurysm.  Gallbladder is surgically absent. No biliary obstruction is seen. There is some atrophy of the pancreas. There are bilateral renal cysts. No follow-up is indicated. There is right renal atrophy with cortical thinning and cortical lobulation. Both kidneys are nonobstructed. Mild left adrenal hyperplasia is unchanged. Solid abdominal organs are otherwise normal. The urinary bladder and solid pelvic organs appear normal. The appendix has been removed. There is a lipoma at the ileocecal valve. No evidence of acute colitis. No significant stool burden. No small bowel obstruction or clear evidence of enteritis. Stomach is normal except for a stable small hiatal hernia. No free fluid or adenopathy is identified. Old compression fracture with kyphoplasty noted at L4.     Impression: 1. Small right pleural effusion with right lower lobe atelectasis. 2. No clearly acute findings in the abdomen or pelvis. 3. Appendectomy. No bowel obstruction or evidence of enteritis or colitis. 4. Nonobstructed kidneys with renal atrophy on the right. Electronically Signed: Mat Godwin MD  10/14/2024 10:41 PM EDT  Workstation ID: [id number]    CT Head Without Contrast    Result Date: 10/14/2024  CT HEAD WO CONTRAST Date of Exam: 10/14/2024 10:21 PM EDT Indication: fall, ams. Comparison: 3/17/2021 Technique: Axial CT images were obtained of the head without contrast administration.  Automated exposure control and iterative construction methods were used. Findings: There is no evidence of acute territorial infarction. There is no acute intracranial hemorrhage. There are no extra-axial collections. Ventricles and CSF spaces are symmetric. No mass effect nor hydrocephalus. Brain parenchyma appears unchanged with advanced white matter hypoattenuation.  Paranasal sinuses and mastoid air cells are adequately aerated.  Osseous structures and orbits appear intact.     Impression: Impression: * 1. No acute process identified. Electronically Signed:  Albaro Barrera MD  10/14/2024 10:33 PM EDT  Workstation ID: [id number]    XR Chest 1 View    Result Date: 10/14/2024  XR CHEST 1 VW Date of Exam: 10/14/2024 9:19 PM EDT Indication: ams Comparison: Chest radiograph 6/13/2022 Findings: Mediastinum: Cardiomediastinal silhouette appears unchanged and enlarged. Lungs: Mild peripheral right lung base hazy opacity. Pleura: Mild blunting of right costophrenic sulcus. No pneumothorax. Bones and soft tissues: No acute osseous abnormality.     Impression: Impression: Possible small right pleural effusion with adjacent atelectasis. Superimposed infection to be excluded clinically. Electronically Signed: García Argueta  10/14/2024 9:44 PM EDT  Workstation ID: [id number]     Results for orders placed during the hospital encounter of 10/14/24    Adult Transthoracic Echo Complete W/ Cont if Necessary Per Protocol    Interpretation Summary    Left ventricular systolic function is mildly decreased. Calculated left ventricular EF = 41.4% Left ventricular ejection fraction appears to be 41 - 45%.    The following left ventricular wall segments are hypokinetic: apical anterior, apical lateral, apical inferior, apical septal and apex hypokinetic.    Left ventricular diastolic function is consistent with (grade II w/high LAP) pseudonormalization.    Mild aortic valve stenosis is present. Aortic valve area is 1.8 cm2.    Peak velocity of the flow distal to the aortic valve is 209.8 cm/s. Aortic valve maximum pressure gradient is 18 mmHg. Aortic valve mean pressure gradient is 10 mmHg. Aortic valve dimensionless index is 0 .    Mild mitral valve stenosis is present. The mitral valve peak gradient is 13 mmHg. The mitral valve mean gradient is 5 mmHg.    Estimated right ventricular systolic pressure from tricuspid regurgitation is normal (<35 mmHg).      Current medications:  Scheduled Meds:cefTRIAXone, 2,000 mg, Intravenous, Q24H  clopidogrel, 75 mg, Oral, Daily  heparin (porcine), 5,000 Units,  Subcutaneous, Q8H  insulin lispro, 2-7 Units, Subcutaneous, 4x Daily AC & at Bedtime  Nirmatrelvir&Ritonavir 150/100, 2 tablet, Oral, BID  senna-docusate sodium, 2 tablet, Oral, BID  sodium chloride, 10 mL, Intravenous, Q12H  valsartan, 240 mg, Oral, Daily      Continuous Infusions:   PRN Meds:.  acetaminophen **OR** acetaminophen    albuterol sulfate HFA    benzonatate    senna-docusate sodium **AND** polyethylene glycol **AND** bisacodyl **AND** bisacodyl    dextromethorphan polistirex ER    dextrose    dextrose    glucagon (human recombinant)    influenza vaccine    melatonin    sodium chloride    Assessment & Plan   Assessment & Plan     Active Hospital Problems    Diagnosis  POA    **COVID-19 virus detected [U07.1]  Yes    Acute UTI (urinary tract infection) [N39.0]  Yes    CKD (chronic kidney disease) stage 3, GFR 30-59 ml/min [N18.30]  Yes    T2DM (type 2 diabetes mellitus) [E11.9]  Yes    CAD (coronary artery disease) [I25.10]  Yes    Syncope [R55]  Yes    Dementia [F03.90]  Unknown    Diastolic dysfunction [I51.89]  Yes    Chronic obstructive pulmonary disease [J44.9]  Yes    Essential hypertension [I10]  Yes    GERD (gastroesophageal reflux disease) [K21.9]  Yes    Hyperlipidemia [E78.5]  Yes      Resolved Hospital Problems   No resolved problems to display.        Brief Hospital Course to date:  Georgia Velázquez is a 79 y.o. female  with CAD status post stent, diastolic CHF, hypertension, hyperlipidemia, history of CVA, type 2 diabetes, CKD 3, GERD, COPD, dementia who presented to the ED with altered mental status and syncope at home.  Patient was found to be COVID-positive and have a UTI.     This patient's problems and plans were partially entered by my partner and updated as appropriate by me 10/16/24.      UTI  - Urine culture preliminarily positive for E. Coli, sensitivities pending  - Continue Rocephin for now to complete 5-day course  - Blood cultures no growth to date     COVID-19  infection  COPD, compensated  -- Covid + on 10/14  - Patient remains on room air  - CT shows possible right small pleural effusion  - Continue Paxlovid  - Hold steroids as patient is not hypoxic  -- As needed albuterol for wheezing  -- Daily CBC w diff, CMP, mag     Syncope  - Likely multifactorial in the setting of UTI and COVID and dehydration.  - Fall with left hand laceration status post sutures in the ED  - CT head unremarkable  - Echo with EF 41-45%, left wall hypokinesis, grade II diastolic dysfunctionpending  - Orthostatic vitals negative in the ED  - Carotid duplex shows less than 50% stenosis on the right side, left side shows 50 to 69% stenosis which is a mild increase from the study done in February.  --PT and OT following     Dementia  - Patient remains pleasantly confused  - CT head on arrival unremarkable  - Patient on fall precautions     CKD 3  -- Baseline ~1.2-1.5 per chart review  - Improving, within baseline  - Will continue to monitor, 1.36 today     Diastolic heart failure  - Currently compensated  - Echo with EF 41-45%, left wall hypokinesis, grade II diastolic dysfunction  -- Strict I's & O's, daily weights     Type 2 diabetes  --A1c 5.5  -Sliding scale insulin     CAD status post stent  Hyperlipidemia  History of CVA  - Continue Plavix  - Continue Diovan  - Holding statin while on Paxlovid      Expected Discharge Location and Transportation: SNF rehab  Expected Discharge pending bed offer, insurance approval, isolation rules  Expected Discharge Date: 10/18/2024; Expected Discharge Time:      VTE Prophylaxis:  Pharmacologic VTE prophylaxis orders are present.         AM-PAC 6 Clicks Score (PT): 17 (10/15/24 2024)    CODE STATUS:   Code Status and Medical Interventions: No CPR (Do Not Attempt to Resuscitate); Limited Support; No intubation (DNI); DNR/DNI   Ordered at: 10/15/24 0122     Medical Intervention Limits:    No intubation (DNI)     Level Of Support Discussed With:    Health Care  Surrogate     Code Status (Patient has no pulse and is not breathing):    No CPR (Do Not Attempt to Resuscitate)     Medical Interventions (Patient has pulse or is breathing):    Limited Support     Comments:    DNR/DNI       DON Solomon  10/16/24        Electronically signed by Alisa Sparks APRN at 10/16/24 1332          Physical Therapy Notes (most recent note)        Miladys Gu, PT at 10/15/24 0956  Version 1 of 1         Goal Outcome Evaluation:           Progress: improving  Outcome Evaluation: Physical therapy treatment complete. The patient continues to require additional time to complete mobility. Patient improved tolerance to mobility and with decreased subjective complaints of pain. Patient increased ambulation distance compared to previous treatment session. The patient continues to present below baseline for functional mobility. The patient would continue to benefit from skilled PT to address strength, balance and activity tolerance deficits. Continue to current PT POC    Anticipated Discharge Disposition (PT): home with 24/7 care, home with home health                          Electronically signed by Miladys Gu PT at 10/15/24 1651          Occupational Therapy Notes (most recent note)        Karolina Peres, OT at 10/15/24 1001          Patient Name: Georgia Velázquez  : 1945    MRN: [id number]                              Today's Date: 10/15/2024       Admit Date: 10/14/2024    Visit Dx:     ICD-10-CM ICD-9-CM   1. Altered mental status, unspecified altered mental status type  R41.82 780.97   2. Acute cystitis without hematuria  N30.00 595.0   3. COVID-19  U07.1 079.89   4. History of diabetes mellitus  Z86.39 V12.29   5. History of coronary artery disease  Z86.79 V12.59     Patient Active Problem List   Diagnosis    Essential hypertension    Hyperlipidemia    Coronary artery disease    GERD (gastroesophageal reflux disease)    Recurrent UTI    Stage  3 chronic kidney disease    Chronic obstructive pulmonary disease    Retinal emboli, right    Diastolic dysfunction    Glaucoma    Left carotid artery stenosis    Syncope    Aortic regurgitation    Diabetic retinopathy    Type 2 diabetes mellitus with stage 3b chronic kidney disease, without long-term current use of insulin    Hypoxia    Obesity, morbid, BMI 50 or higher    Hospital discharge follow-up    Cognitive decline    Other specified health status    Skin lesion of face    Chronic constipation    COVID-19 virus detected    Acute UTI (urinary tract infection)    CKD (chronic kidney disease) stage 3, GFR 30-59 ml/min    T2DM (type 2 diabetes mellitus)    CAD (coronary artery disease)    Syncope    Dementia     Past Medical History:   Diagnosis Date    Acute kidney injury 12/05/2017    Aortic regurgitation     Arthritis of shoulder 05/18/2016    Body mass index (BMI) of 45.0-49.9 in adult 06/14/2019    Bright red rectal bleeding 12/03/2023    CHF (congestive heart failure)     Closed compression fracture of L4 lumbar vertebra 12/04/2017    Added automatically from request for surgery [id number]    Colitis 07/21/2020    Constipation 12/05/2017    COPD (chronic obstructive pulmonary disease)     Coronary artery disease 05/18/2016    Diabetes mellitus type 2 in obese 01/29/2018    Elevated alkaline phosphatase level 02/26/2018    Elevated creatine kinase     Essential hypertension 05/18/2016    GERD (gastroesophageal reflux disease) 05/18/2016    Glaucoma 07/21/2020    History of kyphoplasty 01/30/2018    Hyperlipidemia     Lumbar facet arthropathy 01/29/2018    Lumbar stenosis with neurogenic claudication 01/29/2018    Morbid obesity due to excess calories 01/29/2018    Myocardial infarction 05/18/2016    Osteoporosis 05/18/2016    Physical deconditioning 01/30/2018    Retinal emboli, 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    Sepsis     uro    Stroke     Urinary incontinence without sensory awareness 02/26/2018     Past Surgical History:    Procedure Laterality Date    APPENDECTOMY      BACK SURGERY      CARDIAC CATHETERIZATION      CHOLECYSTECTOMY      CORONARY STENT PLACEMENT      EYE SURGERY      KYPHOPLASTY N/A 12/07/2017    Procedure: KYPHOPLASTY L4;  Surgeon: Marc Pham MD;  Location: [facility];  Service:       General Information       Row Name 10/15/24 1038          OT Time and Intention    Subjective Information no complaints  -JOELLE     Document Type evaluation  -JOELLE     Mode of Treatment occupational therapy  -JOELLE     Patient Effort excellent  -JOELLE     Symptoms Noted During/After Treatment none  -JOELLE       Row Name 10/15/24 1038          General Information    Patient Profile Reviewed yes  -JOELLE     Prior Level of Function independent:;all household mobility;gait;transfer;bed mobility;feeding;grooming;dressing;bathing;mod assist:;cooking;dependent:;cleaning;driving;shopping  per pt. she uses a rollator and has a stair lift for stairs in home  -JOELLE     Existing Precautions/Restrictions fall  -JOELLE     Barriers to Rehab previous functional deficit;cognitive status  -JOELLE       Row Name 10/15/24 1038          Occupational Profile    Environmental Supports and Barriers (Occupational Profile) stair lift in home, wx in shower with seat and grab bars, high toilet, rollator use per pt. report  -JOELLE       Row Name 10/15/24 1038          Living Environment    People in Home child(chandler), adult  pt. and daughter live together, daughter works days a [employer]  -JOELLE       Row Name 10/15/24 1038          Home Main Entrance    Number of Stairs, Main Entrance none  -JOELLE       Row Name 10/15/24 1038          Stairs Within Home, Primary    Stairs, Within Home, Primary stair lift use  -JOELLE       Row Name 10/15/24 1038          Cognition    Orientation Status (Cognition) oriented to;person;place;time;verbal cues/prompts needed for orientation  off one year stating 2023  -JOELLE       Row Name 10/15/24 1038          Safety Issues/Impairments Affecting Functional Mobility    Safety  Issues Affecting Function (Mobility) insight into deficits/self-awareness;safety precaution awareness;safety precautions follow-through/compliance  -     Impairments Affecting Function (Mobility) cognition;endurance/activity tolerance  -     Cognitive Impairments, Mobility Safety/Performance insight into deficits/self-awareness;safety precaution awareness  -               User Key  (r) = Recorded By, (t) = Taken By, (c) = Cosigned By      Initials Name Provider Type    JOELLE Karolina Peres, OT Occupational Therapist                     Mobility/ADL's       Row Name 10/15/24 1042          Bed Mobility    Bed Mobility supine-sit  -JOELLE     Supine-Sit [place] (Bed Mobility) minimum assist (75% patient effort);verbal cues  -     Assistive Device (Bed Mobility) bed rails  -JOELLE     Comment, (Bed Mobility) pt. needed significant use of bed railing, per pt. she sleep in standard bed without rails then said had rails so unsure of accuracy  -       Row Name 10/15/24 1042          Transfers    Transfers sit-stand transfer;stand-sit transfer  -     Comment, (Transfers) cues needed for hand placement  -       Row Name 10/15/24 1042          Sit-Stand Transfer    Sit-Stand [place] (Transfers) contact guard;verbal cues  -     Assistive Device (Sit-Stand Transfers) walker, front-wheeled  -       Row Name 10/15/24 1042          Stand-Sit Transfer    Stand-Sit [place] (Transfers) contact guard;verbal cues  -     Assistive Device (Stand-Sit Transfers) walker, front-wheeled  -       Row Name 10/15/24 1042          Functional Mobility    Functional Mobility- Ind. Level contact guard assist;verbal cues required  -     Functional Mobility- Device walker, front-wheeled  -     Functional Mobility-Distance (Feet) 20  -     Functional Mobility- Comment pt. cued to move to recliner, but then heading to couch and needed re-direction  -       Row Name 10/15/24 1042          Activities of Daily Living     BADL Assessment/Intervention lower body dressing;grooming;upper body dressing  -       Row Name 10/15/24 1042          Lower Body Dressing Assessment/Training    [place] Level (Lower Body Dressing) don;socks;dependent (less than 25% patient effort)  -     Position (Lower Body Dressing) edge of bed sitting  -JOELLE     Comment, (Lower Body Dressing) per pt. she has a sockaid at home, pt. says she does not need AE to bath or for other dressing tasks  -       Row Name 10/15/24 1042          Grooming Assessment/Training    [place] Level (Grooming) hair care, combing/brushing;minimum assist (75% patient effort)  -JOELLE     Position (Grooming) edge of bed sitting  -       Row Name 10/15/24 1042          Upper Body Dressing Assessment/Training    Comment, (Upper Body Dressing) pt. declined  -               User Key  (r) = Recorded By, (t) = Taken By, (c) = Cosigned By      Initials Name Provider Type    Karolina Medina OT Occupational Therapist                   Obj/Interventions       Row Name 10/15/24 1044          Sensory Assessment (Somatosensory)    Sensory Assessment pt. did not report any numbness or tingling  -       Row Name 10/15/24 1044          Range of Motion Comprehensive    General Range of Motion bilateral upper extremity ROM WFL  -       Row Name 10/15/24 1044          Strength Comprehensive (MMT)    General Manual Muscle Testing (MMT) Assessment no strength deficits identified  -     Comment, General Manual Muscle Testing (MMT) Assessment BUE-5/5  -       Row Name 10/15/24 1044          Motor Skills    Motor Skills functional endurance  -     Functional Endurance fair, per pt. her legs at home get weak quickly and become giggly  -       Row Name 10/15/24 1044          Balance    Balance Assessment sitting static balance;sitting dynamic balance;standing static balance;standing dynamic balance  -     Static Sitting Balance supervision  -     Dynamic Sitting Balance standby  assist  -JOELLE     Position, Sitting Balance unsupported;sitting edge of bed  -JOELLE     Static Standing Balance contact guard  -JOELLE     Dynamic Standing Balance contact guard  -JOELLE     Position/Device Used, Standing Balance supported;walker, front-wheeled  -JOELLE               User Key  (r) = Recorded By, (t) = Taken By, (c) = Cosigned By      Initials Name Provider Type    Karolina Medina, OT Occupational Therapist                   Goals/Plan       Row Name 10/15/24 1051          Bed Mobility Goal 1 (OT)    Activity/Assistive Device (Bed Mobility Goal 1, OT) sit to supine;supine to sit;bed rails  -JOELLE     [place] Level/Cues Needed (Bed Mobility Goal 1, OT) standby assist  -JOELLE     Time Frame (Bed Mobility Goal 1, OT) long term goal (LTG);10 days  -JOELLE     Progress/Outcomes (Bed Mobility Goal 1, OT) new goal  -JOELLE       Row Name 10/15/24 1051          Transfer Goal 1 (OT)    Activity/Assistive Device (Transfer Goal 1, OT) toilet;walker, rolling  -JOELLE     [place] Level/Cues Needed (Transfer Goal 1, OT) standby assist  -JOELLE     Time Frame (Transfer Goal 1, OT) short term goal (STG);5 days  -JOELLE     Progress/Outcome (Transfer Goal 1, OT) new goal  -JOELLE       Row Name 10/15/24 1051          Toileting Goal 1 (OT)    Activity/Device (Toileting Goal 1, OT) adjust/manage clothing;perform perineal hygiene;commode;grab bar/safety frame  -JOELLE     [place] Level/Cues Needed (Toileting Goal 1, OT) standby assist  -JOELLE     Time Frame (Toileting Goal 1, OT) short term goal (STG);5 days  -JOELLE     Progress/Outcome (Toileting Goal 1, OT) new goal  -JOELLE       Row Name 10/15/24 1051          Therapy Assessment/Plan (OT)    Planned Therapy Interventions (OT) activity tolerance training;BADL retraining;cognitive/visual perception retraining;functional balance retraining;occupation/activity based interventions;patient/caregiver education/training;ROM/therapeutic exercise;transfer/mobility retraining  -JOELLE               User Key  (r) = Recorded  By, (t) = Taken By, (c) = Cosigned By      Initials Name Provider Type    Karolina Medina, OT Occupational Therapist                   Clinical Impression       Row Name 10/15/24 1045          Pain Assessment    Pretreatment Pain Rating 0/10 - no pain  -JOELLE     Posttreatment Pain Rating 0/10 - no pain  -JOELLE       Row Name 10/15/24 1045          Plan of Care Review    Plan of Care Reviewed With patient  -JOELLE     Progress no change  -JOELLE     Outcome Evaluation Pt. presents with some limitations in balance and endurance and appears with mild cognitive deficit.  Noted history of dementia. Pt. reports using a sockaid for donning socks.  Bed rails recommended if pt. does not have at home.  Pt. may need 24/7 supervision/assist at home at discharage.   therapy recommended.  -JOELLE       Row Name 10/15/24 104          Therapy Assessment/Plan (OT)    Patient/Family Therapy Goal Statement (OT) be able to return home  -JOELLE     Rehab Potential (OT) good  -JOELLE     Criteria for Skilled Therapeutic Interventions Met (OT) yes;meets criteria;skilled treatment is necessary  -JOELLE     Therapy Frequency (OT) daily  -JOELLE     Predicted Duration of Therapy Intervention (OT) 10 days  -JOELLE       Row Name 10/15/24 1048          Therapy Plan Review/Discharge Plan (OT)    Equipment Needs Upon Discharge (OT) other (see comments)  bed rails  -JOELLE     Anticipated Discharge Disposition (OT) home with 24/7 care;home with home health  -JOELLE       Row Name 10/15/24 1045          Vital Signs    Pre Systolic BP Rehab 148  -JOELLE     Pre Treatment Diastolic BP 60  -JOELLE     Intra Systolic BP Rehab 149  question accuracy with movement  -JOELLE     Intra Treatment Diastolic BP 39  question accuracy with movement  -JOELLE     Post Systolic BP Rehab 128  -JOELLE     Post Treatment Diastolic BP 89  -JOELLE     Pre SpO2 (%) 98  -JOELLE     O2 Delivery Pre Treatment room air  -JOELLE     O2 Delivery Intra Treatment room air  -JOELLE     O2 Delivery Post Treatment room air  -JOELLE     Pre Patient Position  Supine  -JOELLE     Intra Patient Position Sitting  -JOELLE     Post Patient Position Sitting  -JOELLE       Row Name 10/15/24 1045          Positioning and Restraints    Pre-Treatment Position in bed  -JOELLE     Post Treatment Position chair  -JOELLE     In Chair notified nsg;reclined;call light within reach;encouraged to call for assist;exit alarm on;waffle cushion;legs elevated;heels elevated  notified nurse and nurse aid pt. up and black cord missing from exit alarm  -JOELLE               User Key  (r) = Recorded By, (t) = Taken By, (c) = Cosigned By      Initials Name Provider Type    Karolina Medina, OT Occupational Therapist                   Outcome Measures       Row Name 10/15/24 1052          How much help from another is currently needed...    Putting on and taking off regular lower body clothing? 2  sockaid at home  -JOELLE     Bathing (including washing, rinsing, and drying) 2  -JOELLE     Toileting (which includes using toilet bed pan or urinal) 2  -JOELLE     Putting on and taking off regular upper body clothing 3  -JOELLE     Taking care of personal grooming (such as brushing teeth) 3  -JOELLE     Eating meals 4  -JOELLE     AM-PAC 6 Clicks Score (OT) 16  -JOELLE       Row Name 10/15/24 0832 10/15/24 0353       How much help from another person do you currently need...    Turning from your back to your side while in flat bed without using bedrails? 3  -CH 3  -ZW    Moving from lying on back to sitting on the side of a flat bed without bedrails? 3  -CH 3  -ZW    Moving to and from a bed to a chair (including a wheelchair)? 3  -CH 3  -ZW    Standing up from a chair using your arms (e.g., wheelchair, bedside chair)? 3  -CH 3  -ZW    Climbing 3-5 steps with a railing? 2  -CH 2  -ZW    To walk in hospital room? 3  -CH 3  -ZW    AM-PAC 6 Clicks Score (PT) 17  -CH 17  -ZW      Row Name 10/15/24 1052          Functional Assessment    Outcome Measure Options AM-PAC 6 Clicks Daily Activity (OT)  -JOELLE               User Key  (r) = Recorded By, (t) = Taken  By, (c) = Cosigned By      Initials Name Provider Type    Karolina Medina, OT Occupational Therapist    Haritha Quiroz, RN Registered Nurse    Hao York RN Registered Nurse                    Occupational Therapy Education       Title: PT OT SLP Therapies (In Progress)       Topic: Occupational Therapy (In Progress)       Point: ADL training (Done)       Description:   Instruct learner(s) on proper safety adaptation and remediation techniques during self care or transfers.   Instruct in proper use of assistive devices.                  Learning Progress Summary            Patient Acceptance, E, NR,VU by JOELLE at 10/15/2024 1052    Comment: reason for consult, benefit of grab bars on bed, orientation to year, transfer safety                      Point: Home exercise program (Not Started)       Description:   Instruct learner(s) on appropriate technique for monitoring, assisting and/or progressing therapeutic exercises/activities.                  Learner Progress:  Not documented in this visit.              Point: Precautions (Done)       Description:   Instruct learner(s) on prescribed precautions during self-care and functional transfers.                  Learning Progress Summary            Patient Acceptance, E, NR,VU by JOELLE at 10/15/2024 1052    Comment: reason for consult, benefit of grab bars on bed, orientation to year, transfer safety                      Point: Body mechanics (Not Started)       Description:   Instruct learner(s) on proper positioning and spine alignment during self-care, functional mobility activities and/or exercises.                  Learner Progress:  Not documented in this visit.                              User Key       Initials Effective Dates Name Provider Type Discipline     07/11/23 -  Karolina Peres, OT Occupational Therapist OT                  OT Recommendation and Plan  Planned Therapy Interventions (OT): activity tolerance training, BADL retraining,  cognitive/visual perception retraining, functional balance retraining, occupation/activity based interventions, patient/caregiver education/training, ROM/therapeutic exercise, transfer/mobility retraining  Therapy Frequency (OT): daily  Plan of Care Review  Plan of Care Reviewed With: patient  Progress: no change  Outcome Evaluation: Pt. presents with some limitations in balance and endurance and appears with mild cognitive deficit.  Noted history of dementia. Pt. reports using a sockaid for donning socks.  Bed rails recommended if pt. does not have at home.  Pt. may need 24/7 supervision/assist at home at discharage.   therapy recommended.     Time Calculation:   Evaluation Complexity (OT)  Review Occupational Profile/Medical/Therapy History Complexity: brief/low complexity  Assessment, Occupational Performance/Identification of Deficit Complexity: 1-3 performance deficits  Clinical Decision Making Complexity (OT): problem focused assessment/low complexity  Overall Complexity of Evaluation (OT): low complexity     Time Calculation- OT       Row Name 10/15/24 1053             Time Calculation- OT    OT Start Time 1001  -JOELLE      OT Received On 10/15/24  -JOELLE      OT Goal Re-Cert Due Date 10/25/24  -JOELLE         Untimed Charges    OT Eval/Re-eval Minutes 54  -JOELLE         Total Minutes    Untimed Charges Total Minutes 54  -JOELLE       Total Minutes 54  -JOELLE                User Key  (r) = Recorded By, (t) = Taken By, (c) = Cosigned By      Initials Name Provider Type    Karolina Medina OT Occupational Therapist                  Therapy Charges for Today       Code Description Service Date Service Provider Modifiers Qty    [id number]  OT EVAL LOW COMPLEXITY 4 10/15/2024 Karolina Peres OT GO 1                 Karolina Peres OT  10/15/2024    Electronically signed by Karolina Peres OT at 10/15/24 1054

## 2024-10-17 LAB
ALBUMIN SERPL-MCNC: 3.4 G/DL (ref 3.5–5.2)
ALBUMIN/GLOB SERPL: 1.3 G/DL
ALP SERPL-CCNC: 83 U/L (ref 39–117)
ALT SERPL W P-5'-P-CCNC: 14 U/L (ref 1–33)
ANION GAP SERPL CALCULATED.3IONS-SCNC: 12 MMOL/L (ref 5–15)
AST SERPL-CCNC: 27 U/L (ref 1–32)
BACTERIA SPEC AEROBE CULT: ABNORMAL
BASOPHILS # BLD AUTO: 0.02 10*3/MM3 (ref 0–0.2)
BASOPHILS NFR BLD AUTO: 0.4 % (ref 0–1.5)
BILIRUB SERPL-MCNC: 0.3 MG/DL (ref 0–1.2)
BUN SERPL-MCNC: 31 MG/DL (ref 8–23)
BUN/CREAT SERPL: 23.5 (ref 7–25)
CALCIUM SPEC-SCNC: 8.8 MG/DL (ref 8.6–10.5)
CHLORIDE SERPL-SCNC: 106 MMOL/L (ref 98–107)
CO2 SERPL-SCNC: 21 MMOL/L (ref 22–29)
CREAT SERPL-MCNC: 1.32 MG/DL (ref 0.57–1)
DEPRECATED RDW RBC AUTO: 48 FL (ref 37–54)
EGFRCR SERPLBLD CKD-EPI 2021: 40.6 ML/MIN/1.73
EOSINOPHIL # BLD AUTO: 0.06 10*3/MM3 (ref 0–0.4)
EOSINOPHIL NFR BLD AUTO: 1.2 % (ref 0.3–6.2)
ERYTHROCYTE [DISTWIDTH] IN BLOOD BY AUTOMATED COUNT: 13.6 % (ref 12.3–15.4)
GLOBULIN UR ELPH-MCNC: 2.7 GM/DL
GLUCOSE BLDC GLUCOMTR-MCNC: 80 MG/DL (ref 70–130)
GLUCOSE BLDC GLUCOMTR-MCNC: 83 MG/DL (ref 70–130)
GLUCOSE BLDC GLUCOMTR-MCNC: 92 MG/DL (ref 70–130)
GLUCOSE BLDC GLUCOMTR-MCNC: 94 MG/DL (ref 70–130)
GLUCOSE SERPL-MCNC: 74 MG/DL (ref 65–99)
HCT VFR BLD AUTO: 39.8 % (ref 34–46.6)
HGB BLD-MCNC: 12.9 G/DL (ref 12–15.9)
IMM GRANULOCYTES # BLD AUTO: 0.02 10*3/MM3 (ref 0–0.05)
IMM GRANULOCYTES NFR BLD AUTO: 0.4 % (ref 0–0.5)
LYMPHOCYTES # BLD AUTO: 2.03 10*3/MM3 (ref 0.7–3.1)
LYMPHOCYTES NFR BLD AUTO: 39.9 % (ref 19.6–45.3)
MAGNESIUM SERPL-MCNC: 2 MG/DL (ref 1.6–2.4)
MCH RBC QN AUTO: 31 PG (ref 26.6–33)
MCHC RBC AUTO-ENTMCNC: 32.4 G/DL (ref 31.5–35.7)
MCV RBC AUTO: 95.7 FL (ref 79–97)
MONOCYTES # BLD AUTO: 0.62 10*3/MM3 (ref 0.1–0.9)
MONOCYTES NFR BLD AUTO: 12.2 % (ref 5–12)
NEUTROPHILS NFR BLD AUTO: 2.34 10*3/MM3 (ref 1.7–7)
NEUTROPHILS NFR BLD AUTO: 45.9 % (ref 42.7–76)
NRBC BLD AUTO-RTO: 0 /100 WBC (ref 0–0.2)
PLATELET # BLD AUTO: 135 10*3/MM3 (ref 140–450)
PMV BLD AUTO: 10.6 FL (ref 6–12)
POTASSIUM SERPL-SCNC: 4.7 MMOL/L (ref 3.5–5.2)
PROT SERPL-MCNC: 6.1 G/DL (ref 6–8.5)
RBC # BLD AUTO: 4.16 10*6/MM3 (ref 3.77–5.28)
SODIUM SERPL-SCNC: 139 MMOL/L (ref 136–145)
WBC NRBC COR # BLD AUTO: 5.09 10*3/MM3 (ref 3.4–10.8)

## 2024-10-17 PROCEDURE — 80053 COMPREHEN METABOLIC PANEL: CPT | Performed by: NURSE PRACTITIONER

## 2024-10-17 PROCEDURE — 83735 ASSAY OF MAGNESIUM: CPT | Performed by: NURSE PRACTITIONER

## 2024-10-17 PROCEDURE — 82948 REAGENT STRIP/BLOOD GLUCOSE: CPT

## 2024-10-17 PROCEDURE — 25010000002 CEFTRIAXONE PER 250 MG: Performed by: NURSE PRACTITIONER

## 2024-10-17 PROCEDURE — 63710000001 ONDANSETRON ODT 4 MG TABLET DISPERSIBLE: Performed by: NURSE PRACTITIONER

## 2024-10-17 PROCEDURE — 94799 UNLISTED PULMONARY SVC/PX: CPT

## 2024-10-17 PROCEDURE — 85025 COMPLETE CBC W/AUTO DIFF WBC: CPT | Performed by: NURSE PRACTITIONER

## 2024-10-17 PROCEDURE — 96372 THER/PROPH/DIAG INJ SC/IM: CPT

## 2024-10-17 PROCEDURE — 25010000002 HEPARIN (PORCINE) PER 1000 UNITS: Performed by: NURSE PRACTITIONER

## 2024-10-17 PROCEDURE — G0378 HOSPITAL OBSERVATION PER HR: HCPCS

## 2024-10-17 RX ORDER — ONDANSETRON 4 MG/1
4 TABLET, ORALLY DISINTEGRATING ORAL EVERY 6 HOURS PRN
Status: DISCONTINUED | OUTPATIENT
Start: 2024-10-17 | End: 2024-10-24 | Stop reason: HOSPADM

## 2024-10-17 RX ORDER — ONDANSETRON 2 MG/ML
4 INJECTION INTRAMUSCULAR; INTRAVENOUS EVERY 6 HOURS PRN
Status: DISCONTINUED | OUTPATIENT
Start: 2024-10-17 | End: 2024-10-24 | Stop reason: HOSPADM

## 2024-10-17 RX ADMIN — Medication 10 ML: at 07:57

## 2024-10-17 RX ADMIN — ALBUTEROL SULFATE 2 PUFF: 90 AEROSOL, METERED RESPIRATORY (INHALATION) at 10:26

## 2024-10-17 RX ADMIN — SENNOSIDES AND DOCUSATE SODIUM 2 TABLET: 50; 8.6 TABLET ORAL at 21:10

## 2024-10-17 RX ADMIN — VALSARTAN 240 MG: 80 TABLET, FILM COATED ORAL at 07:58

## 2024-10-17 RX ADMIN — CLOPIDOGREL BISULFATE 75 MG: 75 TABLET ORAL at 08:00

## 2024-10-17 RX ADMIN — SODIUM CHLORIDE 2000 MG: 900 INJECTION INTRAVENOUS at 21:08

## 2024-10-17 RX ADMIN — NIRMATRELVIR AND RITONAVIR 2 TABLET: KIT at 21:10

## 2024-10-17 RX ADMIN — ONDANSETRON 4 MG: 4 TABLET, ORALLY DISINTEGRATING ORAL at 00:24

## 2024-10-17 RX ADMIN — NIRMATRELVIR AND RITONAVIR 2 TABLET: KIT at 08:00

## 2024-10-17 RX ADMIN — HEPARIN SODIUM 5000 UNITS: 5000 INJECTION INTRAVENOUS; SUBCUTANEOUS at 05:06

## 2024-10-17 RX ADMIN — HEPARIN SODIUM 5000 UNITS: 5000 INJECTION INTRAVENOUS; SUBCUTANEOUS at 21:08

## 2024-10-17 RX ADMIN — HEPARIN SODIUM 5000 UNITS: 5000 INJECTION INTRAVENOUS; SUBCUTANEOUS at 13:37

## 2024-10-17 NOTE — CASE MANAGEMENT/SOCIAL WORK
Continued Stay Note  [facility]     Patient Name: Georgia Velázquez  MRN: [id number]  Today's Date: 10/17/2024    Admit Date: 10/14/2024    Plan: SNF   Discharge Plan       Row Name 10/17/24 1249       Plan    Plan SNF    Patient/Family in Agreement with Plan yes    Plan Comments Discussed in MDR. Received a call from April, Liaison at [facility] saying she can offer a bed (private for isolation) on Monday, 10/21/24. She will start the pre-cert tomorrow, 10/18/24. Spoke with Ms. Velázquez daughter Omaira, she was agreeable with the bed offer at [facility]. CM will continue to follow for medical readiness.                   Discharge Codes    No documentation.                 Expected Discharge Date and Time       Expected Discharge Date Expected Discharge Time    Oct 18, 2024               Madyson Arriaza RN

## 2024-10-17 NOTE — PLAN OF CARE
Goal Outcome Evaluation:  Plan of Care Reviewed With: patient        Progress: improving  Outcome Evaluation: forgetful at times, ra, sb, no bm, 1500 uop, ate better this afternoon, up to chair assist of one and walker, no c/o pain or n/v, afebrile

## 2024-10-18 LAB
ALBUMIN SERPL-MCNC: 3.3 G/DL (ref 3.5–5.2)
ALBUMIN/GLOB SERPL: 1.1 G/DL
ALP SERPL-CCNC: 77 U/L (ref 39–117)
ALT SERPL W P-5'-P-CCNC: 14 U/L (ref 1–33)
ANION GAP SERPL CALCULATED.3IONS-SCNC: 14 MMOL/L (ref 5–15)
AST SERPL-CCNC: 21 U/L (ref 1–32)
BASOPHILS # BLD AUTO: 0.01 10*3/MM3 (ref 0–0.2)
BASOPHILS NFR BLD AUTO: 0.2 % (ref 0–1.5)
BILIRUB SERPL-MCNC: 0.2 MG/DL (ref 0–1.2)
BUN SERPL-MCNC: 36 MG/DL (ref 8–23)
BUN/CREAT SERPL: 22.8 (ref 7–25)
CALCIUM SPEC-SCNC: 9.1 MG/DL (ref 8.6–10.5)
CHLORIDE SERPL-SCNC: 103 MMOL/L (ref 98–107)
CO2 SERPL-SCNC: 21 MMOL/L (ref 22–29)
CREAT SERPL-MCNC: 1.58 MG/DL (ref 0.57–1)
DEPRECATED RDW RBC AUTO: 47 FL (ref 37–54)
EGFRCR SERPLBLD CKD-EPI 2021: 32.8 ML/MIN/1.73
EOSINOPHIL # BLD AUTO: 0.07 10*3/MM3 (ref 0–0.4)
EOSINOPHIL NFR BLD AUTO: 1.3 % (ref 0.3–6.2)
ERYTHROCYTE [DISTWIDTH] IN BLOOD BY AUTOMATED COUNT: 13.5 % (ref 12.3–15.4)
GLOBULIN UR ELPH-MCNC: 3.1 GM/DL
GLUCOSE BLDC GLUCOMTR-MCNC: 117 MG/DL (ref 70–130)
GLUCOSE BLDC GLUCOMTR-MCNC: 143 MG/DL (ref 70–130)
GLUCOSE BLDC GLUCOMTR-MCNC: 92 MG/DL (ref 70–130)
GLUCOSE SERPL-MCNC: 66 MG/DL (ref 65–99)
HCT VFR BLD AUTO: 39.1 % (ref 34–46.6)
HGB BLD-MCNC: 12.9 G/DL (ref 12–15.9)
IMM GRANULOCYTES # BLD AUTO: 0.01 10*3/MM3 (ref 0–0.05)
IMM GRANULOCYTES NFR BLD AUTO: 0.2 % (ref 0–0.5)
LYMPHOCYTES # BLD AUTO: 2.56 10*3/MM3 (ref 0.7–3.1)
LYMPHOCYTES NFR BLD AUTO: 46.3 % (ref 19.6–45.3)
MAGNESIUM SERPL-MCNC: 1.9 MG/DL (ref 1.6–2.4)
MCH RBC QN AUTO: 31.2 PG (ref 26.6–33)
MCHC RBC AUTO-ENTMCNC: 33 G/DL (ref 31.5–35.7)
MCV RBC AUTO: 94.4 FL (ref 79–97)
MONOCYTES # BLD AUTO: 0.59 10*3/MM3 (ref 0.1–0.9)
MONOCYTES NFR BLD AUTO: 10.7 % (ref 5–12)
NEUTROPHILS NFR BLD AUTO: 2.29 10*3/MM3 (ref 1.7–7)
NEUTROPHILS NFR BLD AUTO: 41.3 % (ref 42.7–76)
NRBC BLD AUTO-RTO: 0 /100 WBC (ref 0–0.2)
PLATELET # BLD AUTO: 126 10*3/MM3 (ref 140–450)
PMV BLD AUTO: 12.1 FL (ref 6–12)
POTASSIUM SERPL-SCNC: 4.6 MMOL/L (ref 3.5–5.2)
PROT SERPL-MCNC: 6.4 G/DL (ref 6–8.5)
RBC # BLD AUTO: 4.14 10*6/MM3 (ref 3.77–5.28)
SODIUM SERPL-SCNC: 138 MMOL/L (ref 136–145)
WBC NRBC COR # BLD AUTO: 5.53 10*3/MM3 (ref 3.4–10.8)

## 2024-10-18 PROCEDURE — 96372 THER/PROPH/DIAG INJ SC/IM: CPT

## 2024-10-18 PROCEDURE — 80053 COMPREHEN METABOLIC PANEL: CPT | Performed by: NURSE PRACTITIONER

## 2024-10-18 PROCEDURE — 82948 REAGENT STRIP/BLOOD GLUCOSE: CPT

## 2024-10-18 PROCEDURE — 83735 ASSAY OF MAGNESIUM: CPT | Performed by: NURSE PRACTITIONER

## 2024-10-18 PROCEDURE — G0378 HOSPITAL OBSERVATION PER HR: HCPCS

## 2024-10-18 PROCEDURE — 85025 COMPLETE CBC W/AUTO DIFF WBC: CPT | Performed by: NURSE PRACTITIONER

## 2024-10-18 PROCEDURE — 97530 THERAPEUTIC ACTIVITIES: CPT

## 2024-10-18 PROCEDURE — 99232 SBSQ HOSP IP/OBS MODERATE 35: CPT | Performed by: INTERNAL MEDICINE

## 2024-10-18 PROCEDURE — 25010000002 HEPARIN (PORCINE) PER 1000 UNITS: Performed by: NURSE PRACTITIONER

## 2024-10-18 PROCEDURE — 97116 GAIT TRAINING THERAPY: CPT

## 2024-10-18 PROCEDURE — 97110 THERAPEUTIC EXERCISES: CPT

## 2024-10-18 PROCEDURE — 25010000002 CEFTRIAXONE PER 250 MG: Performed by: NURSE PRACTITIONER

## 2024-10-18 RX ORDER — IPRATROPIUM BROMIDE AND ALBUTEROL SULFATE 2.5; .5 MG/3ML; MG/3ML
3 SOLUTION RESPIRATORY (INHALATION) EVERY 6 HOURS PRN
Status: DISCONTINUED | OUTPATIENT
Start: 2024-10-18 | End: 2024-10-24 | Stop reason: HOSPADM

## 2024-10-18 RX ADMIN — VALSARTAN 240 MG: 80 TABLET, FILM COATED ORAL at 08:47

## 2024-10-18 RX ADMIN — IPRATROPIUM BROMIDE AND ALBUTEROL SULFATE 3 ML: 2.5; .5 SOLUTION RESPIRATORY (INHALATION) at 12:38

## 2024-10-18 RX ADMIN — SENNOSIDES AND DOCUSATE SODIUM 2 TABLET: 50; 8.6 TABLET ORAL at 20:55

## 2024-10-18 RX ADMIN — NIRMATRELVIR AND RITONAVIR 2 TABLET: KIT at 08:47

## 2024-10-18 RX ADMIN — NIRMATRELVIR AND RITONAVIR 2 TABLET: KIT at 20:55

## 2024-10-18 RX ADMIN — HEPARIN SODIUM 5000 UNITS: 5000 INJECTION INTRAVENOUS; SUBCUTANEOUS at 23:28

## 2024-10-18 RX ADMIN — HEPARIN SODIUM 5000 UNITS: 5000 INJECTION INTRAVENOUS; SUBCUTANEOUS at 05:44

## 2024-10-18 RX ADMIN — SENNOSIDES AND DOCUSATE SODIUM 2 TABLET: 50; 8.6 TABLET ORAL at 08:46

## 2024-10-18 RX ADMIN — SODIUM CHLORIDE 2000 MG: 900 INJECTION INTRAVENOUS at 20:56

## 2024-10-18 RX ADMIN — Medication 5 MG: at 20:55

## 2024-10-18 RX ADMIN — CLOPIDOGREL BISULFATE 75 MG: 75 TABLET ORAL at 08:47

## 2024-10-18 NOTE — PLAN OF CARE
Goal Outcome Evaluation:  Plan of Care Reviewed With: patient        Progress: improving  Outcome Evaluation: Good effort and participation throughout.  Increased ambulation distance to 35ft with Roberto and FWW.  Con to present below baseline with gait instability, decreased balance, decreased functional endurance, SOA/TOLEDO, and weakness limiting indep with mobility and ability to safely navigate home environment.  Will con to benefit from skilled IP PT to improve deficits and promote return to PLOF.  Rec SNF upon d/c for best fxl outcome.    Anticipated Discharge Disposition (PT): skilled nursing facility

## 2024-10-18 NOTE — PROGRESS NOTES
[facility] Medicine Services  PROGRESS NOTE    Patient Name: Georgia Velázquez  : 1945  MRN: [id number]    Date of Admission: 10/14/2024  Primary Care Physician: Provider, No Known    Subjective   Subjective     CC:  F/u encephalopathy    HPI:  Resting in bed in no acute distress and tells me she feels better.  No fever or chills.  No chest pain palpitation shortness of breath.  Currently she is on room air and saturating well.  She still has some cough but much improved.      Objective   Objective     Vital Signs:   Temp:  [97 °F (36.1 °C)-97.3 °F (36.3 °C)] 97 °F (36.1 °C)  Heart Rate:  [57] 57  Resp:  [20] 20  BP: (144-155)/(45-85) 144/45     Physical Exam:  Constitutional: No acute distress, looks comfortable  HENT: NCAT, mucous membranes moist  Respiratory: Scattered expiratory wheezes bilaterally, respiratory effort normal, room air and saturating about 97%  Cardiovascular: RRR, no murmurs, rubs, or gallops  Gastrointestinal: Abdomen is obese.  Positive bowel sounds, soft, nontender, nondistended  Musculoskeletal:  bilateral ankle edema, or bili obese  Psychiatric: Appropriate affect, cooperative  Neurologic: Follows commands, no focality appreciated, speech clear  Skin: No rashes      Results Reviewed:  LAB RESULTS:      Lab 10/18/24  0334 10/17/24  0344 10/16/24  0433 10/15/24  1601 10/15/24  0647 10/15/24  0431 10/14/24  2141   WBC 5.53 5.09 6.12  --   --   --  7.18   HEMOGLOBIN 12.9 12.9 12.3  --   --   --  13.1   HEMATOCRIT 39.1 39.8 37.5  --   --   --  39.7   PLATELETS 126* 135* 154  --   --   --  177   NEUTROS ABS 2.29 2.34 3.58  --   --   --  5.58   IMMATURE GRANS (ABS) 0.01 0.02 0.01  --   --   --  0.05   LYMPHS ABS 2.56 2.03 1.57  --   --   --  0.75   MONOS ABS 0.59 0.62 0.81  --   --   --  0.78   EOS ABS 0.07 0.06 0.13  --   --   --  0.00   MCV 94.4 95.7 93.5  --   --   --  94.7   CRP  --   --   --   --   --   --  1.79*   PROCALCITONIN  --   --   --   --   --    --  0.09   LACTATE  --   --   --   --   --  1.2  --    LDH  --   --   --   --   --   --  152   PROTIME  --   --   --   --  16.3*  --   --    APTT  --   --   --   --  30.4  --   --    HEPARIN ANTI-XA  --   --   --   --   --  0.10*  --    D DIMER QUANT  --   --   --   --  1.76*  --   --    HSTROP T  --   --   --  34*  --  40* 36*         Lab 10/18/24  0334 10/17/24  0344 10/16/24  0433 10/15/24  0431 10/14/24  2146 10/14/24  2141   SODIUM 138 139 137  --   --  137   POTASSIUM 4.6 4.7 4.5  --   --  4.3   CHLORIDE 103 106 105  --   --  102   CO2 21.0* 21.0* 22.0  --   --  23.0   ANION GAP 14.0 12.0 10.0  --   --  12.0   BUN 36* 31* 26*  --   --  25*   CREATININE 1.58* 1.32* 1.36*  --  1.60* 1.45*   EGFR 32.8* 40.6* 39.2*  --   --  36.3*   GLUCOSE 66 74 78  --   --  132*   CALCIUM 9.1 8.8 8.6  --   --  9.0   MAGNESIUM 1.9 2.0 2.0  --   --   --    HEMOGLOBIN A1C  --   --   --  5.50  --   --          Lab 10/18/24  0334 10/17/24  0344 10/16/24  0433 10/14/24  2141   TOTAL PROTEIN 6.4 6.1 6.3 7.3   ALBUMIN 3.3* 3.4* 3.4* 3.8   GLOBULIN 3.1 2.7 2.9 3.5   ALT (SGPT) 14 14 12 11   AST (SGOT) 21 27 28 19   BILIRUBIN 0.2 0.3 0.3 0.6   ALK PHOS 77 83 83 102   LIPASE  --   --   --  19         Lab 10/15/24  1601 10/15/24  0647 10/15/24  0431 10/14/24  2141   PROBNP  --   --   --  3,900.0*   HSTROP T 34*  --  40* 36*   PROTIME  --  16.3*  --   --    INR  --  1.30*  --   --              Lab 10/14/24  2141   FERRITIN 301.80*         Brief Urine Lab Results  (Last result in the past 365 days)        Color   Clarity   Blood   Leuk Est   Nitrite   Protein   CREAT   Urine HCG        10/14/24 2203 Yellow   Cloudy   Moderate (2+)   Moderate (2+)   Positive   100 mg/dL (2+)                   Microbiology Results Abnormal       Procedure Component Value - Date/Time    Blood Culture - Blood, Arm, Left [[id number]]  (Normal) Collected: 10/15/24 0431    Lab Status: Preliminary result Specimen: Blood from Arm, Left Updated: 10/18/24 0615     Blood  Culture No growth at 3 days    Narrative:      Less than seven (7) mL's of blood was collected.  Insufficient quantity may yield false negative results.    Blood Culture - Blood, Hand, Right [[id number]]  (Normal) Collected: 10/15/24 0431    Lab Status: Preliminary result Specimen: Blood from Hand, Right Updated: 10/18/24 0615     Blood Culture No growth at 3 days    Narrative:      Less than seven (7) mL's of blood was collected.  Insufficient quantity may yield false negative results.            No radiology results from the last 24 hrs    Results for orders placed during the hospital encounter of 10/14/24    Adult Transthoracic Echo Complete W/ Cont if Necessary Per Protocol    Interpretation Summary    Left ventricular systolic function is mildly decreased. Calculated left ventricular EF = 41.4% Left ventricular ejection fraction appears to be 41 - 45%.    The following left ventricular wall segments are hypokinetic: apical anterior, apical lateral, apical inferior, apical septal and apex hypokinetic.    Left ventricular diastolic function is consistent with (grade II w/high LAP) pseudonormalization.    Mild aortic valve stenosis is present. Aortic valve area is 1.8 cm2.    Peak velocity of the flow distal to the aortic valve is 209.8 cm/s. Aortic valve maximum pressure gradient is 18 mmHg. Aortic valve mean pressure gradient is 10 mmHg. Aortic valve dimensionless index is 0 .    Mild mitral valve stenosis is present. The mitral valve peak gradient is 13 mmHg. The mitral valve mean gradient is 5 mmHg.    Estimated right ventricular systolic pressure from tricuspid regurgitation is normal (<35 mmHg).      Current medications:  Scheduled Meds:cefTRIAXone, 2,000 mg, Intravenous, Q24H  clopidogrel, 75 mg, Oral, Daily  heparin (porcine), 5,000 Units, Subcutaneous, Q8H  insulin lispro, 2-7 Units, Subcutaneous, 4x Daily AC & at Bedtime  Nirmatrelvir&Ritonavir 150/100, 2 tablet, Oral, BID  senna-docusate sodium, 2  tablet, Oral, BID  valsartan, 240 mg, Oral, Daily      Continuous Infusions:   PRN Meds:.  acetaminophen **OR** acetaminophen    albuterol sulfate HFA    benzonatate    senna-docusate sodium **AND** polyethylene glycol **AND** bisacodyl **AND** bisacodyl    dextromethorphan polistirex ER    dextrose    dextrose    glucagon (human recombinant)    influenza vaccine    ipratropium-albuterol    melatonin    ondansetron ODT **OR** ondansetron    sodium chloride    Assessment & Plan   Assessment & Plan     Active Hospital Problems    Diagnosis  POA    **COVID-19 virus detected [U07.1]  Yes    Acute UTI (urinary tract infection) [N39.0]  Yes    CKD (chronic kidney disease) stage 3, GFR 30-59 ml/min [N18.30]  Yes    T2DM (type 2 diabetes mellitus) [E11.9]  Yes    CAD (coronary artery disease) [I25.10]  Yes    Syncope [R55]  Yes    Dementia [F03.90]  Unknown    Diastolic dysfunction [I51.89]  Yes    Chronic obstructive pulmonary disease [J44.9]  Yes    Essential hypertension [I10]  Yes    GERD (gastroesophageal reflux disease) [K21.9]  Yes    Hyperlipidemia [E78.5]  Yes      Resolved Hospital Problems   No resolved problems to display.        Brief Hospital Course to date:  Georgia Velázquez is a 79 y.o. female  with CAD status post stent, diastolic CHF, hypertension, hyperlipidemia, history of CVA, type 2 diabetes, CKD 3, GERD, COPD, dementia who presented to the ED with altered mental status and syncope at home.  Patient was found to be COVID-positive and have a UTI.     This patient's problems and plans were partially entered by my partner and updated as appropriate by me 10/18/24.      UTI  - Urine culture preliminarily positive for E. Coli, sensitivities pending  - Continue Rocephin for now to complete 5-day course  - Blood cultures no growth to date     COVID-19 infection  COPD, compensated  -- Covid + on 10/14  - Patient remains on room air  - CT shows possible right small pleural effusion  - Continue Paxlovid  -  Hold steroids as patient is not hypoxic  -- As needed albuterol for wheezing       Syncope  - Likely multifactorial in the setting of UTI and COVID and dehydration.  - Fall with left hand laceration status post sutures in the ED  - CT head unremarkable  - Echo with EF 41-45%, left wall hypokinesis, grade II diastolic dysfunctionpending  - Orthostatic vitals negative in the ED  - Carotid duplex shows less than 50% stenosis on the right side, left side shows 50 to 69% stenosis which is a mild increase from the study done in February.  --PT and OT following     Dementia  - Patient remains pleasantly confused  - CT head on arrival unremarkable  - Patient on fall precautions     CKD 3  -- Baseline ~1.2-1.5 per chart review  - Improving, within baseline  - Will continue to monitor, 1.36 today     Diastolic heart failure  - Currently compensated  - Echo with EF 41-45%, left wall hypokinesis, grade II diastolic dysfunction  -- Strict I's & O's, daily weights     Type 2 diabetes  --A1c 5.5  -Sliding scale insulin     CAD status post stent  Hyperlipidemia  History of CVA  - Continue Plavix  - Continue Diovan  - Holding statin while on Paxlovid      Expected Discharge Location and Transportation: SNF rehab  Expected Discharge pending bed offer, insurance approval, isolation rules  Expected Discharge Date: 10/18/2024; Expected Discharge Time:      VTE Prophylaxis:  Pharmacologic VTE prophylaxis orders are present.         AM-PAC 6 Clicks Score (PT): 17 (10/18/24 1042)    CODE STATUS:   Code Status and Medical Interventions: No CPR (Do Not Attempt to Resuscitate); Limited Support; No intubation (DNI); DNR/DNI   Ordered at: 10/15/24 0122     Medical Intervention Limits:    No intubation (DNI)     Level Of Support Discussed With:    Health Care Surrogate     Code Status (Patient has no pulse and is not breathing):    No CPR (Do Not Attempt to Resuscitate)     Medical Interventions (Patient has pulse or is breathing):    Limited  Support     Comments:    DNR/DNI       Binh Rendon MD  10/18/24

## 2024-10-18 NOTE — THERAPY TREATMENT NOTE
Patient Name: Georgia Velázquez  : 1945    MRN: [id number]                              Today's Date: 10/18/2024       Admit Date: 10/14/2024    Visit Dx:     ICD-10-CM ICD-9-CM   1. Altered mental status, unspecified altered mental status type  R41.82 780.97   2. Acute cystitis without hematuria  N30.00 595.0   3. COVID-19  U07.1 079.89   4. History of diabetes mellitus  Z86.39 V12.29   5. History of coronary artery disease  Z86.79 V12.59     Patient Active Problem List   Diagnosis    Essential hypertension    Hyperlipidemia    Coronary artery disease    GERD (gastroesophageal reflux disease)    Recurrent UTI    Stage 3 chronic kidney disease    Chronic obstructive pulmonary disease    Retinal emboli, right    Diastolic dysfunction    Glaucoma    Left carotid artery stenosis    Syncope    Aortic regurgitation    Diabetic retinopathy    Type 2 diabetes mellitus with stage 3b chronic kidney disease, without long-term current use of insulin    Hypoxia    Obesity, morbid, BMI 50 or higher    Hospital discharge follow-up    Cognitive decline    Other specified health status    Skin lesion of face    Chronic constipation    COVID-19 virus detected    Acute UTI (urinary tract infection)    CKD (chronic kidney disease) stage 3, GFR 30-59 ml/min    T2DM (type 2 diabetes mellitus)    CAD (coronary artery disease)    Syncope    Dementia     Past Medical History:   Diagnosis Date    Acute kidney injury 2017    Aortic regurgitation     Arthritis of shoulder 2016    Body mass index (BMI) of 45.0-49.9 in adult 2019    Bright red rectal bleeding 2023    CHF (congestive heart failure)     Closed compression fracture of L4 lumbar vertebra 2017    Added automatically from request for surgery [id number]    Colitis 2020    Constipation 2017    COPD (chronic obstructive pulmonary disease)     Coronary artery disease 2016    Diabetes mellitus type 2 in obese 2018    Elevated  alkaline phosphatase level 02/26/2018    Elevated creatine kinase     Essential hypertension 05/18/2016    GERD (gastroesophageal reflux disease) 05/18/2016    Glaucoma 07/21/2020    History of kyphoplasty 01/30/2018    Hyperlipidemia     Lumbar facet arthropathy 01/29/2018    Lumbar stenosis with neurogenic claudication 01/29/2018    Morbid obesity due to excess calories 01/29/2018    Myocardial infarction 05/18/2016    Osteoporosis 05/18/2016    Physical deconditioning 01/30/2018    Retinal emboli, 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    Sepsis     uro    Stroke     Urinary incontinence without sensory awareness 02/26/2018     Past Surgical History:   Procedure Laterality Date    APPENDECTOMY      BACK SURGERY      CARDIAC CATHETERIZATION      CHOLECYSTECTOMY      CORONARY STENT PLACEMENT      EYE SURGERY      KYPHOPLASTY N/A 12/07/2017    Procedure: KYPHOPLASTY L4;  Surgeon: Marc Pham MD;  Location: [facility];  Service:       General Information       Row Name 10/18/24 0928          OT Time and Intention    Document Type therapy note (daily note)  -AC     Mode of Treatment occupational therapy  -AC     Patient Effort good  -AC       Row Name 10/18/24 0928          General Information    Patient Profile Reviewed yes  -AC     Existing Precautions/Restrictions fall  Covid, [place]  -AC     Barriers to Rehab previous functional deficit;cognitive status  -AC       Row Name 10/18/24 0928          Cognition    Orientation Status (Cognition) oriented to;person;verbal cues/prompts needed for orientation;place;other (see comments)  pt required choices for name of hospital and year, stated month as Sept  -AC       Row Name 10/18/24 0928          Safety Issues/Impairments Affecting Functional Mobility    Safety Issues Affecting Function (Mobility) insight into deficits/self-awareness;safety precaution awareness  -AC     Impairments Affecting Function (Mobility) cognition;endurance/activity tolerance;balance;strength  -AC     Cognitive  Impairments, Mobility Safety/Performance insight into deficits/self-awareness;safety precaution awareness;safety precaution follow-through;sequencing abilities  -               User Key  (r) = Recorded By, (t) = Taken By, (c) = Cosigned By      Initials Name Provider Type     Lisset Velázquez, OT Occupational Therapist                     Mobility/ADL's       Row Name 10/18/24 1011          Bed Mobility    Bed Mobility supine-sit  -     Supine-Sit [place] (Bed Mobility) minimum assist (75% patient effort);verbal cues  -     Assistive Device (Bed Mobility) bed rails;head of bed elevated  -     Comment, (Bed Mobility) increased time and effort  -       Row Name 10/18/24 1011          Transfers    Transfers sit-stand transfer  -     Comment, (Transfers) VCs for hand placement  -       Row Name 10/18/24 1011          Sit-Stand Transfer    Sit-Stand [place] (Transfers) verbal cues;minimum assist (75% patient effort)  -     Assistive Device (Sit-Stand Transfers) walker, front-wheeled  -       Row Name 10/18/24 1011          Functional Mobility    Functional Mobility- Ind. Level verbal cues required;minimum assist (75% patient effort)  -     Functional Mobility- Device walker, front-wheeled  -     Functional Mobility-Distance (Feet) 35  -     Functional Mobility- Comment VCs to keep walker closer  -       Row Name 10/18/24 1011          Activities of Daily Living    BADL Assessment/Intervention grooming;upper body dressing  -       Row Name 10/18/24 1011          Grooming Assessment/Training    [place] Level (Grooming) hair care, combing/brushing;minimum assist (75% patient effort)  -     Position (Grooming) edge of bed sitting  -       Row Name 10/18/24 1011          Upper Body Dressing Assessment/Training    [place] Level (Upper Body Dressing) don;front opening garment;minimum assist (75% patient effort)  -     Position (Upper Body Dressing) edge of bed sitting  -                User Key  (r) = Recorded By, (t) = Taken By, (c) = Cosigned By      Initials Name Provider Type    Lisset Mahajan, OT Occupational Therapist                   Obj/Interventions       Row Name 10/18/24 1013          Balance    Balance Assessment sitting static balance;sitting dynamic balance;standing static balance;standing dynamic balance  -AC     Static Sitting Balance standby assist  -AC     Dynamic Sitting Balance standby assist  -AC     Position, Sitting Balance unsupported;sitting edge of bed  -AC     Static Standing Balance contact guard  -AC     Dynamic Standing Balance minimal assist  -AC     Position/Device Used, Standing Balance walker, front-wheeled  -AC               User Key  (r) = Recorded By, (t) = Taken By, (c) = Cosigned By      Initials Name Provider Type    Lisset Mahajan, OT Occupational Therapist                   Goals/Plan    No documentation.                  Clinical Impression       Row Name 10/18/24 1013          Pain Assessment    Pretreatment Pain Rating 0/10 - no pain  -AC     Posttreatment Pain Rating 0/10 - no pain  -AC       Row Name 10/18/24 1013          Plan of Care Review    Plan of Care Reviewed With patient  -AC     Outcome Evaluation Pt min A to brush hair, min A UBD,  min A to ambulate in room with RW given cues for safety.  Pt with good effort, but continues below baseline with self care/mobility d/t weakness and decr activity tolerance.  Recommend SNF upon d/c.  -       Row Name 10/18/24 1013          Therapy Assessment/Plan (OT)    Rehab Potential (OT) good  -       Row Name 10/18/24 1013          Therapy Plan Review/Discharge Plan (OT)    Anticipated Discharge Disposition (OT) skilled nursing facility  -       Row Name 10/18/24 1013          Vital Signs    Pre Systolic BP Rehab 132  -AC     Pre Treatment Diastolic BP 68  -AC     Pretreatment Heart Rate (beats/min) 47  -AC     Posttreatment Heart Rate (beats/min) 55  -AC     Pre SpO2 (%) 94  -AC      O2 Delivery Pre Treatment room air  -AC     O2 Delivery Intra Treatment room air  -AC     Post SpO2 (%) 93  -AC     O2 Delivery Post Treatment room air  -AC     Pre Patient Position Supine  -AC     Post Patient Position Sitting  -AC       Row Name 10/18/24 1013          Positioning and Restraints    Pre-Treatment Position in bed  -AC     Post Treatment Position chair  -AC     In Chair notified nsg;sitting;with PT;waffle cushion;exit alarm on;call light within reach;encouraged to call for assist  -AC               User Key  (r) = Recorded By, (t) = Taken By, (c) = Cosigned By      Initials Name Provider Type    Lisset Mahajan, OT Occupational Therapist                   Outcome Measures       Row Name 10/18/24 1016          How much help from another is currently needed...    Putting on and taking off regular lower body clothing? 2  -AC     Bathing (including washing, rinsing, and drying) 2  -AC     Toileting (which includes using toilet bed pan or urinal) 2  -AC     Putting on and taking off regular upper body clothing 3  -AC     Taking care of personal grooming (such as brushing teeth) 3  -AC     Eating meals 4  -AC     AM-PAC 6 Clicks Score (OT) 16  -AC       Row Name 10/18/24 1016          Functional Assessment    Outcome Measure Options AM-PAC 6 Clicks Daily Activity (OT)  -AC               User Key  (r) = Recorded By, (t) = Taken By, (c) = Cosigned By      Initials Name Provider Type    Lisset Mahajan, DEON Occupational Therapist                    Occupational Therapy Education       Title: PT OT SLP Therapies (In Progress)       Topic: Occupational Therapy (In Progress)       Point: ADL training (In Progress)       Description:   Instruct learner(s) on proper safety adaptation and remediation techniques during self care or transfers.   Instruct in proper use of assistive devices.                  Learning Progress Summary            Patient Acceptance, E, NR by GT at 10/18/2024 1016    Acceptance,  E,TB, VU,DU by  at 10/17/2024 0910    Acceptance, E, NR,VU by  at 10/15/2024 1052    Comment: reason for consult, benefit of grab bars on bed, orientation to year, transfer safety                      Point: Home exercise program (Done)       Description:   Instruct learner(s) on appropriate technique for monitoring, assisting and/or progressing therapeutic exercises/activities.                  Learning Progress Summary            Patient Acceptance, E,TB, VU,DU by  at 10/17/2024 0910                      Point: Precautions (Done)       Description:   Instruct learner(s) on prescribed precautions during self-care and functional transfers.                  Learning Progress Summary            Patient Acceptance, E,TB, VU,DU by  at 10/17/2024 0910    Acceptance, E, NR,VU by  at 10/15/2024 1052    Comment: reason for consult, benefit of grab bars on bed, orientation to year, transfer safety                      Point: Body mechanics (Done)       Description:   Instruct learner(s) on proper positioning and spine alignment during self-care, functional mobility activities and/or exercises.                  Learning Progress Summary            Patient Acceptance, E,TB, VU,DU by  at 10/17/2024 0910                                      User Key       Initials Effective Dates Name Provider Type Discipline     07/11/23 -  Karolina Peres, OT Occupational Therapist OT     02/03/23 -  Lisset Velázquez, OT Occupational Therapist OT     06/16/21 -  Ewelina Zarco RN Registered Nurse Nurse                  OT Recommendation and Plan     Plan of Care Review  Plan of Care Reviewed With: patient  Outcome Evaluation: Pt min A to brush hair, min A UBD,  min A to ambulate in room with RW given cues for safety.  Pt with good effort, but continues below baseline with self care/mobility d/t weakness and decr activity tolerance.  Recommend SNF upon d/c.     Time Calculation:         Time Calculation- OT       Row Name  10/18/24 0928             Time Calculation- OT    OT Start Time 0928  -AC      OT Received On 10/18/24  -AC      OT Goal Re-Cert Due Date 10/25/24  -AC         Timed Charges    13596 - OT Therapeutic Activity Minutes 15  -AC         Total Minutes    Timed Charges Total Minutes 15  -AC       Total Minutes 15  -AC                User Key  (r) = Recorded By, (t) = Taken By, (c) = Cosigned By      Initials Name Provider Type    AC Lisset Velázquez, OT Occupational Therapist                  Therapy Charges for Today       Code Description Service Date Service Provider Modifiers Qty    [id number]  OT THERAPEUTIC ACT EA 15 MIN 10/18/2024 Lisset Velázquez OT GO 1                 Lisset Velázquez OT  10/18/2024

## 2024-10-18 NOTE — CASE MANAGEMENT/SOCIAL WORK
Continued Stay Note  [facility]     Patient Name: Georgia Velázquez  MRN: [id number]  Today's Date: 10/18/2024    Admit Date: 10/14/2024    Plan: SNF   Discharge Plan       Row Name 10/18/24 1313       Plan    Plan SNF    Patient/Family in Agreement with Plan yes    Plan Comments Discussed patient in MDR. Updated PT/OT notes are in epic from today. Updated April with [facility]. She will start pre-cert later today for a private skilled bed that will be available as early as Monday, 10/21. Covid +. Patient and daughter are agreeable with the plan. CM will continue to follow.    Final Discharge Disposition Code 03 - skilled nursing facility (SNF)                   Discharge Codes    No documentation.                 Expected Discharge Date and Time       Expected Discharge Date Expected Discharge Time    Oct 18, 2024               Emily Greenwood RN

## 2024-10-18 NOTE — THERAPY TREATMENT NOTE
Patient Name: Georgia Velázquez  : 1945    MRN: [id number]                              Today's Date: 10/18/2024       Admit Date: 10/14/2024    Visit Dx:     ICD-10-CM ICD-9-CM   1. Altered mental status, unspecified altered mental status type  R41.82 780.97   2. Acute cystitis without hematuria  N30.00 595.0   3. COVID-19  U07.1 079.89   4. History of diabetes mellitus  Z86.39 V12.29   5. History of coronary artery disease  Z86.79 V12.59     Patient Active Problem List   Diagnosis    Essential hypertension    Hyperlipidemia    Coronary artery disease    GERD (gastroesophageal reflux disease)    Recurrent UTI    Stage 3 chronic kidney disease    Chronic obstructive pulmonary disease    Retinal emboli, right    Diastolic dysfunction    Glaucoma    Left carotid artery stenosis    Syncope    Aortic regurgitation    Diabetic retinopathy    Type 2 diabetes mellitus with stage 3b chronic kidney disease, without long-term current use of insulin    Hypoxia    Obesity, morbid, BMI 50 or higher    Hospital discharge follow-up    Cognitive decline    Other specified health status    Skin lesion of face    Chronic constipation    COVID-19 virus detected    Acute UTI (urinary tract infection)    CKD (chronic kidney disease) stage 3, GFR 30-59 ml/min    T2DM (type 2 diabetes mellitus)    CAD (coronary artery disease)    Syncope    Dementia     Past Medical History:   Diagnosis Date    Acute kidney injury 2017    Aortic regurgitation     Arthritis of shoulder 2016    Body mass index (BMI) of 45.0-49.9 in adult 2019    Bright red rectal bleeding 2023    CHF (congestive heart failure)     Closed compression fracture of L4 lumbar vertebra 2017    Added automatically from request for surgery [id number]    Colitis 2020    Constipation 2017    COPD (chronic obstructive pulmonary disease)     Coronary artery disease 2016    Diabetes mellitus type 2 in obese 2018    Elevated  alkaline phosphatase level 02/26/2018    Elevated creatine kinase     Essential hypertension 05/18/2016    GERD (gastroesophageal reflux disease) 05/18/2016    Glaucoma 07/21/2020    History of kyphoplasty 01/30/2018    Hyperlipidemia     Lumbar facet arthropathy 01/29/2018    Lumbar stenosis with neurogenic claudication 01/29/2018    Morbid obesity due to excess calories 01/29/2018    Myocardial infarction 05/18/2016    Osteoporosis 05/18/2016    Physical deconditioning 01/30/2018    Retinal emboli, 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    Sepsis     uro    Stroke     Urinary incontinence without sensory awareness 02/26/2018     Past Surgical History:   Procedure Laterality Date    APPENDECTOMY      BACK SURGERY      CARDIAC CATHETERIZATION      CHOLECYSTECTOMY      CORONARY STENT PLACEMENT      EYE SURGERY      KYPHOPLASTY N/A 12/07/2017    Procedure: KYPHOPLASTY L4;  Surgeon: Marc Pham MD;  Location: [facility] OR;  Service:       General Information       Row Name 10/18/24 1024          Physical Therapy Time and Intention    Document Type therapy note (daily note)  -BA     Mode of Treatment physical therapy  -BA       Row Name 10/18/24 1024          General Information    Patient Profile Reviewed yes  -BA     Existing Precautions/Restrictions fall;other (see comments)  contact and airborne (COVID); hx dementia; Ramona  -BA     Barriers to Rehab previous functional deficit;cognitive status  -BA       Row Name 10/18/24 1024          Cognition    Orientation Status (Cognition) oriented to;person;verbal cues/prompts needed for orientation;place;time;other (see comments)  Reported she was in the hospital, but required choices for name of hospital.  Required choices for year, and reported it was month of Sept.  -BA       Row Name 10/18/24 1024          Safety Issues/Impairments Affecting Functional Mobility    Safety Issues Affecting Function (Mobility) awareness of need for assistance;insight into deficits/self-awareness;positioning of  assistive device;safety precaution awareness;safety precautions follow-through/compliance;sequencing abilities  -     Impairments Affecting Function (Mobility) balance;coordination;endurance/activity tolerance;motor planning;postural/trunk control;shortness of breath;strength;cognition  -     Cognitive Impairments, Mobility Safety/Performance insight into deficits/self-awareness;safety precaution awareness;safety precaution follow-through;sequencing abilities  -               User Key  (r) = Recorded By, (t) = Taken By, (c) = Cosigned By      Initials Name Provider Type     Wanda Kline, PT Physical Therapist                   Mobility       Row Name 10/18/24 1028          Bed Mobility    Comment, (Bed Mobility) Received sitting EOB with OT present.  -       Row Name 10/18/24 1028          Sit-Stand Transfer    Sit-Stand [place] (Transfers) minimum assist (75% patient effort);1 person assist;verbal cues;nonverbal cues (demo/gesture)  -     Assistive Device (Sit-Stand Transfers) walker, front-wheeled  -     Comment, (Sit-Stand Transfer) STS from EOB with VCs/TCs for optimal pre-positioning, sequencing, hand placement, and upright posture.  Reported no dizziness upon standing.  -       Row Name 10/18/24 1028          Gait/Stairs (Locomotion)    [place] Level (Gait) minimum assist (75% patient effort);1 person assist;verbal cues  -     Assistive Device (Gait) walker, front-wheeled  -     Distance in Feet (Gait) 35  -     Deviations/Abnormal Patterns (Gait) bilateral deviations;no decreased;gait speed decreased;stride length decreased;festinating/shuffling;weight shifting decreased  -     Bilateral Gait Deviations forward flexed posture;heel strike decreased  -     Comment, (Gait/Stairs) Demo'd step through gait pattern with decreased no and short, shuffled steps, R>L.  Appeared to have more difficulty advancing RLE initially, but improved as distance progressed.   Forward, flexed posture throughout.  Reported mild SOA/TOLEDO with O2 sats stable b/w 92-96% on RA.  VCs/TCs for walker management/steering, optimal walker positioning with staying closer to FWW, upright posture, improved stride length, and improved step height.  Gait distance limited by fatigue.  -               User Key  (r) = Recorded By, (t) = Taken By, (c) = Cosigned By      Initials Name Provider Type     Wanda Kline PT Physical Therapist                   Obj/Interventions       Row Name 10/18/24 1036          Motor Skills    Therapeutic Exercise hip;knee;ankle  -       Row Name 10/18/24 1036          Hip (Therapeutic Exercise)    Hip (Therapeutic Exercise) strengthening exercise  -     Hip Strengthening (Therapeutic Exercise) bilateral;marching while seated;aBduction;aDduction;heel slides;sitting;10 repetitions  -       Row Name 10/18/24 1036          Knee (Therapeutic Exercise)    Knee (Therapeutic Exercise) strengthening exercise  -     Knee Strengthening (Therapeutic Exercise) bilateral;LAQ (long arc quad);SLR (straight leg raise);sitting;10 repetitions  -       Row Name 10/18/24 1036          Ankle (Therapeutic Exercise)    Ankle (Therapeutic Exercise) AROM (active range of motion)  -     Ankle AROM (Therapeutic Exercise) bilateral;dorsiflexion;plantarflexion;sitting;10 repetitions  -       Row Name 10/18/24 1036          Balance    Balance Assessment sitting static balance;sitting dynamic balance;standing static balance;standing dynamic balance  -     Static Sitting Balance standby assist  -     Dynamic Sitting Balance contact guard  -     Position, Sitting Balance unsupported;sitting edge of bed;sitting in chair  -     Static Standing Balance contact guard  -     Dynamic Standing Balance minimal assist;verbal cues  -     Position/Device Used, Standing Balance supported;walker, front-wheeled  -     Comment, Balance Mild instability with standing and ambulation  activity with FWW; no overt LOB noted.  -               User Key  (r) = Recorded By, (t) = Taken By, (c) = Cosigned By      Initials Name Provider Type    Wanda Alejandre, PT Physical Therapist                   Goals/Plan    No documentation.                  Clinical Impression       Row Name 10/18/24 1037          Pain    Pretreatment Pain Rating 0/10 - no pain  -BA     Posttreatment Pain Rating 0/10 - no pain  -       Row Name 10/18/24 1037          Plan of Care Review    Plan of Care Reviewed With patient  -BA     Progress improving  -     Outcome Evaluation Good effort and participation throughout.  Increased ambulation distance to 35ft with Roberto and FWW.  Con to present below baseline with gait instability, decreased balance, decreased functional endurance, SOA/TOLEDO, and weakness limiting indep with mobility and ability to safely navigate home environment.  Will con to benefit from skilled IP PT to improve deficits and promote return to PLOF.  Rec SNF upon d/c for best fxl outcome.  -       Row Name 10/18/24 1037          Therapy Assessment/Plan (PT)    Rehab Potential (PT) good  -     Criteria for Skilled Interventions Met (PT) yes;meets criteria;skilled treatment is necessary  -     Therapy Frequency (PT) daily  -       Row Name 10/18/24 1037          Vital Signs    Pre Systolic BP Rehab 132  taken prior to PT arrival  -BA     Pre Treatment Diastolic BP 68  -BA     Post Systolic BP Rehab 117  -BA     Post Treatment Diastolic BP 47  -BA     Pretreatment Heart Rate (beats/min) 55  -BA     Intratreatment Heart Rate (beats/min) 68  -BA     Posttreatment Heart Rate (beats/min) 49  -BA     O2 Delivery Pre Treatment room air  -BA     Intra SpO2 (%) 92  -BA     O2 Delivery Intra Treatment room air  -BA     Post SpO2 (%) 96  -BA     O2 Delivery Post Treatment room air  -BA     Pre Patient Position Sitting  -BA     Intra Patient Position Standing  -BA     Post Patient Position Sitting  -BA        Row Name 10/18/24 1037          Positioning and Restraints    Pre-Treatment Position in bed  -BA     Post Treatment Position chair  -BA     In Chair notified nsg;reclined;call light within reach;encouraged to call for assist;exit alarm on;waffle cushion;legs elevated  -               User Key  (r) = Recorded By, (t) = Taken By, (c) = Cosigned By      Initials Name Provider Type    Wanda Alejandre, LARISA Physical Therapist                   Outcome Measures       Row Name 10/18/24 1042          How much help from another person do you currently need...    Turning from your back to your side while in flat bed without using bedrails? 3  -BA     Moving from lying on back to sitting on the side of a flat bed without bedrails? 3  -BA     Moving to and from a bed to a chair (including a wheelchair)? 3  -BA     Standing up from a chair using your arms (e.g., wheelchair, bedside chair)? 3  -BA     Climbing 3-5 steps with a railing? 2  -BA     To walk in hospital room? 3  -BA     AM-PAC 6 Clicks Score (PT) 17  -BA     Highest Level of Mobility Goal 5 --> Static standing  -BA       Row Name 10/18/24 1042 10/18/24 1016       Functional Assessment    Outcome Measure Options AM-PAC 6 Clicks Basic Mobility (PT)  -BA AM-PAC 6 Clicks Daily Activity (OT)  -AC              User Key  (r) = Recorded By, (t) = Taken By, (c) = Cosigned By      Initials Name Provider Type    AC Lisset Velázquez, OT Occupational Therapist    Wanda Alejandre, PT Physical Therapist                                 Physical Therapy Education       Title: PT OT SLP Therapies (In Progress)       Topic: Physical Therapy (Done)       Point: Mobility training (Done)       Learning Progress Summary            Patient Acceptance, E, VU,NR by KATE at 10/18/2024 1043                      Point: Home exercise program (Done)       Learning Progress Summary            Patient Acceptance, E, VU,NR by KATE at 10/18/2024 1043                      Point: Body mechanics  (Done)       Learning Progress Summary            Patient Acceptance, E, VU,NR by  at 10/18/2024 1043                      Point: Precautions (Done)       Learning Progress Summary            Patient Acceptance, E, VU,NR by  at 10/18/2024 1043                                      User Key       Initials Effective Dates Name Provider Type [facility] 09/21/21 -  Wanda Kline PT Physical Therapist PT                  PT Recommendation and Plan     Progress: improving  Outcome Evaluation: Good effort and participation throughout.  Increased ambulation distance to 35ft with Roberto and FWW.  Con to present below baseline with gait instability, decreased balance, decreased functional endurance, SOA/TOLEDO, and weakness limiting indep with mobility and ability to safely navigate home environment.  Will con to benefit from skilled IP PT to improve deficits and promote return to PLOF.  Rec SNF upon d/c for best fxl outcome.     Time Calculation:         PT Charges       Row Name 10/18/24 1044             Time Calculation    Start Time 0938  -BA      PT Received On 10/18/24  -         Time Calculation- PT    Total Timed Code Minutes- PT 23 minute(s)  -BA         Timed Charges    12799 - PT Therapeutic Exercise Minutes 14  -BA      24636 - Gait Training Minutes  9  -BA         Total Minutes    Timed Charges Total Minutes 23  -BA       Total Minutes 23  -BA                User Key  (r) = Recorded By, (t) = Taken By, (c) = Cosigned By      Initials Name Provider Type    Wanda Alejandre PT Physical Therapist                  Therapy Charges for Today       Code Description Service Date Service Provider Modifiers Qty    50262005837 HC PT THER PROC EA 15 MIN 10/18/2024 Wanda Kline, PT GP 1    94315420804 HC GAIT TRAINING EA 15 MIN 10/18/2024 Wanda Kline, PT GP 1            PT G-Codes  Outcome Measure Options: AM-PAC 6 Clicks Basic Mobility (PT)  AM-PAC 6 Clicks Score (PT): 17  AM-PAC 6 Clicks Score  (OT): 16  PT Discharge Summary  Anticipated Discharge Disposition (PT): skilled nursing facility    Wanda Kline, PT  10/18/2024

## 2024-10-18 NOTE — PLAN OF CARE
Problem: Adult Inpatient Plan of Care  Goal: Plan of Care Review   Goal Outcome Evaluation:  Plan of Care Reviewed With: patient           Outcome Evaluation: Pt min A to brush hair, min A UBD,  min A to ambulate in room with RW given cues for safety.  Pt with good effort, but continues below baseline with self care/mobility d/t weakness and decr activity tolerance.  Recommend SNF upon d/c.    Anticipated Discharge Disposition (OT): skilled nursing facility

## 2024-10-19 LAB
ANION GAP SERPL CALCULATED.3IONS-SCNC: 11 MMOL/L (ref 5–15)
BUN SERPL-MCNC: 33 MG/DL (ref 8–23)
BUN/CREAT SERPL: 28 (ref 7–25)
CALCIUM SPEC-SCNC: 9 MG/DL (ref 8.6–10.5)
CHLORIDE SERPL-SCNC: 104 MMOL/L (ref 98–107)
CO2 SERPL-SCNC: 22 MMOL/L (ref 22–29)
CREAT SERPL-MCNC: 1.18 MG/DL (ref 0.57–1)
EGFRCR SERPLBLD CKD-EPI 2021: 46.5 ML/MIN/1.73
GLUCOSE BLDC GLUCOMTR-MCNC: 133 MG/DL (ref 70–130)
GLUCOSE BLDC GLUCOMTR-MCNC: 145 MG/DL (ref 70–130)
GLUCOSE BLDC GLUCOMTR-MCNC: 81 MG/DL (ref 70–130)
GLUCOSE BLDC GLUCOMTR-MCNC: 87 MG/DL (ref 70–130)
GLUCOSE SERPL-MCNC: 78 MG/DL (ref 65–99)
POTASSIUM SERPL-SCNC: 4.5 MMOL/L (ref 3.5–5.2)
SODIUM SERPL-SCNC: 137 MMOL/L (ref 136–145)

## 2024-10-19 PROCEDURE — G0378 HOSPITAL OBSERVATION PER HR: HCPCS

## 2024-10-19 PROCEDURE — 80048 BASIC METABOLIC PNL TOTAL CA: CPT | Performed by: INTERNAL MEDICINE

## 2024-10-19 PROCEDURE — 25010000002 HEPARIN (PORCINE) PER 1000 UNITS: Performed by: NURSE PRACTITIONER

## 2024-10-19 PROCEDURE — 96372 THER/PROPH/DIAG INJ SC/IM: CPT

## 2024-10-19 PROCEDURE — 99232 SBSQ HOSP IP/OBS MODERATE 35: CPT | Performed by: INTERNAL MEDICINE

## 2024-10-19 PROCEDURE — 82948 REAGENT STRIP/BLOOD GLUCOSE: CPT

## 2024-10-19 RX ADMIN — VALSARTAN 240 MG: 80 TABLET, FILM COATED ORAL at 10:21

## 2024-10-19 RX ADMIN — CLOPIDOGREL BISULFATE 75 MG: 75 TABLET ORAL at 10:21

## 2024-10-19 RX ADMIN — NIRMATRELVIR AND RITONAVIR 2 TABLET: KIT at 20:42

## 2024-10-19 RX ADMIN — HEPARIN SODIUM 5000 UNITS: 5000 INJECTION INTRAVENOUS; SUBCUTANEOUS at 14:43

## 2024-10-19 RX ADMIN — NIRMATRELVIR AND RITONAVIR 2 TABLET: KIT at 10:22

## 2024-10-19 RX ADMIN — SENNOSIDES AND DOCUSATE SODIUM 2 TABLET: 50; 8.6 TABLET ORAL at 10:21

## 2024-10-19 RX ADMIN — HEPARIN SODIUM 5000 UNITS: 5000 INJECTION INTRAVENOUS; SUBCUTANEOUS at 20:44

## 2024-10-19 NOTE — PROGRESS NOTES
[facility] Medicine Services  PROGRESS NOTE    Patient Name: Georgia Velázquez  : 1945  MRN: [id number]    Date of Admission: 10/14/2024  Primary Care Physician: Provider, No Known    Subjective   Subjective     CC:  F/u encephalopathy    HPI:  Resting in bed in no acute distress and tells me she feels ok.  No fever or chills.  No chest pain palpitation shortness of breath.  Currently she is on room air and saturating well.        Objective   Objective     Vital Signs:   Temp:  [96.2 °F (35.7 °C)-96.6 °F (35.9 °C)] 96.2 °F (35.7 °C)  Heart Rate:  [56-59] 56  Resp:  [18-20] 18  BP: (130-164)/(42-60) 155/42     Physical Exam:  Constitutional: No acute distress, looks comfortable  HENT: NCAT, mucous membranes moist  Respiratory: Scattered expiratory wheezes bilaterally, respiratory effort normal, room air and saturating well  Cardiovascular: RRR, no murmurs, rubs, or gallops  Gastrointestinal: Abdomen is obese.  Positive bowel sounds, soft, nontender, nondistended  Musculoskeletal:  bilateral ankle edema, or bili obese  Psychiatric: Appropriate affect, cooperative  Neurologic: Follows commands, no focality appreciated, speech clear  Skin: No rashes      Results Reviewed:  LAB RESULTS:      Lab 10/18/24  0334 10/17/24  0344 10/16/24  0433 10/15/24  1601 10/15/24  0647 10/15/24  0431 10/14/24  2141   WBC 5.53 5.09 6.12  --   --   --  7.18   HEMOGLOBIN 12.9 12.9 12.3  --   --   --  13.1   HEMATOCRIT 39.1 39.8 37.5  --   --   --  39.7   PLATELETS 126* 135* 154  --   --   --  177   NEUTROS ABS 2.29 2.34 3.58  --   --   --  5.58   IMMATURE GRANS (ABS) 0.01 0.02 0.01  --   --   --  0.05   LYMPHS ABS 2.56 2.03 1.57  --   --   --  0.75   MONOS ABS 0.59 0.62 0.81  --   --   --  0.78   EOS ABS 0.07 0.06 0.13  --   --   --  0.00   MCV 94.4 95.7 93.5  --   --   --  94.7   CRP  --   --   --   --   --   --  1.79*   PROCALCITONIN  --   --   --   --   --   --  0.09   LACTATE  --   --   --   --   --   1.2  --    LDH  --   --   --   --   --   --  152   PROTIME  --   --   --   --  16.3*  --   --    APTT  --   --   --   --  30.4  --   --    HEPARIN ANTI-XA  --   --   --   --   --  0.10*  --    D DIMER QUANT  --   --   --   --  1.76*  --   --    HSTROP T  --   --   --  34*  --  40* 36*         Lab 10/19/24  0807 10/18/24  0334 10/17/24  0344 10/16/24  0433 10/15/24  0431 10/14/24  2146 10/14/24  2141   SODIUM 137 138 139 137  --   --  137   POTASSIUM 4.5 4.6 4.7 4.5  --   --  4.3   CHLORIDE 104 103 106 105  --   --  102   CO2 22.0 21.0* 21.0* 22.0  --   --  23.0   ANION GAP 11.0 14.0 12.0 10.0  --   --  12.0   BUN 33* 36* 31* 26*  --   --  25*   CREATININE 1.18* 1.58* 1.32* 1.36*  --  1.60* 1.45*   EGFR 46.5* 32.8* 40.6* 39.2*  --   --  36.3*   GLUCOSE 78 66 74 78  --   --  132*   CALCIUM 9.0 9.1 8.8 8.6  --   --  9.0   MAGNESIUM  --  1.9 2.0 2.0  --   --   --    HEMOGLOBIN A1C  --   --   --   --  5.50  --   --          Lab 10/18/24  0334 10/17/24  0344 10/16/24  0433 10/14/24  2141   TOTAL PROTEIN 6.4 6.1 6.3 7.3   ALBUMIN 3.3* 3.4* 3.4* 3.8   GLOBULIN 3.1 2.7 2.9 3.5   ALT (SGPT) 14 14 12 11   AST (SGOT) 21 27 28 19   BILIRUBIN 0.2 0.3 0.3 0.6   ALK PHOS 77 83 83 102   LIPASE  --   --   --  19         Lab 10/15/24  1601 10/15/24  0647 10/15/24  0431 10/14/24  2141   PROBNP  --   --   --  3,900.0*   HSTROP T 34*  --  40* 36*   PROTIME  --  16.3*  --   --    INR  --  1.30*  --   --              Lab 10/14/24  2141   FERRITIN 301.80*         Brief Urine Lab Results  (Last result in the past 365 days)        Color   Clarity   Blood   Leuk Est   Nitrite   Protein   CREAT   Urine HCG        10/14/24 2203 Yellow   Cloudy   Moderate (2+)   Moderate (2+)   Positive   100 mg/dL (2+)                   Microbiology Results Abnormal       Procedure Component Value - Date/Time    Blood Culture - Blood, Arm, Left [[id number]]  (Normal) Collected: 10/15/24 0431    Lab Status: Preliminary result Specimen: Blood from Arm, Left  Updated: 10/19/24 0615     Blood Culture No growth at 4 days    Narrative:      Less than seven (7) mL's of blood was collected.  Insufficient quantity may yield false negative results.    Blood Culture - Blood, Hand, Right [[id number]]  (Normal) Collected: 10/15/24 0431    Lab Status: Preliminary result Specimen: Blood from Hand, Right Updated: 10/19/24 0615     Blood Culture No growth at 4 days    Narrative:      Less than seven (7) mL's of blood was collected.  Insufficient quantity may yield false negative results.            No radiology results from the last 24 hrs    Results for orders placed during the hospital encounter of 10/14/24    Adult Transthoracic Echo Complete W/ Cont if Necessary Per Protocol    Interpretation Summary    Left ventricular systolic function is mildly decreased. Calculated left ventricular EF = 41.4% Left ventricular ejection fraction appears to be 41 - 45%.    The following left ventricular wall segments are hypokinetic: apical anterior, apical lateral, apical inferior, apical septal and apex hypokinetic.    Left ventricular diastolic function is consistent with (grade II w/high LAP) pseudonormalization.    Mild aortic valve stenosis is present. Aortic valve area is 1.8 cm2.    Peak velocity of the flow distal to the aortic valve is 209.8 cm/s. Aortic valve maximum pressure gradient is 18 mmHg. Aortic valve mean pressure gradient is 10 mmHg. Aortic valve dimensionless index is 0 .    Mild mitral valve stenosis is present. The mitral valve peak gradient is 13 mmHg. The mitral valve mean gradient is 5 mmHg.    Estimated right ventricular systolic pressure from tricuspid regurgitation is normal (<35 mmHg).      Current medications:  Scheduled Meds:clopidogrel, 75 mg, Oral, Daily  heparin (porcine), 5,000 Units, Subcutaneous, Q8H  insulin lispro, 2-7 Units, Subcutaneous, 4x Daily AC & at Bedtime  Nirmatrelvir&Ritonavir 150/100, 2 tablet, Oral, BID  senna-docusate sodium, 2 tablet, Oral,  BID  valsartan, 240 mg, Oral, Daily      Continuous Infusions:   PRN Meds:.  acetaminophen **OR** acetaminophen    albuterol sulfate HFA    benzonatate    senna-docusate sodium **AND** polyethylene glycol **AND** bisacodyl **AND** bisacodyl    dextromethorphan polistirex ER    dextrose    dextrose    glucagon (human recombinant)    influenza vaccine    ipratropium-albuterol    melatonin    ondansetron ODT **OR** ondansetron    sodium chloride    Assessment & Plan   Assessment & Plan     Active Hospital Problems    Diagnosis  POA    **COVID-19 virus detected [U07.1]  Yes    Acute UTI (urinary tract infection) [N39.0]  Yes    CKD (chronic kidney disease) stage 3, GFR 30-59 ml/min [N18.30]  Yes    T2DM (type 2 diabetes mellitus) [E11.9]  Yes    CAD (coronary artery disease) [I25.10]  Yes    Syncope [R55]  Yes    Dementia [F03.90]  Unknown    Diastolic dysfunction [I51.89]  Yes    Chronic obstructive pulmonary disease [J44.9]  Yes    Essential hypertension [I10]  Yes    GERD (gastroesophageal reflux disease) [K21.9]  Yes    Hyperlipidemia [E78.5]  Yes      Resolved Hospital Problems   No resolved problems to display.        Brief Hospital Course to date:  Georgia Velázquez is a 79 y.o. female  with CAD status post stent, diastolic CHF, hypertension, hyperlipidemia, history of CVA, type 2 diabetes, CKD 3, GERD, COPD, dementia who presented to the ED with altered mental status and syncope at home.  Patient was found to be COVID-positive and have a UTI.     This patient's problems and plans were partially entered by my partner and updated as appropriate by me 10/19/24.      UTI  - Urine culture preliminarily positive for E. Coli, sensitivities pending  - Continue Rocephin for now to complete 5-day course  - Blood cultures no growth to date     COVID-19 infection  COPD, compensated  -- Covid + on 10/14  - Patient remains on room air  - CT shows possible right small pleural effusion  - Continue Paxlovid  - Hold steroids as  patient is not hypoxic  -- As needed albuterol for wheezing       Syncope  - Likely multifactorial in the setting of UTI and COVID and dehydration.  - Fall with left hand laceration status post sutures in the ED  - CT head unremarkable  - Echo with EF 41-45%, left wall hypokinesis, grade II diastolic dysfunctionpending  - Orthostatic vitals negative in the ED  - Carotid duplex shows less than 50% stenosis on the right side, left side shows 50 to 69% stenosis which is a mild increase from the study done in February.  --PT and OT following     Dementia  - Patient remains pleasantly confused  - CT head on arrival unremarkable  - Patient on fall precautions     CKD 3  -- Baseline ~1.2-1.5 per chart review  - Improving, within baseline  - Will continue to monitor, 1.36 today     Diastolic heart failure  - Currently compensated  - Echo with EF 41-45%, left wall hypokinesis, grade II diastolic dysfunction  -- Strict I's & O's, daily weights     Type 2 diabetes  --A1c 5.5  -Sliding scale insulin     CAD status post stent  Hyperlipidemia  History of CVA  - Continue Plavix  - Continue Diovan  - Holding statin while on Paxlovid      Expected Discharge Location and Transportation: SNF rehab  Expected Discharge pending bed offer, insurance approval, isolation rules  Expected Discharge Date: 10/18/2024; Expected Discharge Time:      VTE Prophylaxis:  Pharmacologic VTE prophylaxis orders are present.         AM-PAC 6 Clicks Score (PT): 17 (10/19/24 1022)    CODE STATUS:   Code Status and Medical Interventions: No CPR (Do Not Attempt to Resuscitate); Limited Support; No intubation (DNI); DNR/DNI   Ordered at: 10/15/24 0122     Medical Intervention Limits:    No intubation (DNI)     Level Of Support Discussed With:    Health Care Surrogate     Code Status (Patient has no pulse and is not breathing):    No CPR (Do Not Attempt to Resuscitate)     Medical Interventions (Patient has pulse or is breathing):    Limited Support      Comments:    DNR/DNI       Binh Rendon MD  10/19/24

## 2024-10-20 LAB
BACTERIA SPEC AEROBE CULT: NORMAL
BACTERIA SPEC AEROBE CULT: NORMAL
GLUCOSE BLDC GLUCOMTR-MCNC: 112 MG/DL (ref 70–130)
GLUCOSE BLDC GLUCOMTR-MCNC: 85 MG/DL (ref 70–130)
GLUCOSE BLDC GLUCOMTR-MCNC: 94 MG/DL (ref 70–130)
GLUCOSE BLDC GLUCOMTR-MCNC: 96 MG/DL (ref 70–130)

## 2024-10-20 PROCEDURE — 96372 THER/PROPH/DIAG INJ SC/IM: CPT

## 2024-10-20 PROCEDURE — 82948 REAGENT STRIP/BLOOD GLUCOSE: CPT

## 2024-10-20 PROCEDURE — G0378 HOSPITAL OBSERVATION PER HR: HCPCS

## 2024-10-20 PROCEDURE — 25010000002 HEPARIN (PORCINE) PER 1000 UNITS: Performed by: NURSE PRACTITIONER

## 2024-10-20 PROCEDURE — 99232 SBSQ HOSP IP/OBS MODERATE 35: CPT | Performed by: NURSE PRACTITIONER

## 2024-10-20 RX ADMIN — HEPARIN SODIUM 5000 UNITS: 5000 INJECTION INTRAVENOUS; SUBCUTANEOUS at 15:32

## 2024-10-20 RX ADMIN — CLOPIDOGREL BISULFATE 75 MG: 75 TABLET ORAL at 09:19

## 2024-10-20 RX ADMIN — VALSARTAN 240 MG: 80 TABLET, FILM COATED ORAL at 09:19

## 2024-10-20 RX ADMIN — SENNOSIDES AND DOCUSATE SODIUM 2 TABLET: 50; 8.6 TABLET ORAL at 09:18

## 2024-10-20 RX ADMIN — HEPARIN SODIUM 5000 UNITS: 5000 INJECTION INTRAVENOUS; SUBCUTANEOUS at 06:14

## 2024-10-20 NOTE — PROGRESS NOTES
[facility] Medicine Services  PROGRESS NOTE    Patient Name: Georgia Velázquez  : 1945  MRN: [id number]    Date of Admission: 10/14/2024  Primary Care Physician: Provider, No Known    Subjective   Subjective     CC:  F/u encephalopathy    HPI:  Resting in bed in no acute distress and tells me she feels ok.  No fever or chills.  No chest pain palpitation shortness of breath.  Currently she is on room air and saturating well.        Objective   Objective     Vital Signs:   Temp:  [99 °F (37.2 °C)-99.5 °F (37.5 °C)] 99 °F (37.2 °C)  Heart Rate:  [53-66] 59  Resp:  [17-18] 18  BP: (143-160)/(48-63) 154/48     Physical Exam:  Constitutional: No acute distress, looks comfortable  HENT: NCAT, mucous membranes moist  Respiratory: Scattered expiratory wheezes bilaterally, respiratory effort normal, room air and saturating well  Cardiovascular: RRR, no murmurs, rubs, or gallops  Gastrointestinal: Abdomen is obese.  Positive bowel sounds, soft, nontender, nondistended  Musculoskeletal:  bilateral ankle edema, or bili obese  Psychiatric: Appropriate affect, cooperative  Neurologic: Follows commands, no focality appreciated, speech clear  Skin: No rashes      Results Reviewed:  LAB RESULTS:      Lab 10/18/24  0334 10/17/24  0344 10/16/24  0433 10/15/24  1601 10/15/24  0647 10/15/24  0431 10/14/24  2141   WBC 5.53 5.09 6.12  --   --   --  7.18   HEMOGLOBIN 12.9 12.9 12.3  --   --   --  13.1   HEMATOCRIT 39.1 39.8 37.5  --   --   --  39.7   PLATELETS 126* 135* 154  --   --   --  177   NEUTROS ABS 2.29 2.34 3.58  --   --   --  5.58   IMMATURE GRANS (ABS) 0.01 0.02 0.01  --   --   --  0.05   LYMPHS ABS 2.56 2.03 1.57  --   --   --  0.75   MONOS ABS 0.59 0.62 0.81  --   --   --  0.78   EOS ABS 0.07 0.06 0.13  --   --   --  0.00   MCV 94.4 95.7 93.5  --   --   --  94.7   CRP  --   --   --   --   --   --  1.79*   PROCALCITONIN  --   --   --   --   --   --  0.09   LACTATE  --   --   --   --   --  1.2   --    LDH  --   --   --   --   --   --  152   PROTIME  --   --   --   --  16.3*  --   --    APTT  --   --   --   --  30.4  --   --    HEPARIN ANTI-XA  --   --   --   --   --  0.10*  --    D DIMER QUANT  --   --   --   --  1.76*  --   --    HSTROP T  --   --   --  34*  --  40* 36*         Lab 10/19/24  0807 10/18/24  0334 10/17/24  0344 10/16/24  0433 10/15/24  0431 10/14/24  2146 10/14/24  2141   SODIUM 137 138 139 137  --   --  137   POTASSIUM 4.5 4.6 4.7 4.5  --   --  4.3   CHLORIDE 104 103 106 105  --   --  102   CO2 22.0 21.0* 21.0* 22.0  --   --  23.0   ANION GAP 11.0 14.0 12.0 10.0  --   --  12.0   BUN 33* 36* 31* 26*  --   --  25*   CREATININE 1.18* 1.58* 1.32* 1.36*  --  1.60* 1.45*   EGFR 46.5* 32.8* 40.6* 39.2*  --   --  36.3*   GLUCOSE 78 66 74 78  --   --  132*   CALCIUM 9.0 9.1 8.8 8.6  --   --  9.0   MAGNESIUM  --  1.9 2.0 2.0  --   --   --    HEMOGLOBIN A1C  --   --   --   --  5.50  --   --          Lab 10/18/24  0334 10/17/24  0344 10/16/24  0433 10/14/24  2141   TOTAL PROTEIN 6.4 6.1 6.3 7.3   ALBUMIN 3.3* 3.4* 3.4* 3.8   GLOBULIN 3.1 2.7 2.9 3.5   ALT (SGPT) 14 14 12 11   AST (SGOT) 21 27 28 19   BILIRUBIN 0.2 0.3 0.3 0.6   ALK PHOS 77 83 83 102   LIPASE  --   --   --  19         Lab 10/15/24  1601 10/15/24  0647 10/15/24  0431 10/14/24  2141   PROBNP  --   --   --  3,900.0*   HSTROP T 34*  --  40* 36*   PROTIME  --  16.3*  --   --    INR  --  1.30*  --   --              Lab 10/14/24  2141   FERRITIN 301.80*         Brief Urine Lab Results  (Last result in the past 365 days)        Color   Clarity   Blood   Leuk Est   Nitrite   Protein   CREAT   Urine HCG        10/14/24 2203 Yellow   Cloudy   Moderate (2+)   Moderate (2+)   Positive   100 mg/dL (2+)                   Microbiology Results Abnormal       Procedure Component Value - Date/Time    Blood Culture - Blood, Arm, Left [[id number]]  (Normal) Collected: 10/15/24 0431    Lab Status: Final result Specimen: Blood from Arm, Left Updated: 10/20/24  0615     Blood Culture No growth at 5 days    Narrative:      Less than seven (7) mL's of blood was collected.  Insufficient quantity may yield false negative results.    Blood Culture - Blood, Hand, Right [[id number]]  (Normal) Collected: 10/15/24 0431    Lab Status: Final result Specimen: Blood from Hand, Right Updated: 10/20/24 0615     Blood Culture No growth at 5 days    Narrative:      Less than seven (7) mL's of blood was collected.  Insufficient quantity may yield false negative results.            No radiology results from the last 24 hrs    Results for orders placed during the hospital encounter of 10/14/24    Adult Transthoracic Echo Complete W/ Cont if Necessary Per Protocol    Interpretation Summary    Left ventricular systolic function is mildly decreased. Calculated left ventricular EF = 41.4% Left ventricular ejection fraction appears to be 41 - 45%.    The following left ventricular wall segments are hypokinetic: apical anterior, apical lateral, apical inferior, apical septal and apex hypokinetic.    Left ventricular diastolic function is consistent with (grade II w/high LAP) pseudonormalization.    Mild aortic valve stenosis is present. Aortic valve area is 1.8 cm2.    Peak velocity of the flow distal to the aortic valve is 209.8 cm/s. Aortic valve maximum pressure gradient is 18 mmHg. Aortic valve mean pressure gradient is 10 mmHg. Aortic valve dimensionless index is 0 .    Mild mitral valve stenosis is present. The mitral valve peak gradient is 13 mmHg. The mitral valve mean gradient is 5 mmHg.    Estimated right ventricular systolic pressure from tricuspid regurgitation is normal (<35 mmHg).      Current medications:  Scheduled Meds:clopidogrel, 75 mg, Oral, Daily  heparin (porcine), 5,000 Units, Subcutaneous, Q8H  insulin lispro, 2-7 Units, Subcutaneous, 4x Daily AC & at Bedtime  senna-docusate sodium, 2 tablet, Oral, BID  valsartan, 240 mg, Oral, Daily      Continuous Infusions:   PRN Meds:.   acetaminophen **OR** acetaminophen    albuterol sulfate HFA    benzonatate    senna-docusate sodium **AND** polyethylene glycol **AND** bisacodyl **AND** bisacodyl    dextromethorphan polistirex ER    dextrose    dextrose    glucagon (human recombinant)    influenza vaccine    ipratropium-albuterol    melatonin    ondansetron ODT **OR** ondansetron    sodium chloride    Assessment & Plan   Assessment & Plan     Active Hospital Problems    Diagnosis  POA    **COVID-19 virus detected [U07.1]  Yes    Acute UTI (urinary tract infection) [N39.0]  Yes    CKD (chronic kidney disease) stage 3, GFR 30-59 ml/min [N18.30]  Yes    T2DM (type 2 diabetes mellitus) [E11.9]  Yes    CAD (coronary artery disease) [I25.10]  Yes    Syncope [R55]  Yes    Dementia [F03.90]  Unknown    Diastolic dysfunction [I51.89]  Yes    Chronic obstructive pulmonary disease [J44.9]  Yes    Essential hypertension [I10]  Yes    GERD (gastroesophageal reflux disease) [K21.9]  Yes    Hyperlipidemia [E78.5]  Yes      Resolved Hospital Problems   No resolved problems to display.        Brief Hospital Course to date:  Georgia Velázquez is a 79 y.o. female  with CAD status post stent, diastolic CHF, hypertension, hyperlipidemia, history of CVA, type 2 diabetes, CKD 3, GERD, COPD, dementia who presented to the ED with altered mental status and syncope at home.  Patient was found to be COVID-positive and have a UTI.     This patient's problems and plans were partially entered by my partner and updated as appropriate by me 10/20/24.      UTI  - Urine culture preliminarily positive for E. Coli; pan sensitive   - Rocephin; completed 5-day course  - Blood cultures no growth to date     COVID-19 infection  COPD, compensated  -- Covid + on 10/14  - Patient remains on room air  - CT shows possible right small pleural effusion  - Completed Paxlovid  - Hold steroids as patient is not hypoxic  -- As needed albuterol for wheezing       Syncope  - Likely multifactorial  in the setting of UTI and COVID and dehydration.  - Fall with left hand laceration status post sutures in the ED  - CT head unremarkable  - Echo with EF 41-45%, left wall hypokinesis, grade II diastolic dysfunctionpending  - Orthostatic vitals negative in the ED  - Carotid duplex shows less than 50% stenosis on the right side, left side shows 50 to 69% stenosis which is a mild increase from the study done in February.  --PT and OT following     Dementia  - Patient remains pleasantly confused  - CT head on arrival unremarkable  - Patient on fall precautions     CKD 3  -- Baseline ~1.2-1.5 per chart review  - Improving, within baseline  - Will continue to monitor, 1.1 most recently      Diastolic heart failure  - Currently compensated  - Echo with EF 41-45%, left wall hypokinesis, grade II diastolic dysfunction  -- Strict I's & O's, daily weights     Type 2 diabetes  --A1c 5.5  -Sliding scale insulin     CAD status post stent  Hyperlipidemia  History of CVA  - Continue Plavix  - Continue Diovan  - Holding statin while on Paxlovid; restart soon if liver enzymes remain stable, labs in AM       Expected Discharge Location and Transportation: SNF rehab  Expected Discharge pending bed offer, insurance approval, isolation rules  Expected Discharge Date: 10/18/2024; Expected Discharge Time:      VTE Prophylaxis:  Pharmacologic VTE prophylaxis orders are present.         AM-PAC 6 Clicks Score (PT): 17 (10/20/24 0963)    CODE STATUS:   Code Status and Medical Interventions: No CPR (Do Not Attempt to Resuscitate); Limited Support; No intubation (DNI); DNR/DNI   Ordered at: 10/15/24 0122     Medical Intervention Limits:    No intubation (DNI)     Level Of Support Discussed With:    Health Care Surrogate     Code Status (Patient has no pulse and is not breathing):    No CPR (Do Not Attempt to Resuscitate)     Medical Interventions (Patient has pulse or is breathing):    Limited Support     Comments:    DNR/DNI       Vivien CORONADO  Franci, APRN  10/20/24

## 2024-10-21 LAB
ALBUMIN SERPL-MCNC: 3.1 G/DL (ref 3.5–5.2)
ALBUMIN/GLOB SERPL: 1 G/DL
ALP SERPL-CCNC: 77 U/L (ref 39–117)
ALT SERPL W P-5'-P-CCNC: 23 U/L (ref 1–33)
ANION GAP SERPL CALCULATED.3IONS-SCNC: 12 MMOL/L (ref 5–15)
AST SERPL-CCNC: 27 U/L (ref 1–32)
BILIRUB SERPL-MCNC: 0.4 MG/DL (ref 0–1.2)
BUN SERPL-MCNC: 34 MG/DL (ref 8–23)
BUN/CREAT SERPL: 21.9 (ref 7–25)
CALCIUM SPEC-SCNC: 8.7 MG/DL (ref 8.6–10.5)
CHLORIDE SERPL-SCNC: 104 MMOL/L (ref 98–107)
CO2 SERPL-SCNC: 23 MMOL/L (ref 22–29)
CREAT SERPL-MCNC: 1.55 MG/DL (ref 0.57–1)
EGFRCR SERPLBLD CKD-EPI 2021: 33.5 ML/MIN/1.73
GLOBULIN UR ELPH-MCNC: 3.2 GM/DL
GLUCOSE BLDC GLUCOMTR-MCNC: 102 MG/DL (ref 70–130)
GLUCOSE BLDC GLUCOMTR-MCNC: 103 MG/DL (ref 70–130)
GLUCOSE BLDC GLUCOMTR-MCNC: 106 MG/DL (ref 70–130)
GLUCOSE BLDC GLUCOMTR-MCNC: 77 MG/DL (ref 70–130)
GLUCOSE SERPL-MCNC: 77 MG/DL (ref 65–99)
POTASSIUM SERPL-SCNC: 4 MMOL/L (ref 3.5–5.2)
PROT SERPL-MCNC: 6.3 G/DL (ref 6–8.5)
SODIUM SERPL-SCNC: 139 MMOL/L (ref 136–145)

## 2024-10-21 PROCEDURE — 82948 REAGENT STRIP/BLOOD GLUCOSE: CPT

## 2024-10-21 PROCEDURE — 80053 COMPREHEN METABOLIC PANEL: CPT | Performed by: NURSE PRACTITIONER

## 2024-10-21 PROCEDURE — G0378 HOSPITAL OBSERVATION PER HR: HCPCS

## 2024-10-21 PROCEDURE — 25010000002 HEPARIN (PORCINE) PER 1000 UNITS: Performed by: NURSE PRACTITIONER

## 2024-10-21 PROCEDURE — 99232 SBSQ HOSP IP/OBS MODERATE 35: CPT | Performed by: INTERNAL MEDICINE

## 2024-10-21 PROCEDURE — 96372 THER/PROPH/DIAG INJ SC/IM: CPT

## 2024-10-21 RX ADMIN — HEPARIN SODIUM 5000 UNITS: 5000 INJECTION INTRAVENOUS; SUBCUTANEOUS at 14:32

## 2024-10-21 RX ADMIN — CLOPIDOGREL BISULFATE 75 MG: 75 TABLET ORAL at 09:23

## 2024-10-21 RX ADMIN — HEPARIN SODIUM 5000 UNITS: 5000 INJECTION INTRAVENOUS; SUBCUTANEOUS at 21:58

## 2024-10-21 NOTE — PROGRESS NOTES
Nutrition Services    Patient Name:  Georgia Velázquez  YOB: 1945  MRN: [id number]  Admit Date:  10/14/2024    Pt identified 2/2 LOS. Pt currently in COVID isolation - attempted to reach by room phone without success. Will attempt to complete full assessment by phone as able per COVID protocol. Please consult for any nutrition related needs before able to complete assessment.       Electronically signed by:  Georgia Abarca MS,RD,LD  10/21/24 15:22 EDT

## 2024-10-21 NOTE — PLAN OF CARE
Problem: Adult Inpatient Plan of Care  Goal: Plan of Care Review  Outcome: Progressing  Goal: Patient-Specific Goal (Individualized)  Outcome: Progressing  Goal: Absence of Hospital-Acquired Illness or Injury  Outcome: Progressing  Intervention: Identify and Manage Fall Risk  Recent Flowsheet Documentation  Taken 10/21/2024 1600 by Deon Carroll RN  Safety Promotion/Fall Prevention:   safety round/check completed   room organization consistent   nonskid shoes/slippers when out of bed   lighting adjusted   fall prevention program maintained   clutter free environment maintained   assistive device/personal items within reach   activity supervised  Taken 10/21/2024 1400 by Deon Carroll, RN  Safety Promotion/Fall Prevention:   safety round/check completed   room organization consistent   nonskid shoes/slippers when out of bed   lighting adjusted   fall prevention program maintained   clutter free environment maintained   assistive device/personal items within reach  Taken 10/21/2024 1200 by Deon Carroll, RN  Safety Promotion/Fall Prevention:   safety round/check completed   room organization consistent   nonskid shoes/slippers when out of bed   lighting adjusted   fall prevention program maintained   clutter free environment maintained   assistive device/personal items within reach   activity supervised  Taken 10/21/2024 1000 by Deon Carroll, RN  Safety Promotion/Fall Prevention:   safety round/check completed   room organization consistent   nonskid shoes/slippers when out of bed   lighting adjusted   fall prevention program maintained   assistive device/personal items within reach   activity supervised   clutter free environment maintained  Taken 10/21/2024 0800 by Deon Carroll, RN  Safety Promotion/Fall Prevention:   safety round/check completed   room organization consistent   nonskid shoes/slippers when out of bed   lighting adjusted   fall prevention program maintained   assistive device/personal  items within reach   clutter free environment maintained   activity supervised  Intervention: Prevent Skin Injury  Recent Flowsheet Documentation  Taken 10/21/2024 1600 by Deon Carroll RN  Body Position:   tilted   right  Skin Protection:   incontinence pads utilized   silicone foam dressing in place   transparent dressing maintained  Taken 10/21/2024 1400 by Deon Carroll RN  Body Position: position maintained  Skin Protection:   transparent dressing maintained   silicone foam dressing in place  Taken 10/21/2024 1200 by Deon Carroll RN  Body Position:   tilted   right  Skin Protection:   transparent dressing maintained   silicone foam dressing in place   incontinence pads utilized  Taken 10/21/2024 1000 by Deon Carroll RN  Body Position: tilted  Skin Protection:   incontinence pads utilized   silicone foam dressing in place   transparent dressing maintained  Taken 10/21/2024 0800 by Deon Carroll RN  Body Position:   lower extremity elevated   neutral body alignment  Skin Protection:   silicone foam dressing in place   transparent dressing maintained   incontinence pads utilized  Intervention: Prevent and Manage VTE (Venous Thromboembolism) Risk  Recent Flowsheet Documentation  Taken 10/21/2024 0800 by Deon Carroll RN  VTE Prevention/Management: (hep subq) other (see comments)  Goal: Optimal Comfort and Wellbeing  Outcome: Progressing  Intervention: Provide Person-Centered Care  Recent Flowsheet Documentation  Taken 10/21/2024 1600 by Deon Carroll RN  Trust Relationship/Rapport: care explained  Taken 10/21/2024 1400 by Deon Carroll RN  Trust Relationship/Rapport: care explained  Taken 10/21/2024 1200 by Deon Carroll RN  Trust Relationship/Rapport: care explained  Taken 10/21/2024 1000 by Deon Carroll RN  Trust Relationship/Rapport: care explained  Taken 10/21/2024 0800 by Deon Carroll RN  Trust Relationship/Rapport: care explained  Goal: Readiness for Transition of  Care  Outcome: Progressing     Problem: Skin Injury Risk Increased  Goal: Skin Health and Integrity  Outcome: Progressing  Intervention: Optimize Skin Protection  Recent Flowsheet Documentation  Taken 10/21/2024 1600 by Deon Carroll RN  Activity Management: activity encouraged  Pressure Reduction Techniques: weight shift assistance provided  Head of Bed (HOB) Positioning: HOB elevated  Pressure Reduction Devices: pressure-redistributing mattress utilized  Skin Protection:   incontinence pads utilized   silicone foam dressing in place   transparent dressing maintained  Taken 10/21/2024 1400 by Deon Carroll RN  Activity Management: activity encouraged  Pressure Reduction Techniques: frequent weight shift encouraged  Head of Bed (HOB) Positioning: HOB lowered  Pressure Reduction Devices: pressure-redistributing mattress utilized  Skin Protection:   transparent dressing maintained   silicone foam dressing in place  Taken 10/21/2024 1200 by Deon Carroll RN  Activity Management: activity encouraged  Pressure Reduction Techniques: frequent weight shift encouraged  Head of Bed (HOB) Positioning: HOB elevated  Pressure Reduction Devices: pressure-redistributing mattress utilized  Skin Protection:   transparent dressing maintained   silicone foam dressing in place   incontinence pads utilized  Taken 10/21/2024 1000 by Deon Carroll RN  Activity Management: activity encouraged  Pressure Reduction Techniques: frequent weight shift encouraged  Head of Bed (HOB) Positioning: HOB elevated  Pressure Reduction Devices: pressure-redistributing mattress utilized  Skin Protection:   incontinence pads utilized   silicone foam dressing in place   transparent dressing maintained  Taken 10/21/2024 0800 by Deon Carroll RN  Activity Management: activity encouraged  Pressure Reduction Techniques: frequent weight shift encouraged  Head of Bed (HOB) Positioning: HOB elevated  Pressure Reduction Devices:  pressure-redistributing mattress utilized  Skin Protection:   silicone foam dressing in place   transparent dressing maintained   incontinence pads utilized     Problem: Fall Injury Risk  Goal: Absence of Fall and Fall-Related Injury  Outcome: Progressing  Intervention: Identify and Manage Contributors  Recent Flowsheet Documentation  Taken 10/21/2024 1600 by Deon Carroll RN  Medication Review/Management: medications reviewed  Taken 10/21/2024 1400 by Deon Carroll RN  Medication Review/Management: medications reviewed  Taken 10/21/2024 1200 by Deon Carroll RN  Medication Review/Management: medications reviewed  Taken 10/21/2024 1000 by Deon Carroll RN  Medication Review/Management: medications reviewed  Taken 10/21/2024 0800 by Deon Carroll RN  Medication Review/Management: medications reviewed  Intervention: Promote Injury-Free Environment  Recent Flowsheet Documentation  Taken 10/21/2024 1600 by Deon Carroll RN  Safety Promotion/Fall Prevention:   safety round/check completed   room organization consistent   nonskid shoes/slippers when out of bed   lighting adjusted   fall prevention program maintained   clutter free environment maintained   assistive device/personal items within reach   activity supervised  Taken 10/21/2024 1400 by Deon Carroll RN  Safety Promotion/Fall Prevention:   safety round/check completed   room organization consistent   nonskid shoes/slippers when out of bed   lighting adjusted   fall prevention program maintained   clutter free environment maintained   assistive device/personal items within reach  Taken 10/21/2024 1200 by Deon Carroll RN  Safety Promotion/Fall Prevention:   safety round/check completed   room organization consistent   nonskid shoes/slippers when out of bed   lighting adjusted   fall prevention program maintained   clutter free environment maintained   assistive device/personal items within reach   activity supervised  Taken 10/21/2024 1000  by Carroll, Deon, RN  Safety Promotion/Fall Prevention:   safety round/check completed   room organization consistent   nonskid shoes/slippers when out of bed   lighting adjusted   fall prevention program maintained   assistive device/personal items within reach   activity supervised   clutter free environment maintained  Taken 10/21/2024 0800 by Deon Carroll RN  Safety Promotion/Fall Prevention:   safety round/check completed   room organization consistent   nonskid shoes/slippers when out of bed   lighting adjusted   fall prevention program maintained   assistive device/personal items within reach   clutter free environment maintained   activity supervised     Problem: Comorbidity Management  Goal: Blood Glucose Level Within Target Range  Outcome: Progressing  Intervention: Monitor and Manage Glycemia  Recent Flowsheet Documentation  Taken 10/21/2024 1600 by Deon Carroll RN  Medication Review/Management: medications reviewed  Taken 10/21/2024 1400 by Deon Carroll RN  Medication Review/Management: medications reviewed  Taken 10/21/2024 1200 by Deon Carroll RN  Medication Review/Management: medications reviewed  Taken 10/21/2024 1000 by Deon Carroll RN  Medication Review/Management: medications reviewed  Taken 10/21/2024 0800 by Deon Carroll RN  Medication Review/Management: medications reviewed  Goal: Maintenance of Heart Failure Symptom Control  Outcome: Progressing  Intervention: Maintain Heart Failure Management  Recent Flowsheet Documentation  Taken 10/21/2024 1600 by Deon Carroll RN  Medication Review/Management: medications reviewed  Taken 10/21/2024 1400 by Deon Carroll RN  Medication Review/Management: medications reviewed  Taken 10/21/2024 1200 by Deon Carroll RN  Medication Review/Management: medications reviewed  Taken 10/21/2024 1000 by Deon Carroll RN  Medication Review/Management: medications reviewed  Taken 10/21/2024 0800 by Deon Carroll RN  Medication  Review/Management: medications reviewed  Goal: Blood Pressure in Desired Range  Outcome: Progressing  Intervention: Maintain Blood Pressure Management  Recent Flowsheet Documentation  Taken 10/21/2024 1600 by Deon Carroll, RN  Medication Review/Management: medications reviewed  Taken 10/21/2024 1400 by Deon Carroll, RN  Medication Review/Management: medications reviewed  Taken 10/21/2024 1200 by Deon Carroll, RN  Medication Review/Management: medications reviewed  Taken 10/21/2024 1000 by Deon Carroll, RN  Medication Review/Management: medications reviewed  Taken 10/21/2024 0800 by Deon Carroll, RN  Medication Review/Management: medications reviewed   Goal Outcome Evaluation:

## 2024-10-21 NOTE — CASE MANAGEMENT/SOCIAL WORK
Continued Stay Note  [facility]     Patient Name: Georgia Velázquez  MRN: [id number]  Today's Date: 10/21/2024    Admit Date: 10/14/2024    Plan: SNF   Discharge Plan       Row Name 10/21/24 1217       Plan    Plan SNF    Plan Comments Discussed in MDR. Ms.. Velázquez is not being discharged today. April, Liaison with [facility] called and said she was going to start pre-cert today, with Ms. Velázquez insurance. PENDING insurance approval, she can go to [facility] at discharge.  will continue to follow for medical readiness.    Final Discharge Disposition Code 03 - skilled nursing facility (SNF)                   Discharge Codes    No documentation.                 Expected Discharge Date and Time       Expected Discharge Date Expected Discharge Time    Oct 23, 2024               Madyson Arriaza RN

## 2024-10-21 NOTE — PROGRESS NOTES
[facility] Medicine Services  PROGRESS NOTE    Patient Name: Georgia Velázquez  : 1945  MRN: [id number]    Date of Admission: 10/14/2024  Primary Care Physician: Provider, No Known    Subjective   Subjective     CC:  F/u encephalopathy    HPI:  Remains on RA with T max 99.2 .  She is asleep and did not wake to exam. Breathing comfortably.        Objective   Objective     Vital Signs:   Temp:  [98.7 °F (37.1 °C)-99.2 °F (37.3 °C)] 99.2 °F (37.3 °C)  Heart Rate:  [55-67] 58  Resp:  [18] 18  BP: (122-154)/(48-57) 122/48     Physical Exam:  Constitutional: No acute distress, looks comfortable, asleep.    HENT: NCAT,  Respiratory:  CTA on RA   Cardiovascular: RRR,   Gastrointestinal: soft NT   Musculoskeletal:  bilateral ankle edema  Skin: No rashes  Hands - no lac seen but has bandage on a distal finger.       Results Reviewed:  LAB RESULTS:      Lab 10/18/24  0334 10/17/24  0344 10/16/24  0433 10/15/24  1601 10/15/24  0647 10/15/24  0431 10/14/24  2141   WBC 5.53 5.09 6.12  --   --   --  7.18   HEMOGLOBIN 12.9 12.9 12.3  --   --   --  13.1   HEMATOCRIT 39.1 39.8 37.5  --   --   --  39.7   PLATELETS 126* 135* 154  --   --   --  177   NEUTROS ABS 2.29 2.34 3.58  --   --   --  5.58   IMMATURE GRANS (ABS) 0.01 0.02 0.01  --   --   --  0.05   LYMPHS ABS 2.56 2.03 1.57  --   --   --  0.75   MONOS ABS 0.59 0.62 0.81  --   --   --  0.78   EOS ABS 0.07 0.06 0.13  --   --   --  0.00   MCV 94.4 95.7 93.5  --   --   --  94.7   CRP  --   --   --   --   --   --  1.79*   PROCALCITONIN  --   --   --   --   --   --  0.09   LACTATE  --   --   --   --   --  1.2  --    LDH  --   --   --   --   --   --  152   PROTIME  --   --   --   --  16.3*  --   --    APTT  --   --   --   --  30.4  --   --    HEPARIN ANTI-XA  --   --   --   --   --  0.10*  --    D DIMER QUANT  --   --   --   --  1.76*  --   --    HSTROP T  --   --   --  34*  --  40* 36*         Lab 10/21/24  0312 10/19/24  0807 10/18/24  0334  10/17/24  0344 10/16/24  0433 10/15/24  0431   SODIUM 139 137 138 139 137  --    POTASSIUM 4.0 4.5 4.6 4.7 4.5  --    CHLORIDE 104 104 103 106 105  --    CO2 23.0 22.0 21.0* 21.0* 22.0  --    ANION GAP 12.0 11.0 14.0 12.0 10.0  --    BUN 34* 33* 36* 31* 26*  --    CREATININE 1.55* 1.18* 1.58* 1.32* 1.36*  --    EGFR 33.5* 46.5* 32.8* 40.6* 39.2*  --    GLUCOSE 77 78 66 74 78  --    CALCIUM 8.7 9.0 9.1 8.8 8.6  --    MAGNESIUM  --   --  1.9 2.0 2.0  --    HEMOGLOBIN A1C  --   --   --   --   --  5.50         Lab 10/21/24  0312 10/18/24  0334 10/17/24  0344 10/16/24  0433 10/14/24  2141   TOTAL PROTEIN 6.3 6.4 6.1 6.3 7.3   ALBUMIN 3.1* 3.3* 3.4* 3.4* 3.8   GLOBULIN 3.2 3.1 2.7 2.9 3.5   ALT (SGPT) 23 14 14 12 11   AST (SGOT) 27 21 27 28 19   BILIRUBIN 0.4 0.2 0.3 0.3 0.6   ALK PHOS 77 77 83 83 102   LIPASE  --   --   --   --  19         Lab 10/15/24  1601 10/15/24  0647 10/15/24  0431 10/14/24  2141   PROBNP  --   --   --  3,900.0*   HSTROP T 34*  --  40* 36*   PROTIME  --  16.3*  --   --    INR  --  1.30*  --   --              Lab 10/14/24  2141   FERRITIN 301.80*         Brief Urine Lab Results  (Last result in the past 365 days)        Color   Clarity   Blood   Leuk Est   Nitrite   Protein   CREAT   Urine HCG        10/14/24 2203 Yellow   Cloudy   Moderate (2+)   Moderate (2+)   Positive   100 mg/dL (2+)                   Microbiology Results Abnormal       Procedure Component Value - Date/Time    Blood Culture - Blood, Arm, Left [[id number]]  (Normal) Collected: 10/15/24 0431    Lab Status: Final result Specimen: Blood from Arm, Left Updated: 10/20/24 0615     Blood Culture No growth at 5 days    Narrative:      Less than seven (7) mL's of blood was collected.  Insufficient quantity may yield false negative results.    Blood Culture - Blood, Hand, Right [[id number]]  (Normal) Collected: 10/15/24 0431    Lab Status: Final result Specimen: Blood from Hand, Right Updated: 10/20/24 0615     Blood Culture No growth at 5  days    Narrative:      Less than seven (7) mL's of blood was collected.  Insufficient quantity may yield false negative results.            No radiology results from the last 24 hrs    Results for orders placed during the hospital encounter of 10/14/24    Adult Transthoracic Echo Complete W/ Cont if Necessary Per Protocol    Interpretation Summary    Left ventricular systolic function is mildly decreased. Calculated left ventricular EF = 41.4% Left ventricular ejection fraction appears to be 41 - 45%.    The following left ventricular wall segments are hypokinetic: apical anterior, apical lateral, apical inferior, apical septal and apex hypokinetic.    Left ventricular diastolic function is consistent with (grade II w/high LAP) pseudonormalization.    Mild aortic valve stenosis is present. Aortic valve area is 1.8 cm2.    Peak velocity of the flow distal to the aortic valve is 209.8 cm/s. Aortic valve maximum pressure gradient is 18 mmHg. Aortic valve mean pressure gradient is 10 mmHg. Aortic valve dimensionless index is 0 .    Mild mitral valve stenosis is present. The mitral valve peak gradient is 13 mmHg. The mitral valve mean gradient is 5 mmHg.    Estimated right ventricular systolic pressure from tricuspid regurgitation is normal (<35 mmHg).      Current medications:  Scheduled Meds:clopidogrel, 75 mg, Oral, Daily  heparin (porcine), 5,000 Units, Subcutaneous, Q8H  insulin lispro, 2-7 Units, Subcutaneous, 4x Daily AC & at Bedtime  senna-docusate sodium, 2 tablet, Oral, BID  valsartan, 240 mg, Oral, Daily      Continuous Infusions:   PRN Meds:.  acetaminophen **OR** acetaminophen    albuterol sulfate HFA    benzonatate    senna-docusate sodium **AND** polyethylene glycol **AND** bisacodyl **AND** bisacodyl    dextromethorphan polistirex ER    dextrose    dextrose    glucagon (human recombinant)    influenza vaccine    ipratropium-albuterol    melatonin    ondansetron ODT **OR** ondansetron    sodium  chloride    Assessment & Plan   Assessment & Plan     Active Hospital Problems    Diagnosis  POA    **COVID-19 virus detected [U07.1]  Yes    Acute UTI (urinary tract infection) [N39.0]  Yes    CKD (chronic kidney disease) stage 3, GFR 30-59 ml/min [N18.30]  Yes    T2DM (type 2 diabetes mellitus) [E11.9]  Yes    CAD (coronary artery disease) [I25.10]  Yes    Syncope [R55]  Yes    Dementia [F03.90]  Unknown    Diastolic dysfunction [I51.89]  Yes    Chronic obstructive pulmonary disease [J44.9]  Yes    Essential hypertension [I10]  Yes    GERD (gastroesophageal reflux disease) [K21.9]  Yes    Hyperlipidemia [E78.5]  Yes      Resolved Hospital Problems   No resolved problems to display.        Brief Hospital Course to date:  Georgia Velázquez is a 79 y.o. female  with CAD status post stent, diastolic CHF, hypertension, hyperlipidemia, history of CVA, type 2 diabetes, CKD 3, GERD, COPD, dementia who presented to the ED with altered mental status and syncope at home.  Patient was found to be COVID-positive and have a UTI.     This patient's problems and plans were partially entered by my partner and updated as appropriate by me 10/21/24.      UTI  -  E. Coli; pan sensitive   - Rocephin; completed 5-day course     COVID-19 infection  COPD, compensated  -- Covid + on 10/14  - Patient remains on room air  - CT shows possible right small pleural effusion  - Completed Paxlovid  - Hold steroids as patient is not hypoxic  -- As needed albuterol for wheezing       Syncope  - Likely multifactorial in the setting of UTI and COVID and dehydration.  - Fall with left hand laceration status post sutures in the ED   - CT head unremarkable  - Echo with EF 41-45%, left wall hypokinesis, grade II diastolic dysfunctionpending  - Orthostatic vitals negative in the ED  - Carotid duplex shows less than 50% stenosis on the right side, left side shows 50 to 69% stenosis which is a mild increase from the study done in February.  --PT and OT  following     Dementia  - Patient remains pleasantly confused  - CT head on arrival unremarkable  - Patient on fall precautions     CKD 3  -- Baseline ~1.2-1.5 per chart review  - Improving, within baseline  - Will continue to monitor, 1.1 most recently      Diastolic heart failure  - Currently compensated  - Echo with EF 41-45%, left wall hypokinesis, grade II diastolic dysfunction  -- Strict I's & O's, daily weights     Type 2 diabetes  --A1c 5.5  -Sliding scale insulin     CAD status post stent  Hyperlipidemia  History of CVA  - Continue Plavix  - Continue Diovan  - Holding statin while on Paxlovid; restart soon if liver enzymes remain stable, labs in AM       Expected Discharge Location and Transportation: SNF rehab  Expected Discharge pending bed offer, insurance approval, isolation rules  Expected Discharge Date: 10/23/2024; Expected Discharge Time:      VTE Prophylaxis:  Pharmacologic VTE prophylaxis orders are present.         AM-PAC 6 Clicks Score (PT): 17 (10/20/24 2000)    CODE STATUS:   Code Status and Medical Interventions: No CPR (Do Not Attempt to Resuscitate); Limited Support; No intubation (DNI); DNR/DNI   Ordered at: 10/15/24 0122     Medical Intervention Limits:    No intubation (DNI)     Level Of Support Discussed With:    Health Care Surrogate     Code Status (Patient has no pulse and is not breathing):    No CPR (Do Not Attempt to Resuscitate)     Medical Interventions (Patient has pulse or is breathing):    Limited Support     Comments:    DNR/DNI       Nidhi Wylie MD  10/21/24

## 2024-10-22 LAB
GLUCOSE BLDC GLUCOMTR-MCNC: 101 MG/DL (ref 70–130)
GLUCOSE BLDC GLUCOMTR-MCNC: 112 MG/DL (ref 70–130)
GLUCOSE BLDC GLUCOMTR-MCNC: 129 MG/DL (ref 70–130)
GLUCOSE BLDC GLUCOMTR-MCNC: 164 MG/DL (ref 70–130)

## 2024-10-22 PROCEDURE — 94799 UNLISTED PULMONARY SVC/PX: CPT

## 2024-10-22 PROCEDURE — G0378 HOSPITAL OBSERVATION PER HR: HCPCS

## 2024-10-22 PROCEDURE — 96372 THER/PROPH/DIAG INJ SC/IM: CPT

## 2024-10-22 PROCEDURE — 99232 SBSQ HOSP IP/OBS MODERATE 35: CPT | Performed by: INTERNAL MEDICINE

## 2024-10-22 PROCEDURE — 82948 REAGENT STRIP/BLOOD GLUCOSE: CPT

## 2024-10-22 PROCEDURE — 25010000002 HEPARIN (PORCINE) PER 1000 UNITS: Performed by: NURSE PRACTITIONER

## 2024-10-22 PROCEDURE — 63710000001 INSULIN LISPRO (HUMAN) PER 5 UNITS: Performed by: NURSE PRACTITIONER

## 2024-10-22 RX ORDER — ATORVASTATIN CALCIUM 40 MG/1
40 TABLET, FILM COATED ORAL NIGHTLY
Status: DISCONTINUED | OUTPATIENT
Start: 2024-10-22 | End: 2024-10-24 | Stop reason: HOSPADM

## 2024-10-22 RX ADMIN — CLOPIDOGREL BISULFATE 75 MG: 75 TABLET ORAL at 09:17

## 2024-10-22 RX ADMIN — SENNOSIDES AND DOCUSATE SODIUM 2 TABLET: 50; 8.6 TABLET ORAL at 21:20

## 2024-10-22 RX ADMIN — HEPARIN SODIUM 5000 UNITS: 5000 INJECTION INTRAVENOUS; SUBCUTANEOUS at 21:20

## 2024-10-22 RX ADMIN — HEPARIN SODIUM 5000 UNITS: 5000 INJECTION INTRAVENOUS; SUBCUTANEOUS at 13:41

## 2024-10-22 RX ADMIN — IPRATROPIUM BROMIDE AND ALBUTEROL SULFATE 3 ML: 2.5; .5 SOLUTION RESPIRATORY (INHALATION) at 21:38

## 2024-10-22 RX ADMIN — ATORVASTATIN CALCIUM 40 MG: 40 TABLET, FILM COATED ORAL at 21:20

## 2024-10-22 RX ADMIN — SENNOSIDES AND DOCUSATE SODIUM 2 TABLET: 50; 8.6 TABLET ORAL at 09:17

## 2024-10-22 RX ADMIN — INSULIN LISPRO 2 UNITS: 100 INJECTION, SOLUTION INTRAVENOUS; SUBCUTANEOUS at 21:19

## 2024-10-22 RX ADMIN — VALSARTAN 240 MG: 80 TABLET, FILM COATED ORAL at 09:17

## 2024-10-22 RX ADMIN — HEPARIN SODIUM 5000 UNITS: 5000 INJECTION INTRAVENOUS; SUBCUTANEOUS at 05:36

## 2024-10-22 NOTE — PROGRESS NOTES
[facility] Medicine Services  PROGRESS NOTE    Patient Name: Georgia Velázquez  : 1945  MRN: [id number]    Date of Admission: 10/14/2024  Primary Care Physician: Provider, No Known    Subjective   Subjective     CC:  F/u encephalopathy    HPI:  awake, alert, nad.  Was sorry to hear that insurance denied rehab but is glad her dtr is appealing       Objective   Objective     Vital Signs:   Temp:  [97.6 °F (36.4 °C)-98.6 °F (37 °C)] 98.6 °F (37 °C)  Heart Rate:  [56-62] 62  Resp:  [18-20] 18  BP: ()/(46-69) 127/54     Physical Exam:  Constitutional: No acute distress up in chair     HENT: NCAT,  Respiratory:  CTA on RA   Cardiovascular: RRR,   Gastrointestinal: soft NT   Musculoskeletal:  bilateral ankle edema  Skin:warm      Results Reviewed:  LAB RESULTS:      Lab 10/18/24  0334 10/17/24  0344 10/16/24  0433 10/15/24  1601   WBC 5.53 5.09 6.12  --    HEMOGLOBIN 12.9 12.9 12.3  --    HEMATOCRIT 39.1 39.8 37.5  --    PLATELETS 126* 135* 154  --    NEUTROS ABS 2.29 2.34 3.58  --    IMMATURE GRANS (ABS) 0.01 0.02 0.01  --    LYMPHS ABS 2.56 2.03 1.57  --    MONOS ABS 0.59 0.62 0.81  --    EOS ABS 0.07 0.06 0.13  --    MCV 94.4 95.7 93.5  --    HSTROP T  --   --   --  34*         Lab 10/21/24  0312 10/19/24  0807 10/18/24  0334 10/17/24  0344 10/16/24  0433   SODIUM 139 137 138 139 137   POTASSIUM 4.0 4.5 4.6 4.7 4.5   CHLORIDE 104 104 103 106 105   CO2 23.0 22.0 21.0* 21.0* 22.0   ANION GAP 12.0 11.0 14.0 12.0 10.0   BUN 34* 33* 36* 31* 26*   CREATININE 1.55* 1.18* 1.58* 1.32* 1.36*   EGFR 33.5* 46.5* 32.8* 40.6* 39.2*   GLUCOSE 77 78 66 74 78   CALCIUM 8.7 9.0 9.1 8.8 8.6   MAGNESIUM  --   --  1.9 2.0 2.0         Lab 10/21/24  0312 10/18/24  0334 10/17/24  0344 10/16/24  0433   TOTAL PROTEIN 6.3 6.4 6.1 6.3   ALBUMIN 3.1* 3.3* 3.4* 3.4*   GLOBULIN 3.2 3.1 2.7 2.9   ALT (SGPT) 23 14 14 12   AST (SGOT) 27 21 27 28   BILIRUBIN 0.4 0.2 0.3 0.3   ALK PHOS 77 77 83 83         Lab  10/15/24  1601   HSTROP T 34*                   Brief Urine Lab Results  (Last result in the past 365 days)        Color   Clarity   Blood   Leuk Est   Nitrite   Protein   CREAT   Urine HCG        10/14/24 2203 Yellow   Cloudy   Moderate (2+)   Moderate (2+)   Positive   100 mg/dL (2+)                   Microbiology Results Abnormal       Procedure Component Value - Date/Time    Blood Culture - Blood, Arm, Left [[id number]]  (Normal) Collected: 10/15/24 0431    Lab Status: Final result Specimen: Blood from Arm, Left Updated: 10/20/24 0615     Blood Culture No growth at 5 days    Narrative:      Less than seven (7) mL's of blood was collected.  Insufficient quantity may yield false negative results.    Blood Culture - Blood, Hand, Right [[id number]]  (Normal) Collected: 10/15/24 0431    Lab Status: Final result Specimen: Blood from Hand, Right Updated: 10/20/24 0615     Blood Culture No growth at 5 days    Narrative:      Less than seven (7) mL's of blood was collected.  Insufficient quantity may yield false negative results.            No radiology results from the last 24 hrs    Results for orders placed during the hospital encounter of 10/14/24    Adult Transthoracic Echo Complete W/ Cont if Necessary Per Protocol    Interpretation Summary    Left ventricular systolic function is mildly decreased. Calculated left ventricular EF = 41.4% Left ventricular ejection fraction appears to be 41 - 45%.    The following left ventricular wall segments are hypokinetic: apical anterior, apical lateral, apical inferior, apical septal and apex hypokinetic.    Left ventricular diastolic function is consistent with (grade II w/high LAP) pseudonormalization.    Mild aortic valve stenosis is present. Aortic valve area is 1.8 cm2.    Peak velocity of the flow distal to the aortic valve is 209.8 cm/s. Aortic valve maximum pressure gradient is 18 mmHg. Aortic valve mean pressure gradient is 10 mmHg. Aortic valve dimensionless index is 0  .    Mild mitral valve stenosis is present. The mitral valve peak gradient is 13 mmHg. The mitral valve mean gradient is 5 mmHg.    Estimated right ventricular systolic pressure from tricuspid regurgitation is normal (<35 mmHg).      Current medications:  Scheduled Meds:clopidogrel, 75 mg, Oral, Daily  heparin (porcine), 5,000 Units, Subcutaneous, Q8H  insulin lispro, 2-7 Units, Subcutaneous, 4x Daily AC & at Bedtime  senna-docusate sodium, 2 tablet, Oral, BID  valsartan, 240 mg, Oral, Daily      Continuous Infusions:   PRN Meds:.  acetaminophen **OR** acetaminophen    albuterol sulfate HFA    benzonatate    senna-docusate sodium **AND** polyethylene glycol **AND** bisacodyl **AND** bisacodyl    dextromethorphan polistirex ER    dextrose    dextrose    glucagon (human recombinant)    influenza vaccine    ipratropium-albuterol    melatonin    ondansetron ODT **OR** ondansetron    sodium chloride    Assessment & Plan   Assessment & Plan     Active Hospital Problems    Diagnosis  POA    **COVID-19 virus detected [U07.1]  Yes    Acute UTI (urinary tract infection) [N39.0]  Yes    CKD (chronic kidney disease) stage 3, GFR 30-59 ml/min [N18.30]  Yes    T2DM (type 2 diabetes mellitus) [E11.9]  Yes    CAD (coronary artery disease) [I25.10]  Yes    Syncope [R55]  Yes    Dementia [F03.90]  Unknown    Diastolic dysfunction [I51.89]  Yes    Chronic obstructive pulmonary disease [J44.9]  Yes    Essential hypertension [I10]  Yes    GERD (gastroesophageal reflux disease) [K21.9]  Yes    Hyperlipidemia [E78.5]  Yes      Resolved Hospital Problems   No resolved problems to display.        Brief Hospital Course to date:  Georgia Velázquez is a 79 y.o. female  with CAD status post stent, diastolic CHF, hypertension, hyperlipidemia, history of CVA, type 2 diabetes, CKD 3, GERD, COPD, dementia who presented to the ED with altered mental status and syncope at home.  Patient was found to be COVID-positive and have a UTI.     This  patient's problems and plans were partially entered by my partner and updated as appropriate by me 10/22/24.      UTI  -  E. Coli; pan sensitive   - Rocephin; completed 5-day course     COVID-19 infection  COPD, compensated  -- Covid + on 10/14  - Patient remains on room air  - CT shows possible right small pleural effusion  - Completed Paxlovid  - Hold steroids as patient is not hypoxic  -- As needed albuterol for wheezing       Syncope  - Likely multifactorial in the setting of UTI and COVID and dehydration.  - Fall with left hand laceration status post sutures in the ED   - CT head unremarkable  - Echo with EF 41-45%, left wall hypokinesis, grade II diastolic dysfunctionpending  - Orthostatic vitals negative in the ED  - Carotid duplex shows less than 50% stenosis on the right side, left side shows 50 to 69% stenosis which is a mild increase from the study done in February.  --PT and OT following     Dementia  - Patient remains pleasantly confused  - CT head on arrival unremarkable  - Patient on fall precautions     CKD 3 - at baseline      Diastolic heart failure  - Currently compensated  - Echo with EF 41-45%, left wall hypokinesis, grade II diastolic dysfunction  -- Strict I's & O's, daily weights     Type 2 diabetes  --A1c 5.5  -Sliding scale insulin     CAD status post stent  Hyperlipidemia  History of CVA  - Continue Plavix  diovan       Expected Discharge Location and Transportation: SNF rehab  Expected Discharge pending bed offer, insurance approval, isolation rules  Expected Discharge Date: 10/23/2024; Expected Discharge Time:      VTE Prophylaxis:  Pharmacologic VTE prophylaxis orders are present.         AM-PAC 6 Clicks Score (PT): 16 (10/21/24 2200)    CODE STATUS:   Code Status and Medical Interventions: No CPR (Do Not Attempt to Resuscitate); Limited Support; No intubation (DNI); DNR/DNI   Ordered at: 10/15/24 0122     Medical Intervention Limits:    No intubation (DNI)     Level Of Support  Discussed With:    Health Care Surrogate     Code Status (Patient has no pulse and is not breathing):    No CPR (Do Not Attempt to Resuscitate)     Medical Interventions (Patient has pulse or is breathing):    Limited Support     Comments:    DNR/DNI       Nidhi Wylie MD  10/22/24

## 2024-10-23 LAB
GLUCOSE BLDC GLUCOMTR-MCNC: 113 MG/DL (ref 70–130)
GLUCOSE BLDC GLUCOMTR-MCNC: 131 MG/DL (ref 70–130)
GLUCOSE BLDC GLUCOMTR-MCNC: 147 MG/DL (ref 70–130)
GLUCOSE BLDC GLUCOMTR-MCNC: 99 MG/DL (ref 70–130)

## 2024-10-23 PROCEDURE — 97110 THERAPEUTIC EXERCISES: CPT

## 2024-10-23 PROCEDURE — G0378 HOSPITAL OBSERVATION PER HR: HCPCS

## 2024-10-23 PROCEDURE — 97530 THERAPEUTIC ACTIVITIES: CPT

## 2024-10-23 PROCEDURE — 94799 UNLISTED PULMONARY SVC/PX: CPT

## 2024-10-23 PROCEDURE — 96372 THER/PROPH/DIAG INJ SC/IM: CPT

## 2024-10-23 PROCEDURE — 25010000002 HEPARIN (PORCINE) PER 1000 UNITS: Performed by: NURSE PRACTITIONER

## 2024-10-23 PROCEDURE — 97535 SELF CARE MNGMENT TRAINING: CPT

## 2024-10-23 PROCEDURE — 82948 REAGENT STRIP/BLOOD GLUCOSE: CPT

## 2024-10-23 PROCEDURE — 99232 SBSQ HOSP IP/OBS MODERATE 35: CPT | Performed by: INTERNAL MEDICINE

## 2024-10-23 RX ADMIN — HEPARIN SODIUM 5000 UNITS: 5000 INJECTION INTRAVENOUS; SUBCUTANEOUS at 20:52

## 2024-10-23 RX ADMIN — Medication 10 ML: at 08:05

## 2024-10-23 RX ADMIN — VALSARTAN 240 MG: 80 TABLET, FILM COATED ORAL at 08:05

## 2024-10-23 RX ADMIN — ATORVASTATIN CALCIUM 40 MG: 40 TABLET, FILM COATED ORAL at 20:52

## 2024-10-23 RX ADMIN — ACETAMINOPHEN 650 MG: 325 TABLET ORAL at 23:18

## 2024-10-23 RX ADMIN — BENZONATATE 100 MG: 100 CAPSULE ORAL at 23:18

## 2024-10-23 RX ADMIN — SENNOSIDES AND DOCUSATE SODIUM 2 TABLET: 50; 8.6 TABLET ORAL at 08:05

## 2024-10-23 RX ADMIN — HEPARIN SODIUM 5000 UNITS: 5000 INJECTION INTRAVENOUS; SUBCUTANEOUS at 05:42

## 2024-10-23 RX ADMIN — HEPARIN SODIUM 5000 UNITS: 5000 INJECTION INTRAVENOUS; SUBCUTANEOUS at 15:32

## 2024-10-23 RX ADMIN — IPRATROPIUM BROMIDE AND ALBUTEROL SULFATE 3 ML: 2.5; .5 SOLUTION RESPIRATORY (INHALATION) at 21:02

## 2024-10-23 RX ADMIN — CLOPIDOGREL BISULFATE 75 MG: 75 TABLET ORAL at 08:05

## 2024-10-23 NOTE — PROGRESS NOTES
[facility] Medicine Services  PROGRESS NOTE    Patient Name: Georgia Velázquez  : 1945  MRN: [id number]    Date of Admission: 10/14/2024  Primary Care Physician: Provider, No Known    Subjective   Subjective     CC:  F/u encephalopathy    HPI:  feels fine, up in chair, says she doesn't need anything, still hopes to go to rehab      Objective   Objective     Vital Signs:   Temp:  [98 °F (36.7 °C)-98.6 °F (37 °C)] 98.2 °F (36.8 °C)  Heart Rate:  [60-67] 60  Resp:  [18] 18  BP: (121-161)/(47-82) 161/70     Physical Exam:  Constitutional: No acute distress up in chair , eating   HENT: NCAT,  Respiratory:  CTA on RA   Cardiovascular: RRR,   Gastrointestinal: soft NT   Musculoskeletal:  bilateral ankle edema trace  Skin:warm      Results Reviewed:  LAB RESULTS:      Lab 10/18/24  0334 10/17/24  0344   WBC 5.53 5.09   HEMOGLOBIN 12.9 12.9   HEMATOCRIT 39.1 39.8   PLATELETS 126* 135*   NEUTROS ABS 2.29 2.34   IMMATURE GRANS (ABS) 0.01 0.02   LYMPHS ABS 2.56 2.03   MONOS ABS 0.59 0.62   EOS ABS 0.07 0.06   MCV 94.4 95.7         Lab 10/21/24  0312 10/19/24  0807 10/18/24  0334 10/17/24  034   SODIUM 139 137 138 139   POTASSIUM 4.0 4.5 4.6 4.7   CHLORIDE 104 104 103 106   CO2 23.0 22.0 21.0* 21.0*   ANION GAP 12.0 11.0 14.0 12.0   BUN 34* 33* 36* 31*   CREATININE 1.55* 1.18* 1.58* 1.32*   EGFR 33.5* 46.5* 32.8* 40.6*   GLUCOSE 77 78 66 74   CALCIUM 8.7 9.0 9.1 8.8   MAGNESIUM  --   --  1.9 2.0         Lab 10/21/24  0312 10/18/24  0334 10/17/24  0344   TOTAL PROTEIN 6.3 6.4 6.1   ALBUMIN 3.1* 3.3* 3.4*   GLOBULIN 3.2 3.1 2.7   ALT (SGPT) 23 14 14   AST (SGOT) 27 21 27   BILIRUBIN 0.4 0.2 0.3   ALK PHOS 77 77 83                         Brief Urine Lab Results  (Last result in the past 365 days)        Color   Clarity   Blood   Leuk Est   Nitrite   Protein   CREAT   Urine HCG        10/14/24 2203 Yellow   Cloudy   Moderate (2+)   Moderate (2+)   Positive   100 mg/dL (2+)                    Microbiology Results Abnormal       Procedure Component Value - Date/Time    Blood Culture - Blood, Arm, Left [[id number]]  (Normal) Collected: 10/15/24 0431    Lab Status: Final result Specimen: Blood from Arm, Left Updated: 10/20/24 0615     Blood Culture No growth at 5 days    Narrative:      Less than seven (7) mL's of blood was collected.  Insufficient quantity may yield false negative results.    Blood Culture - Blood, Hand, Right [[id number]]  (Normal) Collected: 10/15/24 0431    Lab Status: Final result Specimen: Blood from Hand, Right Updated: 10/20/24 0615     Blood Culture No growth at 5 days    Narrative:      Less than seven (7) mL's of blood was collected.  Insufficient quantity may yield false negative results.            No radiology results from the last 24 hrs    Results for orders placed during the hospital encounter of 10/14/24    Adult Transthoracic Echo Complete W/ Cont if Necessary Per Protocol    Interpretation Summary    Left ventricular systolic function is mildly decreased. Calculated left ventricular EF = 41.4% Left ventricular ejection fraction appears to be 41 - 45%.    The following left ventricular wall segments are hypokinetic: apical anterior, apical lateral, apical inferior, apical septal and apex hypokinetic.    Left ventricular diastolic function is consistent with (grade II w/high LAP) pseudonormalization.    Mild aortic valve stenosis is present. Aortic valve area is 1.8 cm2.    Peak velocity of the flow distal to the aortic valve is 209.8 cm/s. Aortic valve maximum pressure gradient is 18 mmHg. Aortic valve mean pressure gradient is 10 mmHg. Aortic valve dimensionless index is 0 .    Mild mitral valve stenosis is present. The mitral valve peak gradient is 13 mmHg. The mitral valve mean gradient is 5 mmHg.    Estimated right ventricular systolic pressure from tricuspid regurgitation is normal (<35 mmHg).      Current medications:  Scheduled Meds:atorvastatin, 40 mg, Oral,  Nightly  clopidogrel, 75 mg, Oral, Daily  heparin (porcine), 5,000 Units, Subcutaneous, Q8H  insulin lispro, 2-7 Units, Subcutaneous, 4x Daily AC & at Bedtime  senna-docusate sodium, 2 tablet, Oral, BID  valsartan, 240 mg, Oral, Daily      Continuous Infusions:   PRN Meds:.  acetaminophen **OR** acetaminophen    albuterol sulfate HFA    benzonatate    senna-docusate sodium **AND** polyethylene glycol **AND** bisacodyl **AND** bisacodyl    dextromethorphan polistirex ER    dextrose    dextrose    glucagon (human recombinant)    influenza vaccine    ipratropium-albuterol    melatonin    ondansetron ODT **OR** ondansetron    sodium chloride    Assessment & Plan   Assessment & Plan     Active Hospital Problems    Diagnosis  POA    **COVID-19 virus detected [U07.1]  Yes    Acute UTI (urinary tract infection) [N39.0]  Yes    CKD (chronic kidney disease) stage 3, GFR 30-59 ml/min [N18.30]  Yes    T2DM (type 2 diabetes mellitus) [E11.9]  Yes    CAD (coronary artery disease) [I25.10]  Yes    Syncope [R55]  Yes    Dementia [F03.90]  Unknown    Diastolic dysfunction [I51.89]  Yes    Chronic obstructive pulmonary disease [J44.9]  Yes    Essential hypertension [I10]  Yes    GERD (gastroesophageal reflux disease) [K21.9]  Yes    Hyperlipidemia [E78.5]  Yes      Resolved Hospital Problems   No resolved problems to display.        Brief Hospital Course to date:  Georgia Velázquez is a 79 y.o. female  with CAD status post stent, diastolic CHF, hypertension, hyperlipidemia, history of CVA, type 2 diabetes, CKD 3, GERD, COPD, dementia who presented to the ED with altered mental status and syncope at home.  Patient was found to be COVID-positive and have a UTI.     This patient's problems and plans were partially entered by my partner and updated as appropriate by me 10/23/24.      UTI  -  E. Coli; pan sensitive   - Rocephin; completed 5-day course     COVID-19 infection  COPD, compensated  -- Covid + on 10/14  - Patient remains on  room air  - CT shows possible right small pleural effusion  - Completed Paxlovid  - Hold steroids as patient is not hypoxic  -- As needed albuterol for wheezing       Syncope  - Likely multifactorial in the setting of UTI and COVID and dehydration.  - Fall with left hand laceration status post sutures in the ED   - CT head unremarkable  - Echo with EF 41-45%, left wall hypokinesis, grade II diastolic dysfunctionpending  - Orthostatic vitals negative in the ED  - Carotid duplex shows less than 50% stenosis on the right side, left side shows 50 to 69% stenosis which is a mild increase from the study done in February.  --PT and OT following     Dementia  - Patient conversant, not always oriented   - CT head on arrival unremarkable  - Patient on fall precautions     CKD 3 - at baseline      Diastolic heart failure  - Currently compensated  - Echo with EF 41-45%, left wall hypokinesis, grade II diastolic dysfunction  -- Strict I's & O's, daily weights     Type 2 diabetes  --A1c 5.5  -Sliding scale insulin     CAD status post stent  Hyperlipidemia  History of CVA  - Continue Plavix  diovan       Expected Discharge Location and Transportation: SNF rehab  Expected Discharge pending bed offer, insurance approval, isolation rules  Expected Discharge Date: 10/23/2024; Expected Discharge Time:      VTE Prophylaxis:  Pharmacologic VTE prophylaxis orders are present.         AM-PAC 6 Clicks Score (PT): 17 (10/22/24 2000)    CODE STATUS:   Code Status and Medical Interventions: No CPR (Do Not Attempt to Resuscitate); Limited Support; No intubation (DNI); DNR/DNI   Ordered at: 10/15/24 0122     Medical Intervention Limits:    No intubation (DNI)     Level Of Support Discussed With:    Health Care Surrogate     Code Status (Patient has no pulse and is not breathing):    No CPR (Do Not Attempt to Resuscitate)     Medical Interventions (Patient has pulse or is breathing):    Limited Support     Comments:    DNR/DNI       Nidhi Wylie  MD  10/23/24

## 2024-10-23 NOTE — CASE MANAGEMENT/SOCIAL WORK
Continued Stay Note  [facility]     Patient Name: Georgia Velázquez  MRN: [id number]  Today's Date: 10/23/2024    Admit Date: 10/14/2024    Plan: Ongoing   Discharge Plan       Row Name 10/23/24 1427       Plan    Plan Ongoing    Plan Comments Discussed in MDR. Patient was denied SNFat [facility], from her insurance yesterday. There was a Peer to Peer done on 10/22/24 with a Physician Advisor and the denial was upheld. Patient and daughter requested information to be sent to [facility]. The daughter is filing an expedited appeal, to overturn the denial for skilled nursing facility at [facility]. CM faxed H&P and clinicals to [facility] at [phone], for review, per patient & daughter's request. CM will continue to follow for medical readiness.    1629: Tonia with [facility] called and requested updated PT notes. PT saw the patient and updated PT notes were faxed to Tonia at [facility] to [phone], per her request. CM will continue to follow.     Final Discharge Disposition Code 03 - skilled nursing facility (SNF)                   Discharge Codes    No documentation.                 Expected Discharge Date and Time       Expected Discharge Date Expected Discharge Time    Oct 23, 2024               Madyson Arriaza RN

## 2024-10-23 NOTE — DISCHARGE PLACEMENT REQUEST
"TO: [place] for Expedited Appeal  Georgia Palumbo (79 y.o. Female)       Date of Birth   1945    Social Security Number       Address   [address] [place] [address]    Home Phone   [phone]    MRN   [id number]       Mosque   Cheondoism    Marital Status                               Admission Date   10/14/24    Admission Type   Emergency    Admitting Provider   Nidhi Wylie MD    Attending Provider   Nidhi Wylie MD    Department, Room/Bed   [facility] 5G, S547/1       Discharge Date       Discharge Disposition       Discharge Destination                                 Attending Provider: Nidhi Wylie MD    Allergies: No Known Allergies    Isolation: Contact Air   Infection: COVID (confirmed) (10/14/24)   Code Status: No CPR    Ht: 154.9 cm (61\")   Wt: 109 kg (240 lb 8 oz)    Admission Cmt: None   Principal Problem: COVID-19 virus detected [U07.1]                   Active Insurance as of 10/14/2024       Primary Coverage       Payor Plan Insurance Group Employer/Plan Group    ANTH MEDICARE REPLACEMENT [facility] MEDICARE ADVANTAGE [id number]       Payor Plan Address Payor Plan Phone Number Payor Plan Fax Number Effective Dates    [address] [phone]  2024 - None Entered    [facility] [address]         Subscriber Name Subscriber Birth Date Member ID       GEORGIA PALUMBO 1945 [id number]                     Emergency Contacts        (Rel.) Home Phone Work Phone Mobile Phone    JARVIS ESCALONA (Daughter) [phone] -- [phone]    ABDELRAHMAN PALUMBO (Son) [phone] -- [phone]                 History & Physical        Eryn Miller MD at 10/15/24 0031              [facility] Medicine Services  HISTORY AND PHYSICAL    Patient Name: Georgia Palumbo  : 1945  MRN: [id number]  Primary Care Physician: Provider, No Known  Date of admission: 10/14/2024    Subjective  Subjective     Chief " "Complaint:  Altered mental status     HPI:  Georgia Velázquez is a 79 y.o. female w/ a hx of CAD (s/p stent), diastolic CHF, HTN, HLD, hx of CVA, diet controlled T2DM, CKD Stage III, GERD, COPD, dementia who presented to the ED w/ c/o altered mental status.   Pt lives w/ her family. Pt uses a walker to ambulate. Pt confused at baseline 2/2 dementia.   HPI per pt's daughter. Pt w/ 2-3 days of progressively worsening generalized weakness and fatigue. Pt slept the majority of the day on Monday. This evening pt was sitting on her Rolator when she c/o nausea. Pt then had a syncopal episode witnessed by her daughter and grandson. Pt fell off the Rolator and hit her left hand causing a laceration. Pt had LOC for \"seconds\".   Pt evaluated in the ED. COVID-19 detected. CXR w/ small right pleural effusion. CT abd/pel with no acute findings. CT Head unremarkable. UA c/w UTI. Pt admitted to the hospital medicine service for further evaluation.     Review of Systems   Unable to perform ROS: Mental status change      Personal History     Past Medical History:   Diagnosis Date    Acute kidney injury 12/05/2017    Aortic regurgitation     Arthritis of shoulder 05/18/2016    Body mass index (BMI) of 45.0-49.9 in adult 06/14/2019    Bright red rectal bleeding 12/03/2023    CHF (congestive heart failure)     Closed compression fracture of L4 lumbar vertebra 12/04/2017    Added automatically from request for surgery [id number]    Colitis 07/21/2020    Constipation 12/05/2017    COPD (chronic obstructive pulmonary disease)     Coronary artery disease 05/18/2016    Diabetes mellitus type 2 in obese 01/29/2018    Elevated alkaline phosphatase level 02/26/2018    Elevated creatine kinase     Essential hypertension 05/18/2016    GERD (gastroesophageal reflux disease) 05/18/2016    Glaucoma 07/21/2020    History of kyphoplasty 01/30/2018    Hyperlipidemia     Lumbar facet arthropathy 01/29/2018    Lumbar stenosis with neurogenic claudication " 01/29/2018    Morbid obesity due to excess calories 01/29/2018    Myocardial infarction 05/18/2016    Osteoporosis 05/18/2016    Physical deconditioning 01/30/2018    Retinal emboli, 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    Sepsis     uro    Stroke     Urinary incontinence without sensory awareness 02/26/2018     Past Surgical History:   Procedure Laterality Date    APPENDECTOMY      BACK SURGERY      CARDIAC CATHETERIZATION      CHOLECYSTECTOMY      CORONARY STENT PLACEMENT      EYE SURGERY      KYPHOPLASTY N/A 12/07/2017    Procedure: KYPHOPLASTY L4;  Surgeon: Marc Pham MD;  Location: [facility];  Service:      Family History: family history includes Diabetes in her mother; Heart failure in her father and mother; No Known Problems in her brother, daughter, sister, and son.     Social History:  reports that she quit smoking about 24 years ago. Her smoking use included cigarettes. She started smoking about 39 years ago. She has a 15 pack-year smoking history. She has been exposed to tobacco smoke. She has never used smokeless tobacco. She reports that she does not drink alcohol and does not use drugs.  Social History     Social History Narrative    Living w daughter. Daughter works at [employer], in process of getting medical POA.      Medications:  Vitamin D3, albuterol sulfate HFA, atorvastatin, clopidogrel, docusate sodium, ipratropium-albuterol, nystatin, and valsartan    No Known Allergies    Objective  Objective     Vital Signs:   Temp:  [99 °F (37.2 °C)-101 °F (38.3 °C)] 99.1 °F (37.3 °C)  Heart Rate:  [72-85] 78  Resp:  [16-24] 24  BP: (127-184)/() 139/74    Physical Exam     Constitutional: Awake, alert; chronically ill appearing   Eyes: PERRLA, sclerae anicteric, no conjunctival injection  HENT: NCAT, mucous membranes moist  Neck: Supple, no thyromegaly, no lymphadenopathy, trachea midline  Respiratory: Decreased bases bilaterally; no rhonchi, nonlabored respirations   Cardiovascular: RRR, no murmurs, rubs, or  gallops, no peripheral edema   Gastrointestinal: Positive bowel sounds, soft, nontender, nondistended  Musculoskeletal: Normal ROM bilaterally   Psychiatric: Cooperative  Neurologic: Oriented to self, strength symmetric in all extremities, speech clear  Skin: No rashes, lesions; laceration (sutures intact)- left hand     Result Review:  I have personally reviewed the results from the time of this admission to 10/15/2024 01:37 EDT and agree with these findings:  [x]  Laboratory list / accordion  []  Microbiology  [x]  Radiology  [x]  EKG/Telemetry   []  Cardiology/Vascular   []  Pathology  [x]  Old records    LAB RESULTS:      Lab 10/14/24  2141   WBC 7.18   HEMOGLOBIN 13.1   HEMATOCRIT 39.7   PLATELETS 177   NEUTROS ABS 5.58   IMMATURE GRANS (ABS) 0.05   LYMPHS ABS 0.75   MONOS ABS 0.78   EOS ABS 0.00   MCV 94.7   CRP 1.79*   PROCALCITONIN 0.09         Lab 10/14/24  2146 10/14/24  2141   SODIUM  --  137   POTASSIUM  --  4.3   CHLORIDE  --  102   CO2  --  23.0   ANION GAP  --  12.0   BUN  --  25*   CREATININE 1.60* 1.45*   EGFR  --  36.3*   GLUCOSE  --  132*   CALCIUM  --  9.0         Lab 10/14/24  2141   TOTAL PROTEIN 7.3   ALBUMIN 3.8   GLOBULIN 3.5   ALT (SGPT) 11   AST (SGOT) 19   BILIRUBIN 0.6   ALK PHOS 102   LIPASE 19         Lab 10/14/24  2141   PROBNP 3,900.0*   HSTROP T 36*             Lab 10/14/24  2141   FERRITIN 301.80*         Brief Urine Lab Results  (Last result in the past 365 days)        Color   Clarity   Blood   Leuk Est   Nitrite   Protein   CREAT   Urine HCG        10/14/24 2203 Yellow   Cloudy   Moderate (2+)   Moderate (2+)   Positive   100 mg/dL (2+)                 Microbiology Results (last 10 days)       Procedure Component Value - Date/Time    COVID PRE-OP / PRE-PROCEDURE SCREENING ORDER (NO ISOLATION) - Swab, Nasopharynx [[id number]]  (Abnormal) Collected: 10/14/24 2252    Lab Status: Final result Specimen: Swab from Nasopharynx Updated: 10/14/24 0616    Narrative:      The following  orders were created for panel order COVID PRE-OP / PRE-PROCEDURE SCREENING ORDER (NO ISOLATION) - Swab, Nasopharynx.  Procedure                               Abnormality         Status                     ---------                               -----------         ------                     COVID-19 and FLU A/B PCR...[[id number]]  Abnormal            Final result                 Please view results for these tests on the individual orders.    COVID-19 and FLU A/B PCR, 1 HR TAT - Swab, Nasopharynx [[id number]]  (Abnormal) Collected: 10/14/24 2252    Lab Status: Final result Specimen: Swab from Nasopharynx Updated: 10/14/24 2324     COVID19 Detected     Influenza A PCR Not Detected     Influenza B PCR Not Detected    Narrative:      Fact sheet for providers: https://www.fda.gov/media/198211/download    Fact sheet for patients: https://www.fda.gov/media/479233/download    Test performed by PCR.  Influenza A and Influenza B negative results should be considered presumptive in samples that have a positive SARS-CoV-2 result.    Competitive inhibition studies showed that SARS-CoV-2 virus, when present at concentrations above 3.6E+04 copies/mL, can inhibit the detection and amplification of influenza A and influenza B virus RNA if present at or below 1.8E+02 copies/mL or 4.9E+02 copies/mL, respectively, and may lead to false negative influenza virus results. If co-infection with influenza A or influenza B virus is suspected in samples with a positive SARS-CoV-2 result, the sample should be re-tested with another FDA cleared, approved, or authorized influenza test, if influenza virus detection would change clinical management.          CT Abdomen Pelvis With Contrast    Result Date: 10/14/2024  CT ABDOMEN PELVIS W CONTRAST Date of Exam: 10/14/2024 10:21 PM EDT Indication: generalized abdominal pain, vomiting. Comparison: 7/21/2020 Technique: Axial CT images were obtained of the abdomen and pelvis following the uneventful  intravenous administration of contrast. Reconstructed coronal and sagittal images were also obtained. Automated exposure control and iterative construction methods were used. Findings: There is a small right pleural effusion with some adjacent atelectasis in the right lower lobe. The left lung base is clear. There is extensive atherosclerotic disease, including within the mesenteric arteries. No aortic aneurysm. Gallbladder is surgically absent. No biliary obstruction is seen. There is some atrophy of the pancreas. There are bilateral renal cysts. No follow-up is indicated. There is right renal atrophy with cortical thinning and cortical lobulation. Both kidneys are nonobstructed. Mild left adrenal hyperplasia is unchanged. Solid abdominal organs are otherwise normal. The urinary bladder and solid pelvic organs appear normal. The appendix has been removed. There is a lipoma at the ileocecal valve. No evidence of acute colitis. No significant stool burden. No small bowel obstruction or clear evidence of enteritis. Stomach is normal except for a stable small hiatal hernia. No free fluid or adenopathy is identified. Old compression fracture with kyphoplasty noted at L4.     Impression: 1. Small right pleural effusion with right lower lobe atelectasis. 2. No clearly acute findings in the abdomen or pelvis. 3. Appendectomy. No bowel obstruction or evidence of enteritis or colitis. 4. Nonobstructed kidneys with renal atrophy on the right. Electronically Signed: Mat Godwin MD  10/14/2024 10:41 PM EDT  Workstation ID: [id number]    CT Head Without Contrast    Result Date: 10/14/2024  CT HEAD WO CONTRAST Date of Exam: 10/14/2024 10:21 PM EDT Indication: fall, ams. Comparison: 3/17/2021 Technique: Axial CT images were obtained of the head without contrast administration.  Automated exposure control and iterative construction methods were used. Findings: There is no evidence of acute territorial infarction. There is no  acute intracranial hemorrhage. There are no extra-axial collections. Ventricles and CSF spaces are symmetric. No mass effect nor hydrocephalus. Brain parenchyma appears unchanged with advanced white matter hypoattenuation.  Paranasal sinuses and mastoid air cells are adequately aerated.  Osseous structures and orbits appear intact.     Impression: Impression: * 1. No acute process identified. Electronically Signed: Albaro Barrera MD  10/14/2024 10:33 PM EDT  Workstation ID: [id number]    XR Chest 1 View    Result Date: 10/14/2024  XR CHEST 1 VW Date of Exam: 10/14/2024 9:19 PM EDT Indication: ams Comparison: Chest radiograph 6/13/2022 Findings: Mediastinum: Cardiomediastinal silhouette appears unchanged and enlarged. Lungs: Mild peripheral right lung base hazy opacity. Pleura: Mild blunting of right costophrenic sulcus. No pneumothorax. Bones and soft tissues: No acute osseous abnormality.     Impression: Impression: Possible small right pleural effusion with adjacent atelectasis. Superimposed infection to be excluded clinically. Electronically Signed: García Argueta  10/14/2024 9:44 PM EDT  Workstation ID: [id number]     Results for orders placed during the hospital encounter of 03/17/21    Adult Transthoracic Echo Complete With Contrast if Necessary Per Protocol    Interpretation Summary  · Calculated left ventricular EF = 55% Estimated left ventricular EF was in agreement with the calculated left ventricular EF. Left ventricular systolic function is normal.  · Left ventricular diastolic function is consistent with (grade II w/high LAP) pseudonormalization.  · Estimated right ventricular systolic pressure from tricuspid regurgitation is normal (<35 mmHg). Calculated right ventricular systolic pressure from tricuspid regurgitation is 32 mmHg.  · Endocardial definition of the apical segments is limited, unable to definitively assess apical regional wall motion abnormalities.    Assessment & Plan  Assessment & Plan        COVID-19 virus detected    Essential hypertension    Hyperlipidemia    GERD (gastroesophageal reflux disease)    Chronic obstructive pulmonary disease    Diastolic dysfunction    Acute UTI (urinary tract infection)    CKD (chronic kidney disease) stage 3, GFR 30-59 ml/min    T2DM (type 2 diabetes mellitus)    CAD (coronary artery disease)    Syncope    Dementia    Georgia Velázquez is a 79 y.o. female w/ a hx of CAD (s/p stent), diastolic CHF, HTN, HLD, hx of CVA, diet controlled T2DM, CKD Stage III, GERD, COPD, dementia who presented to the ED w/ c/o altered mental status.     **Acute UTI   -previous urine culture 3/2024 + E.Coli   -UA c/w UTI; urine culture pending   -blood cx pending   -procal, lactic acid pending (WBC WNL)  -s/p Rocephin given in ED; continue pending cx results   -s/p 1 liter NS bolus given in ED  -hold on further IVF for now   -symptom mgt    **COVID-19  **Hx of COPD   -currently 94% on room air   -CXR: possible small right effusion  -CT abd/pel showed small right effusion   -full respiratory panel pcr pending   -initial COVID labs pending (D.Dimer, ferritin, etc)   -Paxlovid   -hold on Decadron (currently no hypoxic)   -symptom mgt     **Syncope   **likely multifactorial w/ UTI, COVID, ?vasovagal event, ?dehydration  -fall w/ left hand laceration s/p sutures   -CT Head unremarkable   -last ECHO 2021: EF 55%, grade II LVDF   -ECHO pending   -carotid duplex pending (last duplex 2/2024: Right internal carotid artery demonstrates a less than 50% stenosis. Left internal carotid artery demonstrates a 50-69% stenosis. Right Vertebral: Bidirectional flow is present.)   -orthostatic Bps  -s/p 1 liter NS bolus   -fall precautions/bed alarm     **Dementia   -w/ slight increase in confusion over last few days   -CT Head unremarkable   -treating for UTI/COVID   -symptom mgt  -fall precautions/bed alarm     **CKD Stage III; stable   -previous creatinine 1.57 in March 2024, 1.59 in 11/2023  -current  creatinine 1.45 w/ eGFR 36.3  -UA c/w UTI   -s/p 1 liter NS bolus given in ED   -monitor    **Elevated troponin (mild at 36)  **CAD (s/p stent)  **Diastolic dysfunction   **HTN, HLD   **Hx of CVA   -last ECHO 2021: EF 55%, grade II LVDF   -ECHO pending   -troponin 36; repeat pending   -proBNP pending   -EKG stable   -CXR/CT abd/pel shows small right pleural effusion   -continue routine Plavix   -continue routine Diovan w/ hold parameters   -holding statin while on Paxlovid   -daily weights; strict I/Os     **T2DM  -diet controlled   -hem A1c pending   -FSBG q ac/hs w/ LDSS     DVT prophylaxis:  Heparin     CODE STATUS:    Medical Intervention Limits: No intubation (DNI)  Level Of Support Discussed With: Health Care Surrogate  Code Status (Patient has no pulse and is not breathing): No CPR (Do Not Attempt to Resuscitate)  Medical Interventions (Patient has pulse or is breathing): Limited Support  Comments: DNR/DNI    Expected Discharge   Expected discharge date/ time has not been documented.    This note has been completed as part of a split-shared workflow.     Signature: Electronically signed by DON Lee, 10/15/24, 1:37 AM EDT.    Time spent: 55 minutes       Attending   Admission Attestation       I have performed an independent face-to-face diagnostic evaluation including performing an independent physical examination.  I approve of the documented plan of care above that was reviewed and developed with the advanced practice clinician (APC) and take responsibility for that plan along with its associated risks.  I have updated the HPI as appropriate.    Brief HPI     78 y/o female with hx of HTN, HLD, GERD, COPD, DM, CAD, dementia who presents to ER w/ mental status change, syncope w/ fall per her daughter.  Federico notes pt had been getting up and down a lot today and states she was sitting on rollator and had LOC w/ fall but LOC was only for a few seconds.  Pt w/ left had lac.  No c/o now and federico feels  pt already feeling better.     Attending Physical Exam:  Temp:  [99 °F (37.2 °C)-101 °F (38.3 °C)] 99.1 °F (37.3 °C)  Heart Rate:  [72-85] 78  Resp:  [16-24] 24  BP: (127-184)/() 139/74     Above exam performed by me w/ no changes to above    Result Review:  I have personally reviewed the results from the time of this admission to 10/15/2024 01:38 EDT and agree with these findings:  [x]  Laboratory list / accordion  [x]  Microbiology  [x]  Radiology  [x]  EKG/Telemetry   []  Cardiology/Vascular   []  Pathology  []  Old records  []  Other:  Most notable findings include:    Ua w/ uti  Hst 36  Cr 1.45  Covid +  See imaging reports    Assessment and Plan:    See assessment and plan documented by APC above and updated/edited by me as appropriate.    Eryn Miller MD  10/15/24  Electronically signed by Eryn Miller MD, 10/15/24, 1:50 AM EDT.                        Electronically signed by Eryn Miller MD at 10/15/24 0150          Physical Therapy Notes (most recent note)        Wanda Kline, PT at 10/18/24 0938  Version 1 of 1         Patient Name: Georgia Velázquez  : 1945    MRN: [id number]                              Today's Date: 10/18/2024       Admit Date: 10/14/2024    Visit Dx:     ICD-10-CM ICD-9-CM   1. Altered mental status, unspecified altered mental status type  R41.82 780.97   2. Acute cystitis without hematuria  N30.00 595.0   3. COVID-19  U07.1 079.89   4. History of diabetes mellitus  Z86.39 V12.29   5. History of coronary artery disease  Z86.79 V12.59     Patient Active Problem List   Diagnosis    Essential hypertension    Hyperlipidemia    Coronary artery disease    GERD (gastroesophageal reflux disease)    Recurrent UTI    Stage 3 chronic kidney disease    Chronic obstructive pulmonary disease    Retinal emboli, right    Diastolic dysfunction    Glaucoma    Left carotid artery stenosis    Syncope    Aortic regurgitation    Diabetic retinopathy    Type 2 diabetes mellitus  with stage 3b chronic kidney disease, without long-term current use of insulin    Hypoxia    Obesity, morbid, BMI 50 or higher    Hospital discharge follow-up    Cognitive decline    Other specified health status    Skin lesion of face    Chronic constipation    COVID-19 virus detected    Acute UTI (urinary tract infection)    CKD (chronic kidney disease) stage 3, GFR 30-59 ml/min    T2DM (type 2 diabetes mellitus)    CAD (coronary artery disease)    Syncope    Dementia     Past Medical History:   Diagnosis Date    Acute kidney injury 12/05/2017    Aortic regurgitation     Arthritis of shoulder 05/18/2016    Body mass index (BMI) of 45.0-49.9 in adult 06/14/2019    Bright red rectal bleeding 12/03/2023    CHF (congestive heart failure)     Closed compression fracture of L4 lumbar vertebra 12/04/2017    Added automatically from request for surgery [id number]    Colitis 07/21/2020    Constipation 12/05/2017    COPD (chronic obstructive pulmonary disease)     Coronary artery disease 05/18/2016    Diabetes mellitus type 2 in obese 01/29/2018    Elevated alkaline phosphatase level 02/26/2018    Elevated creatine kinase     Essential hypertension 05/18/2016    GERD (gastroesophageal reflux disease) 05/18/2016    Glaucoma 07/21/2020    History of kyphoplasty 01/30/2018    Hyperlipidemia     Lumbar facet arthropathy 01/29/2018    Lumbar stenosis with neurogenic claudication 01/29/2018    Morbid obesity due to excess calories 01/29/2018    Myocardial infarction 05/18/2016    Osteoporosis 05/18/2016    Physical deconditioning 01/30/2018    Retinal emboli, 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    Sepsis     uro    Stroke     Urinary incontinence without sensory awareness 02/26/2018     Past Surgical History:   Procedure Laterality Date    APPENDECTOMY      BACK SURGERY      CARDIAC CATHETERIZATION      CHOLECYSTECTOMY      CORONARY STENT PLACEMENT      EYE SURGERY      KYPHOPLASTY N/A 12/07/2017    Procedure: KYPHOPLASTY L4;  Surgeon: Marc hPam,  MD;  [facility];  Service:       General Information       Row Name 10/18/24 1024          Physical Therapy Time and Intention    Document Type therapy note (daily note)  -     Mode of Treatment physical therapy  -       Row Name 10/18/24 1024          General Information    Patient Profile Reviewed yes  -     Existing Precautions/Restrictions fall;other (see comments)  contact and airborne (COVID); hx dementia; [place]  -     Barriers to Rehab previous functional deficit;cognitive status  -       Row Name 10/18/24 1024          Cognition    Orientation Status (Cognition) oriented to;person;verbal cues/prompts needed for orientation;place;time;other (see comments)  Reported she was in the hospital, but required choices for name of hospital.  Required choices for year, and reported it was month of Sept.  -       Row Name 10/18/24 1024          Safety Issues/Impairments Affecting Functional Mobility    Safety Issues Affecting Function (Mobility) awareness of need for assistance;insight into deficits/self-awareness;positioning of assistive device;safety precaution awareness;safety precautions follow-through/compliance;sequencing abilities  -     Impairments Affecting Function (Mobility) balance;coordination;endurance/activity tolerance;motor planning;postural/trunk control;shortness of breath;strength;cognition  -     Cognitive Impairments, Mobility Safety/Performance insight into deficits/self-awareness;safety precaution awareness;safety precaution follow-through;sequencing abilities  -               User Key  (r) = Recorded By, (t) = Taken By, (c) = Cosigned By      Initials Name Provider Type     Wanda Kline, PT Physical Therapist                   Mobility       Row Name 10/18/24 1028          Bed Mobility    Comment, (Bed Mobility) Received sitting EOB with OT present.  -       Row Name 10/18/24 1028          Sit-Stand Transfer    Sit-Stand [place] (Transfers) minimum assist  (75% patient effort);1 person assist;verbal cues;nonverbal cues (demo/gesture)  -     Assistive Device (Sit-Stand Transfers) walker, front-wheeled  -BA     Comment, (Sit-Stand Transfer) STS from EOB with VCs/TCs for optimal pre-positioning, sequencing, hand placement, and upright posture.  Reported no dizziness upon standing.  -       Row Name 10/18/24 1028          Gait/Stairs (Locomotion)    [place] (Gait) minimum assist (75% patient effort);1 person assist;verbal cues  -     Assistive Device (Gait) walker, front-wheeled  -BA     Distance in Feet (Gait) 35  -BA     Deviations/Abnormal Patterns (Gait) bilateral deviations;no decreased;gait speed decreased;stride length decreased;festinating/shuffling;weight shifting decreased  -     Bilateral Gait Deviations forward flexed posture;heel strike decreased  -     Comment, (Gait/Stairs) Demo'd step through gait pattern with decreased no and short, shuffled steps, R>L.  Appeared to have more difficulty advancing RLE initially, but improved as distance progressed.  Forward, flexed posture throughout.  Reported mild SOA/TOLEDO with O2 sats stable b/w 92-96% on RA.  VCs/TCs for walker management/steering, optimal walker positioning with staying closer to FWW, upright posture, improved stride length, and improved step height.  Gait distance limited by fatigue.  -               User Key  (r) = Recorded By, (t) = Taken By, (c) = Cosigned By      Initials Name Provider Type     Wanda Kline, PT Physical Therapist                   Obj/Interventions       Row Name 10/18/24 1036          Motor Skills    Therapeutic Exercise hip;knee;ankle  -       Row Name 10/18/24 1036          Hip (Therapeutic Exercise)    Hip (Therapeutic Exercise) strengthening exercise  -     Hip Strengthening (Therapeutic Exercise) bilateral;marching while seated;aBduction;aDduction;heel slides;sitting;10 repetitions  -       Row Name 10/18/24 1036          Knee  (Therapeutic Exercise)    Knee (Therapeutic Exercise) strengthening exercise  -     Knee Strengthening (Therapeutic Exercise) bilateral;LAQ (long arc quad);SLR (straight leg raise);sitting;10 repetitions  -       Row Name 10/18/24 1036          Ankle (Therapeutic Exercise)    Ankle (Therapeutic Exercise) AROM (active range of motion)  -     Ankle AROM (Therapeutic Exercise) bilateral;dorsiflexion;plantarflexion;sitting;10 repetitions  -       Row Name 10/18/24 1036          Balance    Balance Assessment sitting static balance;sitting dynamic balance;standing static balance;standing dynamic balance  -     Static Sitting Balance standby assist  -     Dynamic Sitting Balance contact guard  -     Position, Sitting Balance unsupported;sitting edge of bed;sitting in chair  -     Static Standing Balance contact guard  -     Dynamic Standing Balance minimal assist;verbal cues  -     Position/Device Used, Standing Balance supported;walker, front-wheeled  -     Comment, Balance Mild instability with standing and ambulation activity with FWW; no overt LOB noted.  -               User Key  (r) = Recorded By, (t) = Taken By, (c) = Cosigned By      Initials Name Provider Type    Wanda Alejandre, PT Physical Therapist                   Goals/Plan    No documentation.                  Clinical Impression       Row Name 10/18/24 1037          Pain    Pretreatment Pain Rating 0/10 - no pain  -     Posttreatment Pain Rating 0/10 - no pain  -       Row Name 10/18/24 1037          Plan of Care Review    Plan of Care Reviewed With patient  -BA     Progress improving  -     Outcome Evaluation Good effort and participation throughout.  Increased ambulation distance to 35ft with Roberto and FWW.  Con to present below baseline with gait instability, decreased balance, decreased functional endurance, SOA/TOLEDO, and weakness limiting indep with mobility and ability to safely navigate home environment.  Will con  to benefit from skilled IP PT to improve deficits and promote return to PLOF.  Rec SNF upon d/c for best fxl outcome.  -BA       Row Name 10/18/24 1037          Therapy Assessment/Plan (PT)    Rehab Potential (PT) good  -BA     Criteria for Skilled Interventions Met (PT) yes;meets criteria;skilled treatment is necessary  -BA     Therapy Frequency (PT) daily  -BA       Row Name 10/18/24 1037          Vital Signs    Pre Systolic BP Rehab 132  taken prior to PT arrival  -BA     Pre Treatment Diastolic BP 68  -BA     Post Systolic BP Rehab 117  -BA     Post Treatment Diastolic BP 47  -BA     Pretreatment Heart Rate (beats/min) 55  -BA     Intratreatment Heart Rate (beats/min) 68  -BA     Posttreatment Heart Rate (beats/min) 49  -BA     O2 Delivery Pre Treatment room air  -BA     Intra SpO2 (%) 92  -BA     O2 Delivery Intra Treatment room air  -BA     Post SpO2 (%) 96  -BA     O2 Delivery Post Treatment room air  -BA     Pre Patient Position Sitting  -BA     Intra Patient Position Standing  -BA     Post Patient Position Sitting  -BA       Row Name 10/18/24 1037          Positioning and Restraints    Pre-Treatment Position in bed  -BA     Post Treatment Position chair  -BA     In Chair notified nsg;reclined;call light within reach;encouraged to call for assist;exit alarm on;waffle cushion;legs elevated  -BA               User Key  (r) = Recorded By, (t) = Taken By, (c) = Cosigned By      Initials Name Provider Type    Wanda Alejandre, PT Physical Therapist                   Outcome Measures       Row Name 10/18/24 1042          How much help from another person do you currently need...    Turning from your back to your side while in flat bed without using bedrails? 3  -BA     Moving from lying on back to sitting on the side of a flat bed without bedrails? 3  -BA     Moving to and from a bed to a chair (including a wheelchair)? 3  -BA     Standing up from a chair using your arms (e.g., wheelchair, bedside chair)? 3   -BA     Climbing 3-5 steps with a railing? 2  -BA     To walk in hospital room? 3  -BA     AM-PAC 6 Clicks Score (PT) 17  -BA     Highest Level of Mobility Goal 5 --> Static standing  -       Row Name 10/18/24 1042 10/18/24 1016       Functional Assessment    Outcome Measure Options AM-PAC 6 Clicks Basic Mobility (PT)  - AM-PAC 6 Clicks Daily Activity (OT)  -              User Key  (r) = Recorded By, (t) = Taken By, (c) = Cosigned By      Initials Name Provider Type    AC Lisset Velázquez, OT Occupational Therapist     Wanda Kline, PT Physical Therapist                                 Physical Therapy Education       Title: PT OT SLP Therapies (In Progress)       Topic: Physical Therapy (Done)       Point: Mobility training (Done)       Learning Progress Summary            Patient Acceptance, E, VU,NR by  at 10/18/2024 1043                      Point: Home exercise program (Done)       Learning Progress Summary            Patient Acceptance, E, VU,NR by  at 10/18/2024 1043                      Point: Body mechanics (Done)       Learning Progress Summary            Patient Acceptance, E, VU,NR by  at 10/18/2024 1043                      Point: Precautions (Done)       Learning Progress Summary            Patient Acceptance, E, VU,NR by  at 10/18/2024 1043                                      User Key       Initials Effective Dates Name Provider Type Discipline     09/21/21 -  Wanda Kline, LARISA Physical Therapist PT                  PT Recommendation and Plan     Progress: improving  Outcome Evaluation: Good effort and participation throughout.  Increased ambulation distance to 35ft with Roberto and FWW.  Con to present below baseline with gait instability, decreased balance, decreased functional endurance, SOA/TOLEDO, and weakness limiting indep with mobility and ability to safely navigate home environment.  Will con to benefit from skilled IP PT to improve deficits and promote return to PLOF.   Rec SNF upon d/c for best fxl outcome.     Time Calculation:         PT Charges       Row Name 10/18/24 1044             Time Calculation    Start Time 0938  -BA      PT Received On 10/18/24  -BA         Time Calculation- PT    Total Timed Code Minutes- PT 23 minute(s)  -BA         Timed Charges    27757 - PT Therapeutic Exercise Minutes 14  -BA      55813 - Gait Training Minutes  9  -BA         Total Minutes    Timed Charges Total Minutes 23  -BA       Total Minutes 23  -BA                User Key  (r) = Recorded By, (t) = Taken By, (c) = Cosigned By      Initials Name Provider Type    Wanda Alejandre PT Physical Therapist                  Therapy Charges for Today       Code Description Service Date Service Provider Modifiers Qty    46569819020 HC PT THER PROC EA 15 MIN 10/18/2024 Wanda Kline, PT GP 1    92370726342 HC GAIT TRAINING EA 15 MIN 10/18/2024 Wanda Kline, PT GP 1            PT G-Codes  Outcome Measure Options: AM-PAC 6 Clicks Basic Mobility (PT)  AM-PAC 6 Clicks Score (PT): 17  AM-PAC 6 Clicks Score (OT): 16  PT Discharge Summary  Anticipated Discharge Disposition (PT): skilled nursing facility    Wanda Kline PT  10/18/2024      Electronically signed by Wanda Kline PT at 10/18/24 1051          Occupational Therapy Notes (most recent note)        Karolina Peres, OT at 10/23/24 0816          Patient Name: Georgia Velázquez  : 1945    MRN: [id number]                              Today's Date: 10/23/2024       Admit Date: 10/14/2024    Visit Dx:     ICD-10-CM ICD-9-CM   1. Altered mental status, unspecified altered mental status type  R41.82 780.97   2. Acute cystitis without hematuria  N30.00 595.0   3. COVID-19  U07.1 079.89   4. History of diabetes mellitus  Z86.39 V12.29   5. History of coronary artery disease  Z86.79 V12.59     Patient Active Problem List   Diagnosis    Essential hypertension    Hyperlipidemia    Coronary artery disease    GERD  (gastroesophageal reflux disease)    Recurrent UTI    Stage 3 chronic kidney disease    Chronic obstructive pulmonary disease    Retinal emboli, right    Diastolic dysfunction    Glaucoma    Left carotid artery stenosis    Syncope    Aortic regurgitation    Diabetic retinopathy    Type 2 diabetes mellitus with stage 3b chronic kidney disease, without long-term current use of insulin    Hypoxia    Obesity, morbid, BMI 50 or higher    Hospital discharge follow-up    Cognitive decline    Other specified health status    Skin lesion of face    Chronic constipation    COVID-19 virus detected    Acute UTI (urinary tract infection)    CKD (chronic kidney disease) stage 3, GFR 30-59 ml/min    T2DM (type 2 diabetes mellitus)    CAD (coronary artery disease)    Syncope    Dementia     Past Medical History:   Diagnosis Date    Acute kidney injury 12/05/2017    Aortic regurgitation     Arthritis of shoulder 05/18/2016    Body mass index (BMI) of 45.0-49.9 in adult 06/14/2019    Bright red rectal bleeding 12/03/2023    CHF (congestive heart failure)     Closed compression fracture of L4 lumbar vertebra 12/04/2017    Added automatically from request for surgery [id number]    Colitis 07/21/2020    Constipation 12/05/2017    COPD (chronic obstructive pulmonary disease)     Coronary artery disease 05/18/2016    Diabetes mellitus type 2 in obese 01/29/2018    Elevated alkaline phosphatase level 02/26/2018    Elevated creatine kinase     Essential hypertension 05/18/2016    GERD (gastroesophageal reflux disease) 05/18/2016    Glaucoma 07/21/2020    History of kyphoplasty 01/30/2018    Hyperlipidemia     Lumbar facet arthropathy 01/29/2018    Lumbar stenosis with neurogenic claudication 01/29/2018    Morbid obesity due to excess calories 01/29/2018    Myocardial infarction 05/18/2016    Osteoporosis 05/18/2016    Physical deconditioning 01/30/2018    Retinal emboli, 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    Sepsis     uro    Stroke     Urinary incontinence  without sensory awareness 02/26/2018     Past Surgical History:   Procedure Laterality Date    APPENDECTOMY      BACK SURGERY      CARDIAC CATHETERIZATION      CHOLECYSTECTOMY      CORONARY STENT PLACEMENT      EYE SURGERY      KYPHOPLASTY N/A 12/07/2017    Procedure: KYPHOPLASTY L4;  Surgeon: Marc Pham MD;  [facility]  Service:       General Information       Row Name 10/23/24 0843          OT Time and Intention    Subjective Information no complaints  -JOELLE     Document Type therapy note (daily note)  -JOELLE     Mode of Treatment individual therapy;occupational therapy  -JOELLE     Patient Effort excellent  -JOELLE     Symptoms Noted During/After Treatment none  -JOELLE       Row Name 10/23/24 0843          General Information    Patient Profile Reviewed yes  -JOELLE     Existing Precautions/Restrictions fall  contact and airborne (COVID); hx dementia; [place]  -JOELLE     Barriers to Rehab previous functional deficit;cognitive status  -JOELLE       Row Name 10/23/24 3541          Cognition    Orientation Status (Cognition) oriented to;person;place;disoriented to;time  -JOELLE       Row Name 10/23/24 0826          Safety Issues/Impairments Affecting Functional Mobility    Safety Issues Affecting Function (Mobility) insight into deficits/self-awareness;safety precaution awareness;safety precautions follow-through/compliance  -JOELLE     Impairments Affecting Function (Mobility) balance;endurance/activity tolerance;strength;shortness of breath  -JOELLE               User Key  (r) = Recorded By, (t) = Taken By, (c) = Cosigned By      Initials Name Provider Type    Karolina Medina OT Occupational Therapist                     Mobility/ADL's       Row Name 10/23/24 2053          Bed Mobility    Supine-Sit [place] (Bed Mobility) minimum assist (75% patient effort)  -JOELLE     Bed Mobility, Safety Issues impaired trunk control for bed mobility  -JOELLE     Assistive Device (Bed Mobility) bed rails;head of bed elevated  -JOELLE     Comment, (Bed  Mobility) pt. needed assist to roll to side further to reach rail so could pull self up to sit  -JOELLE       Row Name 10/23/24 0844          Transfers    Transfers sit-stand transfer;stand-sit transfer  -JOELLE       Row Name 10/23/24 0844          Sit-Stand Transfer    Sit-Stand [place] (Transfers) contact guard;verbal cues  -JOELLE     Assistive Device (Sit-Stand Transfers) walker, front-wheeled  -JOELLE     Comment, (Sit-Stand Transfer) pt. needed cues for hand placement to push up and reach back and pull or hold to walker, R foot block needed not to slide on standing  -JOELLE       Row Name 10/23/24 0844          Stand-Sit Transfer    Stand-Sit [place] (Transfers) contact guard;verbal cues  -JOELLE     Assistive Device (Stand-Sit Transfers) walker, front-wheeled  -JOELLE     Comment, (Stand-Sit Transfer) despite cues pt. did not reach back  -       Row Name 10/23/24 0844          Functional Mobility    Functional Mobility- Ind. Level contact guard assist  -     Functional Mobility- Device walker, front-wheeled  -JOELLE     Functional Mobility-Distance (Feet) 4  + 16  -JOELLE     Functional Mobility- Safety Issues step length decreased  -JOELLE     Functional Mobility- Comment pt. still tends to want to stay to far from walker, pt. declined to walk further  -       Row Name 10/23/24 0844          Activities of Daily Living    BADL Assessment/Intervention upper body dressing;grooming;feeding  -       Row Name 10/23/24 0844          Grooming Assessment/Training    [place] Level (Grooming) hair care, combing/brushing;wash face, hands;set up;standby assist  -     Position (Grooming) sink side;supported standing  -       Row Name 10/23/24 0844          Self-Feeding Assessment/Training    [place] Level (Feeding) prepare tray/open items;minimum assist (75% patient effort);liquids to mouth;independent  -               User Key  (r) = Recorded By, (t) = Taken By, (c) = Cosigned By      Initials Name Provider Type    JOELLE Peres  Karolina Trujillo OT Occupational Therapist                   Obj/Interventions       Row Name 10/23/24 0814          Shoulder (Therapeutic Exercise)    Shoulder (Therapeutic Exercise) AROM (active range of motion)  -     Shoulder AROM (Therapeutic Exercise) bilateral;flexion;extension;aBduction;aDduction;horizontal aBduction/aDduction;sitting;10 repetitions  short 15-30 second rest periods between each, pt. could not follow breathing technique  -       Row Name 10/23/24 0847          Elbow/Forearm (Therapeutic Exercise)    Elbow/Forearm (Therapeutic Exercise) strengthening exercise  -     Elbow/Forearm Strengthening (Therapeutic Exercise) bilateral;flexion;extension;10 repetitions;sitting  mild manual resistance given  -       Row Name 10/23/24 0847          Motor Skills    Therapeutic Exercise shoulder;elbow/forearm  -       Row Name 10/23/24 0865          Balance    Balance Assessment sit to stand dynamic balance  -     Static Sitting Balance standby assist  -     Dynamic Sitting Balance standby assist  -     Position, Sitting Balance unsupported;sitting edge of bed  -     Sit to Stand Dynamic Balance contact guard;verbal cues  -     Static Standing Balance contact guard  up to SBA  -JOELLE     Dynamic Standing Balance contact guard  up to SBA  -JOELLE     Position/Device Used, Standing Balance supported;walker, front-wheeled  -     Balance Interventions sit to stand;occupation based/functional task  -               User Key  (r) = Recorded By, (t) = Taken By, (c) = Cosigned By      Initials Name Provider Type    Karolina Medina, OT Occupational Therapist                   Goals/Plan       Row Name 10/23/24 0819          Bed Mobility Goal 1 (OT)    Progress/Outcomes (Bed Mobility Goal 1, OT) continuing progress toward goal;goal ongoing  -       Row Name 10/23/24 0850          Transfer Goal 1 (OT)    Progress/Outcome (Transfer Goal 1, OT) goal ongoing  -       Row Name 10/23/24 0806           Toileting Goal 1 (OT)    Progress/Outcome (Toileting Goal 1, OT) goal ongoing  -JOELLE               User Key  (r) = Recorded By, (t) = Taken By, (c) = Cosigned By      Initials Name Provider Type    Karolina Meidna, OT Occupational Therapist                   Clinical Impression       Row Name 10/23/24 0849          Pain Assessment    Pretreatment Pain Rating 0/10 - no pain  -JOELLE     Posttreatment Pain Rating 0/10 - no pain  -JOELLE       Row Name 10/23/24 0849          Plan of Care Review    Plan of Care Reviewed With patient  -JOELLE     Progress improving  -JOELLE     Outcome Evaluation Pt. demonstrated improved transfer and endurance today.  Pt. able to stand at sink and groom today and add on UE TE.  Pt. continues to fatigue easily and need rest periods throughout and cues for safety.  Pt. remains below baseline ADL independence warranting continued skilled OT services to address deficit areas. Continue to recommend SNF at discharge.  -       Row Name 10/23/24 0849          Therapy Plan Review/Discharge Plan (OT)    Anticipated Discharge Disposition (OT) skilled nursing facility  -       Row Name 10/23/24 0849          Vital Signs    Pretreatment Heart Rate (beats/min) 63  -JOELLE     Posttreatment Heart Rate (beats/min) 67  -JOELLE     Pre SpO2 (%) 95  -JOELLE     O2 Delivery Pre Treatment room air  -JOELLE     Intra SpO2 (%) 94  -JOELLE     O2 Delivery Intra Treatment room air  -JOELLE     Post SpO2 (%) 95  -JOELLE     O2 Delivery Post Treatment room air  -JOELLE     Pre Patient Position Supine  -JOELLE     Intra Patient Position Standing  -JOELLE     Post Patient Position Sitting  -JOELLE       Row Name 10/23/24 0849          Positioning and Restraints    Pre-Treatment Position in bed  -JOELLE     Post Treatment Position chair  -JOELLE     In Chair notified nsg;reclined;call light within reach;encouraged to call for assist;exit alarm on;waffle cushion;legs elevated  pt. declined heel elevation  -JOELLE               User Key  (r) = Recorded By, (t) = Taken By, (c) =  Cosigned By      Initials Name Provider Type    Karolina Medina OT Occupational Therapist                   Outcome Measures       Row Name 10/23/24 0852          How much help from another is currently needed...    Putting on and taking off regular lower body clothing? 2  -JOELLE     Bathing (including washing, rinsing, and drying) 2  -JOELLE     Toileting (which includes using toilet bed pan or urinal) 2  -JOELLE     Putting on and taking off regular upper body clothing 3  pull over  -JOELLE     Taking care of personal grooming (such as brushing teeth) 3  CGA and cues  -JOELLE     Eating meals 4  -JOELLE     AM-PAC 6 Clicks Score (OT) 16  -JOELLE       Row Name 10/23/24 0852          Functional Assessment    Outcome Measure Options AM-PAC 6 Clicks Daily Activity (OT)  -JOELLE               User Key  (r) = Recorded By, (t) = Taken By, (c) = Cosigned By      Initials Name Provider Type    Karolina Medina OT Occupational Therapist                    Occupational Therapy Education       Title: PT OT SLP Therapies (In Progress)       Topic: Occupational Therapy (In Progress)       Point: ADL training (In Progress)       Description:   Instruct learner(s) on proper safety adaptation and remediation techniques during self care or transfers.   Instruct in proper use of assistive devices.                  Learning Progress Summary            Patient Acceptance, E,D, NR by JOELLE at 10/23/2024 0852    Comment: re-orientation, bed mobility, transfer and ADL sequencing and safety, UE TE    Acceptance, E, NR by  at 10/18/2024 1016    Acceptance, E,TB, VU,DU by  at 10/17/2024 0910    Acceptance, E, NR,VU by JOELLE at 10/15/2024 1052    Comment: reason for consult, benefit of grab bars on bed, orientation to year, transfer safety                      Point: Home exercise program (In Progress)       Description:   Instruct learner(s) on appropriate technique for monitoring, assisting and/or progressing therapeutic exercises/activities.                   Learning Progress Summary            Patient Acceptance, E,D, NR by  at 10/23/2024 0852    Comment: re-orientation, bed mobility, transfer and ADL sequencing and safety, UE TE    Acceptance, E,TB, VU,DU by  at 10/17/2024 0910                      Point: Precautions (In Progress)       Description:   Instruct learner(s) on prescribed precautions during self-care and functional transfers.                  Learning Progress Summary            Patient Acceptance, E,D, NR by  at 10/23/2024 0852    Comment: re-orientation, bed mobility, transfer and ADL sequencing and safety, UE TE    Acceptance, E,TB, VU,DU by  at 10/17/2024 0910    Acceptance, E, NR,VU by  at 10/15/2024 1052    Comment: reason for consult, benefit of grab bars on bed, orientation to year, transfer safety                      Point: Body mechanics (Done)       Description:   Instruct learner(s) on proper positioning and spine alignment during self-care, functional mobility activities and/or exercises.                  Learning Progress Summary            Patient Acceptance, E,TB, VU,DU by  at 10/17/2024 0910                                      User Key       Initials Effective Dates Name Provider Type Discipline     07/11/23 -  Karolina Peres, OT Occupational Therapist OT     02/03/23 -  Lisset Velázquez, OT Occupational Therapist OT     06/16/21 -  Ewelina Zarco RN Registered Nurse Nurse                  OT Recommendation and Plan  Planned Therapy Interventions (OT): activity tolerance training, BADL retraining, cognitive/visual perception retraining, functional balance retraining, occupation/activity based interventions, patient/caregiver education/training, ROM/therapeutic exercise, transfer/mobility retraining  Therapy Frequency (OT): daily  Plan of Care Review  Plan of Care Reviewed With: patient  Progress: improving  Outcome Evaluation: Pt. demonstrated improved transfer and endurance today.  Pt. able to stand at sink and  groom today and add on UE TE.  Pt. continues to fatigue easily and need rest periods throughout and cues for safety.  Pt. remains below baseline ADL independence warranting continued skilled OT services to address deficit areas. Continue to recommend SNF at discharge.     Time Calculation:   Evaluation Complexity (OT)  Review Occupational Profile/Medical/Therapy History Complexity: brief/low complexity  Assessment, Occupational Performance/Identification of Deficit Complexity: 1-3 performance deficits  Clinical Decision Making Complexity (OT): problem focused assessment/low complexity  Overall Complexity of Evaluation (OT): low complexity     Time Calculation- OT       Row Name 10/23/24 0853             Time Calculation- OT    OT Start Time 0816  -JOELLE      OT Received On 10/23/24  -JOELLE      OT Goal Re-Cert Due Date 10/25/24  -JOELLE         Timed Charges    82209 - OT Therapeutic Exercise Minutes 7  -JOELLE      43709 - OT Therapeutic Activity Minutes 6  -JOELLE      75142 - OT Self Care/Mgmt Minutes 10  -JOELLE         Total Minutes    Timed Charges Total Minutes 23  -JOELLE       Total Minutes 23  -JOELLE                User Key  (r) = Recorded By, (t) = Taken By, (c) = Cosigned By      Initials Name Provider Type    Karolina Medina OT Occupational Therapist                  Therapy Charges for Today       Code Description Service Date Service Provider Modifiers Qty    37333729940 HC OT THER PROC EA 15 MIN 10/23/2024 Karolina Peres OT GO 1    84576684038 HC OT SELF CARE/MGMT/TRAIN EA 15 MIN 10/23/2024 Karolina Peres OT GO 1                 Karolina Peres OT  10/23/2024    Electronically signed by Karolina Peres OT at 10/23/24 0854       Respiratory Therapy Notes (most recent note)    No notes exist for this encounter.

## 2024-10-23 NOTE — THERAPY TREATMENT NOTE
Patient Name: Georgia Velázquez  : 1945    MRN: [id number]                              Today's Date: 10/23/2024       Admit Date: 10/14/2024    Visit Dx:     ICD-10-CM ICD-9-CM   1. Altered mental status, unspecified altered mental status type  R41.82 780.97   2. Acute cystitis without hematuria  N30.00 595.0   3. COVID-19  U07.1 079.89   4. History of diabetes mellitus  Z86.39 V12.29   5. History of coronary artery disease  Z86.79 V12.59     Patient Active Problem List   Diagnosis    Essential hypertension    Hyperlipidemia    Coronary artery disease    GERD (gastroesophageal reflux disease)    Recurrent UTI    Stage 3 chronic kidney disease    Chronic obstructive pulmonary disease    Retinal emboli, right    Diastolic dysfunction    Glaucoma    Left carotid artery stenosis    Syncope    Aortic regurgitation    Diabetic retinopathy    Type 2 diabetes mellitus with stage 3b chronic kidney disease, without long-term current use of insulin    Hypoxia    Obesity, morbid, BMI 50 or higher    Hospital discharge follow-up    Cognitive decline    Other specified health status    Skin lesion of face    Chronic constipation    COVID-19 virus detected    Acute UTI (urinary tract infection)    CKD (chronic kidney disease) stage 3, GFR 30-59 ml/min    T2DM (type 2 diabetes mellitus)    CAD (coronary artery disease)    Syncope    Dementia     Past Medical History:   Diagnosis Date    Acute kidney injury 2017    Aortic regurgitation     Arthritis of shoulder 2016    Body mass index (BMI) of 45.0-49.9 in adult 2019    Bright red rectal bleeding 2023    CHF (congestive heart failure)     Closed compression fracture of L4 lumbar vertebra 2017    Added automatically from request for surgery [id number]    Colitis 2020    Constipation 2017    COPD (chronic obstructive pulmonary disease)     Coronary artery disease 2016    Diabetes mellitus type 2 in obese 2018    Elevated  alkaline phosphatase level 02/26/2018    Elevated creatine kinase     Essential hypertension 05/18/2016    GERD (gastroesophageal reflux disease) 05/18/2016    Glaucoma 07/21/2020    History of kyphoplasty 01/30/2018    Hyperlipidemia     Lumbar facet arthropathy 01/29/2018    Lumbar stenosis with neurogenic claudication 01/29/2018    Morbid obesity due to excess calories 01/29/2018    Myocardial infarction 05/18/2016    Osteoporosis 05/18/2016    Physical deconditioning 01/30/2018    Retinal emboli, 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    Sepsis     uro    Stroke     Urinary incontinence without sensory awareness 02/26/2018     Past Surgical History:   Procedure Laterality Date    APPENDECTOMY      BACK SURGERY      CARDIAC CATHETERIZATION      CHOLECYSTECTOMY      CORONARY STENT PLACEMENT      EYE SURGERY      KYPHOPLASTY N/A 12/07/2017    Procedure: KYPHOPLASTY L4;  Surgeon: Marc Pham MD;  Location: [facility] OR;  Service:       General Information       Row Name 10/23/24 1538          Physical Therapy Time and Intention    Document Type therapy note (daily note)  -     Mode of Treatment physical therapy  -       Row Name 10/23/24 1538          General Information    Patient Profile Reviewed yes  -     Existing Precautions/Restrictions fall;other (see comments)  hx dementia, [place]  -     Barriers to Rehab medically complex;previous functional deficit;cognitive status  -       Row Name 10/23/24 1538          Cognition    Orientation Status (Cognition) oriented to;person;place;disoriented to;time  -       Row Name 10/23/24 1538          Safety Issues/Impairments Affecting Functional Mobility    Safety Issues Affecting Function (Mobility) awareness of need for assistance;insight into deficits/self-awareness;safety precaution awareness;safety precautions follow-through/compliance;sequencing abilities  -     Impairments Affecting Function (Mobility) balance;endurance/activity tolerance;strength;shortness of breath;pain   -               User Key  (r) = Recorded By, (t) = Taken By, (c) = Cosigned By      Initials Name Provider Type     Pema Barnes, PT Physical Therapist                   Mobility       Row Name 10/23/24 1539          Bed Mobility    Bed Mobility sit-supine;rolling left;rolling right  -     Rolling Left [place] (Bed Mobility) minimum assist (75% patient effort);verbal cues  -     Rolling Right [place] (Bed Mobility) minimum assist (75% patient effort);verbal cues  -     Sit-Supine [place] (Bed Mobility) minimum assist (75% patient effort);verbal cues  -     Assistive Device (Bed Mobility) bed rails;head of bed elevated  -     Comment, (Bed Mobility) VCs for hand placement and sequencing. Assist to bring up BLEs  -       Row Name 10/23/24 1539          Transfers    Comment, (Transfers) VCs for hand placement and sequencing w/ FWW. Cues for upright posture  -       Row Name 10/23/24 1539          Sit-Stand Transfer    Sit-Stand [place] (Transfers) contact guard;verbal cues  -     Assistive Device (Sit-Stand Transfers) walker, front-wheeled  -     Comment, (Sit-Stand Transfer) STS from chair  -       Row Name 10/23/24 1539          Gait/Stairs (Locomotion)    [place] Level (Gait) minimum assist (75% patient effort);1 person assist;verbal cues  -     Assistive Device (Gait) walker, front-wheeled  -     Distance in Feet (Gait) 40  -     Deviations/Abnormal Patterns (Gait) bilateral deviations;no decreased;gait speed decreased;stride length decreased;festinating/shuffling;weight shifting decreased  -     Bilateral Gait Deviations forward flexed posture;heel strike decreased  -     Comment, (Gait/Stairs) Pt demo step through gait pattern w/ increased forward flexed posture and decreased speed. VCs for upright posture, forward gaze, AD management, and PLB. Pt tends to advance FWW too far ahead and requires max cues for body positioning. No overt LOB noted.  Distance limited by weakness and fatigue. [place] w/ wheezing noted however O2 sats remained >90% on RA  -               User Key  (r) = Recorded By, (t) = Taken By, (c) = Cosigned By      Initials Name Provider Type     Pema Barnes PT Physical Therapist                   Obj/Interventions       Row Name 10/23/24 1543          Motor Skills    Therapeutic Exercise hip;knee;ankle  -       Row Name 10/23/24 1543          Hip (Therapeutic Exercise)    Hip (Therapeutic Exercise) isometric exercises;strengthening exercise  -     Hip Isometrics (Therapeutic Exercise) bilateral;gluteal sets;10 repetitions  -     Hip Strengthening (Therapeutic Exercise) bilateral;aBduction;aDduction;heel slides;10 repetitions  -       Row Name 10/23/24 1543          Knee (Therapeutic Exercise)    Knee (Therapeutic Exercise) isometric exercises;strengthening exercise  -     Knee Isometrics (Therapeutic Exercise) bilateral;quad sets;10 repetitions  -     Knee Strengthening (Therapeutic Exercise) bilateral;SLR (straight leg raise);LAQ (long arc quad);10 repetitions  -       Row Name 10/23/24 1543          Ankle (Therapeutic Exercise)    Ankle (Therapeutic Exercise) AROM (active range of motion)  -     Ankle AROM (Therapeutic Exercise) bilateral;dorsiflexion;plantarflexion;10 repetitions  -       Row Name 10/23/24 1543          Balance    Balance Assessment sitting static balance;sitting dynamic balance;standing static balance;standing dynamic balance  -     Static Sitting Balance standby assist  -     Dynamic Sitting Balance standby assist  -     Position, Sitting Balance unsupported;sitting in chair  -     Static Standing Balance contact guard  -     Dynamic Standing Balance minimal assist;verbal cues  -     Position/Device Used, Standing Balance supported;walker, front-wheeled  -     Balance Interventions sitting;sit to stand;standing;supported;dynamic;static  -               User Key  (r) = Recorded By,  (t) = Taken By, (c) = Cosigned By      Initials Name Provider Type     Pema Barnes PT Physical Therapist                   Goals/Plan    No documentation.                  Clinical Impression       Row Name 10/23/24 1544          Pain    Pretreatment Pain Rating 5/10  -LH     Posttreatment Pain Rating 5/10  -     Pain Location chest  -     Pain Side/Orientation generalized  -     Pain Management Interventions exercise or physical activity utilized  [facility]     Response to Pain Interventions no change per patient report  -       Row Name 10/23/24 1544          Plan of Care Review    Plan of Care Reviewed With patient  -     Progress improving  -     Outcome Evaluation Pt continues to present below baseline function d/t generalized weakness, balance deficits, and decreased activity tolerance. Pt ambulated 40 ft w/ FWW, Roberto. [place] w/ wheezing noted however O2 sats remained >90% on RA. Pt would continue to benefit from skilled IP PT. Recommend SNF at d/c.  -       Row Name 10/23/24 1544          Vital Signs    Pre Systolic BP Rehab 161  -LH     Pre Treatment Diastolic BP 70  -     Pretreatment Heart Rate (beats/min) 63  -     Pre SpO2 (%) 95  -     O2 Delivery Pre Treatment room air  -     O2 Delivery Intra Treatment room air  -     Post SpO2 (%) 92  -     O2 Delivery Post Treatment room air  -     Pre Patient Position Sitting  -     Post Patient Position Supine  -       Row Name 10/23/24 1544          Positioning and Restraints    Pre-Treatment Position sitting in chair/recliner  -     Post Treatment Position bed  -     In Bed notified nsg;supine;call light within reach;encouraged to call for assist;exit alarm on;with nsg;with other staff  -               User Key  (r) = Recorded By, (t) = Taken By, (c) = Cosigned By      Initials Name Provider Type     Pema Barnes, LARISA Physical Therapist                   Outcome Measures       Row Name 10/23/24 1545 10/23/24 0804       How  much help from another person do you currently need...    Turning from your back to your side while in flat bed without using bedrails? 3  - 3  -AB    Moving from lying on back to sitting on the side of a flat bed without bedrails? 3  - 3  -AB    Moving to and from a bed to a chair (including a wheelchair)? 3  - 3  -AB    Standing up from a chair using your arms (e.g., wheelchair, bedside chair)? 3  - 3  -AB    Climbing 3-5 steps with a railing? 2  - 2  -AB    To walk in hospital room? 3  - 3  -AB    AM-PAC 6 Clicks Score (PT) 17  - 17  -AB    Highest Level of Mobility Goal 5 --> Static standing  - 5 --> Static standing  -AB      Row Name 10/23/24 1545 10/23/24 0852       Functional Assessment    Outcome Measure Options AM-PAC 6 Clicks Basic Mobility (PT)  - AM-PAC 6 Clicks Daily Activity (OT)  -              User Key  (r) = Recorded By, (t) = Taken By, (c) = Cosigned By      Initials Name Provider Type    Karolina Medina, OT Occupational Therapist     Pema Barnes, PT Physical Therapist    AB Martha Parada, RN Registered Nurse                                 Physical Therapy Education       Title: PT OT SLP Therapies (In Progress)       Topic: Physical Therapy (Done)       Point: Mobility training (Done)       Learning Progress Summary            Patient Acceptance, E, VU,NR by  at 10/23/2024 1545    Acceptance, E, VU,NR by  at 10/18/2024 1043                      Point: Home exercise program (Done)       Learning Progress Summary            Patient Acceptance, E, VU,NR by  at 10/23/2024 1545    Acceptance, E, VU,NR by BA at 10/18/2024 1043                      Point: Body mechanics (Done)       Learning Progress Summary            Patient Acceptance, E, VU,NR by  at 10/23/2024 1545    Acceptance, E, VU,NR by BA at 10/18/2024 1043                      Point: Precautions (Done)       Learning Progress Summary            Patient Acceptance, E, VU,NR by  at 10/23/2024 1545     Acceptance, E, VU,NR by  at 10/18/2024 1043                                      User Key       Initials Effective Dates Name Provider Type Discipline     09/21/21 -  Wanda Kline, PT Physical Therapist PT     09/21/23 -  Pema Barnes PT Physical Therapist PT                  PT Recommendation and Plan     Progress: improving  Outcome Evaluation: Pt continues to present below baseline function d/t generalized weakness, balance deficits, and decreased activity tolerance. Pt ambulated 40 ft w/ FWW, Roberto. [place] w/ wheezing noted however O2 sats remained >90% on RA. Pt would continue to benefit from skilled IP PT. Recommend SNF at d/c.     Time Calculation:         PT Charges       Row Name 10/23/24 1545             Time Calculation    Start Time 1508  -      PT Received On 10/23/24  -      PT Goal Re-Cert Due Date 10/25/24  -         Timed Charges    76310 - PT Therapeutic Exercise Minutes 12  -LH      84734 - PT Therapeutic Activity Minutes 13  -         Total Minutes    Timed Charges Total Minutes 25  -       Total Minutes 25  -                User Key  (r) = Recorded By, (t) = Taken By, (c) = Cosigned By      Initials Name Provider Type     Pema Barnes, LARISA Physical Therapist                  Therapy Charges for Today       Code Description Service Date Service Provider Modifiers Qty    35559061363 HC PT THER PROC EA 15 MIN 10/23/2024 Pema Barnes, PT GP 1    56901953780 HC PT THERAPEUTIC ACT EA 15 MIN 10/23/2024 Pema Barnes, PT GP 1            PT G-Codes  Outcome Measure Options: AM-PAC 6 Clicks Basic Mobility (PT)  AM-PAC 6 Clicks Score (PT): 17  AM-PAC 6 Clicks Score (OT): 16  PT Discharge Summary  Anticipated Discharge Disposition (PT): skilled nursing facility    Pema Barnes PT  10/23/2024

## 2024-10-23 NOTE — DISCHARGE PLACEMENT REQUEST
"TO: Silviano ATTN: Tonia GIRON   [id number]  Georgia Palumbo (79 y.o. Female)       Date of Birth   1945    Social Security Number       Address   [address] [place], [place] [address]    Home Phone   [phone]    MRN   [id number]       [place]   Gnosticism    Marital Status                               Admission Date   10/14/24    Admission Type   Emergency    Admitting Provider   Nidhi Wylie MD    Attending Provider   Nidhi Wylie MD    Department, Room/Bed   [facility] 5G, S547/1       Discharge Date       Discharge Disposition       Discharge Destination                                 Attending Provider: Nidhi Wylie MD    Allergies: No Known Allergies    Isolation: Contact Air   Infection: COVID (confirmed) (10/14/24)   Code Status: No CPR    Ht: 154.9 cm (61\")   Wt: 109 kg (240 lb 8 oz)    Admission Cmt: None   Principal Problem: COVID-19 virus detected [U07.1]                   Active Insurance as of 10/14/2024       Primary Coverage       Payor Plan Insurance Group Employer/Plan Group    ANTH MEDICARE REPLACEMENT ANTH MEDICARE ADVANTAGE [id number]       Payor Plan Address Payor Plan Phone Number Payor Plan Fax Number Effective Dates    [address] [phone]  2024 - None Entered    [facility] [address]         Subscriber Name Subscriber Birth Date Member ID       GEORGIA PALUMBO 1945 [id number]                     Emergency Contacts        (Rel.) Home Phone Work Phone Mobile Phone    JARVIS ESCALONA (Daughter) [phone] -- [phone]    ABDELRAHMAN PALUMBO (Son) [phone] -- [phone]                 Physical Therapy Notes (most recent note)        Pema Barnes, PT at 10/23/24 1508  Version 1 of 1         Patient Name: Georgia Plaumbo  : 1945    MRN: [id number]                              Today's Date: 10/23/2024       Admit Date: 10/14/2024    Visit Dx:     ICD-10-CM ICD-9-CM   1. Altered " mental status, unspecified altered mental status type  R41.82 780.97   2. Acute cystitis without hematuria  N30.00 595.0   3. COVID-19  U07.1 079.89   4. History of diabetes mellitus  Z86.39 V12.29   5. History of coronary artery disease  Z86.79 V12.59     Patient Active Problem List   Diagnosis    Essential hypertension    Hyperlipidemia    Coronary artery disease    GERD (gastroesophageal reflux disease)    Recurrent UTI    Stage 3 chronic kidney disease    Chronic obstructive pulmonary disease    Retinal emboli, right    Diastolic dysfunction    Glaucoma    Left carotid artery stenosis    Syncope    Aortic regurgitation    Diabetic retinopathy    Type 2 diabetes mellitus with stage 3b chronic kidney disease, without long-term current use of insulin    Hypoxia    Obesity, morbid, BMI 50 or higher    Hospital discharge follow-up    Cognitive decline    Other specified health status    Skin lesion of face    Chronic constipation    COVID-19 virus detected    Acute UTI (urinary tract infection)    CKD (chronic kidney disease) stage 3, GFR 30-59 ml/min    T2DM (type 2 diabetes mellitus)    CAD (coronary artery disease)    Syncope    Dementia     Past Medical History:   Diagnosis Date    Acute kidney injury 12/05/2017    Aortic regurgitation     Arthritis of shoulder 05/18/2016    Body mass index (BMI) of 45.0-49.9 in adult 06/14/2019    Bright red rectal bleeding 12/03/2023    CHF (congestive heart failure)     Closed compression fracture of L4 lumbar vertebra 12/04/2017    Added automatically from request for surgery [id number]    Colitis 07/21/2020    Constipation 12/05/2017    COPD (chronic obstructive pulmonary disease)     Coronary artery disease 05/18/2016    Diabetes mellitus type 2 in obese 01/29/2018    Elevated alkaline phosphatase level 02/26/2018    Elevated creatine kinase     Essential hypertension 05/18/2016    GERD (gastroesophageal reflux disease) 05/18/2016    Glaucoma 07/21/2020    History of  kyphoplasty 01/30/2018    Hyperlipidemia     Lumbar facet arthropathy 01/29/2018    Lumbar stenosis with neurogenic claudication 01/29/2018    Morbid obesity due to excess calories 01/29/2018    Myocardial infarction 05/18/2016    Osteoporosis 05/18/2016    Physical deconditioning 01/30/2018    Retinal emboli, 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    Sepsis     uro    Stroke     Urinary incontinence without sensory awareness 02/26/2018     Past Surgical History:   Procedure Laterality Date    APPENDECTOMY      BACK SURGERY      CARDIAC CATHETERIZATION      CHOLECYSTECTOMY      CORONARY STENT PLACEMENT      EYE SURGERY      KYPHOPLASTY N/A 12/07/2017    Procedure: KYPHOPLASTY L4;  Surgeon: Marc Pham MD;  Location: [facility] OR;  Service:       General Information       Row Name 10/23/24 1538          Physical Therapy Time and Intention    Document Type therapy note (daily note)  -     Mode of Treatment physical therapy  -       Row Name 10/23/24 1538          General Information    Patient Profile Reviewed yes  -     Existing Precautions/Restrictions fall;other (see comments)  hx dementia, [place]  -     Barriers to Rehab medically complex;previous functional deficit;cognitive status  -       Row Name 10/23/24 1538          Cognition    Orientation Status (Cognition) oriented to;person;place;disoriented to;time  -       Row Name 10/23/24 1538          Safety Issues/Impairments Affecting Functional Mobility    Safety Issues Affecting Function (Mobility) awareness of need for assistance;insight into deficits/self-awareness;safety precaution awareness;safety precautions follow-through/compliance;sequencing abilities  -     Impairments Affecting Function (Mobility) balance;endurance/activity tolerance;strength;shortness of breath;pain  -               User Key  (r) = Recorded By, (t) = Taken By, (c) = Cosigned By      Initials Name Provider Type     Pema Barnes PT Physical Therapist                   Mobility       Row  Name 10/23/24 1539          Bed Mobility    Bed Mobility sit-supine;rolling left;rolling right  -     Rolling Left [place] (Bed Mobility) minimum assist (75% patient effort);verbal cues  -LH     Rolling Right [place] (Bed Mobility) minimum assist (75% patient effort);verbal cues  -LH     Sit-Supine [place] (Bed Mobility) minimum assist (75% patient effort);verbal cues  -     Assistive Device (Bed Mobility) bed rails;head of bed elevated  -     Comment, (Bed Mobility) VCs for hand placement and sequencing. Assist to bring up BLEs  -       Row Name 10/23/24 1539          Transfers    Comment, (Transfers) VCs for hand placement and sequencing w/ FWW. Cues for upright posture  -       Row Name 10/23/24 1539          Sit-Stand Transfer    Sit-Stand [place] (Transfers) contact guard;verbal cues  -     Assistive Device (Sit-Stand Transfers) walker, front-wheeled  -     Comment, (Sit-Stand Transfer) STS from chair  -       Row Name 10/23/24 1539          Gait/Stairs (Locomotion)    [place] Level (Gait) minimum assist (75% patient effort);1 person assist;verbal cues  -     Assistive Device (Gait) walker, front-wheeled  -     Distance in Feet (Gait) 40  -     Deviations/Abnormal Patterns (Gait) bilateral deviations;no decreased;gait speed decreased;stride length decreased;festinating/shuffling;weight shifting decreased  -     Bilateral Gait Deviations forward flexed posture;heel strike decreased  -     Comment, (Gait/Stairs) Pt demo step through gait pattern w/ increased forward flexed posture and decreased speed. VCs for upright posture, forward gaze, AD management, and PLB. Pt tends to advance FWW too far ahead and requires max cues for body positioning. No overt LOB noted. Distance limited by weakness and fatigue. TOLEDO w/ wheezing noted however O2 sats remained >90% on RA  -               User Key  (r) = Recorded By, (t) = Taken By, (c) = Cosigned By      Initials  Name Provider Type     Pema Barnes PT Physical Therapist                   Obj/Interventions       Row Name 10/23/24 1543          Motor Skills    Therapeutic Exercise hip;knee;ankle  -       Row Name 10/23/24 1543          Hip (Therapeutic Exercise)    Hip (Therapeutic Exercise) isometric exercises;strengthening exercise  -     Hip Isometrics (Therapeutic Exercise) bilateral;gluteal sets;10 repetitions  -     Hip Strengthening (Therapeutic Exercise) bilateral;aBduction;aDduction;heel slides;10 repetitions  -       Row Name 10/23/24 1543          Knee (Therapeutic Exercise)    Knee (Therapeutic Exercise) isometric exercises;strengthening exercise  -     Knee Isometrics (Therapeutic Exercise) bilateral;quad sets;10 repetitions  -     Knee Strengthening (Therapeutic Exercise) bilateral;SLR (straight leg raise);LAQ (long arc quad);10 repetitions  -       Row Name 10/23/24 1543          Ankle (Therapeutic Exercise)    Ankle (Therapeutic Exercise) AROM (active range of motion)  -     Ankle AROM (Therapeutic Exercise) bilateral;dorsiflexion;plantarflexion;10 repetitions  -       Row Name 10/23/24 1543          Balance    Balance Assessment sitting static balance;sitting dynamic balance;standing static balance;standing dynamic balance  -     Static Sitting Balance standby assist  -     Dynamic Sitting Balance standby assist  -     Position, Sitting Balance unsupported;sitting in chair  -     Static Standing Balance contact guard  -     Dynamic Standing Balance minimal assist;verbal cues  -     Position/Device Used, Standing Balance supported;walker, front-wheeled  -     Balance Interventions sitting;sit to stand;standing;supported;dynamic;static  -               User Key  (r) = Recorded By, (t) = Taken By, (c) = Cosigned By      Initials Name Provider Type     Pema Barnes PT Physical Therapist                   Goals/Plan    No documentation.                  Clinical  Impression       [facility] Name 10/23/24 1544          Pain    Pretreatment Pain Rating 5/10  -     Posttreatment Pain Rating 5/10  -     Pain Location chest  -     Pain Side/Orientation generalized  -     Pain Management Interventions exercise or physical activity utilized  -     Response to Pain Interventions no change per patient report  -Critical access hospital Name 10/23/24 1544          Plan of Care Review    Plan of Care Reviewed With patient  -     Progress improving  -     Outcome Evaluation Pt continues to present below baseline function d/t generalized weakness, balance deficits, and decreased activity tolerance. Pt ambulated 40 ft w/ FWW, Roberto. TOLEDO w/ wheezing noted however O2 sats remained >90% on RA. Pt would continue to benefit from skilled IP PT. Recommend SNF at d/c.  -Critical access hospital Name 10/23/24 1544          Vital Signs    Pre Systolic BP Rehab 161  -     Pre Treatment Diastolic BP 70  -     Pretreatment Heart Rate (beats/min) 63  -     Pre SpO2 (%) 95  -LH     O2 Delivery Pre Treatment room air  -     O2 Delivery Intra Treatment room air  -     Post SpO2 (%) 92  -     O2 Delivery Post Treatment room air  -     Pre Patient Position Sitting  -     Post Patient Position Supine  -Critical access hospital Name 10/23/24 1544          Positioning and Restraints    Pre-Treatment Position sitting in chair/recliner  -     Post Treatment Position bed  -     In Bed notified nsg;supine;call light within reach;encouraged to call for assist;exit alarm on;with nsg;with other staff  -               User Key  (r) = Recorded By, (t) = Taken By, (c) = Cosigned By      Initials Name Provider Type     Pema Barnes, PT Physical Therapist                   Outcome Measures       [facility] Name 10/23/24 1545 10/23/24 0804       How much help from another person do you currently need...    Turning from your back to your side while in flat bed without using bedrails? 3  -LH 3  -AB    Moving from lying on back to  sitting on the side of a flat bed without bedrails? 3  - 3  -AB    Moving to and from a bed to a chair (including a wheelchair)? 3  - 3  -AB    Standing up from a chair using your arms (e.g., wheelchair, bedside chair)? 3  - 3  -AB    Climbing 3-5 steps with a railing? 2  - 2  -AB    To walk in hospital room? 3  - 3  -AB    AM-PAC 6 Clicks Score (PT) 17  -LH 17  -AB    Highest Level of Mobility Goal 5 --> Static standing  - 5 --> Static standing  -AB      Row Name 10/23/24 1545 10/23/24 0852       Functional Assessment    Outcome Measure Options AM-PAC 6 Clicks Basic Mobility (PT)  - AM-PAC 6 Clicks Daily Activity (OT)  -              User Key  (r) = Recorded By, (t) = Taken By, (c) = Cosigned By      Initials Name Provider Type    Karolina Medina, OT Occupational Therapist     Pema Barnes, PT Physical Therapist    Martha Guillory, RN Registered Nurse                                 Physical Therapy Education       Title: PT OT SLP Therapies (In Progress)       Topic: Physical Therapy (Done)       Point: Mobility training (Done)       Learning Progress Summary            Patient Acceptance, E, VU,NR by  at 10/23/2024 1545    Acceptance, E, VU,NR by  at 10/18/2024 1043                      Point: Home exercise program (Done)       Learning Progress Summary            Patient Acceptance, E, VU,NR by  at 10/23/2024 1545    Acceptance, E, VU,NR by  at 10/18/2024 1043                      Point: Body mechanics (Done)       Learning Progress Summary            Patient Acceptance, E, VU,NR by  at 10/23/2024 1545    Acceptance, E, VU,NR by  at 10/18/2024 1043                      Point: Precautions (Done)       Learning Progress Summary            Patient Acceptance, E, VU,NR by  at 10/23/2024 1545    Acceptance, E, VU,NR by  at 10/18/2024 1043                                      User Key       Initials Effective Dates Name Provider Type Discipline     09/21/21 -  Sixto Bob  Wanda PT Physical Therapist PT     09/21/23 -  Pmea Barnes, PT Physical Therapist PT                  PT Recommendation and Plan     Progress: improving  Outcome Evaluation: Pt continues to present below baseline function d/t generalized weakness, balance deficits, and decreased activity tolerance. Pt ambulated 40 ft w/ FWW, Roberto. TOLEDO w/ wheezing noted however O2 sats remained >90% on RA. Pt would continue to benefit from skilled IP PT. Recommend SNF at d/c.     Time Calculation:         PT Charges       Row Name 10/23/24 1545             Time Calculation    Start Time 1508  -LH      PT Received On 10/23/24  -      PT Goal Re-Cert Due Date 10/25/24  -         Timed Charges    22204 - PT Therapeutic Exercise Minutes 12  -      00312 - PT Therapeutic Activity Minutes 13  -         Total Minutes    Timed Charges Total Minutes 25  -       Total Minutes 25  -                User Key  (r) = Recorded By, (t) = Taken By, (c) = Cosigned By      Initials Name Provider Type     Pema Barnes, PT Physical Therapist                  Therapy Charges for Today       Code Description Service Date Service Provider Modifiers Qty    72432730745 HC PT THER PROC EA 15 MIN 10/23/2024 Pema Barnes, PT GP 1    14244729598 HC PT THERAPEUTIC ACT EA 15 MIN 10/23/2024 Pema Barnes, PT GP 1            PT G-Codes  Outcome Measure Options: AM-PAC 6 Clicks Basic Mobility (PT)  AM-PAC 6 Clicks Score (PT): 17  AM-PAC 6 Clicks Score (OT): 16  PT Discharge Summary  Anticipated Discharge Disposition (PT): skilled nursing facility    Pema Barnes PT  10/23/2024      Electronically signed by Pema Barnes PT at 10/23/24 8159

## 2024-10-23 NOTE — PLAN OF CARE
Goal Outcome Evaluation:  Plan of Care Reviewed With: patient        Progress: improving  Outcome Evaluation: Pt continues to present below baseline function d/t generalized weakness, balance deficits, and decreased activity tolerance. Pt ambulated 40 ft w/ FWW, Roberto. TOLEDO w/ wheezing noted however O2 sats remained >90% on RA. Pt would continue to benefit from skilled IP PT. Recommend SNF at d/c.    Anticipated Discharge Disposition (PT): skilled nursing facility

## 2024-10-23 NOTE — PLAN OF CARE
Goal Outcome Evaluation:  Plan of Care Reviewed With: patient        Progress: improving  Outcome Evaluation: Pt. demonstrated improved transfer and endurance today.  Pt. able to stand at sink and groom today and add on UE TE.  Pt. continues to fatigue easily and need rest periods throughout and cues for safety.  Pt. remains below baseline ADL independence warranting continued skilled OT services to address deficit areas. Continue to recommend SNF at discharge.    Anticipated Discharge Disposition (OT): skilled nursing facility

## 2024-10-23 NOTE — PROGRESS NOTES
Nutrition Services    Patient Name:  Georgia Velázquez  YOB: 1945  MRN: [id number]  Admit Date:  10/14/2024    Pt noted to have adequate PO intake. Has a documented 57.7% avg at last 13 documented meals. RD will sign off at this time. If note that PO intake decrease to avg of <50%, please consult RD to re-evaluate interventions in place.       Electronically signed by:  Georgia Abarca MS,RD,LD  10/23/24 15:17 EDT

## 2024-10-23 NOTE — PLAN OF CARE
Problem: Adult Inpatient Plan of Care  Goal: Plan of Care Review  Outcome: Progressing  Goal: Patient-Specific Goal (Individualized)  Outcome: Progressing  Goal: Absence of Hospital-Acquired Illness or Injury  Outcome: Progressing  Intervention: Identify and Manage Fall Risk  Recent Flowsheet Documentation  Taken 10/23/2024 0400 by Stefanie Rhoades RN  Safety Promotion/Fall Prevention:   assistive device/personal items within reach   clutter free environment maintained   fall prevention program maintained   nonskid shoes/slippers when out of bed   room organization consistent   safety round/check completed  Taken 10/23/2024 0000 by Stefanie Rhoades RN  Safety Promotion/Fall Prevention:   assistive device/personal items within reach   clutter free environment maintained   fall prevention program maintained   nonskid shoes/slippers when out of bed   room organization consistent   safety round/check completed  Taken 10/22/2024 2000 by Stefanie Rhoades RN  Safety Promotion/Fall Prevention:   assistive device/personal items within reach   clutter free environment maintained   fall prevention program maintained   nonskid shoes/slippers when out of bed   room organization consistent   safety round/check completed  Intervention: Prevent Skin Injury  Recent Flowsheet Documentation  Taken 10/23/2024 0400 by Stefanie Rhoades RN  Body Position: position changed independently  Skin Protection:   incontinence pads utilized   transparent dressing maintained  Taken 10/23/2024 0000 by Stefanie Rhoades RN  Body Position: position changed independently  Skin Protection:   incontinence pads utilized   transparent dressing maintained  Taken 10/22/2024 2000 by Stefanie Rhoades RN  Body Position: position changed independently  Skin Protection:   incontinence pads utilized   transparent dressing maintained  Intervention: Prevent and Manage VTE (Venous Thromboembolism) Risk  Recent Flowsheet Documentation  Taken 10/22/2024 2000 by  Shofner, Stefanie C, RN  VTE Prevention/Management: (see MAR) other (see comments)  Intervention: Prevent Infection  Recent Flowsheet Documentation  Taken 10/23/2024 0400 by Stefanie Rhoades RN  Infection Prevention:   environmental surveillance performed   hand hygiene promoted   personal protective equipment utilized   single patient room provided  Taken 10/23/2024 0000 by Stefanie Rhoades RN  Infection Prevention:   environmental surveillance performed   hand hygiene promoted   personal protective equipment utilized   single patient room provided  Taken 10/22/2024 2000 by Stefanie Rhoades RN  Infection Prevention:   environmental surveillance performed   hand hygiene promoted   personal protective equipment utilized   single patient room provided  Goal: Optimal Comfort and Wellbeing  Outcome: Progressing  Intervention: Provide Person-Centered Care  Recent Flowsheet Documentation  Taken 10/22/2024 2000 by Stefanie Rhoades RN  Trust Relationship/Rapport:   care explained   choices provided   questions answered   questions encouraged  Goal: Readiness for Transition of Care  Outcome: Progressing     Problem: Skin Injury Risk Increased  Goal: Skin Health and Integrity  Outcome: Progressing  Intervention: Optimize Skin Protection  Recent Flowsheet Documentation  Taken 10/23/2024 0400 by Stefanie Rhoades RN  Activity Management: activity encouraged  Pressure Reduction Techniques: frequent weight shift encouraged  Head of Bed (HOB) Positioning: HOB lowered  Pressure Reduction Devices:   positioning supports utilized   pressure-redistributing mattress utilized   heel offloading device utilized  Skin Protection:   incontinence pads utilized   transparent dressing maintained  Taken 10/23/2024 0000 by Stefanie Rhoades RN  Activity Management: activity encouraged  Pressure Reduction Techniques: frequent weight shift encouraged  Head of Bed (HOB) Positioning: HOB lowered  Pressure Reduction Devices:    pressure-redistributing mattress utilized   positioning supports utilized   heel offloading device utilized  Skin Protection:   incontinence pads utilized   transparent dressing maintained  Taken 10/22/2024 2000 by Stefanie Rhoades RN  Activity Management:   activity encouraged   back to bed  Pressure Reduction Techniques:   frequent weight shift encouraged   weight shift assistance provided   pressure points protected  Head of Bed (HOB) Positioning: HOB lowered  Pressure Reduction Devices:   pressure-redistributing mattress utilized   heel offloading device utilized   foam padding utilized  Skin Protection:   incontinence pads utilized   transparent dressing maintained     Problem: Fall Injury Risk  Goal: Absence of Fall and Fall-Related Injury  Outcome: Progressing  Intervention: Identify and Manage Contributors  Recent Flowsheet Documentation  Taken 10/22/2024 2000 by Stefanie Rhoades RN  Medication Review/Management: medications reviewed  Intervention: Promote Injury-Free Environment  Recent Flowsheet Documentation  Taken 10/23/2024 0400 by Stefanie Rhoades RN  Safety Promotion/Fall Prevention:   assistive device/personal items within reach   clutter free environment maintained   fall prevention program maintained   nonskid shoes/slippers when out of bed   room organization consistent   safety round/check completed  Taken 10/23/2024 0000 by Stefanie Rhoades RN  Safety Promotion/Fall Prevention:   assistive device/personal items within reach   clutter free environment maintained   fall prevention program maintained   nonskid shoes/slippers when out of bed   room organization consistent   safety round/check completed  Taken 10/22/2024 2000 by Stefanie Rhoades RN  Safety Promotion/Fall Prevention:   assistive device/personal items within reach   clutter free environment maintained   fall prevention program maintained   nonskid shoes/slippers when out of bed   room organization consistent   safety round/check  completed     Problem: Comorbidity Management  Goal: Blood Glucose Level Within Target Range  Outcome: Progressing  Intervention: Monitor and Manage Glycemia  Recent Flowsheet Documentation  Taken 10/22/2024 2000 by Stefanie Rhoades RN  Medication Review/Management: medications reviewed  Goal: Maintenance of Heart Failure Symptom Control  Outcome: Progressing  Intervention: Maintain Heart Failure Management  Recent Flowsheet Documentation  Taken 10/22/2024 2000 by Stefanie Rhoadse RN  Medication Review/Management: medications reviewed  Goal: Blood Pressure in Desired Range  Outcome: Progressing  Intervention: Maintain Blood Pressure Management  Recent Flowsheet Documentation  Taken 10/22/2024 2000 by Stefanie Rhoades RN  Medication Review/Management: medications reviewed   Goal Outcome Evaluation:

## 2024-10-23 NOTE — THERAPY TREATMENT NOTE
Patient Name: Georgia Velázquez  : 1945    MRN: [id number]                              Today's Date: 10/23/2024       Admit Date: 10/14/2024    Visit Dx:     ICD-10-CM ICD-9-CM   1. Altered mental status, unspecified altered mental status type  R41.82 780.97   2. Acute cystitis without hematuria  N30.00 595.0   3. COVID-19  U07.1 079.89   4. History of diabetes mellitus  Z86.39 V12.29   5. History of coronary artery disease  Z86.79 V12.59     Patient Active Problem List   Diagnosis    Essential hypertension    Hyperlipidemia    Coronary artery disease    GERD (gastroesophageal reflux disease)    Recurrent UTI    Stage 3 chronic kidney disease    Chronic obstructive pulmonary disease    Retinal emboli, right    Diastolic dysfunction    Glaucoma    Left carotid artery stenosis    Syncope    Aortic regurgitation    Diabetic retinopathy    Type 2 diabetes mellitus with stage 3b chronic kidney disease, without long-term current use of insulin    Hypoxia    Obesity, morbid, BMI 50 or higher    Hospital discharge follow-up    Cognitive decline    Other specified health status    Skin lesion of face    Chronic constipation    COVID-19 virus detected    Acute UTI (urinary tract infection)    CKD (chronic kidney disease) stage 3, GFR 30-59 ml/min    T2DM (type 2 diabetes mellitus)    CAD (coronary artery disease)    Syncope    Dementia     Past Medical History:   Diagnosis Date    Acute kidney injury 2017    Aortic regurgitation     Arthritis of shoulder 2016    Body mass index (BMI) of 45.0-49.9 in adult 2019    Bright red rectal bleeding 2023    CHF (congestive heart failure)     Closed compression fracture of L4 lumbar vertebra 2017    Added automatically from request for surgery [id number]    Colitis 2020    Constipation 2017    COPD (chronic obstructive pulmonary disease)     Coronary artery disease 2016    Diabetes mellitus type 2 in obese 2018    Elevated  alkaline phosphatase level 02/26/2018    Elevated creatine kinase     Essential hypertension 05/18/2016    GERD (gastroesophageal reflux disease) 05/18/2016    Glaucoma 07/21/2020    History of kyphoplasty 01/30/2018    Hyperlipidemia     Lumbar facet arthropathy 01/29/2018    Lumbar stenosis with neurogenic claudication 01/29/2018    Morbid obesity due to excess calories 01/29/2018    Myocardial infarction 05/18/2016    Osteoporosis 05/18/2016    Physical deconditioning 01/30/2018    Retinal emboli, 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    Sepsis     uro    Stroke     Urinary incontinence without sensory awareness 02/26/2018     Past Surgical History:   Procedure Laterality Date    APPENDECTOMY      BACK SURGERY      CARDIAC CATHETERIZATION      CHOLECYSTECTOMY      CORONARY STENT PLACEMENT      EYE SURGERY      KYPHOPLASTY N/A 12/07/2017    Procedure: KYPHOPLASTY L4;  Surgeon: Marc Pham MD;  Location: [facility] OR;  Service:       General Information       Row Name 10/23/24 0843          OT Time and Intention    Subjective Information no complaints  -JOELLE     Document Type therapy note (daily note)  -JOELLE     Mode of Treatment individual therapy;occupational therapy  -JOELLE     Patient Effort excellent  -JOELLE     Symptoms Noted During/After Treatment none  -JOELLE       Row Name 10/23/24 0843          General Information    Patient Profile Reviewed yes  -JOELLE     Existing Precautions/Restrictions fall  contact and airborne (COVID); hx dementia; [place]  -JOELLE     Barriers to Rehab previous functional deficit;cognitive status  -JOELLE       Row Name 10/23/24 0843          Cognition    Orientation Status (Cognition) oriented to;person;place;disoriented to;time  -JOELLE       Row Name 10/23/24 0843          Safety Issues/Impairments Affecting Functional Mobility    Safety Issues Affecting Function (Mobility) insight into deficits/self-awareness;safety precaution awareness;safety precautions follow-through/compliance  -JOELEL     Impairments Affecting Function  (Mobility) balance;endurance/activity tolerance;strength;shortness of breath  -               User Key  (r) = Recorded By, (t) = Taken By, (c) = Cosigned By      Initials Name Provider Type    JOELLE Karolina Peres, OT Occupational Therapist                     Mobility/ADL's       Row Name 10/23/24 0844          Bed Mobility    Supine-Sit [place] (Bed Mobility) minimum assist (75% patient effort)  -     Bed Mobility, Safety Issues impaired trunk control for bed mobility  -     Assistive Device (Bed Mobility) bed rails;head of bed elevated  -JOELLE     Comment, (Bed Mobility) pt. needed assist to roll to side further to reach rail so could pull self up to sit  -JOELLE       Row Name 10/23/24 0844          Transfers    Transfers sit-stand transfer;stand-sit transfer  -       Row Name 10/23/24 0844          Sit-Stand Transfer    Sit-Stand [place] (Transfers) contact guard;verbal cues  -     Assistive Device (Sit-Stand Transfers) walker, front-wheeled  -JOELLE     Comment, (Sit-Stand Transfer) pt. needed cues for hand placement to push up and reach back and pull or hold to walker, R foot block needed not to slide on standing  -       Row Name 10/23/24 0844          Stand-Sit Transfer    Stand-Sit [place] (Transfers) contact guard;verbal cues  -     Assistive Device (Stand-Sit Transfers) walker, front-wheeled  -JOELLE     Comment, (Stand-Sit Transfer) despite cues pt. did not reach back  -       Row Name 10/23/24 0844          Functional Mobility    Functional Mobility- Ind. Level contact guard assist  -     Functional Mobility- Device walker, front-wheeled  -     Functional Mobility-Distance (Feet) 4  + 16  -     Functional Mobility- Safety Issues step length decreased  -     Functional Mobility- Comment pt. still tends to want to stay to far from walker, pt. declined to walk further  -       Row Name 10/23/24 0844          Activities of Daily Living    BADL Assessment/Intervention upper body  dressing;grooming;feeding  -       Row Name 10/23/24 0844          Grooming Assessment/Training    [place] Level (Grooming) hair care, combing/brushing;wash face, hands;set up;standby assist  -     Position (Grooming) sink side;supported standing  -       Row Name 10/23/24 0844          Self-Feeding Assessment/Training    [place] Level (Feeding) prepare tray/open items;minimum assist (75% patient effort);liquids to mouth;independent  -               User Key  (r) = Recorded By, (t) = Taken By, (c) = Cosigned By      Initials Name Provider Type    JOELLE Karolina Peres, OT Occupational Therapist                   Obj/Interventions       Row Name 10/23/24 0847          Shoulder (Therapeutic Exercise)    Shoulder (Therapeutic Exercise) AROM (active range of motion)  -     Shoulder AROM (Therapeutic Exercise) bilateral;flexion;extension;aBduction;aDduction;horizontal aBduction/aDduction;sitting;10 repetitions  short 15-30 second rest periods between each, pt. could not follow breathing technique  -       Row Name 10/23/24 0847          Elbow/Forearm (Therapeutic Exercise)    Elbow/Forearm (Therapeutic Exercise) strengthening exercise  -     Elbow/Forearm Strengthening (Therapeutic Exercise) bilateral;flexion;extension;10 repetitions;sitting  mild manual resistance given  -       Row Name 10/23/24 0847          Motor Skills    Therapeutic Exercise shoulder;elbow/forearm  -       Row Name 10/23/24 0847          Balance    Balance Assessment sit to stand dynamic balance  -     Static Sitting Balance standby assist  -     Dynamic Sitting Balance standby assist  -     Position, Sitting Balance unsupported;sitting edge of bed  -     Sit to Stand Dynamic Balance contact guard;verbal cues  -     Static Standing Balance contact guard  up to SBA  -JOELEL     Dynamic Standing Balance contact guard  up to SBA  -JOELLE     Position/Device Used, Standing Balance supported;walker, front-wheeled  -      Balance Interventions sit to stand;occupation based/functional task  -JOELLE               User Key  (r) = Recorded By, (t) = Taken By, (c) = Cosigned By      Initials Name Provider Type    Karolina Medina OT Occupational Therapist                   Goals/Plan       Row Name 10/23/24 0851          Bed Mobility Goal 1 (OT)    Progress/Outcomes (Bed Mobility Goal 1, OT) continuing progress toward goal;goal ongoing  -JOELLE       Row Name 10/23/24 0851          Transfer Goal 1 (OT)    Progress/Outcome (Transfer Goal 1, OT) goal ongoing  -JOELLE       Row Name 10/23/24 0851          Toileting Goal 1 (OT)    Progress/Outcome (Toileting Goal 1, OT) goal ongoing  -JOELLE               User Key  (r) = Recorded By, (t) = Taken By, (c) = Cosigned By      Initials Name Provider Type    Karolina Medina, DEON Occupational Therapist                   Clinical Impression       Row Name 10/23/24 0849          Pain Assessment    Pretreatment Pain Rating 0/10 - no pain  -JOELLE     Posttreatment Pain Rating 0/10 - no pain  -JOELLE       Row Name 10/23/24 0849          Plan of Care Review    Plan of Care Reviewed With patient  -JOELLE     Progress improving  -JOELLE     Outcome Evaluation Pt. demonstrated improved transfer and endurance today.  Pt. able to stand at sink and groom today and add on UE TE.  Pt. continues to fatigue easily and need rest periods throughout and cues for safety.  Pt. remains below baseline ADL independence warranting continued skilled OT services to address deficit areas. Continue to recommend SNF at discharge.  -       Row Name 10/23/24 0849          Therapy Plan Review/Discharge Plan (OT)    Anticipated Discharge Disposition (OT) skilled nursing facility  -       Row Name 10/23/24 0849          Vital Signs    Pretreatment Heart Rate (beats/min) 63  -JOELLE     Posttreatment Heart Rate (beats/min) 67  -JOELLE     Pre SpO2 (%) 95  -JOELLE     O2 Delivery Pre Treatment room air  -JOELLE     Intra SpO2 (%) 94  -JOELLE     O2 Delivery Intra Treatment  room air  -JOELLE     Post SpO2 (%) 95  -JOELLE     O2 Delivery Post Treatment room air  -JOELLE     Pre Patient Position Supine  -JOELLE     Intra Patient Position Standing  -JOELLE     Post Patient Position Sitting  -JOELLE       Row Name 10/23/24 0849          Positioning and Restraints    Pre-Treatment Position in bed  -JOELLE     Post Treatment Position chair  -JOELLE     In Chair notified nsg;reclined;call light within reach;encouraged to call for assist;exit alarm on;waffle cushion;legs elevated  pt. declined heel elevation  -JOELLE               User Key  (r) = Recorded By, (t) = Taken By, (c) = Cosigned By      Initials Name Provider Type    Karolina Medina OT Occupational Therapist                   Outcome Measures       Row Name 10/23/24 0852          How much help from another is currently needed...    Putting on and taking off regular lower body clothing? 2  -JOELLE     Bathing (including washing, rinsing, and drying) 2  -JOELLE     Toileting (which includes using toilet bed pan or urinal) 2  -JOELLE     Putting on and taking off regular upper body clothing 3  pull over  -JOELLE     Taking care of personal grooming (such as brushing teeth) 3  CGA and cues  -JOELLE     Eating meals 4  -JOELLE     AM-PAC 6 Clicks Score (OT) 16  -JOELLE       Row Name 10/23/24 0852          Functional Assessment    Outcome Measure Options AM-PAC 6 Clicks Daily Activity (OT)  -JOELLE               User Key  (r) = Recorded By, (t) = Taken By, (c) = Cosigned By      Initials Name Provider Type    Karolina Medina OT Occupational Therapist                    Occupational Therapy Education       Title: PT OT SLP Therapies (In Progress)       Topic: Occupational Therapy (In Progress)       Point: ADL training (In Progress)       Description:   Instruct learner(s) on proper safety adaptation and remediation techniques during self care or transfers.   Instruct in proper use of assistive devices.                  Learning Progress Summary            Patient Acceptance, E,D, NR by JOELLE at  10/23/2024 0852    Comment: re-orientation, bed mobility, transfer and ADL sequencing and safety, UE TE    Acceptance, E, NR by  at 10/18/2024 1016    Acceptance, E,TB, VU,DU by  at 10/17/2024 0910    Acceptance, E, NR,VU by  at 10/15/2024 1052    Comment: reason for consult, benefit of grab bars on bed, orientation to year, transfer safety                      Point: Home exercise program (In Progress)       Description:   Instruct learner(s) on appropriate technique for monitoring, assisting and/or progressing therapeutic exercises/activities.                  Learning Progress Summary            Patient Acceptance, E,D, NR by  at 10/23/2024 0852    Comment: re-orientation, bed mobility, transfer and ADL sequencing and safety, UE TE    Acceptance, E,TB, VU,DU by  at 10/17/2024 0910                      Point: Precautions (In Progress)       Description:   Instruct learner(s) on prescribed precautions during self-care and functional transfers.                  Learning Progress Summary            Patient Acceptance, E,D, NR by  at 10/23/2024 0852    Comment: re-orientation, bed mobility, transfer and ADL sequencing and safety, UE TE    Acceptance, E,TB, VU,DU by  at 10/17/2024 0910    Acceptance, E, NR,VU by  at 10/15/2024 1052    Comment: reason for consult, benefit of grab bars on bed, orientation to year, transfer safety                      Point: Body mechanics (Done)       Description:   Instruct learner(s) on proper positioning and spine alignment during self-care, functional mobility activities and/or exercises.                  Learning Progress Summary            Patient Acceptance, E,TB, VU,DU by  at 10/17/2024 0910                                      User Key       Initials Effective Dates Name Provider Type Discipline    JOELLE 07/11/23 -  Karolina Peres, OT Occupational Therapist OT    AC 02/03/23 -  Lisset Velázquez, OT Occupational Therapist OT     06/16/21 -  Ewelina Zarco RN  Registered Nurse Nurse                  OT Recommendation and Plan  Planned Therapy Interventions (OT): activity tolerance training, BADL retraining, cognitive/visual perception retraining, functional balance retraining, occupation/activity based interventions, patient/caregiver education/training, ROM/therapeutic exercise, transfer/mobility retraining  Therapy Frequency (OT): daily  Plan of Care Review  Plan of Care Reviewed With: patient  Progress: improving  Outcome Evaluation: Pt. demonstrated improved transfer and endurance today.  Pt. able to stand at sink and groom today and add on UE TE.  Pt. continues to fatigue easily and need rest periods throughout and cues for safety.  Pt. remains below baseline ADL independence warranting continued skilled OT services to address deficit areas. Continue to recommend SNF at discharge.     Time Calculation:   Evaluation Complexity (OT)  Review Occupational Profile/Medical/Therapy History Complexity: brief/low complexity  Assessment, Occupational Performance/Identification of Deficit Complexity: 1-3 performance deficits  Clinical Decision Making Complexity (OT): problem focused assessment/low complexity  Overall Complexity of Evaluation (OT): low complexity     Time Calculation- OT       Row Name 10/23/24 0853             Time Calculation- OT    OT Start Time 0816  -JOELLE      OT Received On 10/23/24  -JOELLE      OT Goal Re-Cert Due Date 10/25/24  -JOELLE         Timed Charges    29910 - OT Therapeutic Exercise Minutes 7  -JOELLE      70728 - OT Therapeutic Activity Minutes 6  -JOELLE      89318 - OT Self Care/Mgmt Minutes 10  -JOELLE         Total Minutes    Timed Charges Total Minutes 23  -JOELLE       Total Minutes 23  -JOELLE                User Key  (r) = Recorded By, (t) = Taken By, (c) = Cosigned By      Initials Name Provider Type    Karolina Medina, OT Occupational Therapist                  Therapy Charges for Today       Code Description Service Date Service Provider Modifiers Qty     35672653996 HC OT THER PROC EA 15 MIN 10/23/2024 Karolina Peres, OT GO 1    35754915510 HC OT SELF CARE/MGMT/TRAIN EA 15 MIN 10/23/2024 Karolina Peres OT GO 1                 Karolina Peres, OT  10/23/2024

## 2024-10-24 ENCOUNTER — READMISSION MANAGEMENT (OUTPATIENT)
Dept: CALL CENTER | Facility: HOSPITAL | Age: 79
End: 2024-10-24
Payer: MEDICARE

## 2024-10-24 ENCOUNTER — TELEPHONE (OUTPATIENT)
Dept: PEDIATRICS | Facility: OTHER | Age: 79
End: 2024-10-24

## 2024-10-24 VITALS
RESPIRATION RATE: 18 BRPM | HEART RATE: 58 BPM | HEIGHT: 61 IN | BODY MASS INDEX: 45.75 KG/M2 | TEMPERATURE: 98.6 F | DIASTOLIC BLOOD PRESSURE: 80 MMHG | SYSTOLIC BLOOD PRESSURE: 125 MMHG | WEIGHT: 242.3 LBS | OXYGEN SATURATION: 96 %

## 2024-10-24 LAB
GLUCOSE BLDC GLUCOMTR-MCNC: 101 MG/DL (ref 70–130)
GLUCOSE BLDC GLUCOMTR-MCNC: 102 MG/DL (ref 70–130)

## 2024-10-24 PROCEDURE — 96372 THER/PROPH/DIAG INJ SC/IM: CPT

## 2024-10-24 PROCEDURE — G0378 HOSPITAL OBSERVATION PER HR: HCPCS

## 2024-10-24 PROCEDURE — 99239 HOSP IP/OBS DSCHRG MGMT >30: CPT | Performed by: INTERNAL MEDICINE

## 2024-10-24 PROCEDURE — 82948 REAGENT STRIP/BLOOD GLUCOSE: CPT

## 2024-10-24 PROCEDURE — 25010000002 HEPARIN (PORCINE) PER 1000 UNITS: Performed by: NURSE PRACTITIONER

## 2024-10-24 RX ORDER — AMOXICILLIN 250 MG
2 CAPSULE ORAL 2 TIMES DAILY PRN
Start: 2024-10-24

## 2024-10-24 RX ADMIN — SENNOSIDES AND DOCUSATE SODIUM 2 TABLET: 50; 8.6 TABLET ORAL at 09:11

## 2024-10-24 RX ADMIN — HEPARIN SODIUM 5000 UNITS: 5000 INJECTION INTRAVENOUS; SUBCUTANEOUS at 05:44

## 2024-10-24 RX ADMIN — CLOPIDOGREL BISULFATE 75 MG: 75 TABLET ORAL at 09:11

## 2024-10-24 RX ADMIN — VALSARTAN 240 MG: 80 TABLET, FILM COATED ORAL at 09:10

## 2024-10-24 NOTE — CASE MANAGEMENT/SOCIAL WORK
Case Management Discharge Note      Final Note: Ms. Velázquez is being discharged today, 10/24/24. She will be going to [facility] ([facility] Nurse to call report to [phone]. CM will fax the discharge summary to [phone] when it becomes available. Daughter will transport her.         Selected Continued Care - Admitted Since 10/14/2024       Destination    No services have been selected for the patient.                Durable Medical Equipment    No services have been selected for the patient.                Dialysis/Infusion    No services have been selected for the patient.                Home Medical Care    No services have been selected for the patient.                Therapy    No services have been selected for the patient.                Community Resources    No services have been selected for the patient.                Community & DME    No services have been selected for the patient.                         Final Discharge Disposition Code: 03 - skilled nursing facility (SNF)

## 2024-10-24 NOTE — OUTREACH NOTE
Prep Survey      Flowsheet Row Responses   [facility] facility patient discharged from? [facility]   Is LACE score < 7 ? No   Eligibility Not Eligible   What are the reasons patient is not eligible? [facility]   Does the patient have one of the following disease processes/diagnoses(primary or secondary)? Other   Prep survey completed? Yes            Digna MARTINEZ - Registered Nurse

## 2024-10-24 NOTE — DISCHARGE PLACEMENT REQUEST
"TO: [facility]  ATTN: April  Georgia Palumbo (79 y.o. Female)       Date of Birth   1945    Social Security Number       Address   [address] [place] [address]    Home Phone   [phone]    MRN   [id number]       Yazdanism   Muslim    Marital Status                               Admission Date   10/14/24    Admission Type   Emergency    Admitting Provider   Nidhi Wylie MD    Attending Provider   Nidhi Wylie MD    Department, Room/Bed   [facility] 5G, S547/1       Discharge Date       Discharge Disposition   Skilled Nursing Facility (DC - External)    Discharge Destination                                 Attending Provider: Nidhi Wylie MD    Allergies: No Known Allergies    Isolation: Contact Air   Infection: COVID (confirmed) (10/14/24)   Code Status: No CPR    Ht: 154.9 cm (61\")   Wt: 110 kg (242 lb 4.8 oz)    Admission Cmt: None   Principal Problem: COVID-19 virus detected [U07.1]                   Active Insurance as of 10/14/2024       Primary Coverage       Payor Plan Insurance Group Employer/Plan Group    ANTHEM MEDICARE REPLACEMENT ANTHEM MEDICARE ADVANTAGE [id number]       Payor Plan Address Payor Plan Phone Number Payor Plan Fax Number Effective Dates    [address] [phone]  2024 - None Entered    [facility] [address]         Subscriber Name Subscriber Birth Date Member ID       GEORGIA PALUMBO 1945 [id number]                     Emergency Contacts        (Rel.) Home Phone Work Phone Mobile Phone    JARVIS ESCALONA (Daughter) [phone] -- [phone]    ABDELRAHMAN PALUMBO (Son) [phone] -- [phone]                 Discharge Summary        Nidhi Wylie MD at 10/24/24 1119              [facility] Medicine Services  DISCHARGE SUMMARY    Patient Name: Georgia Palumbo  : 1945  MRN: [id number]    Date of Admission: 10/14/2024  8:45 PM  Date of Discharge:  " 10/24/2024  Primary Care Physician: Provider, No Known    Consults       No orders found from 9/15/2024 to 10/15/2024.            Hospital Course     Presenting Problem: fatigue and malaise     Active Hospital Problems    Diagnosis  POA    **COVID-19 virus detected [U07.1]  Yes    Acute UTI (urinary tract infection) [N39.0]  Yes    CKD (chronic kidney disease) stage 3, GFR 30-59 ml/min [N18.30]  Yes    T2DM (type 2 diabetes mellitus) [E11.9]  Yes    CAD (coronary artery disease) [I25.10]  Yes    Syncope [R55]  Yes    Dementia [F03.90]  Unknown    Diastolic dysfunction [I51.89]  Yes    Chronic obstructive pulmonary disease [J44.9]  Yes    Essential hypertension [I10]  Yes    GERD (gastroesophageal reflux disease) [K21.9]  Yes    Hyperlipidemia [E78.5]  Yes      Resolved Hospital Problems   No resolved problems to display.          Hospital Course:  Georgia Velázquez is a 79 y.o. female w/ a hx of CAD (s/p stent), diastolic CHF, HTN, HLD, hx of CVA, diet controlled T2DM, CKD Stage III, GERD, COPD, dementia.  She lives with her family and usually walks w a walker,  She was brought to hospital for 3 days of progressive weakness and confusion, sleeping all day.  She had syncope at home.   She tested positive for COVID and UTI in the ED.       UTI  -  E. Coli; pan sensitive   - Rocephin; completed 5-day course     COVID-19 infection  COPD, compensated  -- tested + for Covid on 10/14  - CT shows possible right small pleural effusion  - Completed Paxlovid.  She remained on room air throughout her stay     Syncope  - Likely multifactorial in the setting of UTI and COVID and dehydration.  - Fall with left hand laceration status post a dissolving suture in ED.   - CT head unremarkable  - Echo with EF 41-45%, left wall hypokinesis, grade II diastolic dysfunctionpending  - no further syncope in house; has worked with PT and sits up in chair a lot.      Dementia - Patient conversant, not always oriented      Discharge Follow Up  Recommendations for outpatient labs/diagnostics:   - FU with PCP after discharge from in rehab facility     Day of Discharge     HPI:   sleeping this morning.  Was alert earlier, no new issues     Review of Systems  Stable per staff    Vital Signs:   Temp:  [98 °F (36.7 °C)-98.6 °F (37 °C)] 98.6 °F (37 °C)  Heart Rate:  [53-73] 58  Resp:  [18] 18  BP: (131-148)/(49-77) 137/77      Physical Exam:  Gen:  WD/WN woman asleep in bed, NAD   Neuro: sleeping,  RRR  Lungs clear, not labored       Pertinent  and/or Most Recent Results     LAB RESULTS:      Lab 10/18/24  0334   WBC 5.53   HEMOGLOBIN 12.9   HEMATOCRIT 39.1   PLATELETS 126*   NEUTROS ABS 2.29   IMMATURE GRANS (ABS) 0.01   LYMPHS ABS 2.56   MONOS ABS 0.59   EOS ABS 0.07   MCV 94.4         Lab 10/21/24  0312 10/19/24  0807 10/18/24  0334   SODIUM 139 137 138   POTASSIUM 4.0 4.5 4.6   CHLORIDE 104 104 103   CO2 23.0 22.0 21.0*   ANION GAP 12.0 11.0 14.0   BUN 34* 33* 36*   CREATININE 1.55* 1.18* 1.58*   EGFR 33.5* 46.5* 32.8*   GLUCOSE 77 78 66   CALCIUM 8.7 9.0 9.1   MAGNESIUM  --   --  1.9         Lab 10/21/24  0312 10/18/24  0334   TOTAL PROTEIN 6.3 6.4   ALBUMIN 3.1* 3.3*   GLOBULIN 3.2 3.1   ALT (SGPT) 23 14   AST (SGOT) 27 21   BILIRUBIN 0.4 0.2   ALK PHOS 77 77                     Brief Urine Lab Results  (Last result in the past 365 days)        Color   Clarity   Blood   Leuk Est   Nitrite   Protein   CREAT   Urine HCG        10/14/24 2203 Yellow   Cloudy   Moderate (2+)   Moderate (2+)   Positive   100 mg/dL (2+)                 Microbiology Results (last 10 days)       Procedure Component Value - Date/Time    Respiratory Panel PCR w/COVID-19(SARS-CoV-2) KAYE/HEIKE/LEILANI/PAD/COR/LEIA In-House, NP Swab in UTM/VTM, 2 HR TAT - Swab, Nasopharynx [[id number]]  (Abnormal) Collected: 10/15/24 0448    Lab Status: Final result Specimen: Swab from Nasopharynx Updated: 10/15/24 0549     ADENOVIRUS, PCR Not Detected     Coronavirus 229E Not Detected     Coronavirus HKU1  Not Detected     Coronavirus NL63 Not Detected     Coronavirus OC43 Not Detected     COVID19 Detected     Human Metapneumovirus Not Detected     Human Rhinovirus/Enterovirus Not Detected     Influenza A PCR Not Detected     Influenza B PCR Not Detected     Parainfluenza Virus 1 Not Detected     Parainfluenza Virus 2 Not Detected     Parainfluenza Virus 3 Not Detected     Parainfluenza Virus 4 Not Detected     RSV, PCR Not Detected     Bordetella pertussis pcr Not Detected     Bordetella parapertussis PCR Not Detected     Chlamydophila pneumoniae PCR Not Detected     Mycoplasma pneumo by PCR Not Detected    Narrative:      In the setting of a positive respiratory panel with a viral infection PLUS a negative procalcitonin without other underlying concern for bacterial infection, consider observing off antibiotics or discontinuation of antibiotics and continue supportive care. If the respiratory panel is positive for atypical bacterial infection (Bordetella pertussis, Chlamydophila pneumoniae, or Mycoplasma pneumoniae), consider antibiotic de-escalation to target atypical bacterial infection.    Blood Culture - Blood, Arm, Left [[id number]]  (Normal) Collected: 10/15/24 0431    Lab Status: Final result Specimen: Blood from Arm, Left Updated: 10/20/24 0615     Blood Culture No growth at 5 days    Narrative:      Less than seven (7) mL's of blood was collected.  Insufficient quantity may yield false negative results.    Blood Culture - Blood, Hand, Right [[id number]]  (Normal) Collected: 10/15/24 0431    Lab Status: Final result Specimen: Blood from Hand, Right Updated: 10/20/24 0615     Blood Culture No growth at 5 days    Narrative:      Less than seven (7) mL's of blood was collected.  Insufficient quantity may yield false negative results.    COVID PRE-OP / PRE-PROCEDURE SCREENING ORDER (NO ISOLATION) - Swab, Nasopharynx [[id number]]  (Abnormal) Collected: 10/14/24 8000    Lab Status: Final result Specimen: Swab from  Nasopharynx Updated: 10/14/24 2324    Narrative:      The following orders were created for panel order COVID PRE-OP / PRE-PROCEDURE SCREENING ORDER (NO ISOLATION) - Swab, Nasopharynx.  Procedure                               Abnormality         Status                     ---------                               -----------         ------                     COVID-19 and FLU A/B PCR...[[id number]]  Abnormal            Final result                 Please view results for these tests on the individual orders.    COVID-19 and FLU A/B PCR, 1 HR TAT - Swab, Nasopharynx [[id number]]  (Abnormal) Collected: 10/14/24 2252    Lab Status: Final result Specimen: Swab from Nasopharynx Updated: 10/14/24 2324     COVID19 Detected     Influenza A PCR Not Detected     Influenza B PCR Not Detected    Narrative:      Fact sheet for providers: https://www.fda.gov/media/926173/download    Fact sheet for patients: https://www.fda.gov/media/004915/download    Test performed by PCR.  Influenza A and Influenza B negative results should be considered presumptive in samples that have a positive SARS-CoV-2 result.    Competitive inhibition studies showed that SARS-CoV-2 virus, when present at concentrations above 3.6E+04 copies/mL, can inhibit the detection and amplification of influenza A and influenza B virus RNA if present at or below 1.8E+02 copies/mL or 4.9E+02 copies/mL, respectively, and may lead to false negative influenza virus results. If co-infection with influenza A or influenza B virus is suspected in samples with a positive SARS-CoV-2 result, the sample should be re-tested with another FDA cleared, approved, or authorized influenza test, if influenza virus detection would change clinical management.    Urine Culture - Urine, Straight Cath [[id number]]  (Abnormal)  (Susceptibility) Collected: 10/14/24 2203    Lab Status: Final result Specimen: Urine from Straight Cath Updated: 10/17/24 1020     Urine Culture >100,000 CFU/mL  Escherichia coli    Narrative:      Colonization of the urinary tract without infection is common. Treatment is discouraged unless the patient is symptomatic, pregnant, or undergoing an invasive urologic procedure.    Susceptibility        Escherichia coli      MIGUEL ANGEL      Amoxicillin + Clavulanate Susceptible      Ampicillin Susceptible      Ampicillin + Sulbactam Susceptible      Cefazolin Susceptible      Cefepime Susceptible      Ceftazidime Susceptible      Ceftriaxone Susceptible      Gentamicin Susceptible      Levofloxacin Susceptible      Nitrofurantoin Susceptible      Piperacillin + Tazobactam Susceptible      Trimethoprim + Sulfamethoxazole Susceptible                                   Adult Transthoracic Echo Complete W/ Cont if Necessary Per Protocol    Result Date: 10/15/2024    Left ventricular systolic function is mildly decreased. Calculated left ventricular EF = 41.4% Left ventricular ejection fraction appears to be 41 - 45%.   The following left ventricular wall segments are hypokinetic: apical anterior, apical lateral, apical inferior, apical septal and apex hypokinetic.   Left ventricular diastolic function is consistent with (grade II w/high LAP) pseudonormalization.   Mild aortic valve stenosis is present. Aortic valve area is 1.8 cm2.   Peak velocity of the flow distal to the aortic valve is 209.8 cm/s. Aortic valve maximum pressure gradient is 18 mmHg. Aortic valve mean pressure gradient is 10 mmHg. Aortic valve dimensionless index is 0 .   Mild mitral valve stenosis is present. The mitral valve peak gradient is 13 mmHg. The mitral valve mean gradient is 5 mmHg.   Estimated right ventricular systolic pressure from tricuspid regurgitation is normal (<35 mmHg).     Duplex Carotid Ultrasound CAR    Result Date: 10/15/2024    Right internal carotid artery demonstrates a less than 50% stenosis.   Antegrade right vertebral flow.   Left internal carotid artery demonstrates a 50-69% stenosis, mild  increase in velocities from study 2/2024.   Antegrade left vertebral flow.     CT Abdomen Pelvis With Contrast    Result Date: 10/14/2024  CT ABDOMEN PELVIS W CONTRAST Date of Exam: 10/14/2024 10:21 PM EDT Indication: generalized abdominal pain, vomiting. Comparison: 7/21/2020 Technique: Axial CT images were obtained of the abdomen and pelvis following the uneventful intravenous administration of contrast. Reconstructed coronal and sagittal images were also obtained. Automated exposure control and iterative construction methods were used. Findings: There is a small right pleural effusion with some adjacent atelectasis in the right lower lobe. The left lung base is clear. There is extensive atherosclerotic disease, including within the mesenteric arteries. No aortic aneurysm. Gallbladder is surgically absent. No biliary obstruction is seen. There is some atrophy of the pancreas. There are bilateral renal cysts. No follow-up is indicated. There is right renal atrophy with cortical thinning and cortical lobulation. Both kidneys are nonobstructed. Mild left adrenal hyperplasia is unchanged. Solid abdominal organs are otherwise normal. The urinary bladder and solid pelvic organs appear normal. The appendix has been removed. There is a lipoma at the ileocecal valve. No evidence of acute colitis. No significant stool burden. No small bowel obstruction or clear evidence of enteritis. Stomach is normal except for a stable small hiatal hernia. No free fluid or adenopathy is identified. Old compression fracture with kyphoplasty noted at L4.     1. Small right pleural effusion with right lower lobe atelectasis. 2. No clearly acute findings in the abdomen or pelvis. 3. Appendectomy. No bowel obstruction or evidence of enteritis or colitis. 4. Nonobstructed kidneys with renal atrophy on the right. Electronically Signed: Mat Godwin MD  10/14/2024 10:41 PM EDT  Workstation ID: [id number]    CT Head Without Contrast    Result  Date: 10/14/2024  CT HEAD WO CONTRAST Date of Exam: 10/14/2024 10:21 PM EDT Indication: fall, ams. Comparison: 3/17/2021 Technique: Axial CT images were obtained of the head without contrast administration.  Automated exposure control and iterative construction methods were used. Findings: There is no evidence of acute territorial infarction. There is no acute intracranial hemorrhage. There are no extra-axial collections. Ventricles and CSF spaces are symmetric. No mass effect nor hydrocephalus. Brain parenchyma appears unchanged with advanced white matter hypoattenuation.  Paranasal sinuses and mastoid air cells are adequately aerated.  Osseous structures and orbits appear intact.     Impression: * 1. No acute process identified. Electronically Signed: Albaro Barrera MD  10/14/2024 10:33 PM EDT  Workstation ID: [id number]    XR Chest 1 View    Result Date: 10/14/2024  XR CHEST 1 VW Date of Exam: 10/14/2024 9:19 PM EDT Indication: ams Comparison: Chest radiograph 6/13/2022 Findings: Mediastinum: Cardiomediastinal silhouette appears unchanged and enlarged. Lungs: Mild peripheral right lung base hazy opacity. Pleura: Mild blunting of right costophrenic sulcus. No pneumothorax. Bones and soft tissues: No acute osseous abnormality.     Impression: Possible small right pleural effusion with adjacent atelectasis. Superimposed infection to be excluded clinically. Electronically Signed: García Argueta  10/14/2024 9:44 PM EDT  Workstation ID: [id number]     Results for orders placed during the hospital encounter of 10/14/24    Duplex Carotid Ultrasound CAR    Interpretation Summary    Right internal carotid artery demonstrates a less than 50% stenosis.    Antegrade right vertebral flow.    Left internal carotid artery demonstrates a 50-69% stenosis, mild increase in velocities from study 2/2024.    Antegrade left vertebral flow.      Results for orders placed during the hospital encounter of 10/14/24    Duplex Carotid Ultrasound  CAR    Interpretation Summary    Right internal carotid artery demonstrates a less than 50% stenosis.    Antegrade right vertebral flow.    Left internal carotid artery demonstrates a 50-69% stenosis, mild increase in velocities from study 2/2024.    Antegrade left vertebral flow.      Results for orders placed during the hospital encounter of 10/14/24    Adult Transthoracic Echo Complete W/ Cont if Necessary Per Protocol    Interpretation Summary    Left ventricular systolic function is mildly decreased. Calculated left ventricular EF = 41.4% Left ventricular ejection fraction appears to be 41 - 45%.    The following left ventricular wall segments are hypokinetic: apical anterior, apical lateral, apical inferior, apical septal and apex hypokinetic.    Left ventricular diastolic function is consistent with (grade II w/high LAP) pseudonormalization.    Mild aortic valve stenosis is present. Aortic valve area is 1.8 cm2.    Peak velocity of the flow distal to the aortic valve is 209.8 cm/s. Aortic valve maximum pressure gradient is 18 mmHg. Aortic valve mean pressure gradient is 10 mmHg. Aortic valve dimensionless index is 0 .    Mild mitral valve stenosis is present. The mitral valve peak gradient is 13 mmHg. The mitral valve mean gradient is 5 mmHg.    Estimated right ventricular systolic pressure from tricuspid regurgitation is normal (<35 mmHg).    Discharge Details        Discharge Medications        New Medications        Instructions Start Date   sennosides-docusate 8.6-50 MG per tablet  Commonly known as: PERICOLACE   2 tablets, Oral, 2 Times Daily PRN             Continue These Medications        Instructions Start Date   albuterol sulfate  (90 Base) MCG/ACT inhaler  Commonly known as: PROVENTIL HFA;VENTOLIN HFA;PROAIR HFA   2 puffs, Inhalation, Every 4 Hours PRN      atorvastatin 40 MG tablet  Commonly known as: LIPITOR   40 mg, Oral, Nightly      clopidogrel 75 MG tablet  Commonly known as: PLAVIX    75 mg, Oral, Daily      ipratropium-albuterol 0.5-2.5 mg/3 ml nebulizer  Commonly known as: DUO-NEB   Take 3 ml by nebulization every TID-QID prn wheezing or shortness of breath      valsartan 160 MG tablet  Commonly known as: DIOVAN   240 mg, Oral, Daily      Vitamin D3 50 MCG (2000 UT) capsule   2,000 Units, Oral, Daily, 2000 units              Stop These Medications      docusate sodium 100 MG capsule  Commonly known as: Colace     nystatin 460917 UNIT/GM powder  Commonly known as: MYCOSTATIN              No Known Allergies      Discharge Disposition:  Skilled Nursing Facility (DC - External)    Diet:  Hospital:  Diet Order   Procedures    Diet: Regular/House, Cardiac, Diabetic; Healthy Heart (2-3 Na+); Consistent Carbohydrate; Fluid Consistency: Thin (IDDSI 0)            Activity: as tolerated     CODE STATUS:    Code Status and Medical Interventions: No CPR (Do Not Attempt to Resuscitate); Limited Support; No intubation (DNI); DNR/DNI   Ordered at: 10/15/24 0122     Medical Intervention Limits:    No intubation (DNI)     Level Of Support Discussed With:    Health Care Surrogate     Code Status (Patient has no pulse and is not breathing):    No CPR (Do Not Attempt to Resuscitate)     Medical Interventions (Patient has pulse or is breathing):    Limited Support     Comments:    DNR/DNI           Nidhi Wylie MD  10/24/24      Time Spent on Discharge:  I spent  35  minutes on this discharge activity which included: face-to-face encounter with the patient, reviewing the data in the system, coordination of the care with the nursing staff as well as consultants, documentation, and entering orders.            Electronically signed by Nidhi Wylie MD at 10/24/24 0361

## 2024-10-24 NOTE — TELEPHONE ENCOUNTER
Caller: EUSEBIA WITH [facility]    Relationship to patient: REPRESENTATIVE     Best call back number: [phone]     New or established patient?  [x] New  [] Established    Date of discharge: 10/24/2024    Facility discharged from: [facility]     Diagnosis/Symptoms: COVID-10    Length of stay (If applicable): 10/14/2024-10/24/2024    Specialty Only: Did you see a [facility] provider?    [x] Yes  [] No  If so, who? HOSPITALIST DR MOORE     Additional Details: PLEASE CALL THE PATIENT IF QUESTIONS OR CONCERNS.

## 2024-10-24 NOTE — PLAN OF CARE
Problem: Adult Inpatient Plan of Care  Goal: Plan of Care Review  Outcome: Met  Goal: Patient-Specific Goal (Individualized)  Outcome: Met  Goal: Absence of Hospital-Acquired Illness or Injury  Outcome: Met  Intervention: Identify and Manage Fall Risk  Recent Flowsheet Documentation  Taken 10/24/2024 1200 by Wendy Ruby RN  Safety Promotion/Fall Prevention:   activity supervised   safety round/check completed  Taken 10/24/2024 1000 by Wendy Ruby RN  Safety Promotion/Fall Prevention:   activity supervised   safety round/check completed  Taken 10/24/2024 0800 by Wendy Ruby RN  Safety Promotion/Fall Prevention:   activity supervised   safety round/check completed  Intervention: Prevent Skin Injury  Recent Flowsheet Documentation  Taken 10/24/2024 1200 by Wendy Ruby RN  Body Position: position changed independently  Skin Protection:   incontinence pads utilized   transparent dressing maintained   skin sealant/moisture barrier applied  Taken 10/24/2024 1000 by Wendy Ruby RN  Body Position: position changed independently  Skin Protection:   incontinence pads utilized   skin sealant/moisture barrier applied   transparent dressing maintained  Taken 10/24/2024 0800 by Wendy Ruby RN  Skin Protection:   incontinence pads utilized   transparent dressing maintained   skin sealant/moisture barrier applied  Goal: Optimal Comfort and Wellbeing  Outcome: Met  Goal: Readiness for Transition of Care  Outcome: Met     Problem: Skin Injury Risk Increased  Goal: Skin Health and Integrity  Outcome: Met  Intervention: Optimize Skin Protection  Recent Flowsheet Documentation  Taken 10/24/2024 1200 by Wendy Ruby RN  Activity Management: activity encouraged  Pressure Reduction Techniques: frequent weight shift encouraged  Pressure Reduction Devices: pressure-redistributing mattress utilized  Skin Protection:   incontinence pads utilized   transparent dressing maintained   skin sealant/moisture barrier  applied  Taken 10/24/2024 1000 by Wendy Ruby RN  Activity Management: activity encouraged  Pressure Reduction Techniques: frequent weight shift encouraged  Head of Bed (HOB) Positioning: HOB lowered  Pressure Reduction Devices: pressure-redistributing mattress utilized  Skin Protection:   incontinence pads utilized   skin sealant/moisture barrier applied   transparent dressing maintained  Taken 10/24/2024 0800 by Wendy Ruby RN  Activity Management: activity encouraged  Pressure Reduction Techniques: frequent weight shift encouraged  Pressure Reduction Devices: pressure-redistributing mattress utilized  Skin Protection:   incontinence pads utilized   transparent dressing maintained   skin sealant/moisture barrier applied     Problem: Fall Injury Risk  Goal: Absence of Fall and Fall-Related Injury  Outcome: Met  Intervention: Promote Injury-Free Environment  Recent Flowsheet Documentation  Taken 10/24/2024 1200 by Wendy Ruby RN  Safety Promotion/Fall Prevention:   activity supervised   safety round/check completed  Taken 10/24/2024 1000 by Wendy Ruby RN  Safety Promotion/Fall Prevention:   activity supervised   safety round/check completed  Taken 10/24/2024 0800 by Wendy Ruby RN  Safety Promotion/Fall Prevention:   activity supervised   safety round/check completed     Problem: Comorbidity Management  Goal: Blood Glucose Level Within Target Range  Outcome: Met  Goal: Maintenance of Heart Failure Symptom Control  Outcome: Met  Goal: Blood Pressure in Desired Range  Outcome: Met   Goal Outcome Evaluation:

## 2024-10-24 NOTE — PLAN OF CARE
Problem: Adult Inpatient Plan of Care  Goal: Plan of Care Review  Outcome: Progressing  Goal: Patient-Specific Goal (Individualized)  Outcome: Progressing  Goal: Absence of Hospital-Acquired Illness or Injury  Outcome: Progressing  Intervention: Identify and Manage Fall Risk  Recent Flowsheet Documentation  Taken 10/24/2024 0400 by Stefanie Rhoades RN  Safety Promotion/Fall Prevention:   assistive device/personal items within reach   clutter free environment maintained   fall prevention program maintained   nonskid shoes/slippers when out of bed   room organization consistent   safety round/check completed  Taken 10/24/2024 0000 by Stefanie Rhoades RN  Safety Promotion/Fall Prevention:   assistive device/personal items within reach   clutter free environment maintained   fall prevention program maintained   nonskid shoes/slippers when out of bed   room organization consistent   safety round/check completed  Taken 10/23/2024 2000 by Stefanie Rhoades RN  Safety Promotion/Fall Prevention:   assistive device/personal items within reach   clutter free environment maintained   fall prevention program maintained   nonskid shoes/slippers when out of bed   room organization consistent   safety round/check completed  Intervention: Prevent Skin Injury  Recent Flowsheet Documentation  Taken 10/24/2024 0400 by Stefanie Rhoades RN  Skin Protection:   incontinence pads utilized   transparent dressing maintained  Taken 10/24/2024 0000 by Stefanie Rhoades RN  Skin Protection:   incontinence pads utilized   transparent dressing maintained  Taken 10/23/2024 2000 by Stefanie Rhoades RN  Skin Protection:   transparent dressing maintained   incontinence pads utilized  Intervention: Prevent and Manage VTE (Venous Thromboembolism) Risk  Recent Flowsheet Documentation  Taken 10/23/2024 2000 by Stefanie Rhoades RN  VTE Prevention/Management: (see MAR) other (see comments)  Intervention: Prevent Infection  Recent Flowsheet  Documentation  Taken 10/24/2024 0400 by Stefanie Rhoades RN  Infection Prevention:   environmental surveillance performed   hand hygiene promoted   personal protective equipment utilized   single patient room provided  Taken 10/24/2024 0000 by Stefanie Rhoades RN  Infection Prevention:   environmental surveillance performed   hand hygiene promoted   personal protective equipment utilized   single patient room provided  Taken 10/23/2024 2000 by Stefanie Rhoades RN  Infection Prevention:   environmental surveillance performed   hand hygiene promoted   personal protective equipment utilized   single patient room provided  Goal: Optimal Comfort and Wellbeing  Outcome: Progressing  Intervention: Provide Person-Centered Care  Recent Flowsheet Documentation  Taken 10/23/2024 2000 by Stefanie Rhoades RN  Trust Relationship/Rapport:   choices provided   care explained   questions answered   questions encouraged  Goal: Readiness for Transition of Care  Outcome: Progressing     Problem: Skin Injury Risk Increased  Goal: Skin Health and Integrity  Outcome: Progressing  Intervention: Optimize Skin Protection  Recent Flowsheet Documentation  Taken 10/24/2024 0400 by Stefanie Rhoades RN  Activity Management: activity encouraged  Pressure Reduction Techniques: frequent weight shift encouraged  Head of Bed (HOB) Positioning: HOB lowered  Pressure Reduction Devices:   pressure-redistributing mattress utilized   positioning supports utilized   heel offloading device utilized  Skin Protection:   incontinence pads utilized   transparent dressing maintained  Taken 10/24/2024 0000 by Stefanie Rhoades RN  Activity Management: activity encouraged  Pressure Reduction Techniques: frequent weight shift encouraged  Head of Bed (HOB) Positioning: HOB lowered  Pressure Reduction Devices:   pressure-redistributing mattress utilized   positioning supports utilized  Skin Protection:   incontinence pads utilized   transparent dressing  maintained  Taken 10/23/2024 2000 by Stefanie Rhoades RN  Activity Management: activity encouraged  Pressure Reduction Techniques:   frequent weight shift encouraged   weight shift assistance provided  Head of Bed (HOB) Positioning: HOB lowered  Pressure Reduction Devices:   pressure-redistributing mattress utilized   positioning supports utilized  Skin Protection:   transparent dressing maintained   incontinence pads utilized     Problem: Fall Injury Risk  Goal: Absence of Fall and Fall-Related Injury  Outcome: Progressing  Intervention: Identify and Manage Contributors  Recent Flowsheet Documentation  Taken 10/23/2024 2000 by Stefanie Rhoades RN  Medication Review/Management: medications reviewed  Intervention: Promote Injury-Free Environment  Recent Flowsheet Documentation  Taken 10/24/2024 0400 by Stefanie Rhoades, RN  Safety Promotion/Fall Prevention:   assistive device/personal items within reach   clutter free environment maintained   fall prevention program maintained   nonskid shoes/slippers when out of bed   room organization consistent   safety round/check completed  Taken 10/24/2024 0000 by Stefanie Rhoades RN  Safety Promotion/Fall Prevention:   assistive device/personal items within reach   clutter free environment maintained   fall prevention program maintained   nonskid shoes/slippers when out of bed   room organization consistent   safety round/check completed  Taken 10/23/2024 2000 by Stefanie Rhoades RN  Safety Promotion/Fall Prevention:   assistive device/personal items within reach   clutter free environment maintained   fall prevention program maintained   nonskid shoes/slippers when out of bed   room organization consistent   safety round/check completed     Problem: Comorbidity Management  Goal: Blood Glucose Level Within Target Range  Outcome: Progressing  Intervention: Monitor and Manage Glycemia  Recent Flowsheet Documentation  Taken 10/23/2024 2000 by Stefanie Rhoades, RN  Medication  Review/Management: medications reviewed  Goal: Maintenance of Heart Failure Symptom Control  Outcome: Progressing  Intervention: Maintain Heart Failure Management  Recent Flowsheet Documentation  Taken 10/23/2024 2000 by Stefanie Rhoades RN  Medication Review/Management: medications reviewed  Goal: Blood Pressure in Desired Range  Outcome: Progressing  Intervention: Maintain Blood Pressure Management  Recent Flowsheet Documentation  Taken 10/23/2024 2000 by Stefanie Rhoades RN  Medication Review/Management: medications reviewed   Goal Outcome Evaluation:

## 2024-10-24 NOTE — DISCHARGE SUMMARY
[facility] Medicine Services  DISCHARGE SUMMARY    Patient Name: Georgia Velázquez  : 1945  MRN: [id number]    Date of Admission: 10/14/2024  8:45 PM  Date of Discharge:  10/24/2024  Primary Care Physician: Provider, No Known    Consults       No orders found from 9/15/2024 to 10/15/2024.            Hospital Course     Presenting Problem: fatigue and malaise     Active Hospital Problems    Diagnosis  POA    **COVID-19 virus detected [U07.1]  Yes    Acute UTI (urinary tract infection) [N39.0]  Yes    CKD (chronic kidney disease) stage 3, GFR 30-59 ml/min [N18.30]  Yes    T2DM (type 2 diabetes mellitus) [E11.9]  Yes    CAD (coronary artery disease) [I25.10]  Yes    Syncope [R55]  Yes    Dementia [F03.90]  Unknown    Diastolic dysfunction [I51.89]  Yes    Chronic obstructive pulmonary disease [J44.9]  Yes    Essential hypertension [I10]  Yes    GERD (gastroesophageal reflux disease) [K21.9]  Yes    Hyperlipidemia [E78.5]  Yes      Resolved Hospital Problems   No resolved problems to display.          Hospital Course:  Georgia Velázquez is a 79 y.o. female w/ a hx of CAD (s/p stent), diastolic CHF, HTN, HLD, hx of CVA, diet controlled T2DM, CKD Stage III, GERD, COPD, dementia.  She lives with her family and usually walks w a walker,  She was brought to hospital for 3 days of progressive weakness and confusion, sleeping all day.  She had syncope at home.   She tested positive for COVID and UTI in the ED.       UTI  -  E. Coli; pan sensitive   - Rocephin; completed 5-day course     COVID-19 infection  COPD, compensated  -- tested + for Covid on 10/14  - CT shows possible right small pleural effusion  - Completed Paxlovid.  She remained on room air throughout her stay     Syncope  - Likely multifactorial in the setting of UTI and COVID and dehydration.  - Fall with left hand laceration status post a dissolving suture in ED.   - CT head unremarkable  - Echo with EF 41-45%, left wall  hypokinesis, grade II diastolic dysfunctionpending  - no further syncope in house; has worked with PT and sits up in chair a lot.      Dementia - Patient conversant, not always oriented      Discharge Follow Up Recommendations for outpatient labs/diagnostics:   - FU with PCP after discharge from in rehab facility     Day of Discharge     HPI:   sleeping this morning.  Was alert earlier, no new issues     Review of Systems  Stable per staff    Vital Signs:   Temp:  [98 °F (36.7 °C)-98.6 °F (37 °C)] 98.6 °F (37 °C)  Heart Rate:  [53-73] 58  Resp:  [18] 18  BP: (131-148)/(49-77) 137/77      Physical Exam:  Gen:  WD/WN woman asleep in bed, NAD   Neuro: sleeping,  RRR  Lungs clear, not labored       Pertinent  and/or Most Recent Results     LAB RESULTS:      Lab 10/18/24  0334   WBC 5.53   HEMOGLOBIN 12.9   HEMATOCRIT 39.1   PLATELETS 126*   NEUTROS ABS 2.29   IMMATURE GRANS (ABS) 0.01   LYMPHS ABS 2.56   MONOS ABS 0.59   EOS ABS 0.07   MCV 94.4         Lab 10/21/24  0312 10/19/24  0807 10/18/24  0334   SODIUM 139 137 138   POTASSIUM 4.0 4.5 4.6   CHLORIDE 104 104 103   CO2 23.0 22.0 21.0*   ANION GAP 12.0 11.0 14.0   BUN 34* 33* 36*   CREATININE 1.55* 1.18* 1.58*   EGFR 33.5* 46.5* 32.8*   GLUCOSE 77 78 66   CALCIUM 8.7 9.0 9.1   MAGNESIUM  --   --  1.9         Lab 10/21/24  0312 10/18/24  0334   TOTAL PROTEIN 6.3 6.4   ALBUMIN 3.1* 3.3*   GLOBULIN 3.2 3.1   ALT (SGPT) 23 14   AST (SGOT) 27 21   BILIRUBIN 0.4 0.2   ALK PHOS 77 77                     Brief Urine Lab Results  (Last result in the past 365 days)        Color   Clarity   Blood   Leuk Est   Nitrite   Protein   CREAT   Urine HCG        10/14/24 2203 Yellow   Cloudy   Moderate (2+)   Moderate (2+)   Positive   100 mg/dL (2+)                 Microbiology Results (last 10 days)       Procedure Component Value - Date/Time    Respiratory Panel PCR w/COVID-19(SARS-CoV-2) KAYE/HEIKE/LEILANI/PAD/COR/LEIA In-House, NP Swab in UTM/VTM, 2 HR TAT - Swab, Nasopharynx [[id number]]   (Abnormal) Collected: 10/15/24 0448    Lab Status: Final result Specimen: Swab from Nasopharynx Updated: 10/15/24 0549     ADENOVIRUS, PCR Not Detected     Coronavirus 229E Not Detected     Coronavirus HKU1 Not Detected     Coronavirus NL63 Not Detected     Coronavirus OC43 Not Detected     COVID19 Detected     Human Metapneumovirus Not Detected     Human Rhinovirus/Enterovirus Not Detected     Influenza A PCR Not Detected     Influenza B PCR Not Detected     Parainfluenza Virus 1 Not Detected     Parainfluenza Virus 2 Not Detected     Parainfluenza Virus 3 Not Detected     Parainfluenza Virus 4 Not Detected     RSV, PCR Not Detected     Bordetella pertussis pcr Not Detected     Bordetella parapertussis PCR Not Detected     Chlamydophila pneumoniae PCR Not Detected     Mycoplasma pneumo by PCR Not Detected    Narrative:      In the setting of a positive respiratory panel with a viral infection PLUS a negative procalcitonin without other underlying concern for bacterial infection, consider observing off antibiotics or discontinuation of antibiotics and continue supportive care. If the respiratory panel is positive for atypical bacterial infection (Bordetella pertussis, Chlamydophila pneumoniae, or Mycoplasma pneumoniae), consider antibiotic de-escalation to target atypical bacterial infection.    Blood Culture - Blood, Arm, Left [[id number]]  (Normal) Collected: 10/15/24 0431    Lab Status: Final result Specimen: Blood from Arm, Left Updated: 10/20/24 0615     Blood Culture No growth at 5 days    Narrative:      Less than seven (7) mL's of blood was collected.  Insufficient quantity may yield false negative results.    Blood Culture - Blood, Hand, Right [[id number]]  (Normal) Collected: 10/15/24 0431    Lab Status: Final result Specimen: Blood from Hand, Right Updated: 10/20/24 0615     Blood Culture No growth at 5 days    Narrative:      Less than seven (7) mL's of blood was collected.  Insufficient quantity may  yield false negative results.    COVID PRE-OP / PRE-PROCEDURE SCREENING ORDER (NO ISOLATION) - Swab, Nasopharynx [[id number]]  (Abnormal) Collected: 10/14/24 2252    Lab Status: Final result Specimen: Swab from Nasopharynx Updated: 10/14/24 2324    Narrative:      The following orders were created for panel order COVID PRE-OP / PRE-PROCEDURE SCREENING ORDER (NO ISOLATION) - Swab, Nasopharynx.  Procedure                               Abnormality         Status                     ---------                               -----------         ------                     COVID-19 and FLU A/B PCR...[[id number]]  Abnormal            Final result                 Please view results for these tests on the individual orders.    COVID-19 and FLU A/B PCR, 1 HR TAT - Swab, Nasopharynx [[id number]]  (Abnormal) Collected: 10/14/24 2252    Lab Status: Final result Specimen: Swab from Nasopharynx Updated: 10/14/24 2324     COVID19 Detected     Influenza A PCR Not Detected     Influenza B PCR Not Detected    Narrative:      Fact sheet for providers: https://www.fda.gov/media/681107/download    Fact sheet for patients: https://www.fda.gov/media/986616/download    Test performed by PCR.  Influenza A and Influenza B negative results should be considered presumptive in samples that have a positive SARS-CoV-2 result.    Competitive inhibition studies showed that SARS-CoV-2 virus, when present at concentrations above 3.6E+04 copies/mL, can inhibit the detection and amplification of influenza A and influenza B virus RNA if present at or below 1.8E+02 copies/mL or 4.9E+02 copies/mL, respectively, and may lead to false negative influenza virus results. If co-infection with influenza A or influenza B virus is suspected in samples with a positive SARS-CoV-2 result, the sample should be re-tested with another FDA cleared, approved, or authorized influenza test, if influenza virus detection would change clinical management.    Urine Culture - Urine,  Straight Cath [[id number]]  (Abnormal)  (Susceptibility) Collected: 10/14/24 2200    Lab Status: Final result Specimen: Urine from Straight Cath Updated: 10/17/24 1020     Urine Culture >100,000 CFU/mL Escherichia coli    Narrative:      Colonization of the urinary tract without infection is common. Treatment is discouraged unless the patient is symptomatic, pregnant, or undergoing an invasive urologic procedure.    Susceptibility        Escherichia coli      MIGUEL ANGEL      Amoxicillin + Clavulanate Susceptible      Ampicillin Susceptible      Ampicillin + Sulbactam Susceptible      Cefazolin Susceptible      Cefepime Susceptible      Ceftazidime Susceptible      Ceftriaxone Susceptible      Gentamicin Susceptible      Levofloxacin Susceptible      Nitrofurantoin Susceptible      Piperacillin + Tazobactam Susceptible      Trimethoprim + Sulfamethoxazole Susceptible                                   Adult Transthoracic Echo Complete W/ Cont if Necessary Per Protocol    Result Date: 10/15/2024    Left ventricular systolic function is mildly decreased. Calculated left ventricular EF = 41.4% Left ventricular ejection fraction appears to be 41 - 45%.   The following left ventricular wall segments are hypokinetic: apical anterior, apical lateral, apical inferior, apical septal and apex hypokinetic.   Left ventricular diastolic function is consistent with (grade II w/high LAP) pseudonormalization.   Mild aortic valve stenosis is present. Aortic valve area is 1.8 cm2.   Peak velocity of the flow distal to the aortic valve is 209.8 cm/s. Aortic valve maximum pressure gradient is 18 mmHg. Aortic valve mean pressure gradient is 10 mmHg. Aortic valve dimensionless index is 0 .   Mild mitral valve stenosis is present. The mitral valve peak gradient is 13 mmHg. The mitral valve mean gradient is 5 mmHg.   Estimated right ventricular systolic pressure from tricuspid regurgitation is normal (<35 mmHg).     Duplex Carotid Ultrasound  CAR    Result Date: 10/15/2024    Right internal carotid artery demonstrates a less than 50% stenosis.   Antegrade right vertebral flow.   Left internal carotid artery demonstrates a 50-69% stenosis, mild increase in velocities from study 2/2024.   Antegrade left vertebral flow.     CT Abdomen Pelvis With Contrast    Result Date: 10/14/2024  CT ABDOMEN PELVIS W CONTRAST Date of Exam: 10/14/2024 10:21 PM EDT Indication: generalized abdominal pain, vomiting. Comparison: 7/21/2020 Technique: Axial CT images were obtained of the abdomen and pelvis following the uneventful intravenous administration of contrast. Reconstructed coronal and sagittal images were also obtained. Automated exposure control and iterative construction methods were used. Findings: There is a small right pleural effusion with some adjacent atelectasis in the right lower lobe. The left lung base is clear. There is extensive atherosclerotic disease, including within the mesenteric arteries. No aortic aneurysm. Gallbladder is surgically absent. No biliary obstruction is seen. There is some atrophy of the pancreas. There are bilateral renal cysts. No follow-up is indicated. There is right renal atrophy with cortical thinning and cortical lobulation. Both kidneys are nonobstructed. Mild left adrenal hyperplasia is unchanged. Solid abdominal organs are otherwise normal. The urinary bladder and solid pelvic organs appear normal. The appendix has been removed. There is a lipoma at the ileocecal valve. No evidence of acute colitis. No significant stool burden. No small bowel obstruction or clear evidence of enteritis. Stomach is normal except for a stable small hiatal hernia. No free fluid or adenopathy is identified. Old compression fracture with kyphoplasty noted at L4.     1. Small right pleural effusion with right lower lobe atelectasis. 2. No clearly acute findings in the abdomen or pelvis. 3. Appendectomy. No bowel obstruction or evidence of enteritis  or colitis. 4. Nonobstructed kidneys with renal atrophy on the right. Electronically Signed: Mat Godwin MD  10/14/2024 10:41 PM EDT  Workstation ID: [id number]    CT Head Without Contrast    Result Date: 10/14/2024  CT HEAD WO CONTRAST Date of Exam: 10/14/2024 10:21 PM EDT Indication: fall, ams. Comparison: 3/17/2021 Technique: Axial CT images were obtained of the head without contrast administration.  Automated exposure control and iterative construction methods were used. Findings: There is no evidence of acute territorial infarction. There is no acute intracranial hemorrhage. There are no extra-axial collections. Ventricles and CSF spaces are symmetric. No mass effect nor hydrocephalus. Brain parenchyma appears unchanged with advanced white matter hypoattenuation.  Paranasal sinuses and mastoid air cells are adequately aerated.  Osseous structures and orbits appear intact.     Impression: * 1. No acute process identified. Electronically Signed: Albaro Barrera MD  10/14/2024 10:33 PM EDT  Workstation ID: [id number]    XR Chest 1 View    Result Date: 10/14/2024  XR CHEST 1 VW Date of Exam: 10/14/2024 9:19 PM EDT Indication: ams Comparison: Chest radiograph 6/13/2022 Findings: Mediastinum: Cardiomediastinal silhouette appears unchanged and enlarged. Lungs: Mild peripheral right lung base hazy opacity. Pleura: Mild blunting of right costophrenic sulcus. No pneumothorax. Bones and soft tissues: No acute osseous abnormality.     Impression: Possible small right pleural effusion with adjacent atelectasis. Superimposed infection to be excluded clinically. Electronically Signed: García Argueta  10/14/2024 9:44 PM EDT  Workstation ID: [id number]     Results for orders placed during the hospital encounter of 10/14/24    Duplex Carotid Ultrasound CAR    Interpretation Summary    Right internal carotid artery demonstrates a less than 50% stenosis.    Antegrade right vertebral flow.    Left internal carotid artery demonstrates  a 50-69% stenosis, mild increase in velocities from study 2/2024.    Antegrade left vertebral flow.      Results for orders placed during the hospital encounter of 10/14/24    Duplex Carotid Ultrasound CAR    Interpretation Summary    Right internal carotid artery demonstrates a less than 50% stenosis.    Antegrade right vertebral flow.    Left internal carotid artery demonstrates a 50-69% stenosis, mild increase in velocities from study 2/2024.    Antegrade left vertebral flow.      Results for orders placed during the hospital encounter of 10/14/24    Adult Transthoracic Echo Complete W/ Cont if Necessary Per Protocol    Interpretation Summary    Left ventricular systolic function is mildly decreased. Calculated left ventricular EF = 41.4% Left ventricular ejection fraction appears to be 41 - 45%.    The following left ventricular wall segments are hypokinetic: apical anterior, apical lateral, apical inferior, apical septal and apex hypokinetic.    Left ventricular diastolic function is consistent with (grade II w/high LAP) pseudonormalization.    Mild aortic valve stenosis is present. Aortic valve area is 1.8 cm2.    Peak velocity of the flow distal to the aortic valve is 209.8 cm/s. Aortic valve maximum pressure gradient is 18 mmHg. Aortic valve mean pressure gradient is 10 mmHg. Aortic valve dimensionless index is 0 .    Mild mitral valve stenosis is present. The mitral valve peak gradient is 13 mmHg. The mitral valve mean gradient is 5 mmHg.    Estimated right ventricular systolic pressure from tricuspid regurgitation is normal (<35 mmHg).    Discharge Details        Discharge Medications        New Medications        Instructions Start Date   sennosides-docusate 8.6-50 MG per tablet  Commonly known as: PERICOLACE   2 tablets, Oral, 2 Times Daily PRN             Continue These Medications        Instructions Start Date   albuterol sulfate  (90 Base) MCG/ACT inhaler  Commonly known as: PROVENTIL  HFA;VENTOLIN HFA;PROAIR HFA   2 puffs, Inhalation, Every 4 Hours PRN      atorvastatin 40 MG tablet  Commonly known as: LIPITOR   40 mg, Oral, Nightly      clopidogrel 75 MG tablet  Commonly known as: PLAVIX   75 mg, Oral, Daily      ipratropium-albuterol 0.5-2.5 mg/3 ml nebulizer  Commonly known as: DUO-NEB   Take 3 ml by nebulization every TID-QID prn wheezing or shortness of breath      valsartan 160 MG tablet  Commonly known as: DIOVAN   240 mg, Oral, Daily      Vitamin D3 50 MCG (2000 UT) capsule   2,000 Units, Oral, Daily, 2000 units              Stop These Medications      docusate sodium 100 MG capsule  Commonly known as: Colace     nystatin 354596 UNIT/GM powder  Commonly known as: MYCOSTATIN              No Known Allergies      Discharge Disposition:  Skilled Nursing Facility (DC - External)    Diet:  Hospital:  Diet Order   Procedures    Diet: Regular/House, Cardiac, Diabetic; Healthy Heart (2-3 Na+); Consistent Carbohydrate; Fluid Consistency: Thin (IDDSI 0)            Activity: as tolerated     CODE STATUS:    Code Status and Medical Interventions: No CPR (Do Not Attempt to Resuscitate); Limited Support; No intubation (DNI); DNR/DNI   Ordered at: 10/15/24 0122     Medical Intervention Limits:    No intubation (DNI)     Level Of Support Discussed With:    Health Care Surrogate     Code Status (Patient has no pulse and is not breathing):    No CPR (Do Not Attempt to Resuscitate)     Medical Interventions (Patient has pulse or is breathing):    Limited Support     Comments:    DNR/DNI           Nidhi Wylie MD  10/24/24      Time Spent on Discharge:  I spent  35  minutes on this discharge activity which included: face-to-face encounter with the patient, reviewing the data in the system, coordination of the care with the nursing staff as well as consultants, documentation, and entering orders.

## 2024-10-24 NOTE — SIGNIFICANT NOTE
Received a call from Tonia with [facility]. She said, they reviewed the information that I faxed to her yesterday and they approved for patient to go to SNF. She will be sending an approval letter out soon. JENNA will continue to follow.

## 2024-10-27 LAB
QT INTERVAL: 358 MS
QTC INTERVAL: 399 MS

## 2025-03-26 ENCOUNTER — APPOINTMENT (OUTPATIENT)
Dept: GENERAL RADIOLOGY | Facility: HOSPITAL | Age: 80
End: 2025-03-26
Payer: MEDICARE

## 2025-03-26 ENCOUNTER — APPOINTMENT (OUTPATIENT)
Dept: CT IMAGING | Facility: HOSPITAL | Age: 80
End: 2025-03-26
Payer: MEDICARE

## 2025-03-26 ENCOUNTER — HOSPITAL ENCOUNTER (EMERGENCY)
Facility: HOSPITAL | Age: 80
Discharge: HOME | End: 2025-03-26
Attending: EMERGENCY MEDICINE
Payer: MEDICARE

## 2025-03-26 ENCOUNTER — APPOINTMENT (OUTPATIENT)
Dept: OTHER | Facility: HOSPITAL | Age: 80
End: 2025-03-26
Payer: MEDICARE

## 2025-03-26 VITALS
SYSTOLIC BLOOD PRESSURE: 121 MMHG | TEMPERATURE: 97.8 F | DIASTOLIC BLOOD PRESSURE: 45 MMHG | WEIGHT: 242.51 LBS | HEIGHT: 61 IN | RESPIRATION RATE: 18 BRPM | HEART RATE: 76 BPM | BODY MASS INDEX: 45.79 KG/M2 | OXYGEN SATURATION: 95 %

## 2025-03-26 DIAGNOSIS — G44.319 ACUTE POST-TRAUMATIC HEADACHE, NOT INTRACTABLE: ICD-10-CM

## 2025-03-26 DIAGNOSIS — S92.424A NONDISPLACED FRACTURE OF DISTAL PHALANX OF RIGHT GREAT TOE, INITIAL ENCOUNTER FOR CLOSED FRACTURE: ICD-10-CM
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
## 2025-03-26 DIAGNOSIS — S09.90XA MINOR CLOSED HEAD INJURY: ICD-10-CM

## 2025-03-26 LAB
HOLD SPECIMEN: NORMAL
WHOLE BLOOD HOLD COAG: NORMAL
WHOLE BLOOD HOLD SPECIMEN: NORMAL

## 2025-03-26 PROCEDURE — 99284 EMERGENCY DEPT VISIT MOD MDM: CPT

## 2025-03-26 PROCEDURE — 73660 X-RAY EXAM OF TOE(S): CPT

## 2025-03-26 PROCEDURE — 70450 CT HEAD/BRAIN W/O DYE: CPT

## 2025-03-26 RX ORDER — SODIUM CHLORIDE 0.9 % (FLUSH) 0.9 %
10 SYRINGE (ML) INJECTION AS NEEDED
Status: DISCONTINUED | OUTPATIENT
Start: 2025-03-26 | End: 2025-03-26 | Stop reason: HOSPADM

## 2025-03-26 RX ORDER — ACETAMINOPHEN 325 MG/1
650 TABLET ORAL ONCE
Status: COMPLETED | OUTPATIENT
Start: 2025-03-26 | End: 2025-03-26

## 2025-03-26 RX ADMIN — ACETAMINOPHEN 650 MG: 325 TABLET, FILM COATED ORAL at 06:41

## 2025-03-26 NOTE — ED PROVIDER NOTES
Subjective   History of Present Illness  Mrs. Velázquez presents via EMS from [facility].  She had a witnessed fall out of bed.  It was reported she got tangled up and covers in bed and fell out of bed onto her head.  She remembers falling.  She denies recent illness.  Denies chest pain or shortness of breath.  She does acknowledge headache and has obvious bruising over her forehead.  When asked if she hurts anywhere else she tells me her big toes hurt on both feet.  Mostly the right.      Review of Systems    Past Medical History:   Diagnosis Date    Acute kidney injury 12/05/2017    Aortic regurgitation     Arthritis of shoulder 05/18/2016    Body mass index (BMI) of 45.0-49.9 in adult 06/14/2019    Bright red rectal bleeding 12/03/2023    CHF (congestive heart failure)     Closed compression fracture of L4 lumbar vertebra 12/04/2017    Added automatically from request for surgery [id number]    Colitis 07/21/2020    Constipation 12/05/2017    COPD (chronic obstructive pulmonary disease)     Coronary artery disease 05/18/2016    Diabetes mellitus type 2 in obese 01/29/2018    Elevated alkaline phosphatase level 02/26/2018    Elevated creatine kinase     Essential hypertension 05/18/2016    GERD (gastroesophageal reflux disease) 05/18/2016    Glaucoma 07/21/2020    History of kyphoplasty 01/30/2018    Hyperlipidemia     Lumbar facet arthropathy 01/29/2018    Lumbar stenosis with neurogenic claudication 01/29/2018    Morbid obesity due to excess calories 01/29/2018    Myocardial infarction 05/18/2016    Osteoporosis 05/18/2016    Physical deconditioning 01/30/2018    Retinal emboli, 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    Sepsis     uro    Stroke     Urinary incontinence without sensory awareness 02/26/2018       No Known Allergies    Past Surgical History:   Procedure Laterality Date    APPENDECTOMY      BACK SURGERY      CARDIAC CATHETERIZATION      CHOLECYSTECTOMY      CORONARY STENT PLACEMENT      EYE SURGERY      KYPHOPLASTY  N/A 2017    Procedure: KYPHOPLASTY L4;  Surgeon: Marc Pham MD;  Location: [facility];  Service:        Family History   Problem Relation Age of Onset    Diabetes Mother     Heart failure Mother     Heart failure Father     No Known Problems Sister     No Known Problems Brother     No Known Problems Son     No Known Problems Daughter        Social History     Socioeconomic History    Marital status:    Tobacco Use    Smoking status: Former     Current packs/day: 0.00     Average packs/day: 1 pack/day for 15.0 years (15.0 ttl pk-yrs)     Types: Cigarettes     Start date: 1985     Quit date: 2000     Years since quittin.2     Passive exposure: Past    Smokeless tobacco: Never   Vaping Use    Vaping status: Never Used   Substance and Sexual Activity    Alcohol use: No    Drug use: No    Sexual activity: Not Currently     Partners: Male           Objective   Physical Exam  Vitals and nursing note reviewed.   Constitutional:       General: She is not in acute distress.     Appearance: Normal appearance.   HENT:      Head: Normocephalic.      Comments: She has bruising over her right temple and hematoma over right forehead     Nose: Nose normal. No congestion or rhinorrhea.   Eyes:      General: No scleral icterus.     Conjunctiva/sclera: Conjunctivae normal.   Neck:      Comments: No JVD   Cardiovascular:      Rate and Rhythm: Normal rate and regular rhythm.      Heart sounds: No murmur heard.     No friction rub.   Pulmonary:      Effort: Pulmonary effort is normal.      Breath sounds: Normal breath sounds. No wheezing or rales.   Abdominal:      General: Bowel sounds are normal.      Palpations: Abdomen is soft.      Tenderness: There is no abdominal tenderness. There is no guarding or rebound.   Musculoskeletal:         General: Tenderness present.      Cervical back: Normal range of motion and neck supple.      Right lower leg: No edema.      Left lower leg: No edema.      Comments:  She has tenderness over the distal right great toe, no redness, deformity, or bruising however.  She is mildly tender over the left great toe as well.   Skin:     General: Skin is warm and dry.      Coloration: Skin is not pale.      Findings: No erythema.   Neurological:      General: No focal deficit present.      Mental Status: She is alert.      Motor: No weakness.      Coordination: Coordination normal.   Psychiatric:         Mood and Affect: Mood normal.         Behavior: Behavior normal.         Thought Content: Thought content normal.         Procedures           ED Course                                                       Medical Decision Making  Ordered and interpreted x-rays and CT scan.  Had reevaluation and discussion with patient and family    Problems Addressed:  Acute post-traumatic headache, not intractable: complicated acute illness or injury that poses a threat to life or bodily functions  Fall from bed, initial encounter: complicated acute illness or injury  Minor closed head injury: complicated acute illness or injury that poses a threat to life or bodily functions  Nondisplaced fracture of distal phalanx of right great toe, initial encounter for closed fracture: complicated acute illness or injury    Amount and/or Complexity of Data Reviewed  Radiology: ordered. Decision-making details documented in ED Course.    Risk  OTC drugs.        Final diagnoses:   Fall from bed, initial encounter   Minor closed head injury   Acute post-traumatic headache, not intractable   Nondisplaced fracture of distal phalanx of right great toe, initial encounter for closed fracture       ED Disposition  ED Disposition       ED Disposition   Discharge    Condition   Stable    Comment   --               No follow-up provider specified.       Medication List      No changes were made to your prescriptions during this visit.            Albaro Harris MD  03/26/25 [id number]

## 2025-03-26 NOTE — DISCHARGE INSTRUCTIONS
Apply ice to your foot intermittently over the next couple of days.  Elevated.  Tylenol as needed for pain.

## 2025-08-10 ENCOUNTER — HOSPITAL ENCOUNTER (EMERGENCY)
Facility: HOSPITAL | Age: 80
Discharge: HOME | End: 2025-08-10
Attending: EMERGENCY MEDICINE | Admitting: EMERGENCY MEDICINE
Payer: MEDICARE

## 2025-08-10 ENCOUNTER — APPOINTMENT (OUTPATIENT)
Dept: GENERAL RADIOLOGY | Facility: HOSPITAL | Age: 80
End: 2025-08-10
Payer: MEDICARE

## 2025-08-10 VITALS
HEIGHT: 61 IN | WEIGHT: 245 LBS | BODY MASS INDEX: 46.26 KG/M2 | OXYGEN SATURATION: 88 % | SYSTOLIC BLOOD PRESSURE: 128 MMHG | DIASTOLIC BLOOD PRESSURE: 56 MMHG | HEART RATE: 66 BPM | TEMPERATURE: 97.8 F | RESPIRATION RATE: 18 BRPM

## 2025-08-10 DIAGNOSIS — M25.551 RIGHT HIP PAIN: ICD-10-CM
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
## 2025-08-10 PROCEDURE — 99283 EMERGENCY DEPT VISIT LOW MDM: CPT

## 2025-08-10 PROCEDURE — 73502 X-RAY EXAM HIP UNI 2-3 VIEWS: CPT

## 2025-08-10 RX ORDER — ACETAMINOPHEN 325 MG/1
650 TABLET ORAL ONCE
Status: COMPLETED | OUTPATIENT
Start: 2025-08-10 | End: 2025-08-10

## 2025-08-10 RX ADMIN — ACETAMINOPHEN 650 MG: 325 TABLET ORAL at 09:18

## (undated) DEVICE — ANTIBACTERIAL UNDYED BRAIDED (POLYGLACTIN 910), SYNTHETIC ABSORBABLE SUTURE: Brand: COATED VICRYL

## (undated) DEVICE — PK KYPHOPLASTY 10

## (undated) DEVICE — SKIN AFFIX SURG ADHESIVE 72/CS 0.55ML: Brand: MEDLINE

## (undated) DEVICE — BONE BIOPSY DEVICE F05A BBD SIZE 3: Brand: MEDTRONIC REUSABLE INSTRUMENTS

## (undated) DEVICE — HDRST INTUB GENTLETOUCH SLOT 7IN RT

## (undated) DEVICE — GLV SURG RAD SENSICARE SHLD PF LF SZ8 STRL

## (undated) DEVICE — MEDI-VAC YANKAUER SUCTION HANDLE W/BULBOUS TIP: Brand: CARDINAL HEALTH

## (undated) DEVICE — ENCORE® LATEX MICRO SIZE 7.5, STERILE LATEX POWDER-FREE SURGICAL GLOVE: Brand: ENCORE

## (undated) DEVICE — MIXER A07A CEMENT

## (undated) DEVICE — BONE TAMP KIT KPX203PB FF X2 20/3 1 STP: Brand: KYPHOPAK™ TRAY

## (undated) DEVICE — APPL CHLORAPREP W/TINT 26ML BLU

## (undated) DEVICE — ENCORE® LATEX MICRO SIZE 6.5, STERILE LATEX POWDER-FREE SURGICAL GLOVE: Brand: ENCORE

## (undated) DEVICE — AIRWY 90MM NO9

## (undated) DEVICE — ENCORE® LATEX MICRO SIZE 7, STERILE LATEX POWDER-FREE SURGICAL GLOVE: Brand: ENCORE

## (undated) DEVICE — MEDI-VAC NON-CONDUCTIVE SUCTION TUBING: Brand: CARDINAL HEALTH

## (undated) DEVICE — CANNULA,OXY,ADULT,SUPERSOFT,W/7'TUB,UC: Brand: MEDLINE